# Patient Record
Sex: MALE | Race: WHITE | NOT HISPANIC OR LATINO | Employment: OTHER | URBAN - METROPOLITAN AREA
[De-identification: names, ages, dates, MRNs, and addresses within clinical notes are randomized per-mention and may not be internally consistent; named-entity substitution may affect disease eponyms.]

---

## 2019-06-02 PROBLEM — K21.00 GERD WITH ESOPHAGITIS: Status: ACTIVE | Noted: 2017-11-09

## 2019-06-02 PROBLEM — D35.01 ADENOMA OF RIGHT ADRENAL GLAND: Status: ACTIVE | Noted: 2017-11-09

## 2019-06-03 PROBLEM — Z12.5 SCREENING FOR MALIGNANT NEOPLASM OF PROSTATE: Status: ACTIVE | Noted: 2019-06-03

## 2019-06-03 PROBLEM — Z51.81 ENCOUNTER FOR THERAPEUTIC DRUG MONITORING: Status: ACTIVE | Noted: 2019-06-03

## 2019-06-03 PROBLEM — E55.9 VITAMIN D DEFICIENCY: Status: ACTIVE | Noted: 2019-06-03

## 2019-06-03 PROBLEM — E13.9 DIABETES 1.5, MANAGED AS TYPE 2: Status: ACTIVE | Noted: 2019-06-03

## 2019-06-03 PROBLEM — Z79.899 ENCOUNTER FOR LONG-TERM (CURRENT) USE OF MEDICATIONS: Status: ACTIVE | Noted: 2019-06-03

## 2019-06-03 PROBLEM — E56.9 MULTIPLE VITAMIN DEFICIENCY DISEASE: Status: ACTIVE | Noted: 2019-06-03

## 2019-06-03 PROBLEM — E88.810 METABOLIC SYNDROME: Status: ACTIVE | Noted: 2019-06-03

## 2019-06-03 PROBLEM — Z79.899 OTHER LONG TERM (CURRENT) DRUG THERAPY: Status: ACTIVE | Noted: 2019-06-03

## 2019-06-03 PROBLEM — R53.82 CHRONIC FATIGUE AND MALAISE: Status: ACTIVE | Noted: 2019-06-03

## 2019-06-03 PROBLEM — R93.89 ABNORMAL CAT SCAN: Status: ACTIVE | Noted: 2019-06-03

## 2019-06-03 PROBLEM — M15.9 GENERALIZED OSTEOARTHROSIS, INVOLVING MULTIPLE SITES: Status: ACTIVE | Noted: 2019-06-03

## 2019-06-03 PROBLEM — E08.43 DIABETES MELLITUS DUE TO UNDERLYING CONDITION WITH DIABETIC AUTONOMIC NEUROPATHY, WITHOUT LONG-TERM CURRENT USE OF INSULIN: Status: ACTIVE | Noted: 2019-06-03

## 2019-06-03 PROBLEM — R53.81 CHRONIC FATIGUE AND MALAISE: Status: ACTIVE | Noted: 2019-06-03

## 2019-06-03 PROBLEM — G60.9 HEREDITARY AND IDIOPATHIC NEUROPATHY: Status: ACTIVE | Noted: 2019-06-03

## 2019-06-03 PROBLEM — R06.09 DYSPNEA ON MINIMAL EXERTION: Status: ACTIVE | Noted: 2019-06-03

## 2019-06-03 PROBLEM — Z71.3 DIETARY COUNSELING: Status: ACTIVE | Noted: 2019-06-03

## 2019-06-13 PROBLEM — R93.3 ABNORMAL COMPUTED TOMOGRAPHY OF ESOPHAGUS: Status: ACTIVE | Noted: 2019-06-13

## 2019-07-15 PROBLEM — G60.9 HEREDITARY AND IDIOPATHIC NEUROPATHY: Chronic | Status: ACTIVE | Noted: 2019-06-03

## 2019-07-15 PROBLEM — Z79.899 ENCOUNTER FOR LONG-TERM (CURRENT) USE OF MEDICATIONS: Chronic | Status: ACTIVE | Noted: 2019-06-03

## 2019-07-15 PROBLEM — Z71.3 DIETARY COUNSELING: Status: ACTIVE | Noted: 2019-07-15

## 2019-07-15 PROBLEM — Z09 FOLLOW-UP EXAMINATION FOLLOWING COMBINED TREATMENT: Status: ACTIVE | Noted: 2019-07-15

## 2019-07-16 PROBLEM — Z71.2 PERSON CONSULTING FOR EXPLANATION OF EXAMINATION OR TEST FINDINGS: Status: ACTIVE | Noted: 2019-07-16

## 2019-07-16 PROBLEM — G47.00 INSOMNIA: Status: ACTIVE | Noted: 2019-07-16

## 2019-10-14 PROBLEM — Z09 FOLLOW-UP EXAMINATION FOLLOWING COMBINED TREATMENT: Status: RESOLVED | Noted: 2019-07-15 | Resolved: 2019-10-14

## 2019-10-16 PROBLEM — H68.001: Status: ACTIVE | Noted: 2019-10-16

## 2020-01-27 ENCOUNTER — HOSPITAL ENCOUNTER (INPATIENT)
Facility: HOSPITAL | Age: 60
LOS: 11 days | Discharge: NURSING, REHAB OR LONG-TERM CARE | DRG: 041 | End: 2020-02-07
Attending: EMERGENCY MEDICINE | Admitting: PSYCHIATRY & NEUROLOGY
Payer: MEDICARE

## 2020-01-27 DIAGNOSIS — D62 ACUTE BLOOD LOSS ANEMIA: ICD-10-CM

## 2020-01-27 DIAGNOSIS — S43.005A DISLOCATION OF LEFT SHOULDER JOINT, INITIAL ENCOUNTER: ICD-10-CM

## 2020-01-27 DIAGNOSIS — K21.00 GERD WITH ESOPHAGITIS: ICD-10-CM

## 2020-01-27 DIAGNOSIS — S42.91XA CLOSED FRACTURE DISLOCATION OF JOINT OF RIGHT SHOULDER GIRDLE, INITIAL ENCOUNTER: ICD-10-CM

## 2020-01-27 DIAGNOSIS — S43.006A SHOULDER DISLOCATION: ICD-10-CM

## 2020-01-27 DIAGNOSIS — I10 HTN (HYPERTENSION): ICD-10-CM

## 2020-01-27 DIAGNOSIS — E08.43 DIABETES MELLITUS DUE TO UNDERLYING CONDITION WITH DIABETIC AUTONOMIC NEUROPATHY, WITHOUT LONG-TERM CURRENT USE OF INSULIN: ICD-10-CM

## 2020-01-27 DIAGNOSIS — S43.004A DISLOCATION OF RIGHT SHOULDER JOINT, INITIAL ENCOUNTER: ICD-10-CM

## 2020-01-27 DIAGNOSIS — S42.121D CLOSED DISPLACED FRACTURE OF RIGHT ACROMIAL PROCESS WITH ROUTINE HEALING, SUBSEQUENT ENCOUNTER: ICD-10-CM

## 2020-01-27 DIAGNOSIS — E55.9 VITAMIN D DEFICIENCY: ICD-10-CM

## 2020-01-27 DIAGNOSIS — F10.10 ALCOHOL ABUSE: Chronic | ICD-10-CM

## 2020-01-27 DIAGNOSIS — F17.200 CURRENT EVERY DAY SMOKER: ICD-10-CM

## 2020-01-27 DIAGNOSIS — S42.92XA CLOSED FRACTURE DISLOCATION OF JOINT OF LEFT SHOULDER GIRDLE, INITIAL ENCOUNTER: ICD-10-CM

## 2020-01-27 DIAGNOSIS — R06.02 SHORTNESS OF BREATH: ICD-10-CM

## 2020-01-27 DIAGNOSIS — J42 CHRONIC BRONCHITIS, UNSPECIFIED CHRONIC BRONCHITIS TYPE: Chronic | ICD-10-CM

## 2020-01-27 DIAGNOSIS — S42.91XD CLOSED FRACTURE DISLOCATION OF JOINT OF RIGHT SHOULDER GIRDLE WITH ROUTINE HEALING, SUBSEQUENT ENCOUNTER: ICD-10-CM

## 2020-01-27 DIAGNOSIS — I48.0 PAF (PAROXYSMAL ATRIAL FIBRILLATION): ICD-10-CM

## 2020-01-27 DIAGNOSIS — M54.50 ACUTE BILATERAL LOW BACK PAIN, UNSPECIFIED WHETHER SCIATICA PRESENT: ICD-10-CM

## 2020-01-27 DIAGNOSIS — S42.122D: ICD-10-CM

## 2020-01-27 DIAGNOSIS — S42.301B: ICD-10-CM

## 2020-01-27 DIAGNOSIS — I10 ESSENTIAL HYPERTENSION: ICD-10-CM

## 2020-01-27 DIAGNOSIS — K59.00 CONSTIPATION, UNSPECIFIED CONSTIPATION TYPE: ICD-10-CM

## 2020-01-27 DIAGNOSIS — J44.9 CHRONIC OBSTRUCTIVE PULMONARY DISEASE, UNSPECIFIED COPD TYPE: Chronic | ICD-10-CM

## 2020-01-27 DIAGNOSIS — S42.302B: ICD-10-CM

## 2020-01-27 DIAGNOSIS — S06.5XAA SUBDURAL HEMATOMA: Primary | ICD-10-CM

## 2020-01-27 LAB
ALBUMIN SERPL BCP-MCNC: 2.9 G/DL (ref 3.5–5.2)
ALP SERPL-CCNC: 129 U/L (ref 55–135)
ALT SERPL W/O P-5'-P-CCNC: 17 U/L (ref 10–44)
ANION GAP SERPL CALC-SCNC: 11 MMOL/L (ref 8–16)
AST SERPL-CCNC: 22 U/L (ref 10–40)
BASOPHILS # BLD AUTO: 0.05 K/UL (ref 0–0.2)
BASOPHILS NFR BLD: 0.2 % (ref 0–1.9)
BILIRUB SERPL-MCNC: 0.8 MG/DL (ref 0.1–1)
BUN SERPL-MCNC: 12 MG/DL (ref 6–20)
CALCIUM SERPL-MCNC: 7.2 MG/DL (ref 8.7–10.5)
CHLORIDE SERPL-SCNC: 102 MMOL/L (ref 95–110)
CHOLEST SERPL-MCNC: 122 MG/DL (ref 120–199)
CHOLEST SERPL-MCNC: 122 MG/DL (ref 120–199)
CHOLEST/HDLC SERPL: 5.5 {RATIO} (ref 2–5)
CHOLEST/HDLC SERPL: 5.5 {RATIO} (ref 2–5)
CO2 SERPL-SCNC: 24 MMOL/L (ref 23–29)
CREAT SERPL-MCNC: 1.2 MG/DL (ref 0.5–1.4)
DIFFERENTIAL METHOD: ABNORMAL
EOSINOPHIL # BLD AUTO: 0 K/UL (ref 0–0.5)
EOSINOPHIL NFR BLD: 0 % (ref 0–8)
ERYTHROCYTE [DISTWIDTH] IN BLOOD BY AUTOMATED COUNT: 16.8 % (ref 11.5–14.5)
EST. GFR  (AFRICAN AMERICAN): >60 ML/MIN/1.73 M^2
EST. GFR  (NON AFRICAN AMERICAN): >60 ML/MIN/1.73 M^2
GLUCOSE SERPL-MCNC: 147 MG/DL (ref 70–110)
GLUCOSE SERPL-MCNC: 152 MG/DL (ref 70–110)
HCT VFR BLD AUTO: 44.4 % (ref 40–54)
HDLC SERPL-MCNC: 22 MG/DL (ref 40–75)
HDLC SERPL-MCNC: 22 MG/DL (ref 40–75)
HDLC SERPL: 18 % (ref 20–50)
HDLC SERPL: 18 % (ref 20–50)
HGB BLD-MCNC: 14 G/DL (ref 14–18)
IMM GRANULOCYTES # BLD AUTO: 0.3 K/UL (ref 0–0.04)
IMM GRANULOCYTES NFR BLD AUTO: 1.5 % (ref 0–0.5)
INR PPP: 1.3 (ref 0.8–1.2)
LDLC SERPL CALC-MCNC: 61 MG/DL (ref 63–159)
LDLC SERPL CALC-MCNC: 61 MG/DL (ref 63–159)
LYMPHOCYTES # BLD AUTO: 1 K/UL (ref 1–4.8)
LYMPHOCYTES NFR BLD: 4.9 % (ref 18–48)
MCH RBC QN AUTO: 27.6 PG (ref 27–31)
MCHC RBC AUTO-ENTMCNC: 31.5 G/DL (ref 32–36)
MCV RBC AUTO: 87 FL (ref 82–98)
MONOCYTES # BLD AUTO: 1 K/UL (ref 0.3–1)
MONOCYTES NFR BLD: 4.8 % (ref 4–15)
NEUTROPHILS # BLD AUTO: 17.7 K/UL (ref 1.8–7.7)
NEUTROPHILS NFR BLD: 88.6 % (ref 38–73)
NONHDLC SERPL-MCNC: 100 MG/DL
NONHDLC SERPL-MCNC: 100 MG/DL
NRBC BLD-RTO: 0 /100 WBC
PLATELET # BLD AUTO: 153 K/UL (ref 150–350)
PMV BLD AUTO: 9.6 FL (ref 9.2–12.9)
POTASSIUM SERPL-SCNC: 3.5 MMOL/L (ref 3.5–5.1)
PROT SERPL-MCNC: 6.1 G/DL (ref 6–8.4)
PROTHROMBIN TIME: 12.5 SEC (ref 9–12.5)
RBC # BLD AUTO: 5.08 M/UL (ref 4.6–6.2)
SODIUM SERPL-SCNC: 137 MMOL/L (ref 136–145)
TRIGL SERPL-MCNC: 195 MG/DL (ref 30–150)
TRIGL SERPL-MCNC: 195 MG/DL (ref 30–150)
TROPONIN I SERPL DL<=0.01 NG/ML-MCNC: 0.01 NG/ML (ref 0–0.03)
TSH SERPL DL<=0.005 MIU/L-ACNC: 0.67 UIU/ML (ref 0.4–4)
TSH SERPL DL<=0.005 MIU/L-ACNC: 0.67 UIU/ML (ref 0.4–4)
WBC # BLD AUTO: 20.03 K/UL (ref 3.9–12.7)

## 2020-01-27 PROCEDURE — 80061 LIPID PANEL: CPT

## 2020-01-27 PROCEDURE — 85025 COMPLETE CBC W/AUTO DIFF WBC: CPT

## 2020-01-27 PROCEDURE — 82962 GLUCOSE BLOOD TEST: CPT

## 2020-01-27 PROCEDURE — 99291 CRITICAL CARE FIRST HOUR: CPT | Mod: 25

## 2020-01-27 PROCEDURE — 85610 PROTHROMBIN TIME: CPT

## 2020-01-27 PROCEDURE — 99284 PR EMERGENCY DEPT VISIT,LEVEL IV: ICD-10-PCS | Mod: ,,, | Performed by: EMERGENCY MEDICINE

## 2020-01-27 PROCEDURE — 99284 EMERGENCY DEPT VISIT MOD MDM: CPT | Mod: ,,, | Performed by: EMERGENCY MEDICINE

## 2020-01-27 PROCEDURE — 84484 ASSAY OF TROPONIN QUANT: CPT

## 2020-01-27 PROCEDURE — 63600175 PHARM REV CODE 636 W HCPCS: Performed by: NURSE PRACTITIONER

## 2020-01-27 PROCEDURE — 12000002 HC ACUTE/MED SURGE SEMI-PRIVATE ROOM

## 2020-01-27 PROCEDURE — 86850 RBC ANTIBODY SCREEN: CPT

## 2020-01-27 PROCEDURE — 96375 TX/PRO/DX INJ NEW DRUG ADDON: CPT

## 2020-01-27 PROCEDURE — 99223 PR INITIAL HOSPITAL CARE,LEVL III: ICD-10-PCS | Mod: ,,, | Performed by: NURSE PRACTITIONER

## 2020-01-27 PROCEDURE — 83036 HEMOGLOBIN GLYCOSYLATED A1C: CPT

## 2020-01-27 PROCEDURE — 25000003 PHARM REV CODE 250: Performed by: EMERGENCY MEDICINE

## 2020-01-27 PROCEDURE — 25000003 PHARM REV CODE 250: Performed by: STUDENT IN AN ORGANIZED HEALTH CARE EDUCATION/TRAINING PROGRAM

## 2020-01-27 PROCEDURE — 80053 COMPREHEN METABOLIC PANEL: CPT

## 2020-01-27 PROCEDURE — 99223 1ST HOSP IP/OBS HIGH 75: CPT | Mod: ,,, | Performed by: NURSE PRACTITIONER

## 2020-01-27 PROCEDURE — 84443 ASSAY THYROID STIM HORMONE: CPT

## 2020-01-27 PROCEDURE — 96365 THER/PROPH/DIAG IV INF INIT: CPT

## 2020-01-27 RX ORDER — IPRATROPIUM BROMIDE AND ALBUTEROL SULFATE 2.5; .5 MG/3ML; MG/3ML
3 SOLUTION RESPIRATORY (INHALATION) EVERY 4 HOURS PRN
Status: DISCONTINUED | OUTPATIENT
Start: 2020-01-28 | End: 2020-01-30

## 2020-01-27 RX ORDER — INSULIN ASPART 100 [IU]/ML
1-10 INJECTION, SOLUTION INTRAVENOUS; SUBCUTANEOUS EVERY 6 HOURS PRN
Status: DISCONTINUED | OUTPATIENT
Start: 2020-01-27 | End: 2020-02-01

## 2020-01-27 RX ORDER — SODIUM CHLORIDE 0.9 % (FLUSH) 0.9 %
10 SYRINGE (ML) INJECTION
Status: DISCONTINUED | OUTPATIENT
Start: 2020-01-27 | End: 2020-02-07 | Stop reason: HOSPADM

## 2020-01-27 RX ORDER — ONDANSETRON 2 MG/ML
4 INJECTION INTRAMUSCULAR; INTRAVENOUS EVERY 8 HOURS PRN
Status: DISCONTINUED | OUTPATIENT
Start: 2020-01-27 | End: 2020-02-07 | Stop reason: HOSPADM

## 2020-01-27 RX ORDER — GLUCAGON 1 MG
1 KIT INJECTION
Status: DISCONTINUED | OUTPATIENT
Start: 2020-01-27 | End: 2020-02-01

## 2020-01-27 RX ORDER — LIDOCAINE HYDROCHLORIDE 10 MG/ML
20 INJECTION INFILTRATION; PERINEURAL ONCE
Status: COMPLETED | OUTPATIENT
Start: 2020-01-27 | End: 2020-01-28

## 2020-01-27 RX ORDER — NICARDIPINE HYDROCHLORIDE 0.2 MG/ML
2.5 INJECTION INTRAVENOUS CONTINUOUS
Status: DISCONTINUED | OUTPATIENT
Start: 2020-01-27 | End: 2020-01-29

## 2020-01-27 RX ORDER — SODIUM CHLORIDE 9 MG/ML
INJECTION, SOLUTION INTRAVENOUS CONTINUOUS
Status: DISCONTINUED | OUTPATIENT
Start: 2020-01-27 | End: 2020-02-02

## 2020-01-27 RX ORDER — ATORVASTATIN CALCIUM 20 MG/1
80 TABLET, FILM COATED ORAL NIGHTLY
Status: DISCONTINUED | OUTPATIENT
Start: 2020-01-28 | End: 2020-01-28

## 2020-01-27 RX ORDER — FENTANYL CITRATE 50 UG/ML
6.25 INJECTION, SOLUTION INTRAMUSCULAR; INTRAVENOUS
Status: DISCONTINUED | OUTPATIENT
Start: 2020-01-28 | End: 2020-01-28

## 2020-01-27 RX ORDER — PANTOPRAZOLE SODIUM 40 MG/1
40 TABLET, DELAYED RELEASE ORAL DAILY
Status: DISCONTINUED | OUTPATIENT
Start: 2020-01-28 | End: 2020-01-28

## 2020-01-27 RX ORDER — LABETALOL HCL 20 MG/4 ML
10 SYRINGE (ML) INTRAVENOUS EVERY 4 HOURS PRN
Status: DISCONTINUED | OUTPATIENT
Start: 2020-01-27 | End: 2020-02-02

## 2020-01-27 RX ORDER — LEVETIRACETAM 5 MG/ML
500 INJECTION INTRAVASCULAR EVERY 12 HOURS
Status: DISCONTINUED | OUTPATIENT
Start: 2020-01-27 | End: 2020-01-28

## 2020-01-27 RX ORDER — METOPROLOL TARTRATE 25 MG/1
25 TABLET, FILM COATED ORAL 2 TIMES DAILY
Status: DISCONTINUED | OUTPATIENT
Start: 2020-01-28 | End: 2020-01-28

## 2020-01-27 RX ORDER — ACETAMINOPHEN 500 MG
1000 TABLET ORAL
Status: COMPLETED | OUTPATIENT
Start: 2020-01-27 | End: 2020-01-27

## 2020-01-27 RX ADMIN — FENTANYL CITRATE 6.25 MCG: 50 INJECTION INTRAMUSCULAR; INTRAVENOUS at 11:01

## 2020-01-27 RX ADMIN — SODIUM CHLORIDE: 0.9 INJECTION, SOLUTION INTRAVENOUS at 11:01

## 2020-01-27 RX ADMIN — NICARDIPINE HYDROCHLORIDE 2.5 MG/HR: 0.2 INJECTION, SOLUTION INTRAVENOUS at 09:01

## 2020-01-27 RX ADMIN — LEVETIRACETAM 500 MG: 5 INJECTION INTRAVENOUS at 10:01

## 2020-01-27 RX ADMIN — ACETAMINOPHEN 1000 MG: 500 TABLET ORAL at 09:01

## 2020-01-28 ENCOUNTER — ANESTHESIA (OUTPATIENT)
Dept: SURGERY | Facility: HOSPITAL | Age: 60
DRG: 041 | End: 2020-01-28
Payer: MEDICARE

## 2020-01-28 ENCOUNTER — ANESTHESIA EVENT (OUTPATIENT)
Dept: SURGERY | Facility: HOSPITAL | Age: 60
DRG: 041 | End: 2020-01-28
Payer: MEDICARE

## 2020-01-28 PROBLEM — S42.92XA: Status: ACTIVE | Noted: 2020-01-27

## 2020-01-28 PROBLEM — S42.91XA: Status: ACTIVE | Noted: 2020-01-28

## 2020-01-28 LAB
ABO + RH BLD: NORMAL
ALBUMIN SERPL BCP-MCNC: 2.9 G/DL (ref 3.5–5.2)
ALLENS TEST: ABNORMAL
ALP SERPL-CCNC: 122 U/L (ref 55–135)
ALT SERPL W/O P-5'-P-CCNC: 15 U/L (ref 10–44)
ANION GAP SERPL CALC-SCNC: 11 MMOL/L (ref 8–16)
APTT BLDCRRT: 25.5 SEC (ref 21–32)
ASCENDING AORTA: 3.36 CM
AST SERPL-CCNC: 20 U/L (ref 10–40)
AV INDEX (PROSTH): 0.73
AV MEAN GRADIENT: 4 MMHG
AV PEAK GRADIENT: 7 MMHG
AV VALVE AREA: 2.37 CM2
AV VELOCITY RATIO: 0.63
BACTERIA #/AREA URNS AUTO: ABNORMAL /HPF
BASOPHILS # BLD AUTO: 0.03 K/UL (ref 0–0.2)
BASOPHILS # BLD AUTO: 0.03 K/UL (ref 0–0.2)
BASOPHILS NFR BLD: 0.2 % (ref 0–1.9)
BASOPHILS NFR BLD: 0.2 % (ref 0–1.9)
BILIRUB SERPL-MCNC: 0.9 MG/DL (ref 0.1–1)
BILIRUB UR QL STRIP: NEGATIVE
BLD GP AB SCN CELLS X3 SERPL QL: NORMAL
BSA FOR ECHO PROCEDURE: 2.35 M2
BUN SERPL-MCNC: 12 MG/DL (ref 6–20)
CALCIUM SERPL-MCNC: 7 MG/DL (ref 8.7–10.5)
CHLORIDE SERPL-SCNC: 101 MMOL/L (ref 95–110)
CLARITY UR REFRACT.AUTO: ABNORMAL
CO2 SERPL-SCNC: 25 MMOL/L (ref 23–29)
COLOR UR AUTO: YELLOW
CREAT SERPL-MCNC: 1.2 MG/DL (ref 0.5–1.4)
CV ECHO LV RWT: 0.43 CM
DELSYS: ABNORMAL
DIFFERENTIAL METHOD: ABNORMAL
DIFFERENTIAL METHOD: ABNORMAL
DOP CALC AO PEAK VEL: 1.29 M/S
DOP CALC AO VTI: 20.43 CM
DOP CALC LVOT AREA: 3.2 CM2
DOP CALC LVOT DIAMETER: 2.03 CM
DOP CALC LVOT PEAK VEL: 0.81 M/S
DOP CALC LVOT STROKE VOLUME: 48.33 CM3
DOP CALCLVOT PEAK VEL VTI: 14.94 CM
ECHO LV POSTERIOR WALL: 1.1 CM (ref 0.6–1.1)
EOSINOPHIL # BLD AUTO: 0 K/UL (ref 0–0.5)
EOSINOPHIL # BLD AUTO: 0 K/UL (ref 0–0.5)
EOSINOPHIL NFR BLD: 0.1 % (ref 0–8)
EOSINOPHIL NFR BLD: 0.1 % (ref 0–8)
ERYTHROCYTE [DISTWIDTH] IN BLOOD BY AUTOMATED COUNT: 16.7 % (ref 11.5–14.5)
ERYTHROCYTE [DISTWIDTH] IN BLOOD BY AUTOMATED COUNT: 16.7 % (ref 11.5–14.5)
ERYTHROCYTE [SEDIMENTATION RATE] IN BLOOD BY WESTERGREN METHOD: 14 MM/H
EST. GFR  (AFRICAN AMERICAN): >60 ML/MIN/1.73 M^2
EST. GFR  (NON AFRICAN AMERICAN): >60 ML/MIN/1.73 M^2
ESTIMATED AVG GLUCOSE: 123 MG/DL (ref 68–131)
FIO2: 50
FRACTIONAL SHORTENING: 35 % (ref 28–44)
GLUCOSE SERPL-MCNC: 124 MG/DL (ref 70–110)
GLUCOSE UR QL STRIP: NEGATIVE
HBA1C MFR BLD HPLC: 5.9 % (ref 4–5.6)
HCO3 UR-SCNC: 28.3 MMOL/L (ref 24–28)
HCT VFR BLD AUTO: 43 % (ref 40–54)
HCT VFR BLD AUTO: 43 % (ref 40–54)
HGB BLD-MCNC: 13.6 G/DL (ref 14–18)
HGB BLD-MCNC: 13.6 G/DL (ref 14–18)
HGB UR QL STRIP: ABNORMAL
HYALINE CASTS UR QL AUTO: 3 /LPF
IMM GRANULOCYTES # BLD AUTO: 0.23 K/UL (ref 0–0.04)
IMM GRANULOCYTES # BLD AUTO: 0.23 K/UL (ref 0–0.04)
IMM GRANULOCYTES NFR BLD AUTO: 1.4 % (ref 0–0.5)
IMM GRANULOCYTES NFR BLD AUTO: 1.4 % (ref 0–0.5)
INR PPP: 1.2 (ref 0.8–1.2)
INTERVENTRICULAR SEPTUM: 1.1 CM (ref 0.6–1.1)
KETONES UR QL STRIP: NEGATIVE
LA MAJOR: 4.84 CM
LA MINOR: 5.12 CM
LA WIDTH: 3.51 CM
LEFT ATRIUM SIZE: 3.42 CM
LEFT ATRIUM VOLUME INDEX: 22.1 ML/M2
LEFT ATRIUM VOLUME: 50.77 CM3
LEFT INTERNAL DIMENSION IN SYSTOLE: 3.29 CM (ref 2.1–4)
LEFT VENTRICLE DIASTOLIC VOLUME INDEX: 57.39 ML/M2
LEFT VENTRICLE DIASTOLIC VOLUME: 131.71 ML
LEFT VENTRICLE MASS INDEX: 93 G/M2
LEFT VENTRICLE SYSTOLIC VOLUME INDEX: 19.1 ML/M2
LEFT VENTRICLE SYSTOLIC VOLUME: 43.79 ML
LEFT VENTRICULAR INTERNAL DIMENSION IN DIASTOLE: 5.1 CM (ref 3.5–6)
LEFT VENTRICULAR MASS: 213.9 G
LEUKOCYTE ESTERASE UR QL STRIP: NEGATIVE
LYMPHOCYTES # BLD AUTO: 1.1 K/UL (ref 1–4.8)
LYMPHOCYTES # BLD AUTO: 1.1 K/UL (ref 1–4.8)
LYMPHOCYTES NFR BLD: 6.8 % (ref 18–48)
LYMPHOCYTES NFR BLD: 6.8 % (ref 18–48)
MAGNESIUM SERPL-MCNC: 1.2 MG/DL (ref 1.6–2.6)
MCH RBC QN AUTO: 27.5 PG (ref 27–31)
MCH RBC QN AUTO: 27.5 PG (ref 27–31)
MCHC RBC AUTO-ENTMCNC: 31.6 G/DL (ref 32–36)
MCHC RBC AUTO-ENTMCNC: 31.6 G/DL (ref 32–36)
MCV RBC AUTO: 87 FL (ref 82–98)
MCV RBC AUTO: 87 FL (ref 82–98)
MICROSCOPIC COMMENT: ABNORMAL
MIN VOL: 7
MODE: ABNORMAL
MONOCYTES # BLD AUTO: 1 K/UL (ref 0.3–1)
MONOCYTES # BLD AUTO: 1 K/UL (ref 0.3–1)
MONOCYTES NFR BLD: 5.8 % (ref 4–15)
MONOCYTES NFR BLD: 5.8 % (ref 4–15)
NEUTROPHILS # BLD AUTO: 14.4 K/UL (ref 1.8–7.7)
NEUTROPHILS # BLD AUTO: 14.4 K/UL (ref 1.8–7.7)
NEUTROPHILS NFR BLD: 85.7 % (ref 38–73)
NEUTROPHILS NFR BLD: 85.7 % (ref 38–73)
NITRITE UR QL STRIP: NEGATIVE
NRBC BLD-RTO: 0 /100 WBC
NRBC BLD-RTO: 0 /100 WBC
PCO2 BLDA: 58.6 MMHG (ref 35–45)
PEEP: 5
PH SMN: 7.29 [PH] (ref 7.35–7.45)
PH UR STRIP: 5 [PH] (ref 5–8)
PHOSPHATE SERPL-MCNC: 3.7 MG/DL (ref 2.7–4.5)
PIP: 24
PLATELET # BLD AUTO: 140 K/UL (ref 150–350)
PLATELET # BLD AUTO: 140 K/UL (ref 150–350)
PMV BLD AUTO: 9.8 FL (ref 9.2–12.9)
PMV BLD AUTO: 9.8 FL (ref 9.2–12.9)
PO2 BLDA: 110 MMHG (ref 80–100)
POC BE: 2 MMOL/L
POC SATURATED O2: 98 % (ref 95–100)
POC TCO2: 30 MMOL/L (ref 23–27)
POCT GLUCOSE: 147 MG/DL (ref 70–110)
POCT GLUCOSE: 83 MG/DL (ref 70–110)
POTASSIUM SERPL-SCNC: 3.5 MMOL/L (ref 3.5–5.1)
PROT SERPL-MCNC: 6.1 G/DL (ref 6–8.4)
PROT UR QL STRIP: ABNORMAL
PROTHROMBIN TIME: 11.9 SEC (ref 9–12.5)
RA MAJOR: 4.03 CM
RA PRESSURE: 3 MMHG
RA WIDTH: 2.74 CM
RBC # BLD AUTO: 4.94 M/UL (ref 4.6–6.2)
RBC # BLD AUTO: 4.94 M/UL (ref 4.6–6.2)
RBC #/AREA URNS AUTO: 1 /HPF (ref 0–4)
RIGHT VENTRICULAR END-DIASTOLIC DIMENSION: 3.43 CM
SAMPLE: ABNORMAL
SINUS: 3.07 CM
SITE: ABNORMAL
SODIUM SERPL-SCNC: 137 MMOL/L (ref 136–145)
SP GR UR STRIP: >=1.03 (ref 1–1.03)
SP02: 100
SQUAMOUS #/AREA URNS AUTO: 0 /HPF
STJ: 3.2 CM
TDI LATERAL: 0.04 M/S
TDI SEPTAL: 0.09 M/S
TDI: 0.07 M/S
TRICUSPID ANNULAR PLANE SYSTOLIC EXCURSION: 2.42 CM
URN SPEC COLLECT METH UR: ABNORMAL
VT: 450
WBC # BLD AUTO: 16.74 K/UL (ref 3.9–12.7)
WBC # BLD AUTO: 16.74 K/UL (ref 3.9–12.7)
WBC #/AREA URNS AUTO: 2 /HPF (ref 0–5)

## 2020-01-28 PROCEDURE — 25000003 PHARM REV CODE 250: Performed by: NURSE ANESTHETIST, CERTIFIED REGISTERED

## 2020-01-28 PROCEDURE — 84100 ASSAY OF PHOSPHORUS: CPT

## 2020-01-28 PROCEDURE — 88304 PR  SURG PATH,LEVEL III: ICD-10-PCS | Mod: 26,,, | Performed by: PATHOLOGY

## 2020-01-28 PROCEDURE — 80053 COMPREHEN METABOLIC PANEL: CPT

## 2020-01-28 PROCEDURE — C1713 ANCHOR/SCREW BN/BN,TIS/BN: HCPCS | Performed by: ORTHOPAEDIC SURGERY

## 2020-01-28 PROCEDURE — 88311 DECALCIFY TISSUE: CPT | Performed by: PATHOLOGY

## 2020-01-28 PROCEDURE — 63600175 PHARM REV CODE 636 W HCPCS: Performed by: NURSE PRACTITIONER

## 2020-01-28 PROCEDURE — D9220A PRA ANESTHESIA: ICD-10-PCS | Mod: CRNA,,, | Performed by: NURSE ANESTHETIST, CERTIFIED REGISTERED

## 2020-01-28 PROCEDURE — 85610 PROTHROMBIN TIME: CPT

## 2020-01-28 PROCEDURE — 63600175 PHARM REV CODE 636 W HCPCS: Performed by: STUDENT IN AN ORGANIZED HEALTH CARE EDUCATION/TRAINING PROGRAM

## 2020-01-28 PROCEDURE — S0028 INJECTION, FAMOTIDINE, 20 MG: HCPCS | Performed by: NURSE PRACTITIONER

## 2020-01-28 PROCEDURE — 63600175 PHARM REV CODE 636 W HCPCS: Performed by: NURSE ANESTHETIST, CERTIFIED REGISTERED

## 2020-01-28 PROCEDURE — D9220A PRA ANESTHESIA: Mod: CRNA,,, | Performed by: NURSE ANESTHETIST, CERTIFIED REGISTERED

## 2020-01-28 PROCEDURE — 63600175 PHARM REV CODE 636 W HCPCS

## 2020-01-28 PROCEDURE — 88304 TISSUE EXAM BY PATHOLOGIST: CPT | Performed by: PATHOLOGY

## 2020-01-28 PROCEDURE — 99900026 HC AIRWAY MAINTENANCE (STAT)

## 2020-01-28 PROCEDURE — 88304 TISSUE EXAM BY PATHOLOGIST: CPT | Mod: 26,,, | Performed by: PATHOLOGY

## 2020-01-28 PROCEDURE — C1776 JOINT DEVICE (IMPLANTABLE): HCPCS | Performed by: ORTHOPAEDIC SURGERY

## 2020-01-28 PROCEDURE — 25000242 PHARM REV CODE 250 ALT 637 W/ HCPCS: Performed by: NURSE ANESTHETIST, CERTIFIED REGISTERED

## 2020-01-28 PROCEDURE — 94761 N-INVAS EAR/PLS OXIMETRY MLT: CPT

## 2020-01-28 PROCEDURE — 88311 DECALCIFY TISSUE: CPT | Mod: 26,,, | Performed by: PATHOLOGY

## 2020-01-28 PROCEDURE — 36600 WITHDRAWAL OF ARTERIAL BLOOD: CPT

## 2020-01-28 PROCEDURE — 37000008 HC ANESTHESIA 1ST 15 MINUTES: Performed by: ORTHOPAEDIC SURGERY

## 2020-01-28 PROCEDURE — 23616 PR OPEN PROX HUMERAL FRACTURE PROSHETIC REPLACEMENT: ICD-10-PCS | Mod: LT,GC,, | Performed by: ORTHOPAEDIC SURGERY

## 2020-01-28 PROCEDURE — 82803 BLOOD GASES ANY COMBINATION: CPT

## 2020-01-28 PROCEDURE — 99900035 HC TECH TIME PER 15 MIN (STAT)

## 2020-01-28 PROCEDURE — 27201423 OPTIME MED/SURG SUP & DEVICES STERILE SUPPLY: Performed by: ORTHOPAEDIC SURGERY

## 2020-01-28 PROCEDURE — 25000003 PHARM REV CODE 250

## 2020-01-28 PROCEDURE — 36000711: Performed by: ORTHOPAEDIC SURGERY

## 2020-01-28 PROCEDURE — 99291 PR CRITICAL CARE, E/M 30-74 MINUTES: ICD-10-PCS | Mod: GC,,, | Performed by: PSYCHIATRY & NEUROLOGY

## 2020-01-28 PROCEDURE — 83735 ASSAY OF MAGNESIUM: CPT

## 2020-01-28 PROCEDURE — 99291 CRITICAL CARE FIRST HOUR: CPT | Mod: GC,,, | Performed by: PSYCHIATRY & NEUROLOGY

## 2020-01-28 PROCEDURE — D9220A PRA ANESTHESIA: Mod: ANES,,, | Performed by: ANESTHESIOLOGY

## 2020-01-28 PROCEDURE — 27000221 HC OXYGEN, UP TO 24 HOURS

## 2020-01-28 PROCEDURE — 25000003 PHARM REV CODE 250: Performed by: NURSE PRACTITIONER

## 2020-01-28 PROCEDURE — 88311 PR  DECALCIFY TISSUE: ICD-10-PCS | Mod: 26,,, | Performed by: PATHOLOGY

## 2020-01-28 PROCEDURE — 81001 URINALYSIS AUTO W/SCOPE: CPT

## 2020-01-28 PROCEDURE — 25000003 PHARM REV CODE 250: Performed by: STUDENT IN AN ORGANIZED HEALTH CARE EDUCATION/TRAINING PROGRAM

## 2020-01-28 PROCEDURE — 85025 COMPLETE CBC W/AUTO DIFF WBC: CPT

## 2020-01-28 PROCEDURE — 63600175 PHARM REV CODE 636 W HCPCS: Performed by: ORTHOPAEDIC SURGERY

## 2020-01-28 PROCEDURE — D9220A PRA ANESTHESIA: ICD-10-PCS | Mod: ANES,,, | Performed by: ANESTHESIOLOGY

## 2020-01-28 PROCEDURE — 23616 OPTX PRX HMRL FX FIX RPR RPL: CPT | Mod: LT,GC,, | Performed by: ORTHOPAEDIC SURGERY

## 2020-01-28 PROCEDURE — 36000710: Performed by: ORTHOPAEDIC SURGERY

## 2020-01-28 PROCEDURE — 85730 THROMBOPLASTIN TIME PARTIAL: CPT

## 2020-01-28 PROCEDURE — 37000009 HC ANESTHESIA EA ADD 15 MINS: Performed by: ORTHOPAEDIC SURGERY

## 2020-01-28 PROCEDURE — 63600175 PHARM REV CODE 636 W HCPCS: Performed by: INTERNAL MEDICINE

## 2020-01-28 PROCEDURE — 20000000 HC ICU ROOM

## 2020-01-28 PROCEDURE — 94002 VENT MGMT INPAT INIT DAY: CPT

## 2020-01-28 DEVICE — PLUG BONE #4: Type: IMPLANTABLE DEVICE | Site: SHOULDER | Status: FUNCTIONAL

## 2020-01-28 DEVICE — CEMENT BONE ANTIBIO SIMPLEX P: Type: IMPLANTABLE DEVICE | Site: SHOULDER | Status: FUNCTIONAL

## 2020-01-28 RX ORDER — FENTANYL CITRATE 50 UG/ML
25 INJECTION, SOLUTION INTRAMUSCULAR; INTRAVENOUS ONCE
Status: COMPLETED | OUTPATIENT
Start: 2020-01-28 | End: 2020-01-28

## 2020-01-28 RX ORDER — ROCURONIUM BROMIDE 10 MG/ML
INJECTION, SOLUTION INTRAVENOUS
Status: DISCONTINUED | OUTPATIENT
Start: 2020-01-28 | End: 2020-01-28

## 2020-01-28 RX ORDER — VANCOMYCIN HYDROCHLORIDE 1 G/20ML
INJECTION, POWDER, LYOPHILIZED, FOR SOLUTION INTRAVENOUS
Status: DISCONTINUED | OUTPATIENT
Start: 2020-01-28 | End: 2020-01-28 | Stop reason: HOSPADM

## 2020-01-28 RX ORDER — DEXMEDETOMIDINE HYDROCHLORIDE 4 UG/ML
0.2 INJECTION, SOLUTION INTRAVENOUS CONTINUOUS
Status: DISCONTINUED | OUTPATIENT
Start: 2020-01-28 | End: 2020-01-28

## 2020-01-28 RX ORDER — MIDAZOLAM HYDROCHLORIDE 1 MG/ML
1 INJECTION INTRAMUSCULAR; INTRAVENOUS ONCE
Status: COMPLETED | OUTPATIENT
Start: 2020-01-28 | End: 2020-01-28

## 2020-01-28 RX ORDER — PHENYLEPHRINE HYDROCHLORIDE 10 MG/ML
INJECTION INTRAVENOUS
Status: DISCONTINUED | OUTPATIENT
Start: 2020-01-28 | End: 2020-01-28

## 2020-01-28 RX ORDER — SODIUM CHLORIDE 0.9 % (FLUSH) 0.9 %
3 SYRINGE (ML) INJECTION
Status: DISCONTINUED | OUTPATIENT
Start: 2020-01-28 | End: 2020-01-29

## 2020-01-28 RX ORDER — MIDAZOLAM HYDROCHLORIDE 1 MG/ML
INJECTION INTRAMUSCULAR; INTRAVENOUS
Status: COMPLETED
Start: 2020-01-28 | End: 2020-01-28

## 2020-01-28 RX ORDER — ONDANSETRON HYDROCHLORIDE 2 MG/ML
INJECTION, SOLUTION INTRAMUSCULAR; INTRAVENOUS
Status: DISCONTINUED | OUTPATIENT
Start: 2020-01-28 | End: 2020-01-28

## 2020-01-28 RX ORDER — FAMOTIDINE 10 MG/ML
20 INJECTION INTRAVENOUS 2 TIMES DAILY
Status: DISCONTINUED | OUTPATIENT
Start: 2020-01-28 | End: 2020-01-31

## 2020-01-28 RX ORDER — CEFAZOLIN SODIUM 1 G/3ML
2 INJECTION, POWDER, FOR SOLUTION INTRAMUSCULAR; INTRAVENOUS
Status: COMPLETED | OUTPATIENT
Start: 2020-01-28 | End: 2020-01-29

## 2020-01-28 RX ORDER — PROPOFOL 10 MG/ML
5 INJECTION, EMULSION INTRAVENOUS CONTINUOUS
Status: DISCONTINUED | OUTPATIENT
Start: 2020-01-28 | End: 2020-01-30

## 2020-01-28 RX ORDER — CHLORHEXIDINE GLUCONATE ORAL RINSE 1.2 MG/ML
15 SOLUTION DENTAL 2 TIMES DAILY
Status: DISCONTINUED | OUTPATIENT
Start: 2020-01-28 | End: 2020-01-31

## 2020-01-28 RX ORDER — DEXAMETHASONE SODIUM PHOSPHATE 4 MG/ML
INJECTION, SOLUTION INTRA-ARTICULAR; INTRALESIONAL; INTRAMUSCULAR; INTRAVENOUS; SOFT TISSUE
Status: DISCONTINUED | OUTPATIENT
Start: 2020-01-28 | End: 2020-01-28

## 2020-01-28 RX ORDER — EPHEDRINE SULFATE 50 MG/ML
INJECTION, SOLUTION INTRAVENOUS
Status: DISCONTINUED | OUTPATIENT
Start: 2020-01-28 | End: 2020-01-28

## 2020-01-28 RX ORDER — METOPROLOL TARTRATE 25 MG/1
25 TABLET, FILM COATED ORAL 2 TIMES DAILY
Status: DISCONTINUED | OUTPATIENT
Start: 2020-01-29 | End: 2020-01-31

## 2020-01-28 RX ORDER — LEVETIRACETAM 10 MG/ML
1000 INJECTION INTRAVASCULAR EVERY 12 HOURS
Status: DISCONTINUED | OUTPATIENT
Start: 2020-01-28 | End: 2020-02-02

## 2020-01-28 RX ORDER — PROPOFOL 10 MG/ML
VIAL (ML) INTRAVENOUS
Status: DISCONTINUED | OUTPATIENT
Start: 2020-01-28 | End: 2020-01-28

## 2020-01-28 RX ORDER — ATORVASTATIN CALCIUM 20 MG/1
80 TABLET, FILM COATED ORAL NIGHTLY
Status: DISCONTINUED | OUTPATIENT
Start: 2020-01-29 | End: 2020-01-31

## 2020-01-28 RX ORDER — PROPOFOL 10 MG/ML
INJECTION, EMULSION INTRAVENOUS
Status: DISPENSED
Start: 2020-01-28 | End: 2020-01-28

## 2020-01-28 RX ORDER — MIDAZOLAM HYDROCHLORIDE 1 MG/ML
INJECTION INTRAMUSCULAR; INTRAVENOUS
Status: DISCONTINUED
Start: 2020-01-28 | End: 2020-01-28 | Stop reason: WASHOUT

## 2020-01-28 RX ORDER — KETAMINE HCL IN 0.9 % NACL 50 MG/5 ML
SYRINGE (ML) INTRAVENOUS
Status: DISCONTINUED | OUTPATIENT
Start: 2020-01-28 | End: 2020-01-28

## 2020-01-28 RX ORDER — FENTANYL CITRATE 50 UG/ML
50 INJECTION, SOLUTION INTRAMUSCULAR; INTRAVENOUS
Status: DISCONTINUED | OUTPATIENT
Start: 2020-01-28 | End: 2020-02-02

## 2020-01-28 RX ORDER — LIDOCAINE HCL/PF 100 MG/5ML
SYRINGE (ML) INTRAVENOUS
Status: DISCONTINUED | OUTPATIENT
Start: 2020-01-28 | End: 2020-01-28

## 2020-01-28 RX ORDER — DEXMEDETOMIDINE HYDROCHLORIDE 4 UG/ML
INJECTION, SOLUTION INTRAVENOUS
Status: COMPLETED
Start: 2020-01-28 | End: 2020-01-28

## 2020-01-28 RX ORDER — FENTANYL CITRATE 50 UG/ML
INJECTION, SOLUTION INTRAMUSCULAR; INTRAVENOUS
Status: COMPLETED
Start: 2020-01-28 | End: 2020-01-28

## 2020-01-28 RX ORDER — NEOSTIGMINE METHYLSULFATE 0.5 MG/ML
INJECTION, SOLUTION INTRAVENOUS
Status: DISCONTINUED | OUTPATIENT
Start: 2020-01-28 | End: 2020-01-28

## 2020-01-28 RX ORDER — MORPHINE SULFATE 2 MG/ML
2 INJECTION, SOLUTION INTRAMUSCULAR; INTRAVENOUS EVERY 4 HOURS PRN
Status: DISCONTINUED | OUTPATIENT
Start: 2020-01-28 | End: 2020-01-28

## 2020-01-28 RX ORDER — ALBUTEROL SULFATE 90 UG/1
AEROSOL, METERED RESPIRATORY (INHALATION)
Status: DISCONTINUED | OUTPATIENT
Start: 2020-01-28 | End: 2020-01-28

## 2020-01-28 RX ORDER — MIDAZOLAM HYDROCHLORIDE 5 MG/ML
2 INJECTION INTRAMUSCULAR; INTRAVENOUS ONCE
Status: DISCONTINUED | OUTPATIENT
Start: 2020-01-28 | End: 2020-01-29

## 2020-01-28 RX ORDER — PROPOFOL 10 MG/ML
INJECTION, EMULSION INTRAVENOUS
Status: DISCONTINUED
Start: 2020-01-28 | End: 2020-01-28 | Stop reason: WASHOUT

## 2020-01-28 RX ORDER — GLYCOPYRROLATE 0.2 MG/ML
INJECTION INTRAMUSCULAR; INTRAVENOUS
Status: DISCONTINUED | OUTPATIENT
Start: 2020-01-28 | End: 2020-01-28

## 2020-01-28 RX ORDER — FENTANYL CITRATE 50 UG/ML
INJECTION, SOLUTION INTRAMUSCULAR; INTRAVENOUS
Status: DISCONTINUED | OUTPATIENT
Start: 2020-01-28 | End: 2020-01-28

## 2020-01-28 RX ORDER — FENTANYL CITRATE 50 UG/ML
12.5 INJECTION, SOLUTION INTRAMUSCULAR; INTRAVENOUS
Status: DISCONTINUED | OUTPATIENT
Start: 2020-01-28 | End: 2020-01-28

## 2020-01-28 RX ADMIN — ROCURONIUM BROMIDE 20 MG: 10 INJECTION, SOLUTION INTRAVENOUS at 05:01

## 2020-01-28 RX ADMIN — SODIUM CHLORIDE, SODIUM GLUCONATE, SODIUM ACETATE, POTASSIUM CHLORIDE, MAGNESIUM CHLORIDE, SODIUM PHOSPHATE, DIBASIC, AND POTASSIUM PHOSPHATE: .53; .5; .37; .037; .03; .012; .00082 INJECTION, SOLUTION INTRAVENOUS at 05:01

## 2020-01-28 RX ADMIN — EPHEDRINE SULFATE 5 MG: 50 INJECTION, SOLUTION INTRAMUSCULAR; INTRAVENOUS; SUBCUTANEOUS at 07:01

## 2020-01-28 RX ADMIN — DEXAMETHASONE SODIUM PHOSPHATE 4 MG: 4 INJECTION, SOLUTION INTRAMUSCULAR; INTRAVENOUS at 05:01

## 2020-01-28 RX ADMIN — PHENYLEPHRINE HYDROCHLORIDE 100 MCG: 10 INJECTION INTRAVENOUS at 06:01

## 2020-01-28 RX ADMIN — PROPOFOL 15 MCG/KG/MIN: 10 INJECTION, EMULSION INTRAVENOUS at 09:01

## 2020-01-28 RX ADMIN — FENTANYL CITRATE 12.5 MCG: 50 INJECTION INTRAMUSCULAR; INTRAVENOUS at 06:01

## 2020-01-28 RX ADMIN — THERA TABS 1 TABLET: TAB at 09:01

## 2020-01-28 RX ADMIN — LEVETIRACETAM 1000 MG: 10 INJECTION INTRAVENOUS at 09:01

## 2020-01-28 RX ADMIN — DEXTROSE 2 G: 50 INJECTION, SOLUTION INTRAVENOUS at 05:01

## 2020-01-28 RX ADMIN — MIDAZOLAM HYDROCHLORIDE 2 MG: 1 INJECTION, SOLUTION INTRAMUSCULAR; INTRAVENOUS at 01:01

## 2020-01-28 RX ADMIN — FENTANYL CITRATE 12.5 MCG: 50 INJECTION INTRAMUSCULAR; INTRAVENOUS at 10:01

## 2020-01-28 RX ADMIN — CEFAZOLIN 2 G: 1 INJECTION, POWDER, FOR SOLUTION INTRAMUSCULAR; INTRAVENOUS at 10:01

## 2020-01-28 RX ADMIN — PHENYLEPHRINE HYDROCHLORIDE 150 MCG: 10 INJECTION INTRAVENOUS at 06:01

## 2020-01-28 RX ADMIN — GLYCOPYRROLATE 0.6 MG: 0.2 INJECTION, SOLUTION INTRAMUSCULAR; INTRAVENOUS at 08:01

## 2020-01-28 RX ADMIN — NEOSTIGMINE METHYLSULFATE 5 MG: 0.5 INJECTION INTRAVENOUS at 08:01

## 2020-01-28 RX ADMIN — MORPHINE SULFATE 2 MG: 2 INJECTION, SOLUTION INTRAMUSCULAR; INTRAVENOUS at 03:01

## 2020-01-28 RX ADMIN — ROCURONIUM BROMIDE 10 MG: 10 INJECTION, SOLUTION INTRAVENOUS at 07:01

## 2020-01-28 RX ADMIN — Medication 50 MG: at 04:01

## 2020-01-28 RX ADMIN — HUMAN ALBUMIN MICROSPHERES AND PERFLUTREN 0.66 MG: 10; .22 INJECTION, SOLUTION INTRAVENOUS at 09:01

## 2020-01-28 RX ADMIN — ALBUTEROL SULFATE 2 PUFF: 90 AEROSOL, METERED RESPIRATORY (INHALATION) at 04:01

## 2020-01-28 RX ADMIN — LABETALOL HCL IV SOLN PREFILLED SYRINGE 20 MG/4ML (5 MG/ML) 10 MG: 20/4 SOLUTION PREFILLED SYRINGE at 09:01

## 2020-01-28 RX ADMIN — SODIUM CHLORIDE: 0.9 INJECTION, SOLUTION INTRAVENOUS at 03:01

## 2020-01-28 RX ADMIN — ONDANSETRON 4 MG: 2 INJECTION, SOLUTION INTRAMUSCULAR; INTRAVENOUS at 08:01

## 2020-01-28 RX ADMIN — FENTANYL CITRATE 12.5 MCG: 50 INJECTION INTRAMUSCULAR; INTRAVENOUS at 12:01

## 2020-01-28 RX ADMIN — PHENYLEPHRINE HYDROCHLORIDE 200 MCG: 10 INJECTION INTRAVENOUS at 07:01

## 2020-01-28 RX ADMIN — EPHEDRINE SULFATE 10 MG: 50 INJECTION, SOLUTION INTRAMUSCULAR; INTRAVENOUS; SUBCUTANEOUS at 08:01

## 2020-01-28 RX ADMIN — ROCURONIUM BROMIDE 50 MG: 10 INJECTION, SOLUTION INTRAVENOUS at 04:01

## 2020-01-28 RX ADMIN — FENTANYL CITRATE 50 MCG: 50 INJECTION, SOLUTION INTRAMUSCULAR; INTRAVENOUS at 09:01

## 2020-01-28 RX ADMIN — DEXMEDETOMIDINE HYDROCHLORIDE 0.8 MCG/KG/HR: 4 INJECTION, SOLUTION INTRAVENOUS at 02:01

## 2020-01-28 RX ADMIN — SODIUM CHLORIDE, SODIUM GLUCONATE, SODIUM ACETATE, POTASSIUM CHLORIDE, MAGNESIUM CHLORIDE, SODIUM PHOSPHATE, DIBASIC, AND POTASSIUM PHOSPHATE: .53; .5; .37; .037; .03; .012; .00082 INJECTION, SOLUTION INTRAVENOUS at 04:01

## 2020-01-28 RX ADMIN — PHENYLEPHRINE HYDROCHLORIDE 100 MCG: 10 INJECTION INTRAVENOUS at 05:01

## 2020-01-28 RX ADMIN — PHENYLEPHRINE HYDROCHLORIDE 150 MCG: 10 INJECTION INTRAVENOUS at 05:01

## 2020-01-28 RX ADMIN — CHLORHEXIDINE GLUCONATE 0.12% ORAL RINSE 15 ML: 1.2 LIQUID ORAL at 10:01

## 2020-01-28 RX ADMIN — FENTANYL CITRATE 12.5 MCG: 50 INJECTION INTRAMUSCULAR; INTRAVENOUS at 08:01

## 2020-01-28 RX ADMIN — FENTANYL CITRATE 12.5 MCG: 50 INJECTION INTRAMUSCULAR; INTRAVENOUS at 04:01

## 2020-01-28 RX ADMIN — LABETALOL HCL IV SOLN PREFILLED SYRINGE 20 MG/4ML (5 MG/ML) 10 MG: 20/4 SOLUTION PREFILLED SYRINGE at 02:01

## 2020-01-28 RX ADMIN — MIDAZOLAM HYDROCHLORIDE 1 MG: 1 INJECTION INTRAMUSCULAR; INTRAVENOUS at 01:01

## 2020-01-28 RX ADMIN — FAMOTIDINE 20 MG: 10 INJECTION, SOLUTION INTRAVENOUS at 10:01

## 2020-01-28 RX ADMIN — FENTANYL CITRATE 50 MCG: 50 INJECTION, SOLUTION INTRAMUSCULAR; INTRAVENOUS at 05:01

## 2020-01-28 RX ADMIN — FENTANYL CITRATE 25 MCG: 50 INJECTION INTRAMUSCULAR; INTRAVENOUS at 03:01

## 2020-01-28 RX ADMIN — PANTOPRAZOLE SODIUM 40 MG: 40 TABLET, DELAYED RELEASE ORAL at 09:01

## 2020-01-28 RX ADMIN — ROCURONIUM BROMIDE 10 MG: 10 INJECTION, SOLUTION INTRAVENOUS at 06:01

## 2020-01-28 RX ADMIN — MIDAZOLAM HYDROCHLORIDE 1 MG: 1 INJECTION, SOLUTION INTRAMUSCULAR; INTRAVENOUS at 01:01

## 2020-01-28 RX ADMIN — FENTANYL CITRATE 12.5 MCG: 50 INJECTION INTRAMUSCULAR; INTRAVENOUS at 05:01

## 2020-01-28 RX ADMIN — FENTANYL CITRATE 50 MCG: 50 INJECTION INTRAMUSCULAR; INTRAVENOUS at 09:01

## 2020-01-28 RX ADMIN — PHENYLEPHRINE HYDROCHLORIDE 50 MCG: 10 INJECTION INTRAVENOUS at 05:01

## 2020-01-28 RX ADMIN — FENTANYL CITRATE 12.5 MCG: 50 INJECTION INTRAMUSCULAR; INTRAVENOUS at 09:01

## 2020-01-28 RX ADMIN — LEVETIRACETAM 500 MG: 5 INJECTION INTRAVENOUS at 08:01

## 2020-01-28 RX ADMIN — FENTANYL CITRATE 150 MCG: 50 INJECTION, SOLUTION INTRAMUSCULAR; INTRAVENOUS at 04:01

## 2020-01-28 RX ADMIN — FENTANYL CITRATE 25 MCG: 50 INJECTION, SOLUTION INTRAMUSCULAR; INTRAVENOUS at 03:01

## 2020-01-28 RX ADMIN — PHENYLEPHRINE HYDROCHLORIDE 100 MCG: 10 INJECTION INTRAVENOUS at 07:01

## 2020-01-28 RX ADMIN — FENTANYL CITRATE 50 MCG: 50 INJECTION, SOLUTION INTRAMUSCULAR; INTRAVENOUS at 07:01

## 2020-01-28 RX ADMIN — LIDOCAINE HYDROCHLORIDE 100 MG: 20 INJECTION, SOLUTION INTRAVENOUS at 04:01

## 2020-01-28 RX ADMIN — METOPROLOL TARTRATE 25 MG: 25 TABLET ORAL at 09:01

## 2020-01-28 RX ADMIN — ONDANSETRON 4 MG: 2 INJECTION INTRAMUSCULAR; INTRAVENOUS at 08:01

## 2020-01-28 RX ADMIN — PHENYLEPHRINE HYDROCHLORIDE 200 MCG: 10 INJECTION INTRAVENOUS at 06:01

## 2020-01-28 RX ADMIN — SODIUM CHLORIDE, SODIUM GLUCONATE, SODIUM ACETATE, POTASSIUM CHLORIDE, MAGNESIUM CHLORIDE, SODIUM PHOSPHATE, DIBASIC, AND POTASSIUM PHOSPHATE: .53; .5; .37; .037; .03; .012; .00082 INJECTION, SOLUTION INTRAVENOUS at 07:01

## 2020-01-28 RX ADMIN — LIDOCAINE HYDROCHLORIDE 20 ML: 10 INJECTION, SOLUTION INFILTRATION; PERINEURAL at 02:01

## 2020-01-28 RX ADMIN — FENTANYL CITRATE 25 MCG: 50 INJECTION INTRAMUSCULAR; INTRAVENOUS at 01:01

## 2020-01-28 RX ADMIN — PROPOFOL 150 MG: 10 INJECTION, EMULSION INTRAVENOUS at 04:01

## 2020-01-28 RX ADMIN — FENTANYL CITRATE 12.5 MCG: 50 INJECTION INTRAMUSCULAR; INTRAVENOUS at 11:01

## 2020-01-28 RX ADMIN — FENTANYL CITRATE 12.5 MCG: 50 INJECTION INTRAMUSCULAR; INTRAVENOUS at 07:01

## 2020-01-28 NOTE — PLAN OF CARE
01/28/20 1620   Post-Acute Status   Post-Acute Authorization Placement   Post-Acute Placement Status Awaiting Internal Medical Clearance   Discharge Delays None known at this time       Gwen Franks RN, CCRN-K, [facility]  Neuro-Critical Care   [phone] [id number]

## 2020-01-28 NOTE — CONSULTS
[facility]  Orthopedics  Consult Note    Patient Name: Ben Melvin  MRN: [id number]  Admission Date: 1/27/2020  Hospital Length of Stay: 1 days  Attending Provider: He Doss MD  Primary Care Provider: Cristal Ohara NP    Patient information was obtained from patient, relative(s) and ER records.     Inpatient consult to Orthopedic Surgery  Consult performed by: Guicho Adkins MD  Consult ordered by: Daniela August MD        Subjective:     Principal Problem:Subdural hematoma    Chief Complaint:   Chief Complaint   Patient presents with    [place] [facility]     Neuro consult for L sudural bleed w/ midline shift. Reversing coumadin w/ k centra and platelets. On cardene at 15ml/hr.        HPI: Mr Melvin is a 60 yo male with EtOH abuse, tobacco abuse,  PAF  with long term anticoagulation (coumadin) who [place] [facility] [place] for management of  SDH and bilateral proximal humerus fracture-dislocations. He  presented to OSH via EMS for acute onset of  SOB and reported seizure activity. At approx 5 pm he was playing video poker, felt light headed, and was assisted to the ground by family. He was disoriented and confused after. Patient reports severe bilateral shoulder pain. He denies pain in other extremities. He denies numbness or tingling in the extremities. He is currently admitted to neuro critical care after CTH and chest at OSH revealed SDH. He received Kcentra and Vit K at OSH for reversal of INR.       Past Medical History:   Diagnosis Date    Adenoma of right adrenal gland 11/09/2017    Alcohol abuse     ASCVD (arteriosclerotic cardiovascular disease) 10/2008    [place]    BPH (benign prostatic hyperplasia)     Cardiomyopathy     Chronic anxiety     Cirrhosis of liver     COPD (chronic obstructive pulmonary disease)     Current every day smoker     2-3 ppd    Elevated LFTs 08/2001    GERD with esophagitis 11/09/2017    History of colon polyps      History of epididymitis 2016    Juan Antonio    Hyperlipidemia     Hypertension, essential     Long term (current) use of anticoagulants     Lumbar disc disease with radiculopathy 05/2015    Major depression, recurrent, chronic     PAF (paroxysmal atrial fibrillation)     Peripheral autonomic neuropathy due to DM     Proteinuria     PVD (peripheral vascular disease) 05/29/2009    Venous insufficiency of both lower extremities     Vitamin D deficiency        Past Surgical History:   Procedure Laterality Date    biopsy back  05/20/2019    benign Martinez    biopsy right ankle Right 05/20/2019    Martinez, benign    COLONOSCOPY N/A 6/13/2019    Procedure: COLONOSCOPY;  Surgeon: Delmer Kerr MD;  Location: [facility] ENDO;  Service: Endoscopy;  Laterality: N/A;    COLONOSCOPY W/ POLYPECTOMY      EPIDURAL STEROID INJECTION INTO LUMBAR SPINE  06/2015    LASHAE Harvey    ESOPHAGOGASTRODUODENOSCOPY N/A 6/13/2019    Procedure: ESOPHAGOGASTRODUODENOSCOPY (EGD);  Surgeon: Delmer Kerr MD;  Location: [facility] ENDO;  Service: Endoscopy;  Laterality: N/A;    INTESTINAL MALROTATION REPAIR Right 1961    2 weeks old, Martinez    PERCUTANEOUS TRANSLUMINAL ANGIOPLASTY (PTA) OF PERIPHERAL VESSEL Right 12/2014    NIKOLAS Childs       Review of patient's allergies indicates:  No Known Allergies    Current Facility-Administered Medications   Medication    0.9%  NaCl infusion    albuterol-ipratropium 2.5 mg-0.5 mg/3 mL nebulizer solution 3 mL    atorvastatin tablet 80 mg    dextrose 10% (D10W) Bolus    fentaNYL (SUBLIMAZE) 50 mcg/mL injection    fentaNYL injection 25 mcg    fentaNYL injection 6.25 mcg    glucagon (human recombinant) injection 1 mg    insulin aspart U-100 pen 1-10 Units    labetalol 20 mg/4 mL (5 mg/mL) IV syring    levETIRAcetam in NaCl (iso-os) IVPB 500 mg    metoprolol tartrate (LOPRESSOR) tablet 25 mg    midazolam (VERSED) 1 mg/mL injection    multivitamin tablet    niCARdipine 40 mg/200 mL infusion  "   ondansetron injection 4 mg    pantoprazole EC tablet 40 mg    propofol (DIPRIVAN) 10 mg/mL infusion    propofol (DIPRIVAN) 10 mg/mL infusion    sodium chloride 0.9% flush 10 mL     Family History     Problem Relation (Age of Onset)    Acromegaly Father    Diabetes Mother    Heart attack Father    Hypertension Mother, Father    Kidney cancer Brother        Tobacco Use    Smoking status: Current Every Day Smoker     Packs/day: 9.00     Years: 40.00     Pack years: 360.00     Types: Cigarettes    Smokeless tobacco: Never Used    Tobacco comment: smokes parts all day long continuously   Substance and Sexual Activity    Alcohol use: Not Currently     Frequency: Never     Comment: quit 2 years ago was heavily    Drug use: Never    Sexual activity: Yes     Partners: Female     ROS See primary provider ROS  Objective:     Vital Signs (Most Recent):  Temp: 97.8 °F (36.6 °C) (01/28/20 0100)  Pulse: 110 (01/28/20 0200)  Resp: (!) 23 (01/28/20 0200)  BP: (!) 147/83 (01/28/20 0200)  SpO2: 95 % (01/28/20 0200) Vital Signs (24h Range):  Temp:  [96.8 °F (36 °C)-97.8 °F (36.6 °C)] 97.8 °F (36.6 °C)  Pulse:  [105-122] 110  Resp:  [13-27] 23  SpO2:  [90 %-98 %] 95 %  BP: (127-175)/(65-92) 147/83     Weight: 111.1 kg (244 lb 14.9 oz)  Height: 5' 11" (180.3 cm)  Body mass index is 34.16 kg/m².      Intake/Output Summary (Last 24 hours) at 1/28/2020 0315  Last data filed at 1/28/2020 0200  Gross per 24 hour   Intake 367.29 ml   Output --   Net 367.29 ml       Ortho/SPM Exam    Afebrile, Vital signs stable   Gen - well-developed, well-nourished, no acute distress  HEENT - normocephalic, atraumatic   CV - Regular rate   Pulm - Good inspiratory effort with unlaboured breathing    Right Upper Extremity:  Skin intact, no deformity noted  No open wounds/abrasions/laceration  Ecchymosis over posterior R shoulder  TTP diffusely about right proximal humerus  No TTP R humerus shaft, elbow, forearm, wrist, hand  Pain limited ROM of R " shoulder, full painless ROM R elbow and wrist  SILT M/U/R  SILT axillary nerve   Motor intact AIN/PIN/M/U/R   Able to contract deltoid  Cap refill < 2s    Left Upper Extremity:  Skin intact, no deformity noted  No open wounds/abrasions/laceration  TTP diffusely about left proximal humerus  No TTP L humerus shaft, elbow, forearm, wrist, hand  Pain limited ROM of L shoulder, full painless ROM L elbow and wrist  SILT M/U/R  SILT axillary nerve   Motor intact AIN/PIN/M/U/R   Able to contract deltoid  Cap refill < 2s  2+ RP    All other joints (shoulder/elbow/wrist/hip/knee/ankle) were examined and had non painful ROM. All other long bones were palpated and were non-tender.       Significant Labs: All pertinent labs within the past 24 hours have been reviewed.     Recent Labs   Lab 01/27/20  2256   WBC 20.03*   RBC 5.08   HGB 14.0   HCT 44.4      MCV 87   MCH 27.6   MCHC 31.5*         Significant Imaging: I have reviewed and interpreted all pertinent imaging results/findings.     CTA chest shows bilateral fracture dislocations of proximal humerus  XR shoulders show comminuted intra articular proximal humerus fractures      Assessment/Plan:     Fracture dislocation of joint of right shoulder girdle  Patient is a 60 yo M with bilateral posterior fracture dislocations of proximal humerus. Closed, NVI. Given subdural hemorrhage the ER did not feel comfortable sedating. Using fentanyl, versed, and intra articular lidocaine overseen by neurocritical care, bilateral shoulder reductions were attempted and were ultimately unsuccessful. Bilateral sling and swath applied. Plan to discuss further management with staff. Patient to remain NPO.     Procedure note:  After time out was performed and patient ID, side, and site were verified, the area was sterilly prepped in the standard fashion. A 22-gauge needle was introduced into the left and then right shoulder site without complication with aspiration of hematoma for  confirmation. 10cc of 1% lidocaine was then injected to the bilateral shoulder joints without difficulty. Versed and fentanyl administered by neuro critical care team. After adequate analgesia, the fracture-dislocation was attempted to be close reduced. Post reduction attempt Xrays show no improvement in alignment. The patient tolerated the procedure well with no complications. Blood loss was minimal.              Guicho Adkins MD  Orthopedics  [facility]

## 2020-01-28 NOTE — CONSULTS
[facility]  Neurosurgery  Consult Note    Consults  Subjective:     Chief Complaint/Reason for Admission: SDH    History of Present Illness: 59 M with hx of paroxysmal afib on coumadin presents for eval of bilateral shoulder dislocations and left convexity SDH.  Pt had first time seizure and slumped over in chair.  There was no LOC.  He was given vit k and kcentra at OSH for hx of coumadin use.  He endorses low back pain but no neck pain.  No sensation loss in his extremities.      (Not in a hospital admission)    Review of patient's allergies indicates:  No Known Allergies    Past Medical History:   Diagnosis Date    Adenoma of right adrenal gland 11/09/2017    Alcohol abuse     ASCVD (arteriosclerotic cardiovascular disease) 10/2008    [place]    BPH (benign prostatic hyperplasia)     Cardiomyopathy     Chronic anxiety     Cirrhosis of liver     COPD (chronic obstructive pulmonary disease)     Current every day smoker     2-3 ppd    Elevated LFTs 08/2001    GERD with esophagitis 11/09/2017    History of colon polyps     History of epididymitis 2016    Juan Antonio    Hyperlipidemia     Hypertension, essential     Long term (current) use of anticoagulants     Lumbar disc disease with radiculopathy 05/2015    Major depression, recurrent, chronic     PAF (paroxysmal atrial fibrillation)     Peripheral autonomic neuropathy due to DM     Proteinuria     PVD (peripheral vascular disease) 05/29/2009    Venous insufficiency of both lower extremities     Vitamin D deficiency      Past Surgical History:   Procedure Laterality Date    biopsy back  05/20/2019    benign Martinez    biopsy right ankle Right 05/20/2019    Martinez, benign    COLONOSCOPY N/A 6/13/2019    Procedure: COLONOSCOPY;  Surgeon: Delmer Kerr MD;  Location: [facility];  Service: Endoscopy;  Laterality: N/A;    COLONOSCOPY W/ POLYPECTOMY      EPIDURAL STEROID INJECTION INTO LUMBAR SPINE  06/2015    LASHAE Harvey     ESOPHAGOGASTRODUODENOSCOPY N/A 6/13/2019    Procedure: ESOPHAGOGASTRODUODENOSCOPY (EGD);  Surgeon: Delmer Kerr MD;  Location: [facility];  Service: Endoscopy;  Laterality: N/A;    INTESTINAL MALROTATION REPAIR Right 1961    2 weeks old, Martinez    PERCUTANEOUS TRANSLUMINAL ANGIOPLASTY (PTA) OF PERIPHERAL VESSEL Right 12/2014    NIKOLAS Childs     Family History     Problem Relation (Age of Onset)    Acromegaly Father    Diabetes Mother    Heart attack Father    Hypertension Mother, Father    Kidney cancer Brother        Tobacco Use    Smoking status: Current Every Day Smoker     Packs/day: 9.00     Years: 40.00     Pack years: 360.00     Types: Cigarettes    Smokeless tobacco: Never Used    Tobacco comment: smokes parts all day long continuously   Substance and Sexual Activity    Alcohol use: Not Currently     Frequency: Never     Comment: quit 2 years ago was heavily    Drug use: Never    Sexual activity: Yes     Partners: Female     Review of Systems  Objective:     Weight: 120.2 kg (265 lb)  Body mass index is 36.96 kg/m².  Vital Signs (Most Recent):  Temp: 96.8 °F (36 °C) (01/27/20 2106)  Pulse: 110 (01/27/20 2202)  Resp: 20 (01/27/20 2202)  BP: (!) 156/78 (01/27/20 2202)  SpO2: (!) 93 % (01/27/20 2202) Vital Signs (24h Range):  Temp:  [96.8 °F (36 °C)-97.8 °F (36.6 °C)] 96.8 °F (36 °C)  Pulse:  [105-122] 110  Resp:  [13-27] 20  SpO2:  [90 %-98 %] 93 %  BP: (127-175)/(65-90) 156/78                          Neurosurgery Physical Exam   AOX3  PERRL, EOMI, face symm, tongue midline  Able to squeeze hands 5/5   5/5 in BLE  SILT  No posterior c spine tenderness    Significant Labs:  Recent Labs   Lab 01/27/20  1625   *   *   K 3.6   CL 96   CO2 25   BUN 11   CREATININE 1.30   CALCIUM 7.8*     Recent Labs   Lab 01/27/20  1625   WBC 22.30*   HGB 16.6   HCT 49.5        Recent Labs   Lab 01/27/20  1625   LABPT 25.8*   INR 2.4*   APTT 40.9*     Microbiology Results (last 7 days)     ** No  results found for the last 168 hours. **            Significant Diagnostics:  CT head: reviewed.    Assessment/Plan:     Subdural hematoma  59 M with hx of paroxysmal afib on coumadin presents for eval of bilateral shoulder dislocations, left convexity SDH.  -Admit to neuro ICU  -q 1 hr neuro checks  -Repeat CT head 6 hours from prior  -Keppra  -Hold coumadin, goal INR of less than 1.4  -Ortho consult for bilateral shoulder dislocation  -Fu CT C/T/L spine  -Will follow.          Rik Rios,   Neurosurgery  [facility]

## 2020-01-28 NOTE — PT/OT/SLP PROGRESS
Speech Language Pathology      Ben Melvin  MRN: [id number]    Patient not seen today secondary to Other (Comment)(pt unavailable on multiple (5) attempts this AM. On final attempt, pt was leaving floor for MRI and nurse state pt going to OR at 3:00 2/2 bilateral shoulder dislocation and fracture).  Will require new orders s/p surgery. Will await new orders.    MELANI Dominguez, CCC-SLP     MELANI Dominguez, CCC-SLP  Speech Language Pathologist  [phone]  1/28/2020

## 2020-01-28 NOTE — PROGRESS NOTES
[facility]  Neurocritical Care  Progress Note    Admit Date: 1/27/2020  Service Date: 01/28/2020  Length of Stay: 1    Subjective:     Chief Complaint: Subdural hematoma    History of Present Illness: Mr Melvin is a 58 yo male with EtOH abuse, tobacco abuse,  PAF  With long term anticoagulation (coumadin) who presents to [facility] for SDH and bilateral shoulder dislocation. He  presented to OSH via EMS for acute onset of  SOB and reported seizure activity. At approx 5 pm he was playing video poker, felt light headed, and was assisted  Ground by family. He had what the family felt was a seizure but appeared awake during it. He was disoriented and confused after. He then reported had SOB, chest pain, and BUE pain. Patient also reports new onset severe lower back pain. He returned to baseline mental status shortly after per family. CTH and chest at OSH revealed SDH and bilateral upper ext dislocations. He received Kcentra and Vit K at OSH for reversal.  He is being admitted to [facility] for a higher level of care.     Hospital Course: 1/27: Admit NCC, repeat CTH, SBP <160, consult NSGY and ortho, received Kcentra and vit K at OSH  1/28: Keppra increased to 1G BD, MRI C-spine negative for compression fracture, NSGY cleared for undergoing ortho procedure under C-collar, normal PT/INR, no acute intervention per NSGY will hold anticoagulation, F/U with MRI L-Spine, Needs epilepsy follow up, PRN Morphine for pain    Interval History:      Review of Systems   Constitutional: Positive for activity change.   HENT: Positive for voice change.    Eyes: Negative.    Respiratory: Negative.    Cardiovascular: Negative.    Gastrointestinal: Negative.    Endocrine: Negative.    Genitourinary: Negative.    Musculoskeletal: Positive for arthralgias and back pain.   Skin: Negative.    Allergic/Immunologic: Negative.    Neurological: Positive for weakness.   Hematological: Negative.    Psychiatric/Behavioral: Negative.       Objective:     Vitals:  Temp: 99.1 °F (37.3 °C)  Pulse: 110  Rhythm: sinus tachycardia  BP: (!) 154/63  MAP (mmHg): 91  Resp: (!) 24  SpO2: 96 %  O2 Device (Oxygen Therapy): nasal cannula    Temp  Min: 96.8 °F (36 °C)  Max: 99.2 °F (37.3 °C)  Pulse  Min: 70  Max: 123  BP  Min: 127/65  Max: 175/85  MAP (mmHg)  Min: 85  Max: 121  Resp  Min: 13  Max: 41  SpO2  Min: 90 %  Max: 98 %    01/27 0701 - 01/28 0700  In: 782.1 [I.V.:682.1]  Out: -    Unmeasured Output  Urine Occurrence: 1  Pad Count: 2       Physical Exam  Neuro: ALERT oriented x 3, On C-collar and B/L arm slings  Patient in distress due to pain  GCS - 15  No facial asymmetry, abnormal speech 2/2 to collar placement, EOMs intact, PERRLA  Able to move upper and lower extremity distal>proximal    Medications:  Continuous  sodium chloride 0.9% Last Rate: 75 mL/hr at 01/28/20 1519   niCARdipine Last Rate: Stopped (01/28/20 0801)   Scheduled  atorvastatin 80 mg QHS   levetiracetam IVPB 1,000 mg Q12H   metoprolol tartrate 25 mg BID   midazolam 2 mg Once   multivitamin 1 tablet Daily   pantoprazole 40 mg Daily   PRN  albuterol-ipratropium 3 mL Q4H PRN   Dextrose 10% Bolus 12.5 g PRN   fentaNYL 12.5 mcg Q1H PRN   glucagon (human recombinant) 1 mg PRN   insulin aspart U-100 1-10 Units Q6H PRN   labetalol 10 mg Q4H PRN   ondansetron 4 mg Q8H PRN   sodium chloride 0.9% 10 mL PRN     Today I personally reviewed pertinent medications, lines/drains/airways, imaging, cardiology results, laboratory results, microbiology results, notably:    Diet  Diet NPO  Diet NPO        Assessment/Plan:     Neuro  * Subdural hematoma  - NSGY consulted  - Kcentra and Vit K for warfarin  - Repeat INR pending  - Hold anticoagulation at this time  - SBP <160   - Q 1 vitals  - Q1 neuro Checks  - TSH, Lipid, A1c, Echo, EKG  - PT/Ot eval and treat   - Repeat CTH stable  - TLSO when OOB/upright  - MRI C spine and MRI L spine including up to T11        Psychiatric  Alcohol abuse  - Stopped daily  drinking several years ago   - reports intermittent ETOH use   - MVI, Folate, Thiamine daily     Pulmonary  COPD (chronic obstructive pulmonary disease)  - Duo nebs   - Sats >88    Cardiac/Vascular  Hypertension, essential  - SBP <160  - Echo and EKG   - nicardipine gtt  - Labetalol prn     PAF (paroxysmal atrial fibrillation)  - continue home metoprolol   - currently rate controlled   - anticoagulation reversed and held in setting of SDH    Hyperlipidemia  - Lipid panel   - continue home atorvastatin     Hematology  Long term (current) use of anticoagulants  - see SDH     Endocrine  Diabetes mellitus due to underlying condition with diabetic autonomic neuropathy, without long-term current use of insulin  - A1c  - SSI with accu checks     GI  GERD with esophagitis  - continue home Protonix     Orthopedic  Acute bilateral low back pain  - Lumbar vertebral fracture  - MR L-spine pending  - MR C spine shows no fractures with probable C5-C6 stenosis, most of the study affected by motion artifact  - NSGY okay to undergo ortho surgery on C-collar    Open fracture of both humeri  Undergoing surgery today    Fracture dislocation of joint of left shoulder girdle  - Ortho consulted  - appreciate recs  - plan for reduction at bedside per ortho     Other  Current every day smoker  - nicotine patch PRN          The patient is being Prophylaxed for:  Venous Thromboembolism with: Mechanical  Stress Ulcer with: PPI  Ventilator Pneumonia with: not applicable    Activity Orders          Diet NPO: NPO starting at 01/27 2238    Commode at bedside starting at 01/27 2238        Full Code    Albert Johnston MD  Neurocritical Care  [facility]

## 2020-01-28 NOTE — ASSESSMENT & PLAN NOTE
- NSGY consulted  - Kcentra and Vit K for warfarin  - Repeat INR pending  - Hold anticoagulation at this time  - SBP <160   - Q 1 vitals  - Q1 neuro Checks  - TSH, Lipid, A1c, Echo, EKG  - PT/Ot eval and treat   - Repeat CTH stable  - TLSO when OOB/upright  - MRI C spine and MRI L spine including up to T11

## 2020-01-28 NOTE — PLAN OF CARE
POC reviewed with pt at 0500. Pt verbalized understanding. Questions and concerns addressed with pt and family. Cardene and NS gtt.  Pt NPO for possible surgery.  Pain medication administered for shoulder pain.   No acute events overnight. Pt progressing toward goals. Will continue to monitor. See flowsheets for full assessment and VS info

## 2020-01-28 NOTE — PROGRESS NOTES
[facility]  Neurosurgery  Progress Note    Subjective:     History of Present Illness: 59 M with hx of paroxysmal afib on coumadin presents for eval of bilateral shoulder dislocations and left convexity SDH.  Pt had first time seizure and slumped over in chair.  There was no LOC.  He was given vit k and kcentra at OSH for hx of coumadin use.  He endorses low back pain but no neck pain.  No sensation loss in his extremities.    Post-Op Info:  Procedure(s) (LRB):  ORIF, FRACTURE, HUMERUS, PROXIMAL: Left; Trios; Supine; Plexiglass; [place] prox humerus plate (Left)   Day of Surgery     Interval History: NAEON. Pending surgery w/Ortho. Pending additional spine imaging. C collar at all times, TLSO when OOB. Neurologically intact.     Medications:  Continuous Infusions:   sodium chloride 0.9% 75 mL/hr at 01/28/20 1301    niCARdipine Stopped (01/28/20 0801)     Scheduled Meds:   atorvastatin  80 mg Oral QHS    levetiracetam IVPB  1,000 mg Intravenous Q12H    metoprolol tartrate  25 mg Oral BID    midazolam  2 mg Intravenous Once    multivitamin  1 tablet Oral Daily    pantoprazole  40 mg Oral Daily    propofol         PRN Meds:albuterol-ipratropium, Dextrose 10% Bolus, fentaNYL, glucagon (human recombinant), insulin aspart U-100, labetalol, ondansetron, sodium chloride 0.9%     Review of Systems  Objective:     Weight: 110.7 kg (244 lb)  Body mass index is 34.03 kg/m².  Vital Signs (Most Recent):  Temp: 99.2 °F (37.3 °C) (01/28/20 1101)  Pulse: 70 (01/28/20 1301)  Resp: (!) 25 (01/28/20 1301)  BP: 132/80 (01/28/20 1301)  SpO2: (!) 92 % (01/28/20 1301) Vital Signs (24h Range):  Temp:  [96.8 °F (36 °C)-99.2 °F (37.3 °C)] 99.2 °F (37.3 °C)  Pulse:  [] 70  Resp:  [13-41] 25  SpO2:  [90 %-98 %] 92 %  BP: (127-175)/(59-92) 132/80     Date 01/28/20 0700 - 01/29/20 0659   Shift 1244-9216 2335-3171 3088-1170 24 Hour Total   INTAKE   I.V.(mL/kg) 564.2(5.1)   564.2(5.1)   IV Piggyback 100   100    Shift Total(mL/kg) 664.2(6)   664.2(6)   OUTPUT   Urine(mL/kg/hr) 700   700   Shift Total(mL/kg) 700(6.3)   700(6.3)   Weight (kg) 110.7 110.7 110.7 110.7                        Neurosurgery Physical Exam   AOX3  PERRL, EOMI, face symm, tongue midline  Able to squeeze hands 5/5   5/5 in BLE  SILT  No posterior c spine tenderness    Significant Labs:  Recent Labs   Lab 01/27/20 1625 01/27/20 2256 01/28/20  0110   * 152* 124*   * 137 137   K 3.6 3.5 3.5   CL 96 102 101   CO2 25 24 25   BUN 11 12 12   CREATININE 1.30 1.2 1.2   CALCIUM 7.8* 7.2* 7.0*   MG  --   --  1.2*     Recent Labs   Lab 01/27/20 1625 01/27/20 2256 01/28/20  0110   WBC 22.30* 20.03* 16.74*  16.74*   HGB 16.6 14.0 13.6*  13.6*   HCT 49.5 44.4 43.0  43.0    153 140*  140*     Recent Labs   Lab 01/27/20 1625 01/27/20 2256 01/28/20  0110   LABPT 25.8*  --   --    INR 2.4* 1.3* 1.2   APTT 40.9*  --  25.5       Significant Diagnostics:  Ct Head Without Contrast    Result Date: 1/28/2020  Mixed attenuation subdural hematoma overlying the left cerebral convexity measuring 9 mm in thickness and 6 mm of left to right midline shift. Findings were included on preliminary report sent by [facility]. Electronically signed by: Bora Escoto MD Date:    01/28/2020 Time:    09:21    Ct Head Without Contrast    Result Date: 1/27/2020  Unchanged appearance of mixed attenuation left-sided holohemispheric collection with hyperattenuating components, most consistent with acute on subacute left-sided subdural hematoma.  Stable 4 mm left to right midline shift. Minimal hyperattenuating material layering along the tentorium.  The findings may also represent some small component of subdural hematoma along the tentorium.  No significant mass effect. Electronically signed by resident: Eusebio Jones Date:    01/27/2020 Time:    22:54 Electronically signed by: Sebastian Nascimento MD Date:    01/27/2020 Time:    23:25    Cta Chest Non-coronary (pe  Study)    Result Date: 1/28/2020  1. No CT evidence of pulmonary thromboembolism. 2. Bilateral fracture dislocations both humeral heads. 3. Questionable cholelithiasis. 4. Hypoattenuating lesions in both kidneys, possibly complex cysts.  Consider follow-up ultrasound. Electronically signed by: Bora Escoto MD Date:    01/28/2020 Time:    09:17    Ct Cervical Spine Without Contrast    Result Date: 1/28/2020  Minimal compression deformities of the C5-C7 vertebral bodies, likely chronic in etiology however in the absence of prior imaging remain age-indeterminate.  No traumatic malalignment of the cervical spine.  No significant prevertebral soft tissue swelling. Electronically signed by resident: Eusebio Jones Date:    01/27/2020 Time:    23:05 Electronically signed by: Forrest Toth MD Date:    01/28/2020 Time:    00:24    Ct Lumbar Spine Without Contrast    Result Date: 1/28/2020  Acute burst fracture of the L4 vertebral body with mild fracture fragment retropulsion and extension into the left pedicle. Acute burst fracture of the L1 vertebral body with mild fracture fragment retropulsion. Age indeterminate compression fractures at T11 and T12. Partially visualized left-sided renal lesions, with greater than expected density for simple cysts.  Follow-up with nonemergent renal ultrasound could be helpful to establish cystic nature of these lesions and exclude neoplasm. This report was flagged in Epic as abnormal. Electronically signed by resident: Eusebio Jones Date:    01/27/2020 Time:    23:23 Electronically signed by: Forrest Toth MD Date:    01/28/2020 Time:    00:48    X-ray Shoulder 1 View Bilateral    Result Date: 1/28/2020  See above. Electronically signed by: Forrest Toth MD Date:    01/28/2020 Time:    03:47    X-ray Shoulder 1 View Bilateral    Result Date: 1/28/2020  See above. Consider CT for further evaluation of shoulder fractures as clinically appropriate. Electronically signed by: Forrest  MD Navneet Date:    01/28/2020 Time:    02:08    X-ray Humerus 2 View Bilateral    Result Date: 1/28/2020  See above. Consider CT for further evaluation of shoulder fractures as clinically appropriate. Electronically signed by: Forrest Toth MD Date:    01/28/2020 Time:    02:08    X-ray Chest Ap Portable    Result Date: 1/27/2020  No evidence of acute cardiopulmonary disease. Electronically signed by: Bora Escoto MD Date:    01/27/2020 Time:    16:38    Ct 3d Recon With Independent Ws    Result Date: 1/28/2020  Acute comminuted and impacted fractures of bilateral proximal humeri/humeral heads associated with posterior dislocation. Acute linear fractures of bilateral acromion processes associated with moderate posterolateral displacement. No fractures identified elsewhere. Additional incidental findings, as above. Electronically signed by resident: Ninfa Armas Date:    01/28/2020 Time:    10:25 Electronically signed by: Riley Raza MD Date:    01/28/2020 Time:    11:01    Ct 3d Recon With Independent Ws    Result Date: 1/28/2020  Acute comminuted and impacted fractures of bilateral proximal humeri/humeral heads associated with posterior dislocation. Acute linear fractures of bilateral acromion processes associated with moderate posterolateral displacement. No fractures identified elsewhere. Additional incidental findings, as above. Electronically signed by resident: Ninfa Armas Date:    01/28/2020 Time:    10:25 Electronically signed by: Riley Raza MD Date:    01/28/2020 Time:    11:01    Ct Shoulder Without Contrast Left    Result Date: 1/28/2020  Acute comminuted and impacted fractures of bilateral proximal humeri/humeral heads associated with posterior dislocation. Acute linear fractures of bilateral acromion processes associated with moderate posterolateral displacement. No fractures identified elsewhere. Additional incidental findings, as above. Electronically signed by resident:  Ninfa Armas Date:    01/28/2020 Time:    10:25 Electronically signed by: Riley Raza MD Date:    01/28/2020 Time:    11:01    Ct Shoulder Without Contrast Right    Result Date: 1/28/2020  Acute comminuted and impacted fractures of bilateral proximal humeri/humeral heads associated with posterior dislocation. Acute linear fractures of bilateral acromion processes associated with moderate posterolateral displacement. No fractures identified elsewhere. Additional incidental findings, as above. Electronically signed by resident: Ninfa Armas Date:    01/28/2020 Time:    10:25 Electronically signed by: Riley Raza MD Date:    01/28/2020 Time:    11:01    X-ray Shoulder Trauma 3 View Bilateral    Result Date: 1/27/2020  Acute displaced bilateral shoulder fractures discussed above. Electronically signed by: Forrest Toth MD Date:    01/27/2020 Time:    22:52      Assessment/Plan:     * Subdural hematoma  59 M with hx of paroxysmal afib on coumadin presents for eval of bilateral shoulder dislocations, left convexity SDH.  -Admit to neuro ICU  -q 1 hr neuro checks  -Repeat CT head stable  -Hold coumadin, goal INR of less than 1.4  -Ortho consult for bilateral shoulder dislocation  -C collar at all times  - TLSO when OOB/upright  - MRI C spine and MRI L spine including up to T11  - Ok to proceed with Ortho procedure if in C collar    Discussed w/Dr. Jhonny Zuniga MD  Neurosurgery  [facility]

## 2020-01-28 NOTE — PLAN OF CARE
POC reviewed with pt and family at 1230. Pt verbalized understanding. Questions and concerns addressed. Patient has been complaining of back and shoulder pain, PRN pain medication was given. Patient maintained on NPO except medication. Patient was placed on C collar and LSO brace was ordered. Spine precaution was done. CT scan of both shoulder and MRI of spine was done today. Patient is scheduled for OR today for ORIF. Patient on Normal Saline at 75ml/hour.  Will continue to monitor. See flowsheets for full assessment and VS info.

## 2020-01-28 NOTE — PT/OT/SLP PROGRESS
Physical Therapy   Update and Discharge    Ben Melvin   MRN: [id number]     PT orders received and acknowledged. Patient going to OR today for bilateral proximal humerus ORIF with orthopedics. Will need new orders post-op to proceed with PT evaluation and treat. Will discontinue prior PT orders and await new orders post-op.    Jaswant Lombardo, PT  1/28/2020

## 2020-01-28 NOTE — NURSING
1530H-Patient lumbar MRI was not completed due to patient agitation. Versed and fentanyl was given prior MRI. Patient is drowsy but still moving a lot. Patient's O2 Sat is on the lower side betwwen 89-90% on nasal cannula at 4LPM. Neurosurgery was notified. Humera from neurosurgery stated to put LSO brace all the time. And may remove cervical collar. Will monitor patient.

## 2020-01-28 NOTE — PROGRESS NOTES
Per rigo sullivan rdnimo, optison given ivp vialeft forearm sl for imaging. Denies transfusion rxn. Tolerated well. Sl flushed before and after with 10 cc ns,

## 2020-01-28 NOTE — PROGRESS NOTES
Patient arrived to [facility] [place] A[facility]     Type of stroke/diagnosis: SDH, dislocated and fractured bilateral shoulders    Current symptoms: bilateral shoulder pain    Skin assessment done: Y  Wounds noted: none    GALLO Armband Applied: yes    NCC notified: WILLY Soto

## 2020-01-28 NOTE — CONSULTS
"[facility]  Adult Nutrition  Progress Note    SUMMARY       Recommendations  As medically able, recommend advancing diet to Regular with texture per SLP recommendations.    RD to monitor.    Goals: Pt to receive and tolerate >75% EEN and EPN by RD follow up  Nutrition Goal Status: new  Communication of RD Recs: reviewed with RN    Reason for Assessment    Reason For Assessment: consult  Diagnosis: other (see comments)(SDH)  Relevant Medical History: EtOH abuse, tobacco abuse  Interdisciplinary Rounds: attended  General Information Comments: Pt NPO for pending surgery. Wife at bedside assisting with nutrition hx. Pt and wife confirm adequate po intake and appetite PTA. UBW approx 255lb however pt reports no s/s of weight loss as BW on admit 244lb. NFPE completed - pt nourished and obese without indications of malnutrition at this time.  Nutrition Discharge Planning: adequate po intake for optimal nutrition    Nutrition Risk Screen    Nutrition Risk Screen: (P) no indicators present    Nutrition/Diet History    Spiritual, Cultural Beliefs, Druze Practices, Values that Affect Care: (None noted at this time)    Anthropometrics    Temp: 99.2 °F (37.3 °C)  Height Method: Stated  Height: 5' 11" (180.3 cm)  Height (inches): 71 in  Weight Method: Bed Scale  Weight: 110.7 kg (244 lb)  Weight (lb): 244 lb  Ideal Body Weight (IBW), Male: 172 lb  % Ideal Body Weight, Male (lb): 141.86 %  BMI (Calculated): 34  BMI Grade: 30 - 34.9- obesity - grade I       Lab/Procedures/Meds    Pertinent Labs Reviewed: reviewed  Pertinent Labs Comments: POCT Glu 147-160  Pertinent Medications Reviewed: reviewed  Pertinent Medications Comments: IVF, cardene, MVI, versed    Estimated/Assessed Needs    Weight Used For Calorie Calculations: 110.7 kg (244 lb 0.8 oz)  Energy Calorie Requirements (kcal): 2436  Energy Need Method: Holden-St Jillian(PAL 1.25)  Protein Requirements: 111-133g(1.0-1.2g/kg)  Weight Used For Protein " Calculations: 110.7 kg (244 lb 0.8 oz)  Fluid Requirements (mL): 1mL/kcal or per MD     RDA Method (mL): 2436         Nutrition Prescription Ordered    Current Diet Order: NPO    Evaluation of Received Nutrient/Fluid Intake    IV Fluid (mL): 1800  I/O: +I/O  % Intake of Estimated Energy Needs: 0 - 25 %  % Meal Intake: NPO    Nutrition Risk    Level of Risk/Frequency of Follow-up: low(f/u 1x/week)     Assessment and Plan    Nutrition Problem  Inadequate energy intake    Related to (etiology):   NPO    Signs and Symptoms (as evidenced by):   Pt receiving <75% EEN and EPN.     Interventions(treatment strategy):  Collaboration of care with providers    Nutrition Diagnosis Status:   New        Monitor and Evaluation    Food and Nutrient Intake: energy intake, food and beverage intake  Food and Nutrient Adminstration: diet order  Anthropometric Measurements: weight, weight change, body mass index  Biochemical Data, Medical Tests and Procedures: electrolyte and renal panel, gastrointestinal profile, glucose/endocrine profile, inflammatory profile, lipid profile  Nutrition-Focused Physical Findings: overall appearance     Malnutrition Assessment         Orbital Region (Subcutaneous Fat Loss): well nourished  Upper Arm Region (Subcutaneous Fat Loss): well nourished  Thoracic and Lumbar Region: well nourished   Catholic Region (Muscle Loss): well nourished  Clavicle Bone Region (Muscle Loss): well nourished  Clavicle and Acromion Bone Region (Muscle Loss): well nourished  Scapular Bone Region (Muscle Loss): well nourished  Dorsal Hand (Muscle Loss): well nourished  Patellar Region (Muscle Loss): well nourished  Anterior Thigh Region (Muscle Loss): well nourished  Posterior Calf Region (Muscle Loss): well nourished     Nutrition Follow-Up    RD Follow-up?: Yes

## 2020-01-28 NOTE — HOSPITAL COURSE
1/27: Admit NCC, repeat CTH, SBP <160, consult NSGY and ortho, received Kcentra and vit K at OSH  1/28: Keppra increased to 1G BD, MRI C-spine negative for compression fracture, NSGY cleared for undergoing ortho procedure under C-collar, normal PT/INR, no acute intervention per NSGY will hold anticoagulation, F/U with MRI L-Spine, Needs epilepsy follow up, PRN Morphine for pain  1/29: intubated on propofol, S/P left shoulder arthroplasty, started on tube feeds, Right shoulder arthroplasty tomorrow, scheduled albuterol, MRI when able, Nicotine patch post MRI  01/30/2020- s/p pod#0 right shoulder hemiarthroplasty, d/c propofol and start precedex, add nicotine patch, plan to extubate tomorrow AM  01/31/2020-plan to extubate today  02/01/2020-follow CT head in AM  02/02/2020 dc IVF, stable for step down

## 2020-01-28 NOTE — ASSESSMENT & PLAN NOTE
Patient is a 60 yo M with bilateral posterior fracture dislocations of proximal humerus. Closed, NVI. Given subdural hemorrhage the ER did not feel comfortable sedating. Using fentanyl, versed, and intra articular lidocaine overseen by neurocritical care, bilateral shoulder reductions were attempted and were ultimately unsuccessful. Bilateral sling and swath applied. Plan to discuss further management with staff. Patient to remain NPO.     Procedure note:  After time out was performed and patient ID, side, and site were verified, the area was sterilly prepped in the standard fashion. A 22-gauge needle was introduced into the left and then right shoulder site without complication with aspiration of hematoma for confirmation. 10cc of 1% lidocaine was then injected to the bilateral shoulder joints without difficulty. Versed and fentanyl administered by neuro critical care team. After adequate analgesia, the fracture-dislocation was attempted to be close reduced. Post reduction attempt Xrays show no improvement in alignment. The patient tolerated the procedure well with no complications. Blood loss was minimal.

## 2020-01-28 NOTE — ASSESSMENT & PLAN NOTE
- Lumbar vertebral fracture  - MR L-spine pending  - MR C spine shows no fractures with probable C5-C6 stenosis, most of the study affected by motion artifact  - NSGY okay to undergo ortho surgery on C-collar

## 2020-01-28 NOTE — ED NOTES
Pt reports to ED as transfer from [facility] for neuro consult for L subdural with midline shift. Pt arrived on 15 ml/hr of cardene and got k centra and platelets in route to reverse blood thinners. Pt reports sitting at a bar drinking a coke when he got SOB and could not breathe and lightheaded. Pt was then helped to ground where he had an episode of seizure like activity. Pt currently c/o BUE pain, lower back pain, and chest pain.

## 2020-01-28 NOTE — ED PROVIDER NOTES
Encounter Date: 1/27/2020       History     Chief Complaint   Patient presents with    [place] Transfer     Neuro consult for L sudural bleed w/ midline shift. Reversing coumadin w/ k centra and platelets. On cardene at 15ml/hr.     60 yo male with EtOH abuse, PAF (coumadin) who presented to OSH via EMS for subdural hemorrhage and bilat UE humeral fractures with dislocations. He was playing video poker, felt light headed, and fell to the ground. He had a seizure but appeared awake during it. Right after he had SOB, chest pain, and BUE pain. Patient also reports new onset severe lower back pain. He denies abdominal pain, headache, dizziness, changes in sensation or weakness, n/v, diarrhea.     CT head with mixed attenuation 9mm subdural with 6mm shift. Contains acute hemorrhage. Patient received Kcentra 2500 units, 10mg Vit k via slow IV infusion. Patient had lactic acid >5, leukocytosis (WCC 22), cough, rhonchi on pulmonary exam, family reports recent URI.  CTA chest demonstrated finding of bilateral posterior shoulder dislocations with proximal humerus fractures bilaterally.     Patient transferred for NSGY care.           Review of patient's allergies indicates:  No Known Allergies  Past Medical History:   Diagnosis Date    Adenoma of right adrenal gland 11/09/2017    Alcohol abuse     ASCVD (arteriosclerotic cardiovascular disease) 10/2008    [place]    BPH (benign prostatic hyperplasia)     Cardiomyopathy     Chronic anxiety     Cirrhosis of liver     COPD (chronic obstructive pulmonary disease)     Current every day smoker     2-3 ppd    Elevated LFTs 08/2001    GERD with esophagitis 11/09/2017    History of colon polyps     History of epididymitis 2016    Juan Antonio    Hyperlipidemia     Hypertension, essential     Long term (current) use of anticoagulants     Lumbar disc disease with radiculopathy 05/2015    Major depression, recurrent, chronic     PAF (paroxysmal atrial fibrillation)      Peripheral autonomic neuropathy due to DM     Proteinuria     PVD (peripheral vascular disease) 05/29/2009    Venous insufficiency of both lower extremities     Vitamin D deficiency      Past Surgical History:   Procedure Laterality Date    biopsy back  05/20/2019    benign Martinez    biopsy right ankle Right 05/20/2019    Martinez, benign    COLONOSCOPY N/A 6/13/2019    Procedure: COLONOSCOPY;  Surgeon: Delmer Kerr MD;  Location: [facility] ENDO;  Service: Endoscopy;  Laterality: N/A;    COLONOSCOPY W/ POLYPECTOMY      EPIDURAL STEROID INJECTION INTO LUMBAR SPINE  06/2015    LASHAE Harvey    ESOPHAGOGASTRODUODENOSCOPY N/A 6/13/2019    Procedure: ESOPHAGOGASTRODUODENOSCOPY (EGD);  Surgeon: Delmer Kerr MD;  Location: [facility] ENDO;  Service: Endoscopy;  Laterality: N/A;    INTESTINAL MALROTATION REPAIR Right 1961    2 weeks old, Martinez    PERCUTANEOUS TRANSLUMINAL ANGIOPLASTY (PTA) OF PERIPHERAL VESSEL Right 12/2014    NIKOLAS Childs     Family History   Problem Relation Age of Onset    Diabetes Mother     Hypertension Mother     Hypertension Father     Acromegaly Father     Heart attack Father     Kidney cancer Brother      Social History     Tobacco Use    Smoking status: Current Every Day Smoker     Packs/day: 9.00     Years: 40.00     Pack years: 360.00     Types: Cigarettes    Smokeless tobacco: Never Used    Tobacco comment: smokes parts all day long continuously   Substance Use Topics    Alcohol use: Not Currently     Frequency: Never     Comment: quit 2 years ago was heavily    Drug use: Never     Review of Systems   Constitutional: Negative for activity change, chills and fever.   HENT: Positive for congestion and rhinorrhea.    Respiratory: Negative for chest tightness, shortness of breath and wheezing.    Cardiovascular: Positive for chest pain. Negative for palpitations and leg swelling.   Gastrointestinal: Negative for abdominal distention, abdominal pain, constipation, diarrhea,  nausea and vomiting.   Genitourinary: Negative for dysuria and frequency.   Musculoskeletal: Positive for back pain and myalgias.   Skin: Negative for pallor and wound.   Neurological: Negative for dizziness, weakness, light-headedness, numbness and headaches.   Psychiatric/Behavioral: Positive for agitation. Negative for confusion.       Physical Exam     Initial Vitals [01/27/20 2106]   BP Pulse Resp Temp SpO2   (!) 170/84 (!) 111 (!) 21 96.8 °F (36 °C) (!) 94 %      MAP       --         Physical Exam    Nursing note and vitals reviewed.  Constitutional: He appears well-developed.   obese   Eyes: Conjunctivae and EOM are normal.   Pinpoint pupil   Neck:   Patient unwilling to move due to bilat UE pain   Cardiovascular: Normal rate, regular rhythm and normal heart sounds.   No murmur heard.  Pulmonary/Chest: He is in respiratory distress (slightly SOB due to pain on O2 NC). He has rhonchi.   Abdominal: He exhibits distension. There is no tenderness.   Musculoskeletal:   Patient unwilling to move UE due to pain. Rest of exam shows normal ROM   Neurological: He is alert and oriented to person, place, and time. He has normal strength. No cranial nerve deficit. GCS score is 15. GCS eye subscore is 4. GCS verbal subscore is 5. GCS motor subscore is 6.   Skin: Skin is warm and dry. No erythema.   Psychiatric: He has a normal mood and affect. Thought content normal.         ED Course   Procedures  Labs Reviewed - No data to display       Imaging Results    None          Medical Decision Making:   Initial Assessment:   Patient is a 60 yo male with EtOH abuse, PAF (on coumadin) presenting with subdural hematoma and bilat humeral fractures with dislocation after a seizure.   Differential Diagnosis:   Patient was a transfer from [facility] so diagnosis has been made. Here for NSGY evaluation.  Clinical Tests:   Radiological Study: Ordered and Reviewed  ED Management:  Patient given 1000 tylenol for pain. Ortho consulted for bilat  humeral fractures in which they recommended injections to help with the pain and then re location per ED team. NCC consulted in which they are seeing the patient and discussing care with NSGY. Xray shoulders bilat ordered to re assess fractures as well as CT Cervical and Lumbar Spine as patient has new severe back pain. Will reassess after imaging has resulted and he has been evaluated by NSGY.                                  Clinical Impression:       ICD-10-CM ICD-9-CM   1. Subdural hematoma S06.5X9A 432.1   2. Shoulder dislocation S43.006A 831.00   3. Open fracture of both humeri, initial encounter S42.301B 812.30    S42.302B    4. Acute bilateral low back pain, unspecified whether sciatica present M54.5 724.2   5. Shortness of breath R06.02 786.05                             Daniela August MD  Resident  01/27/20 0300

## 2020-01-28 NOTE — PLAN OF CARE
Increase keppra 1g [id number]  Morphine for pain management. Hold for excessive sedation  Complex PMHx.   Clear for surgery  Rate control with metoprolol.

## 2020-01-28 NOTE — ASSESSMENT & PLAN NOTE
- Stopped daily drinking several years ago   - reports intermittent ETOH use   - MVI, Folate, Thiamine daily

## 2020-01-28 NOTE — ANESTHESIA PREPROCEDURE EVALUATION
[facility]  Anesthesia Pre-Operative Evaluation         Patient Name: Ben Melvin  YOB: 1960  MRN: [id number]    SUBJECTIVE:     Pre-operative evaluation for Procedure(s) (LRB):  ORIF, FRACTURE, HUMERUS, PROXIMAL: Left; Trios; Supine; Plexiglass; Jairon prox humerus plate (Left)     01/28/2020    Ben Melvin is a 59 y.o. male w/ a significant PMHx of HTN, pAF (on warfarin; currently held), COPD, GERD, T2DM, EtOH cirrhosis, CAD, PAD, current smoker who presented with SDH and BL humeral fracture/dislocation after a reported seizure.      Patient now presents for the above procedure(s).    EKG 1/27/20  Sinus tachycardia  Abnormal ECG  When compared with ECG of 27-JAN-2020 21:23,  No significant change was found  Confirmed by HANY ANGELES MD ([id number]) on 1/28/2020 10:55:16 AM    TTE 1/28/20  · Technically challenging portable study.  · Normal left ventricular systolic function. The estimated ejection fraction is 60%.  · Concentric left ventricular remodeling.  · Normal LV diastolic function.  · Normal right ventricular systolic function.  · Normal central venous pressure (3 mmHg).  · Anterior pericardial effusion with echodensity adherent to the anterior surface.      LDA: None documented.       Peripheral IV - Single Lumen 01/27/20 1625 18 G Left Wrist (Active)   Site Assessment Clean;Dry;Intact 1/28/2020 11:01 AM   Line Status No blood return;Flushed;Saline locked 1/28/2020 11:01 AM   Dressing Status Clean;Dry;Intact 1/28/2020 11:01 AM   Dressing Intervention Dressing reinforced 1/28/2020 11:01 AM   Dressing Change Due 01/31/20 1/28/2020 11:01 AM   Site Change Due 01/31/20 1/28/2020 11:01 AM   Reason Not Rotated Not due 1/28/2020 11:01 AM   Number of days: 0            Peripheral IV - Single Lumen 01/27/20 1913 18 G Right Hand (Active)   Site Assessment Clean 1/28/2020 11:01 AM   Line Status No blood return;Flushed;Infusing 1/28/2020 11:01 AM    Dressing Status Clean;Dry;Intact 1/28/2020 11:01 AM   Dressing Intervention Dressing reinforced 1/28/2020 11:01 AM   Dressing Change Due 01/31/20 1/28/2020 11:01 AM   Site Change Due 01/31/20 1/28/2020 11:01 AM   Reason Not Rotated Not due 1/28/2020 11:01 AM   Number of days: 0       Prev airway: None documented.    Drips: None documented.   sodium chloride 0.9% 75 mL/hr at 01/28/20 1401    niCARdipine Stopped (01/28/20 0801)       Patient Active Problem List   Diagnosis    Current every day smoker    Cardiomyopathy    ASCVD (arteriosclerotic cardiovascular disease)    Elevated LFTs    Hyperlipidemia    Proteinuria    PVD (peripheral vascular disease)    COPD (chronic obstructive pulmonary disease)    Alcohol abuse    Cirrhosis of liver    Venous insufficiency of both lower extremities    Long term (current) use of anticoagulants    PAF (paroxysmal atrial fibrillation)    Hypertension, essential    Peripheral autonomic neuropathy due to DM    Adenoma of right adrenal gland    GERD with esophagitis    BPH (benign prostatic hyperplasia)    Lumbar disc disease with radiculopathy    Chronic anxiety    Major depression, recurrent, chronic    History of epididymitis    Encounter for therapeutic drug monitoring    Metabolic syndrome    Encounter for long-term (current) use of medications    Screening for malignant neoplasm of prostate    Generalized osteoarthrosis, involving multiple sites    Abnormal CAT scan    Shortness of breath    Multiple vitamin deficiency disease    Chronic fatigue and malaise    Vitamin D deficiency    Diabetes 1.5, managed as type 2    Diabetes mellitus due to underlying condition with diabetic autonomic neuropathy, without long-term current use of insulin    Hereditary and idiopathic neuropathy     Other long term (current) drug therapy     Abnormal computed tomography of esophagus    Dietary counseling    Person consulting for explanation of  examination or test findings    Insomnia    Salpingitis of Eustachian tube, right    Fracture dislocation of joint of left shoulder girdle    Open fracture of both humeri    Acute bilateral low back pain    Subdural hematoma    Fracture dislocation of joint of right shoulder girdle       Review of patient's allergies indicates:  No Known Allergies    Current Inpatient Medications:   atorvastatin  80 mg Oral QHS    levetiracetam IVPB  1,000 mg Intravenous Q12H    metoprolol tartrate  25 mg Oral BID    midazolam  2 mg Intravenous Once    multivitamin  1 tablet Oral Daily    pantoprazole  40 mg Oral Daily       No current facility-administered medications on file prior to encounter.      Current Outpatient Medications on File Prior to Encounter   Medication Sig Dispense Refill    amitriptyline (ELAVIL) 100 MG tablet Take 1 tablet (100 mg total) by mouth every evening. 30 tablet 11    atorvastatin (LIPITOR) 80 MG tablet Take 80 mg by mouth every evening.  11    BASAGLAR KWIKPEN U-100 INSULIN glargine 100 units/mL (3mL) SubQ pen INJECT 25 UNITS DAILY 15 Syringe 5    metFORMIN (GLUCOPHAGE) 1000 MG tablet Take 1 tablet (1,000 mg total) by mouth daily with breakfast. 90 tablet 3    metoprolol tartrate (LOPRESSOR) 25 MG tablet Take 25 mg by mouth 2 (two) times daily.  11    pantoprazole (PROTONIX) 40 MG tablet Take 1 tablet (40 mg total) by mouth once daily. 30 tablet 11    ramipril (ALTACE) 5 MG capsule TAKE 1 CAPSULE (5 MG) BY ORAL ROUTE ONCE DAILY  5    TRADJENTA 5 mg Tab tablet TAKE 1 TABLET BY MOUTH EVERY DAY 30 tablet 5    warfarin (COUMADIN) 5 MG tablet Take 7.5 mg by mouth every Mon, Wed, Fri.      albuterol-ipratropium (DUO-NEB) 2.5 mg-0.5 mg/3 mL nebulizer solution USE 1 VIAL BY NEBULIZATION EVERY 6 HOURS AS NEEDED FOR WHEEZING 270 mL 3    arformoterol (BROVANA) 15 mcg/2 mL Nebu Take 15 mcg by nebulization 2 (two) times daily. Controller      aspirin 81 MG Chew Take 162 mg by mouth once  daily.      cyanocobalamin (VITAMIN B-12) 1000 MCG tablet Take 100 mcg by mouth once daily.      multivitamin with minerals tablet Take 1 tablet by mouth once daily.         Past Surgical History:   Procedure Laterality Date    biopsy back  05/20/2019    benign Martinez    biopsy right ankle Right 05/20/2019    Martinez, benign    COLONOSCOPY N/A 6/13/2019    Procedure: COLONOSCOPY;  Surgeon: Delmer Kerr MD;  Location: [facility] ENDO;  Service: Endoscopy;  Laterality: N/A;    COLONOSCOPY W/ POLYPECTOMY      EPIDURAL STEROID INJECTION INTO LUMBAR SPINE  06/2015    LASHAE Harvey    ESOPHAGOGASTRODUODENOSCOPY N/A 6/13/2019    Procedure: ESOPHAGOGASTRODUODENOSCOPY (EGD);  Surgeon: Delmer Kerr MD;  Location: [facility] ENDO;  Service: Endoscopy;  Laterality: N/A;    INTESTINAL MALROTATION REPAIR Right 1961    2 weeks old, Martinez    PERCUTANEOUS TRANSLUMINAL ANGIOPLASTY (PTA) OF PERIPHERAL VESSEL Right 12/2014    NIKOLAS Childs       Social History     Socioeconomic History    Marital status:      Spouse name: Eboni Rhodes x37 years    Number of children: 1    Years of education: Not on file    Highest education level: High school graduate   Occupational History    Occupation: Retired  x 37 years     Comment: [employer] [place]   Social Needs    Financial resource strain: Not on file    Food insecurity:     Worry: Not on file     Inability: Not on file    Transportation needs:     Medical: Not on file     Non-medical: Not on file   Tobacco Use    Smoking status: Current Every Day Smoker     Packs/day: 9.00     Years: 40.00     Pack years: 360.00     Types: Cigarettes    Smokeless tobacco: Never Used    Tobacco comment: smokes parts all day long continuously   Substance and Sexual Activity    Alcohol use: Not Currently     Frequency: Never     Comment: quit 2 years ago was heavily    Drug use: Never    Sexual activity: Yes     Partners: Female   Lifestyle    Physical activity:      Days per week: Not on file     Minutes per session: Not on file    Stress: Not at all   Relationships    Social connections:     Talks on phone: Not on file     Gets together: Not on file     Attends Baptism service: Not on file     Active member of club or organization: Not on file     Attends meetings of clubs or organizations: Not on file     Relationship status: Not on file   Other Topics Concern    Not on file   Social History Narrative    Not on file       OBJECTIVE:     Vital Signs Range (Last 24H):  Temp:  [36 °C (96.8 °F)-37.3 °C (99.2 °F)]   Pulse:  []   Resp:  [13-41]   BP: (127-175)/(59-92)   SpO2:  [90 %-98 %]       Significant Labs:  Lab Results   Component Value Date    WBC 16.74 (H) 01/28/2020    WBC 16.74 (H) 01/28/2020    HGB 13.6 (L) 01/28/2020    HGB 13.6 (L) 01/28/2020    HCT 43.0 01/28/2020    HCT 43.0 01/28/2020     (L) 01/28/2020     (L) 01/28/2020    CHOL 122 01/27/2020    CHOL 122 01/27/2020    TRIG 195 (H) 01/27/2020    TRIG 195 (H) 01/27/2020    HDL 22 (L) 01/27/2020    HDL 22 (L) 01/27/2020    ALT 15 01/28/2020    AST 20 01/28/2020     01/28/2020    K 3.5 01/28/2020     01/28/2020    CREATININE 1.2 01/28/2020    BUN 12 01/28/2020    CO2 25 01/28/2020    TSH 0.667 01/27/2020    TSH 0.667 01/27/2020    PSA 0.33 06/03/2019    INR 1.2 01/28/2020    HGBA1C 5.9 (H) 01/27/2020       Diagnostic Studies: No relevant studies.    EKG:   Results for orders placed or performed in visit on 01/27/20   EKG 12-lead    Collection Time: 01/27/20  9:24 PM    Narrative    Test Reason : R00.0    Vent. Rate : 109 BPM     Atrial Rate : 109 BPM     P-R Int : 146 ms          QRS Dur : 084 ms      QT Int : 376 ms       P-R-T Axes : 074 071 072 degrees     QTc Int : 506 ms    Sinus tachycardia  Abnormal ECG  When compared with ECG of 27-JAN-2020 21:23,  No significant change was found  Confirmed by HANY ANGELES MD ([id number]) on 1/28/2020 10:55:16 AM    Referred By: JHONNY  BRITTNEY           Confirmed By:HANY ANGELES MD       2D ECHO:  TTE:  Results for orders placed or performed during the hospital encounter of 01/27/20   Echo Color Flow Doppler? Yes   Result Value Ref Range    BSA 2.35 m2    TDI SEPTAL 0.09 m/s    LA WIDTH 3.51 cm    TDI LATERAL 0.04 m/s    LVIDD 5.10 3.5 - 6.0 cm    IVS 1.10 (A) 0.6 - 1.1 cm    PW 1.10 0.6 - 1.1 cm    LVIDS 3.29 2.1 - 4.0 cm    FS 35 28 - 44 %    LA volume 50.77 cm3    Sinus 3.07 cm    STJ 3.20 cm    Ascending aorta 3.36 cm    LV mass 213.90 g    LA size 3.42 cm    RVDD 3.43 cm    TAPSE 2.42 cm    Left Ventricle Relative Wall Thickness 0.43 cm    AV mean gradient 4 mmHg    AV valve area 2.37 cm2    AV Velocity Ratio 0.63     AV index (prosthetic) 0.73     Mean e' 0.07 m/s    LVOT diameter 2.03 cm    LVOT area 3.2 cm2    LVOT peak shaheed 0.81 m/s    LVOT peak VTI 14.94 cm    Ao peak shaheed 1.29 m/s    Ao VTI 20.43 cm    LVOT stroke volume 48.33 cm3    AV peak gradient 7 mmHg    LV Systolic Volume 43.79 mL    LV Systolic Volume Index 19.1 mL/m2    LV Diastolic Volume 131.71 mL    LV Diastolic Volume Index 57.39 mL/m2    LA Volume Index 22.1 mL/m2    LV Mass Index 93 g/m2    RA Major Axis 4.03 cm    Left Atrium Minor Axis 5.12 cm    Left Atrium Major Axis 4.84 cm    RA Width 2.74 cm    Right Atrial Pressure (from IVC) 3 mmHg    Narrative    · Technically challenging portable study.  · Normal left ventricular systolic function. The estimated ejection   fraction is 60%.  · Concentric left ventricular remodeling.  · Normal LV diastolic function.  · Normal right ventricular systolic function.  · Normal central venous pressure (3 mmHg).  · Anterior pericardial effusion with echodensity adherent to the anterior   surface.          EMANI:  No results found for this or any previous visit.    ASSESSMENT/PLAN:         Anesthesia Evaluation    I have reviewed the Patient Summary Reports.    I have reviewed the Nursing Notes.   I have reviewed the Medications.      Review of Systems  Anesthesia Hx:  No problems with previous Anesthesia  History of prior surgery of interest to airway management or planning: Denies Family Hx of Anesthesia complications.   Denies Personal Hx of Anesthesia complications.   Social:  Smoker, Social Alcohol Use  Alcohol Use:   Alcohol Abuse: (Hx of)   Cardiovascular:   Hypertension, well controlled CAD  Dysrhythmias atrial fibrillation PVD hyperlipidemia DE [place] CRUZ ECG has been reviewed.    Cardiomyopathy  Chronic Venous Insufficiency, bilateral lower extremity  Peripheral Arterial Disease Aorto-Iliac disease, s/p stent, right  Disorder of Cardiac Rhythm, on warfarin Rx    Pulmonary:   COPD Shortness of breath  Chronic Obstructive Pulmonary Disease (COPD):  is secondary to smoking. Inhaler use is maintenance inhaler daily.    Renal/:   BPH    Hepatic/GI:   Liver Disease,  Liver Disease, Abnormal Liver Enzymes , Cirrhosis    Musculoskeletal:   Arthritis   Lumbar Spine Disorders, Lumbar Disc Disease, Radiculopathy   Neurological:   Neuromuscular Disease, Lumbar disc disease, radiculopathy.   Endocrine:   Diabetes  Diabetes, Type 2 Diabetes for 4 years , Complications include Diabetic Neuropathy, peripheral sensory neuropathy , controlled by insulin, diet, oral hypoglycemics.    Psych:   Psychiatric History anxiety depression          Physical Exam  General:  Well nourished, Obesity    Airway/Jaw/Neck:  Airway Findings: Mouth Opening: Normal Tongue: Normal  General Airway Assessment: Adult  Mallampati: III  Improves to II with phonation.  TM Distance: Normal, at least 6 cm  Jaw/Neck Findings:  Neck ROM: Normal ROM  Neck Findings: (neck brace)  Girth Increased     Eyes/Ears/Nose:  EYES/EARS/NOSE FINDINGS: Normal   Dental:  Dental Findings: In tact   Chest/Lungs:  Chest/Lungs Clear    Heart/Vascular:  Heart Findings: Normal       Mental Status:  Mental Status Findings: Normal        Anesthesia Plan  Type of Anesthesia, risks & benefits  discussed:  Anesthesia Type:  general  Patient's Preference:   Intra-op Monitoring Plan: standard ASA monitors  Intra-op Monitoring Plan Comments:   Post Op Pain Control Plan: multimodal analgesia, IV/PO Opioids PRN and per primary service following discharge from PACU  Post Op Pain Control Plan Comments:   Induction:   IV  Beta Blocker:  Patient is not currently on a Beta-Blocker (No further documentation required).       Informed Consent: Patient representative understands risks and agrees with Anesthesia plan.  Questions answered. Anesthesia consent signed with patient representative.  ASA Score: 3     Day of Surgery Review of History & Physical: I have interviewed and examined the patient. I have reviewed the patient's H&P dated:  There are no significant changes.  H&P update referred to the surgeon.         Ready For Surgery From Anesthesia Perspective.

## 2020-01-28 NOTE — H&P
[facility]  Neurocritical Care  History & Physical    Admit Date: 1/27/2020  Service Date: 01/27/2020  Length of Stay: 0    Subjective:     Chief Complaint: Subdural hematoma    History of Present Illness: Mr Melvin is a 58 yo male with EtOH abuse, tobacco abuse,  PAF  With long term anticoagulation (coumadin) who presents to [facility] for SDH and bilateral shoulder dislocation. He  presented to OSH via EMS for acute onset of  SOB and reported seizure activity. At approx 5 pm he was playing video poker, felt light headed, and was assisted  Ground by family. He had what the family felt was a seizure but appeared awake during it. He was disoriented and confused after. He then reported had SOB, chest pain, and BUE pain. Patient also reports new onset severe lower back pain. He returned to baseline mental status shortly after per family. CTH and chest at OSH revealed SDH and bilateral upper ext dislocations. He received Kcentra and Vit K at OSH for reversal.  He is being admitted to [facility] for a higher level of care.     Past Medical History:   Diagnosis Date    Adenoma of right adrenal gland 11/09/2017    Alcohol abuse     ASCVD (arteriosclerotic cardiovascular disease) 10/2008    [place]    BPH (benign prostatic hyperplasia)     Cardiomyopathy     Chronic anxiety     Cirrhosis of liver     COPD (chronic obstructive pulmonary disease)     Current every day smoker     2-3 ppd    Elevated LFTs 08/2001    GERD with esophagitis 11/09/2017    History of colon polyps     History of epididymitis 2016    Juan Antonio    Hyperlipidemia     Hypertension, essential     Long term (current) use of anticoagulants     Lumbar disc disease with radiculopathy 05/2015    Major depression, recurrent, chronic     PAF (paroxysmal atrial fibrillation)     Peripheral autonomic neuropathy due to DM     Proteinuria     PVD (peripheral vascular disease) 05/29/2009    Venous insufficiency of both lower extremities      Vitamin D deficiency      Past Surgical History:   Procedure Laterality Date    biopsy back  05/20/2019    benign Martinez    biopsy right ankle Right 05/20/2019    Martinez, benign    COLONOSCOPY N/A 6/13/2019    Procedure: COLONOSCOPY;  Surgeon: Delmer Kerr MD;  Location: [facility];  Service: Endoscopy;  Laterality: N/A;    COLONOSCOPY W/ POLYPECTOMY      EPIDURAL STEROID INJECTION INTO LUMBAR SPINE  06/2015    LASHAE Harvey    ESOPHAGOGASTRODUODENOSCOPY N/A 6/13/2019    Procedure: ESOPHAGOGASTRODUODENOSCOPY (EGD);  Surgeon: Delmer Kerr MD;  Location: [facility] END[facility];  Service: Endoscopy;  Laterality: N/A;    INTESTINAL MALROTATION REPAIR Right 1961    2 weeks old, Martinez    PERCUTANEOUS TRANSLUMINAL ANGIOPLASTY (PTA) OF PERIPHERAL VESSEL Right 12/2014    NIKOLAS Childs      Current Facility-Administered Medications on File Prior to Encounter   Medication Dose Route Frequency Provider Last Rate Last Dose    [COMPLETED] human prothrombin complex (PCC) (KCENTRA) 500 unit (400-620 unit) injection 2,500 Units  2,500 Units Intravenous Once Aram Vasques MD   2,500 Units at 01/27/20 1934    [COMPLETED] HYDROmorphone injection 1 mg  1 mg Intravenous ED 1 Time Aram Vasques MD   1 mg at 01/27/20 1843    [COMPLETED] HYDROmorphone injection 1 mg  1 mg Intravenous ED 1 Time Aram Vasques MD   1 mg at 01/27/20 1947    [COMPLETED] iopamidol (ISOVUE-370) injection 100 mL  100 mL Intravenous ONCE PRN Aram Vasques MD   100 mL at 01/27/20 1753    [COMPLETED] morphine injection 2 mg  2 mg Intravenous ED 1 Time Aram Vasques MD   2 mg at 01/27/20 1649    [COMPLETED] nitroGLYCERIN 2% TD oint ointment 0.5 inch  0.5 inch Topical (Top) ED 1 Time Aram Vasques MD   0.5 inch at 01/27/20 1648    [COMPLETED] ondansetron injection 4 mg  4 mg Intravenous ED 1 Time Aram Vasques MD   4 mg at 01/27/20 1649    [COMPLETED] phytonadione vitamin k (AQUA-MEPHYTON) 10 mg in dextrose 5 % 50 mL  IVPB  10 mg Intravenous Once Aram Vasques MD 50 mL/hr at 01/27/20 1941 10 mg at 01/27/20 1941    [COMPLETED] piperacillin-tazobactam 4.5 g in dextrose 5 % 100 mL IVPB (ready to mix system)  4.5 g Intravenous ED 1 Time Aram Vasques MD   Stopped at 01/27/20 1908    [COMPLETED] sodium chloride 0.9% bolus 2,250 mL  2,250 mL Intravenous Once Aram Vasques MD   Stopped at 01/27/20 1905    [DISCONTINUED] niCARdipine 40 mg/200 mL infusion  3 mg/hr Intravenous Continuous Aram Vasques MD 15 mL/hr at 01/27/20 1943 3 mg/hr at 01/27/20 1943     Current Outpatient Medications on File Prior to Encounter   Medication Sig Dispense Refill    albuterol-ipratropium (DUO-NEB) 2.5 mg-0.5 mg/3 mL nebulizer solution USE 1 VIAL BY NEBULIZATION EVERY 6 HOURS AS NEEDED FOR WHEEZING 270 mL 3    amitriptyline (ELAVIL) 100 MG tablet Take 1 tablet (100 mg total) by mouth every evening. 30 tablet 11    arformoterol (BROVANA) 15 mcg/2 mL Nebu Take 15 mcg by nebulization 2 (two) times daily. Controller      aspirin 81 MG Chew Take 162 mg by mouth once daily.      atorvastatin (LIPITOR) 80 MG tablet Take 80 mg by mouth every evening.  11    BASAGLAR KWIKPEN U-100 INSULIN glargine 100 units/mL (3mL) SubQ pen INJECT 25 UNITS DAILY 15 Syringe 5    cyanocobalamin (VITAMIN B-12) 1000 MCG tablet Take 100 mcg by mouth once daily.      metFORMIN (GLUCOPHAGE) 1000 MG tablet Take 1 tablet (1,000 mg total) by mouth daily with breakfast. 90 tablet 3    metoprolol tartrate (LOPRESSOR) 25 MG tablet Take 25 mg by mouth 2 (two) times daily.  11    multivitamin with minerals tablet Take 1 tablet by mouth once daily.      pantoprazole (PROTONIX) 40 MG tablet Take 1 tablet (40 mg total) by mouth once daily. 30 tablet 11    ramipril (ALTACE) 5 MG capsule TAKE 1 CAPSULE (5 MG) BY ORAL ROUTE ONCE DAILY  5    TRADJENTA 5 mg Tab tablet TAKE 1 TABLET BY MOUTH EVERY DAY 30 tablet 5    warfarin (COUMADIN) 5 MG tablet Take 7.5 mg by  mouth every Mon, Wed, Fri.        Allergies: Patient has no known allergies.    Family History   Problem Relation Age of Onset    Diabetes Mother     Hypertension Mother     Hypertension Father     Acromegaly Father     Heart attack Father     Kidney cancer Brother      Social History     Tobacco Use    Smoking status: Current Every Day Smoker     Packs/day: 9.00     Years: 40.00     Pack years: 360.00     Types: Cigarettes    Smokeless tobacco: Never Used    Tobacco comment: smokes parts all day long continuously   Substance Use Topics    Alcohol use: Not Currently     Frequency: Never     Comment: quit 2 years ago was heavily    Drug use: Never     Review of Systems     Review of Symptoms:  Constitutional: Denies fevers, weight loss, chills, or weakness.  Eyes: Denies changes in vision.  ENT: Denies dysphagia, nasal discharge, ear pain or discharge.  Cardiovascular: Denies chest pain, palpitations, orthopnea, or claudication.  Respiratory: Denies shortness of breath, cough, hemoptysis, or wheezing.  GI: Denies nausea/vomitting, hematochezia, melena, abd pain, or changes in appetite.  : Denies dysuria, incontinence, or hematuria.  Musculoskeletal: + Bilateral shoulder pain   Skin/breast: Denies rashes, lumps, lesions, or discharge.  Neurologic: Denies headache, dizziness, vertigo, or paresthesias.  Psychiatric: Denies changes in mood or hallucinations.  Endocrine: Denies polyuria, polydipsia, heat/cold intolerance.  Hematologic/Lymph: Denies lymphadenopathy, easy bruising or easy bleeding.  Allergic/Immunologic: Denies rash, rhinitis.     Objective:     Vitals:    Temp: 96.8 °F (36 °C)  Pulse: 108  BP: (!) 172/86  MAP (mmHg): 121  Resp: 20  SpO2: 96 %  O2 Device (Oxygen Therapy): nasal cannula    Temp  Min: 96.8 °F (36 °C)  Max: 97.8 °F (36.6 °C)  Pulse  Min: 105  Max: 122  BP  Min: 127/65  Max: 175/85  MAP (mmHg)  Min: 87  Max: 121  Resp  Min: 13  Max: 27  SpO2  Min: 90 %  Max: 98 %    No  intake/output data recorded.           Physical Exam      Physical Exam:  GA: Alert, comfortable, no acute distress.   HEENT: No scleral icterus or JVD.   Pulmonary: Coarse bilaterally   Cardiac: RRR S1 & S2 w/o rubs/murmurs/gallops.   Abdominal: Bowel sounds present x 4.   Skin: No jaundice, rashes, or visible lesions.  Psych: Mental Status:  Alert oriented follows  Neuro:  --GCS: E4 V5 M6  --CN II-XII grossly intact.   --Pupils 3mm, PERRL.   --Corneal reflex, gag, cough intact.  --LUE strength: 5/5  --RUE strength: 5/5  --LLE strength: 5/5  --RLE strength: 5/5    Unable to test gait due to level of consciousness.    Today I personally reviewed pertinent medications, lines/drains/airways, imaging, cardiology results, laboratory results, notably: CTH        Assessment/Plan:     Neuro  * Subdural hematoma  - NSGY consulted  - Kcentra and Vit K for warfarin  - Repeat INR pending  - Hold anticoagulation at this time  - SBP <160   - Q 1 vitals  - Q1 neuro Checks  - TSH, Lipid, A1c, Echo, EKG  - PT/Ot eval and treat   - Repeat CTH         Psychiatric  Alcohol abuse  - Stopped daily drinking several years ago   - reports intermittent ETOH use   - MVI, Folate, Thiamine daily     Pulmonary  COPD (chronic obstructive pulmonary disease)  - Duo nebs   - Sats >88    Cardiac/Vascular  Hypertension, essential  - SBP <160  - Echo and EKG   - nicardipine gtt  - Labetalol prn     PAF (paroxysmal atrial fibrillation)  - continue home metoprolol   - currently rate controlled   - anticoagulation reversed and held in setting of SDH    Hyperlipidemia  - Lipid panel   - continue home atorvastatin     Hematology  Long term (current) use of anticoagulants  - see SDH     Endocrine  Diabetes mellitus due to underlying condition with diabetic autonomic neuropathy, without long-term current use of insulin  - A1c  - SSI with accu checks     GI  GERD with esophagitis  - continue home Protonix     Orthopedic  Acute bilateral low back pain  - CT L  spine pending     Shoulder dislocation  - Ortho consulted  - appreciate recs  - plan for reduction at bedside per ortho     Other  Current every day smoker  - nicotine patch PRN          The patient is being Prophylaxed for:  Venous Thromboembolism with: Mechanical  Stress Ulcer with: PPI  Ventilator Pneumonia with: not applicable    Activity Orders          Diet NPO: NPO starting at 01/27 2238    Commode at bedside starting at 01/27 2238        Full Code    Rik Soto NP  Neurocritical Care  [facility]

## 2020-01-28 NOTE — ASSESSMENT & PLAN NOTE
- continue home metoprolol   - currently rate controlled   - anticoagulation reversed and held in setting of SDH

## 2020-01-28 NOTE — HPI
59 M with hx of paroxysmal afib on coumadin presents for eval of bilateral shoulder dislocations and left convexity SDH.  Pt had first time seizure and slumped over in chair.  There was no LOC.  He was given vit k and kcentra at OSH for hx of coumadin use.  He endorses low back pain but no neck pain.  No sensation loss in his extremities.

## 2020-01-28 NOTE — SUBJECTIVE & OBJECTIVE
Past Medical History:   Diagnosis Date    Adenoma of right adrenal gland 11/09/2017    Alcohol abuse     ASCVD (arteriosclerotic cardiovascular disease) 10/2008    Amadeo    BPH (benign prostatic hyperplasia)     Cardiomyopathy     Chronic anxiety     Cirrhosis of liver     COPD (chronic obstructive pulmonary disease)     Current every day smoker     2-3 ppd    Elevated LFTs 08/2001    GERD with esophagitis 11/09/2017    History of colon polyps     History of epididymitis 2016    Juan Antonio    Hyperlipidemia     Hypertension, essential     Long term (current) use of anticoagulants     Lumbar disc disease with radiculopathy 05/2015    Major depression, recurrent, chronic     PAF (paroxysmal atrial fibrillation)     Peripheral autonomic neuropathy due to DM     Proteinuria     PVD (peripheral vascular disease) 05/29/2009    Venous insufficiency of both lower extremities     Vitamin D deficiency      Past Surgical History:   Procedure Laterality Date    biopsy back  05/20/2019    benign Martinez    biopsy right ankle Right 05/20/2019    Martinez, benign    COLONOSCOPY N/A 6/13/2019    Procedure: COLONOSCOPY;  Surgeon: Delmer Kerr MD;  Location: [facility];  Service: Endoscopy;  Laterality: N/A;    COLONOSCOPY W/ POLYPECTOMY      EPIDURAL STEROID INJECTION INTO LUMBAR SPINE  06/2015    LASHAE Harvey    ESOPHAGOGASTRODUODENOSCOPY N/A 6/13/2019    Procedure: ESOPHAGOGASTRODUODENOSCOPY (EGD);  Surgeon: Delmer Kerr MD;  Location: [facility];  Service: Endoscopy;  Laterality: N/A;    INTESTINAL MALROTATION REPAIR Right 1961    2 weeks old, Martinez    PERCUTANEOUS TRANSLUMINAL ANGIOPLASTY (PTA) OF PERIPHERAL VESSEL Right 12/2014    NIKOLAS Childs      Current Facility-Administered Medications on File Prior to Encounter   Medication Dose Route Frequency Provider Last Rate Last Dose    [COMPLETED] human prothrombin complex (PCC) (KCENTRA) 500 unit (400-620 unit) injection 2,500 Units  2,500 Units  Intravenous Once Aram Vasques MD   2,500 Units at 01/27/20 1934    [COMPLETED] HYDROmorphone injection 1 mg  1 mg Intravenous ED 1 Time Aram Vasques MD   1 mg at 01/27/20 1843    [COMPLETED] HYDROmorphone injection 1 mg  1 mg Intravenous ED 1 Time Aram Vasques MD   1 mg at 01/27/20 1947    [COMPLETED] iopamidol (ISOVUE-370) injection 100 mL  100 mL Intravenous ONCE PRN Aram Vasques MD   100 mL at 01/27/20 1753    [COMPLETED] morphine injection 2 mg  2 mg Intravenous ED 1 Time Aram Vasques MD   2 mg at 01/27/20 1649    [COMPLETED] nitroGLYCERIN 2% TD oint ointment 0.5 inch  0.5 inch Topical (Top) ED 1 Time Aram Vasques MD   0.5 inch at 01/27/20 1648    [COMPLETED] ondansetron injection 4 mg  4 mg Intravenous ED 1 Time Aram Vasques MD   4 mg at 01/27/20 1649    [COMPLETED] phytonadione vitamin k (AQUA-MEPHYTON) 10 mg in dextrose 5 % 50 mL IVPB  10 mg Intravenous Once Aram Vasques MD 50 mL/hr at 01/27/20 1941 10 mg at 01/27/20 1941    [COMPLETED] piperacillin-tazobactam 4.5 g in dextrose 5 % 100 mL IVPB (ready to mix system)  4.5 g Intravenous ED 1 Time Aram Vasques MD   Stopped at 01/27/20 1908    [COMPLETED] sodium chloride 0.9% bolus 2,250 mL  2,250 mL Intravenous Once Aram Vasques MD   Stopped at 01/27/20 1905    [DISCONTINUED] niCARdipine 40 mg/200 mL infusion  3 mg/hr Intravenous Continuous Aram Vasques MD 15 mL/hr at 01/27/20 1943 3 mg/hr at 01/27/20 1943     Current Outpatient Medications on File Prior to Encounter   Medication Sig Dispense Refill    albuterol-ipratropium (DUO-NEB) 2.5 mg-0.5 mg/3 mL nebulizer solution USE 1 VIAL BY NEBULIZATION EVERY 6 HOURS AS NEEDED FOR WHEEZING 270 mL 3    amitriptyline (ELAVIL) 100 MG tablet Take 1 tablet (100 mg total) by mouth every evening. 30 tablet 11    arformoterol (BROVANA) 15 mcg/2 mL Nebu Take 15 mcg by nebulization 2 (two) times daily. Controller      aspirin 81 MG Chew  Take 162 mg by mouth once daily.      atorvastatin (LIPITOR) 80 MG tablet Take 80 mg by mouth every evening.  11    BASAGLAR KWIKPEN U-100 INSULIN glargine 100 units/mL (3mL) SubQ pen INJECT 25 UNITS DAILY 15 Syringe 5    cyanocobalamin (VITAMIN B-12) 1000 MCG tablet Take 100 mcg by mouth once daily.      metFORMIN (GLUCOPHAGE) 1000 MG tablet Take 1 tablet (1,000 mg total) by mouth daily with breakfast. 90 tablet 3    metoprolol tartrate (LOPRESSOR) 25 MG tablet Take 25 mg by mouth 2 (two) times daily.  11    multivitamin with minerals tablet Take 1 tablet by mouth once daily.      pantoprazole (PROTONIX) 40 MG tablet Take 1 tablet (40 mg total) by mouth once daily. 30 tablet 11    ramipril (ALTACE) 5 MG capsule TAKE 1 CAPSULE (5 MG) BY ORAL ROUTE ONCE DAILY  5    TRADJENTA 5 mg Tab tablet TAKE 1 TABLET BY MOUTH EVERY DAY 30 tablet 5    warfarin (COUMADIN) 5 MG tablet Take 7.5 mg by mouth every Mon, Wed, Fri.        Allergies: Patient has no known allergies.    Family History   Problem Relation Age of Onset    Diabetes Mother     Hypertension Mother     Hypertension Father     Acromegaly Father     Heart attack Father     Kidney cancer Brother      Social History     Tobacco Use    Smoking status: Current Every Day Smoker     Packs/day: 9.00     Years: 40.00     Pack years: 360.00     Types: Cigarettes    Smokeless tobacco: Never Used    Tobacco comment: smokes parts all day long continuously   Substance Use Topics    Alcohol use: Not Currently     Frequency: Never     Comment: quit 2 years ago was heavily    Drug use: Never     Review of Systems     Review of Symptoms:  Constitutional: Denies fevers, weight loss, chills, or weakness.  Eyes: Denies changes in vision.  ENT: Denies dysphagia, nasal discharge, ear pain or discharge.  Cardiovascular: Denies chest pain, palpitations, orthopnea, or claudication.  Respiratory: Denies shortness of breath, cough, hemoptysis, or wheezing.  GI: Denies  nausea/vomitting, hematochezia, melena, abd pain, or changes in appetite.  : Denies dysuria, incontinence, or hematuria.  Musculoskeletal: + Bilateral shoulder pain   Skin/breast: Denies rashes, lumps, lesions, or discharge.  Neurologic: Denies headache, dizziness, vertigo, or paresthesias.  Psychiatric: Denies changes in mood or hallucinations.  Endocrine: Denies polyuria, polydipsia, heat/cold intolerance.  Hematologic/Lymph: Denies lymphadenopathy, easy bruising or easy bleeding.  Allergic/Immunologic: Denies rash, rhinitis.     Objective:     Vitals:    Temp: 96.8 °F (36 °C)  Pulse: 108  BP: (!) 172/86  MAP (mmHg): 121  Resp: 20  SpO2: 96 %  O2 Device (Oxygen Therapy): nasal cannula    Temp  Min: 96.8 °F (36 °C)  Max: 97.8 °F (36.6 °C)  Pulse  Min: 105  Max: 122  BP  Min: 127/65  Max: 175/85  MAP (mmHg)  Min: 87  Max: 121  Resp  Min: 13  Max: 27  SpO2  Min: 90 %  Max: 98 %    No intake/output data recorded.           Physical Exam      Physical Exam:  GA: Alert, comfortable, no acute distress.   HEENT: No scleral icterus or JVD.   Pulmonary: Coarse bilaterally   Cardiac: RRR S1 & S2 w/o rubs/murmurs/gallops.   Abdominal: Bowel sounds present x 4.   Skin: No jaundice, rashes, or visible lesions.  Psych: Mental Status:  Alert oriented follows  Neuro:  --GCS: E4 V5 M6  --CN II-XII grossly intact.   --Pupils 3mm, PERRL.   --Corneal reflex, gag, cough intact.  --LUE strength: 5/5  --RUE strength: 5/5  --LLE strength: 5/5  --RLE strength: 5/5    Unable to test gait due to level of consciousness.    Today I personally reviewed pertinent medications, lines/drains/airways, imaging, cardiology results, laboratory results, notably: [facility]

## 2020-01-28 NOTE — NURSING
Ortho MD and NCC at bedside to attempt reduction of bilateral shoulders.  Precedex gtt started.  Fentanyl and versed administered for comfort throughout procedure.   Shoulder reduction unsuccessful.  Plan for surgery today. Pt kept NPO.

## 2020-01-28 NOTE — SUBJECTIVE & OBJECTIVE
(Not in a hospital admission)    Review of patient's allergies indicates:  No Known Allergies    Past Medical History:   Diagnosis Date    Adenoma of right adrenal gland 11/09/2017    Alcohol abuse     ASCVD (arteriosclerotic cardiovascular disease) 10/2008    [place]    BPH (benign prostatic hyperplasia)     Cardiomyopathy     Chronic anxiety     Cirrhosis of liver     COPD (chronic obstructive pulmonary disease)     Current every day smoker     2-3 ppd    Elevated LFTs 08/2001    GERD with esophagitis 11/09/2017    History of colon polyps     History of epididymitis 2016    Juan Antonio    Hyperlipidemia     Hypertension, essential     Long term (current) use of anticoagulants     Lumbar disc disease with radiculopathy 05/2015    Major depression, recurrent, chronic     PAF (paroxysmal atrial fibrillation)     Peripheral autonomic neuropathy due to DM     Proteinuria     PVD (peripheral vascular disease) 05/29/2009    Venous insufficiency of both lower extremities     Vitamin D deficiency      Past Surgical History:   Procedure Laterality Date    biopsy back  05/20/2019    benign Martinez    biopsy right ankle Right 05/20/2019    Martinez, benign    COLONOSCOPY N/A 6/13/2019    Procedure: COLONOSCOPY;  Surgeon: Delmer Kerr MD;  [facility];  Service: Endoscopy;  Laterality: N/A;    COLONOSCOPY W/ POLYPECTOMY      EPIDURAL STEROID INJECTION INTO LUMBAR SPINE  06/2015    LASHAE Harvey    ESOPHAGOGASTRODUODENOSCOPY N/A 6/13/2019    Procedure: ESOPHAGOGASTRODUODENOSCOPY (EGD);  Surgeon: Delmer Kerr MD;  [facility];  Service: Endoscopy;  Laterality: N/A;    INTESTINAL MALROTATION REPAIR Right 1961    2 weeks old, Martinez    PERCUTANEOUS TRANSLUMINAL ANGIOPLASTY (PTA) OF PERIPHERAL VESSEL Right 12/2014    Amadeo, NIKOLAS     Family History     Problem Relation (Age of Onset)    Acromegaly Father    Diabetes Mother    Heart attack Father    Hypertension Mother, Father     Kidney cancer Brother        Tobacco Use    Smoking status: Current Every Day Smoker     Packs/day: 9.00     Years: 40.00     Pack years: 360.00     Types: Cigarettes    Smokeless tobacco: Never Used    Tobacco comment: smokes parts all day long continuously   Substance and Sexual Activity    Alcohol use: Not Currently     Frequency: Never     Comment: quit 2 years ago was heavily    Drug use: Never    Sexual activity: Yes     Partners: Female     Review of Systems  Objective:     Weight: 120.2 kg (265 lb)  Body mass index is 36.96 kg/m².  Vital Signs (Most Recent):  Temp: 96.8 °F (36 °C) (01/27/20 2106)  Pulse: 110 (01/27/20 2202)  Resp: 20 (01/27/20 2202)  BP: (!) 156/78 (01/27/20 2202)  SpO2: (!) 93 % (01/27/20 2202) Vital Signs (24h Range):  Temp:  [96.8 °F (36 °C)-97.8 °F (36.6 °C)] 96.8 °F (36 °C)  Pulse:  [105-122] 110  Resp:  [13-27] 20  SpO2:  [90 %-98 %] 93 %  BP: (127-175)/(65-90) 156/78                          Neurosurgery Physical Exam   AOX3  PERRL, EOMI, face symm, tongue midline  Able to squeeze hands 5/5   5/5 in BLE  SILT  No posterior c spine tenderness    Significant Labs:  Recent Labs   Lab 01/27/20  1625   *   *   K 3.6   CL 96   CO2 25   BUN 11   CREATININE 1.30   CALCIUM 7.8*     Recent Labs   Lab 01/27/20  1625   WBC 22.30*   HGB 16.6   HCT 49.5        Recent Labs   Lab 01/27/20  1625   LABPT 25.8*   INR 2.4*   APTT 40.9*     Microbiology Results (last 7 days)     ** No results found for the last 168 hours. **            Significant Diagnostics:  CT head: reviewed.

## 2020-01-28 NOTE — HPI
The patient is a 58 yo male with ETOH abuse, tobacco abuse,  PAF  with long term anticoagulation (coumadin) who presents to [facility] for SDH and bilateral shoulder dislocation. He  presented to OSH via EMS for acute onset of  SOB and reported seizure activity. At approx 5 pm he was playing video poker, felt light headed, and was assisted  Ground by family. He had what the family felt was a seizure but appeared awake during it. He was disoriented and confused after. He then reported had SOB, chest pain, and BUE pain. Patient also reports new onset severe lower back pain. He returned to baseline mental status shortly after per family. CTH and chest at OSH revealed SDH and bilateral upper ext dislocations. He received Kcentra and Vit K at OSH for reversal.  He is being admitted to [facility] for a higher level of care.

## 2020-01-28 NOTE — CHAPLAIN
brought Advance Directives information up to patient room. Patient appeared fully conscious, was cracking jokes and answering questions from the doctors.  Significant other, Eboni, was pursuing the Advance Directive and Living Will in response to the patient's request that she be his POA, and that his son not be allowed to make decisions for him. Patient appeared to me  (a non-clinician) to be competent to make these decisions.

## 2020-01-28 NOTE — PLAN OF CARE
Nutrition assessment completed. Please see RD note for details.    Recommendations  As medically able, recommend advancing diet to Regular with texture per SLP recommendations.     RD to monitor.     Goals: Pt to receive and tolerate >75% EEN and EPN by RD follow up  Nutrition Goal Status: new  Communication of RD Recs: reviewed with RN  Problem: Oral Intake Inadequate  Goal: Improved Oral Intake  Outcome: Ongoing, Progressing

## 2020-01-28 NOTE — NURSING
Patient with STAT imaging ordered. Rik Soto at bedside stated patient need to wear LSO brace first before doing the imaging. Will follow up.

## 2020-01-28 NOTE — NURSING
1400H- patient oriented only to self but seems to not cooperate with the neuro assessment. Patient has been receiving fentanyl and versed was given 2 hours ago. Chloe AGUILERA was notified. No new order. Will monitor patient.

## 2020-01-28 NOTE — SUBJECTIVE & OBJECTIVE
Past Medical History:   Diagnosis Date    Adenoma of right adrenal gland 11/09/2017    Alcohol abuse     ASCVD (arteriosclerotic cardiovascular disease) 10/2008    Amadeo    BPH (benign prostatic hyperplasia)     Cardiomyopathy     Chronic anxiety     Cirrhosis of liver     COPD (chronic obstructive pulmonary disease)     Current every day smoker     2-3 ppd    Elevated LFTs 08/2001    GERD with esophagitis 11/09/2017    History of colon polyps     History of epididymitis 2016    Juan Antonio    Hyperlipidemia     Hypertension, essential     Long term (current) use of anticoagulants     Lumbar disc disease with radiculopathy 05/2015    Major depression, recurrent, chronic     PAF (paroxysmal atrial fibrillation)     Peripheral autonomic neuropathy due to DM     Proteinuria     PVD (peripheral vascular disease) 05/29/2009    Venous insufficiency of both lower extremities     Vitamin D deficiency        Past Surgical History:   Procedure Laterality Date    biopsy back  05/20/2019    benign Martinez    biopsy right ankle Right 05/20/2019    Martinez, benign    COLONOSCOPY N/A 6/13/2019    Procedure: COLONOSCOPY;  Surgeon: Delmer Kerr MD;  Location: [facility];  Service: Endoscopy;  Laterality: N/A;    COLONOSCOPY W/ POLYPECTOMY      EPIDURAL STEROID INJECTION INTO LUMBAR SPINE  06/2015    LASHAE Harvey    ESOPHAGOGASTRODUODENOSCOPY N/A 6/13/2019    Procedure: ESOPHAGOGASTRODUODENOSCOPY (EGD);  Surgeon: Delmer Kerr MD;  [facility] ENDO;  Service: Endoscopy;  Laterality: N/A;    INTESTINAL MALROTATION REPAIR Right 1961    2 weeks old, Martinez    PERCUTANEOUS TRANSLUMINAL ANGIOPLASTY (PTA) OF PERIPHERAL VESSEL Right 12/2014    NIKOLAS Childs       Review of patient's allergies indicates:  No Known Allergies    Current Facility-Administered Medications   Medication    0.9%  NaCl infusion    albuterol-ipratropium 2.5 mg-0.5 mg/3 mL nebulizer solution 3 mL    atorvastatin tablet 80 mg     "dextrose 10% (D10W) Bolus    fentaNYL (SUBLIMAZE) 50 mcg/mL injection    fentaNYL injection 25 mcg    fentaNYL injection 6.25 mcg    glucagon (human recombinant) injection 1 mg    insulin aspart U-100 pen 1-10 Units    labetalol 20 mg/4 mL (5 mg/mL) IV syring    levETIRAcetam in NaCl (iso-os) IVPB 500 mg    metoprolol tartrate (LOPRESSOR) tablet 25 mg    midazolam (VERSED) 1 mg/mL injection    multivitamin tablet    niCARdipine 40 mg/200 mL infusion    ondansetron injection 4 mg    pantoprazole EC tablet 40 mg    propofol (DIPRIVAN) 10 mg/mL infusion    propofol (DIPRIVAN) 10 mg/mL infusion    sodium chloride 0.9% flush 10 mL     Family History     Problem Relation (Age of Onset)    Acromegaly Father    Diabetes Mother    Heart attack Father    Hypertension Mother, Father    Kidney cancer Brother        Tobacco Use    Smoking status: Current Every Day Smoker     Packs/day: 9.00     Years: 40.00     Pack years: 360.00     Types: Cigarettes    Smokeless tobacco: Never Used    Tobacco comment: smokes parts all day long continuously   Substance and Sexual Activity    Alcohol use: Not Currently     Frequency: Never     Comment: quit 2 years ago was heavily    Drug use: Never    Sexual activity: Yes     Partners: Female     ROS See primary provider ROS  Objective:     Vital Signs (Most Recent):  Temp: 97.8 °F (36.6 °C) (01/28/20 0100)  Pulse: 110 (01/28/20 0200)  Resp: (!) 23 (01/28/20 0200)  BP: (!) 147/83 (01/28/20 0200)  SpO2: 95 % (01/28/20 0200) Vital Signs (24h Range):  Temp:  [96.8 °F (36 °C)-97.8 °F (36.6 °C)] 97.8 °F (36.6 °C)  Pulse:  [105-122] 110  Resp:  [13-27] 23  SpO2:  [90 %-98 %] 95 %  BP: (127-175)/(65-92) 147/83     Weight: 111.1 kg (244 lb 14.9 oz)  Height: 5' 11" (180.3 cm)  Body mass index is 34.16 kg/m².      Intake/Output Summary (Last 24 hours) at 1/28/2020 0315  Last data filed at 1/28/2020 0200  Gross per 24 hour   Intake 367.29 ml   Output --   Net 367.29 ml "       Ortho/SPM Exam    Afebrile, Vital signs stable   Gen - well-developed, well-nourished, no acute distress  HEENT - normocephalic, atraumatic   CV - Regular rate   Pulm - Good inspiratory effort with unlaboured breathing    Right Upper Extremity:  Skin intact, no deformity noted  No open wounds/abrasions/laceration  Ecchymosis over posterior R shoulder  TTP diffusely about right proximal humerus  No TTP R humerus shaft, elbow, forearm, wrist, hand  Pain limited ROM of R shoulder, full painless ROM R elbow and wrist  SILT M/U/R  SILT axillary nerve   Motor intact AIN/PIN/M/U/R   Able to contract deltoid  Cap refill < 2s    Left Upper Extremity:  Skin intact, no deformity noted  No open wounds/abrasions/laceration  TTP diffusely about left proximal humerus  No TTP L humerus shaft, elbow, forearm, wrist, hand  Pain limited ROM of L shoulder, full painless ROM L elbow and wrist  SILT M/U/R  SILT axillary nerve   Motor intact AIN/PIN/M/U/R   Able to contract deltoid  Cap refill < 2s  2+ RP    All other joints (shoulder/elbow/wrist/hip/knee/ankle) were examined and had non painful ROM. All other long bones were palpated and were non-tender.       Significant Labs: All pertinent labs within the past 24 hours have been reviewed.     Recent Labs   Lab 01/27/20  2256   WBC 20.03*   RBC 5.08   HGB 14.0   HCT 44.4      MCV 87   MCH 27.6   MCHC 31.5*         Significant Imaging: I have reviewed and interpreted all pertinent imaging results/findings.     CTA chest shows bilateral fracture dislocations of proximal humerus  XR shoulders show comminuted intra articular proximal humerus fractures

## 2020-01-28 NOTE — PLAN OF CARE
CM met with patient and S/o, Eboni,  in room for [place] Planning Assessment.  Per patient, he lives S/o in a single story house with  steps to enter.   Per patient and S/o,  patient was independent with ADLS and used no for ambulation.  Patient stated that he uses a nebulizer at home for COPD.   Per S/o,  the patient will have assistance from her upon discharge.   Discharge Planning Booklet given to patient/family and discussed.  All questions addressed.       01/28/20 5035   Discharge Assessment   Assessment Type Discharge Planning Assessment   Confirmed/corrected address and phone number on facesheet? Yes   Assessment information obtained from? Patient;Caregiver  (patient and S/o Eboni)   Expected Length of Stay (days) 7   Communicated expected length of stay with patient/caregiver yes   Prior to hospitilization cognitive status: Alert/Oriented   Prior to hospitalization functional status: Independent   Current cognitive status: Alert/Oriented   Current Functional Status: Needs Assistance  ( bilateral shoulder dislocations and left convexity SDH)   Facility Arrived From: [facility]   Lives With significant other   Able to Return to Prior Arrangements yes   Is patient able to care for self after discharge? Unable to determine at this time (comments)   Who are your caregiver(s) and their phone number(s)? Eboni Rhodes (S/o) [phone]   Patient's perception of discharge disposition rehab facility   Readmission Within the Last 30 Days no previous admission in last 30 days   Patient currently being followed by outpatient case management? No   Patient currently receives any other outside agency services? No   Equipment Currently Used at Home none   Part D Coverage [employer]   Do you have any problems affording any of your prescribed medications? No   Is the patient taking medications as prescribed? yes   Does the patient have transportation home? Yes   Transportation Anticipated family or friend will provide   Does  the patient receive services at the [facility]? No   Discharge Plan A Rehab   Discharge Plan B Home Health   DME Needed Upon Discharge  other (see comments)  (tbd)   Patient/Family in Agreement with Plan yes                PCP:  Cristal Ohara NP        Pharmacy:    [id number] - [place], [place] - [address] AT [facility]  [address] [address]  Phone: [phone] Fax: [phone]        Emergency Contacts:  Extended Emergency Contact Information  Primary Emergency Contact: Eboni Rhodes   [facility]  Mobile Phone: [phone]  Relation: Friend  Secondary Emergency Contact: Mike Rios  [place] Phone: [phone]  Relation: Brother      Insurance:    Payor: MEDICARE / Plan: MEDICARE PART A & B / Product Type: Government /       Gwen Franks RN, CCRN-K, CCM  Neuro-Critical Care   X [phone]    01/28/2020  4:17 PM

## 2020-01-28 NOTE — HPI
Mr Melvin is a 60 yo male with EtOH abuse, tobacco abuse,  PAF with long term anticoagulation (coumadin) who presents to [facility] as a transfer for management of  SDH and bilateral proximal humerus fracture-dislocations. He  presented to OSH via EMS for acute onset of  SOB and reported seizure activity. At approx 5 pm he was playing video poker, felt light headed, and was assisted to the ground by family. He was disoriented and confused after. Patient reports severe bilateral shoulder pain. He denies pain in other extremities. He denies numbness or tingling in the extremities. He is currently admitted to neuro critical care after CTH and chest at OSH revealed SDH. He received Kcentra and Vit K at OSH for reversal of INR.

## 2020-01-28 NOTE — ASSESSMENT & PLAN NOTE
- NSGY consulted  - Kcentra and Vit K for warfarin  - Repeat INR pending  - Hold anticoagulation at this time  - SBP <160   - Q 1 vitals  - Q1 neuro Checks  - TSH, Lipid, A1c, Echo, EKG  - PT/Ot eval and treat   - Repeat CTH

## 2020-01-28 NOTE — ASSESSMENT & PLAN NOTE
59 M with hx of paroxysmal afib on coumadin presents for eval of bilateral shoulder dislocations, left convexity SDH.  -Admit to neuro ICU  -q 1 hr neuro checks  -Repeat CT head stable  -Hold coumadin, goal INR of less than 1.4  -Ortho consult for bilateral shoulder dislocation  -C collar at all times  - TLSO when OOB/upright  - MRI C spine and MRI L spine including up to T11  - Ok to proceed with Ortho procedure if in C collar    Discussed w/Dr. Barry

## 2020-01-28 NOTE — SUBJECTIVE & OBJECTIVE
Interval History: NAEON. Pending surgery w/Ortho. Pending additional spine imaging. C collar at all times, TLSO when OOB. Neurologically intact.     Medications:  Continuous Infusions:   sodium chloride 0.9% 75 mL/hr at 01/28/20 1301    niCARdipine Stopped (01/28/20 0801)     Scheduled Meds:   atorvastatin  80 mg Oral QHS    levetiracetam IVPB  1,000 mg Intravenous Q12H    metoprolol tartrate  25 mg Oral BID    midazolam  2 mg Intravenous Once    multivitamin  1 tablet Oral Daily    pantoprazole  40 mg Oral Daily    propofol         PRN Meds:albuterol-ipratropium, Dextrose 10% Bolus, fentaNYL, glucagon (human recombinant), insulin aspart U-100, labetalol, ondansetron, sodium chloride 0.9%     Review of Systems  Objective:     Weight: 110.7 kg (244 lb)  Body mass index is 34.03 kg/m².  Vital Signs (Most Recent):  Temp: 99.2 °F (37.3 °C) (01/28/20 1101)  Pulse: 70 (01/28/20 1301)  Resp: (!) 25 (01/28/20 1301)  BP: 132/80 (01/28/20 1301)  SpO2: (!) 92 % (01/28/20 1301) Vital Signs (24h Range):  Temp:  [96.8 °F (36 °C)-99.2 °F (37.3 °C)] 99.2 °F (37.3 °C)  Pulse:  [] 70  Resp:  [13-41] 25  SpO2:  [90 %-98 %] 92 %  BP: (127-175)/(59-92) 132/80     Date 01/28/20 0700 - 01/29/20 0659   Shift 9943-3811 9807-5755 9321-1296 24 Hour Total   INTAKE   I.V.(mL/kg) 564.2(5.1)   564.2(5.1)   IV Piggyback 100   100   Shift Total(mL/kg) 664.2(6)   664.2(6)   OUTPUT   Urine(mL/kg/hr) 700   700   Shift Total(mL/kg) 700(6.3)   700(6.3)   Weight (kg) 110.7 110.7 110.7 110.7                        Neurosurgery Physical Exam   AOX3  PERRL, EOMI, face symm, tongue midline  Able to squeeze hands 5/5   5/5 in BLE  SILT  No posterior c spine tenderness    Significant Labs:  Recent Labs   Lab 01/27/20  1625 01/27/20  2256 01/28/20  0110   * 152* 124*   * 137 137   K 3.6 3.5 3.5   CL 96 102 101   CO2 25 24 25   BUN 11 12 12   CREATININE 1.30 1.2 1.2   CALCIUM 7.8* 7.2* 7.0*   MG  --   --  1.2*     Recent Labs   Lab  01/27/20  1625 01/27/20  2256 01/28/20  0110   WBC 22.30* 20.03* 16.74*  16.74*   HGB 16.6 14.0 13.6*  13.6*   HCT 49.5 44.4 43.0  43.0    153 140*  140*     Recent Labs   Lab 01/27/20  1625 01/27/20  2256 01/28/20  0110   LABPT 25.8*  --   --    INR 2.4* 1.3* 1.2   APTT 40.9*  --  25.5       Significant Diagnostics:  Ct Head Without Contrast    Result Date: 1/28/2020  Mixed attenuation subdural hematoma overlying the left cerebral convexity measuring 9 mm in thickness and 6 mm of left to right midline shift. Findings were included on preliminary report sent by [facility]. Electronically signed by: Bora Escoto MD Date:    01/28/2020 Time:    09:21    Ct Head Without Contrast    Result Date: 1/27/2020  Unchanged appearance of mixed attenuation left-sided holohemispheric collection with hyperattenuating components, most consistent with acute on subacute left-sided subdural hematoma.  Stable 4 mm left to right midline shift. Minimal hyperattenuating material layering along the tentorium.  The findings may also represent some small component of subdural hematoma along the tentorium.  No significant mass effect. Electronically signed by resident: Eusebio Jones Date:    01/27/2020 Time:    22:54 Electronically signed by: Sebastian Nascimento MD Date:    01/27/2020 Time:    23:25    Cta Chest Non-coronary (pe Study)    Result Date: 1/28/2020  1. No CT evidence of pulmonary thromboembolism. 2. Bilateral fracture dislocations both humeral heads. 3. Questionable cholelithiasis. 4. Hypoattenuating lesions in both kidneys, possibly complex cysts.  Consider follow-up ultrasound. Electronically signed by: Bora Escoto MD Date:    01/28/2020 Time:    09:17    Ct Cervical Spine Without Contrast    Result Date: 1/28/2020  Minimal compression deformities of the C5-C7 vertebral bodies, likely chronic in etiology however in the absence of prior imaging remain age-indeterminate.  No traumatic malalignment of the cervical  spine.  No significant prevertebral soft tissue swelling. Electronically signed by resident: Eusebio Jones Date:    01/27/2020 Time:    23:05 Electronically signed by: Forrest Toth MD Date:    01/28/2020 Time:    00:24    Ct Lumbar Spine Without Contrast    Result Date: 1/28/2020  Acute burst fracture of the L4 vertebral body with mild fracture fragment retropulsion and extension into the left pedicle. Acute burst fracture of the L1 vertebral body with mild fracture fragment retropulsion. Age indeterminate compression fractures at T11 and T12. Partially visualized left-sided renal lesions, with greater than expected density for simple cysts.  Follow-up with nonemergent renal ultrasound could be helpful to establish cystic nature of these lesions and exclude neoplasm. This report was flagged in Epic as abnormal. Electronically signed by resident: Eusebio Jones Date:    01/27/2020 Time:    23:23 Electronically signed by: Forrest Toth MD Date:    01/28/2020 Time:    00:48    X-ray Shoulder 1 View Bilateral    Result Date: 1/28/2020  See above. Electronically signed by: Forrest Toth MD Date:    01/28/2020 Time:    03:47    X-ray Shoulder 1 View Bilateral    Result Date: 1/28/2020  See above. Consider CT for further evaluation of shoulder fractures as clinically appropriate. Electronically signed by: Forrest Toth MD Date:    01/28/2020 Time:    02:08    X-ray Humerus 2 View Bilateral    Result Date: 1/28/2020  See above. Consider CT for further evaluation of shoulder fractures as clinically appropriate. Electronically signed by: Forrest Toth MD Date:    01/28/2020 Time:    02:08    X-ray Chest Ap Portable    Result Date: 1/27/2020  No evidence of acute cardiopulmonary disease. Electronically signed by: Bora Escoto MD Date:    01/27/2020 Time:    16:38    Ct 3d Recon With Independent Ws    Result Date: 1/28/2020  Acute comminuted and impacted fractures of bilateral proximal humeri/humeral heads  associated with posterior dislocation. Acute linear fractures of bilateral acromion processes associated with moderate posterolateral displacement. No fractures identified elsewhere. Additional incidental findings, as above. Electronically signed by resident: Ninfa Armas Date:    01/28/2020 Time:    10:25 Electronically signed by: Riley Raza MD Date:    01/28/2020 Time:    11:01    Ct 3d Recon With Independent Ws    Result Date: 1/28/2020  Acute comminuted and impacted fractures of bilateral proximal humeri/humeral heads associated with posterior dislocation. Acute linear fractures of bilateral acromion processes associated with moderate posterolateral displacement. No fractures identified elsewhere. Additional incidental findings, as above. Electronically signed by resident: Ninfa Armas Date:    01/28/2020 Time:    10:25 Electronically signed by: Riley Raza MD Date:    01/28/2020 Time:    11:01    Ct Shoulder Without Contrast Left    Result Date: 1/28/2020  Acute comminuted and impacted fractures of bilateral proximal humeri/humeral heads associated with posterior dislocation. Acute linear fractures of bilateral acromion processes associated with moderate posterolateral displacement. No fractures identified elsewhere. Additional incidental findings, as above. Electronically signed by resident: Ninfa Armas Date:    01/28/2020 Time:    10:25 Electronically signed by: Riley Raza MD Date:    01/28/2020 Time:    11:01    Ct Shoulder Without Contrast Right    Result Date: 1/28/2020  Acute comminuted and impacted fractures of bilateral proximal humeri/humeral heads associated with posterior dislocation. Acute linear fractures of bilateral acromion processes associated with moderate posterolateral displacement. No fractures identified elsewhere. Additional incidental findings, as above. Electronically signed by resident: Ninfa Armas Date:    01/28/2020 Time:    10:25 Electronically  signed by: Riley Raza MD Date:    01/28/2020 Time:    11:01    X-ray Shoulder Trauma 3 View Bilateral    Result Date: 1/27/2020  Acute displaced bilateral shoulder fractures discussed above. Electronically signed by: Forrest Toth MD Date:    01/27/2020 Time:    22:52

## 2020-01-28 NOTE — ANESTHESIA PROCEDURE NOTES
Intubation  Performed by: Bear Trevino CRNA  Authorized by: David Gibbs MD     Intubation:     Induction:  Intravenous    Intubated:  Postinduction    Mask Ventilation:  Moderately difficult with oral airway    Attempts:  1    Attempted By:  CRNA    Method of Intubation:  Video laryngoscopy    Blade:  Villa 4    Laryngeal View Grade: Grade I - full view of chords      Difficult Airway Encountered?: No      Complications:  None    Airway Device:  Oral endotracheal tube    Airway Device Size:  8.0    Style/Cuff Inflation:  Cuffed (inflated to minimal occlusive pressure)    Inflation Amount (mL):  8    Tube secured:  21    Secured at:  The lips    Placement Verified By:  Capnometry    Complicating Factors:  None

## 2020-01-28 NOTE — SUBJECTIVE & OBJECTIVE
Interval History:      Review of Systems   Constitutional: Positive for activity change.   HENT: Positive for voice change.    Eyes: Negative.    Respiratory: Negative.    Cardiovascular: Negative.    Gastrointestinal: Negative.    Endocrine: Negative.    Genitourinary: Negative.    Musculoskeletal: Positive for arthralgias and back pain.   Skin: Negative.    Allergic/Immunologic: Negative.    Neurological: Positive for weakness.   Hematological: Negative.    Psychiatric/Behavioral: Negative.      Objective:     Vitals:  Temp: 99.1 °F (37.3 °C)  Pulse: 110  Rhythm: sinus tachycardia  BP: (!) 154/63  MAP (mmHg): 91  Resp: (!) 24  SpO2: 96 %  O2 Device (Oxygen Therapy): nasal cannula    Temp  Min: 96.8 °F (36 °C)  Max: 99.2 °F (37.3 °C)  Pulse  Min: 70  Max: 123  BP  Min: 127/65  Max: 175/85  MAP (mmHg)  Min: 85  Max: 121  Resp  Min: 13  Max: 41  SpO2  Min: 90 %  Max: 98 %    01/27 0701 - 01/28 0700  In: 782.1 [I.V.:682.1]  Out: -    Unmeasured Output  Urine Occurrence: 1  Pad Count: 2       Physical Exam  Neuro: ALERT oriented x 3, On C-collar and B/L arm slings  Patient in distress due to pain  GCS - 15  No facial asymmetry, abnormal speech 2/2 to collar placement, EOMs intact, PERRLA  Able to move upper and lower extremity distal>proximal    Medications:  Continuous  sodium chloride 0.9% Last Rate: 75 mL/hr at 01/28/20 1519   niCARdipine Last Rate: Stopped (01/28/20 0801)   Scheduled  atorvastatin 80 mg QHS   levetiracetam IVPB 1,000 mg Q12H   metoprolol tartrate 25 mg BID   midazolam 2 mg Once   multivitamin 1 tablet Daily   pantoprazole 40 mg Daily   PRN  albuterol-ipratropium 3 mL Q4H PRN   Dextrose 10% Bolus 12.5 g PRN   fentaNYL 12.5 mcg Q1H PRN   glucagon (human recombinant) 1 mg PRN   insulin aspart U-100 1-10 Units Q6H PRN   labetalol 10 mg Q4H PRN   ondansetron 4 mg Q8H PRN   sodium chloride 0.9% 10 mL PRN     Today I personally reviewed pertinent medications, lines/drains/airways, imaging, cardiology  results, laboratory results, microbiology results, notably:    Diet  Diet NPO  Diet NPO

## 2020-01-28 NOTE — PROGRESS NOTES
C collar cleared at bedside.    Pt w/o midline neck bony pain.   No numbness/tingling  Full, painless ROM neck  No radiculopathy  CT neg for fx/dislocation

## 2020-01-29 ENCOUNTER — ANESTHESIA EVENT (OUTPATIENT)
Dept: SURGERY | Facility: HOSPITAL | Age: 60
DRG: 041 | End: 2020-01-29
Payer: MEDICARE

## 2020-01-29 PROBLEM — S42.301A CLOSED FRACTURE OF BOTH HUMERI: Status: ACTIVE | Noted: 2020-01-27

## 2020-01-29 PROBLEM — S42.121A: Status: ACTIVE | Noted: 2020-01-29

## 2020-01-29 PROBLEM — S42.302A CLOSED FRACTURE OF BOTH HUMERI: Status: ACTIVE | Noted: 2020-01-27

## 2020-01-29 PROBLEM — S42.122A: Status: ACTIVE | Noted: 2020-01-29

## 2020-01-29 LAB
ALBUMIN SERPL BCP-MCNC: 2.2 G/DL (ref 3.5–5.2)
ALLENS TEST: ABNORMAL
ALP SERPL-CCNC: 85 U/L (ref 55–135)
ALT SERPL W/O P-5'-P-CCNC: 14 U/L (ref 10–44)
ANION GAP SERPL CALC-SCNC: 7 MMOL/L (ref 8–16)
ANION GAP SERPL CALC-SCNC: 8 MMOL/L (ref 8–16)
AST SERPL-CCNC: 45 U/L (ref 10–40)
BASOPHILS # BLD AUTO: 0.01 K/UL (ref 0–0.2)
BASOPHILS # BLD AUTO: 0.01 K/UL (ref 0–0.2)
BASOPHILS NFR BLD: 0.1 % (ref 0–1.9)
BASOPHILS NFR BLD: 0.1 % (ref 0–1.9)
BILIRUB SERPL-MCNC: 0.5 MG/DL (ref 0.1–1)
BUN SERPL-MCNC: 15 MG/DL (ref 6–20)
BUN SERPL-MCNC: 15 MG/DL (ref 6–20)
CALCIUM SERPL-MCNC: 6.4 MG/DL (ref 8.7–10.5)
CALCIUM SERPL-MCNC: 6.8 MG/DL (ref 8.7–10.5)
CHLORIDE SERPL-SCNC: 103 MMOL/L (ref 95–110)
CHLORIDE SERPL-SCNC: 105 MMOL/L (ref 95–110)
CO2 SERPL-SCNC: 26 MMOL/L (ref 23–29)
CO2 SERPL-SCNC: 26 MMOL/L (ref 23–29)
CREAT SERPL-MCNC: 1.2 MG/DL (ref 0.5–1.4)
CREAT SERPL-MCNC: 1.4 MG/DL (ref 0.5–1.4)
DELSYS: ABNORMAL
DIFFERENTIAL METHOD: ABNORMAL
DIFFERENTIAL METHOD: ABNORMAL
EOSINOPHIL # BLD AUTO: 0 K/UL (ref 0–0.5)
EOSINOPHIL # BLD AUTO: 0 K/UL (ref 0–0.5)
EOSINOPHIL NFR BLD: 0.2 % (ref 0–8)
EOSINOPHIL NFR BLD: 0.2 % (ref 0–8)
ERYTHROCYTE [DISTWIDTH] IN BLOOD BY AUTOMATED COUNT: 17 % (ref 11.5–14.5)
ERYTHROCYTE [DISTWIDTH] IN BLOOD BY AUTOMATED COUNT: 17 % (ref 11.5–14.5)
ERYTHROCYTE [SEDIMENTATION RATE] IN BLOOD BY WESTERGREN METHOD: 18 MM/H
EST. GFR  (AFRICAN AMERICAN): >60 ML/MIN/1.73 M^2
EST. GFR  (AFRICAN AMERICAN): >60 ML/MIN/1.73 M^2
EST. GFR  (NON AFRICAN AMERICAN): 55 ML/MIN/1.73 M^2
EST. GFR  (NON AFRICAN AMERICAN): >60 ML/MIN/1.73 M^2
FIO2: 40
GLUCOSE SERPL-MCNC: 139 MG/DL (ref 70–110)
GLUCOSE SERPL-MCNC: 171 MG/DL (ref 70–110)
HCO3 UR-SCNC: 26.6 MMOL/L (ref 24–28)
HCT VFR BLD AUTO: 32.5 % (ref 40–54)
HCT VFR BLD AUTO: 32.5 % (ref 40–54)
HGB BLD-MCNC: 10.1 G/DL (ref 14–18)
HGB BLD-MCNC: 10.1 G/DL (ref 14–18)
IMM GRANULOCYTES # BLD AUTO: 0.09 K/UL (ref 0–0.04)
IMM GRANULOCYTES # BLD AUTO: 0.09 K/UL (ref 0–0.04)
IMM GRANULOCYTES NFR BLD AUTO: 0.7 % (ref 0–0.5)
IMM GRANULOCYTES NFR BLD AUTO: 0.7 % (ref 0–0.5)
LYMPHOCYTES # BLD AUTO: 0.6 K/UL (ref 1–4.8)
LYMPHOCYTES # BLD AUTO: 0.6 K/UL (ref 1–4.8)
LYMPHOCYTES NFR BLD: 4.3 % (ref 18–48)
LYMPHOCYTES NFR BLD: 4.3 % (ref 18–48)
MAGNESIUM SERPL-MCNC: 1.5 MG/DL (ref 1.6–2.6)
MAGNESIUM SERPL-MCNC: 2 MG/DL (ref 1.6–2.6)
MCH RBC QN AUTO: 27.7 PG (ref 27–31)
MCH RBC QN AUTO: 27.7 PG (ref 27–31)
MCHC RBC AUTO-ENTMCNC: 31.1 G/DL (ref 32–36)
MCHC RBC AUTO-ENTMCNC: 31.1 G/DL (ref 32–36)
MCV RBC AUTO: 89 FL (ref 82–98)
MCV RBC AUTO: 89 FL (ref 82–98)
MODE: ABNORMAL
MONOCYTES # BLD AUTO: 0.5 K/UL (ref 0.3–1)
MONOCYTES # BLD AUTO: 0.5 K/UL (ref 0.3–1)
MONOCYTES NFR BLD: 3.9 % (ref 4–15)
MONOCYTES NFR BLD: 3.9 % (ref 4–15)
NEUTROPHILS # BLD AUTO: 12.4 K/UL (ref 1.8–7.7)
NEUTROPHILS # BLD AUTO: 12.4 K/UL (ref 1.8–7.7)
NEUTROPHILS NFR BLD: 90.8 % (ref 38–73)
NEUTROPHILS NFR BLD: 90.8 % (ref 38–73)
NRBC BLD-RTO: 0 /100 WBC
NRBC BLD-RTO: 0 /100 WBC
PCO2 BLDA: 44.3 MMHG (ref 35–45)
PEEP: 5
PH SMN: 7.39 [PH] (ref 7.35–7.45)
PHOSPHATE SERPL-MCNC: 2.5 MG/DL (ref 2.7–4.5)
PHOSPHATE SERPL-MCNC: 2.6 MG/DL (ref 2.7–4.5)
PLATELET # BLD AUTO: 114 K/UL (ref 150–350)
PLATELET # BLD AUTO: 114 K/UL (ref 150–350)
PMV BLD AUTO: 10.3 FL (ref 9.2–12.9)
PMV BLD AUTO: 10.3 FL (ref 9.2–12.9)
PO2 BLDA: 85 MMHG (ref 80–100)
POC BE: 2 MMOL/L
POC SATURATED O2: 96 % (ref 95–100)
POC TCO2: 28 MMOL/L (ref 23–27)
POCT GLUCOSE: 116 MG/DL (ref 70–110)
POCT GLUCOSE: 125 MG/DL (ref 70–110)
POCT GLUCOSE: 140 MG/DL (ref 70–110)
POCT GLUCOSE: 156 MG/DL (ref 70–110)
POTASSIUM SERPL-SCNC: 4.3 MMOL/L (ref 3.5–5.1)
POTASSIUM SERPL-SCNC: 4.4 MMOL/L (ref 3.5–5.1)
PROT SERPL-MCNC: 5.3 G/DL (ref 6–8.4)
RBC # BLD AUTO: 3.64 M/UL (ref 4.6–6.2)
RBC # BLD AUTO: 3.64 M/UL (ref 4.6–6.2)
SAMPLE: ABNORMAL
SITE: ABNORMAL
SODIUM SERPL-SCNC: 136 MMOL/L (ref 136–145)
SODIUM SERPL-SCNC: 139 MMOL/L (ref 136–145)
VT: 450
WBC # BLD AUTO: 13.61 K/UL (ref 3.9–12.7)
WBC # BLD AUTO: 13.61 K/UL (ref 3.9–12.7)

## 2020-01-29 PROCEDURE — 80053 COMPREHEN METABOLIC PANEL: CPT

## 2020-01-29 PROCEDURE — 94003 VENT MGMT INPAT SUBQ DAY: CPT

## 2020-01-29 PROCEDURE — 94761 N-INVAS EAR/PLS OXIMETRY MLT: CPT

## 2020-01-29 PROCEDURE — 99233 SBSQ HOSP IP/OBS HIGH 50: CPT | Mod: GC,,, | Performed by: PSYCHIATRY & NEUROLOGY

## 2020-01-29 PROCEDURE — 99233 PR SUBSEQUENT HOSPITAL CARE,LEVL III: ICD-10-PCS | Mod: GC,,, | Performed by: PSYCHIATRY & NEUROLOGY

## 2020-01-29 PROCEDURE — 99900035 HC TECH TIME PER 15 MIN (STAT)

## 2020-01-29 PROCEDURE — 63600175 PHARM REV CODE 636 W HCPCS: Performed by: NURSE PRACTITIONER

## 2020-01-29 PROCEDURE — 84100 ASSAY OF PHOSPHORUS: CPT | Mod: 91

## 2020-01-29 PROCEDURE — 20000000 HC ICU ROOM

## 2020-01-29 PROCEDURE — 83735 ASSAY OF MAGNESIUM: CPT

## 2020-01-29 PROCEDURE — 27000221 HC OXYGEN, UP TO 24 HOURS

## 2020-01-29 PROCEDURE — 84100 ASSAY OF PHOSPHORUS: CPT

## 2020-01-29 PROCEDURE — 83735 ASSAY OF MAGNESIUM: CPT | Mod: 91

## 2020-01-29 PROCEDURE — 85025 COMPLETE CBC W/AUTO DIFF WBC: CPT

## 2020-01-29 PROCEDURE — S0028 INJECTION, FAMOTIDINE, 20 MG: HCPCS | Performed by: NURSE PRACTITIONER

## 2020-01-29 PROCEDURE — 63600175 PHARM REV CODE 636 W HCPCS: Performed by: STUDENT IN AN ORGANIZED HEALTH CARE EDUCATION/TRAINING PROGRAM

## 2020-01-29 PROCEDURE — 99900026 HC AIRWAY MAINTENANCE (STAT)

## 2020-01-29 PROCEDURE — 80048 BASIC METABOLIC PNL TOTAL CA: CPT

## 2020-01-29 PROCEDURE — 25000003 PHARM REV CODE 250: Performed by: NURSE PRACTITIONER

## 2020-01-29 PROCEDURE — 36600 WITHDRAWAL OF ARTERIAL BLOOD: CPT

## 2020-01-29 PROCEDURE — 94640 AIRWAY INHALATION TREATMENT: CPT

## 2020-01-29 PROCEDURE — 25000242 PHARM REV CODE 250 ALT 637 W/ HCPCS: Performed by: PSYCHIATRY & NEUROLOGY

## 2020-01-29 PROCEDURE — 99222 PR INITIAL HOSPITAL CARE,LEVL II: ICD-10-PCS | Mod: ,,, | Performed by: ORTHOPAEDIC SURGERY

## 2020-01-29 PROCEDURE — 99222 1ST HOSP IP/OBS MODERATE 55: CPT | Mod: ,,, | Performed by: ORTHOPAEDIC SURGERY

## 2020-01-29 RX ORDER — FENTANYL CITRATE 50 UG/ML
25 INJECTION, SOLUTION INTRAMUSCULAR; INTRAVENOUS EVERY 5 MIN PRN
Status: CANCELLED | OUTPATIENT
Start: 2020-01-29

## 2020-01-29 RX ORDER — POTASSIUM CHLORIDE 7.45 MG/ML
40 INJECTION INTRAVENOUS
Status: DISCONTINUED | OUTPATIENT
Start: 2020-01-29 | End: 2020-02-02

## 2020-01-29 RX ORDER — MAGNESIUM SULFATE HEPTAHYDRATE 40 MG/ML
2 INJECTION, SOLUTION INTRAVENOUS
Status: DISCONTINUED | OUTPATIENT
Start: 2020-01-29 | End: 2020-02-03

## 2020-01-29 RX ORDER — MAGNESIUM SULFATE HEPTAHYDRATE 40 MG/ML
4 INJECTION, SOLUTION INTRAVENOUS
Status: DISCONTINUED | OUTPATIENT
Start: 2020-01-29 | End: 2020-02-02

## 2020-01-29 RX ORDER — IPRATROPIUM BROMIDE AND ALBUTEROL SULFATE 2.5; .5 MG/3ML; MG/3ML
3 SOLUTION RESPIRATORY (INHALATION) EVERY 6 HOURS
Status: DISCONTINUED | OUTPATIENT
Start: 2020-01-29 | End: 2020-01-29

## 2020-01-29 RX ORDER — POTASSIUM CHLORIDE 7.45 MG/ML
40 INJECTION INTRAVENOUS
Status: DISCONTINUED | OUTPATIENT
Start: 2020-01-29 | End: 2020-02-03

## 2020-01-29 RX ORDER — AMOXICILLIN 250 MG
1 CAPSULE ORAL DAILY
Status: DISCONTINUED | OUTPATIENT
Start: 2020-01-29 | End: 2020-01-29

## 2020-01-29 RX ORDER — AMOXICILLIN 250 MG
1 CAPSULE ORAL 2 TIMES DAILY
Status: DISCONTINUED | OUTPATIENT
Start: 2020-01-29 | End: 2020-01-31

## 2020-01-29 RX ORDER — IPRATROPIUM BROMIDE AND ALBUTEROL SULFATE 2.5; .5 MG/3ML; MG/3ML
3 SOLUTION RESPIRATORY (INHALATION) EVERY 6 HOURS
Status: DISCONTINUED | OUTPATIENT
Start: 2020-01-29 | End: 2020-02-02

## 2020-01-29 RX ORDER — MIDAZOLAM HYDROCHLORIDE 1 MG/ML
0.5 INJECTION INTRAMUSCULAR; INTRAVENOUS
Status: CANCELLED | OUTPATIENT
Start: 2020-01-29

## 2020-01-29 RX ADMIN — FAMOTIDINE 20 MG: 10 INJECTION, SOLUTION INTRAVENOUS at 08:01

## 2020-01-29 RX ADMIN — IPRATROPIUM BROMIDE AND ALBUTEROL SULFATE 3 ML: .5; 3 SOLUTION RESPIRATORY (INHALATION) at 11:01

## 2020-01-29 RX ADMIN — SENNOSIDES AND DOCUSATE SODIUM 1 TABLET: 8.6; 5 TABLET ORAL at 11:01

## 2020-01-29 RX ADMIN — CEFAZOLIN 2 G: 1 INJECTION, POWDER, FOR SOLUTION INTRAMUSCULAR; INTRAVENOUS at 06:01

## 2020-01-29 RX ADMIN — SODIUM CHLORIDE: 0.9 INJECTION, SOLUTION INTRAVENOUS at 05:01

## 2020-01-29 RX ADMIN — LEVETIRACETAM 1000 MG: 10 INJECTION INTRAVENOUS at 08:01

## 2020-01-29 RX ADMIN — CALCIUM GLUCONATE 500 MG: 98 INJECTION, SOLUTION INTRAVENOUS at 04:01

## 2020-01-29 RX ADMIN — ATORVASTATIN CALCIUM 80 MG: 20 TABLET, FILM COATED ORAL at 09:01

## 2020-01-29 RX ADMIN — PROPOFOL 30 MCG/KG/MIN: 10 INJECTION, EMULSION INTRAVENOUS at 05:01

## 2020-01-29 RX ADMIN — METOPROLOL TARTRATE 25 MG: 25 TABLET ORAL at 09:01

## 2020-01-29 RX ADMIN — CHLORHEXIDINE GLUCONATE 0.12% ORAL RINSE 15 ML: 1.2 LIQUID ORAL at 09:01

## 2020-01-29 RX ADMIN — SODIUM PHOSPHATE, MONOBASIC, MONOHYDRATE 15 MMOL: 276; 142 INJECTION, SOLUTION INTRAVENOUS at 06:01

## 2020-01-29 RX ADMIN — FAMOTIDINE 20 MG: 10 INJECTION, SOLUTION INTRAVENOUS at 09:01

## 2020-01-29 RX ADMIN — IPRATROPIUM BROMIDE AND ALBUTEROL SULFATE 3 ML: .5; 3 SOLUTION RESPIRATORY (INHALATION) at 07:01

## 2020-01-29 RX ADMIN — LEVETIRACETAM 1000 MG: 10 INJECTION INTRAVENOUS at 09:01

## 2020-01-29 RX ADMIN — CEFAZOLIN 2 G: 1 INJECTION, POWDER, FOR SOLUTION INTRAMUSCULAR; INTRAVENOUS at 01:01

## 2020-01-29 RX ADMIN — METOPROLOL TARTRATE 25 MG: 25 TABLET ORAL at 08:01

## 2020-01-29 RX ADMIN — MAGNESIUM SULFATE IN WATER 2 G: 40 INJECTION, SOLUTION INTRAVENOUS at 04:01

## 2020-01-29 RX ADMIN — PROPOFOL 30 MCG/KG/MIN: 10 INJECTION, EMULSION INTRAVENOUS at 08:01

## 2020-01-29 RX ADMIN — SENNOSIDES AND DOCUSATE SODIUM 1 TABLET: 8.6; 5 TABLET ORAL at 09:01

## 2020-01-29 RX ADMIN — SODIUM PHOSPHATE, MONOBASIC, MONOHYDRATE 15 MMOL: 276; 142 INJECTION, SOLUTION INTRAVENOUS at 01:01

## 2020-01-29 RX ADMIN — PROPOFOL 35 MCG/KG/MIN: 10 INJECTION, EMULSION INTRAVENOUS at 10:01

## 2020-01-29 RX ADMIN — IPRATROPIUM BROMIDE AND ALBUTEROL SULFATE 3 ML: .5; 3 SOLUTION RESPIRATORY (INHALATION) at 12:01

## 2020-01-29 RX ADMIN — PROPOFOL 35 MCG/KG/MIN: 10 INJECTION, EMULSION INTRAVENOUS at 01:01

## 2020-01-29 NOTE — ANESTHESIA PREPROCEDURE EVALUATION
[facility]  Anesthesia Pre-Operative Evaluation         Patient Name: Ben Melvin  YOB: 1960  MRN: [id number]    SUBJECTIVE:     Pre-operative evaluation for Procedure(s) (LRB):  HEMIARTHROPLASTY, SHOULDER- RIGHT- MEKA- SUPINE-  TRIOS (Right)     01/29/2020    Ben Melvin is a 60 y.o. male w/ a significant PMHx of HTN, pAF (on warfarin; currently held), COPD, GERD, T2DM, EtOH cirrhosis, CAD, PAD, current smoker (>140 pack-year hx) who presented with SDH and BL humeral fracture/dislocation after a reported seizure (now s/p L shoulder hemiarthroplasty on 1/28).  Pt remained intubated following surgery on 1/28.    HDS.  On propofol gtt for sedation.    Patient now presents for the above procedure(s).      LDA:        Peripheral IV - Single Lumen 01/27/20 1625 18 G Left Wrist (Active)   Site Assessment Clean;Dry;Intact;No redness;No swelling 1/28/2020 11:00 PM   Line Status Saline locked 1/28/2020 11:00 PM   Dressing Status Intact;Dry;Clean 1/28/2020 11:00 PM   Dressing Intervention Dressing reinforced 1/28/2020 11:00 PM   Dressing Change Due 01/31/20 1/28/2020 11:00 PM   Site Change Due 01/31/20 1/28/2020 11:00 PM   Reason Not Rotated Not due 1/28/2020 11:00 PM   Number of days: 1            Peripheral IV - Single Lumen 01/27/20 1913 18 G Right Hand (Active)   Site Assessment Clean 1/29/2020 11:01 AM   Line Status No blood return;Flushed;Infusing 1/29/2020 11:01 AM   Dressing Status Clean;Dry;Intact 1/29/2020 11:01 AM   Dressing Intervention Dressing reinforced 1/29/2020 11:01 AM   Dressing Change Due 01/31/20 1/29/2020 11:01 AM   Site Change Due 01/31/20 1/29/2020 11:01 AM   Reason Not Rotated Not due 1/29/2020 11:01 AM   Number of days: 1            Peripheral IV - Single Lumen 01/28/20 1637 16 G Right Forearm (Active)   Site Assessment Clean;Dry;Intact 1/29/2020 11:01 AM   Line Status No blood return;Infusing 1/29/2020 11:01 AM   Dressing Status Clean;Dry;Intact  "1/29/2020 11:01 AM   Dressing Intervention Dressing reinforced 1/29/2020 11:01 AM   Dressing Change Due 02/01/20 1/29/2020 11:01 AM   Site Change Due 02/01/20 1/29/2020 11:01 AM   Reason Not Rotated Not due 1/29/2020 11:01 AM   Number of days: 0            NG/OG Tube 01/28/20 2200 Center mouth (Active)   Placement Check placement verified by aspirate characteristics;placement verified by distal tube length measurement;placement verified by x-ray 1/29/2020 11:01 AM   Tolerance no signs/symptoms of discomfort 1/29/2020 11:01 AM   Securement secured to commercial device 1/29/2020 11:01 AM   Clamp Status/Tolerance unclamped 1/29/2020 11:01 AM   Suction Setting/Drainage Method suction at the bedside 1/28/2020 11:00 PM   Insertion Site Appearance no redness, warmth, tenderness, skin breakdown, drainage 1/29/2020 11:01 AM   Drainage None 1/28/2020 11:00 PM   Flush/Irrigation flushed w/;water;no resistance met 1/29/2020 11:01 AM   Feeding Method continuous 1/29/2020 11:01 AM   Feeding Action feeding restarted 1/29/2020 11:01 AM   Current Rate (mL/hr) 10 mL/hr 1/29/2020 11:01 AM   Goal Rate (mL/hr) 60 mL/hr 1/29/2020 11:01 AM   Formula Name impact peptide 1/29/2020 11:01 AM   Intake (mL) - Formula Tube Feeding 10 1/29/2020  1:01 PM   Number of days: 0            Urethral Catheter 01/28/20 1630 Non-latex;Straight-tip 16 Fr. (Active)   Site Assessment Clean;Intact 1/29/2020 11:01 AM   Collection Container Urimeter 1/29/2020 11:01 AM   Securement Method secured to top of thigh w/ adhesive device 1/29/2020 11:01 AM   Catheter Care Performed yes 1/29/2020 11:01 AM   Reason for Continuing Urinary Catheterization Post operative 1/29/2020 11:01 AM   CAUTI Prevention Bundle StatLock in place w 1" slack;Intact seal between catheter & drainage tubing;Drainage bag/urimeter off the floor;No dependent loops or kinks;Green sheeting clip in use;Drainage bag/urimeter not overfilled (<2/3 full);Drainage bag/urimeter below bladder 1/29/2020 " 11:01 AM   Output (mL) 150 mL 1/29/2020  1:01 PM   Number of days: 0       Prev airway:   Performed by: Bear Trevino CRNA  Authorized by: David Gibbs MD      Intubation:     Induction:  Intravenous    Intubated:  Postinduction    Mask Ventilation:  Moderately difficult with oral airway    Attempts:  1    Attempted By:  CRNA    Method of Intubation:  Video laryngoscopy    Blade:  Villa 4    Laryngeal View Grade: Grade I - full view of chords      Difficult Airway Encountered?: No      Complications:  None    Airway Device:  Oral endotracheal tube    Airway Device Size:  8.0    Style/Cuff Inflation:  Cuffed (inflated to minimal occlusive pressure)    Inflation Amount (mL):  8    Tube secured:  21    Secured at:  The lips    Placement Verified By:  Capnometry    Complicating Factors:  None       Drips:    sodium chloride 0.9% 100 mL/hr at 01/29/20 1301    propofol 35 mcg/kg/min (01/29/20 1346)       Patient Active Problem List   Diagnosis    Current every day smoker    Cardiomyopathy    ASCVD (arteriosclerotic cardiovascular disease)    Elevated LFTs    Hyperlipidemia    Proteinuria    PVD (peripheral vascular disease)    COPD (chronic obstructive pulmonary disease)    Alcohol abuse    Cirrhosis of liver    Venous insufficiency of both lower extremities    Long term (current) use of anticoagulants    PAF (paroxysmal atrial fibrillation)    Hypertension, essential    Peripheral autonomic neuropathy due to DM    Adenoma of right adrenal gland    GERD with esophagitis    BPH (benign prostatic hyperplasia)    Lumbar disc disease with radiculopathy    Chronic anxiety    Major depression, recurrent, chronic    History of epididymitis    Encounter for therapeutic drug monitoring    Metabolic syndrome    Encounter for long-term (current) use of medications    Screening for malignant neoplasm of prostate    Generalized osteoarthrosis, involving multiple sites    Abnormal CAT scan     Shortness of breath    Multiple vitamin deficiency disease    Chronic fatigue and malaise    Vitamin D deficiency    Diabetes 1.5, managed as type 2    Diabetes mellitus due to underlying condition with diabetic autonomic neuropathy, without long-term current use of insulin    Hereditary and idiopathic neuropathy     Other long term (current) drug therapy     Abnormal computed tomography of esophagus    Dietary counseling    Person consulting for explanation of examination or test findings    Insomnia    Salpingitis of Eustachian tube, right    Fracture dislocation of joint of left shoulder girdle    Open fracture of both humeri    Acute bilateral low back pain    Subdural hematoma     fracture dislocation of bilateral shoulders       Review of patient's allergies indicates:  No Known Allergies    Current Inpatient Medications:   albuterol-ipratropium  3 mL Nebulization Q6H    atorvastatin  80 mg Per OG tube QHS    chlorhexidine  15 mL Mouth/Throat BID    famotidine (PF)  20 mg Intravenous BID    levetiracetam IVPB  1,000 mg Intravenous Q12H    metoprolol tartrate  25 mg Per OG tube BID    senna-docusate 8.6-50 mg  1 tablet Per OG tube BID       No current facility-administered medications on file prior to encounter.      Current Outpatient Medications on File Prior to Encounter   Medication Sig Dispense Refill    amitriptyline (ELAVIL) 100 MG tablet Take 1 tablet (100 mg total) by mouth every evening. 30 tablet 11    atorvastatin (LIPITOR) 80 MG tablet Take 80 mg by mouth every evening.  11    BASAGLAR KWIKPEN U-100 INSULIN glargine 100 units/mL (3mL) SubQ pen INJECT 25 UNITS DAILY 15 Syringe 5    metFORMIN (GLUCOPHAGE) 1000 MG tablet Take 1 tablet (1,000 mg total) by mouth daily with breakfast. 90 tablet 3    metoprolol tartrate (LOPRESSOR) 25 MG tablet Take 25 mg by mouth 2 (two) times daily.  11    pantoprazole (PROTONIX) 40 MG tablet Take 1 tablet (40 mg total) by mouth once daily.  30 tablet 11    ramipril (ALTACE) 5 MG capsule TAKE 1 CAPSULE (5 MG) BY ORAL ROUTE ONCE DAILY  5    TRADJENTA 5 mg Tab tablet TAKE 1 TABLET BY MOUTH EVERY DAY 30 tablet 5    warfarin (COUMADIN) 5 MG tablet Take 7.5 mg by mouth every Mon, Wed, Fri.      albuterol-ipratropium (DUO-NEB) 2.5 mg-0.5 mg/3 mL nebulizer solution USE 1 VIAL BY NEBULIZATION EVERY 6 HOURS AS NEEDED FOR WHEEZING 270 mL 3    arformoterol (BROVANA) 15 mcg/2 mL Nebu Take 15 mcg by nebulization 2 (two) times daily. Controller      aspirin 81 MG Chew Take 162 mg by mouth once daily.      cyanocobalamin (VITAMIN B-12) 1000 MCG tablet Take 100 mcg by mouth once daily.      multivitamin with minerals tablet Take 1 tablet by mouth once daily.         Past Surgical History:   Procedure Laterality Date    biopsy back  05/20/2019    benign Martinez    biopsy right ankle Right 05/20/2019    Martinez, benign    COLONOSCOPY N/A 6/13/2019    Procedure: COLONOSCOPY;  Surgeon: Delmer Kerr MD;  Location: [facility];  Service: Endoscopy;  Laterality: N/A;    COLONOSCOPY W/ POLYPECTOMY      EPIDURAL STEROID INJECTION INTO LUMBAR SPINE  06/2015    LASHAE Harvey    ESOPHAGOGASTRODUODENOSCOPY N/A 6/13/2019    Procedure: ESOPHAGOGASTRODUODENOSCOPY (EGD);  Surgeon: Delmer Kerr MD;  Location: [facility];  Service: Endoscopy;  Laterality: N/A;    INTESTINAL MALROTATION REPAIR Right 1960    2 weeks old, Martinez    PERCUTANEOUS TRANSLUMINAL ANGIOPLASTY (PTA) OF PERIPHERAL VESSEL Right 12/2014    NIKOLAS Childs       Social History     Socioeconomic History    Marital status:      Spouse name: Eboni Rhodes x37 years    Number of children: 1    Years of education: Not on file    Highest education level: High school graduate   Occupational History    Occupation: Retired  x 37 years     Comment: [employer]   Social Needs    Financial resource strain: Not on file    Food insecurity:     Worry: Not on file     Inability: Not  on file    Transportation needs:     Medical: Not on file     Non-medical: Not on file   Tobacco Use    Smoking status: Current Every Day Smoker     Packs/day: 9.00     Years: 40.00     Pack years: 360.00     Types: Cigarettes    Smokeless tobacco: Never Used    Tobacco comment: smokes parts all day long continuously   Substance and Sexual Activity    Alcohol use: Not Currently     Frequency: Never     Comment: quit 2 years ago was heavily    Drug use: Never    Sexual activity: Yes     Partners: Female   Lifestyle    Physical activity:     Days per week: Not on file     Minutes per session: Not on file    Stress: Not at all   Relationships    Social connections:     Talks on phone: Not on file     Gets together: Not on file     Attends [employer] service: Not on file     Active member of club or organization: Not on file     Attends meetings of clubs or organizations: Not on file     Relationship status: Not on file   Other Topics Concern    Not on file   Social History Narrative    Not on file       OBJECTIVE:     Vital Signs Range (Last 24H):  Temp:  [36.7 °C (98.1 °F)-37.4 °C (99.3 °F)]   Pulse:  []   Resp:  [5-32]   BP: (104-171)/(54-78)   SpO2:  [93 %-100 %]       Significant Labs:  Lab Results   Component Value Date    WBC 13.61 (H) 01/29/2020    WBC 13.61 (H) 01/29/2020    HGB 10.1 (L) 01/29/2020    HGB 10.1 (L) 01/29/2020    HCT 32.5 (L) 01/29/2020    HCT 32.5 (L) 01/29/2020     (L) 01/29/2020     (L) 01/29/2020    CHOL 122 01/27/2020    CHOL 122 01/27/2020    TRIG 195 (H) 01/27/2020    TRIG 195 (H) 01/27/2020    HDL 22 (L) 01/27/2020    HDL 22 (L) 01/27/2020    ALT 14 01/29/2020    AST 45 (H) 01/29/2020     01/29/2020    K 4.3 01/29/2020     01/29/2020    CREATININE 1.2 01/29/2020    BUN 15 01/29/2020    CO2 26 01/29/2020    TSH 0.667 01/27/2020    TSH 0.667 01/27/2020    PSA 0.33 06/03/2019    INR 1.2 01/28/2020    HGBA1C 5.9 (H) 01/27/2020       Diagnostic  Studies: No relevant studies.    EKG:   Results for orders placed or performed in visit on 01/27/20   EKG 12-lead    Collection Time: 01/27/20  9:24 PM    Narrative    Test Reason : R00.0    Vent. Rate : 109 BPM     Atrial Rate : 109 BPM     P-R Int : 146 ms          QRS Dur : 084 ms      QT Int : 376 ms       P-R-T Axes : 074 071 072 degrees     QTc Int : 506 ms    Sinus tachycardia  Abnormal ECG  When compared with ECG of 27-JAN-2020 21:23,  No significant change was found  Confirmed by HANY ANGELES MD ([id number]) on 1/28/2020 10:55:16 AM    Referred By: JHONNY LUGO           Confirmed By:HANY ANGELES MD       2D ECHO:  TTE:  Results for orders placed or performed during the hospital encounter of 01/27/20   Echo Color Flow Doppler? Yes   Result Value Ref Range    BSA 2.35 m2    TDI SEPTAL 0.09 m/s    LA WIDTH 3.51 cm    TDI LATERAL 0.04 m/s    LVIDD 5.10 3.5 - 6.0 cm    IVS 1.10 (A) 0.6 - 1.1 cm    PW 1.10 0.6 - 1.1 cm    LVIDS 3.29 2.1 - 4.0 cm    FS 35 28 - 44 %    LA volume 50.77 cm3    Sinus 3.07 cm    STJ 3.20 cm    Ascending aorta 3.36 cm    LV mass 213.90 g    LA size 3.42 cm    RVDD 3.43 cm    TAPSE 2.42 cm    Left Ventricle Relative Wall Thickness 0.43 cm    AV mean gradient 4 mmHg    AV valve area 2.37 cm2    AV Velocity Ratio 0.63     AV index (prosthetic) 0.73     Mean e' 0.07 m/s    LVOT diameter 2.03 cm    LVOT area 3.2 cm2    LVOT peak shaheed 0.81 m/s    LVOT peak VTI 14.94 cm    Ao peak shaheed 1.29 m/s    Ao VTI 20.43 cm    LVOT stroke volume 48.33 cm3    AV peak gradient 7 mmHg    LV Systolic Volume 43.79 mL    LV Systolic Volume Index 19.1 mL/m2    LV Diastolic Volume 131.71 mL    LV Diastolic Volume Index 57.39 mL/m2    LA Volume Index 22.1 mL/m2    LV Mass Index 93 g/m2    RA Major Axis 4.03 cm    Left Atrium Minor Axis 5.12 cm    Left Atrium Major Axis 4.84 cm    RA Width 2.74 cm    Right Atrial Pressure (from IVC) 3 mmHg    Narrative    · Technically challenging portable study.  · Normal  left ventricular systolic function. The estimated ejection   fraction is 60%.  · Concentric left ventricular remodeling.  · Normal LV diastolic function.  · Normal right ventricular systolic function.  · Normal central venous pressure (3 mmHg).  · Anterior pericardial effusion with echodensity adherent to the anterior   surface.          EMANI:  No results found for this or any previous visit.    ASSESSMENT/PLAN:                                                                                                                  01/29/2020  Ben Melvin is a 60 y.o., male.    Anesthesia Evaluation    I have reviewed the Patient Summary Reports.    I have reviewed the Nursing Notes.   I have reviewed the Medications.     Review of Systems  Anesthesia Hx:  No problems with previous Anesthesia  History of prior surgery of interest to airway management or planning: Denies Family Hx of Anesthesia complications.   Denies Personal Hx of Anesthesia complications.   Social:  Smoker, Social Alcohol Use  Alcohol Use:   Alcohol Abuse: (Hx of)   Hematology/Oncology:         -- Anemia:   Cardiovascular:   Hypertension, well controlled CAD  Dysrhythmias atrial fibrillation PVD hyperlipidemia DE LA CRUZ ECG has been reviewed.    Cardiomyopathy  Chronic Venous Insufficiency, bilateral lower extremity  Peripheral Arterial Disease Aorto-Iliac disease, s/p stent, right  Disorder of Cardiac Rhythm, on warfarin Rx    Pulmonary:   COPD Shortness of breath  Chronic Obstructive Pulmonary Disease (COPD):  is secondary to smoking. Inhaler use is maintenance inhaler daily.    Renal/:   BPH    Hepatic/GI:   Liver Disease,  Liver Disease, Abnormal Liver Enzymes , Cirrhosis    Musculoskeletal:   Arthritis   Lumbar Spine Disorders, Lumbar Disc Disease, Radiculopathy   Neurological:   Neuromuscular Disease, Lumbar disc disease, radiculopathy.   Endocrine:   Diabetes, well controlled, type 2  Diabetes, Type 2 Diabetes for 4 years , Complications include  Diabetic Neuropathy, peripheral sensory neuropathy , controlled by insulin, diet, oral hypoglycemics.    Psych:   Psychiatric History anxiety depression          Physical Exam  General:  Well nourished, Obesity    Airway/Jaw/Neck:  Airway Findings: Mouth Opening: Normal Tongue: Normal  Pre-Existing Airway Tube(s): Oral Endotracheal tube  Size: 8.0  General Airway Assessment: Adult  Mallampati: III  Improves to II with phonation.  TM Distance: Normal, at least 6 cm  Jaw/Neck Findings:  Neck ROM: Normal ROM  Neck Findings: (neck brace)  Girth Increased     Eyes/Ears/Nose:  EYES/EARS/NOSE FINDINGS: Normal   Dental:  Dental Findings: In tact   Chest/Lungs:  Chest/Lungs Findings: Clear to auscultation, Normal Respiratory Rate     Heart/Vascular:  Heart Findings: Rate: Normal  Rhythm: Regular Rhythm  Sounds: Normal        Mental Status:  Mental Status Findings:  Somnolent, Cooperative         Anesthesia Plan  Type of Anesthesia, risks & benefits discussed:  Anesthesia Type:  general  Patient's Preference:   Intra-op Monitoring Plan: standard ASA monitors  Intra-op Monitoring Plan Comments:   Post Op Pain Control Plan: multimodal analgesia and IV/PO Opioids PRN  Post Op Pain Control Plan Comments:   Induction:   IV  Beta Blocker:  Patient is on a Beta-Blocker and has received one dose within the past 24 hours (No further documentation required).       Informed Consent: Patient representative understands risks and agrees with Anesthesia plan.  Questions answered. Anesthesia consent signed with patient representative.  ASA Score: 3     Day of Surgery Review of History & Physical:    H&P update referred to the surgeon.         Ready For Surgery From Anesthesia Perspective.

## 2020-01-29 NOTE — NURSING
0950H- Ortho team at bedside and stated that we can remove the padding in between patient's sling for the MRI procedure. MRI was notified and stated they can do the procedure between 1400H to 1500H. Family at bedside was updated. Will monitor patient.

## 2020-01-29 NOTE — SUBJECTIVE & OBJECTIVE
Interval History: NAEON. POD#1 L hemiarthroplasty with Ortho, plan for possible R side later in the week. MRI C spine w/significant motion artifact, stopped due to patient's inability to tolerate, unable to obtain MRI L spine now given size of ortho cast. C collar in place. Remains intubated post procedure, sedated on Prop.     Medications:  Continuous Infusions:   sodium chloride 0.9% 100 mL/hr at 01/29/20 0701    niCARdipine Stopped (01/28/20 0801)    propofol 35 mcg/kg/min (01/29/20 0701)     Scheduled Meds:   atorvastatin  80 mg Per OG tube QHS    ceFAZolin (ANCEF) IVPB  2 g Intravenous Q8H    chlorhexidine  15 mL Mouth/Throat BID    famotidine (PF)  20 mg Intravenous BID    levetiracetam IVPB  1,000 mg Intravenous Q12H    metoprolol tartrate  25 mg Per OG tube BID    midazolam  2 mg Intravenous Once     PRN Meds:albuterol-ipratropium, Dextrose 10% Bolus, fentaNYL, glucagon (human recombinant), insulin aspart U-100, labetalol, magnesium sulfate IVPB, magnesium sulfate IVPB, ondansetron, potassium chloride in water **AND** potassium chloride in water **AND** potassium chloride in water, sodium chloride 0.9%, sodium chloride 0.9%, sodium phosphate IVPB, sodium phosphate IVPB, sodium phosphate IVPB     Review of Systems  Objective:     Weight: 110.7 kg (244 lb)  Body mass index is 34.03 kg/m².  Vital Signs (Most Recent):  Temp: 99.3 °F (37.4 °C) (01/29/20 0701)  Pulse: (!) 112 (01/29/20 0701)  Resp: (!) 8 (01/29/20 0701)  BP: 118/63 (01/29/20 0701)  SpO2: 96 % (01/29/20 0701) Vital Signs (24h Range):  Temp:  [98.1 °F (36.7 °C)-99.3 °F (37.4 °C)] 99.3 °F (37.4 °C)  Pulse:  [] 112  Resp:  [5-41] 8  SpO2:  [92 %-100 %] 96 %  BP: (104-171)/(54-80) 118/63     Date 01/29/20 0700 - 01/30/20 0659   Shift 8787-0578 6237-4084 0131-1001 24 Hour Total   INTAKE   I.V.(mL/kg) 126.9(1.1)   126.9(1.1)   Shift Total(mL/kg) 126.9(1.1)   126.9(1.1)   OUTPUT   Urine(mL/kg/hr) 200   200   Shift Total(mL/kg) 200(1.8)    "200(1.8)   Weight (kg) 110.7 110.7 110.7 110.7              Vent Mode: A/C  Oxygen Concentration (%):  [28-50] 40  Resp Rate Total:  [15 br/min-21 br/min] 20 br/min  Vt Set:  [450 mL] 450 mL  PEEP/CPAP:  [5 cmH20] 5 cmH20  Mean Airway Pressure:  [9.2 cmH20-10 cmH20] 9.8 cmH20         NG/OG Tube 01/28/20 2200 Center mouth (Active)   Placement Check placement verified by aspirate characteristics;placement verified by distal tube length measurement;placement verified by x-ray 1/29/2020  7:01 AM   Tolerance no signs/symptoms of discomfort 1/29/2020  7:01 AM   Securement secured to commercial device 1/29/2020  7:01 AM   Clamp Status/Tolerance clamped 1/29/2020  7:01 AM   Suction Setting/Drainage Method suction at the bedside 1/28/2020 11:00 PM   Insertion Site Appearance no redness, warmth, tenderness, skin breakdown, drainage 1/29/2020  7:01 AM   Drainage None 1/28/2020 11:00 PM   Flush/Irrigation flushed w/;water;no resistance met 1/29/2020  7:01 AM            Urethral Catheter 01/28/20 1630 Non-latex;Straight-tip 16 Fr. (Active)   Site Assessment Clean;Intact 1/28/2020 11:00 PM   Collection Container Urimeter 1/28/2020 11:00 PM   Securement Method secured to top of thigh w/ adhesive device 1/28/2020 11:00 PM   Catheter Care Performed yes 1/28/2020 11:00 PM   Reason for Continuing Urinary Catheterization Post operative 1/28/2020 11:00 PM   CAUTI Prevention Bundle StatLock in place w 1" slack;Intact seal between catheter & drainage tubing;Drainage bag/urimeter off the floor;Green sheeting clip in use;No dependent loops or kinks;Drainage bag/urimeter not overfilled (<2/3 full);Drainage bag/urimeter below bladder 1/28/2020 11:00 PM   Output (mL) 200 mL 1/29/2020  7:01 AM       Neurosurgery Physical Exam  Intubated, sedated  PERRL  LUE in cast  RUE - not following commands, limited given need for OR  WD BLE  C collar in place    Significant Labs:  Recent Labs   Lab 01/27/20  2256 01/28/20  0110 01/29/20  0155 " 01/29/20  0158   * 124* 171*  --     137 136  --    K 3.5 3.5 4.4  --     101 103  --    CO2 24 25 26  --    BUN 12 12 15  --    CREATININE 1.2 1.2 1.4  --    CALCIUM 7.2* 7.0* 6.4*  --    MG  --  1.2*  --  1.5*     Recent Labs   Lab 01/27/20  2256 01/28/20  0110 01/29/20  0155   WBC 20.03* 16.74*  16.74* 13.61*  13.61*   HGB 14.0 13.6*  13.6* 10.1*  10.1*   HCT 44.4 43.0  43.0 32.5*  32.5*    140*  140* 114*  114*     Recent Labs   Lab 01/27/20  1625 01/27/20 2256 01/28/20 0110   LABPT 25.8*  --   --    INR 2.4* 1.3* 1.2   APTT 40.9*  --  25.5       Significant Diagnostics:  X-ray Chest 1 View    Result Date: 1/29/2020  Stable radiographic appearance. Electronically signed by: Ty Sanderson Date:    01/29/2020 Time:    04:51    X-ray Chest 1 View    Result Date: 1/28/2020  ET tube tip 3 cm above the delmis. Enteric tube tip overlies the GE junction. No other significant change from prior study. Electronically signed by: Isaak Lemons MD Date:    01/28/2020 Time:    22:28    X-ray Shoulder 1 View Left    Result Date: 1/28/2020  As above. Electronically signed by: Isaak Lemons MD Date:    01/28/2020 Time:    22:33    X-ray Abdomen Ap 1 View    Result Date: 1/28/2020  Enteric tube tip in the stomach. Electronically signed by: Isaak Lemons MD Date:    01/28/2020 Time:    22:25    Mri Cervical Spine Without Contrast    Result Date: 1/28/2020  Patient could not tolerate exam, which was terminated early.  Limited sequences obtained with significant motion artifact.  Probable C5-6 spinal canal stenosis.  No evidence of acute fracture, osteomyelitis, or marrow replacement process. Electronically signed by resident: Hunter Segundo Date:    01/28/2020 Time:    13:26 Electronically signed by: David Bradshaw MD Date:    01/28/2020 Time:    14:43    Ct 3d Recon With Independent Ws    Result Date: 1/28/2020  Acute comminuted and impacted fractures of bilateral proximal humeri/humeral  heads associated with posterior dislocation. Acute linear fractures of bilateral acromion processes associated with moderate posterolateral displacement. No fractures identified elsewhere. Additional incidental findings, as above. Electronically signed by resident: Ninfa Armas Date:    01/28/2020 Time:    10:25 Electronically signed by: Riley Raza MD Date:    01/28/2020 Time:    11:01    Ct 3d Recon With Independent Ws    Result Date: 1/28/2020  Acute comminuted and impacted fractures of bilateral proximal humeri/humeral heads associated with posterior dislocation. Acute linear fractures of bilateral acromion processes associated with moderate posterolateral displacement. No fractures identified elsewhere. Additional incidental findings, as above. Electronically signed by resident: Ninfa Armas Date:    01/28/2020 Time:    10:25 Electronically signed by: Riley Raza MD Date:    01/28/2020 Time:    11:01    Ct Shoulder Without Contrast Left    Result Date: 1/28/2020  Acute comminuted and impacted fractures of bilateral proximal humeri/humeral heads associated with posterior dislocation. Acute linear fractures of bilateral acromion processes associated with moderate posterolateral displacement. No fractures identified elsewhere. Additional incidental findings, as above. Electronically signed by resident: Ninfa Armas Date:    01/28/2020 Time:    10:25 Electronically signed by: Riley Raza MD Date:    01/28/2020 Time:    11:01    Ct Shoulder Without Contrast Right    Result Date: 1/28/2020  Acute comminuted and impacted fractures of bilateral proximal humeri/humeral heads associated with posterior dislocation. Acute linear fractures of bilateral acromion processes associated with moderate posterolateral displacement. No fractures identified elsewhere. Additional incidental findings, as above. Electronically signed by resident: Ninfa Armas Date:    01/28/2020 Time:    10:25  Electronically signed by: Riley Raza MD Date:    01/28/2020 Time:    11:01.

## 2020-01-29 NOTE — PLAN OF CARE
POC reviewed with pt and family at 0500. Pt unable to verbalize understanding. Questions and concerns addressed with pts family. Propofol gtt for comfort on the vent.  Mag, Phos, and Calcium replacement administered.   No acute events overnight. Pt progressing toward goals. Will continue to monitor. See flowsheets for full assessment and  VS info

## 2020-01-29 NOTE — PROGRESS NOTES
[facility]  Neurosurgery  Progress Note    Subjective:     History of Present Illness: 59 M with hx of paroxysmal afib on coumadin presents for eval of bilateral shoulder dislocations and left convexity SDH.  Pt had first time seizure and slumped over in chair.  There was no LOC.  He was given vit k and kcentra at OSH for hx of coumadin use.  He endorses low back pain but no neck pain.  No sensation loss in his extremities.    Post-Op Info:  Procedure(s) (LRB):  HEMIARTHROPLASTY, SHOULDER (Left)   1 Day Post-Op     Interval History: NAEON. POD#1 L hemiarthroplasty with Ortho, plan for possible R side later in the week. MRI C spine w/significant motion artifact, stopped due to patient's inability to tolerate, unable to obtain MRI L spine now given size of ortho cast. C collar in place. Remains intubated post procedure, sedated on Prop.     Medications:  Continuous Infusions:   sodium chloride 0.9% 100 mL/hr at 01/29/20 0701    niCARdipine Stopped (01/28/20 0801)    propofol 35 mcg/kg/min (01/29/20 0701)     Scheduled Meds:   atorvastatin  80 mg Per OG tube QHS    ceFAZolin (ANCEF) IVPB  2 g Intravenous Q8H    chlorhexidine  15 mL Mouth/Throat BID    famotidine (PF)  20 mg Intravenous BID    levetiracetam IVPB  1,000 mg Intravenous Q12H    metoprolol tartrate  25 mg Per OG tube BID    midazolam  2 mg Intravenous Once     PRN Meds:albuterol-ipratropium, Dextrose 10% Bolus, fentaNYL, glucagon (human recombinant), insulin aspart U-100, labetalol, magnesium sulfate IVPB, magnesium sulfate IVPB, ondansetron, potassium chloride in water **AND** potassium chloride in water **AND** potassium chloride in water, sodium chloride 0.9%, sodium chloride 0.9%, sodium phosphate IVPB, sodium phosphate IVPB, sodium phosphate IVPB     Review of Systems  Objective:     Weight: 110.7 kg (244 lb)  Body mass index is 34.03 kg/m².  Vital Signs (Most Recent):  Temp: 99.3 °F (37.4 °C) (01/29/20 0701)  Pulse: (!)  "112 (01/29/20 0701)  Resp: (!) 8 (01/29/20 0701)  BP: 118/63 (01/29/20 0701)  SpO2: 96 % (01/29/20 0701) Vital Signs (24h Range):  Temp:  [98.1 °F (36.7 °C)-99.3 °F (37.4 °C)] 99.3 °F (37.4 °C)  Pulse:  [] 112  Resp:  [5-41] 8  SpO2:  [92 %-100 %] 96 %  BP: (104-171)/(54-80) 118/63     Date 01/29/20 0700 - 01/30/20 0659   Shift 5917-3363 9754-2596 8603-5509 24 Hour Total   INTAKE   I.V.(mL/kg) 126.9(1.1)   126.9(1.1)   Shift Total(mL/kg) 126.9(1.1)   126.9(1.1)   OUTPUT   Urine(mL/kg/hr) 200   200   Shift Total(mL/kg) 200(1.8)   200(1.8)   Weight (kg) 110.7 110.7 110.7 110.7              Vent Mode: A/C  Oxygen Concentration (%):  [28-50] 40  Resp Rate Total:  [15 br/min-21 br/min] 20 br/min  Vt Set:  [450 mL] 450 mL  PEEP/CPAP:  [5 cmH20] 5 cmH20  Mean Airway Pressure:  [9.2 cmH20-10 cmH20] 9.8 cmH20         NG/OG Tube 01/28/20 2200 Center mouth (Active)   Placement Check placement verified by aspirate characteristics;placement verified by distal tube length measurement;placement verified by x-ray 1/29/2020  7:01 AM   Tolerance no signs/symptoms of discomfort 1/29/2020  7:01 AM   Securement secured to commercial device 1/29/2020  7:01 AM   Clamp Status/Tolerance clamped 1/29/2020  7:01 AM   Suction Setting/Drainage Method suction at the bedside 1/28/2020 11:00 PM   Insertion Site Appearance no redness, warmth, tenderness, skin breakdown, drainage 1/29/2020  7:01 AM   Drainage None 1/28/2020 11:00 PM   Flush/Irrigation flushed w/;water;no resistance met 1/29/2020  7:01 AM            Urethral Catheter 01/28/20 1630 Non-latex;Straight-tip 16 Fr. (Active)   Site Assessment Clean;Intact 1/28/2020 11:00 PM   Collection Container Urimeter 1/28/2020 11:00 PM   Securement Method secured to top of thigh w/ adhesive device 1/28/2020 11:00 PM   Catheter Care Performed yes 1/28/2020 11:00 PM   Reason for Continuing Urinary Catheterization Post operative 1/28/2020 11:00 PM   CAUTI Prevention Bundle StatLock in place w 1" " slack;Intact seal between catheter & drainage tubing;Drainage bag/urimeter off the floor;Green sheeting clip in use;No dependent loops or kinks;Drainage bag/urimeter not overfilled (<2/3 full);Drainage bag/urimeter below bladder 1/28/2020 11:00 PM   Output (mL) 200 mL 1/29/2020  7:01 AM       Neurosurgery Physical Exam  Intubated, sedated  PERRL  LUE in cast  RUE - not following commands, limited given need for OR  WD BLE  C collar in place    Significant Labs:  Recent Labs   Lab 01/27/20  2256 01/28/20  0110 01/29/20  0155 01/29/20  0158   * 124* 171*  --     137 136  --    K 3.5 3.5 4.4  --     101 103  --    CO2 24 25 26  --    BUN 12 12 15  --    CREATININE 1.2 1.2 1.4  --    CALCIUM 7.2* 7.0* 6.4*  --    MG  --  1.2*  --  1.5*     Recent Labs   Lab 01/27/20 2256 01/28/20  0110 01/29/20  0155   WBC 20.03* 16.74*  16.74* 13.61*  13.61*   HGB 14.0 13.6*  13.6* 10.1*  10.1*   HCT 44.4 43.0  43.0 32.5*  32.5*    140*  140* 114*  114*     Recent Labs   Lab 01/27/20  1625 01/27/20 2256 01/28/20  0110   LABPT 25.8*  --   --    INR 2.4* 1.3* 1.2   APTT 40.9*  --  25.5       Significant Diagnostics:  X-ray Chest 1 View    Result Date: 1/29/2020  Stable radiographic appearance. Electronically signed by: Ty Sanderson Date:    01/29/2020 Time:    04:51    X-ray Chest 1 View    Result Date: 1/28/2020  ET tube tip 3 cm above the delmis. Enteric tube tip overlies the GE junction. No other significant change from prior study. Electronically signed by: Isaak Lemons MD Date:    01/28/2020 Time:    22:28    X-ray Shoulder 1 View Left    Result Date: 1/28/2020  As above. Electronically signed by: Isaak Lemons MD Date:    01/28/2020 Time:    22:33    X-ray Abdomen Ap 1 View    Result Date: 1/28/2020  Enteric tube tip in the stomach. Electronically signed by: Isaak Lemons MD Date:    01/28/2020 Time:    22:25    Mri Cervical Spine Without Contrast    Result Date: 1/28/2020  Patient could  not tolerate exam, which was terminated early.  Limited sequences obtained with significant motion artifact.  Probable C5-6 spinal canal stenosis.  No evidence of acute fracture, osteomyelitis, or marrow replacement process. Electronically signed by resident: Hunter Segundo Date:    01/28/2020 Time:    13:26 Electronically signed by: David Bradshaw MD Date:    01/28/2020 Time:    14:43    Ct 3d Recon With Independent Ws    Result Date: 1/28/2020  Acute comminuted and impacted fractures of bilateral proximal humeri/humeral heads associated with posterior dislocation. Acute linear fractures of bilateral acromion processes associated with moderate posterolateral displacement. No fractures identified elsewhere. Additional incidental findings, as above. Electronically signed by resident: Ninfa Armas Date:    01/28/2020 Time:    10:25 Electronically signed by: Riley Raza MD Date:    01/28/2020 Time:    11:01    Ct 3d Recon With Independent Ws    Result Date: 1/28/2020  Acute comminuted and impacted fractures of bilateral proximal humeri/humeral heads associated with posterior dislocation. Acute linear fractures of bilateral acromion processes associated with moderate posterolateral displacement. No fractures identified elsewhere. Additional incidental findings, as above. Electronically signed by resident: Ninfa Armas Date:    01/28/2020 Time:    10:25 Electronically signed by: Riley Raza MD Date:    01/28/2020 Time:    11:01    Ct Shoulder Without Contrast Left    Result Date: 1/28/2020  Acute comminuted and impacted fractures of bilateral proximal humeri/humeral heads associated with posterior dislocation. Acute linear fractures of bilateral acromion processes associated with moderate posterolateral displacement. No fractures identified elsewhere. Additional incidental findings, as above. Electronically signed by resident: Ninfa Armas Date:    01/28/2020 Time:    10:25 Electronically signed  by: Riley Raza MD Date:    01/28/2020 Time:    11:01    Ct Shoulder Without Contrast Right    Result Date: 1/28/2020  Acute comminuted and impacted fractures of bilateral proximal humeri/humeral heads associated with posterior dislocation. Acute linear fractures of bilateral acromion processes associated with moderate posterolateral displacement. No fractures identified elsewhere. Additional incidental findings, as above. Electronically signed by resident: Ninfa Armas Date:    01/28/2020 Time:    10:25 Electronically signed by: Riley Raza MD Date:    01/28/2020 Time:    11:01.    Assessment/Plan:     * Subdural hematoma  59 M with hx of paroxysmal afib on coumadin presents for eval of bilateral shoulder dislocations, left convexity SDH.  -Admit to neuro ICU  -q 1 hr neuro checks  -Repeat CT head stable  -Hold coumadin, goal INR of less than 1.4  -Ortho consult for bilateral shoulder dislocation - POD#1 L sided procedure, R pending  -C collar at all times  - TLSO when OOB/upright  - MRI C spine and MRI L spine including up to T11 - pending given patient's inability to tolerate  - Ok to proceed with Ortho procedure if in C collar            Amber Zuniga MD  Neurosurgery  [facility]

## 2020-01-29 NOTE — PT/OT/SLP PROGRESS
Occupational Therapy      Patient Name:  Ben Melvin   MRN:  [id number]    OT orders received and discontinued on 1/29/20. Per chart review, pt admitted for SDH, has c-collar in place. Pt with B proximal humerus fractures and is s/p L shld hemiarthroplasty 1/28 and will remain intubated until surgery scheduled tomorrow 1/30 for R shld hemiarthroplasty. Pt will require new orders post-op when appropriate for evaluation.    LILIAM Kim  1/29/2020

## 2020-01-29 NOTE — PROGRESS NOTES
[facility]  Orthopedics  Progress Note    Patient Name: Ben Melvin  MRN: [id number]  Admission Date: 1/27/2020  Hospital Length of Stay: 2 days  Attending Provider: Walter Broussard MD  Primary Care Provider: Cristal Ohara NP  Follow-up For: Procedure(s) (LRB):  HEMIARTHROPLASTY, SHOULDER (Left)    Post-Operative Day: 1 Day Post-Op  Subjective:     Principal Problem:Subdural hematoma    Principal Orthopedic Problem: Bilateral prox humerus fractures     Interval History: Patient seen and examined at bedside.  Finished in the OR last night patient was still intubated this morning.   He was unable to get MRI of his cervical and lumbar spine per notes because of Ortho cast however patient is not in a cast he is in a sling and pillow the pillow can be removed in just keep the sling on for the MRI and that should not be a problem.   Planning for hemiarthroplasty of the right side tomorrow.      Review of patient's allergies indicates:  No Known Allergies    Current Facility-Administered Medications   Medication    0.9%  NaCl infusion    albuterol-ipratropium 2.5 mg-0.5 mg/3 mL nebulizer solution 3 mL    albuterol-ipratropium 2.5 mg-0.5 mg/3 mL nebulizer solution 3 mL    atorvastatin tablet 80 mg    ceFAZolin injection 2 g    chlorhexidine 0.12 % solution 15 mL    dextrose 10% (D10W) Bolus    famotidine (PF) injection 20 mg    fentaNYL injection 50 mcg    glucagon (human recombinant) injection 1 mg    insulin aspart U-100 pen 1-10 Units    labetalol 20 mg/4 mL (5 mg/mL) IV syring    levETIRAcetam in NaCl (iso-os) IVPB 1,000 mg    magnesium sulfate 2g in water 50mL IVPB (premix)    magnesium sulfate 2g in water 50mL IVPB (premix)    metoprolol tartrate (LOPRESSOR) tablet 25 mg    ondansetron injection 4 mg    potassium chloride 10 mEq in 100 mL IVPB    And    potassium chloride 10 mEq in 100 mL IVPB    And    potassium chloride 10 mEq in 100 mL IVPB    propofol  "(DIPRIVAN) 10 mg/mL infusion    senna-docusate 8.6-50 mg per tablet 1 tablet    sodium chloride 0.9% flush 10 mL    sodium phosphate 15 mmol in dextrose 5 % 250 mL IVPB    sodium phosphate 20.01 mmol in dextrose 5 % 250 mL IVPB    sodium phosphate 30 mmol in dextrose 5 % 250 mL IVPB     Objective:     Vital Signs (Most Recent):  Temp: 99.3 °F (37.4 °C) (01/29/20 0701)  Pulse: 105 (01/29/20 1001)  Resp: 18 (01/29/20 1001)  BP: (!) 129/55 (01/29/20 1001)  SpO2: 97 % (01/29/20 1001) Vital Signs (24h Range):  Temp:  [98.1 °F (36.7 °C)-99.3 °F (37.4 °C)] 99.3 °F (37.4 °C)  Pulse:  [] 105  Resp:  [5-41] 18  SpO2:  [92 %-100 %] 97 %  BP: (104-171)/(54-80) 129/55     Weight: 110.7 kg (244 lb)  Height: 5' 11" (180.3 cm)  Body mass index is 34.03 kg/m².      Intake/Output Summary (Last 24 hours) at 1/29/2020 1109  Last data filed at 1/29/2020 1001  Gross per 24 hour   Intake 6594.77 ml   Output 2174 ml   Net 4420.77 ml       Ortho/SPM Exam      intubated and unable to get a good motor sensory exam however patient does respond to stimulation of the left arm    Significant Labs:   CBC:   Recent Labs   Lab 01/27/20  2256 01/28/20  0110 01/29/20  0155   WBC 20.03* 16.74*  16.74* 13.61*  13.61*   HGB 14.0 13.6*  13.6* 10.1*  10.1*   HCT 44.4 43.0  43.0 32.5*  32.5*    140*  140* 114*  114*     All pertinent labs within the past 24 hours have been reviewed.    Significant Imaging: I have reviewed and interpreted all pertinent imaging results/findings.    Assessment/Plan:      fracture dislocation of bilateral shoulders  Ben Melvin is a 60 y.o. male  Who is status post left hemiarthroplasty on January 28, 2020 also has a right proximal humerus fracture dislocation which we will plan to treat in the OR tomorrow.     He was unable to get MRI of his cervical and lumbar spine per notes because of Ortho cast however patient is not in a cast he is in a sling and pillow the pillow can be removed in just keep " the sling on for the MRI and that should not be a problem.   Planning for hemiarthroplasty of the right side tomorrow.    - Weight bearing status: NWB and NO ROM of L shoulder  - Pain control: Per APS  - DVT: please hold anticoagulation if necessary for surgery tomorrow                     Aram Cottrell MD  Orthopedics  [facility]

## 2020-01-29 NOTE — ANESTHESIA POSTPROCEDURE EVALUATION
Anesthesia Post Evaluation    Patient: Ben Melvin    Procedure(s) Performed: Procedure(s) (LRB):  HEMIARTHROPLASTY, SHOULDER (Left)    Final Anesthesia Type: general    Patient location during evaluation: ICU  Patient participation: No - Unable to Participate, Intubation  Level of consciousness: sedated  Post-procedure vital signs: reviewed and stable  Pain management: adequate  Airway patency: patent    PONV status at discharge: No PONV  Anesthetic complications: no      Cardiovascular status: blood pressure returned to baseline and stable  Respiratory status: intubated and ventilator (Patient to remain intubated for high risk of postop respiratory insufficiency / failure face of multiple comorbidities and intracranial bleed.)  Hydration status: euvolemic  Follow-up not needed.          Vitals Value Taken Time   /57 1/28/2020  9:46 PM   Temp 37.3 °C (99.1 °F) 1/28/2020  3:01 PM   Pulse 114 1/28/2020  9:46 PM   Resp 32 1/28/2020  9:46 PM   SpO2 99 % 1/28/2020  9:46 PM   Vitals shown include unvalidated device data.      No case tracking events are documented in the log.      Pain/Maria Del Carmen Score: Pain Rating Prior to Med Admin: 7 (1/28/2020  3:55 PM)  Pain Rating Post Med Admin: 6 (1/28/2020  4:25 PM)

## 2020-01-29 NOTE — SUBJECTIVE & OBJECTIVE
Principal Problem:Subdural hematoma    Principal Orthopedic Problem: Bilateral prox humerus fractures     Interval History: Patient seen and examined at bedside.  Finished in the OR last night patient was still intubated this morning.   He was unable to get MRI of his cervical and lumbar spine per notes because of Ortho cast however patient is not in a cast he is in a sling and pillow the pillow can be removed in just keep the sling on for the MRI and that should not be a problem.   Planning for hemiarthroplasty of the right side tomorrow.      Review of patient's allergies indicates:  No Known Allergies    Current Facility-Administered Medications   Medication    0.9%  NaCl infusion    albuterol-ipratropium 2.5 mg-0.5 mg/3 mL nebulizer solution 3 mL    albuterol-ipratropium 2.5 mg-0.5 mg/3 mL nebulizer solution 3 mL    atorvastatin tablet 80 mg    ceFAZolin injection 2 g    chlorhexidine 0.12 % solution 15 mL    dextrose 10% (D10W) Bolus    famotidine (PF) injection 20 mg    fentaNYL injection 50 mcg    glucagon (human recombinant) injection 1 mg    insulin aspart U-100 pen 1-10 Units    labetalol 20 mg/4 mL (5 mg/mL) IV syring    levETIRAcetam in NaCl (iso-os) IVPB 1,000 mg    magnesium sulfate 2g in water 50mL IVPB (premix)    magnesium sulfate 2g in water 50mL IVPB (premix)    metoprolol tartrate (LOPRESSOR) tablet 25 mg    ondansetron injection 4 mg    potassium chloride 10 mEq in 100 mL IVPB    And    potassium chloride 10 mEq in 100 mL IVPB    And    potassium chloride 10 mEq in 100 mL IVPB    propofol (DIPRIVAN) 10 mg/mL infusion    senna-docusate 8.6-50 mg per tablet 1 tablet    sodium chloride 0.9% flush 10 mL    sodium phosphate 15 mmol in dextrose 5 % 250 mL IVPB    sodium phosphate 20.01 mmol in dextrose 5 % 250 mL IVPB    sodium phosphate 30 mmol in dextrose 5 % 250 mL IVPB     Objective:     Vital Signs (Most Recent):  Temp: 99.3 °F (37.4 °C) (01/29/20 0701)  Pulse: 105  "(01/29/20 1001)  Resp: 18 (01/29/20 1001)  BP: (!) 129/55 (01/29/20 1001)  SpO2: 97 % (01/29/20 1001) Vital Signs (24h Range):  Temp:  [98.1 °F (36.7 °C)-99.3 °F (37.4 °C)] 99.3 °F (37.4 °C)  Pulse:  [] 105  Resp:  [5-41] 18  SpO2:  [92 %-100 %] 97 %  BP: (104-171)/(54-80) 129/55     Weight: 110.7 kg (244 lb)  Height: 5' 11" (180.3 cm)  Body mass index is 34.03 kg/m².      Intake/Output Summary (Last 24 hours) at 1/29/2020 1109  Last data filed at 1/29/2020 1001  Gross per 24 hour   Intake 6594.77 ml   Output 2174 ml   Net 4420.77 ml       Ortho/SPM Exam      intubated and unable to get a good motor sensory exam however patient does respond to stimulation of the left arm    Significant Labs:   CBC:   Recent Labs   Lab 01/27/20  2256 01/28/20  0110 01/29/20  0155   WBC 20.03* 16.74*  16.74* 13.61*  13.61*   HGB 14.0 13.6*  13.6* 10.1*  10.1*   HCT 44.4 43.0  43.0 32.5*  32.5*    140*  140* 114*  114*     All pertinent labs within the past 24 hours have been reviewed.    Significant Imaging: I have reviewed and interpreted all pertinent imaging results/findings.  "

## 2020-01-29 NOTE — CARE UPDATE
Pt received from OR intubated with size 8.0 ETT secured at 23@gums. Pt was placed on documented settings. Will continue to monitor.

## 2020-01-29 NOTE — ASSESSMENT & PLAN NOTE
59 M with hx of paroxysmal afib on coumadin presents for eval of bilateral shoulder dislocations, left convexity SDH.  -Admit to neuro ICU  -q 1 hr neuro checks  -Repeat CT head stable  -Hold coumadin, goal INR of less than 1.4  -Ortho consult for bilateral shoulder dislocation - POD#1 L sided procedure, R pending  -C collar at all times  - TLSO when OOB/upright  - MRI C spine and MRI L spine including up to T11 - pending given patient's inability to tolerate  - Ok to proceed with Ortho procedure if in C collar

## 2020-01-29 NOTE — NURSING
Pt transported to MRI with RT and RN. Pt unable to fit in MRI machine with post-op arm immobilization device.  NCC notified.

## 2020-01-29 NOTE — PLAN OF CARE
POC reviewed with pt and family at 1400. Pt unable to verbalized understanding. Education was given to patient's significant others at bedside. Patient is still intubated and sedated. Patient still on propofol, sedation vacation done at 1200H, pt. Able to follows command and move all extremities to command. Tubefeeding was started today. Patient phosphorus was replaced. Patient is on normal saline. Questions and concerns addressed. No acute events today. Pt progressing toward goals. Will continue to monitor. See flowsheets for full assessment and VS info.

## 2020-01-29 NOTE — PLAN OF CARE
Resume tf  Cont mech vent until post surgery tmrw  duonebs q8hrly  Consider nicotine patch after MRI. >140 pack year smoking history

## 2020-01-29 NOTE — ASSESSMENT & PLAN NOTE
Ben Melvin is a 60 y.o. male  Who is status post left hemiarthroplasty on January 28, 2020 also has a right proximal humerus fracture dislocation which we will plan to treat in the OR tomorrow.     He was unable to get MRI of his cervical and lumbar spine per notes because of Ortho cast however patient is not in a cast he is in a sling and pillow the pillow can be removed in just keep the sling on for the MRI and that should not be a problem.   Planning for hemiarthroplasty of the right side tomorrow.    - Weight bearing status: NWB and NO ROM of L shoulder  - Pain control: Per APS  - DVT: please hold anticoagulation if necessary for surgery tomorrow

## 2020-01-29 NOTE — TRANSFER OF CARE
"Anesthesia Transfer of Care Note    Patient: Ben Melvin    Procedure(s) Performed: Procedure(s) (LRB):  HEMIARTHROPLASTY, SHOULDER (Left)    Patient location: ICU    Anesthesia Type: general    Transport from OR: Transported from OR intubated on 100% O2 by AMBU with adequate controlled ventilation. Continuous ECG monitoring in transport. Continuous SpO2 monitoring in transport    Post pain: adequate analgesia    Post assessment: no apparent anesthetic complications and tolerated procedure well    Post vital signs: stable    Level of consciousness: sedated    Nausea/Vomiting: no nausea/vomiting    Complications: none    Transfer of care protocol was followed      Last vitals:   Visit Vitals  BP (!) 161/70   Pulse 110   Temp 37.3 °C (99.1 °F) (Oral)   Resp (!) 32   Ht 5' 11" (1.803 m)   Wt 110.7 kg (244 lb)   SpO2 96%   BMI 34.03 kg/m²     "

## 2020-01-29 NOTE — NURSING
1400H Patient with pending MRI of lumbar spine. Suppose to be done between 1400H - 1500H.  RN followed up to MRI and was able to talk to Rigoberto and stated they still have 9 MRI to be done from [facility] and they only have 1 machine running and might be able to do the procedure tonight. RN paged neurosurgery.    1410H- Received call from Humera (neurosurgery, RN updated about the status of pending MRI and stated will just wait until patient can be taken by MRI.. Tammy NP (neurocritical care) was also notified about the MRI delay. Will follow up.

## 2020-01-30 ENCOUNTER — ANESTHESIA (OUTPATIENT)
Dept: SURGERY | Facility: HOSPITAL | Age: 60
DRG: 041 | End: 2020-01-30
Payer: MEDICARE

## 2020-01-30 LAB
ABO + RH BLD: NORMAL
ALBUMIN SERPL BCP-MCNC: 2 G/DL (ref 3.5–5.2)
ALLENS TEST: ABNORMAL
ALP SERPL-CCNC: 74 U/L (ref 55–135)
ALT SERPL W/O P-5'-P-CCNC: 11 U/L (ref 10–44)
ANION GAP SERPL CALC-SCNC: 7 MMOL/L (ref 8–16)
AST SERPL-CCNC: 44 U/L (ref 10–40)
BASOPHILS # BLD AUTO: 0.01 K/UL (ref 0–0.2)
BASOPHILS NFR BLD: 0.1 % (ref 0–1.9)
BILIRUB SERPL-MCNC: 0.4 MG/DL (ref 0.1–1)
BLD GP AB SCN CELLS X3 SERPL QL: NORMAL
BUN SERPL-MCNC: 15 MG/DL (ref 6–20)
CALCIUM SERPL-MCNC: 6.9 MG/DL (ref 8.7–10.5)
CHLORIDE SERPL-SCNC: 107 MMOL/L (ref 95–110)
CO2 SERPL-SCNC: 27 MMOL/L (ref 23–29)
CREAT SERPL-MCNC: 1 MG/DL (ref 0.5–1.4)
DELSYS: ABNORMAL
DIFFERENTIAL METHOD: ABNORMAL
EOSINOPHIL # BLD AUTO: 0 K/UL (ref 0–0.5)
EOSINOPHIL NFR BLD: 0.2 % (ref 0–8)
ERYTHROCYTE [DISTWIDTH] IN BLOOD BY AUTOMATED COUNT: 17.1 % (ref 11.5–14.5)
ERYTHROCYTE [SEDIMENTATION RATE] IN BLOOD BY WESTERGREN METHOD: 18 MM/H
EST. GFR  (AFRICAN AMERICAN): >60 ML/MIN/1.73 M^2
EST. GFR  (NON AFRICAN AMERICAN): >60 ML/MIN/1.73 M^2
FIO2: 40
GLUCOSE SERPL-MCNC: 141 MG/DL (ref 70–110)
HCO3 UR-SCNC: 28.1 MMOL/L (ref 24–28)
HCT VFR BLD AUTO: 28.1 % (ref 40–54)
HGB BLD-MCNC: 9 G/DL (ref 14–18)
IMM GRANULOCYTES # BLD AUTO: 0.13 K/UL (ref 0–0.04)
IMM GRANULOCYTES NFR BLD AUTO: 1.1 % (ref 0–0.5)
LYMPHOCYTES # BLD AUTO: 1.3 K/UL (ref 1–4.8)
LYMPHOCYTES NFR BLD: 11.5 % (ref 18–48)
MAGNESIUM SERPL-MCNC: 2 MG/DL (ref 1.6–2.6)
MCH RBC QN AUTO: 28.6 PG (ref 27–31)
MCHC RBC AUTO-ENTMCNC: 32 G/DL (ref 32–36)
MCV RBC AUTO: 89 FL (ref 82–98)
MIN VOL: 8
MODE: ABNORMAL
MONOCYTES # BLD AUTO: 0.6 K/UL (ref 0.3–1)
MONOCYTES NFR BLD: 5.4 % (ref 4–15)
NEUTROPHILS # BLD AUTO: 9.4 K/UL (ref 1.8–7.7)
NEUTROPHILS NFR BLD: 81.7 % (ref 38–73)
NRBC BLD-RTO: 0 /100 WBC
PCO2 BLDA: 38.2 MMHG (ref 35–45)
PEEP: 5
PH SMN: 7.47 [PH] (ref 7.35–7.45)
PHOSPHATE SERPL-MCNC: 2.1 MG/DL (ref 2.7–4.5)
PIP: 24
PLATELET # BLD AUTO: 123 K/UL (ref 150–350)
PMV BLD AUTO: 9.8 FL (ref 9.2–12.9)
PO2 BLDA: 88 MMHG (ref 80–100)
POC BE: 5 MMOL/L
POC SATURATED O2: 97 % (ref 95–100)
POC TCO2: 29 MMOL/L (ref 23–27)
POCT GLUCOSE: 120 MG/DL (ref 70–110)
POCT GLUCOSE: 121 MG/DL (ref 70–110)
POCT GLUCOSE: 130 MG/DL (ref 70–110)
POTASSIUM SERPL-SCNC: 4.1 MMOL/L (ref 3.5–5.1)
PROT SERPL-MCNC: 5.3 G/DL (ref 6–8.4)
RBC # BLD AUTO: 3.15 M/UL (ref 4.6–6.2)
SAMPLE: ABNORMAL
SITE: ABNORMAL
SODIUM SERPL-SCNC: 141 MMOL/L (ref 136–145)
SP02: 98
VT: 450
WBC # BLD AUTO: 11.55 K/UL (ref 3.9–12.7)

## 2020-01-30 PROCEDURE — 80053 COMPREHEN METABOLIC PANEL: CPT

## 2020-01-30 PROCEDURE — 36600 WITHDRAWAL OF ARTERIAL BLOOD: CPT

## 2020-01-30 PROCEDURE — D9220A PRA ANESTHESIA: ICD-10-PCS | Mod: CRNA,,, | Performed by: NURSE ANESTHETIST, CERTIFIED REGISTERED

## 2020-01-30 PROCEDURE — 99900026 HC AIRWAY MAINTENANCE (STAT)

## 2020-01-30 PROCEDURE — 37000008 HC ANESTHESIA 1ST 15 MINUTES: Performed by: ORTHOPAEDIC SURGERY

## 2020-01-30 PROCEDURE — 85025 COMPLETE CBC W/AUTO DIFF WBC: CPT

## 2020-01-30 PROCEDURE — 36556 INSERT NON-TUNNEL CV CATH: CPT | Mod: 59,,, | Performed by: ANESTHESIOLOGY

## 2020-01-30 PROCEDURE — 63600175 PHARM REV CODE 636 W HCPCS: Performed by: NURSE PRACTITIONER

## 2020-01-30 PROCEDURE — 86900 BLOOD TYPING SEROLOGIC ABO: CPT

## 2020-01-30 PROCEDURE — D9220A PRA ANESTHESIA: ICD-10-PCS | Mod: ANES,,, | Performed by: ANESTHESIOLOGY

## 2020-01-30 PROCEDURE — 94640 AIRWAY INHALATION TREATMENT: CPT

## 2020-01-30 PROCEDURE — S0028 INJECTION, FAMOTIDINE, 20 MG: HCPCS | Performed by: NURSE PRACTITIONER

## 2020-01-30 PROCEDURE — 23616 PR OPEN PROX HUMERAL FRACTURE PROSHETIC REPLACEMENT: ICD-10-PCS | Mod: 79,LT,, | Performed by: ORTHOPAEDIC SURGERY

## 2020-01-30 PROCEDURE — C1713 ANCHOR/SCREW BN/BN,TIS/BN: HCPCS | Performed by: ORTHOPAEDIC SURGERY

## 2020-01-30 PROCEDURE — 83735 ASSAY OF MAGNESIUM: CPT

## 2020-01-30 PROCEDURE — D9220A PRA ANESTHESIA: Mod: ANES,,, | Performed by: ANESTHESIOLOGY

## 2020-01-30 PROCEDURE — 25000242 PHARM REV CODE 250 ALT 637 W/ HCPCS: Performed by: PSYCHIATRY & NEUROLOGY

## 2020-01-30 PROCEDURE — 63600175 PHARM REV CODE 636 W HCPCS: Performed by: ORTHOPAEDIC SURGERY

## 2020-01-30 PROCEDURE — 25000003 PHARM REV CODE 250: Performed by: NURSE ANESTHETIST, CERTIFIED REGISTERED

## 2020-01-30 PROCEDURE — D9220A PRA ANESTHESIA: Mod: CRNA,,, | Performed by: NURSE ANESTHETIST, CERTIFIED REGISTERED

## 2020-01-30 PROCEDURE — 36000711: Performed by: ORTHOPAEDIC SURGERY

## 2020-01-30 PROCEDURE — 63600175 PHARM REV CODE 636 W HCPCS: Performed by: NURSE ANESTHETIST, CERTIFIED REGISTERED

## 2020-01-30 PROCEDURE — 36556 PR INSERT NON-TUNNEL CV CATH 5+ YRS OLD: ICD-10-PCS | Mod: 59,,, | Performed by: ANESTHESIOLOGY

## 2020-01-30 PROCEDURE — 99900035 HC TECH TIME PER 15 MIN (STAT)

## 2020-01-30 PROCEDURE — 25000003 PHARM REV CODE 250: Performed by: NURSE PRACTITIONER

## 2020-01-30 PROCEDURE — 20000000 HC ICU ROOM

## 2020-01-30 PROCEDURE — 82803 BLOOD GASES ANY COMBINATION: CPT

## 2020-01-30 PROCEDURE — 37000009 HC ANESTHESIA EA ADD 15 MINS: Performed by: ORTHOPAEDIC SURGERY

## 2020-01-30 PROCEDURE — S4991 NICOTINE PATCH NONLEGEND: HCPCS | Performed by: NURSE PRACTITIONER

## 2020-01-30 PROCEDURE — 23616 OPTX PRX HMRL FX FIX RPR RPL: CPT | Mod: 79,LT,, | Performed by: ORTHOPAEDIC SURGERY

## 2020-01-30 PROCEDURE — 36000710: Performed by: ORTHOPAEDIC SURGERY

## 2020-01-30 PROCEDURE — C1776 JOINT DEVICE (IMPLANTABLE): HCPCS | Performed by: ORTHOPAEDIC SURGERY

## 2020-01-30 PROCEDURE — 94003 VENT MGMT INPAT SUBQ DAY: CPT

## 2020-01-30 PROCEDURE — 27000221 HC OXYGEN, UP TO 24 HOURS

## 2020-01-30 PROCEDURE — 99291 CRITICAL CARE FIRST HOUR: CPT | Mod: ,,, | Performed by: NURSE PRACTITIONER

## 2020-01-30 PROCEDURE — 84100 ASSAY OF PHOSPHORUS: CPT

## 2020-01-30 PROCEDURE — 27201423 OPTIME MED/SURG SUP & DEVICES STERILE SUPPLY: Performed by: ORTHOPAEDIC SURGERY

## 2020-01-30 PROCEDURE — 99291 PR CRITICAL CARE, E/M 30-74 MINUTES: ICD-10-PCS | Mod: ,,, | Performed by: NURSE PRACTITIONER

## 2020-01-30 PROCEDURE — 94761 N-INVAS EAR/PLS OXIMETRY MLT: CPT

## 2020-01-30 DEVICE — PLUG BONE #2: Type: IMPLANTABLE DEVICE | Site: SHOULDER | Status: FUNCTIONAL

## 2020-01-30 DEVICE — CEMENT BONE WHOLE BATCH: Type: IMPLANTABLE DEVICE | Site: SHOULDER | Status: FUNCTIONAL

## 2020-01-30 RX ORDER — CEFAZOLIN SODIUM 1 G/3ML
INJECTION, POWDER, FOR SOLUTION INTRAMUSCULAR; INTRAVENOUS
Status: DISCONTINUED | OUTPATIENT
Start: 2020-01-30 | End: 2020-01-31

## 2020-01-30 RX ORDER — ONDANSETRON 2 MG/ML
INJECTION INTRAMUSCULAR; INTRAVENOUS
Status: DISCONTINUED | OUTPATIENT
Start: 2020-01-30 | End: 2020-01-31

## 2020-01-30 RX ORDER — IBUPROFEN 200 MG
1 TABLET ORAL DAILY
Status: DISCONTINUED | OUTPATIENT
Start: 2020-01-30 | End: 2020-02-07 | Stop reason: HOSPADM

## 2020-01-30 RX ORDER — VASOPRESSIN 20 [USP'U]/ML
INJECTION, SOLUTION INTRAMUSCULAR; SUBCUTANEOUS
Status: DISCONTINUED | OUTPATIENT
Start: 2020-01-30 | End: 2020-01-31

## 2020-01-30 RX ORDER — VANCOMYCIN HYDROCHLORIDE 1 G/20ML
INJECTION, POWDER, LYOPHILIZED, FOR SOLUTION INTRAVENOUS
Status: DISCONTINUED | OUTPATIENT
Start: 2020-01-30 | End: 2020-01-30 | Stop reason: HOSPADM

## 2020-01-30 RX ORDER — VASOPRESSIN 20 [USP'U]/ML
INJECTION, SOLUTION INTRAMUSCULAR; SUBCUTANEOUS CONTINUOUS PRN
Status: DISCONTINUED | OUTPATIENT
Start: 2020-01-30 | End: 2020-01-30

## 2020-01-30 RX ORDER — GLYCOPYRROLATE 0.2 MG/ML
INJECTION INTRAMUSCULAR; INTRAVENOUS
Status: DISCONTINUED | OUTPATIENT
Start: 2020-01-30 | End: 2020-01-31

## 2020-01-30 RX ORDER — PROPOFOL 10 MG/ML
VIAL (ML) INTRAVENOUS
Status: DISCONTINUED | OUTPATIENT
Start: 2020-01-30 | End: 2020-01-30

## 2020-01-30 RX ORDER — DEXAMETHASONE SODIUM PHOSPHATE 4 MG/ML
INJECTION, SOLUTION INTRA-ARTICULAR; INTRALESIONAL; INTRAMUSCULAR; INTRAVENOUS; SOFT TISSUE
Status: DISCONTINUED | OUTPATIENT
Start: 2020-01-30 | End: 2020-01-31

## 2020-01-30 RX ORDER — PROPOFOL 10 MG/ML
5 INJECTION, EMULSION INTRAVENOUS CONTINUOUS
Status: DISCONTINUED | OUTPATIENT
Start: 2020-01-30 | End: 2020-01-31

## 2020-01-30 RX ORDER — NEOSTIGMINE METHYLSULFATE 0.5 MG/ML
INJECTION, SOLUTION INTRAVENOUS
Status: DISCONTINUED | OUTPATIENT
Start: 2020-01-30 | End: 2020-01-31

## 2020-01-30 RX ORDER — KETAMINE HCL IN 0.9 % NACL 50 MG/5 ML
SYRINGE (ML) INTRAVENOUS
Status: DISCONTINUED | OUTPATIENT
Start: 2020-01-30 | End: 2020-01-31

## 2020-01-30 RX ORDER — MIDAZOLAM HYDROCHLORIDE 1 MG/ML
INJECTION, SOLUTION INTRAMUSCULAR; INTRAVENOUS
Status: DISCONTINUED | OUTPATIENT
Start: 2020-01-30 | End: 2020-01-31

## 2020-01-30 RX ORDER — FENTANYL CITRATE 50 UG/ML
INJECTION, SOLUTION INTRAMUSCULAR; INTRAVENOUS
Status: DISCONTINUED | OUTPATIENT
Start: 2020-01-30 | End: 2020-01-31

## 2020-01-30 RX ORDER — DEXMEDETOMIDINE HYDROCHLORIDE 4 UG/ML
0.2 INJECTION, SOLUTION INTRAVENOUS CONTINUOUS
Status: DISCONTINUED | OUTPATIENT
Start: 2020-01-30 | End: 2020-01-30

## 2020-01-30 RX ORDER — ROCURONIUM BROMIDE 10 MG/ML
INJECTION, SOLUTION INTRAVENOUS
Status: DISCONTINUED | OUTPATIENT
Start: 2020-01-30 | End: 2020-01-31

## 2020-01-30 RX ORDER — PHENYLEPHRINE HYDROCHLORIDE 10 MG/ML
INJECTION INTRAVENOUS
Status: DISCONTINUED | OUTPATIENT
Start: 2020-01-30 | End: 2020-01-31

## 2020-01-30 RX ADMIN — PHENYLEPHRINE HYDROCHLORIDE 100 MCG: 10 INJECTION INTRAVENOUS at 08:01

## 2020-01-30 RX ADMIN — IPRATROPIUM BROMIDE AND ALBUTEROL SULFATE 3 ML: .5; 3 SOLUTION RESPIRATORY (INHALATION) at 07:01

## 2020-01-30 RX ADMIN — IPRATROPIUM BROMIDE AND ALBUTEROL SULFATE 3 ML: .5; 3 SOLUTION RESPIRATORY (INHALATION) at 01:01

## 2020-01-30 RX ADMIN — SODIUM PHOSPHATE, MONOBASIC, MONOHYDRATE 20.01 MMOL: 276; 142 INJECTION, SOLUTION INTRAVENOUS at 03:01

## 2020-01-30 RX ADMIN — FENTANYL CITRATE 25 MCG: 50 INJECTION INTRAMUSCULAR; INTRAVENOUS at 09:01

## 2020-01-30 RX ADMIN — PHENYLEPHRINE HYDROCHLORIDE 200 MCG: 10 INJECTION INTRAVENOUS at 11:01

## 2020-01-30 RX ADMIN — SODIUM CHLORIDE: 0.9 INJECTION, SOLUTION INTRAVENOUS at 11:01

## 2020-01-30 RX ADMIN — PROPOFOL 10 MCG/KG/MIN: 10 INJECTION, EMULSION INTRAVENOUS at 07:01

## 2020-01-30 RX ADMIN — PHENYLEPHRINE HYDROCHLORIDE 100 MCG: 10 INJECTION INTRAVENOUS at 09:01

## 2020-01-30 RX ADMIN — ROCURONIUM BROMIDE 10 MG: 10 INJECTION, SOLUTION INTRAVENOUS at 11:01

## 2020-01-30 RX ADMIN — CHLORHEXIDINE GLUCONATE 0.12% ORAL RINSE 15 ML: 1.2 LIQUID ORAL at 08:01

## 2020-01-30 RX ADMIN — PHENYLEPHRINE HYDROCHLORIDE 200 MCG: 10 INJECTION INTRAVENOUS at 10:01

## 2020-01-30 RX ADMIN — PHENYLEPHRINE HYDROCHLORIDE 200 MCG: 10 INJECTION INTRAVENOUS at 08:01

## 2020-01-30 RX ADMIN — Medication 10 MG: at 11:01

## 2020-01-30 RX ADMIN — FENTANYL CITRATE 50 MCG: 50 INJECTION INTRAMUSCULAR; INTRAVENOUS at 07:01

## 2020-01-30 RX ADMIN — SODIUM CHLORIDE, SODIUM GLUCONATE, SODIUM ACETATE, POTASSIUM CHLORIDE, MAGNESIUM CHLORIDE, SODIUM PHOSPHATE, DIBASIC, AND POTASSIUM PHOSPHATE: .53; .5; .37; .037; .03; .012; .00082 INJECTION, SOLUTION INTRAVENOUS at 10:01

## 2020-01-30 RX ADMIN — DEXMEDETOMIDINE HYDROCHLORIDE 0.2 MCG/KG/HR: 4 INJECTION, SOLUTION INTRAVENOUS at 05:01

## 2020-01-30 RX ADMIN — PROPOFOL 15 MCG/KG/MIN: 10 INJECTION, EMULSION INTRAVENOUS at 11:01

## 2020-01-30 RX ADMIN — LEVETIRACETAM 1000 MG: 10 INJECTION INTRAVENOUS at 08:01

## 2020-01-30 RX ADMIN — CALCIUM CHLORIDE 500 MG: 100 INJECTION, SOLUTION INTRAVENOUS at 09:01

## 2020-01-30 RX ADMIN — PHENYLEPHRINE HYDROCHLORIDE 100 MCG: 10 INJECTION INTRAVENOUS at 10:01

## 2020-01-30 RX ADMIN — Medication 30 MG: at 09:01

## 2020-01-30 RX ADMIN — CHLORHEXIDINE GLUCONATE 0.12% ORAL RINSE 15 ML: 1.2 LIQUID ORAL at 02:01

## 2020-01-30 RX ADMIN — ROCURONIUM BROMIDE 20 MG: 10 INJECTION, SOLUTION INTRAVENOUS at 08:01

## 2020-01-30 RX ADMIN — VASOPRESSIN 1 UNITS: 20 INJECTION INTRAVENOUS at 11:01

## 2020-01-30 RX ADMIN — GLYCOPYRROLATE 0.6 MG: 0.2 INJECTION, SOLUTION INTRAMUSCULAR; INTRAVENOUS at 12:01

## 2020-01-30 RX ADMIN — Medication 10 MG: at 09:01

## 2020-01-30 RX ADMIN — ROCURONIUM BROMIDE 10 MG: 10 INJECTION, SOLUTION INTRAVENOUS at 10:01

## 2020-01-30 RX ADMIN — ROCURONIUM BROMIDE 20 MG: 10 INJECTION, SOLUTION INTRAVENOUS at 09:01

## 2020-01-30 RX ADMIN — DEXAMETHASONE SODIUM PHOSPHATE 4 MG: 4 INJECTION, SOLUTION INTRAMUSCULAR; INTRAVENOUS at 09:01

## 2020-01-30 RX ADMIN — FENTANYL CITRATE 50 MCG: 50 INJECTION INTRAMUSCULAR; INTRAVENOUS at 02:01

## 2020-01-30 RX ADMIN — FENTANYL CITRATE 50 MCG: 50 INJECTION INTRAMUSCULAR; INTRAVENOUS at 09:01

## 2020-01-30 RX ADMIN — CEFAZOLIN 2 G: 330 INJECTION, POWDER, FOR SOLUTION INTRAMUSCULAR; INTRAVENOUS at 08:01

## 2020-01-30 RX ADMIN — MIDAZOLAM HYDROCHLORIDE 2 MG: 1 INJECTION, SOLUTION INTRAMUSCULAR; INTRAVENOUS at 08:01

## 2020-01-30 RX ADMIN — FAMOTIDINE 20 MG: 10 INJECTION, SOLUTION INTRAVENOUS at 08:01

## 2020-01-30 RX ADMIN — PROPOFOL 35 MCG/KG/MIN: 10 INJECTION, EMULSION INTRAVENOUS at 07:01

## 2020-01-30 RX ADMIN — LEVETIRACETAM 1000 MG: 10 INJECTION INTRAVENOUS at 02:01

## 2020-01-30 RX ADMIN — SODIUM CHLORIDE 1000 ML: 0.9 INJECTION, SOLUTION INTRAVENOUS at 07:01

## 2020-01-30 RX ADMIN — SODIUM CHLORIDE: 0.9 INJECTION, SOLUTION INTRAVENOUS at 08:01

## 2020-01-30 RX ADMIN — NEOSTIGMINE METHYLSULFATE 5 MG: 0.5 INJECTION INTRAVENOUS at 12:01

## 2020-01-30 RX ADMIN — NICOTINE 1 PATCH: 21 PATCH, EXTENDED RELEASE TRANSDERMAL at 06:01

## 2020-01-30 RX ADMIN — SODIUM CHLORIDE 0.4 MCG/KG/MIN: 9 INJECTION, SOLUTION INTRAVENOUS at 10:01

## 2020-01-30 RX ADMIN — CALCIUM CHLORIDE 500 MG: 100 INJECTION, SOLUTION INTRAVENOUS at 08:01

## 2020-01-30 RX ADMIN — ONDANSETRON 4 MG: 2 INJECTION INTRAMUSCULAR; INTRAVENOUS at 11:01

## 2020-01-30 RX ADMIN — SENNOSIDES AND DOCUSATE SODIUM 1 TABLET: 8.6; 5 TABLET ORAL at 08:01

## 2020-01-30 RX ADMIN — ROCURONIUM BROMIDE 5 MG: 10 INJECTION, SOLUTION INTRAVENOUS at 10:01

## 2020-01-30 RX ADMIN — FENTANYL CITRATE 50 MCG: 50 INJECTION INTRAMUSCULAR; INTRAVENOUS at 04:01

## 2020-01-30 RX ADMIN — SODIUM CHLORIDE: 0.9 INJECTION, SOLUTION INTRAVENOUS at 02:01

## 2020-01-30 RX ADMIN — SODIUM CHLORIDE, SODIUM GLUCONATE, SODIUM ACETATE, POTASSIUM CHLORIDE, MAGNESIUM CHLORIDE, SODIUM PHOSPHATE, DIBASIC, AND POTASSIUM PHOSPHATE: .53; .5; .37; .037; .03; .012; .00082 INJECTION, SOLUTION INTRAVENOUS at 08:01

## 2020-01-30 RX ADMIN — FAMOTIDINE 20 MG: 10 INJECTION, SOLUTION INTRAVENOUS at 02:01

## 2020-01-30 RX ADMIN — METOPROLOL TARTRATE 25 MG: 25 TABLET ORAL at 02:01

## 2020-01-30 RX ADMIN — ATORVASTATIN CALCIUM 80 MG: 20 TABLET, FILM COATED ORAL at 08:01

## 2020-01-30 RX ADMIN — SENNOSIDES AND DOCUSATE SODIUM 1 TABLET: 8.6; 5 TABLET ORAL at 02:01

## 2020-01-30 RX ADMIN — FENTANYL CITRATE 25 MCG: 50 INJECTION INTRAMUSCULAR; INTRAVENOUS at 10:01

## 2020-01-30 RX ADMIN — ROCURONIUM BROMIDE 10 MG: 10 INJECTION, SOLUTION INTRAVENOUS at 09:01

## 2020-01-30 NOTE — NURSING
Pt sent with OR staff for surgery this AM.  Pt prepped and preopchecklist complete.  Pt remains on propofol at 35mcg, NS @100.  Consents verified, chart sent with pt.  GF at bedside notified.  Informed son via phone that pt is in surgery.

## 2020-01-30 NOTE — PROGRESS NOTES
[facility]  Neurocritical Care  Progress Note    Admit Date: 1/27/2020  Service Date: 01/29/2020  Length of Stay: 2    Subjective:     Chief Complaint: Subdural hematoma    History of Present Illness: Mr Melvin is a 60 yo male with EtOH abuse, tobacco abuse,  PAF  With long term anticoagulation (coumadin) who presents to [facility] for SDH and bilateral shoulder dislocation. He  presented to OSH via EMS for acute onset of  SOB and reported seizure activity. At approx 5 pm he was playing video poker, felt light headed, and was assisted  Ground by family. He had what the family felt was a seizure but appeared awake during it. He was disoriented and confused after. He then reported had SOB, chest pain, and BUE pain. Patient also reports new onset severe lower back pain. He returned to baseline mental status shortly after per family. CTH and chest at OSH revealed SDH and bilateral upper ext dislocations. He received Kcentra and Vit K at OSH for reversal.  He is being admitted to [facility] for a higher level of care.     Hospital Course: 1/27: Admit NCC, repeat CTH, SBP <160, consult NSGY and ortho, received Kcentra and vit K at OSH  1/28: Keppra increased to 1G BD, MRI C-spine negative for compression fracture, NSGY cleared for undergoing ortho procedure under C-collar, normal PT/INR, no acute intervention per NSGY will hold anticoagulation, F/U with MRI L-Spine, Needs epilepsy follow up, PRN Morphine for pain  1/29: intubated on propofol, S/P left shoulder arthroplasty, started on tube feeds, Right shoulder arthroplasty tomorrow, scheduled albuterol, MRI when able, Nicotine patch post MRI    Interval History:      Review of Systems   Unable to obtain a complete ROS due to level of consciousness.  Objective:     Vitals:  Temp: 99.1 °F (37.3 °C)  Pulse: 104  Rhythm: sinus tachycardia  BP: (!) 124/58  MAP (mmHg): 90  Resp: 18  SpO2: 97 %  Oxygen Concentration (%): 40  O2 Device (Oxygen Therapy): ventilator  Vent  Mode: A/C  Set Rate: 18 bmp  Vt Set: 450 mL  PEEP/CPAP: 5 cmH20  Peak Airway Pressure: 24 cmH2O  Mean Airway Pressure: 9.4 cmH20  Plateau Pressure: 0 cmH20    Temp  Min: 98.1 °F (36.7 °C)  Max: 99.3 °F (37.4 °C)  Pulse  Min: 92  Max: 116  BP  Min: 104/56  Max: 140/63  MAP (mmHg)  Min: 76  Max: 90  Resp  Min: 5  Max: 30  SpO2  Min: 94 %  Max: 100 %  Oxygen Concentration (%)  Min: 40  Max: 50    01/28 0701 - 01/29 0700  In: 6498.8 [I.V.:5948.8]  Out: 2199 [Urine:1949]   Unmeasured Output  Urine Occurrence: 1  Pad Count: 2       Physical Exam    General: Patient intubated and sedated, was following commands off sedation  PERRLA, Corneal+, No facial asymmetry  Motor: Spontaenous movements of all extremity    Medications:  Continuous  sodium chloride 0.9% Last Rate: 100 mL/hr at 01/29/20 1905   propofol Last Rate: 35 mcg/kg/min (01/29/20 1905)   Scheduled  albuterol-ipratropium 3 mL Q6H   atorvastatin 80 mg QHS   chlorhexidine 15 mL BID   famotidine (PF) 20 mg BID   levetiracetam IVPB 1,000 mg Q12H   metoprolol tartrate 25 mg BID   senna-docusate 8.6-50 mg 1 tablet BID   PRN  albuterol-ipratropium 3 mL Q4H PRN   Dextrose 10% Bolus 12.5 g PRN   fentaNYL 50 mcg Q1H PRN   glucagon (human recombinant) 1 mg PRN   insulin aspart U-100 1-10 Units Q6H PRN   labetalol 10 mg Q4H PRN   magnesium sulfate IVPB 2 g PRN   magnesium sulfate IVPB 4 g PRN   ondansetron 4 mg Q8H PRN   potassium chloride in water 40 mEq PRN   And     potassium chloride in water 40 mEq PRN   And     potassium chloride in water 40 mEq PRN   sodium chloride 0.9% 10 mL PRN   sodium phosphate IVPB 15 mmol PRN   sodium phosphate IVPB 20.01 mmol PRN   sodium phosphate IVPB 30 mmol PRN     Today I personally reviewed pertinent medications, lines/drains/airways, imaging, cardiology results, laboratory results, microbiology results, notably:    Diet  Diet NPO  Diet NPO  Diet NPO  Diet NPO        Assessment/Plan:     Neuro  * Subdural hematoma  - NSGY consulted  -  Kcentra and Vit K for warfarin  - Repeat INR pending  - Hold anticoagulation at this time  - SBP <160   - Q 1 vitals  - Q1 neuro Checks  - TSH, Lipid, A1c, Echo, EKG  - PT/Ot eval and treat   - Repeat CTH stable  - TLSO when OOB/upright  - MRI C spine and MRI L spine including up to T11        Psychiatric  Alcohol abuse  - Stopped daily drinking several years ago   - reports intermittent ETOH use   - watch for alcohol withdrawals    Pulmonary  COPD (chronic obstructive pulmonary disease)  - Duo nebs   - Sats >88    Cardiac/Vascular  Hypertension, essential  - SBP <160  - Echo and EKG   - nicardipine gtt  - Labetalol prn     PAF (paroxysmal atrial fibrillation)  - continue home metoprolol   - currently rate controlled   - anticoagulation reversed and held in setting of SDH    Hyperlipidemia  - Lipid panel   - continue home atorvastatin     Endocrine  Diabetes mellitus due to underlying condition with diabetic autonomic neuropathy, without long-term current use of insulin  - A1c 5.9  - SSI with accu checks     GI  GERD with esophagitis  - continue home Protonix     Orthopedic   fracture dislocation of bilateral shoulders  S/P Left shoulder Arthroplasty  Plan for right shoulder arthroplasty tomorrow per ortho    Open fracture of both humeri  - Orthopedic following    Fracture dislocation of joint of left shoulder girdle  - Ortho consulted  - appreciate recs  - S/P Left shoulder arthroplasty   - Right shoulder arthroplasty tommorow    Other  Current every day smoker  - nicotine patch post MRI  - On Duonebs          The patient is being Prophylaxed for:  Venous Thromboembolism with: Mechanical  Stress Ulcer with: H2B  Ventilator Pneumonia with: chlorhexidine oral care    Activity Orders          Diet NPO: NPO starting at 01/30 0001    Diet NPO: NPO starting at 01/27 2238    Commode at bedside starting at 01/27 2238        Full Code    Albert Johnston MD  Neurocritical Care  [facility]

## 2020-01-30 NOTE — ASSESSMENT & PLAN NOTE
- Ortho consulted  - appreciate recs  - S/P Left shoulder arthroplasty   - s/p Right shoulder arthroplasty

## 2020-01-30 NOTE — PROGRESS NOTES
[facility]  Neurosurgery  Progress Note    Subjective:     History of Present Illness: 59 M with hx of paroxysmal afib on coumadin presents for eval of bilateral shoulder dislocations and left convexity SDH.  Pt had first time seizure and slumped over in chair.  There was no LOC.  He was given vit k and kcentra at OSH for hx of coumadin use.  He endorses low back pain but no neck pain.  No sensation loss in his extremities.    Post-Op Info:  Procedure(s) (LRB):  HEMIARTHROPLASTY, SHOULDER- RIGHT- MEKA- SUPINE-  TRIOS (Right)   Day of Surgery     Interval History: NAEON. To OR today with Ortho. Remains intubated, sedated on Prop gtt.     Medications:  Continuous Infusions:   sodium chloride 0.9% 100 mL/hr at 01/30/20 0702    propofol Stopped (01/30/20 0811)     Scheduled Meds:   albuterol-ipratropium  3 mL Nebulization Q6H    atorvastatin  80 mg Per OG tube QHS    chlorhexidine  15 mL Mouth/Throat BID    famotidine (PF)  20 mg Intravenous BID    levetiracetam IVPB  1,000 mg Intravenous Q12H    metoprolol tartrate  25 mg Per OG tube BID    senna-docusate 8.6-50 mg  1 tablet Per OG tube BID     PRN Meds:Dextrose 10% Bolus, fentaNYL, glucagon (human recombinant), insulin aspart U-100, labetalol, magnesium sulfate IVPB, magnesium sulfate IVPB, ondansetron, potassium chloride in water **AND** potassium chloride in water **AND** potassium chloride in water, sodium chloride 0.9%, sodium phosphate IVPB, sodium phosphate IVPB, sodium phosphate IVPB     Review of Systems  Objective:     Weight: 110.7 kg (244 lb)  Body mass index is 34.03 kg/m².  Vital Signs (Most Recent):  Temp: 99 °F (37.2 °C) (01/30/20 0702)  Pulse: 102 (01/30/20 0802)  Resp: (!) 23 (01/30/20 0802)  BP: (!) 149/70 (01/30/20 0802)  SpO2: 100 % (01/30/20 0802) Vital Signs (24h Range):  Temp:  [99 °F (37.2 °C)-99.3 °F (37.4 °C)] 99 °F (37.2 °C)  Pulse:  [] 102  Resp:  [12-23] 23  SpO2:  [95 %-100 %] 100 %  BP:  (115-158)/(55-77) 149/70     Date 01/30/20 0700 - 01/31/20 0659   Shift 2996-2070 2410-3017 7775-3088 24 Hour Total   INTAKE   I.V.(mL/kg) 1022(9.2)   1022(9.2)   NG/GT 0   0   Shift Total(mL/kg) 1022(9.2)   1022(9.2)   OUTPUT   Urine(mL/kg/hr) 570   570   Shift Total(mL/kg) 570(5.2)   570(5.2)   Weight (kg) 110.7 110.7 110.7 110.7              Vent Mode: A/C  Oxygen Concentration (%):  [40] 40  Resp Rate Total:  [18 br/min-49 br/min] 18 br/min  Vt Set:  [450 mL] 450 mL  PEEP/CPAP:  [5 cmH20] 5 cmH20  Mean Airway Pressure:  [1.2 cmH20-9.9 cmH20] 9.9 cmH20         NG/OG Tube 01/28/20 2200 Center mouth (Active)   Placement Check placement verified by x-ray;placement verified by distal tube length measurement 1/30/2020  7:02 AM   Tolerance no signs/symptoms of discomfort 1/30/2020  7:02 AM   Securement secured to commercial device 1/30/2020  3:05 AM   Clamp Status/Tolerance clamped 1/30/2020  7:02 AM   Suction Setting/Drainage Method suction at the bedside 1/30/2020  3:05 AM   Insertion Site Appearance no redness, warmth, tenderness, skin breakdown, drainage 1/30/2020  3:05 AM   Drainage None 1/30/2020  3:05 AM   Flush/Irrigation flushed w/;water;no resistance met 1/30/2020  3:05 AM   Feeding Method continuous 1/30/2020  3:05 AM   Feeding Action feeding continued 1/30/2020  3:05 AM   Current Rate (mL/hr) 0 mL/hr 1/30/2020  7:02 AM   Goal Rate (mL/hr) 0 mL/hr 1/30/2020  7:02 AM   Intake (mL) 60 mL 1/29/2020  9:05 PM   Formula Name Impact peptide 1/30/2020  3:05 AM   Intake (mL) - Formula Tube Feeding 0 1/30/2020  7:02 AM   Residual Amount (ml) 25 ml 1/29/2020  3:01 PM            Urethral Catheter 01/28/20 1630 Non-latex;Straight-tip 16 Fr. (Active)   Site Assessment Clean;Intact 1/30/2020  7:02 AM   Collection Container Urimeter 1/30/2020  7:02 AM   Securement Method secured to top of thigh w/ adhesive device 1/30/2020  7:02 AM   Catheter Care Performed yes 1/30/2020  3:05 AM   Reason for Continuing Urinary  "Catheterization Post operative 1/30/2020  7:02 AM   CAUTI Prevention Bundle StatLock in place w 1" slack 1/30/2020  7:02 AM   Output (mL) 150 mL 1/30/2020  7:02 AM       Neurosurgery Physical Exam   Intubated, sedated  PERRL  LUE in cast  RUE - not following commands, limited given need for OR  WD BLE  C collar in place       Significant Labs:  Recent Labs   Lab 01/29/20  0155 01/29/20  0158 01/29/20  1015 01/30/20  0105   *  --  139* 141*     --  139 141   K 4.4  --  4.3 4.1     --  105 107   CO2 26  --  26 27   BUN 15  --  15 15   CREATININE 1.4  --  1.2 1.0   CALCIUM 6.4*  --  6.8* 6.9*   MG  --  1.5* 2.0 2.0     Recent Labs   Lab 01/29/20  0155 01/30/20  0105   WBC 13.61*  13.61* 11.55   HGB 10.1*  10.1* 9.0*   HCT 32.5*  32.5* 28.1*   *  114* 123*       Significant Diagnostics:  X-ray Chest 1 View    Result Date: 1/30/2020  Stable radiographic appearance. Electronically signed by: Ty Sanderson Date:    01/30/2020 Time:    04:35      Assessment/Plan:     * Subdural hematoma  59 M with hx of paroxysmal afib on coumadin presents for eval of bilateral shoulder dislocations, left convexity SDH.  -Admit to neuro ICU  -q 1 hr neuro checks  -Repeat CT head stable  -Hold coumadin, goal INR of less than 1.4  -Ortho consult for bilateral shoulder dislocation - POD#2 L sided procedure, R side to be done today.   -C collar at all times  - TLSO when OOB/upright  - MRI L spine complete.   - C spine cleared by Ortho. C collar for comfort            Amber Zuniga MD  Neurosurgery  [facility]  "

## 2020-01-30 NOTE — PLAN OF CARE
Ortho cleared C spine upon admit. C collar for comfort. Okay to proceed with surgery today from NSGY standpoint.Please call for further questions.      Lenny Youssef MD  NSGY PGY-III

## 2020-01-30 NOTE — ASSESSMENT & PLAN NOTE
59 M with hx of paroxysmal afib on coumadin presents for eval of bilateral shoulder dislocations, left convexity SDH.  -Admit to neuro ICU  -q 1 hr neuro checks  -Repeat CT head stable  -Hold coumadin, goal INR of less than 1.4  -Ortho consult for bilateral shoulder dislocation - POD#2 L sided procedure, R side to be done today.   -C collar at all times  - TLSO when OOB/upright  - MRI L spine complete.   - C spine cleared by Ortho. C collar for comfort

## 2020-01-30 NOTE — PROGRESS NOTES
[facility]  Orthopedics  Progress Note    Patient Name: Ben Melvin  MRN: [id number]  Admission Date: 1/27/2020  Hospital Length of Stay: 3 days  Attending Provider: Walter Broussard MD  Primary Care Provider: Cristal Ohara NP  Follow-up For: Procedure(s) (LRB):  HEMIARTHROPLASTY, SHOULDER (Left)    Post-Operative Day: 2 Days Post-Op  Subjective:     Principal Problem:Subdural hematoma    Principal Orthopedic Problem: Bilateral prox humerus fractures     Interval History: Patient seen and examined at bedside.  SANTIAGO.  He remains intubated and sedated.  He is to go to the OR today for operative fixation of his right shoulder.  Orthopedics and neuro surgery have cleared his C-spine.  Therefore he does not have to wear a C-collar.  He is to remain in his LSO brace per Neurosurgery.    Review of patient's allergies indicates:  No Known Allergies    Current Facility-Administered Medications   Medication    0.9%  NaCl infusion    albuterol-ipratropium 2.5 mg-0.5 mg/3 mL nebulizer solution 3 mL    albuterol-ipratropium 2.5 mg-0.5 mg/3 mL nebulizer solution 3 mL    atorvastatin tablet 80 mg    chlorhexidine 0.12 % solution 15 mL    dextrose 10% (D10W) Bolus    famotidine (PF) injection 20 mg    fentaNYL injection 50 mcg    glucagon (human recombinant) injection 1 mg    insulin aspart U-100 pen 1-10 Units    labetalol 20 mg/4 mL (5 mg/mL) IV syring    levETIRAcetam in NaCl (iso-os) IVPB 1,000 mg    magnesium sulfate 2g in water 50mL IVPB (premix)    magnesium sulfate 2g in water 50mL IVPB (premix)    metoprolol tartrate (LOPRESSOR) tablet 25 mg    ondansetron injection 4 mg    potassium chloride 10 mEq in 100 mL IVPB    And    potassium chloride 10 mEq in 100 mL IVPB    And    potassium chloride 10 mEq in 100 mL IVPB    propofol (DIPRIVAN) 10 mg/mL infusion    senna-docusate 8.6-50 mg per tablet 1 tablet    sodium chloride 0.9% flush 10 mL    sodium phosphate 15 mmol in  "dextrose 5 % 250 mL IVPB    sodium phosphate 20.01 mmol in dextrose 5 % 250 mL IVPB    sodium phosphate 30 mmol in dextrose 5 % 250 mL IVPB     Objective:     Vital Signs (Most Recent):  Temp: 99.2 °F (37.3 °C) (01/30/20 0305)  Pulse: 101 (01/30/20 0514)  Resp: 18 (01/30/20 0514)  BP: (!) 143/65 (01/30/20 0305)  SpO2: 97 % (01/30/20 0514) Vital Signs (24h Range):  Temp:  [99.1 °F (37.3 °C)-99.3 °F (37.4 °C)] 99.2 °F (37.3 °C)  Pulse:  [] 101  Resp:  [8-23] 18  SpO2:  [95 %-99 %] 97 %  BP: (115-143)/(55-65) 143/65     Weight: 110.7 kg (244 lb)  Height: 5' 11" (180.3 cm)  Body mass index is 34.03 kg/m².      Intake/Output Summary (Last 24 hours) at 1/30/2020 0538  Last data filed at 1/30/2020 0322  Gross per 24 hour   Intake 4091.08 ml   Output 3205 ml   Net 886.08 ml       Ortho/SPM Exam        intubated and unable to get a good motor sensory exam however patient does respond to stimulation of the left arm    Significant Labs:   CBC:   Recent Labs   Lab 01/29/20  0155 01/30/20  0105   WBC 13.61*  13.61* 11.55   HGB 10.1*  10.1* 9.0*   HCT 32.5*  32.5* 28.1*   *  114* 123*     All pertinent labs within the past 24 hours have been reviewed.    Significant Imaging: I have reviewed and interpreted all pertinent imaging results/findings.    Assessment/Plan:      fracture dislocation of bilateral shoulders  Ben Melvin is a 60 y.o. male status post left shoulder hemiarthroplasty on January 28, 2020 also has a right proximal humerus fracture dislocation which we will plan to treat in the OR today.    - Weight bearing status: NWB and NO ROM of L shoulder  - patient no longer has to wear cervical collar, LSO brace to be continued per Neurosurgery  - Pain control: Per APS  - DVT: please hold anticoagulation if possible until surgery     Patient is marked, booked, and consented for surgery today.                     Marlon Cordoba MD  Orthopedics  [facility]  "

## 2020-01-30 NOTE — TRANSFER OF CARE
"Anesthesia Transfer of Care Note    Patient: Ben Melvin    Procedure(s) Performed: Procedure(s) (LRB):  HEMIARTHROPLASTY, SHOULDER- RIGHT- MEKA- SUPINE-  TRIOS (Right)    Patient location: ICU    Anesthesia Type: general    Transport from OR: Continuous SpO2 monitoring in transport. Continuous ECG monitoring in transport. Transported from OR intubated on 100% O2 by AMBU with assisted ventilation. Upon arrival to PACU/ICU, patient attached to ventilator and auscultated to confirm bilateral breath sounds and adequate TV    Post pain: adequate analgesia    Post assessment: no apparent anesthetic complications and tolerated procedure well    Post vital signs: stable    Level of consciousness: sedated and responds to stimulation    Nausea/Vomiting: no nausea/vomiting    Complications: none    Transfer of care protocol was followed      Last vitals:   Visit Vitals  /70   Pulse 102   Temp 37.2 °C (99 °F) (Axillary)   Resp 16   Ht 5' 11" (1.803 m)   Wt 110.7 kg (244 lb)   SpO2 100%   BMI 34.03 kg/m²     "

## 2020-01-30 NOTE — H&P
CC:  Bilateral proximal humerus fracture dislocations      HISTORY       HPI:  60-year-old male, obese, AFib on anticoagulation, peripheral vascular disease, hypertension, COPD, current smoker, liver cirrhosis who sustained a witnessed seizure while playing video poker 01/27/2020.  He fell from his chair.  He was brought to emergency department at outside facility.  Injuries identified were bilateral proximal humerus fracture dislocations, bilateral acromial fractures, subdural hemorrhage, L1 and L4 burst fractures.      He was transferred to [facility] for further care. He was seen by the orthopedic team in the emergency department.  He was placed into a sling for his bilateral upper extremities.  Multi disciplinary care team coordinated workup of the above injuries.  He was admitted to the Neuro Critical Care unit for monitoring and serial head CTs.  His INR was reversed with vitamin K given his cerebral hemorrhage.  INR 2.4 at time of admission.  1.2 after vitamin K.     At the time of my evaluation in the ICU the patient was alert, oriented to person and place and was able to converse with me and answer questions appropriately.  He was complaining of bilateral shoulder pain. He also had low back pain. He denied any pain in his lower extremities or his neck. His cervical spine demonstrated:  No midline neck bony pain.   No numbness/tingling in bilateral upper extremities  Full, painless ROM neck  No radiculopathy bilateral upper extremities  CT neg for fx/dislocation  His C-collar was cleared by me at bedside.     Bilateral upper extremity exam demonstrated pain in and about his shoulder joint and proximal humerus as well as significant edema and ecchymosis at these.  He had no bony pain or tenderness with range of motion or palpation of his mid humerus, elbow, forearm, wrist, hand.  He had 5/5 strength with wrist flexion, extension, finger flexion, extension, interossei.  He had ability to fire muscles of  bilateral deltoid, biceps, triceps, but was limited due to pain in his shoulder created by these movements and resultant 1/5 strength in each muscle group.  Sensation was intact in axillary, radial, median, ulnar nerve distributions. Palpable radial pulse brisk cap refill bilateral upper extremities.     X-ray and CT scan imaging of his bilateral shoulders demonstrated 4 part posterior proximal humerus fracture dislocations.  Both sides demonstrated proximal anatomical neck fractures, with the right side having less remaining bone on the articular surface. He also had bilateral acromion fractures.     I counseled the patient on his medical diagnosis and both non operative and operative treatment management options.  We felt that non operative treatment would result in sub optimal outcome with severe limitations and eventual resorption of his proximal humeri, limited pain, limited mobility, sub optimal function, and potentially neurovascular compromise or injury due to the severe displaced and dislocated nature of the fractures. We recommended operative intervention in the form of staged procedure, 1st treating the left shoulder which appeared to have better remaining bone stock for possible open reduction internal fixation. We would potentially have to convert to a hemiarthroplasty intraoperatively if we are unable to repair the fracture. By starting 1st with the left side, if this fracture was unable to be fixed, we then would proceed directly to a shoulder hemiarthroplasty on the contralateral side. We discussed with the patient and his family members the nature of the procedure and expected outcomes.     The risks, benefits, and alternatives to surgery were discussed with the patient and/or family.    Specific risks discussed included, but were not limited to:  Failure of fixation, damage to nearby neurovascular structures, pain, dislocation, periprosthetic fracture, need to convert to hemiarthroplasty, need to  convert to later total shoulder arthroplasty or reverse total shoulder arthroplasty, rotator cuff tear weakness, failure of tuberosity healing, periprosthetic joint infection, need for multiple staged procedures, damage to nearby structures, including neurovascular structures leading to loss of function or loss of limb, bleeding, pain, numbness, tingling, weakness, compartment syndrome, malunion/nonunion, hardware failure, hardware prominence, infection, need for multiple staged procedures, prolonged antibiotics, iatrogenic fracture, heterotopic ossification, arthritis, a variety of medical complications including but not limited to heart attack, stroke, deep venous thrombosis, pulmonary embolism, prolonged hospitalization, prolonged intubation, and death.   Patient and/or family expressed an understanding and desires to proceed with surgery.   All questions were answered.  No guarantees were implied or stated.  Informed consent was obtained.    ROS:  Constitutional: Denies fever/chills  Neurological: Denies numbness/tingling (any exceptions noted in orthopaedic exam)   Psychiatric/Behavioral: Denies change in normal mood  Eyes: Denies change in vision  Cardiovascular: Denies chest pain  Respiratory: Denies shortness of breath  Hematologic/Lymphatic: Denies easy bleeding/bruising   Skin: Denies new rash or skin lesions   Gastrointestinal: Denies nausea/vomitting/diarrhea, change in bowel habits, abdominal pain   Allergic/Immunologic: Denies adverse reactions to current medications  Musculoskeletal: see HPI    PAST MEDICAL HISTORY:   Past Medical History:   Diagnosis Date    Adenoma of right adrenal gland 11/09/2017    Alcohol abuse     ASCVD (arteriosclerotic cardiovascular disease) 10/2008    [place]    BPH (benign prostatic hyperplasia)     Cardiomyopathy     Chronic anxiety     Cirrhosis of liver     COPD (chronic obstructive pulmonary disease)     Current every day smoker     2-3 ppd    Elevated LFTs  08/2001    GERD with esophagitis 11/09/2017    History of colon polyps     History of epididymitis 2016    Juan Antonio    Hyperlipidemia     Hypertension, essential     Long term (current) use of anticoagulants     Lumbar disc disease with radiculopathy 05/2015    Major depression, recurrent, chronic     PAF (paroxysmal atrial fibrillation)     Peripheral autonomic neuropathy due to DM     Proteinuria     PVD (peripheral vascular disease) 05/29/2009    Venous insufficiency of both lower extremities     Vitamin D deficiency      PAST SURGICAL HISTORY:   Past Surgical History:   Procedure Laterality Date    biopsy back  05/20/2019    benign Martinez    biopsy right ankle Right 05/20/2019    Martinez, benign    COLONOSCOPY N/A 6/13/2019    Procedure: COLONOSCOPY;  Surgeon: Delmer Kerr MD;  Location: [facility] END[facility];  Service: Endoscopy;  Laterality: N/A;    COLONOSCOPY W/ POLYPECTOMY      EPIDURAL STEROID INJECTION INTO LUMBAR SPINE  06/2015    LASHAE Harvey    ESOPHAGOGASTRODUODENOSCOPY N/A 6/13/2019    Procedure: ESOPHAGOGASTRODUODENOSCOPY (EGD);  Surgeon: Delmer Kerr MD;  Location: [facility] END[facility];  Service: Endoscopy;  Laterality: N/A;    INTESTINAL MALROTATION REPAIR Right 1961    2 weeks old, Martinez    PERCUTANEOUS TRANSLUMINAL ANGIOPLASTY (PTA) OF PERIPHERAL VESSEL Right 12/2014    NIKOLAS Childs     FAMILY HISTORY:   Family History   Problem Relation Age of Onset    Diabetes Mother     Hypertension Mother     Hypertension Father     Acromegaly Father     Heart attack Father     Kidney cancer Brother      SOCIAL HISTORY:   Social History     Socioeconomic History    Marital status:      Spouse name: Eboni Rhodes x37 years    Number of children: 1    Years of education: Not on file    Highest education level: High school graduate   Occupational History    Occupation: Retired  x 37 years     Comment: [employer] [place]   Social Needs    Financial resource strain: Not  on file    Food insecurity:     Worry: Not on file     Inability: Not on file    Transportation needs:     Medical: Not on file     Non-medical: Not on file   Tobacco Use    Smoking status: Current Every Day Smoker     Packs/day: 9.00     Years: 40.00     Pack years: 360.00     Types: Cigarettes    Smokeless tobacco: Never Used    Tobacco comment: smokes parts all day long continuously   Substance and Sexual Activity    Alcohol use: Not Currently     Frequency: Never     Comment: quit 2 years ago was heavily    Drug use: Never    Sexual activity: Yes     Partners: Female   Lifestyle    Physical activity:     Days per week: Not on file     Minutes per session: Not on file    Stress: Not at all   Relationships    Social connections:     Talks on phone: Not on file     Gets together: Not on file     Attends [employer] service: Not on file     Active member of club or organization: Not on file     Attends meetings of clubs or organizations: Not on file     Relationship status: Not on file   Other Topics Concern    Not on file   Social History Narrative    Not on file     MEDICATIONS:   Current Facility-Administered Medications:     0.9%  NaCl infusion, , Intravenous, Continuous, Arielle Lee NP, Last Rate: 100 mL/hr at 01/29/20 1905    albuterol-ipratropium 2.5 mg-0.5 mg/3 mL nebulizer solution 3 mL, 3 mL, Nebulization, Q4H PRN, Rik Soto NP    albuterol-ipratropium 2.5 mg-0.5 mg/3 mL nebulizer solution 3 mL, 3 mL, Nebulization, Q6H, Walter Broussard MD, 3 mL at 01/29/20 1930    atorvastatin tablet 80 mg, 80 mg, Per OG tube, QHS, Arielle Lee NP    chlorhexidine 0.12 % solution 15 mL, 15 mL, Mouth/Throat, BID, Arielle Lee NP, 15 mL at 01/29/20 0900    dextrose 10% (D10W) Bolus, 12.5 g, Intravenous, PRN, Rik Soto NP    famotidine (PF) injection 20 mg, 20 mg, Intravenous, BID, Arielle Lee NP, 20 mg at 01/29/20 0849    fentaNYL injection 50 mcg, 50  mcg, Intravenous, Q1H PRN, Arielle Lee NP, 50 mcg at 01/28/20 2148    glucagon (human recombinant) injection 1 mg, 1 mg, Intramuscular, PRN, Rik Soto NP    insulin aspart U-100 pen 1-10 Units, 1-10 Units, Subcutaneous, Q6H PRN, Rik Soto NP    labetalol 20 mg/4 mL (5 mg/mL) IV syring, 10 mg, Intravenous, Q4H PRN, Rik Soto NP, 10 mg at 01/28/20 1407    levETIRAcetam in NaCl (iso-os) IVPB 1,000 mg, 1,000 mg, Intravenous, Q12H, Govind Willson NP, Last Rate: 200 mL/hr at 01/29/20 0848, 1,000 mg at 01/29/20 0848    magnesium sulfate 2g in water 50mL IVPB (premix), 2 g, Intravenous, PRN, Arielle Lee NP, 2 g at 01/29/20 0426    magnesium sulfate 2g in water 50mL IVPB (premix), 4 g, Intravenous, PRN, Arielle Lee NP    metoprolol tartrate (LOPRESSOR) tablet 25 mg, 25 mg, Per OG tube, BID, Arielle Lee NP, 25 mg at 01/29/20 0848    ondansetron injection 4 mg, 4 mg, Intravenous, Q8H PRN, Rik Soto NP, 4 mg at 01/28/20 0815    potassium chloride 10 mEq in 100 mL IVPB, 40 mEq, Intravenous, PRN **AND** potassium chloride 10 mEq in 100 mL IVPB, 40 mEq, Intravenous, PRN **AND** potassium chloride 10 mEq in 100 mL IVPB, 40 mEq, Intravenous, PRN, Arielle Lee NP    propofol (DIPRIVAN) 10 mg/mL infusion, 5 mcg/kg/min, Intravenous, Continuous, Arielle Lee NP, Last Rate: 23.2 mL/hr at 01/29/20 1905, 35 mcg/kg/min at 01/29/20 1905    senna-docusate 8.6-50 mg per tablet 1 tablet, 1 tablet, Per OG tube, BID, Tammy Calderon NP, 1 tablet at 01/29/20 1100    sodium chloride 0.9% flush 10 mL, 10 mL, Intravenous, PRN, Rik Soto NP    sodium phosphate 15 mmol in dextrose 5 % 250 mL IVPB, 15 mmol, Intravenous, PRN, Arielle Lee NP, Last Rate: 83.3 mL/hr at 01/29/20 1357, 15 mmol at 01/29/20 1357    sodium phosphate 20.01 mmol in dextrose 5 % 250 mL IVPB, 20.01 mmol, Intravenous, PRN, Arielle Lee NP    sodium phosphate 30 mmol in  "dextrose 5 % 250 mL IVPB, 30 mmol, Intravenous, PRN, Arielle Lee NP  ALLERGIES: Review of patient's allergies indicates:  No Known Allergies      EXAM      VITAL SIGNS:   BP (!) 124/58   Pulse 104   Temp 99.1 °F (37.3 °C) (Axillary)   Resp 18   Ht 5' 11" (1.803 m)   Wt 110.7 kg (244 lb)   SpO2 97%   BMI 34.03 kg/m²       PE:  General:  no acute distress, appears stated age    Neuro: alert and oriented x3  Psych: normal mood  Head: normocephalic, atraumatic.   Eyes: no scleral icterus  Mouth: moist mucous membranes  Cardiovascular: extremities warm and well perfused  Lungs: breathing comfortably, equal chest rise bilat  Skin: clean, dry, intact (any exceptions noted in below musculoskeletal exam)    Musculoskeletal:  His cervical spine demonstrated:  No midline neck bony pain.   No numbness/tingling in bilateral upper extremities  Full, painless ROM neck  No radiculopathy bilateral upper extremities     Bilateral upper extremity exam demonstrated pain in and about his shoulder joint and proximal humerus as well as significant edema and ecchymosis at these.  He had no bony pain or tenderness with range of motion or palpation of his mid humerus, elbow, forearm, wrist, hand.  He had 5/5 strength with wrist flexion, extension, finger flexion, extension, interossei.  He had ability to fire muscles of bilateral deltoid, biceps, triceps, but was limited due to pain in his shoulder created by these movements and resultant 1/5 strength in each muscle group.  Sensation was intact in axillary, radial, median, ulnar nerve distributions. Palpable radial pulse brisk cap refill bilateral upper extremities.      XRAYS:  X-ray and CT scan imaging of his bilateral shoulders demonstrated 4 part posterior proximal humerus fracture dislocations.  Both sides demonstrated proximal anatomical neck fractures, with the right side having less remaining bone on the articular surface. He also had bilateral acromion fractures.  (I " independently reviewed and interpreted the above imaging)    MEDICAL DECISION MAKING       60-year-old male, obese, smoker, COPD, AFib on chronic anticoagulation with seizure, subdural hemorrhage, L1 and L4 burst fracture    bilateral proximal humerus posterior fracture dislocations as well as associated acromion fractures.         I counseled the patient on his medical diagnosis and both non operative and operative treatment management options.  We felt that non operative treatment would result in sub optimal outcome with severe limitations and eventual resorption of his proximal humeri, limited pain, limited mobility, sub optimal function, and potentially neurovascular compromise or injury due to the severe displaced and dislocated nature of the fractures. We recommended operative intervention in the form of staged procedure, 1st treating the left shoulder which appeared to have better remaining bone stock for possible open reduction internal fixation. We would potentially have to convert to a hemiarthroplasty intraoperatively if we are unable to repair the fracture. By starting 1st with the left side, if this fracture was unable to be fixed, we then would proceed directly to a shoulder hemiarthroplasty on the contralateral side. We discussed with the patient and his family members the nature of the procedure and expected outcomes.     The risks, benefits, and alternatives to surgery were discussed with the patient and/or family.    Specific risks discussed included, but were not limited to:  Failure of fixation, damage to nearby neurovascular structures, pain, dislocation, periprosthetic fracture, need to convert to hemiarthroplasty, need to convert to later total shoulder arthroplasty or reverse total shoulder arthroplasty, rotator cuff tear weakness, failure of tuberosity healing, periprosthetic joint infection, need for multiple staged procedures, damage to nearby structures, including neurovascular structures  leading to loss of function or loss of limb, bleeding, pain, numbness, tingling, weakness, compartment syndrome, malunion/nonunion, hardware failure, hardware prominence, infection, need for multiple staged procedures, prolonged antibiotics, iatrogenic fracture, heterotopic ossification, arthritis, a variety of medical complications including but not limited to heart attack, stroke, deep venous thrombosis, pulmonary embolism, prolonged hospitalization, prolonged intubation, and death.   Patient and/or family expressed an understanding and desires to proceed with surgery.   All questions were answered.  No guarantees were implied or stated.  Informed consent was obtained.    Left shoulder open reduction internal fixation versus hemiarthroplasty today followed by staged procedure on the right shoulder when medically cleared      =====================  Chandler Chris MD  Orthopaedic Surgery

## 2020-01-30 NOTE — PLAN OF CARE
Per MD:  To OR today with Ortho. Remains intubated.  Not medically ready for discharge.        01/30/20 1501   Discharge Reassessment   Assessment Type Discharge Planning Reassessment   Provided patient/caregiver education on the expected discharge date and the discharge plan Yes   Do you have any problems affording any of your prescribed medications? No   Discharge Plan A Rehab   Discharge Plan B Home Health   DME Needed Upon Discharge  other (see comments)  (tbd)   Patient choice form signed by patient/caregiver N/A   Anticipated Discharge Disposition Rehab   Can the patient answer the patient profile reliably? No, cognitively impaired  (vent)   Describe the patient's ability to walk at the present time. Does not walk or unable to take any steps at all   How often would a person be available to care for the patient? Whenever needed   Number of comorbid conditions (as recorded on the chart) Four   Post-Acute Status   Post-Acute Authorization Placement   Post-Acute Placement Status Awaiting Internal Medical Clearance   Discharge Delays None known at this time       Gwen Franks RN, CCRN-K, [facility]  Neuro-Critical Care   [phone]

## 2020-01-30 NOTE — NURSING
Wheeled back pt from MRI for lumbar spine imaging; no acute events/changes noted; VS stable; RN will continue to monitor

## 2020-01-30 NOTE — ANESTHESIA PROCEDURE NOTES
Central Line    Diagnosis: Humerus fracture  Patient location during procedure: done in OR  Procedure start time: 1/30/2020 8:21 AM  Timeout: 1/30/2020 8:20 AM  Procedure end time: 1/30/2020 8:35 AM    Staffing  Authorizing Provider: Marin Mario MD  Performing Provider: Mairn Mario MD    Staffing  Anesthesiologist: Marin Mario MD  Anesthesiologist was present at the time of the procedure.  Preanesthetic Checklist  Completed: patient identified, site marked, surgical consent, pre-op evaluation, timeout performed, IV checked, risks and benefits discussed, monitors and equipment checked and anesthesia consent given  Indication   Indication: vascular access, med administration     Anesthesia   general anesthesia    Central Line   Skin Prep: skin prepped with ChloraPrep, skin prep agent completely dried prior to procedure  maximum sterile barriers used during central venous catheter insertion  hand hygiene performed prior to central venous catheter insertion  Location: left, internal jugular.   Catheter type: triple lumen  Catheter Size: 7 Fr  Ultrasound: vascular probe with ultrasound  Vessel Caliber: medium, patent  Vascular Doppler:  not done, compressibility normal  Needle advanced into vessel with real time Ultrasound guidance.   Manometry: Venous cannualation confirmed by visual estimation of blood vessel pressure using manometry.  Insertion Attempts: 1   Securement:line sutured, chlorhexidine patch, sterile dressing applied and blood return through all ports    Post-Procedure   Adverse Events:none    Guidewire

## 2020-01-30 NOTE — SUBJECTIVE & OBJECTIVE
Interval History:      Review of Systems   Unable to obtain a complete ROS due to level of consciousness.  Objective:     Vitals:  Temp: 99.1 °F (37.3 °C)  Pulse: 104  Rhythm: sinus tachycardia  BP: (!) 124/58  MAP (mmHg): 90  Resp: 18  SpO2: 97 %  Oxygen Concentration (%): 40  O2 Device (Oxygen Therapy): ventilator  Vent Mode: A/C  Set Rate: 18 bmp  Vt Set: 450 mL  PEEP/CPAP: 5 cmH20  Peak Airway Pressure: 24 cmH2O  Mean Airway Pressure: 9.4 cmH20  Plateau Pressure: 0 cmH20    Temp  Min: 98.1 °F (36.7 °C)  Max: 99.3 °F (37.4 °C)  Pulse  Min: 92  Max: 116  BP  Min: 104/56  Max: 140/63  MAP (mmHg)  Min: 76  Max: 90  Resp  Min: 5  Max: 30  SpO2  Min: 94 %  Max: 100 %  Oxygen Concentration (%)  Min: 40  Max: 50    01/28 0701 - 01/29 0700  In: 6498.8 [I.V.:5948.8]  Out: 2199 [Urine:1949]   Unmeasured Output  Urine Occurrence: 1  Pad Count: 2       Physical Exam    General: Patient intubated and sedated, was following commands off sedation  PERRLA, Corneal+, No facial asymmetry  Motor: Spontaenous movements of all extremity    Medications:  Continuous  sodium chloride 0.9% Last Rate: 100 mL/hr at 01/29/20 1905   propofol Last Rate: 35 mcg/kg/min (01/29/20 1905)   Scheduled  albuterol-ipratropium 3 mL Q6H   atorvastatin 80 mg QHS   chlorhexidine 15 mL BID   famotidine (PF) 20 mg BID   levetiracetam IVPB 1,000 mg Q12H   metoprolol tartrate 25 mg BID   senna-docusate 8.6-50 mg 1 tablet BID   PRN  albuterol-ipratropium 3 mL Q4H PRN   Dextrose 10% Bolus 12.5 g PRN   fentaNYL 50 mcg Q1H PRN   glucagon (human recombinant) 1 mg PRN   insulin aspart U-100 1-10 Units Q6H PRN   labetalol 10 mg Q4H PRN   magnesium sulfate IVPB 2 g PRN   magnesium sulfate IVPB 4 g PRN   ondansetron 4 mg Q8H PRN   potassium chloride in water 40 mEq PRN   And     potassium chloride in water 40 mEq PRN   And     potassium chloride in water 40 mEq PRN   sodium chloride 0.9% 10 mL PRN   sodium phosphate IVPB 15 mmol PRN   sodium phosphate IVPB 20.01 mmol  PRN   sodium phosphate IVPB 30 mmol PRN     Today I personally reviewed pertinent medications, lines/drains/airways, imaging, cardiology results, laboratory results, microbiology results, notably:    Diet  Diet NPO  Diet NPO  Diet NPO  Diet NPO

## 2020-01-30 NOTE — SUBJECTIVE & OBJECTIVE
Principal Problem:Subdural hematoma    Principal Orthopedic Problem: Bilateral prox humerus fractures     Interval History: Patient seen and examined at bedside.  SANTIAGO.  He remains intubated and sedated.  He is to go to the OR today for operative fixation of his right shoulder.  Orthopedics and neuro surgery have cleared his C-spine.  Therefore he does not have to wear a C-collar.  He is to remain in his LSO brace per Neurosurgery.    Review of patient's allergies indicates:  No Known Allergies    Current Facility-Administered Medications   Medication    0.9%  NaCl infusion    albuterol-ipratropium 2.5 mg-0.5 mg/3 mL nebulizer solution 3 mL    albuterol-ipratropium 2.5 mg-0.5 mg/3 mL nebulizer solution 3 mL    atorvastatin tablet 80 mg    chlorhexidine 0.12 % solution 15 mL    dextrose 10% (D10W) Bolus    famotidine (PF) injection 20 mg    fentaNYL injection 50 mcg    glucagon (human recombinant) injection 1 mg    insulin aspart U-100 pen 1-10 Units    labetalol 20 mg/4 mL (5 mg/mL) IV syring    levETIRAcetam in NaCl (iso-os) IVPB 1,000 mg    magnesium sulfate 2g in water 50mL IVPB (premix)    magnesium sulfate 2g in water 50mL IVPB (premix)    metoprolol tartrate (LOPRESSOR) tablet 25 mg    ondansetron injection 4 mg    potassium chloride 10 mEq in 100 mL IVPB    And    potassium chloride 10 mEq in 100 mL IVPB    And    potassium chloride 10 mEq in 100 mL IVPB    propofol (DIPRIVAN) 10 mg/mL infusion    senna-docusate 8.6-50 mg per tablet 1 tablet    sodium chloride 0.9% flush 10 mL    sodium phosphate 15 mmol in dextrose 5 % 250 mL IVPB    sodium phosphate 20.01 mmol in dextrose 5 % 250 mL IVPB    sodium phosphate 30 mmol in dextrose 5 % 250 mL IVPB     Objective:     Vital Signs (Most Recent):  Temp: 99.2 °F (37.3 °C) (01/30/20 0305)  Pulse: 101 (01/30/20 0514)  Resp: 18 (01/30/20 0514)  BP: (!) 143/65 (01/30/20 0305)  SpO2: 97 % (01/30/20 0514) Vital Signs (24h Range):  Temp:  [99.1 °F  "(37.3 °C)-99.3 °F (37.4 °C)] 99.2 °F (37.3 °C)  Pulse:  [] 101  Resp:  [8-23] 18  SpO2:  [95 %-99 %] 97 %  BP: (115-143)/(55-65) 143/65     Weight: 110.7 kg (244 lb)  Height: 5' 11" (180.3 cm)  Body mass index is 34.03 kg/m².      Intake/Output Summary (Last 24 hours) at 1/30/2020 0538  Last data filed at 1/30/2020 0322  Gross per 24 hour   Intake 4091.08 ml   Output 3205 ml   Net 886.08 ml       Ortho/SPM Exam        intubated and unable to get a good motor sensory exam however patient does respond to stimulation of the left arm    Significant Labs:   CBC:   Recent Labs   Lab 01/29/20  0155 01/30/20  0105   WBC 13.61*  13.61* 11.55   HGB 10.1*  10.1* 9.0*   HCT 32.5*  32.5* 28.1*   *  114* 123*     All pertinent labs within the past 24 hours have been reviewed.    Significant Imaging: I have reviewed and interpreted all pertinent imaging results/findings.  "

## 2020-01-30 NOTE — ASSESSMENT & PLAN NOTE
S/P Left shoulder Arthroplasty  Plan for right shoulder arthroplasty tomorrow per ortho  01/30/2020-s/p  right shoulder hemiarthroplasty

## 2020-01-30 NOTE — PLAN OF CARE
POC reviewed with Ben and ALVARO at 0500. Pt is intubated/sedated and cannot verbalized understanding; S.O. verbalized understanding. Questions and concerns addressed. No acute events overnight. MRI of the lumbar spine done. Chlorhexidine bath given; pre-op checklist initiated. PRN fentanyl given twice for agitation and pain. Propofol infusion continued to maintain adequate sedation. Pt progressing toward goals. Will continue to monitor. See flowsheets for full assessment and VS info

## 2020-01-30 NOTE — PROGRESS NOTES
[facility]  Neurocritical Care  Progress Note    Admit Date: 1/27/2020  Service Date: 01/30/2020  Length of Stay: 3    Subjective:     Chief Complaint: Subdural hematoma    History of Present Illness: Mr Melvin is a 60 yo male with EtOH abuse, tobacco abuse,  PAF  With long term anticoagulation (coumadin) who presents to [facility] for SDH and bilateral shoulder dislocation. He  presented to OSH via EMS for acute onset of  SOB and reported seizure activity. At approx 5 pm he was playing video poker, felt light headed, and was assisted  Ground by family. He had what the family felt was a seizure but appeared awake during it. He was disoriented and confused after. He then reported had SOB, chest pain, and BUE pain. Patient also reports new onset severe lower back pain. He returned to baseline mental status shortly after per family. CTH and chest at OSH revealed SDH and bilateral upper ext dislocations. He received Kcentra and Vit K at OSH for reversal.  He is being admitted to [facility] for a higher level of care.     Hospital Course: 1/27: Admit NCC, repeat CTH, SBP <160, consult NSGY and ortho, received Kcentra and vit K at OSH  1/28: Keppra increased to 1G BD, MRI C-spine negative for compression fracture, NSGY cleared for undergoing ortho procedure under C-collar, normal PT/INR, no acute intervention per NSGY will hold anticoagulation, F/U with MRI L-Spine, Needs epilepsy follow up, PRN Morphine for pain  1/29: intubated on propofol, S/P left shoulder arthroplasty, started on tube feeds, Right shoulder arthroplasty tomorrow, scheduled albuterol, MRI when able, Nicotine patch post MRI  01/30/2020- s/p pod#0 right shoulder hemiarthroplasty, d/c propofol and start precedex, add nicotine patch, plan to extubate tomorrow AM          Review of Systems   Unable to obtain a complete ROS due to pt intubated  Objective:     Vitals:  Temp: 98 °F (36.7 °C)  Pulse: 105  Rhythm: sinus tachycardia  BP: (!) 110/55  MAP  (mmHg): 73  Resp: 19  SpO2: 100 %  Oxygen Concentration (%): 40  O2 Device (Oxygen Therapy): ventilator  Vent Mode: A/C  Set Rate: 18 BPM  Vt Set: 450 mL  PEEP/CPAP: 5 cmH20  Peak Airway Pressure: 24 cmH2O  Mean Airway Pressure: 9.1 cmH20  Plateau Pressure: 0 cmH20    Temp  Min: 97.5 °F (36.4 °C)  Max: 99.2 °F (37.3 °C)  Pulse  Min: 87  Max: 109  BP  Min: 103/47  Max: 158/77  MAP (mmHg)  Min: 64  Max: 110  Resp  Min: 10  Max: 23  SpO2  Min: 95 %  Max: 100 %  Oxygen Concentration (%)  Min: 40  Max: 40    01/29 0701 - 01/30 0700  In: 4099.2 [I.V.:3069.2]  Out: 3510 [Urine:3510]   Unmeasured Output  Urine Occurrence: 1  Pad Count: 2       Physical Exam  GA:comfortable, no acute distress.   HEENT: No scleral icterus or JVD.   Pulmonary: Clear to auscultation A/P/L. No wheezing, crackles, or rhonchi.  Cardiac: RRR S1 & S2 w/o rubs/murmurs/gallops.   Abdominal: Bowel sounds present x 4. No appreciable hepatosplenomegaly.  Skin: No jaundice, rashes, or visible lesions.  Neuro:  --GCS: E4 VT1 M6  --Mental Status:  Opens eyes to pain, tracks and follows simple commands  --Pupils 2mm, PERRL.   --Corneal reflex, gag, cough intact.  -- DANG spont    Medications:  Continuous  sodium chloride 0.9% Last Rate: 100 mL/hr at 01/30/20 1702   dexmedetomidine (PRECEDEX) infusion Last Rate: 0.2 mcg/kg/hr (01/30/20 1735)   Scheduled  albuterol-ipratropium 3 mL Q6H   atorvastatin 80 mg QHS   chlorhexidine 15 mL BID   famotidine (PF) 20 mg BID   levetiracetam IVPB 1,000 mg Q12H   metoprolol tartrate 25 mg BID   nicotine 1 patch Daily   senna-docusate 8.6-50 mg 1 tablet BID   PRN  Dextrose 10% Bolus 12.5 g PRN   fentaNYL 50 mcg Q1H PRN   glucagon (human recombinant) 1 mg PRN   insulin aspart U-100 1-10 Units Q6H PRN   labetalol 10 mg Q4H PRN   magnesium sulfate IVPB 2 g PRN   magnesium sulfate IVPB 4 g PRN   ondansetron 4 mg Q8H PRN   potassium chloride in water 40 mEq PRN   And     potassium chloride in water 40 mEq PRN   And     potassium  chloride in water 40 mEq PRN   sodium chloride 0.9% 10 mL PRN   sodium phosphate IVPB 15 mmol PRN   sodium phosphate IVPB 20.01 mmol PRN   sodium phosphate IVPB 30 mmol PRN     Today I personally reviewed pertinent medications, lines/drains/airways, imaging, laboratory results,     Diet  Diet NPO      Assessment/Plan:     Neuro  * Subdural hematoma  - NSGY consulted  - Kcentra and Vit K for warfarin  - Repeat INR pending  - Hold anticoagulation at this time  - SBP <160   - Q 1 vitals  - Q1 neuro Checks  - TSH, Lipid, A1c, Echo, EKG  - PT/Ot eval and treat   - Repeat CTH stable  - TLSO when OOB/upright  - MRI C spine and MRI L spine including up to T11        Cardiac/Vascular  Hypertension, essential  - SBP <160  - Echo and EKG   - nicardipine gtt  - Labetalol prn     PAF (paroxysmal atrial fibrillation)  - continue home metoprolol   - currently rate controlled   - anticoagulation reversed and held in setting of SDH    Hyperlipidemia  - Lipid panel   - continue home atorvastatin     Hematology  Long term (current) use of anticoagulants  - see SDH     Endocrine  Diabetes mellitus due to underlying condition with diabetic autonomic neuropathy, without long-term current use of insulin  - A1c 5.9  - SSI with accu checks     GI  GERD with esophagitis  - continue home Protonix     Orthopedic   fracture dislocation of bilateral shoulders  S/P Left shoulder Arthroplasty  Plan for right shoulder arthroplasty tomorrow per ortho  01/30/2020-s/p  right shoulder hemiarthroplasty      Acute bilateral low back pain  - Lumbar vertebral fracture  - MR L-spine pending  - MR C spine shows no fractures with probable C5-C6 stenosis, most of the study affected by motion artifact  - NSGY okay to undergo ortho surgery on C-collar    Closed fracture of both humeri  - Orthopedic following    Fracture dislocation of joint of left shoulder girdle  - Ortho consulted  - appreciate recs  - S/P Left shoulder arthroplasty   - s/p Right shoulder  arthroplasty     Other  Shortness of breath  -duo nebs scheduled q6h    Current every day smoker  - nicotine patch   - On Duonebs          The patient is being Prophylaxed for:  Venous Thromboembolism with: Mechanical  Stress Ulcer with: H2B  Ventilator Pneumonia with: chlorhexidine  Activity Orders          Diet NPO: NPO starting at 01/30 0001    Commode at bedside starting at 01/27 2238        Full Code    Tammy Calderon NP  Neurocritical Care  [facility]    Critical care time > 30 min.

## 2020-01-30 NOTE — OP NOTE
OPERATIVE NOTE    DATE OF PROCEDURE:  01/28/2020    PREOPERATIVE DIAGNOSIS:   Left proximal humerus 4 part fracture dislocation, posterior, closed, displaced, initial encounter  Left acromion fracture, closed, displaced, initial encounter  Seizure    POSTOPERATIVE DIAGNOSIS:   Left proximal humerus 4 part fracture dislocation, posterior, closed, displaced, initial encounter  Left acromion fracture, closed, displaced, initial encounter  Seizure    PROCEDURE:   Left shoulder hemiarthroplasty for fracture  Left biceps tenodesis    SURGEON:   Chandler Chris MD    ASSISTANT:    Aram Cottrell MD    ANESTHESIA:   General    EBL:    300 mL    COMPLICATIONS:  Unsalvageable humeral head fracture with complete disruption of the articular surface rendering joint unsalvageable, necessitating conversion to hemiarthroplasty intraoperatively    IMPLANTS:   [place]  Shoulder arthroplasty fracture stem, size 11  Humeral head, 48 x 21  Simplex bone cement  Cement restrictor  #5 FiberWire suture  Vancomycin powder 1 g    SPECIMENS:   Humeral head sent for pathology    INDICATIONS FOR PROCEDURE:  60-year-old male, obese, AFib on anticoagulation, peripheral vascular disease, hypertension, COPD, current smoker, liver cirrhosis who sustained a witnessed seizure while playing video poker 01/27/2020.  He fell from his chair.  He was brought to emergency department at outside facility.  Injuries identified were bilateral proximal humerus fracture dislocations, bilateral acromial fractures, subdural hemorrhage, L1 and L4 burst fractures.     He was transferred to [facility] for further care. He was seen by the orthopedic team in the emergency department.  He was placed into a sling for his bilateral upper extremities.  Multi disciplinary care team coordinated workup of the above injuries.  He was admitted to the Neuro Critical Care unit for monitoring and serial head CTs.  His INR was reversed with vitamin K given his cerebral  hemorrhage.  INR 2.4 at time of admission.  1.2 after vitamin K.    At the time of my evaluation in the ICU the patient was alert, oriented to person and place and was able to converse with me and answer questions appropriately.  He was complaining of bilateral shoulder pain. He also had low back pain. He denied any pain in his lower extremities or his neck. His cervical spine demonstrated:  No midline neck bony pain.   No numbness/tingling in bilateral upper extremities  Full, painless ROM neck  No radiculopathy bilateral upper extremities  CT neg for fx/dislocation  His C-collar was cleared by me at bedside.    Bilateral upper extremity exam demonstrated pain in and about his shoulder joint and proximal humerus as well as significant edema and ecchymosis at these.  He had no bony pain or tenderness with range of motion or palpation of his mid humerus, elbow, forearm, wrist, hand.  He had 5/5 strength with wrist flexion, extension, finger flexion, extension, interossei.  He had ability to fire muscles of bilateral deltoid, biceps, triceps, but was limited due to pain in his shoulder created by these movements and resultant 1/5 strength in each muscle group.  Sensation was intact in axillary, radial, median, ulnar nerve distributions. Palpable radial pulse brisk cap refill bilateral upper extremities.    X-ray and CT scan imaging of his bilateral shoulders demonstrated 4 part posterior proximal humerus fracture dislocations.  Both sides demonstrated proximal anatomical neck fractures, with the right side having less remaining bone on the articular surface. He also had bilateral acromion fractures.    I counseled the patient on his medical diagnosis and both non operative and operative treatment management options.  We felt that non operative treatment would result in sub optimal outcome with severe limitations and eventual resorption of his proximal humeri, limited pain, limited mobility, sub optimal function, and  potentially neurovascular compromise or injury due to the severe displaced and dislocated nature of the fractures. We recommended operative intervention in the form of staged procedure, 1st treating the left shoulder which appeared to have better remaining bone stock for possible open reduction internal fixation. We would potentially have to convert to a hemiarthroplasty intraoperatively if we are unable to repair the fracture. By starting 1st with the left side, if this fracture was unable to be fixed, we then would proceed directly to a shoulder hemiarthroplasty on the contralateral side. We discussed with the patient and his family members the nature of the procedure and expected outcomes.    The risks, benefits, and alternatives to surgery were discussed with the patient and/or family.    Specific risks discussed included, but were not limited to:  Failure of fixation, damage to nearby neurovascular structures, pain, dislocation, periprosthetic fracture, need to convert to hemiarthroplasty, need to convert to later total shoulder arthroplasty or reverse total shoulder arthroplasty, rotator cuff tear weakness, failure of tuberosity healing, periprosthetic joint infection, need for multiple staged procedures, damage to nearby structures, including neurovascular structures leading to loss of function or loss of limb, bleeding, pain, numbness, tingling, weakness, compartment syndrome, malunion/nonunion, hardware failure, hardware prominence, infection, need for multiple staged procedures, prolonged antibiotics, iatrogenic fracture, heterotopic ossification, arthritis, a variety of medical complications including but not limited to heart attack, stroke, deep venous thrombosis, pulmonary embolism, prolonged hospitalization, prolonged intubation, and death.   Patient and/or family expressed an understanding and desires to proceed with surgery.   All questions were answered.  No guarantees were implied or stated.   Informed consent was obtained.    OPERATIVE PROCEDURE:  Patient was met in the intensive care unit with the correct site and side of surgery being the left shoulder were marked and verified.  Patient was brought down to the operative suite.  General anesthesia smoothly induced. Patient was transferred over operative table placed in the supine position with care to maintain spinal precautions during transferring.  His left upper extremity was placed on a radiolucent armboard.  A small bump was placed under his left scapula.  We obtained preoperative fluoroscopic imaging to ensure that we could obtain appropriate views.  Left upper extremity was then prepped and draped in normal sterile fashion. Patient received preoperative antibiotics of Ancef.  We performed time-out verifying the correct site and side of surgery being the left shoulder.    We performed a deltopectoral approach starting at the coracoid and extending distally along the anterior lateral aspect of the arm towards the lateral aspect of the biceps tendon.  Skin was incised with scalpel.  Hemostasis was achieved as needed with electrocautery.  Subcutaneous tissue was split.  We identified the deltopectoral interval.  The cephalic vein was retracted and protected laterally throughout the case. Subdeltoid bursa was mobilized.  Axillary nerve was palpated and protected throughout the case.  Deltoid retractor was placed as needed during the case.  Conjoint tendon was identified, protected and retracted medially throughout the case as needed. We identified the fracture.  There is multiple comminuted pieces of bone. There was a lesser tuberosity fracture intact to the subscap tendon displaced medially.  There is a large greater tuberosity piece laterally that was displaced.  The humeral head was dislocated posteriorly.  The lesser and greater tuberosity fracture pieces with their corresponding rotator cuff tendons were tagged with 0 Vicryl suture for control  throughout the case.  We tempted to work through the tuberosity split to retrieve the humeral head fracture piece which was dislocated posteriorly.  Utilizing blunt instruments we were unable to retrieve this fracture fragment easily.      We felt that the biceps tendon was prohibiting retrieval of the fracture piece. The biceps tendon was involved in the fracture did appear to be swollen damage in frayed.  We performed a biceps tenodesis by tagging the biceps tendon to the insertion of the pectoralis major tendon with a 0 Vicryl suture. Biceps tendon was sharply cut and excised. We were then able to retrieve the humeral head fragment from the posterior aspect of the shoulder.  It was a small piece that was 180° turned around from its proper orientation.  There were 0 soft tissue attachments remaining intact. The the humeral head fracture was brought back to the back table and we assessed the ability to reconstruct the humeral head. We felt that not enough remaining bone stock was available for successful open reduction internal fixation and if this was reconstructed would result in a high complication rate and risk of AVN. At this point we converted to a shoulder hemiarthroplasty.    We divided the rotator interval slightly to allow further displacement of the tuberosity pieces to allow access better access to our proximal humerus and glenoid to further remove any bony fragments.  Of further bony fragments of comminution were retrieved from the joint. The joint was thoroughly irrigated with saline. We then sequentially reamed up to the appropriate size Reamer.  We then placed a trial broach with 30° of retroversion.  A trial head component was then placed. The head was approximately 5.5 cm from the insertion of the pectoralis major tendon. Shoulder was reduced. We used fluoroscopic imaging to confirm appropriate placement of our trial and sizing of the head.  Shoulder appeared to be stable throughout range of  motion.    Trial components were removed. We placed our final appropriate sized humeral stem. Our initial plan was to place this fracture stem without using cement. This was malleted into place.  Fluoroscopic imaging indicated that the stem had subsided compared to our trial stem.  Stem was removed.  Decision was made to go forward with cement fixation of this fracture stem.  Cement restrictor was placed.  The canal was thoroughly irrigated with pulse lavage saline. Cement was mixed on the back table and inserted into the canal.  The prior final stem was cleaned of any bony debris and then inserted in 30° retroversion and appropriate alignment into the humeral canal.  Cement was removed. Glenoid was irrigated with saline to remove any debris. We then replaced the trial head and relocated the shoulder.  Fluoroscopic imaging confirmed appropriate stem placement and sizing.  We then placed the appropriate size final head which was impacted onto the Peña taper that was previously cleaned and dried. Shoulder was reduced.  It was found to be appropriate size both visually and on fluoroscopic imaging with appropriate range of motion and stability.    We then turned our attention to repair of the tuberosities.  5 FiberWire sutures were then placed through both the greater and lesser tuberosity pieces.  2 sutures were placed in each. The more superior sutures were tied to each other connecting the lesser and greater tuberosities producing a nice repair.  The lower sutures were tied through holes in the fracture stem further securing our tuberosity repair to the stem. Bone graft from the humeral head was placed into the space along the stem underneath the tuberosity fragments.  At this point we will reassess fluoroscopic imaging were satisfied with our hemiarthroplasty placement.    Wound was irrigated with saline. 1 g vancomycin powder was placed deep.  Deltopectoral interval closed with 0 Vicryl.  Subcutaneous tissue  closed with 2 O Vicryl. Skin closed with 3 Monocryl subcuticular stitch.  Dermabond/Prineo applied. Aquacel applied. Shoulder abduction sling applied.    Prior to final closure all counts were confirmed to be correct.    Patient tolerated the procedure well.  Due to his polytrauma and overall respiratory status the decision was made by the anesthesia team to keep the patient intubated and transferred back directly to the ICU for further medical management.    POSTOPERATIVE PLAN:  60-year-old male, obese, smoker, COPD, AFib on chronic anticoagulation with seizure, subdural hemorrhage, L1 and L4 burst fracture, and bilateral proximal humerus posterior fracture dislocations as well as associated acromion fractures.    01/28/2020 - left shoulder hemiarthroplasty    Plan for right shoulder hemiarthroplasty 01/30/2020, or when deemed medically stable per the multi disciplinary care team.    X-ray left shoulder postop    Antibiotics times 24 hr    Hold anticoagulation per neuro surgery, however will be okay to resume Coumadin or other anticoagulation and DVT prophylaxis when cleared by the consulting teams.  If patient is removed from therapeutic anticoagulation, would recommend aspirin 81 mg b.i.d. x6 weeks for DVT prophylaxis    Nonweightbearing left upper extremity times 3 months postop   shoulder sling x6 weeks, may remove for hygiene and physical therapy.    Okay for range of motion as tolerated hand wrist elbow now.  Okay for active assisted range of motion of shoulder and pendulums at 2 weeks postop.  No strengthening exercises until 3 months postop    Follow-up 2 weeks postop for wound check and x-rays    X-ray bilateral shoulders at subsequent postop visits    Follow-up postop 2 weeks, 6 weeks, 3 months, 6 months, 1 year    =====================  Chandler Chris MD  Orthopaedic Surgery

## 2020-01-30 NOTE — ASSESSMENT & PLAN NOTE
- Stopped daily drinking several years ago   - reports intermittent ETOH use   - watch for alcohol withdrawals

## 2020-01-30 NOTE — ASSESSMENT & PLAN NOTE
Ben Melvin is a 60 y.o. male status post left shoulder hemiarthroplasty on January 28, 2020 also has a right proximal humerus fracture dislocation which we will plan to treat in the OR today.    - Weight bearing status: NWB and NO ROM of L shoulder  - patient no longer has to wear cervical collar, LSO brace to be continued per Neurosurgery  - Pain control: Per APS  - DVT: please hold anticoagulation if possible until surgery     Patient is marked, booked, and consented for surgery today.

## 2020-01-30 NOTE — SUBJECTIVE & OBJECTIVE
Interval History: NAEON. To OR today with Ortho. Remains intubated, sedated on Prop gtt.     Medications:  Continuous Infusions:   sodium chloride 0.9% 100 mL/hr at 01/30/20 0702    propofol Stopped (01/30/20 0811)     Scheduled Meds:   albuterol-ipratropium  3 mL Nebulization Q6H    atorvastatin  80 mg Per OG tube QHS    chlorhexidine  15 mL Mouth/Throat BID    famotidine (PF)  20 mg Intravenous BID    levetiracetam IVPB  1,000 mg Intravenous Q12H    metoprolol tartrate  25 mg Per OG tube BID    senna-docusate 8.6-50 mg  1 tablet Per OG tube BID     PRN Meds:Dextrose 10% Bolus, fentaNYL, glucagon (human recombinant), insulin aspart U-100, labetalol, magnesium sulfate IVPB, magnesium sulfate IVPB, ondansetron, potassium chloride in water **AND** potassium chloride in water **AND** potassium chloride in water, sodium chloride 0.9%, sodium phosphate IVPB, sodium phosphate IVPB, sodium phosphate IVPB     Review of Systems  Objective:     Weight: 110.7 kg (244 lb)  Body mass index is 34.03 kg/m².  Vital Signs (Most Recent):  Temp: 99 °F (37.2 °C) (01/30/20 0702)  Pulse: 102 (01/30/20 0802)  Resp: (!) 23 (01/30/20 0802)  BP: (!) 149/70 (01/30/20 0802)  SpO2: 100 % (01/30/20 0802) Vital Signs (24h Range):  Temp:  [99 °F (37.2 °C)-99.3 °F (37.4 °C)] 99 °F (37.2 °C)  Pulse:  [] 102  Resp:  [12-23] 23  SpO2:  [95 %-100 %] 100 %  BP: (115-158)/(55-77) 149/70     Date 01/30/20 0700 - 01/31/20 0659   Shift 0054-8938 0377-3208 2713-2424 24 Hour Total   INTAKE   I.V.(mL/kg) 1022(9.2)   1022(9.2)   NG/GT 0   0   Shift Total(mL/kg) 1022(9.2)   1022(9.2)   OUTPUT   Urine(mL/kg/hr) 570   570   Shift Total(mL/kg) 570(5.2)   570(5.2)   Weight (kg) 110.7 110.7 110.7 110.7              Vent Mode: A/C  Oxygen Concentration (%):  [40] 40  Resp Rate Total:  [18 br/min-49 br/min] 18 br/min  Vt Set:  [450 mL] 450 mL  PEEP/CPAP:  [5 cmH20] 5 cmH20  Mean Airway Pressure:  [1.2 cmH20-9.9 cmH20] 9.9 cmH20         NG/OG Tube  "01/28/20 2200 Center mouth (Active)   Placement Check placement verified by x-ray;placement verified by distal tube length measurement 1/30/2020  7:02 AM   Tolerance no signs/symptoms of discomfort 1/30/2020  7:02 AM   Securement secured to commercial device 1/30/2020  3:05 AM   Clamp Status/Tolerance clamped 1/30/2020  7:02 AM   Suction Setting/Drainage Method suction at the bedside 1/30/2020  3:05 AM   Insertion Site Appearance no redness, warmth, tenderness, skin breakdown, drainage 1/30/2020  3:05 AM   Drainage None 1/30/2020  3:05 AM   Flush/Irrigation flushed w/;water;no resistance met 1/30/2020  3:05 AM   Feeding Method continuous 1/30/2020  3:05 AM   Feeding Action feeding continued 1/30/2020  3:05 AM   Current Rate (mL/hr) 0 mL/hr 1/30/2020  7:02 AM   Goal Rate (mL/hr) 0 mL/hr 1/30/2020  7:02 AM   Intake (mL) 60 mL 1/29/2020  9:05 PM   Formula Name Impact peptide 1/30/2020  3:05 AM   Intake (mL) - Formula Tube Feeding 0 1/30/2020  7:02 AM   Residual Amount (ml) 25 ml 1/29/2020  3:01 PM            Urethral Catheter 01/28/20 1630 Non-latex;Straight-tip 16 Fr. (Active)   Site Assessment Clean;Intact 1/30/2020  7:02 AM   Collection Container Urimeter 1/30/2020  7:02 AM   Securement Method secured to top of thigh w/ adhesive device 1/30/2020  7:02 AM   Catheter Care Performed yes 1/30/2020  3:05 AM   Reason for Continuing Urinary Catheterization Post operative 1/30/2020  7:02 AM   CAUTI Prevention Bundle StatLock in place w 1" slack 1/30/2020  7:02 AM   Output (mL) 150 mL 1/30/2020  7:02 AM       Neurosurgery Physical Exam   Intubated, sedated  PERRL  LUE in cast  RUE - not following commands, limited given need for OR  WD BLE  C collar in place       Significant Labs:  Recent Labs   Lab 01/29/20  0155 01/29/20  0158 01/29/20  1015 01/30/20  0105   *  --  139* 141*     --  139 141   K 4.4  --  4.3 4.1     --  105 107   CO2 26  --  26 27   BUN 15  --  15 15   CREATININE 1.4  --  1.2 1.0 "   CALCIUM 6.4*  --  6.8* 6.9*   MG  --  1.5* 2.0 2.0     Recent Labs   Lab 01/29/20  0155 01/30/20  0105   WBC 13.61*  13.61* 11.55   HGB 10.1*  10.1* 9.0*   HCT 32.5*  32.5* 28.1*   *  114* 123*       Significant Diagnostics:  X-ray Chest 1 View    Result Date: 1/30/2020  Stable radiographic appearance. Electronically signed by: Ty Sanderson Date:    01/30/2020 Time:    04:35

## 2020-01-30 NOTE — SUBJECTIVE & OBJECTIVE
Review of Systems   Unable to obtain a complete ROS due to pt intubated  Objective:     Vitals:  Temp: 98 °F (36.7 °C)  Pulse: 105  Rhythm: sinus tachycardia  BP: (!) 110/55  MAP (mmHg): 73  Resp: 19  SpO2: 100 %  Oxygen Concentration (%): 40  O2 Device (Oxygen Therapy): ventilator  Vent Mode: A/C  Set Rate: 18 BPM  Vt Set: 450 mL  PEEP/CPAP: 5 cmH20  Peak Airway Pressure: 24 cmH2O  Mean Airway Pressure: 9.1 cmH20  Plateau Pressure: 0 cmH20    Temp  Min: 97.5 °F (36.4 °C)  Max: 99.2 °F (37.3 °C)  Pulse  Min: 87  Max: 109  BP  Min: 103/47  Max: 158/77  MAP (mmHg)  Min: 64  Max: 110  Resp  Min: 10  Max: 23  SpO2  Min: 95 %  Max: 100 %  Oxygen Concentration (%)  Min: 40  Max: 40    01/29 0701 - 01/30 0700  In: 4099.2 [I.V.:3069.2]  Out: 3510 [Urine:3510]   Unmeasured Output  Urine Occurrence: 1  Pad Count: 2       Physical Exam  GA:comfortable, no acute distress.   HEENT: No scleral icterus or JVD.   Pulmonary: Clear to auscultation A/P/L. No wheezing, crackles, or rhonchi.  Cardiac: RRR S1 & S2 w/o rubs/murmurs/gallops.   Abdominal: Bowel sounds present x 4. No appreciable hepatosplenomegaly.  Skin: No jaundice, rashes, or visible lesions.  Neuro:  --GCS: E4 VT1 M6  --Mental Status:  Opens eyes to pain, tracks and follows simple commands  --Pupils 2mm, PERRL.   --Corneal reflex, gag, cough intact.  -- DANG spont    Medications:  Continuous  sodium chloride 0.9% Last Rate: 100 mL/hr at 01/30/20 1702   dexmedetomidine (PRECEDEX) infusion Last Rate: 0.2 mcg/kg/hr (01/30/20 1735)   Scheduled  albuterol-ipratropium 3 mL Q6H   atorvastatin 80 mg QHS   chlorhexidine 15 mL BID   famotidine (PF) 20 mg BID   levetiracetam IVPB 1,000 mg Q12H   metoprolol tartrate 25 mg BID   nicotine 1 patch Daily   senna-docusate 8.6-50 mg 1 tablet BID   PRN  Dextrose 10% Bolus 12.5 g PRN   fentaNYL 50 mcg Q1H PRN   glucagon (human recombinant) 1 mg PRN   insulin aspart U-100 1-10 Units Q6H PRN   labetalol 10 mg Q4H PRN   magnesium sulfate  IVPB 2 g PRN   magnesium sulfate IVPB 4 g PRN   ondansetron 4 mg Q8H PRN   potassium chloride in water 40 mEq PRN   And     potassium chloride in water 40 mEq PRN   And     potassium chloride in water 40 mEq PRN   sodium chloride 0.9% 10 mL PRN   sodium phosphate IVPB 15 mmol PRN   sodium phosphate IVPB 20.01 mmol PRN   sodium phosphate IVPB 30 mmol PRN     Today I personally reviewed pertinent medications, lines/drains/airways, imaging, laboratory results,     Diet  Diet NPO

## 2020-01-30 NOTE — ASSESSMENT & PLAN NOTE
- Ortho consulted  - appreciate recs  - S/P Left shoulder arthroplasty   - Right shoulder arthroplasty tommosisi

## 2020-01-31 DIAGNOSIS — S42.91XD CLOSED FRACTURE DISLOCATION OF JOINT OF RIGHT SHOULDER GIRDLE WITH ROUTINE HEALING, SUBSEQUENT ENCOUNTER: Primary | ICD-10-CM

## 2020-01-31 LAB
ALBUMIN SERPL BCP-MCNC: 1.8 G/DL (ref 3.5–5.2)
ALP SERPL-CCNC: 64 U/L (ref 55–135)
ALT SERPL W/O P-5'-P-CCNC: 10 U/L (ref 10–44)
ANION GAP SERPL CALC-SCNC: 8 MMOL/L (ref 8–16)
AST SERPL-CCNC: 34 U/L (ref 10–40)
BASOPHILS # BLD AUTO: 0.02 K/UL (ref 0–0.2)
BASOPHILS NFR BLD: 0.2 % (ref 0–1.9)
BILIRUB SERPL-MCNC: 0.5 MG/DL (ref 0.1–1)
BUN SERPL-MCNC: 16 MG/DL (ref 6–20)
CALCIUM SERPL-MCNC: 7.1 MG/DL (ref 8.7–10.5)
CHLORIDE SERPL-SCNC: 108 MMOL/L (ref 95–110)
CO2 SERPL-SCNC: 27 MMOL/L (ref 23–29)
CREAT SERPL-MCNC: 0.9 MG/DL (ref 0.5–1.4)
DIFFERENTIAL METHOD: ABNORMAL
EOSINOPHIL # BLD AUTO: 0 K/UL (ref 0–0.5)
EOSINOPHIL NFR BLD: 0 % (ref 0–8)
ERYTHROCYTE [DISTWIDTH] IN BLOOD BY AUTOMATED COUNT: 17.2 % (ref 11.5–14.5)
EST. GFR  (AFRICAN AMERICAN): >60 ML/MIN/1.73 M^2
EST. GFR  (NON AFRICAN AMERICAN): >60 ML/MIN/1.73 M^2
GLUCOSE SERPL-MCNC: 113 MG/DL (ref 70–110)
HCT VFR BLD AUTO: 24.3 % (ref 40–54)
HGB BLD-MCNC: 7.3 G/DL (ref 14–18)
IMM GRANULOCYTES # BLD AUTO: 0.11 K/UL (ref 0–0.04)
IMM GRANULOCYTES NFR BLD AUTO: 1 % (ref 0–0.5)
LYMPHOCYTES # BLD AUTO: 1.2 K/UL (ref 1–4.8)
LYMPHOCYTES NFR BLD: 10.9 % (ref 18–48)
MAGNESIUM SERPL-MCNC: 1.9 MG/DL (ref 1.6–2.6)
MCH RBC QN AUTO: 27.2 PG (ref 27–31)
MCHC RBC AUTO-ENTMCNC: 30 G/DL (ref 32–36)
MCV RBC AUTO: 91 FL (ref 82–98)
MONOCYTES # BLD AUTO: 0.6 K/UL (ref 0.3–1)
MONOCYTES NFR BLD: 5.5 % (ref 4–15)
NEUTROPHILS # BLD AUTO: 9.1 K/UL (ref 1.8–7.7)
NEUTROPHILS NFR BLD: 82.4 % (ref 38–73)
NRBC BLD-RTO: 0 /100 WBC
PHOSPHATE SERPL-MCNC: 2.8 MG/DL (ref 2.7–4.5)
PLATELET # BLD AUTO: 129 K/UL (ref 150–350)
PMV BLD AUTO: 9.7 FL (ref 9.2–12.9)
POCT GLUCOSE: 106 MG/DL (ref 70–110)
POCT GLUCOSE: 94 MG/DL (ref 70–110)
POTASSIUM SERPL-SCNC: 4.4 MMOL/L (ref 3.5–5.1)
PROT SERPL-MCNC: 4.9 G/DL (ref 6–8.4)
RBC # BLD AUTO: 2.68 M/UL (ref 4.6–6.2)
SODIUM SERPL-SCNC: 143 MMOL/L (ref 136–145)
WBC # BLD AUTO: 11 K/UL (ref 3.9–12.7)

## 2020-01-31 PROCEDURE — 63600175 PHARM REV CODE 636 W HCPCS: Performed by: NURSE PRACTITIONER

## 2020-01-31 PROCEDURE — 83735 ASSAY OF MAGNESIUM: CPT

## 2020-01-31 PROCEDURE — 27000221 HC OXYGEN, UP TO 24 HOURS

## 2020-01-31 PROCEDURE — S0028 INJECTION, FAMOTIDINE, 20 MG: HCPCS | Performed by: NURSE PRACTITIONER

## 2020-01-31 PROCEDURE — 94640 AIRWAY INHALATION TREATMENT: CPT

## 2020-01-31 PROCEDURE — 25000003 PHARM REV CODE 250: Performed by: NURSE PRACTITIONER

## 2020-01-31 PROCEDURE — S4991 NICOTINE PATCH NONLEGEND: HCPCS | Performed by: NURSE PRACTITIONER

## 2020-01-31 PROCEDURE — 84100 ASSAY OF PHOSPHORUS: CPT

## 2020-01-31 PROCEDURE — 99900035 HC TECH TIME PER 15 MIN (STAT)

## 2020-01-31 PROCEDURE — 99291 CRITICAL CARE FIRST HOUR: CPT | Mod: ,,, | Performed by: NURSE PRACTITIONER

## 2020-01-31 PROCEDURE — 85025 COMPLETE CBC W/AUTO DIFF WBC: CPT

## 2020-01-31 PROCEDURE — 94150 VITAL CAPACITY TEST: CPT

## 2020-01-31 PROCEDURE — 80053 COMPREHEN METABOLIC PANEL: CPT

## 2020-01-31 PROCEDURE — 94010 BREATHING CAPACITY TEST: CPT

## 2020-01-31 PROCEDURE — 97530 THERAPEUTIC ACTIVITIES: CPT

## 2020-01-31 PROCEDURE — 97162 PT EVAL MOD COMPLEX 30 MIN: CPT

## 2020-01-31 PROCEDURE — 94003 VENT MGMT INPAT SUBQ DAY: CPT

## 2020-01-31 PROCEDURE — 99900026 HC AIRWAY MAINTENANCE (STAT)

## 2020-01-31 PROCEDURE — 94761 N-INVAS EAR/PLS OXIMETRY MLT: CPT

## 2020-01-31 PROCEDURE — 63600175 PHARM REV CODE 636 W HCPCS: Performed by: NURSE ANESTHETIST, CERTIFIED REGISTERED

## 2020-01-31 PROCEDURE — 99291 PR CRITICAL CARE, E/M 30-74 MINUTES: ICD-10-PCS | Mod: ,,, | Performed by: NURSE PRACTITIONER

## 2020-01-31 PROCEDURE — 25000242 PHARM REV CODE 250 ALT 637 W/ HCPCS: Performed by: PSYCHIATRY & NEUROLOGY

## 2020-01-31 PROCEDURE — 20000000 HC ICU ROOM

## 2020-01-31 PROCEDURE — 25000003 PHARM REV CODE 250: Performed by: STUDENT IN AN ORGANIZED HEALTH CARE EDUCATION/TRAINING PROGRAM

## 2020-01-31 RX ORDER — AMOXICILLIN 250 MG
1 CAPSULE ORAL 2 TIMES DAILY
Status: DISCONTINUED | OUTPATIENT
Start: 2020-01-31 | End: 2020-02-05

## 2020-01-31 RX ORDER — METOPROLOL TARTRATE 25 MG/1
25 TABLET, FILM COATED ORAL 2 TIMES DAILY
Status: DISCONTINUED | OUTPATIENT
Start: 2020-01-31 | End: 2020-02-07 | Stop reason: HOSPADM

## 2020-01-31 RX ORDER — AMOXICILLIN 250 MG
1 CAPSULE ORAL 2 TIMES DAILY
Status: DISCONTINUED | OUTPATIENT
Start: 2020-02-01 | End: 2020-01-31

## 2020-01-31 RX ORDER — METOPROLOL TARTRATE 25 MG/1
25 TABLET, FILM COATED ORAL 2 TIMES DAILY
Status: DISCONTINUED | OUTPATIENT
Start: 2020-02-01 | End: 2020-01-31

## 2020-01-31 RX ORDER — ATORVASTATIN CALCIUM 20 MG/1
80 TABLET, FILM COATED ORAL NIGHTLY
Status: DISCONTINUED | OUTPATIENT
Start: 2020-02-01 | End: 2020-01-31

## 2020-01-31 RX ORDER — ATORVASTATIN CALCIUM 20 MG/1
80 TABLET, FILM COATED ORAL NIGHTLY
Status: DISCONTINUED | OUTPATIENT
Start: 2020-01-31 | End: 2020-02-03

## 2020-01-31 RX ADMIN — FENTANYL CITRATE 50 MCG: 50 INJECTION INTRAMUSCULAR; INTRAVENOUS at 06:01

## 2020-01-31 RX ADMIN — IPRATROPIUM BROMIDE AND ALBUTEROL SULFATE 3 ML: .5; 3 SOLUTION RESPIRATORY (INHALATION) at 01:01

## 2020-01-31 RX ADMIN — SENNOSIDES AND DOCUSATE SODIUM 1 TABLET: 8.6; 5 TABLET ORAL at 08:01

## 2020-01-31 RX ADMIN — FENTANYL CITRATE 50 MCG: 50 INJECTION INTRAMUSCULAR; INTRAVENOUS at 12:01

## 2020-01-31 RX ADMIN — SODIUM CHLORIDE: 0.9 INJECTION, SOLUTION INTRAVENOUS at 05:01

## 2020-01-31 RX ADMIN — CHLORHEXIDINE GLUCONATE 0.12% ORAL RINSE 15 ML: 1.2 LIQUID ORAL at 08:01

## 2020-01-31 RX ADMIN — ATORVASTATIN CALCIUM 80 MG: 20 TABLET, FILM COATED ORAL at 10:01

## 2020-01-31 RX ADMIN — FENTANYL CITRATE 50 MCG: 50 INJECTION INTRAMUSCULAR; INTRAVENOUS at 09:01

## 2020-01-31 RX ADMIN — LEVETIRACETAM 1000 MG: 10 INJECTION INTRAVENOUS at 09:01

## 2020-01-31 RX ADMIN — METOPROLOL TARTRATE 25 MG: 25 TABLET ORAL at 08:01

## 2020-01-31 RX ADMIN — LEVETIRACETAM 1000 MG: 10 INJECTION INTRAVENOUS at 08:01

## 2020-01-31 RX ADMIN — FAMOTIDINE 20 MG: 10 INJECTION, SOLUTION INTRAVENOUS at 08:01

## 2020-01-31 RX ADMIN — IPRATROPIUM BROMIDE AND ALBUTEROL SULFATE 3 ML: .5; 3 SOLUTION RESPIRATORY (INHALATION) at 08:01

## 2020-01-31 RX ADMIN — IPRATROPIUM BROMIDE AND ALBUTEROL SULFATE 3 ML: .5; 3 SOLUTION RESPIRATORY (INHALATION) at 07:01

## 2020-01-31 RX ADMIN — METOPROLOL TARTRATE 25 MG: 25 TABLET ORAL at 10:01

## 2020-01-31 RX ADMIN — IPRATROPIUM BROMIDE AND ALBUTEROL SULFATE 3 ML: .5; 3 SOLUTION RESPIRATORY (INHALATION) at 12:01

## 2020-01-31 RX ADMIN — NICOTINE 1 PATCH: 21 PATCH, EXTENDED RELEASE TRANSDERMAL at 08:01

## 2020-01-31 RX ADMIN — SENNOSIDES AND DOCUSATE SODIUM 1 TABLET: 8.6; 5 TABLET ORAL at 10:01

## 2020-01-31 RX ADMIN — FENTANYL CITRATE 50 MCG: 50 INJECTION INTRAMUSCULAR; INTRAVENOUS at 05:01

## 2020-01-31 NOTE — PROGRESS NOTES
[facility]  Neurosurgery  Progress Note    Subjective:     History of Present Illness: 59 M with hx of paroxysmal afib on coumadin presents for eval of bilateral shoulder dislocations and left convexity SDH.  Pt had first time seizure and slumped over in chair.  There was no LOC.  He was given vit k and kcentra at OSH for hx of coumadin use.  He endorses low back pain but no neck pain.  No sensation loss in his extremities.    Post-Op Info:  Procedure(s) (LRB):  HEMIARTHROPLASTY, SHOULDER- RIGHT- MEKA- SUPINE-  TRIOS (Right)   1 Day Post-Op     Interval History: NAEON. OR w/ ortho yesterday.     Medications:  Continuous Infusions:   sodium chloride 0.9% 100 mL/hr at 01/31/20 1102    propofol Stopped (01/31/20 0825)     Scheduled Meds:   albuterol-ipratropium  3 mL Nebulization Q6H    atorvastatin  80 mg Per OG tube QHS    chlorhexidine  15 mL Mouth/Throat BID    famotidine (PF)  20 mg Intravenous BID    levetiracetam IVPB  1,000 mg Intravenous Q12H    metoprolol tartrate  25 mg Per OG tube BID    nicotine  1 patch Transdermal Daily    senna-docusate 8.6-50 mg  1 tablet Per OG tube BID     PRN Meds:Dextrose 10% Bolus, fentaNYL, glucagon (human recombinant), insulin aspart U-100, labetalol, magnesium sulfate IVPB, magnesium sulfate IVPB, ondansetron, potassium chloride in water **AND** potassium chloride in water **AND** potassium chloride in water, sodium chloride 0.9%, sodium phosphate IVPB, sodium phosphate IVPB, sodium phosphate IVPB     Review of Systems    Objective:     Weight: 110.7 kg (244 lb)  Body mass index is 34.03 kg/m².  Vital Signs (Most Recent):  Temp: 99.5 °F (37.5 °C) (01/31/20 0702)  Pulse: 106 (01/31/20 1102)  Resp: 12 (01/31/20 1102)  BP: (!) 162/64 (01/31/20 1102)  SpO2: 100 % (01/31/20 1102) Vital Signs (24h Range):  Temp:  [97.5 °F (36.4 °C)-99.5 °F (37.5 °C)] 99.5 °F (37.5 °C)  Pulse:  [] 106  Resp:  [1-23] 12  SpO2:  [98 %-100 %] 100 %  BP:  ()/(35-95) 162/64     Date 01/31/20 0700 - 02/01/20 0659   Shift 3839-3259 1805-5586 9136-5405 24 Hour Total   INTAKE   I.V.(mL/kg) 532.7(4.8)   532.7(4.8)   NG/GT 50   50   IV Piggyback 100   100   Shift Total(mL/kg) 682.7(6.2)   682.7(6.2)   OUTPUT   Urine(mL/kg/hr) 1100   1100   Shift Total(mL/kg) 1100(9.9)   1100(9.9)   Weight (kg) 110.7 110.7 110.7 110.7              Vent Mode: A/C  Oxygen Concentration (%):  [40] 40  Resp Rate Total:  [18 br/min-42 br/min] 42 br/min  Vt Set:  [450 mL] 450 mL  PEEP/CPAP:  [5 cmH20] 5 cmH20  Mean Airway Pressure:  [9.1 nfZ36-93 cmH20] 10 cmH20         NG/OG Tube 01/28/20 2200 Center mouth (Active)   Placement Check placement verified by x-ray;placement verified by distal tube length measurement 1/30/2020  7:02 AM   Tolerance no signs/symptoms of discomfort 1/30/2020  7:02 AM   Securement secured to commercial device 1/30/2020  3:05 AM   Clamp Status/Tolerance clamped 1/30/2020  7:02 AM   Suction Setting/Drainage Method suction at the bedside 1/30/2020  3:05 AM   Insertion Site Appearance no redness, warmth, tenderness, skin breakdown, drainage 1/30/2020  3:05 AM   Drainage None 1/30/2020  3:05 AM   Flush/Irrigation flushed w/;water;no resistance met 1/30/2020  3:05 AM   Feeding Method continuous 1/30/2020  3:05 AM   Feeding Action feeding continued 1/30/2020  3:05 AM   Current Rate (mL/hr) 0 mL/hr 1/30/2020  7:02 AM   Goal Rate (mL/hr) 0 mL/hr 1/30/2020  7:02 AM   Intake (mL) 60 mL 1/29/2020  9:05 PM   Formula Name Impact peptide 1/30/2020  3:05 AM   Intake (mL) - Formula Tube Feeding 0 1/30/2020  7:02 AM   Residual Amount (ml) 25 ml 1/29/2020  3:01 PM            Urethral Catheter 01/28/20 1630 Non-latex;Straight-tip 16 Fr. (Active)   Site Assessment Clean;Intact 1/30/2020  7:02 AM   Collection Container Urimeter 1/30/2020  7:02 AM   Securement Method secured to top of thigh w/ adhesive device 1/30/2020  7:02 AM   Catheter Care Performed yes 1/30/2020  3:05 AM   Reason for  "Continuing Urinary Catheterization Post operative 1/30/2020  7:02 AM   CAUTI Prevention Bundle StatLock in place w 1" slack 1/30/2020  7:02 AM   Output (mL) 150 mL 1/30/2020  7:02 AM       Neurosurgery Physical Exam     Intubated, sedated  PERRL  BUE in cast  WD BLE  C collar in place, TLSO on.      Significant Labs:  Recent Labs   Lab 01/30/20 0105 01/31/20 0105   * 113*    143   K 4.1 4.4    108   CO2 27 27   BUN 15 16   CREATININE 1.0 0.9   CALCIUM 6.9* 7.1*   MG 2.0 1.9     Recent Labs   Lab 01/30/20 0105 01/31/20 0105   WBC 11.55 11.00   HGB 9.0* 7.3*   HCT 28.1* 24.3*   * 129*       Significant Diagnostics:  X-ray Chest 1 View    Result Date: 1/30/2020  Stable radiographic appearance. Electronically signed by: Ty Sanderson Date:    01/30/2020 Time:    04:35      Assessment/Plan:     * Subdural hematoma  59 M with hx of paroxysmal afib on coumadin presents for eval of bilateral shoulder dislocations, left convexity SDH.    -Admit to neuro ICU  -q 1 hr neuro checks  -Repeat CT head stable  -Hold coumadin, goal INR of less than 1.4  -Ortho consult for bilateral shoulder dislocation - POD#2/1 L/R sided procedures  -C collar at all times  - TLSO when OOB/upright  - MRI L spine complete. Likely will need RadTx to L spine given cancer hx.   - C spine cleared by Ortho. C collar for comfort            Abbe Keane MD  Neurosurgery  [facility]  "

## 2020-01-31 NOTE — NURSING
Weaned propofol down, started pt on precedex.  At 1900 pt BP dropped, MAPs in 50's.  WILLY Munoz notified. Gave orders for 1L NS bolus and to switch pt back to propofol.  Bedside handoff given to MATTHEW Hurtado.  Latest BP reading 129/47.

## 2020-01-31 NOTE — ASSESSMENT & PLAN NOTE
S/P Left shoulder Arthroplasty  Plan for right shoulder arthroplasty tomorrow per ortho  01/31/2020-s/p POD#2/1 L/R sided procedures  TLSO when OOB/upright  C spine cleared by Ortho. C collar for comfort

## 2020-01-31 NOTE — PLAN OF CARE
POC reviewed with pt and family at 1400. Pt unreliably and intermittently demonstrates understanding; girlfriend, Eboni, at bedside verbalizes care plan.  Questions and concerns addressed. No acute events today. Pt progressing toward goals. Will continue to monitor. See flowsheets for full assessment and VS info.       -pt extubated successfully, remains O2 sats of %  -go catheter removed  -pending GALLO   -pt sat on side of bed with PT  -pain adequately controlled with PRN medication, used sparingly

## 2020-01-31 NOTE — PT/OT/SLP EVAL
"Physical Therapy Evaluation & treatment    Patient Name:  Ben Melvin   MRN:  [id number]    Recommendations:     Discharge Recommendations:  rehabilitation facility   Discharge Equipment Recommendations: (TBD)   Barriers to discharge: Decreased caregiver support- requires extensive assistance for mobility at this time.    Assessment:     Ben Melvin is a 60 y.o. male admitted with a medical diagnosis of Subdural hematoma.  He presents with the following impairments/functional limitations:  weakness, impaired endurance, impaired self care skills, impaired functional mobilty, gait instability, impaired cognition, impaired balance, decreased safety awareness, pain, decreased ROM, orthopedic precautions, edema. Pt was independent, mowing people's lawns prior to admission.  At this time, pt requires Mod A x 2 people for both bed mobility and sit<>stand transfers. Demonstrates weakness in his core and lower extremities via forward flexed posture while standing and impaired endurance by standing for 30 seconds or less each trial. Was unable to take any steps. Will benefit from continued PT services to improve his mobility, allowing pt to move without use of his upper extremities for weight-bearing.     Rehab Prognosis: Fair; patient would benefit from acute skilled PT services to address these deficits and reach maximum level of function.    Recent Surgery: Procedure(s) (LRB):  HEMIARTHROPLASTY, SHOULDER- RIGHT- MEKA- SUPINE-  TRIOS (Right) 1 Day Post-Op   Pertinent diagnosis: Subdural hematoma    Plan:     During this hospitalization, patient to be seen 4 x/week to address the identified rehab impairments via gait training, therapeutic exercises, therapeutic activities, neuromuscular re-education and progress toward the following goals:    · Plan of Care Expires:  03/01/20    Subjective     Chief Complaint: Pain. Pt often calling for "Karsten!" Pt goes by the name Karsten regularly. Was calling himself "Nacho" " which is his son's name.   Patient/Family Comments/goals: To stand.  Pain/Comfort:  · Pain Rating 1: (Pain upon movement- did not rate)  · Location - Side 1: Bilateral  · Location - Orientation 1: generalized  · Location 1: shoulder  · Pain Addressed 1: Reposition, Distraction  · Pain Rating Post-Intervention 1: 0/10    Patients cultural, spiritual, Taoism conflicts given the current situation: no    Living Environment:  Pt lives with wife in 1 [address], [address], R [place] (working on getting ramp built). 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 Pt and wife are disabled. Son and brother live nearby. Sleeps in a water bed. Wife is considering having pt sleep in recliner upon return home. Tub-shower with tub bench at home.  Prior to admission, patients level of function was (I).  Equipment used at home: Tub-bench; DME owned (not currently used): none.  Upon discharge, patient will have assistance from wife, son, and brother.    Objective:     Communicated with nurse prior to session.  Patient found up in chair with central line, peripheral IV, go catheter  upon PT entry to room.  Assistance received from nurse for transfers and bed mobility.     General Precautions: Standard, fall, aspiration, seizure(spinal precautions)   Orthopedic Precautions:RUE non weight bearing, LUE non weight bearing  (Per Dr. Chris: Nonweightbearing bilateral upper extremity times 3 months postop   shoulder sling x6 weeks, may remove for hygiene and physical therapy.    Okay for range of motion as tolerated hand wrist elbow now.  Okay for active assisted range of motion of shoulder and pendulums at 2 weeks postop.  No strengthening exercises until 3 months postop)       Braces:   B shoulder abduction slings (new one being ordered for LUE)    Exams:  · Cognitive Exam:  Patient is oriented to Person  · RLE ROM: WNL  · RLE Strength: WNL  · LLE ROM: WNL  · LLE Strength: WNL    Functional Mobility:  · Bed Mobility:   Supine to Sit: moderate assistance and of two persons  · Sit to Supine: maximal assistance and of two persons  · Transfers:  Sit to Stand:  moderate assistance and of two persons with no AD  · Gait: Unable, though attempted  · Balance: Stood twice at edge of bed for thirty seconds or less each trial with Min A x 2, forward flexed posture, head down. Unable to correct      Therapeutic Activities and Exercises:  · PT approached Dr. Cottrell to order another L shoulder abduction brace, as current brace allows arm to fall upon sitting upright with weight of arm.  · Educated pt on proper technique for standing and no weight-bearing through either upper extremity.  · White board updated with (A) level.  · Educated pt and wife on frequency of therapies, therapy recommendation for DC, safety of mobility while on the unit.    AM-PAC 6 CLICK MOBILITY  Total Score:9     Patient left HOB elevated with call button in reach and wife and nurse present.    GOALS:   Multidisciplinary Problems     Physical Therapy Goals        Problem: Physical Therapy Goal    Goal Priority Disciplines Outcome Goal Variances Interventions   Physical Therapy Goal     PT, PT/OT      Description:  Goals to be met by: 2/7/20    Patient will increase functional independence with mobility by performing (while maintaining NWB of both upper extremities):    1. Supine to sit with Moderate Assistance. Not met  2. Sit to supine with Moderate Assistance Not met  3. Sit to stand transfer with Moderate Assistance Not met  4. Bed to chair transfer with Moderate Assistance. Not met  5. Gait  x 25 feet with Moderate Assistance. Not met  6. Stand for 2 minutes with Minimal Assistance statically, upright posture. Not met  7. Lower extremity exercise program x 20 reps per handout, with independence Not met                      History:     Past Medical History:   Diagnosis Date    Adenoma of right adrenal gland 11/09/2017    Alcohol abuse     ASCVD (arteriosclerotic  cardiovascular disease) 10/2008    [place]    BPH (benign prostatic hyperplasia)     Cardiomyopathy     Chronic anxiety     Cirrhosis of liver     COPD (chronic obstructive pulmonary disease)     Current every day smoker     2-3 ppd    Elevated LFTs 08/2001    GERD with esophagitis 11/09/2017    History of colon polyps     History of epididymitis 2016    Juan Antonio    Hyperlipidemia     Hypertension, essential     Long term (current) use of anticoagulants     Lumbar disc disease with radiculopathy 05/2015    Major depression, recurrent, chronic     PAF (paroxysmal atrial fibrillation)     Peripheral autonomic neuropathy due to DM     Proteinuria     PVD (peripheral vascular disease) 05/29/2009    Venous insufficiency of both lower extremities     Vitamin D deficiency        Past Surgical History:   Procedure Laterality Date    biopsy back  05/20/2019    benign Martinez    biopsy right ankle Right 05/20/2019    Martinez, benign    COLONOSCOPY N/A 6/13/2019    Procedure: COLONOSCOPY;  Surgeon: Delmer Kerr MD;  Location: [facility];  Service: Endoscopy;  Laterality: N/A;    COLONOSCOPY W/ POLYPECTOMY      EPIDURAL STEROID INJECTION INTO LUMBAR SPINE  06/2015    LASHAE Harvey    ESOPHAGOGASTRODUODENOSCOPY N/A 6/13/2019    Procedure: ESOPHAGOGASTRODUODENOSCOPY (EGD);  Surgeon: Delmer Kerr MD;  Location: [facility];  Service: Endoscopy;  Laterality: N/A;    INTESTINAL MALROTATION REPAIR Right 1961    2 weeks old, Martinez    PARTIAL ARTHROPLASTY OF SHOULDER Left 1/28/2020    Procedure: HEMIARTHROPLASTY, SHOULDER;  Surgeon: Chandler Chris MD;  Location: [facility] OR [address] [place] [address];  Service: Orthopedics;  Laterality: Left;    PARTIAL ARTHROPLASTY OF SHOULDER Right 1/30/2020    Procedure: HEMIARTHROPLASTY, SHOULDER- RIGHT- MEKA- SUPINE-  TRIOS;  Surgeon: Chandler Chris MD;  Location: [facility] OR [address] [place] [address];  Service: Orthopedics;  Laterality: Right;    PERCUTANEOUS TRANSLUMINAL ANGIOPLASTY  (PTA) OF PERIPHERAL VESSEL Right 12/2014    NIKOLAS Childs       Time Tracking:     PT Received On: 01/31/20  PT Start Time: 1445     PT Stop Time: 1520  PT Total Time (min): 35 min     Billable Minutes: Evaluation 5 and Therapeutic Activity 30      Liliane Cazares, PT,DPT  01/31/2020

## 2020-01-31 NOTE — OP NOTE
OPERATIVE NOTE     DATE OF PROCEDURE:  01/30/2020     PREOPERATIVE DIAGNOSIS:           Right proximal humerus 4 part fracture dislocation, posterior, closed, displaced, initial encounter  Right acromion fracture, closed, displaced, initial encounter  Seizure     POSTOPERATIVE DIAGNOSIS:         Right proximal humerus 4 part fracture dislocation, posterior, closed, displaced, initial encounter  Right acromion fracture, closed, displaced, initial encounter  Seizure     PROCEDURE:              Right shoulder hemiarthroplasty for fracture  Right biceps tenodesis     SURGEON:       Chandler Chris MD     ASSISTANT:                 Aram Cottrell MD     ANESTHESIA:              General     EBL:                  200 mL     COMPLICATIONS:  None    IMPLANTS:       Jairon  Shoulder arthroplasty fracture stem, size 10  Humeral head, 48 x 24  Simplex bone cement  Cement restrictor  #5 FiberWire suture  Vancomycin powder 1 g     SPECIMENS:    Humeral head sent for pathology     INDICATIONS FOR PROCEDURE:  60-year-old male, obese, AFib on anticoagulation, peripheral vascular disease, hypertension, COPD, current smoker, liver cirrhosis who sustained a witnessed seizure while playing video poker 01/27/2020.  He fell from his chair.  He was brought to emergency department at outside facility.  Injuries identified were bilateral proximal humerus fracture dislocations, bilateral acromial fractures, subdural hemorrhage, L1 and L4 burst fractures.      He was transferred to [facility] for further care. He was seen by the orthopedic team in the emergency department.  He was placed into a sling for his bilateral upper extremities.  Multi disciplinary care team coordinated workup of the above injuries.  He was admitted to the Neuro Critical Care unit for monitoring and serial head CTs.  His INR was reversed with vitamin K given his cerebral hemorrhage.  INR 2.4 at time of admission.  1.2 after vitamin K.     At the time of my  evaluation in the ICU the patient was alert, oriented to person and place and was able to converse with me and answer questions appropriately.  He was complaining of bilateral shoulder pain. He also had low back pain. He denied any pain in his lower extremities or his neck. His cervical spine demonstrated:  No midline neck bony pain.   No numbness/tingling in bilateral upper extremities  Full, painless ROM neck  No radiculopathy bilateral upper extremities  CT neg for fx/dislocation  His C-collar was cleared by me at bedside.     Bilateral upper extremity exam demonstrated pain in and about his shoulder joint and proximal humerus as well as significant edema and ecchymosis at these.  He had no bony pain or tenderness with range of motion or palpation of his mid humerus, elbow, forearm, wrist, hand.  He had 5/5 strength with wrist flexion, extension, finger flexion, extension, interossei.  He had ability to fire muscles of bilateral deltoid, biceps, triceps, but was limited due to pain in his shoulder created by these movements and resultant 1/5 strength in each muscle group.  Sensation was intact in axillary, radial, median, ulnar nerve distributions. Palpable radial pulse brisk cap refill bilateral upper extremities.     X-ray and CT scan imaging of his bilateral shoulders demonstrated 4 part posterior proximal humerus fracture dislocations.  Both sides demonstrated proximal anatomical neck fractures, with the right side having less remaining bone on the articular surface. He also had bilateral acromion fractures.     I counseled the patient on his medical diagnosis and both non operative and operative treatment management options.  We felt that non operative treatment would result in sub optimal outcome with severe limitations and eventual resorption of his proximal humeri, limited pain, limited mobility, sub optimal function, and potentially neurovascular compromise or injury due to the severe displaced and  dislocated nature of the fractures.     We recommended operative intervention in the form of staged procedure. Left shoulder hemiarthroplasty was completed 01/28/2020.  Today we are planning right shoulder hemiarthroplasty.    We discussed with the patient and his family members the nature of the procedure and expected outcomes.     The risks, benefits, and alternatives to surgery were discussed with the patient and/or family.    Specific risks discussed included, but were not limited to:  Failure of fixation, damage to nearby neurovascular structures, pain, dislocation, periprosthetic fracture,  need to convert to later total shoulder arthroplasty or reverse total shoulder arthroplasty, rotator cuff tear weakness, failure of tuberosity healing, periprosthetic joint infection, need for multiple staged procedures, damage to nearby structures, including neurovascular structures leading to loss of function or loss of limb, bleeding, pain, numbness, tingling, weakness, compartment syndrome, malunion/nonunion, hardware failure, hardware prominence, infection, need for multiple staged procedures, prolonged antibiotics, iatrogenic fracture, heterotopic ossification, arthritis, a variety of medical complications including but not limited to heart attack, stroke, deep venous thrombosis, pulmonary embolism, prolonged hospitalization, prolonged intubation, and death.   Patient and/or family expressed an understanding and desires to proceed with surgery.   All questions were answered.  No guarantees were implied or stated.  Informed consent was obtained.    Prior to proceeding with surgery we discussed with the multiple disciplinary team, ICU, neuro surgery, anesthesia to ensure the patient was medically stable to proceed with the procedure.     OPERATIVE PROCEDURE:  Patient was met in the intensive care unit with the correct site and side of surgery being the right shoulder were marked and verified.  Patient was brought down  to the operative suite.  General anesthesia smoothly induced. Patient was transferred over operative table placed in the supine position with care to maintain spinal precautions during transferring.  His right upper extremity was placed on a radiolucent armboard.  A small bump was placed under his right scapula.  We obtained preoperative fluoroscopic imaging to ensure that we could obtain appropriate views.  Right upper extremity was then prepped and draped in normal sterile fashion. Patient received preoperative antibiotics of Ancef.  We performed time-out verifying the correct site and side of surgery being the right shoulder.     We performed a deltopectoral approach starting at the coracoid and extending distally along the anterior lateral aspect of the arm towards the lateral aspect of the biceps tendon.  Skin was incised with scalpel.  Hemostasis was achieved as needed with electrocautery.  Subcutaneous tissue was split.  We identified the deltopectoral interval.  The cephalic vein was retracted and protected laterally throughout the case. Subdeltoid bursa was mobilized.  Axillary nerve was palpated and protected throughout the case.  Deltoid retractor was placed as needed during the case.  Conjoint tendon was identified, protected and retracted medially throughout the case as needed. We identified the fracture.  There were multiple comminuted pieces of bone. There was a lesser tuberosity fracture intact to the subscap tendon displaced medially.  There is a large greater tuberosity piece laterally that was displaced.  The humeral head was dislocated posteriorly.  The lesser and greater tuberosity fracture pieces with their corresponding rotator cuff tendons were tagged with 0 Vicryl suture for control throughout the case.      The biceps tendon was involved in the fracture did appear to be swollen, damaged, and frayed.  We performed a biceps tenodesis by tagging the biceps tendon to the insertion of the  pectoralis major tendon with a 0 Vicryl suture. Biceps tendon was sharply cut and excised.     We opened up the rotator interval to allow better exposure.  We were then able to retrieve the humeral head fragment from the posterior aspect of the shoulder.  It was a small piece that was 180° turned around from its proper orientation.  There were 0 soft tissue attachments remaining intact. Cancellous bone from the humeral head was used for bone graft later in the case. Further bony fragments of comminution were retrieved from the joint. The joint was thoroughly irrigated with saline. We then sequentially reamed up to the appropriate size Reamer.  We then placed a trial broach with 30° of retroversion.  A trial head component was then placed. The head was approximately 5.5 cm from the insertion of the pectoralis major tendon. Shoulder was reduced. We used fluoroscopic imaging to confirm appropriate placement of our trial and sizing of the head.  Shoulder appeared to be stable throughout range of motion.     Trial components were removed. Cement restrictor was placed.  The canal was thoroughly irrigated with pulse lavage saline. Cement was mixed on the back table and inserted into the canal.  The prior final stem was inserted in 30° retroversion and appropriate alignment into the humeral canal.  Cement was removed. Glenoid was irrigated with saline to remove any debris. We then replaced the trial head and relocated the shoulder.  Fluoroscopic imaging confirmed appropriate stem placement and sizing.  We then placed the appropriate size final head which was impacted onto the Peña taper that was previously cleaned and dried. Shoulder was reduced.  It was found to be appropriate size both visually and on fluoroscopic imaging with appropriate range of motion and stability.     We then turned our attention to repair of the tuberosities.  5 FiberWire sutures were then placed through both the greater and lesser tuberosity  pieces.  2 sutures were placed in each. 1 strand of suture from each the lesser and greater tuberosities were passed through a hole in the humeral stem. Bone graft from the humeral head was placed deep to the tuberosities around the humeral stem. The more superior sutures were tied to each other connecting the lesser and greater tuberosities producing a nice repair.  The lower sutures were tied through holes in the fracture stem further securing our tuberosity repair to the stem. Rotator interval split was closed with a 2 FiberWire and 0 Vicryl.  At this point we reassessed fluoroscopic imaging were satisfied with our hemiarthroplasty placement.     Wound was irrigated with saline. 1 g vancomycin powder was placed deep.  Deltopectoral interval closed with 0 Vicryl.  Subcutaneous tissue closed with 2 O Vicryl. Skin closed with 3 Monocryl subcuticular stitch.  Dermabond/Prineo applied. Aquacel applied. Shoulder abduction sling applied.     Prior to final closure all counts were confirmed to be correct.     Patient tolerated the procedure well.  Due to his polytrauma and overall respiratory status the decision was made by the anesthesia team to keep the patient intubated and transferred back directly to the ICU for further medical management.     POSTOPERATIVE PLAN:  60-year-old male, obese, smoker, COPD, AFib on chronic anticoagulation with seizure, subdural hemorrhage, L1 and L4 burst fracture, and bilateral proximal humerus posterior fracture dislocations as well as associated acromion fractures.     01/28/2020 - left shoulder hemiarthroplasty, biceps tenodesis    01/30/2020 - right shoulder hemiarthroplasty, biceps tenodesis    If there is a planned spine surgery were the patient will be prone, request that orthopedics be available to assist in positioning to avoid damage to his bilateral shoulder repairs.     X-ray right shoulder postop     Antibiotics times 24 hr     Hold anticoagulation per neuro surgery,  however will be okay to resume Coumadin or other anticoagulation and DVT prophylaxis when cleared by the consulting teams.  If patient is removed from therapeutic anticoagulation, would recommend aspirin 81 mg b.i.d. x6 weeks for DVT prophylaxis     Nonweightbearing bilateral upper extremity times 3 months postop   shoulder sling x6 weeks, may remove for hygiene and physical therapy.    Okay for range of motion as tolerated hand wrist elbow now.  Okay for active assisted range of motion of shoulder and pendulums at 2 weeks postop.  No strengthening exercises until 3 months postop     Follow-up 2 weeks postop for wound check and x-rays     X-ray bilateral shoulders at subsequent postop visits     Follow-up postop 2 weeks, 6 weeks, 3 months, 6 months, 1 year     =====================  Chandler Chris MD  Orthopaedic Surgery

## 2020-01-31 NOTE — ANESTHESIA POSTPROCEDURE EVALUATION
Anesthesia Post Evaluation    Patient: Ben Melvin    Procedure(s) Performed: Procedure(s) (LRB):  HEMIARTHROPLASTY, SHOULDER- RIGHT- MEKA- SUPINE-  TRIOS (Right)    Final Anesthesia Type: general    Patient location during evaluation: PACU  Patient participation: Yes- Able to Participate  Level of consciousness: awake and alert  Post-procedure vital signs: reviewed and stable  Pain management: adequate  Airway patency: patent    PONV status at discharge: No PONV  Anesthetic complications: no      Cardiovascular status: blood pressure returned to baseline  Respiratory status: unassisted  Hydration status: euvolemic  Follow-up not needed.          Vitals Value Taken Time   /77 1/31/2020  5:31 PM   Temp 37.1 °C (98.7 °F) 1/31/2020  3:02 PM   Pulse 113 1/31/2020  5:34 PM   Resp 26 1/31/2020  5:34 PM   SpO2 97 % 1/31/2020  5:34 PM   Vitals shown include unvalidated device data.      No case tracking events are documented in the log.      Pain/Maria Del Carmen Score: Pain Rating Prior to Med Admin: 8 (1/31/2020 12:59 PM)  Pain Rating Post Med Admin: 0 (1/31/2020  1:32 PM)

## 2020-01-31 NOTE — ASSESSMENT & PLAN NOTE
59 M with hx of paroxysmal afib on coumadin presents for eval of bilateral shoulder dislocations, left convexity SDH.    -Admit to neuro ICU  -q 1 hr neuro checks  -Repeat CT head stable  -Hold coumadin, goal INR of less than 1.4  -Ortho consult for bilateral shoulder dislocation - POD#2/1 L/R sided procedures  -C collar at all times  - TLSO when OOB/upright  - MRI L spine complete. Likely will need RadTx to L spine given cancer hx.   - C spine cleared by Ortho. C collar for comfort

## 2020-01-31 NOTE — SUBJECTIVE & OBJECTIVE
Interval History: NAEON. OR w/ ortho yesterday.     Medications:  Continuous Infusions:   sodium chloride 0.9% 100 mL/hr at 01/31/20 1102    propofol Stopped (01/31/20 0825)     Scheduled Meds:   albuterol-ipratropium  3 mL Nebulization Q6H    atorvastatin  80 mg Per OG tube QHS    chlorhexidine  15 mL Mouth/Throat BID    famotidine (PF)  20 mg Intravenous BID    levetiracetam IVPB  1,000 mg Intravenous Q12H    metoprolol tartrate  25 mg Per OG tube BID    nicotine  1 patch Transdermal Daily    senna-docusate 8.6-50 mg  1 tablet Per OG tube BID     PRN Meds:Dextrose 10% Bolus, fentaNYL, glucagon (human recombinant), insulin aspart U-100, labetalol, magnesium sulfate IVPB, magnesium sulfate IVPB, ondansetron, potassium chloride in water **AND** potassium chloride in water **AND** potassium chloride in water, sodium chloride 0.9%, sodium phosphate IVPB, sodium phosphate IVPB, sodium phosphate IVPB     Review of Systems    Objective:     Weight: 110.7 kg (244 lb)  Body mass index is 34.03 kg/m².  Vital Signs (Most Recent):  Temp: 99.5 °F (37.5 °C) (01/31/20 0702)  Pulse: 106 (01/31/20 1102)  Resp: 12 (01/31/20 1102)  BP: (!) 162/64 (01/31/20 1102)  SpO2: 100 % (01/31/20 1102) Vital Signs (24h Range):  Temp:  [97.5 °F (36.4 °C)-99.5 °F (37.5 °C)] 99.5 °F (37.5 °C)  Pulse:  [] 106  Resp:  [1-23] 12  SpO2:  [98 %-100 %] 100 %  BP: ()/(35-95) 162/64     Date 01/31/20 0700 - 02/01/20 0659   Shift 2513-7114 5863-6212 5671-2057 24 Hour Total   INTAKE   I.V.(mL/kg) 532.7(4.8)   532.7(4.8)   NG/GT 50   50   IV Piggyback 100   100   Shift Total(mL/kg) 682.7(6.2)   682.7(6.2)   OUTPUT   Urine(mL/kg/hr) 1100   1100   Shift Total(mL/kg) 1100(9.9)   1100(9.9)   Weight (kg) 110.7 110.7 110.7 110.7              Vent Mode: A/C  Oxygen Concentration (%):  [40] 40  Resp Rate Total:  [18 br/min-42 br/min] 42 br/min  Vt Set:  [450 mL] 450 mL  PEEP/CPAP:  [5 cmH20] 5 cmH20  Mean Airway Pressure:  [9.1 luK41-16 cmH20]  "10 cmH20         NG/OG Tube 01/28/20 2200 Center mouth (Active)   Placement Check placement verified by x-ray;placement verified by distal tube length measurement 1/30/2020  7:02 AM   Tolerance no signs/symptoms of discomfort 1/30/2020  7:02 AM   Securement secured to commercial device 1/30/2020  3:05 AM   Clamp Status/Tolerance clamped 1/30/2020  7:02 AM   Suction Setting/Drainage Method suction at the bedside 1/30/2020  3:05 AM   Insertion Site Appearance no redness, warmth, tenderness, skin breakdown, drainage 1/30/2020  3:05 AM   Drainage None 1/30/2020  3:05 AM   Flush/Irrigation flushed w/;water;no resistance met 1/30/2020  3:05 AM   Feeding Method continuous 1/30/2020  3:05 AM   Feeding Action feeding continued 1/30/2020  3:05 AM   Current Rate (mL/hr) 0 mL/hr 1/30/2020  7:02 AM   Goal Rate (mL/hr) 0 mL/hr 1/30/2020  7:02 AM   Intake (mL) 60 mL 1/29/2020  9:05 PM   Formula Name Impact peptide 1/30/2020  3:05 AM   Intake (mL) - Formula Tube Feeding 0 1/30/2020  7:02 AM   Residual Amount (ml) 25 ml 1/29/2020  3:01 PM            Urethral Catheter 01/28/20 1630 Non-latex;Straight-tip 16 Fr. (Active)   Site Assessment Clean;Intact 1/30/2020  7:02 AM   Collection Container Urimeter 1/30/2020  7:02 AM   Securement Method secured to top of thigh w/ adhesive device 1/30/2020  7:02 AM   Catheter Care Performed yes 1/30/2020  3:05 AM   Reason for Continuing Urinary Catheterization Post operative 1/30/2020  7:02 AM   CAUTI Prevention Bundle StatLock in place w 1" slack 1/30/2020  7:02 AM   Output (mL) 150 mL 1/30/2020  7:02 AM       Neurosurgery Physical Exam     Intubated, sedated  PERRL  BUE in cast  WD BLE  C collar in place, TLSO on.      Significant Labs:  Recent Labs   Lab 01/30/20  0105 01/31/20  0105   * 113*    143   K 4.1 4.4    108   CO2 27 27   BUN 15 16   CREATININE 1.0 0.9   CALCIUM 6.9* 7.1*   MG 2.0 1.9     Recent Labs   Lab 01/30/20  0105 01/31/20  0105   WBC 11.55 11.00   HGB 9.0* " 7.3*   HCT 28.1* 24.3*   * 129*       Significant Diagnostics:  X-ray Chest 1 View    Result Date: 1/30/2020  Stable radiographic appearance. Electronically signed by: Ty Sanderson Date:    01/30/2020 Time:    04:35

## 2020-01-31 NOTE — ASSESSMENT & PLAN NOTE
- NSGY consulted  - Kcentra and Vit K for warfarin  - Repeat INR pending  - Hold anticoagulation at this time  - SBP <160   - Q 1 vitals  - Q1 neuro Checks  - TSH, Lipid, A1c, Echo, EKG  - PT/Ot eval and treat   - Repeat CTH stable  - TLSO when OOB/upright  - MRI C spine and MRI L spine including up to T11  01/31/2020  Plan to extubate today

## 2020-01-31 NOTE — PROGRESS NOTES
[facility]  Neurocritical Care  Progress Note    Admit Date: 1/27/2020  Service Date: 01/31/2020  Length of Stay: 4    Subjective:     Chief Complaint: Subdural hematoma    History of Present Illness: Mr Melvin is a 60 yo male with EtOH abuse, tobacco abuse,  PAF  With long term anticoagulation (coumadin) who presents to [facility] for SDH and bilateral shoulder dislocation. He  presented to OSH via EMS for acute onset of  SOB and reported seizure activity. At approx 5 pm he was playing video poker, felt light headed, and was assisted  Ground by family. He had what the family felt was a seizure but appeared awake during it. He was disoriented and confused after. He then reported had SOB, chest pain, and BUE pain. Patient also reports new onset severe lower back pain. He returned to baseline mental status shortly after per family. CTH and chest at OSH revealed SDH and bilateral upper ext dislocations. He received Kcentra and Vit K at OSH for reversal.  He is being admitted to [facility] for a higher level of care.     Hospital Course: 1/27: Admit NCC, repeat CTH, SBP <160, consult NSGY and ortho, received Kcentra and vit K at OSH  1/28: Keppra increased to 1G BD, MRI C-spine negative for compression fracture, NSGY cleared for undergoing ortho procedure under C-collar, normal PT/INR, no acute intervention per NSGY will hold anticoagulation, F/U with MRI L-Spine, Needs epilepsy follow up, PRN Morphine for pain  1/29: intubated on propofol, S/P left shoulder arthroplasty, started on tube feeds, Right shoulder arthroplasty tomorrow, scheduled albuterol, MRI when able, Nicotine patch post MRI  01/30/2020- s/p pod#0 right shoulder hemiarthroplasty, d/c propofol and start precedex, add nicotine patch, plan to extubate tomorrow AM  01/31/2020-plan to extubate today          Review of Systems  Unable to obtain a complete ROS due to-pt intubated  Objective:     Vitals:  Temp: 99.5 °F (37.5 °C)  Pulse: 110  Rhythm: sinus  tachycardia  BP: (!) 162/64  MAP (mmHg): 92  Resp: 16  SpO2: 98 %  Oxygen Concentration (%): 40  O2 Device (Oxygen Therapy): nasal cannula  Vent Mode: A/C  Set Rate: 18 BPM  Vt Set: 450 mL  PEEP/CPAP: 5 cmH20  Peak Airway Pressure: 20 cmH2O  Mean Airway Pressure: 10 cmH20  Plateau Pressure: 17 cmH20    Temp  Min: 97.5 °F (36.4 °C)  Max: 99.5 °F (37.5 °C)  Pulse  Min: 84  Max: 110  BP  Min: 75/35  Max: 205/95  MAP (mmHg)  Min: 56  Max: 131  Resp  Min: 1  Max: 23  SpO2  Min: 97 %  Max: 100 %  Oxygen Concentration (%)  Min: 40  Max: 40    01/30 0701 - 01/31 0700  In: 5811.5 [I.V.:4551.5]  Out: 2600 [Urine:2550]   Unmeasured Output  Urine Occurrence: 1  Pad Count: 2       Physical Exam  GA: Alert, comfortable, no acute distress.   HEENT: No scleral icterus or JVD.   Pulmonary: Clear to auscultation A/P/L. No wheezing, crackles, or rhonchi.  Cardiac: RRR S1 & S2 w/o rubs/murmurs/gallops.   Abdominal: Bowel sounds present x 4. No appreciable hepatosplenomegaly.  Skin: No jaundice, rashes, or visible lesions.  Psych: normal affect  Neuro:  --GCS: E4 Vt1 M5  --Mental Status:  Awake, alert, follows commands, nods appropriately  --CN II-XII grossly intact.   --Pupils 3mm, PERRL.   --Corneal reflex, gag, cough intact.  --DANG spont    Medications:  Continuous  sodium chloride 0.9% Last Rate: 100 mL/hr at 01/31/20 1102   Scheduled  albuterol-ipratropium 3 mL Q6H   atorvastatin 80 mg QHS   levetiracetam IVPB 1,000 mg Q12H   metoprolol tartrate 25 mg BID   nicotine 1 patch Daily   senna-docusate 8.6-50 mg 1 tablet BID   PRN  Dextrose 10% Bolus 12.5 g PRN   fentaNYL 50 mcg Q1H PRN   glucagon (human recombinant) 1 mg PRN   insulin aspart U-100 1-10 Units Q6H PRN   labetalol 10 mg Q4H PRN   magnesium sulfate IVPB 2 g PRN   magnesium sulfate IVPB 4 g PRN   ondansetron 4 mg Q8H PRN   potassium chloride in water 40 mEq PRN   And     potassium chloride in water 40 mEq PRN   And     potassium chloride in water 40 mEq PRN   sodium chloride  0.9% 10 mL PRN   sodium phosphate IVPB 15 mmol PRN   sodium phosphate IVPB 20.01 mmol PRN   sodium phosphate IVPB 30 mmol PRN     Today I personally reviewed pertinent medications, lines/drains/airways, imaging, laboratory results,     Diet  Diet NPO      Assessment/Plan:     Neuro  * Subdural hematoma  - NSGY consulted  - Kcentra and Vit K for warfarin  - Repeat INR pending  - Hold anticoagulation at this time  - SBP <160   - Q 1 vitals  - Q1 neuro Checks  - TSH, Lipid, A1c, Echo, EKG  - PT/Ot eval and treat   - Repeat CTH stable  - TLSO when OOB/upright  - MRI C spine and MRI L spine including up to T11  01/31/2020  Plan to extubate today        Pulmonary  COPD (chronic obstructive pulmonary disease)  - Duo nebs   - Sats >88    Cardiac/Vascular  Hypertension, essential  - SBP <160  - Echo and EKG   - metoprolol  - Labetalol prn     PAF (paroxysmal atrial fibrillation)  - continue home metoprolol   - currently rate controlled   - anticoagulation reversed and held in setting of SDH    Hyperlipidemia  - Lipid panel   - continue home atorvastatin     Hematology  Long term (current) use of anticoagulants  - see SDH     Endocrine  Diabetes mellitus due to underlying condition with diabetic autonomic neuropathy, without long-term current use of insulin  - A1c 5.9  - SSI with accu checks     GI  GERD with esophagitis  - continue home Protonix     Orthopedic   fracture dislocation of bilateral shoulders  S/P Left shoulder Arthroplasty  Plan for right shoulder arthroplasty tomorrow per ortho  01/31/2020-s/p POD#2/1 L/R sided procedures  TLSO when OOB/upright  C spine cleared by Ortho. C collar for comfort           Other  Current every day smoker  - nicotine patch   - On Duonebs          The patient is being Prophylaxed for:  Venous Thromboembolism with: Mechanical  Stress Ulcer with: H2B  Ventilator Pneumonia with: chlorhexidine oral care    Activity Orders          Diet NPO: NPO starting at 01/30 0001    Commode at  bedside starting at 01/27 2238        Full Code    Tammy Calderon NP  Neurocritical Care  [facility]    Critical care time > 30 min.

## 2020-01-31 NOTE — PROGRESS NOTES
[facility]  Orthopedics  Progress Note    Patient Name: Ben Melvin  MRN: [id number]  Admission Date: 1/27/2020  Hospital Length of Stay: 4 days  Attending Provider: Walter Broussard MD  Primary Care Provider: Cristal Ohara NP  Follow-up For: Procedure(s) (LRB):  HEMIARTHROPLASTY, SHOULDER- RIGHT- MEKA- SUPINE-  TRIOS (Right)    Post-Operative Day: 1 Day Post-Op  Subjective:     Principal Problem:Subdural hematoma    Principal Orthopedic Problem: Bilateral prox humerus fractures     Interval History: Patient seen and examined at bedside.  Patient is now extubated overall he is doing okay he is neurovascularly intact on the left and right side although he does have some diminished sensation over the lateral deltoid.  He is restless and attempting to move his shoulders.    Review of patient's allergies indicates:  No Known Allergies    Current Facility-Administered Medications   Medication    0.9%  NaCl infusion    albuterol-ipratropium 2.5 mg-0.5 mg/3 mL nebulizer solution 3 mL    atorvastatin tablet 80 mg    dextrose 10% (D10W) Bolus    fentaNYL injection 50 mcg    glucagon (human recombinant) injection 1 mg    insulin aspart U-100 pen 1-10 Units    labetalol 20 mg/4 mL (5 mg/mL) IV syring    levETIRAcetam in NaCl (iso-os) IVPB 1,000 mg    magnesium sulfate 2g in water 50mL IVPB (premix)    magnesium sulfate 2g in water 50mL IVPB (premix)    metoprolol tartrate (LOPRESSOR) tablet 25 mg    nicotine 21 mg/24 hr 1 patch    ondansetron injection 4 mg    potassium chloride 10 mEq in 100 mL IVPB    And    potassium chloride 10 mEq in 100 mL IVPB    And    potassium chloride 10 mEq in 100 mL IVPB    senna-docusate 8.6-50 mg per tablet 1 tablet    sodium chloride 0.9% flush 10 mL    sodium phosphate 15 mmol in dextrose 5 % 250 mL IVPB    sodium phosphate 20.01 mmol in dextrose 5 % 250 mL IVPB    sodium phosphate 30 mmol in dextrose 5 % 250 mL IVPB     Objective:  "    Vital Signs (Most Recent):  Temp: 99.5 °F (37.5 °C) (01/31/20 0702)  Pulse: 110 (01/31/20 1242)  Resp: 16 (01/31/20 1242)  BP: (!) 162/64 (01/31/20 1102)  SpO2: 98 % (01/31/20 1242) Vital Signs (24h Range):  Temp:  [97.7 °F (36.5 °C)-99.5 °F (37.5 °C)] 99.5 °F (37.5 °C)  Pulse:  [] 110  Resp:  [1-23] 16  SpO2:  [97 %-100 %] 98 %  BP: ()/(35-95) 162/64     Weight: 110.7 kg (244 lb)  Height: 5' 11" (180.3 cm)  Body mass index is 34.03 kg/m².      Intake/Output Summary (Last 24 hours) at 1/31/2020 1424  Last data filed at 1/31/2020 1102  Gross per 24 hour   Intake 4412.14 ml   Output 2350 ml   Net 2062.14 ml       Ortho/SPM Exam       Physical Exam:  NAD, A/O x 3.  Wound c/d/i with clean dressing.  No focal motor or sensory deficits however some decreased sensation over the lateral shoulder    Significant Labs:   CBC:   Recent Labs   Lab 01/30/20  0105 01/31/20  0105   WBC 11.55 11.00   HGB 9.0* 7.3*   HCT 28.1* 24.3*   * 129*     All pertinent labs within the past 24 hours have been reviewed.    Significant Imaging: I have reviewed and interpreted all pertinent imaging results/findings.    Assessment/Plan:      fracture dislocation of bilateral shoulders  Ben Melvin is a 60 y.o. male status post left shoulder hemiarthroplasty on January 28, 2020  and status post right shoulder hemiarthroplasty on January 30, 2020.    - Weight bearing status: NWB and NO ROM of bilateral  shoulder  - patient no longer has to wear cervical collar, LSO brace to be continued per Neurosurgery  - Pain control: Per APS  - DVT prophylaxis per primary    Continue rehabilitation hemoglobin is 7.2 if decreases again tomorrow will transfuse                     Aram Cottrell MD  Orthopedics  [facility]  "

## 2020-01-31 NOTE — ASSESSMENT & PLAN NOTE
Ben Melvin is a 60 y.o. male status post left shoulder hemiarthroplasty on January 28, 2020  and status post right shoulder hemiarthroplasty on January 30, 2020.    - Weight bearing status: NWB and NO ROM of bilateral  shoulder  - patient no longer has to wear cervical collar, LSO brace to be continued per Neurosurgery  - Pain control: Per APS  - DVT prophylaxis per primary    Continue rehabilitation hemoglobin is 7.2 if decreases again tomorrow will transfuse

## 2020-01-31 NOTE — SUBJECTIVE & OBJECTIVE
Review of Systems  Unable to obtain a complete ROS due to-pt intubated  Objective:     Vitals:  Temp: 99.5 °F (37.5 °C)  Pulse: 110  Rhythm: sinus tachycardia  BP: (!) 162/64  MAP (mmHg): 92  Resp: 16  SpO2: 98 %  Oxygen Concentration (%): 40  O2 Device (Oxygen Therapy): nasal cannula  Vent Mode: A/C  Set Rate: 18 BPM  Vt Set: 450 mL  PEEP/CPAP: 5 cmH20  Peak Airway Pressure: 20 cmH2O  Mean Airway Pressure: 10 cmH20  Plateau Pressure: 17 cmH20    Temp  Min: 97.5 °F (36.4 °C)  Max: 99.5 °F (37.5 °C)  Pulse  Min: 84  Max: 110  BP  Min: 75/35  Max: 205/95  MAP (mmHg)  Min: 56  Max: 131  Resp  Min: 1  Max: 23  SpO2  Min: 97 %  Max: 100 %  Oxygen Concentration (%)  Min: 40  Max: 40    01/30 0701 - 01/31 0700  In: 5811.5 [I.V.:4551.5]  Out: 2600 [Urine:2550]   Unmeasured Output  Urine Occurrence: 1  Pad Count: 2       Physical Exam  GA: Alert, comfortable, no acute distress.   HEENT: No scleral icterus or JVD.   Pulmonary: Clear to auscultation A/P/L. No wheezing, crackles, or rhonchi.  Cardiac: RRR S1 & S2 w/o rubs/murmurs/gallops.   Abdominal: Bowel sounds present x 4. No appreciable hepatosplenomegaly.  Skin: No jaundice, rashes, or visible lesions.  Psych: normal affect  Neuro:  --GCS: E4 Vt1 M5  --Mental Status:  Awake, alert, follows commands, nods appropriately  --CN II-XII grossly intact.   --Pupils 3mm, PERRL.   --Corneal reflex, gag, cough intact.  --DANG spont    Medications:  Continuous  sodium chloride 0.9% Last Rate: 100 mL/hr at 01/31/20 1102   Scheduled  albuterol-ipratropium 3 mL Q6H   atorvastatin 80 mg QHS   levetiracetam IVPB 1,000 mg Q12H   metoprolol tartrate 25 mg BID   nicotine 1 patch Daily   senna-docusate 8.6-50 mg 1 tablet BID   PRN  Dextrose 10% Bolus 12.5 g PRN   fentaNYL 50 mcg Q1H PRN   glucagon (human recombinant) 1 mg PRN   insulin aspart U-100 1-10 Units Q6H PRN   labetalol 10 mg Q4H PRN   magnesium sulfate IVPB 2 g PRN   magnesium sulfate IVPB 4 g PRN   ondansetron 4 mg Q8H PRN    potassium chloride in water 40 mEq PRN   And     potassium chloride in water 40 mEq PRN   And     potassium chloride in water 40 mEq PRN   sodium chloride 0.9% 10 mL PRN   sodium phosphate IVPB 15 mmol PRN   sodium phosphate IVPB 20.01 mmol PRN   sodium phosphate IVPB 30 mmol PRN     Today I personally reviewed pertinent medications, lines/drains/airways, imaging, laboratory results,     Diet  Diet NPO

## 2020-01-31 NOTE — PLAN OF CARE
POC reviewed with Ben and S.O. at 0500. Pt is intubated/sedated and cannot verbalized understanding; SO verbalized understanding. Questions and concerns addressed. No acute events overnight. Propofol drip titrated accordingly to maintain adequate sedation. NS running at 100 mL/hr. PRN Fentanyl given 3x during the shift for pain cues with agitation. Pt progressing toward goals. Will continue to monitor. See flowsheets for full assessment and VS info

## 2020-01-31 NOTE — SUBJECTIVE & OBJECTIVE
"Principal Problem:Subdural hematoma    Principal Orthopedic Problem: Bilateral prox humerus fractures     Interval History: Patient seen and examined at bedside.  Patient is now extubated overall he is doing okay he is neurovascularly intact on the left and right side although he does have some diminished sensation over the lateral deltoid.  He is restless and attempting to move his shoulders.    Review of patient's allergies indicates:  No Known Allergies    Current Facility-Administered Medications   Medication    0.9%  NaCl infusion    albuterol-ipratropium 2.5 mg-0.5 mg/3 mL nebulizer solution 3 mL    atorvastatin tablet 80 mg    dextrose 10% (D10W) Bolus    fentaNYL injection 50 mcg    glucagon (human recombinant) injection 1 mg    insulin aspart U-100 pen 1-10 Units    labetalol 20 mg/4 mL (5 mg/mL) IV syring    levETIRAcetam in NaCl (iso-os) IVPB 1,000 mg    magnesium sulfate 2g in water 50mL IVPB (premix)    magnesium sulfate 2g in water 50mL IVPB (premix)    metoprolol tartrate (LOPRESSOR) tablet 25 mg    nicotine 21 mg/24 hr 1 patch    ondansetron injection 4 mg    potassium chloride 10 mEq in 100 mL IVPB    And    potassium chloride 10 mEq in 100 mL IVPB    And    potassium chloride 10 mEq in 100 mL IVPB    senna-docusate 8.6-50 mg per tablet 1 tablet    sodium chloride 0.9% flush 10 mL    sodium phosphate 15 mmol in dextrose 5 % 250 mL IVPB    sodium phosphate 20.01 mmol in dextrose 5 % 250 mL IVPB    sodium phosphate 30 mmol in dextrose 5 % 250 mL IVPB     Objective:     Vital Signs (Most Recent):  Temp: 99.5 °F (37.5 °C) (01/31/20 0702)  Pulse: 110 (01/31/20 1242)  Resp: 16 (01/31/20 1242)  BP: (!) 162/64 (01/31/20 1102)  SpO2: 98 % (01/31/20 1242) Vital Signs (24h Range):  Temp:  [97.7 °F (36.5 °C)-99.5 °F (37.5 °C)] 99.5 °F (37.5 °C)  Pulse:  [] 110  Resp:  [1-23] 16  SpO2:  [97 %-100 %] 98 %  BP: ()/(35-95) 162/64     Weight: 110.7 kg (244 lb)  Height: 5' 11" (180.3 " cm)  Body mass index is 34.03 kg/m².      Intake/Output Summary (Last 24 hours) at 1/31/2020 1424  Last data filed at 1/31/2020 1102  Gross per 24 hour   Intake 4412.14 ml   Output 2350 ml   Net 2062.14 ml       Ortho/SPM Exam       Physical Exam:  NAD, A/O x 3.  Wound c/d/i with clean dressing.  No focal motor or sensory deficits however some decreased sensation over the lateral shoulder    Significant Labs:   CBC:   Recent Labs   Lab 01/30/20  0105 01/31/20  0105   WBC 11.55 11.00   HGB 9.0* 7.3*   HCT 28.1* 24.3*   * 129*     All pertinent labs within the past 24 hours have been reviewed.    Significant Imaging: I have reviewed and interpreted all pertinent imaging results/findings.

## 2020-01-31 NOTE — PLAN OF CARE
POC reviewed with pt and family at 1300 and 1800. Pt intubated/sedated and unable to VU; significant other at bedside verbalized understanding - questions and concerns addressed. Pt progressing toward goals. Will continue to monitor. See flowsheets for full assessment and VS info.       -pt to OR today for R arthroplasty  -remains sedated/intubated through the night c plans to extubate tomorrow  -switched propofol for precedex  -go draining adequate amounts of clear yellow urine  -starting on nicotine patch per NCC

## 2020-02-01 LAB
ALBUMIN SERPL BCP-MCNC: 2 G/DL (ref 3.5–5.2)
ALP SERPL-CCNC: 76 U/L (ref 55–135)
ALT SERPL W/O P-5'-P-CCNC: 12 U/L (ref 10–44)
ANION GAP SERPL CALC-SCNC: 8 MMOL/L (ref 8–16)
AST SERPL-CCNC: 35 U/L (ref 10–40)
BASOPHILS # BLD AUTO: 0.04 K/UL (ref 0–0.2)
BASOPHILS NFR BLD: 0.5 % (ref 0–1.9)
BILIRUB SERPL-MCNC: 1 MG/DL (ref 0.1–1)
BUN SERPL-MCNC: 16 MG/DL (ref 6–20)
CALCIUM SERPL-MCNC: 7.9 MG/DL (ref 8.7–10.5)
CHLORIDE SERPL-SCNC: 104 MMOL/L (ref 95–110)
CO2 SERPL-SCNC: 29 MMOL/L (ref 23–29)
CREAT SERPL-MCNC: 0.8 MG/DL (ref 0.5–1.4)
DIFFERENTIAL METHOD: ABNORMAL
EOSINOPHIL # BLD AUTO: 0.1 K/UL (ref 0–0.5)
EOSINOPHIL NFR BLD: 1.5 % (ref 0–8)
ERYTHROCYTE [DISTWIDTH] IN BLOOD BY AUTOMATED COUNT: 16.6 % (ref 11.5–14.5)
EST. GFR  (AFRICAN AMERICAN): >60 ML/MIN/1.73 M^2
EST. GFR  (NON AFRICAN AMERICAN): >60 ML/MIN/1.73 M^2
GLUCOSE SERPL-MCNC: 88 MG/DL (ref 70–110)
HCT VFR BLD AUTO: 25.5 % (ref 40–54)
HGB BLD-MCNC: 7.6 G/DL (ref 14–18)
IMM GRANULOCYTES # BLD AUTO: 0.1 K/UL (ref 0–0.04)
IMM GRANULOCYTES NFR BLD AUTO: 1.2 % (ref 0–0.5)
LYMPHOCYTES # BLD AUTO: 1.4 K/UL (ref 1–4.8)
LYMPHOCYTES NFR BLD: 17.3 % (ref 18–48)
MAGNESIUM SERPL-MCNC: 1.6 MG/DL (ref 1.6–2.6)
MCH RBC QN AUTO: 27.2 PG (ref 27–31)
MCHC RBC AUTO-ENTMCNC: 29.8 G/DL (ref 32–36)
MCV RBC AUTO: 91 FL (ref 82–98)
MONOCYTES # BLD AUTO: 0.6 K/UL (ref 0.3–1)
MONOCYTES NFR BLD: 7.3 % (ref 4–15)
NEUTROPHILS # BLD AUTO: 5.8 K/UL (ref 1.8–7.7)
NEUTROPHILS NFR BLD: 72.2 % (ref 38–73)
NRBC BLD-RTO: 0 /100 WBC
PHOSPHATE SERPL-MCNC: 2.6 MG/DL (ref 2.7–4.5)
PHOSPHATE SERPL-MCNC: 3 MG/DL (ref 2.7–4.5)
PLATELET # BLD AUTO: 147 K/UL (ref 150–350)
PMV BLD AUTO: 9.4 FL (ref 9.2–12.9)
POCT GLUCOSE: 92 MG/DL (ref 70–110)
POTASSIUM SERPL-SCNC: 3.5 MMOL/L (ref 3.5–5.1)
POTASSIUM SERPL-SCNC: 3.6 MMOL/L (ref 3.5–5.1)
PROT SERPL-MCNC: 5.4 G/DL (ref 6–8.4)
RBC # BLD AUTO: 2.79 M/UL (ref 4.6–6.2)
SODIUM SERPL-SCNC: 141 MMOL/L (ref 136–145)
WBC # BLD AUTO: 8.07 K/UL (ref 3.9–12.7)

## 2020-02-01 PROCEDURE — 99232 SBSQ HOSP IP/OBS MODERATE 35: CPT | Mod: ,,, | Performed by: NEUROLOGICAL SURGERY

## 2020-02-01 PROCEDURE — 84132 ASSAY OF SERUM POTASSIUM: CPT

## 2020-02-01 PROCEDURE — 83735 ASSAY OF MAGNESIUM: CPT

## 2020-02-01 PROCEDURE — 94761 N-INVAS EAR/PLS OXIMETRY MLT: CPT

## 2020-02-01 PROCEDURE — 94640 AIRWAY INHALATION TREATMENT: CPT

## 2020-02-01 PROCEDURE — 25000003 PHARM REV CODE 250: Performed by: NURSE PRACTITIONER

## 2020-02-01 PROCEDURE — 25000003 PHARM REV CODE 250: Performed by: STUDENT IN AN ORGANIZED HEALTH CARE EDUCATION/TRAINING PROGRAM

## 2020-02-01 PROCEDURE — 85025 COMPLETE CBC W/AUTO DIFF WBC: CPT

## 2020-02-01 PROCEDURE — 25000242 PHARM REV CODE 250 ALT 637 W/ HCPCS: Performed by: PSYCHIATRY & NEUROLOGY

## 2020-02-01 PROCEDURE — 63600175 PHARM REV CODE 636 W HCPCS: Performed by: NURSE PRACTITIONER

## 2020-02-01 PROCEDURE — 99900026 HC AIRWAY MAINTENANCE (STAT)

## 2020-02-01 PROCEDURE — 20000000 HC ICU ROOM

## 2020-02-01 PROCEDURE — 99232 PR SUBSEQUENT HOSPITAL CARE,LEVL II: ICD-10-PCS | Mod: ,,, | Performed by: NEUROLOGICAL SURGERY

## 2020-02-01 PROCEDURE — 80053 COMPREHEN METABOLIC PANEL: CPT

## 2020-02-01 PROCEDURE — 99233 SBSQ HOSP IP/OBS HIGH 50: CPT | Mod: ,,, | Performed by: NURSE PRACTITIONER

## 2020-02-01 PROCEDURE — 84100 ASSAY OF PHOSPHORUS: CPT

## 2020-02-01 PROCEDURE — S4991 NICOTINE PATCH NONLEGEND: HCPCS | Performed by: NURSE PRACTITIONER

## 2020-02-01 PROCEDURE — 27000221 HC OXYGEN, UP TO 24 HOURS

## 2020-02-01 PROCEDURE — 99233 PR SUBSEQUENT HOSPITAL CARE,LEVL III: ICD-10-PCS | Mod: ,,, | Performed by: NURSE PRACTITIONER

## 2020-02-01 PROCEDURE — 84100 ASSAY OF PHOSPHORUS: CPT | Mod: 91

## 2020-02-01 RX ORDER — INSULIN ASPART 100 [IU]/ML
1-10 INJECTION, SOLUTION INTRAVENOUS; SUBCUTANEOUS
Status: DISCONTINUED | OUTPATIENT
Start: 2020-02-01 | End: 2020-02-07 | Stop reason: HOSPADM

## 2020-02-01 RX ORDER — IBUPROFEN 200 MG
16 TABLET ORAL
Status: DISCONTINUED | OUTPATIENT
Start: 2020-02-01 | End: 2020-02-07 | Stop reason: HOSPADM

## 2020-02-01 RX ORDER — GLUCAGON 1 MG
1 KIT INJECTION
Status: DISCONTINUED | OUTPATIENT
Start: 2020-02-01 | End: 2020-02-07 | Stop reason: HOSPADM

## 2020-02-01 RX ORDER — IBUPROFEN 200 MG
24 TABLET ORAL
Status: DISCONTINUED | OUTPATIENT
Start: 2020-02-01 | End: 2020-02-07 | Stop reason: HOSPADM

## 2020-02-01 RX ORDER — HEPARIN SODIUM 5000 [USP'U]/ML
5000 INJECTION, SOLUTION INTRAVENOUS; SUBCUTANEOUS EVERY 8 HOURS
Status: DISCONTINUED | OUTPATIENT
Start: 2020-02-02 | End: 2020-02-07 | Stop reason: HOSPADM

## 2020-02-01 RX ADMIN — SENNOSIDES AND DOCUSATE SODIUM 1 TABLET: 8.6; 5 TABLET ORAL at 08:02

## 2020-02-01 RX ADMIN — IPRATROPIUM BROMIDE AND ALBUTEROL SULFATE 3 ML: .5; 3 SOLUTION RESPIRATORY (INHALATION) at 08:02

## 2020-02-01 RX ADMIN — FENTANYL CITRATE 50 MCG: 50 INJECTION INTRAMUSCULAR; INTRAVENOUS at 10:02

## 2020-02-01 RX ADMIN — MAGNESIUM SULFATE IN WATER 2 G: 40 INJECTION, SOLUTION INTRAVENOUS at 06:02

## 2020-02-01 RX ADMIN — METOPROLOL TARTRATE 25 MG: 25 TABLET ORAL at 08:02

## 2020-02-01 RX ADMIN — IPRATROPIUM BROMIDE AND ALBUTEROL SULFATE 3 ML: .5; 3 SOLUTION RESPIRATORY (INHALATION) at 07:02

## 2020-02-01 RX ADMIN — POTASSIUM CHLORIDE 10 MEQ: 7.46 INJECTION, SOLUTION INTRAVENOUS at 06:02

## 2020-02-01 RX ADMIN — SODIUM PHOSPHATE, MONOBASIC, MONOHYDRATE 15 MMOL: 276; 142 INJECTION, SOLUTION INTRAVENOUS at 09:02

## 2020-02-01 RX ADMIN — IPRATROPIUM BROMIDE AND ALBUTEROL SULFATE 3 ML: .5; 3 SOLUTION RESPIRATORY (INHALATION) at 12:02

## 2020-02-01 RX ADMIN — POTASSIUM CHLORIDE 30 MEQ: 7.46 INJECTION, SOLUTION INTRAVENOUS at 07:02

## 2020-02-01 RX ADMIN — IPRATROPIUM BROMIDE AND ALBUTEROL SULFATE 3 ML: .5; 3 SOLUTION RESPIRATORY (INHALATION) at 01:02

## 2020-02-01 RX ADMIN — FENTANYL CITRATE 50 MCG: 50 INJECTION INTRAMUSCULAR; INTRAVENOUS at 08:02

## 2020-02-01 RX ADMIN — NICOTINE 1 PATCH: 21 PATCH, EXTENDED RELEASE TRANSDERMAL at 08:02

## 2020-02-01 RX ADMIN — LEVETIRACETAM 1000 MG: 10 INJECTION INTRAVENOUS at 09:02

## 2020-02-01 RX ADMIN — POTASSIUM CHLORIDE 40 MEQ: 7.46 INJECTION, SOLUTION INTRAVENOUS at 03:02

## 2020-02-01 RX ADMIN — ATORVASTATIN CALCIUM 80 MG: 20 TABLET, FILM COATED ORAL at 08:02

## 2020-02-01 RX ADMIN — LEVETIRACETAM 1000 MG: 10 INJECTION INTRAVENOUS at 08:02

## 2020-02-01 RX ADMIN — FENTANYL CITRATE 50 MCG: 50 INJECTION INTRAMUSCULAR; INTRAVENOUS at 05:02

## 2020-02-01 NOTE — SUBJECTIVE & OBJECTIVE
Review of Systems  Constitutional: Denies fevers, weight loss, chills, or weakness.  Eyes: Denies changes in vision.  ENT: Denies dysphagia, nasal discharge, ear pain or discharge.  Cardiovascular: Denies chest pain, palpitations, orthopnea, or claudication.  Respiratory: Denies shortness of breath, cough, hemoptysis, or wheezing.  GI: Denies nausea/vomitting, hematochezia, melena, abd pain, or changes in appetite.  : Denies dysuria, incontinence, or hematuria.  Musculoskeletal: Denies joint pain or myalgias.  Skin/breast: Denies rashes, lumps, lesions, or discharge.  Neurologic: Denies headache, dizziness, vertigo, or paresthesias.  Psychiatric: Denies changes in mood or hallucinations.  Endocrine: Denies polyuria, polydipsia, heat/cold intolerance.  Hematologic/Lymph: Denies lymphadenopathy, easy bruising or easy bleeding.  Allergic/Immunologic: Denies rash, rhinitis.   Objective:     Vitals:  Temp: 98.4 °F (36.9 °C)  Pulse: 106  Rhythm: sinus tachycardia  BP: 116/67  MAP (mmHg): 86  Resp: 15  SpO2: 97 %  Oxygen Concentration (%): 28  O2 Device (Oxygen Therapy): nasal cannula    Temp  Min: 98.1 °F (36.7 °C)  Max: 98.4 °F (36.9 °C)  Pulse  Min: 94  Max: 111  BP  Min: 96/49  Max: 148/66  MAP (mmHg)  Min: 67  Max: 108  Resp  Min: 12  Max: 24  SpO2  Min: 93 %  Max: 100 %  Oxygen Concentration (%)  Min: 28  Max: 32    01/31 0701 - 02/01 0700  In: 2831 [I.V.:2481]  Out: 3600 [Urine:3600]   Unmeasured Output  Urine Occurrence: 1  Pad Count: 2       Physical Exam  GA: Alert, comfortable, no acute distress.   HEENT: No scleral icterus or JVD.   Pulmonary: Course to auscultation A/L. No wheezing, crackles, or rhonchi.  Cardiac: RRR S1 & S2 w/o rubs/murmurs/gallops.   Abdominal: Bowel sounds present x 4. No appreciable hepatosplenomegaly.  Skin: No jaundice, rashes, or visible lesions.  Neuro:  --GCS: E4 V4 M6  --Mental Status:  Awake, alert, confused, follows simple comamnds, delayed responses at times  --Pupils 3mm,  PERRL.   --Corneal reflex, gag, cough intact.  -- DANG spont  Medications:  Continuous  sodium chloride 0.9% Last Rate: 100 mL/hr at 02/01/20 1605   Scheduled  albuterol-ipratropium 3 mL Q6H   atorvastatin 80 mg QHS   [START ON 2/2/2020] heparin (porcine) 5,000 Units Q8H   levetiracetam IVPB 1,000 mg Q12H   metoprolol tartrate 25 mg BID   nicotine 1 patch Daily   senna-docusate 8.6-50 mg 1 tablet BID   PRN  Dextrose 10% Bolus 12.5 g PRN   Dextrose 10% Bolus 12.5 g PRN   Dextrose 10% Bolus 25 g PRN   fentaNYL 50 mcg Q1H PRN   glucagon (human recombinant) 1 mg PRN   glucose 16 g PRN   glucose 24 g PRN   insulin aspart U-100 1-10 Units QID (AC + HS) PRN   labetalol 10 mg Q4H PRN   magnesium sulfate IVPB 2 g PRN   magnesium sulfate IVPB 4 g PRN   ondansetron 4 mg Q8H PRN   potassium chloride in water 40 mEq PRN   And     potassium chloride in water 40 mEq PRN   And     potassium chloride in water 40 mEq PRN   sodium chloride 0.9% 10 mL PRN   sodium phosphate IVPB 15 mmol PRN   sodium phosphate IVPB 20.01 mmol PRN   sodium phosphate IVPB 30 mmol PRN     Today I personally reviewed pertinent medications, lines/drains/airways, imaging, laboratory results,     Diet  Diet diabetic [facility]; 2000 Calorie

## 2020-02-01 NOTE — ASSESSMENT & PLAN NOTE
- NSGY consulted  - Kcentra and Vit K for warfarin  - Repeat INR pending  - Hold anticoagulation at this time  - SBP <160   - Q 1 vitals  - Q1 neuro Checks  - TSH, Lipid, A1c, Echo, EKG  - PT/Ot eval and treat   - Repeat CTH stable  - TLSO when OOB/upright  - MRI C spine and MRI L spine including up to T11  - extubated  - follow CT head in AM

## 2020-02-01 NOTE — ASSESSMENT & PLAN NOTE
59 M with hx of paroxysmal afib on coumadin presents for eval of bilateral shoulder dislocations, left convexity SDH, L1 compression fracture, L4 burst fracture.    -continue care per NCC  -q 1 hr neuro checks  -Repeat CT head stable  -Hold coumadin, goal INR of less than 1.4  -both shoulders repaired by ortho, appreciate recs  -TLSO when OOB/upright  -would like sitting upright L-spine x-rays in TLSO  -MRI L spine complete. Likely will need RadTx to L spine given cancer hx.   -C spine cleared by Ortho. C collar for comfort

## 2020-02-01 NOTE — PROGRESS NOTES
[facility]  Orthopedics  Progress Note    Patient Name: Ben Melvin  MRN: [id number]  Admission Date: 1/27/2020  Hospital Length of Stay: 5 days  Attending Provider: Walter Broussard MD  Primary Care Provider: Cristal Ohara NP  Follow-up For: Procedure(s) (LRB):  HEMIARTHROPLASTY, SHOULDER- RIGHT- MEKA- SUPINE-  TRIOS (Right)    Post-Operative Day: 2 Days Post-Op  Subjective:     Principal Problem:Subdural hematoma    Principal Orthopedic Problem: Bilateral prox humerus fractures     Interval History: Patient seen and examined at bedside.  Patient was extubated yesterday. Has been confused since that time. Able to follow commands, but not oriented to place or time. Left abduction brace not fitting comfortably.  Review of patient's allergies indicates:  No Known Allergies    Current Facility-Administered Medications   Medication    0.9%  NaCl infusion    albuterol-ipratropium 2.5 mg-0.5 mg/3 mL nebulizer solution 3 mL    atorvastatin tablet 80 mg    dextrose 10% (D10W) Bolus    fentaNYL injection 50 mcg    glucagon (human recombinant) injection 1 mg    insulin aspart U-100 pen 1-10 Units    labetalol 20 mg/4 mL (5 mg/mL) IV syring    levETIRAcetam in NaCl (iso-os) IVPB 1,000 mg    magnesium sulfate 2g in water 50mL IVPB (premix)    magnesium sulfate 2g in water 50mL IVPB (premix)    metoprolol tartrate (LOPRESSOR) tablet 25 mg    nicotine 21 mg/24 hr 1 patch    ondansetron injection 4 mg    potassium chloride 10 mEq in 100 mL IVPB    And    potassium chloride 10 mEq in 100 mL IVPB    And    potassium chloride 10 mEq in 100 mL IVPB    senna-docusate 8.6-50 mg per tablet 1 tablet    sodium chloride 0.9% flush 10 mL    sodium phosphate 15 mmol in dextrose 5 % 250 mL IVPB    sodium phosphate 20.01 mmol in dextrose 5 % 250 mL IVPB    sodium phosphate 30 mmol in dextrose 5 % 250 mL IVPB     Objective:     Vital Signs (Most Recent):  Temp: 98.3 °F (36.8 °C) (02/01/20  "0301)  Pulse: 101 (02/01/20 0301)  Resp: 17 (02/01/20 0301)  BP: 125/75 (02/01/20 0301)  SpO2: (!) 94 % (02/01/20 0301) Vital Signs (24h Range):  Temp:  [98.3 °F (36.8 °C)-99.5 °F (37.5 °C)] 98.3 °F (36.8 °C)  Pulse:  [] 101  Resp:  [8-24] 17  SpO2:  [94 %-100 %] 94 %  BP: (100-205)/(46-95) 125/75     Weight: 110.7 kg (244 lb)  Height: 5' 11" (180.3 cm)  Body mass index is 34.03 kg/m².      Intake/Output Summary (Last 24 hours) at 2/1/2020 0538  Last data filed at 2/1/2020 0301  Gross per 24 hour   Intake 2487.59 ml   Output 2955 ml   Net -467.41 ml       Ortho/SPM Exam     Physical Exam:  NAD, A/O x 3.  Wound c/d/i with clean dressing.  No focal motor or sensory deficits to bilateral UE    Significant Labs:   CBC:   Recent Labs   Lab 01/31/20  0105 02/01/20  0234   WBC 11.00 8.07   HGB 7.3* 7.6*   HCT 24.3* 25.5*   * 147*     All pertinent labs within the past 24 hours have been reviewed.    Significant Imaging: I have reviewed and interpreted all pertinent imaging results/findings.    Assessment/Plan:      fracture dislocation of bilateral shoulders  Ben Melvin is a 60 y.o. male status post left shoulder hemiarthroplasty on January 28, 2020  and status post right shoulder hemiarthroplasty on January 30, 2020.    - Weight bearing status: NWB and NO ROM of bilateral  shoulder  - patient no longer has to wear cervical collar, LSO brace to be continued per Neurosurgery  - Pain control: Per APS  - DVT prophylaxis per primary  -Labs: hgb 7.6    Will order new shoulder abduction brace today for left.    Dispo: Continue rehabilitation, per primary                     Venkatesh Lukasz Chaney MD  Orthopedics  [facility]  "

## 2020-02-01 NOTE — ASSESSMENT & PLAN NOTE
Ben Melvin is a 60 y.o. male status post left shoulder hemiarthroplasty on January 28, 2020  and status post right shoulder hemiarthroplasty on January 30, 2020.    - Weight bearing status: NWB and NO ROM of bilateral  shoulder  - patient no longer has to wear cervical collar, LSO brace to be continued per Neurosurgery  - Pain control: Per APS  - DVT prophylaxis per primary  -Labs: hgb 7.6    Will order new shoulder abduction brace today for left.    Dispo: Continue rehabilitation, per primary

## 2020-02-01 NOTE — PROGRESS NOTES
[facility]  Neurocritical Care  Progress Note    Admit Date: 1/27/2020  Service Date: 02/01/2020  Length of Stay: 5    Subjective:     Chief Complaint: Subdural hematoma    History of Present Illness: Mr Melvin is a 60 yo male with EtOH abuse, tobacco abuse,  PAF  With long term anticoagulation (coumadin) who presents to [facility] for SDH and bilateral shoulder dislocation. He  presented to OSH via EMS for acute onset of  SOB and reported seizure activity. At approx 5 pm he was playing video poker, felt light headed, and was assisted  Ground by family. He had what the family felt was a seizure but appeared awake during it. He was disoriented and confused after. He then reported had SOB, chest pain, and BUE pain. Patient also reports new onset severe lower back pain. He returned to baseline mental status shortly after per family. CTH and chest at OSH revealed SDH and bilateral upper ext dislocations. He received Kcentra and Vit K at OSH for reversal.  He is being admitted to [facility] for a higher level of care.     Hospital Course: 1/27: Admit NCC, repeat CTH, SBP <160, consult NSGY and ortho, received Kcentra and vit K at OSH  1/28: Keppra increased to 1G BD, MRI C-spine negative for compression fracture, NSGY cleared for undergoing ortho procedure under C-collar, normal PT/INR, no acute intervention per NSGY will hold anticoagulation, F/U with MRI L-Spine, Needs epilepsy follow up, PRN Morphine for pain  1/29: intubated on propofol, S/P left shoulder arthroplasty, started on tube feeds, Right shoulder arthroplasty tomorrow, scheduled albuterol, MRI when able, Nicotine patch post MRI  01/30/2020- s/p pod#0 right shoulder hemiarthroplasty, d/c propofol and start precedex, add nicotine patch, plan to extubate tomorrow AM  01/31/2020-plan to extubate today  02/01/2020-follow CT head in AM          Review of Systems  Constitutional: Denies fevers, weight loss, chills, or weakness.  Eyes: Denies changes in  vision.  ENT: Denies dysphagia, nasal discharge, ear pain or discharge.  Cardiovascular: Denies chest pain, palpitations, orthopnea, or claudication.  Respiratory: Denies shortness of breath, cough, hemoptysis, or wheezing.  GI: Denies nausea/vomitting, hematochezia, melena, abd pain, or changes in appetite.  : Denies dysuria, incontinence, or hematuria.  Musculoskeletal: Denies joint pain or myalgias.  Skin/breast: Denies rashes, lumps, lesions, or discharge.  Neurologic: Denies headache, dizziness, vertigo, or paresthesias.  Psychiatric: Denies changes in mood or hallucinations.  Endocrine: Denies polyuria, polydipsia, heat/cold intolerance.  Hematologic/Lymph: Denies lymphadenopathy, easy bruising or easy bleeding.  Allergic/Immunologic: Denies rash, rhinitis.   Objective:     Vitals:  Temp: 98.4 °F (36.9 °C)  Pulse: 106  Rhythm: sinus tachycardia  BP: 116/67  MAP (mmHg): 86  Resp: 15  SpO2: 97 %  Oxygen Concentration (%): 28  O2 Device (Oxygen Therapy): nasal cannula    Temp  Min: 98.1 °F (36.7 °C)  Max: 98.4 °F (36.9 °C)  Pulse  Min: 94  Max: 111  BP  Min: 96/49  Max: 148/66  MAP (mmHg)  Min: 67  Max: 108  Resp  Min: 12  Max: 24  SpO2  Min: 93 %  Max: 100 %  Oxygen Concentration (%)  Min: 28  Max: 32    01/31 0701 - 02/01 0700  In: 2831 [I.V.:2481]  Out: 3600 [Urine:3600]   Unmeasured Output  Urine Occurrence: 1  Pad Count: 2       Physical Exam  GA: Alert, comfortable, no acute distress.   HEENT: No scleral icterus or JVD.   Pulmonary: Course to auscultation A/L. No wheezing, crackles, or rhonchi.  Cardiac: RRR S1 & S2 w/o rubs/murmurs/gallops.   Abdominal: Bowel sounds present x 4. No appreciable hepatosplenomegaly.  Skin: No jaundice, rashes, or visible lesions.  Neuro:  --GCS: E4 V4 M6  --Mental Status:  Awake, alert, confused, follows simple comamnds, delayed responses at times  --Pupils 3mm, PERRL.   --Corneal reflex, gag, cough intact.  -- DANG spont  Medications:  Continuous  sodium chloride 0.9% Last  Rate: 100 mL/hr at 02/01/20 1605   Scheduled  albuterol-ipratropium 3 mL Q6H   atorvastatin 80 mg QHS   [START ON 2/2/2020] heparin (porcine) 5,000 Units Q8H   levetiracetam IVPB 1,000 mg Q12H   metoprolol tartrate 25 mg BID   nicotine 1 patch Daily   senna-docusate 8.6-50 mg 1 tablet BID   PRN  Dextrose 10% Bolus 12.5 g PRN   Dextrose 10% Bolus 12.5 g PRN   Dextrose 10% Bolus 25 g PRN   fentaNYL 50 mcg Q1H PRN   glucagon (human recombinant) 1 mg PRN   glucose 16 g PRN   glucose 24 g PRN   insulin aspart U-100 1-10 Units QID (AC + HS) PRN   labetalol 10 mg Q4H PRN   magnesium sulfate IVPB 2 g PRN   magnesium sulfate IVPB 4 g PRN   ondansetron 4 mg Q8H PRN   potassium chloride in water 40 mEq PRN   And     potassium chloride in water 40 mEq PRN   And     potassium chloride in water 40 mEq PRN   sodium chloride 0.9% 10 mL PRN   sodium phosphate IVPB 15 mmol PRN   sodium phosphate IVPB 20.01 mmol PRN   sodium phosphate IVPB 30 mmol PRN     Today I personally reviewed pertinent medications, lines/drains/airways, imaging, laboratory results,     Diet  Diet diabetic [facility]; 2000 Calorie        Assessment/Plan:     Neuro  * Subdural hematoma  - NSGY consulted  - Kcentra and Vit K for warfarin  - Repeat INR pending  - Hold anticoagulation at this time  - SBP <160   - Q 1 vitals  - Q1 neuro Checks  - TSH, Lipid, A1c, Echo, EKG  - PT/Ot eval and treat   - Repeat CTH stable  - TLSO when OOB/upright  - MRI C spine and MRI L spine including up to T11  - extubated  - follow CT head in AM        Pulmonary  COPD (chronic obstructive pulmonary disease)  - Duo nebs   - Sats >88    Cardiac/Vascular  Hypertension, essential  - SBP <160  - EKG   - metoprolol  - Labetalol prn   · TTE: Technically challenging portable study.  · Normal left ventricular systolic function. The estimated ejection fraction is 60%.  · Concentric left ventricular remodeling.  · Normal LV diastolic function.  · Normal right ventricular systolic  function.  · Normal central venous pressure (3 mmHg).  Anterior pericardial effusion with echodensity adherent to the anterior surface.    Hyperlipidemia  - Lipid panel   - continue home atorvastatin     Hematology  Long term (current) use of anticoagulants  - see SDH   -plan for CT head in Am    GI  GERD with esophagitis  - continue home Protonix     Orthopedic   fracture dislocation of bilateral shoulders  S/P Left shoulder Arthroplasty  Plan for right shoulder arthroplasty tomorrow per ortho  02/01/2020-s/p POD#2/1 L/R sided procedures  TLSO when OOB/upright  C spine cleared by Ortho. C collar for comfort           Acute bilateral low back pain  - Lumbar vertebral fracture  - MR L-spine pending  - MR C spine shows no fractures with probable C5-C6 stenosis, most of the study affected by motion artifact  - NSGY okay to undergo ortho surgery on C-collar    Fracture dislocation of joint of left shoulder girdle  - Ortho consulted  - appreciate recs  - S/P Left shoulder arthroplasty   - s/p Right shoulder arthroplasty     Other  Current every day smoker  - nicotine patch   - On Duonebs          The patient is being Prophylaxed for:  Venous Thromboembolism with: Chemical  Stress Ulcer with: none  Ventilator Pneumonia with: not applicable    Activity Orders          Diet diabetic [facility]; 2000 Calorie: Diabetic starting at 02/01 1051    Commode at bedside starting at 01/27 2238        Full Code    Tammy Calderon NP  Neurocritical Care  [facility]

## 2020-02-01 NOTE — PLAN OF CARE
POC reviewed with pt and family at 1400. Pt verbalized understanding.  L IJ TLC removed.  Pt started on diabetic diet.  Potassium and phosphorus replaced.  Plan for CT in AM.  Harry arms remain in slings.  Pt turned Q2.  Family at bedside. Questions and concerns addressed. No acute events today. Pt progressing toward goals. Will continue to monitor. See flowsheets for full assessment and VS info.

## 2020-02-01 NOTE — ASSESSMENT & PLAN NOTE
S/P Left shoulder Arthroplasty  Plan for right shoulder arthroplasty tomorrow per ortho  02/01/2020-s/p POD#2/1 L/R sided procedures  TLSO when OOB/upright  C spine cleared by Ortho. C collar for comfort

## 2020-02-01 NOTE — PROGRESS NOTES
[facility]  Neurosurgery  Progress Note    Subjective:     History of Present Illness: 59 M with hx of paroxysmal afib on coumadin presents for eval of bilateral shoulder dislocations and left convexity SDH.  Pt had first time seizure and slumped over in chair.  There was no LOC.  He was given vit k and kcentra at OSH for hx of coumadin use.  He endorses low back pain but no neck pain.  No sensation loss in his extremities.    Post-Op Info:  Procedure(s) (LRB):  HEMIARTHROPLASTY, SHOULDER- RIGHT- MEKA- SUPINE-  TRIOS (Right)   2 Days Post-Op     Interval History: Patient is now extubated.  Some slight confusion, but otherwise doing well.    Medications:  Continuous Infusions:   sodium chloride 0.9% 100 mL/hr at 02/01/20 1505     Scheduled Meds:   albuterol-ipratropium  3 mL Nebulization Q6H    atorvastatin  80 mg Oral QHS    [START ON 2/2/2020] heparin (porcine)  5,000 Units Subcutaneous Q8H    levetiracetam IVPB  1,000 mg Intravenous Q12H    metoprolol tartrate  25 mg Oral BID    nicotine  1 patch Transdermal Daily    senna-docusate 8.6-50 mg  1 tablet Oral BID     PRN Meds:Dextrose 10% Bolus, Dextrose 10% Bolus, Dextrose 10% Bolus, fentaNYL, glucagon (human recombinant), glucose, glucose, insulin aspart U-100, labetalol, magnesium sulfate IVPB, magnesium sulfate IVPB, ondansetron, potassium chloride in water **AND** potassium chloride in water **AND** potassium chloride in water, sodium chloride 0.9%, sodium phosphate IVPB, sodium phosphate IVPB, sodium phosphate IVPB     Review of Systems  Objective:     Weight: 110.7 kg (244 lb)  Body mass index is 34.03 kg/m².  Vital Signs (Most Recent):  Temp: 98.4 °F (36.9 °C) (02/01/20 1505)  Pulse: 108 (02/01/20 1505)  Resp: 18 (02/01/20 1505)  BP: (!) 124/59 (02/01/20 1505)  SpO2: (!) 93 % (02/01/20 1505) Vital Signs (24h Range):  Temp:  [98.1 °F (36.7 °C)-98.4 °F (36.9 °C)] 98.4 °F (36.9 °C)  Pulse:  [] 108  Resp:  [12-24] 18  SpO2:   [93 %-100 %] 93 %  BP: ()/(46-87) 124/59     Date 02/01/20 0700 - 02/02/20 0659   Shift 5931-3519 1930-2675 2625-0859 24 Hour Total   INTAKE   P.O. 10   10   I.V.(mL/kg) 836.7(7.6) 110(1)  946.7(8.6)   IV Piggyback 650   650   Shift Total(mL/kg) 1496.7(13.5) 110(1)  1606.7(14.5)   OUTPUT   Urine(mL/kg/hr) 1270(1.4) 0  1270   Shift Total(mL/kg) 1270(11.5) 0(0)  1270(11.5)   Weight (kg) 110.7 110.7 110.7 110.7              Oxygen Concentration (%):  [28-32] 28    Male External Urinary Catheter 01/31/20 1756 Small (Active)   Collection Container Urimeter 2/1/2020  3:05 PM   Securement Method secured to top of thigh w/ adhesive device 2/1/2020  3:05 PM   Skin no redness;no breakdown 2/1/2020  3:05 PM   Tolerance no signs/symptoms of discomfort 2/1/2020  3:05 PM   Output (mL) 0 mL 2/1/2020  3:05 PM   Catheter Change Date 02/01/20 2/1/2020  3:05 PM   Catheter Change Time 0705 2/1/2020  3:05 PM       Neurosurgery Physical Exam     [id number]  AOx2, FCx4  PERRL  BUE in cast, 5/5 strength in hand   5/5 strength BLE in all muscle groups  TLSO on.      Significant Labs:  Recent Labs   Lab 01/31/20 0105 02/01/20  0234   * 88    141   K 4.4 3.5    104   CO2 27 29   BUN 16 16   CREATININE 0.9 0.8   CALCIUM 7.1* 7.9*   MG 1.9 1.6     Recent Labs   Lab 01/31/20 0105 02/01/20  0234   WBC 11.00 8.07   HGB 7.3* 7.6*   HCT 24.3* 25.5*   * 147*     No results for input(s): LABPT, INR, APTT in the last 48 hours.  Microbiology Results (last 7 days)     ** No results found for the last 168 hours. **        All pertinent labs from the last 24 hours have been reviewed.    Significant Diagnostics:  CT: No results found in the last 24 hours.  MRI: No results found in the last 24 hours.  I have reviewed all pertinent imaging results/findings within the past 24 hours.    Assessment/Plan:     * Subdural hematoma  59 M with hx of paroxysmal afib on coumadin presents for eval of bilateral shoulder dislocations,  left convexity SDH, L1 compression fracture, L4 burst fracture.    -continue care per NCC  -q 1 hr neuro checks  -Repeat CT head stable  -Hold coumadin, goal INR of less than 1.4  -both shoulders repaired by ortho, appreciate recs  -TLSO when OOB/upright  -would like sitting upright L-spine x-rays in TLSO  -MRI L spine complete. Likely will need RadTx to L spine given cancer hx.   -C spine cleared by Ortho. C collar for comfort            Maciej Thapa MD  Neurosurgery  [facility]

## 2020-02-01 NOTE — SUBJECTIVE & OBJECTIVE
"Principal Problem:Subdural hematoma    Principal Orthopedic Problem: Bilateral prox humerus fractures     Interval History: Patient seen and examined at bedside.  Patient was extubated yesterday. Has been confused since that time. Able to follow commands, but not oriented to place or time. Left abduction brace not fitting comfortably.  Review of patient's allergies indicates:  No Known Allergies    Current Facility-Administered Medications   Medication    0.9%  NaCl infusion    albuterol-ipratropium 2.5 mg-0.5 mg/3 mL nebulizer solution 3 mL    atorvastatin tablet 80 mg    dextrose 10% (D10W) Bolus    fentaNYL injection 50 mcg    glucagon (human recombinant) injection 1 mg    insulin aspart U-100 pen 1-10 Units    labetalol 20 mg/4 mL (5 mg/mL) IV syring    levETIRAcetam in NaCl (iso-os) IVPB 1,000 mg    magnesium sulfate 2g in water 50mL IVPB (premix)    magnesium sulfate 2g in water 50mL IVPB (premix)    metoprolol tartrate (LOPRESSOR) tablet 25 mg    nicotine 21 mg/24 hr 1 patch    ondansetron injection 4 mg    potassium chloride 10 mEq in 100 mL IVPB    And    potassium chloride 10 mEq in 100 mL IVPB    And    potassium chloride 10 mEq in 100 mL IVPB    senna-docusate 8.6-50 mg per tablet 1 tablet    sodium chloride 0.9% flush 10 mL    sodium phosphate 15 mmol in dextrose 5 % 250 mL IVPB    sodium phosphate 20.01 mmol in dextrose 5 % 250 mL IVPB    sodium phosphate 30 mmol in dextrose 5 % 250 mL IVPB     Objective:     Vital Signs (Most Recent):  Temp: 98.3 °F (36.8 °C) (02/01/20 0301)  Pulse: 101 (02/01/20 0301)  Resp: 17 (02/01/20 0301)  BP: 125/75 (02/01/20 0301)  SpO2: (!) 94 % (02/01/20 0301) Vital Signs (24h Range):  Temp:  [98.3 °F (36.8 °C)-99.5 °F (37.5 °C)] 98.3 °F (36.8 °C)  Pulse:  [] 101  Resp:  [8-24] 17  SpO2:  [94 %-100 %] 94 %  BP: (100-205)/(46-95) 125/75     Weight: 110.7 kg (244 lb)  Height: 5' 11" (180.3 cm)  Body mass index is 34.03 kg/m².      Intake/Output " Summary (Last 24 hours) at 2/1/2020 0538  Last data filed at 2/1/2020 0301  Gross per 24 hour   Intake 2487.59 ml   Output 2955 ml   Net -467.41 ml       Ortho/SPM Exam     Physical Exam:  NAD, A/O x 3.  Wound c/d/i with clean dressing.  No focal motor or sensory deficits to bilateral UE    Significant Labs:   CBC:   Recent Labs   Lab 01/31/20  0105 02/01/20  0234   WBC 11.00 8.07   HGB 7.3* 7.6*   HCT 24.3* 25.5*   * 147*     All pertinent labs within the past 24 hours have been reviewed.    Significant Imaging: I have reviewed and interpreted all pertinent imaging results/findings.

## 2020-02-01 NOTE — ASSESSMENT & PLAN NOTE
- SBP <160  - EKG   - metoprolol  - Labetalol prn   · TTE: Technically challenging portable study.  · Normal left ventricular systolic function. The estimated ejection fraction is 60%.  · Concentric left ventricular remodeling.  · Normal LV diastolic function.  · Normal right ventricular systolic function.  · Normal central venous pressure (3 mmHg).  Anterior pericardial effusion with echodensity adherent to the anterior surface.

## 2020-02-01 NOTE — SUBJECTIVE & OBJECTIVE
Interval History: Patient is now extubated.  Some slight confusion, but otherwise doing well.    Medications:  Continuous Infusions:   sodium chloride 0.9% 100 mL/hr at 02/01/20 1505     Scheduled Meds:   albuterol-ipratropium  3 mL Nebulization Q6H    atorvastatin  80 mg Oral QHS    [START ON 2/2/2020] heparin (porcine)  5,000 Units Subcutaneous Q8H    levetiracetam IVPB  1,000 mg Intravenous Q12H    metoprolol tartrate  25 mg Oral BID    nicotine  1 patch Transdermal Daily    senna-docusate 8.6-50 mg  1 tablet Oral BID     PRN Meds:Dextrose 10% Bolus, Dextrose 10% Bolus, Dextrose 10% Bolus, fentaNYL, glucagon (human recombinant), glucose, glucose, insulin aspart U-100, labetalol, magnesium sulfate IVPB, magnesium sulfate IVPB, ondansetron, potassium chloride in water **AND** potassium chloride in water **AND** potassium chloride in water, sodium chloride 0.9%, sodium phosphate IVPB, sodium phosphate IVPB, sodium phosphate IVPB     Review of Systems  Objective:     Weight: 110.7 kg (244 lb)  Body mass index is 34.03 kg/m².  Vital Signs (Most Recent):  Temp: 98.4 °F (36.9 °C) (02/01/20 1505)  Pulse: 108 (02/01/20 1505)  Resp: 18 (02/01/20 1505)  BP: (!) 124/59 (02/01/20 1505)  SpO2: (!) 93 % (02/01/20 1505) Vital Signs (24h Range):  Temp:  [98.1 °F (36.7 °C)-98.4 °F (36.9 °C)] 98.4 °F (36.9 °C)  Pulse:  [] 108  Resp:  [12-24] 18  SpO2:  [93 %-100 %] 93 %  BP: ()/(46-87) 124/59     Date 02/01/20 0700 - 02/02/20 0659   Shift 5600-5721 6148-3123 0492-1741 24 Hour Total   INTAKE   P.O. 10   10   I.V.(mL/kg) 836.7(7.6) 110(1)  946.7(8.6)   IV Piggyback 650   650   Shift Total(mL/kg) 1496.7(13.5) 110(1)  1606.7(14.5)   OUTPUT   Urine(mL/kg/hr) 1270(1.4) 0  1270   Shift Total(mL/kg) 1270(11.5) 0(0)  1270(11.5)   Weight (kg) 110.7 110.7 110.7 110.7              Oxygen Concentration (%):  [28-32] 28    Male External Urinary Catheter 01/31/20 2278 Small (Active)   Collection Container Urimeter 2/1/2020   3:05 PM   Securement Method secured to top of thigh w/ adhesive device 2/1/2020  3:05 PM   Skin no redness;no breakdown 2/1/2020  3:05 PM   Tolerance no signs/symptoms of discomfort 2/1/2020  3:05 PM   Output (mL) 0 mL 2/1/2020  3:05 PM   Catheter Change Date 02/01/20 2/1/2020  3:05 PM   Catheter Change Time 0705 2/1/2020  3:05 PM       Neurosurgery Physical Exam     E4V4M6  AOx2, FCx4  PERRL  BUE in cast, 5/5 strength in hand   5/5 strength BLE in all muscle groups  TLSO on.      Significant Labs:  Recent Labs   Lab 01/31/20 0105 02/01/20  0234   * 88    141   K 4.4 3.5    104   CO2 27 29   BUN 16 16   CREATININE 0.9 0.8   CALCIUM 7.1* 7.9*   MG 1.9 1.6     Recent Labs   Lab 01/31/20 0105 02/01/20  0234   WBC 11.00 8.07   HGB 7.3* 7.6*   HCT 24.3* 25.5*   * 147*     No results for input(s): LABPT, INR, APTT in the last 48 hours.  Microbiology Results (last 7 days)     ** No results found for the last 168 hours. **        All pertinent labs from the last 24 hours have been reviewed.    Significant Diagnostics:  CT: No results found in the last 24 hours.  MRI: No results found in the last 24 hours.  I have reviewed all pertinent imaging results/findings within the past 24 hours.

## 2020-02-01 NOTE — PLAN OF CARE
POC reviewed with pt and spouse at 0500. Pt and spouse verbalized understanding but pt needs reinforcement due to being confused. Questions and concerns addressed. No acute events overnight. Pt progressing toward goals. Will continue to monitor. See flowsheets for full assessment and VS info   Fentanyl for pain given x2. Bath complete

## 2020-02-02 LAB
ALBUMIN SERPL BCP-MCNC: 2 G/DL (ref 3.5–5.2)
ALP SERPL-CCNC: 112 U/L (ref 55–135)
ALT SERPL W/O P-5'-P-CCNC: 27 U/L (ref 10–44)
ANION GAP SERPL CALC-SCNC: 8 MMOL/L (ref 8–16)
AST SERPL-CCNC: 47 U/L (ref 10–40)
BASOPHILS # BLD AUTO: 0.04 K/UL (ref 0–0.2)
BASOPHILS NFR BLD: 0.5 % (ref 0–1.9)
BILIRUB SERPL-MCNC: 1.1 MG/DL (ref 0.1–1)
BUN SERPL-MCNC: 16 MG/DL (ref 6–20)
CALCIUM SERPL-MCNC: 8.3 MG/DL (ref 8.7–10.5)
CHLORIDE SERPL-SCNC: 101 MMOL/L (ref 95–110)
CO2 SERPL-SCNC: 29 MMOL/L (ref 23–29)
CREAT SERPL-MCNC: 0.8 MG/DL (ref 0.5–1.4)
DIFFERENTIAL METHOD: ABNORMAL
EOSINOPHIL # BLD AUTO: 0.2 K/UL (ref 0–0.5)
EOSINOPHIL NFR BLD: 2.2 % (ref 0–8)
ERYTHROCYTE [DISTWIDTH] IN BLOOD BY AUTOMATED COUNT: 16.3 % (ref 11.5–14.5)
EST. GFR  (AFRICAN AMERICAN): >60 ML/MIN/1.73 M^2
EST. GFR  (NON AFRICAN AMERICAN): >60 ML/MIN/1.73 M^2
GLUCOSE SERPL-MCNC: 103 MG/DL (ref 70–110)
HCT VFR BLD AUTO: 27.7 % (ref 40–54)
HGB BLD-MCNC: 8.5 G/DL (ref 14–18)
IMM GRANULOCYTES # BLD AUTO: 0.13 K/UL (ref 0–0.04)
IMM GRANULOCYTES NFR BLD AUTO: 1.6 % (ref 0–0.5)
LYMPHOCYTES # BLD AUTO: 1.4 K/UL (ref 1–4.8)
LYMPHOCYTES NFR BLD: 17.4 % (ref 18–48)
MAGNESIUM SERPL-MCNC: 1.7 MG/DL (ref 1.6–2.6)
MCH RBC QN AUTO: 27.4 PG (ref 27–31)
MCHC RBC AUTO-ENTMCNC: 30.7 G/DL (ref 32–36)
MCV RBC AUTO: 89 FL (ref 82–98)
MONOCYTES # BLD AUTO: 0.6 K/UL (ref 0.3–1)
MONOCYTES NFR BLD: 7.2 % (ref 4–15)
NEUTROPHILS # BLD AUTO: 5.6 K/UL (ref 1.8–7.7)
NEUTROPHILS NFR BLD: 71.1 % (ref 38–73)
NRBC BLD-RTO: 0 /100 WBC
PHOSPHATE SERPL-MCNC: 3.3 MG/DL (ref 2.7–4.5)
PLATELET # BLD AUTO: 157 K/UL (ref 150–350)
PMV BLD AUTO: 9.2 FL (ref 9.2–12.9)
POCT GLUCOSE: 104 MG/DL (ref 70–110)
POCT GLUCOSE: 111 MG/DL (ref 70–110)
POCT GLUCOSE: 112 MG/DL (ref 70–110)
POCT GLUCOSE: 118 MG/DL (ref 70–110)
POCT GLUCOSE: 121 MG/DL (ref 70–110)
POCT GLUCOSE: 128 MG/DL (ref 70–110)
POCT GLUCOSE: 85 MG/DL (ref 70–110)
POCT GLUCOSE: 88 MG/DL (ref 70–110)
POTASSIUM SERPL-SCNC: 3.7 MMOL/L (ref 3.5–5.1)
PROT SERPL-MCNC: 5.9 G/DL (ref 6–8.4)
RBC # BLD AUTO: 3.1 M/UL (ref 4.6–6.2)
SODIUM SERPL-SCNC: 138 MMOL/L (ref 136–145)
WBC # BLD AUTO: 7.89 K/UL (ref 3.9–12.7)

## 2020-02-02 PROCEDURE — 25000003 PHARM REV CODE 250: Performed by: NURSE PRACTITIONER

## 2020-02-02 PROCEDURE — S4991 NICOTINE PATCH NONLEGEND: HCPCS | Performed by: NURSE PRACTITIONER

## 2020-02-02 PROCEDURE — 25000242 PHARM REV CODE 250 ALT 637 W/ HCPCS: Performed by: NURSE PRACTITIONER

## 2020-02-02 PROCEDURE — 94640 AIRWAY INHALATION TREATMENT: CPT

## 2020-02-02 PROCEDURE — 99232 SBSQ HOSP IP/OBS MODERATE 35: CPT | Mod: ,,, | Performed by: NURSE PRACTITIONER

## 2020-02-02 PROCEDURE — 99232 PR SUBSEQUENT HOSPITAL CARE,LEVL II: ICD-10-PCS | Mod: ,,, | Performed by: NEUROLOGICAL SURGERY

## 2020-02-02 PROCEDURE — 92610 EVALUATE SWALLOWING FUNCTION: CPT

## 2020-02-02 PROCEDURE — 25000242 PHARM REV CODE 250 ALT 637 W/ HCPCS: Performed by: PSYCHIATRY & NEUROLOGY

## 2020-02-02 PROCEDURE — 94761 N-INVAS EAR/PLS OXIMETRY MLT: CPT

## 2020-02-02 PROCEDURE — 63600175 PHARM REV CODE 636 W HCPCS: Performed by: NURSE PRACTITIONER

## 2020-02-02 PROCEDURE — 99232 PR SUBSEQUENT HOSPITAL CARE,LEVL II: ICD-10-PCS | Mod: ,,, | Performed by: NURSE PRACTITIONER

## 2020-02-02 PROCEDURE — 85025 COMPLETE CBC W/AUTO DIFF WBC: CPT

## 2020-02-02 PROCEDURE — 84100 ASSAY OF PHOSPHORUS: CPT

## 2020-02-02 PROCEDURE — 63600175 PHARM REV CODE 636 W HCPCS: Performed by: STUDENT IN AN ORGANIZED HEALTH CARE EDUCATION/TRAINING PROGRAM

## 2020-02-02 PROCEDURE — 27000221 HC OXYGEN, UP TO 24 HOURS

## 2020-02-02 PROCEDURE — 83735 ASSAY OF MAGNESIUM: CPT

## 2020-02-02 PROCEDURE — 25000003 PHARM REV CODE 250: Performed by: INTERNAL MEDICINE

## 2020-02-02 PROCEDURE — 80053 COMPREHEN METABOLIC PANEL: CPT

## 2020-02-02 PROCEDURE — 25000003 PHARM REV CODE 250: Performed by: STUDENT IN AN ORGANIZED HEALTH CARE EDUCATION/TRAINING PROGRAM

## 2020-02-02 PROCEDURE — 99232 SBSQ HOSP IP/OBS MODERATE 35: CPT | Mod: ,,, | Performed by: NEUROLOGICAL SURGERY

## 2020-02-02 PROCEDURE — 11000001 HC ACUTE MED/SURG PRIVATE ROOM

## 2020-02-02 RX ORDER — LEVALBUTEROL INHALATION SOLUTION 0.63 MG/3ML
0.63 SOLUTION RESPIRATORY (INHALATION) EVERY 8 HOURS
Status: DISCONTINUED | OUTPATIENT
Start: 2020-02-02 | End: 2020-02-07 | Stop reason: HOSPADM

## 2020-02-02 RX ORDER — POLYETHYLENE GLYCOL 3350 17 G/17G
17 POWDER, FOR SOLUTION ORAL 2 TIMES DAILY
Status: DISCONTINUED | OUTPATIENT
Start: 2020-02-02 | End: 2020-02-07 | Stop reason: HOSPADM

## 2020-02-02 RX ORDER — PANTOPRAZOLE SODIUM 40 MG/1
40 TABLET, DELAYED RELEASE ORAL DAILY
Status: DISCONTINUED | OUTPATIENT
Start: 2020-02-03 | End: 2020-02-07 | Stop reason: HOSPADM

## 2020-02-02 RX ORDER — ACETAMINOPHEN 325 MG/1
650 TABLET ORAL EVERY 6 HOURS PRN
Status: DISCONTINUED | OUTPATIENT
Start: 2020-02-03 | End: 2020-02-03

## 2020-02-02 RX ORDER — LEVETIRACETAM 500 MG/1
1000 TABLET ORAL 2 TIMES DAILY
Status: DISCONTINUED | OUTPATIENT
Start: 2020-02-02 | End: 2020-02-07 | Stop reason: HOSPADM

## 2020-02-02 RX ADMIN — HEPARIN SODIUM 5000 UNITS: 5000 INJECTION, SOLUTION INTRAVENOUS; SUBCUTANEOUS at 05:02

## 2020-02-02 RX ADMIN — SENNOSIDES AND DOCUSATE SODIUM 1 TABLET: 8.6; 5 TABLET ORAL at 08:02

## 2020-02-02 RX ADMIN — ATORVASTATIN CALCIUM 80 MG: 20 TABLET, FILM COATED ORAL at 09:02

## 2020-02-02 RX ADMIN — HEPARIN SODIUM 5000 UNITS: 5000 INJECTION, SOLUTION INTRAVENOUS; SUBCUTANEOUS at 02:02

## 2020-02-02 RX ADMIN — HEPARIN SODIUM 5000 UNITS: 5000 INJECTION, SOLUTION INTRAVENOUS; SUBCUTANEOUS at 09:02

## 2020-02-02 RX ADMIN — SENNOSIDES AND DOCUSATE SODIUM 1 TABLET: 8.6; 5 TABLET ORAL at 09:02

## 2020-02-02 RX ADMIN — LEVALBUTEROL HYDROCHLORIDE 0.63 MG: 0.63 SOLUTION RESPIRATORY (INHALATION) at 04:02

## 2020-02-02 RX ADMIN — METOPROLOL TARTRATE 25 MG: 25 TABLET ORAL at 08:02

## 2020-02-02 RX ADMIN — METOPROLOL TARTRATE 25 MG: 25 TABLET ORAL at 09:02

## 2020-02-02 RX ADMIN — LEVETIRACETAM 1000 MG: 500 TABLET ORAL at 09:02

## 2020-02-02 RX ADMIN — MAGNESIUM SULFATE IN WATER 2 G: 40 INJECTION, SOLUTION INTRAVENOUS at 02:02

## 2020-02-02 RX ADMIN — POLYETHYLENE GLYCOL 3350 17 G: 17 POWDER, FOR SOLUTION ORAL at 09:02

## 2020-02-02 RX ADMIN — NICOTINE 1 PATCH: 21 PATCH, EXTENDED RELEASE TRANSDERMAL at 09:02

## 2020-02-02 RX ADMIN — IPRATROPIUM BROMIDE AND ALBUTEROL SULFATE 3 ML: .5; 3 SOLUTION RESPIRATORY (INHALATION) at 07:02

## 2020-02-02 RX ADMIN — ACETAMINOPHEN 650 MG: 325 TABLET ORAL at 11:02

## 2020-02-02 RX ADMIN — IPRATROPIUM BROMIDE AND ALBUTEROL SULFATE 3 ML: .5; 3 SOLUTION RESPIRATORY (INHALATION) at 12:02

## 2020-02-02 RX ADMIN — POTASSIUM CHLORIDE 40 MEQ: 7.46 INJECTION, SOLUTION INTRAVENOUS at 02:02

## 2020-02-02 NOTE — PROGRESS NOTES
[facility]  Neurosurgery  Progress Note    Subjective:     History of Present Illness: 59 M with hx of paroxysmal afib on coumadin presents for eval of bilateral shoulder dislocations and left convexity SDH.  Pt had first time seizure and slumped over in chair.  There was no LOC.  He was given vit k and kcentra at OSH for hx of coumadin use.  He endorses low back pain but no neck pain.  No sensation loss in his extremities.    Post-Op Info:  Procedure(s) (LRB):  HEMIARTHROPLASTY, SHOULDER- RIGHT- MEKA- SUPINE-  TRIOS (Right)   3 Days Post-Op     Interval History: No acute events overnight.  Patient in TLSO.  Otherwise stable.    Medications:  Continuous Infusions:  Scheduled Meds:   atorvastatin  80 mg Oral QHS    heparin (porcine)  5,000 Units Subcutaneous Q8H    levalbuterol  0.63 mg Nebulization Q8H    levETIRAcetam  1,000 mg Oral BID    metoprolol tartrate  25 mg Oral BID    nicotine  1 patch Transdermal Daily    senna-docusate 8.6-50 mg  1 tablet Oral BID     PRN Meds:Dextrose 10% Bolus, Dextrose 10% Bolus, Dextrose 10% Bolus, glucagon (human recombinant), glucose, glucose, insulin aspart U-100, magnesium sulfate IVPB, ondansetron, [DISCONTINUED] potassium chloride in water **AND** potassium chloride in water **AND** [DISCONTINUED] potassium chloride in water, sodium chloride 0.9%     Review of Systems  Objective:     Weight: 110.7 kg (244 lb)  Body mass index is 34.03 kg/m².  Vital Signs (Most Recent):  Temp: 98 °F (36.7 °C) (02/02/20 1334)  Pulse: 90 (02/02/20 1334)  Resp: 16 (02/02/20 1334)  BP: 125/72 (02/02/20 1334)  SpO2: 96 % (02/02/20 1334) Vital Signs (24h Range):  Temp:  [98 °F (36.7 °C)-98.4 °F (36.9 °C)] 98 °F (36.7 °C)  Pulse:  [] 90  Resp:  [11-22] 16  SpO2:  [93 %-100 %] 96 %  BP: (116-163)/(56-99) 125/72     Date 02/02/20 0700 - 02/03/20 0659   Shift 6601-3294 5415-6027 0699-8820 24 Hour Total   INTAKE   P.O. 200   200   I.V.(mL/kg) 311.7(2.8)   311.7(2.8)    Shift Total(mL/kg) 511.7(4.6)   511.7(4.6)   OUTPUT   Urine(mL/kg/hr) 1175   1175   Shift Total(mL/kg) 1175(10.6)   1175(10.6)   Weight (kg) 110.7 110.7 110.7 110.7                   Male External Urinary Catheter 01/31/20 1756 Small (Active)   Collection Container Urimeter 2/2/2020 11:05 AM   Securement Method secured to top of thigh w/ adhesive device 2/2/2020 11:05 AM   Skin no redness;no breakdown 2/2/2020 11:05 AM   Tolerance no signs/symptoms of discomfort 2/2/2020 11:05 AM   Output (mL) 0 mL 2/2/2020 12:05 PM   Catheter Change Date 02/02/20 2/2/2020 11:05 AM   Catheter Change Time 0705 2/2/2020 11:05 AM       Neurosurgery Physical Exam     [id number]  AOx2, FCx4  PERRL  BUE in cast, 5/5 strength in hand   5/5 strength BLE in all muscle groups  TLSO on.       Significant Labs:  Recent Labs   Lab 02/01/20  0234 02/01/20  1451 02/02/20  0115   GLU 88  --  103     --  138   K 3.5 3.6 3.7     --  101   CO2 29  --  29   BUN 16  --  16   CREATININE 0.8  --  0.8   CALCIUM 7.9*  --  8.3*   MG 1.6  --  1.7     Recent Labs   Lab 02/01/20  0234 02/02/20  0115   WBC 8.07 7.89   HGB 7.6* 8.5*   HCT 25.5* 27.7*   * 157     No results for input(s): LABPT, INR, APTT in the last 48 hours.  Microbiology Results (last 7 days)     ** No results found for the last 168 hours. **        All pertinent labs from the last 24 hours have been reviewed.    Significant Diagnostics:  I have reviewed all pertinent imaging results/findings within the past 24 hours.    Assessment/Plan:     * Subdural hematoma  59 M with hx of paroxysmal afib on coumadin presents for eval of bilateral shoulder dislocations, left convexity SDH, L1 compression fracture, L4 burst fracture.    -continue care per NCC  -q 1 hr neuro checks  -Repeat CT head stable  -Hold coumadin, goal INR of less than 1.4  -both shoulders repaired by ortho, appreciate recs  -TLSO when OOB/upright  -would like sitting upright L-spine x-rays in TLSO when able  -MRI  L spine complete. Likely will need RadTx to L spine given cancer hx.   -C spine cleared by Ortho. C collar for comfort              Maciej Thapa MD  Neurosurgery  [facility]

## 2020-02-02 NOTE — ASSESSMENT & PLAN NOTE
Initiate Zoponex (may assist with decreasing tachycardia associated with Duo neb treatments)  Maintain Sat >88%  Oxygen as needed

## 2020-02-02 NOTE — ASSESSMENT & PLAN NOTE
- continue metoprolol   - currently rate controlled   - anticoagulation reversed and held in setting of SDH

## 2020-02-02 NOTE — SUBJECTIVE & OBJECTIVE
Interval History:  POD #3 left shoulder hemiarthroplasty on (01/28) and  right shoulder hemiarthroplasty on (01/30). Neurologically intact with intermittent episodes of confusion. Easily redirected. CTH stable left-sided SDH with diminishing attenuation, minimal subdural along the left tentorium without evidence for significant interval detrimental change. No current plan for NSGY intervention r/t spine. Will reassess in the future. Continue TSLO brace when OOB per ORTHO. Tolerating without difficulty. Currently no weight bearing or ROM. Bilat arm slings intact. PT following. Will re-consult OT when appropriateContinue Keppra for seizure prophylaxis. No incident of seizure activity reported since admission. Able to maintain SBP < 160. Asymptomatic tachycardia noted since admission; Metoprolol scheduled. Pain  Well controlled. Electrolyte replacements administered as needed. Heparin for DVT prophylaxis. Patient stable for TTF.     Review of Systems   Constitutional: Positive for activity change.   HENT: Negative.    Eyes: Negative.    Respiratory: Negative.    Cardiovascular: Negative.    Gastrointestinal: Negative.    Endocrine: Negative.    Genitourinary: Negative.    Musculoskeletal: Positive for back pain and neck pain.   Allergic/Immunologic: Negative.    Neurological: Positive for weakness.   Psychiatric/Behavioral: Positive for confusion.       Objective:     Vitals:  Temp: 98 °F (36.7 °C)  Pulse: 90  Rhythm: sinus tachycardia  BP: 125/72  MAP (mmHg): 99  Resp: 16  SpO2: 96 %  O2 Device (Oxygen Therapy): nasal cannula w/ humidification    Temp  Min: 98 °F (36.7 °C)  Max: 98.3 °F (36.8 °C)  Pulse  Min: 90  Max: 112  BP  Min: 116/67  Max: 163/67  MAP (mmHg)  Min: 59  Max: 106  Resp  Min: 11  Max: 22  SpO2  Min: 95 %  Max: 100 %    02/01 0701 - 02/02 0700  In: 4305 [P.O.:210; I.V.:2545]  Out: 3420 [Urine:3420]   Unmeasured Output  Urine Occurrence: 1  Pad Count: 2       Physical Exam   Constitutional: He appears  well-developed and well-nourished.   HENT:   Head: Normocephalic and atraumatic.   Eyes: Pupils are equal, round, and reactive to light. EOM are normal.   Neck: Normal range of motion. Neck supple. No tracheal deviation present.   Cardiovascular: Tachycardia present. Exam reveals no friction rub.   No murmur heard.  Pulmonary/Chest: Effort normal.   Abdominal: Soft. Bowel sounds are normal. He exhibits no distension.   Neurological:   Mental Status:  Awake, follows commands, occasional confusion to place/time   Negative   Aphasia Dysarthria Ataxia Apraxia   Pupils  3mm, PERRL.   +Corneal reflex, +gag, +cough   Bilat Upper extremity in brace/ weakness noted  Spontaneous movement of lower extremities       Nursing note and vitals reviewed.    Unable to test judgment, insight, fund of knowledge, coordination, gait due to level of consciousness.    Medications:  Continuous Scheduled  atorvastatin 80 mg QHS   heparin (porcine) 5,000 Units Q8H   levalbuterol 0.63 mg Q8H   levETIRAcetam 1,000 mg BID   metoprolol tartrate 25 mg BID   nicotine 1 patch Daily   polyethylene glycol 17 g BID   senna-docusate 8.6-50 mg 1 tablet BID   PRN  Dextrose 10% Bolus 12.5 g PRN   Dextrose 10% Bolus 12.5 g PRN   Dextrose 10% Bolus 25 g PRN   glucagon (human recombinant) 1 mg PRN   glucose 16 g PRN   glucose 24 g PRN   insulin aspart U-100 1-10 Units QID (AC + HS) PRN   magnesium sulfate IVPB 2 g PRN   ondansetron 4 mg Q8H PRN   potassium chloride in water 40 mEq PRN   sodium chloride 0.9% 10 mL PRN     Today I personally reviewed pertinent medications, lines/drains/airways, imaging, laboratory results, notably:    Diet  [facility]; 2000 Calorie  Diet diabetic [facility]; 2000 Calorie

## 2020-02-02 NOTE — PROGRESS NOTES
[facility]  Orthopedics  Progress Note    Patient Name: Ben Melvin  MRN: [id number]  Admission Date: 1/27/2020  Hospital Length of Stay: 6 days  Attending Provider: Walter Broussard MD  Primary Care Provider: Cristal Ohara NP  Follow-up For: Procedure(s) (LRB):  HEMIARTHROPLASTY, SHOULDER- RIGHT- MEKA- SUPINE-  TRIOS (Right)    Post-Operative Day: 3 Days Post-Op  Subjective:     Principal Problem:Subdural hematoma    Principal Orthopedic Problem: Bilateral prox humerus fractures     Interval History: Patient seen and examined at bedside. NAEON. Mentation improving. Patient is no longer confused per family. New left abduction brace is now fitting comfortably.      Review of patient's allergies indicates:  No Known Allergies    Current Facility-Administered Medications   Medication    0.9%  NaCl infusion    albuterol-ipratropium 2.5 mg-0.5 mg/3 mL nebulizer solution 3 mL    atorvastatin tablet 80 mg    dextrose 10% (D10W) Bolus    dextrose 10% (D10W) Bolus    dextrose 10% (D10W) Bolus    fentaNYL injection 50 mcg    glucagon (human recombinant) injection 1 mg    glucose chewable tablet 16 g    glucose chewable tablet 24 g    heparin (porcine) injection 5,000 Units    insulin aspart U-100 pen 1-10 Units    labetalol 20 mg/4 mL (5 mg/mL) IV syring    levETIRAcetam in NaCl (iso-os) IVPB 1,000 mg    magnesium sulfate 2g in water 50mL IVPB (premix)    magnesium sulfate 2g in water 50mL IVPB (premix)    metoprolol tartrate (LOPRESSOR) tablet 25 mg    nicotine 21 mg/24 hr 1 patch    ondansetron injection 4 mg    potassium chloride 10 mEq in 100 mL IVPB    And    potassium chloride 10 mEq in 100 mL IVPB    And    potassium chloride 10 mEq in 100 mL IVPB    senna-docusate 8.6-50 mg per tablet 1 tablet    sodium chloride 0.9% flush 10 mL    sodium phosphate 15 mmol in dextrose 5 % 250 mL IVPB    sodium phosphate 20.01 mmol in dextrose 5 % 250 mL IVPB    sodium  "phosphate 30 mmol in dextrose 5 % 250 mL IVPB     Objective:     Vital Signs (Most Recent):  Temp: 98.3 °F (36.8 °C) (02/02/20 0301)  Pulse: 100 (02/02/20 0601)  Resp: 19 (02/02/20 0601)  BP: 138/65 (02/02/20 0601)  SpO2: 98 % (02/02/20 0601) Vital Signs (24h Range):  Temp:  [98 °F (36.7 °C)-98.4 °F (36.9 °C)] 98.3 °F (36.8 °C)  Pulse:  [] 100  Resp:  [11-24] 19  SpO2:  [93 %-99 %] 98 %  BP: ()/(47-99) 138/65     Weight: 110.7 kg (244 lb)  Height: 5' 11" (180.3 cm)  Body mass index is 34.03 kg/m².      Intake/Output Summary (Last 24 hours) at 2/2/2020 0703  Last data filed at 2/2/2020 0601  Gross per 24 hour   Intake 4305 ml   Output 3420 ml   Net 885 ml       Ortho/SPM Exam     Physical Exam:  NAD, A/O x 3.  Wound c/d/i with clean dressing.  No focal motor or sensory deficits to bilateral UE    Significant Labs:   CBC:   Recent Labs   Lab 02/01/20  0234 02/02/20  0115   WBC 8.07 7.89   HGB 7.6* 8.5*   HCT 25.5* 27.7*   * 157     All pertinent labs within the past 24 hours have been reviewed.    Significant Imaging: I have reviewed and interpreted all pertinent imaging results/findings.    Assessment/Plan:      fracture dislocation of bilateral shoulders  Ben Melvin is a 60 y.o. male status post left shoulder hemiarthroplasty on January 28, 2020  and status post right shoulder hemiarthroplasty on January 30, 2020.    - Weight bearing status: NWB and NO ROM of bilateral  shoulder  - patient no longer has to wear cervical collar, LSO brace to be continued per Neurosurgery  - Pain control: Per APS  - DVT prophylaxis per primary  -Labs: hgb 8.5    New L abduction brace fitting well today.    Dispo: Continue rehabilitation, per primary                     [facility] Lukasz Chaney MD  Orthopedics  [facility]

## 2020-02-02 NOTE — ASSESSMENT & PLAN NOTE
59 M with hx of paroxysmal afib on coumadin presents for eval of bilateral shoulder dislocations, left convexity SDH, L1 compression fracture, L4 burst fracture.    -continue care per NCC  -q 1 hr neuro checks  -Repeat CT head stable  -Hold coumadin, goal INR of less than 1.4  -both shoulders repaired by ortho, appreciate recs  -TLSO when OOB/upright  -would like sitting upright L-spine x-rays in TLSO when able  -MRI L spine complete. Likely will need RadTx to L spine given cancer hx.   -C spine cleared by Ortho. C collar for comfort

## 2020-02-02 NOTE — ASSESSMENT & PLAN NOTE
- Stopped daily drinking several years ago   - reports intermittent ETOH use   Monitor for alcohol withdrawals

## 2020-02-02 NOTE — SUBJECTIVE & OBJECTIVE
Interval History: No acute events overnight.  Patient in TLSO.  Otherwise stable.    Medications:  Continuous Infusions:  Scheduled Meds:   atorvastatin  80 mg Oral QHS    heparin (porcine)  5,000 Units Subcutaneous Q8H    levalbuterol  0.63 mg Nebulization Q8H    levETIRAcetam  1,000 mg Oral BID    metoprolol tartrate  25 mg Oral BID    nicotine  1 patch Transdermal Daily    senna-docusate 8.6-50 mg  1 tablet Oral BID     PRN Meds:Dextrose 10% Bolus, Dextrose 10% Bolus, Dextrose 10% Bolus, glucagon (human recombinant), glucose, glucose, insulin aspart U-100, magnesium sulfate IVPB, ondansetron, [DISCONTINUED] potassium chloride in water **AND** potassium chloride in water **AND** [DISCONTINUED] potassium chloride in water, sodium chloride 0.9%     Review of Systems  Objective:     Weight: 110.7 kg (244 lb)  Body mass index is 34.03 kg/m².  Vital Signs (Most Recent):  Temp: 98 °F (36.7 °C) (02/02/20 1334)  Pulse: 90 (02/02/20 1334)  Resp: 16 (02/02/20 1334)  BP: 125/72 (02/02/20 1334)  SpO2: 96 % (02/02/20 1334) Vital Signs (24h Range):  Temp:  [98 °F (36.7 °C)-98.4 °F (36.9 °C)] 98 °F (36.7 °C)  Pulse:  [] 90  Resp:  [11-22] 16  SpO2:  [93 %-100 %] 96 %  BP: (116-163)/(56-99) 125/72     Date 02/02/20 0700 - 02/03/20 0659   Shift 0854-4431 6191-4403 1481-5032 24 Hour Total   INTAKE   P.O. 200   200   I.V.(mL/kg) 311.7(2.8)   311.7(2.8)   Shift Total(mL/kg) 511.7(4.6)   511.7(4.6)   OUTPUT   Urine(mL/kg/hr) 1175   1175   Shift Total(mL/kg) 1175(10.6)   1175(10.6)   Weight (kg) 110.7 110.7 110.7 110.7                   Male External Urinary Catheter 01/31/20 1756 Small (Active)   Collection Container Urimeter 2/2/2020 11:05 AM   Securement Method secured to top of thigh w/ adhesive device 2/2/2020 11:05 AM   Skin no redness;no breakdown 2/2/2020 11:05 AM   Tolerance no signs/symptoms of discomfort 2/2/2020 11:05 AM   Output (mL) 0 mL 2/2/2020 12:05 PM   Catheter Change Date 02/02/20 2/2/2020 11:05 AM    Catheter Change Time 0705 2/2/2020 11:05 AM       Neurosurgery Physical Exam     E4V4M6  AOx2, FCx4  PERRL  BUE in cast, 5/5 strength in hand   5/5 strength BLE in all muscle groups  TLSO on.       Significant Labs:  Recent Labs   Lab 02/01/20  0234 02/01/20  1451 02/02/20  0115   GLU 88  --  103     --  138   K 3.5 3.6 3.7     --  101   CO2 29  --  29   BUN 16  --  16   CREATININE 0.8  --  0.8   CALCIUM 7.9*  --  8.3*   MG 1.6  --  1.7     Recent Labs   Lab 02/01/20  0234 02/02/20  0115   WBC 8.07 7.89   HGB 7.6* 8.5*   HCT 25.5* 27.7*   * 157     No results for input(s): LABPT, INR, APTT in the last 48 hours.  Microbiology Results (last 7 days)     ** No results found for the last 168 hours. **        All pertinent labs from the last 24 hours have been reviewed.    Significant Diagnostics:  I have reviewed all pertinent imaging results/findings within the past 24 hours.

## 2020-02-02 NOTE — PLAN OF CARE
CT head stable  D/w neurosurgery if no anticipated surgical intervention will transfer to the F  Mobilize with brace as tolerated.   SCD/heparin

## 2020-02-02 NOTE — PLAN OF CARE
POC reviewed with pt and family at 0500. Pt and family verbalized understanding. Questions and concerns addressed. No acute events overnight. Pt progressing toward goals. Will continue to monitor. See flowsheets for full assessment and VS info   Turns Q2hr. Bath complete. Linen changed. Fentanyl 50mcg given x2 for pain.   K+ and Mag replaced.

## 2020-02-02 NOTE — PROGRESS NOTES
[facility]  Neurocritical Care  Progress Note    Admit Date: 1/27/2020  Service Date: 02/02/2020  Length of Stay: 6    Subjective:     Chief Complaint: Subdural hematoma    History of Present Illness: The patient is a 60 yo male with ETOH abuse, tobacco abuse,  PAF  with long term anticoagulation (coumadin) who presents to [facility] for SDH and bilateral shoulder dislocation. He  presented to OSH via EMS for acute onset of  SOB and reported seizure activity. At approx 5 pm he was playing video poker, felt light headed, and was assisted  Ground by family. He had what the family felt was a seizure but appeared awake during it. He was disoriented and confused after. He then reported had SOB, chest pain, and BUE pain. Patient also reports new onset severe lower back pain. He returned to baseline mental status shortly after per family. CTH and chest at OSH revealed SDH and bilateral upper ext dislocations. He received Kcentra and Vit K at OSH for reversal.  He is being admitted to [facility] for a higher level of care.     Hospital Course: 1/27: Admit NCC, repeat CTH, SBP <160, consult NSGY and ortho, received Kcentra and vit K at OSH  1/28: Keppra increased to 1G BD, MRI C-spine negative for compression fracture, NSGY cleared for undergoing ortho procedure under C-collar, normal PT/INR, no acute intervention per NSGY will hold anticoagulation, F/U with MRI L-Spine, Needs epilepsy follow up, PRN Morphine for pain  1/29: intubated on propofol, S/P left shoulder arthroplasty, started on tube feeds, Right shoulder arthroplasty tomorrow, scheduled albuterol, MRI when able, Nicotine patch post MRI  01/30/2020- s/p pod#0 right shoulder hemiarthroplasty, d/c propofol and start precedex, add nicotine patch, plan to extubate tomorrow AM  01/31/2020-plan to extubate today  02/01/2020-follow CT head in AM  02/02/2020 dc IVF, stable for step down    Interval History:  POD #3 left shoulder hemiarthroplasty on (01/28) and   right shoulder hemiarthroplasty on (01/30). Neurologically intact with intermittent episodes of confusion. Easily redirected. CTH stable left-sided SDH with diminishing attenuation, minimal subdural along the left tentorium without evidence for significant interval detrimental change. No current plan for NSGY intervention r/t spine. Will reassess in the future. Continue TSLO brace when OOB per ORTHO. Tolerating without difficulty. Currently no weight bearing or ROM. Bilat arm slings intact. PT following. Will re-consult OT when appropriateContinue Keppra for seizure prophylaxis. No incident of seizure activity reported since admission. Able to maintain SBP < 160. Asymptomatic tachycardia noted since admission; Metoprolol scheduled. Pain  Well controlled. Electrolyte replacements administered as needed. Heparin for DVT prophylaxis. Patient stable for TTF.     Review of Systems   Constitutional: Positive for activity change.   HENT: Negative.    Eyes: Negative.    Respiratory: Negative.    Cardiovascular: Negative.    Gastrointestinal: Negative.    Endocrine: Negative.    Genitourinary: Negative.    Musculoskeletal: Positive for back pain and neck pain.   Allergic/Immunologic: Negative.    Neurological: Positive for weakness.   Psychiatric/Behavioral: Positive for confusion.       Objective:     Vitals:  Temp: 98 °F (36.7 °C)  Pulse: 90  Rhythm: sinus tachycardia  BP: 125/72  MAP (mmHg): 99  Resp: 16  SpO2: 96 %  O2 Device (Oxygen Therapy): nasal cannula w/ humidification    Temp  Min: 98 °F (36.7 °C)  Max: 98.3 °F (36.8 °C)  Pulse  Min: 90  Max: 112  BP  Min: 116/67  Max: 163/67  MAP (mmHg)  Min: 59  Max: 106  Resp  Min: 11  Max: 22  SpO2  Min: 95 %  Max: 100 %    02/01 0701 - 02/02 0700  In: 4305 [P.O.:210; I.V.:2545]  Out: 3420 [Urine:3420]   Unmeasured Output  Urine Occurrence: 1  Pad Count: 2       Physical Exam   Constitutional: He appears well-developed and well-nourished.   HENT:   Head: Normocephalic and  atraumatic.   Eyes: Pupils are equal, round, and reactive to light. EOM are normal.   Neck: Normal range of motion. Neck supple. No tracheal deviation present.   Cardiovascular: Tachycardia present. Exam reveals no friction rub.   No murmur heard.  Pulmonary/Chest: Effort normal.   Abdominal: Soft. Bowel sounds are normal. He exhibits no distension.   Neurological:   Mental Status:  Awake, follows commands, occasional confusion to place/time   Negative   Aphasia Dysarthria Ataxia Apraxia   Pupils  3mm, PERRL.   +Corneal reflex, +gag, +cough   Bilat Upper extremity in brace/ weakness noted  Spontaneous movement of lower extremities       Nursing note and vitals reviewed.    Unable to test judgment, insight, fund of knowledge, coordination, gait due to level of consciousness.    Medications:  Continuous Scheduled  atorvastatin 80 mg QHS   heparin (porcine) 5,000 Units Q8H   levalbuterol 0.63 mg Q8H   levETIRAcetam 1,000 mg BID   metoprolol tartrate 25 mg BID   nicotine 1 patch Daily   polyethylene glycol 17 g BID   senna-docusate 8.6-50 mg 1 tablet BID   PRN  Dextrose 10% Bolus 12.5 g PRN   Dextrose 10% Bolus 12.5 g PRN   Dextrose 10% Bolus 25 g PRN   glucagon (human recombinant) 1 mg PRN   glucose 16 g PRN   glucose 24 g PRN   insulin aspart U-100 1-10 Units QID (AC + HS) PRN   magnesium sulfate IVPB 2 g PRN   ondansetron 4 mg Q8H PRN   potassium chloride in water 40 mEq PRN   sodium chloride 0.9% 10 mL PRN     Today I personally reviewed pertinent medications, lines/drains/airways, imaging, laboratory results, notably:    Diet  Diet diabetic [facility]; 2000 Calorie  Diet diabetic [facility]; 2000 Calorie          Assessment/Plan:     Neuro  * Subdural hematoma  - NSGY following, no intervention  - Kcentra and Vit K for warfarin  - Hold anticoagulation at this time  - SBP <160   - Q 1 vitals  - Q1 neuro Checks  PT following  Re-consult OT when appropriate  - Repeat CTH stable  - TLSO when  OOB/upright            Psychiatric  Alcohol abuse  - Stopped daily drinking several years ago   - reports intermittent ETOH use   Monitor for alcohol withdrawals    Pulmonary  COPD (chronic obstructive pulmonary disease)  Initiate Zoponex (may assist with decreasing tachycardia associated with Duo neb treatments)  Maintain Sat >88%  Oxygen as needed    Cardiac/Vascular  Hypertension, essential  - SBP <160  - metoprolol for rate control  - Labetalol prn   ·  The estimated ejection fraction is 60%.  · Concentric left ventricular remodeling.  Anterior pericardial effusion with echodensity adherent to the anterior surface.    PAF (paroxysmal atrial fibrillation)  - continue metoprolol   - currently rate controlled   - anticoagulation reversed and held in setting of SDH    Hyperlipidemia  Monitor Lipid panel   - continue atorvastatin     Hematology  Long term (current) use of anticoagulants  - see SDH   CT head stable  Coag monitoring prn    Endocrine  Diabetes mellitus due to underlying condition with diabetic autonomic neuropathy, without long-term current use of insulin  - A1c 5.9  - SSI with accu checks   RISS AC/HS    GI  GERD with esophagitis  - continue Protonix     Orthopedic  Dislocation of right shoulder joint  As noted on imaging   ORTHO Following      Dislocation of left shoulder joint  As noted on imaging   ORTHO Following     fracture dislocation of bilateral shoulders  S/P Left shoulder Arthroplasty POD #3  Plan for right shoulder arthroplasty tomorrow per ortho  TLSO when OOB/upright  C spine cleared by Ortho. C collar for comfort           Acute bilateral low back pain  - Lumbar vertebral fracture  - MR L-spine pending  - MR C spine shows no fractures with probable C5-C6 stenosis, most of the study affected by motion artifact  - NSGY okay to undergo ortho surgery on C-collar  Future interventions per NSGY to be reassessed, no current intervention planned    Closed fracture of both humeri  - Orthopedic  following    Fracture dislocation of joint of left shoulder girdle  - Ortho Following  - S/P Left shoulder arthroplasty   - s/p Right shoulder arthroplasty     Other  Closed displaced fracture of acromial process of left scapula  As noted on imaging  ORTHO following    Closed displaced fracture of acromial process of right scapula  As noted on imaging  ORTHO Following    Shortness of breath  Nebulizer as needed  Oxygen as needed  Continuous pulse ox monitoring    Current every day smoker  - nicotine patch   Continue nebulizer treatments          The patient is being Prophylaxed for:  Venous Thromboembolism with: Mechanical or Chemical  Stress Ulcer with: PPI  Ventilator Pneumonia with: none    Activity Orders          Diet diabetic [facility]; 2000 Calorie: Diabetic starting at 02/01 1051    Commode at bedside starting at 01/27 2238        Full Code    Govind Willson NP  Neurocritical Care  [facility]

## 2020-02-02 NOTE — PT/OT/SLP EVAL
Speech Language Pathology Evaluation  Bedside Swallow    Patient Name:  Ben Melvin   MRN:  [id number]  Admitting Diagnosis: Subdural hematoma    Recommendations:                 General Recommendations:  Dysphagia therapy and Cognitive-linguistic evaluation  Diet recommendations:  Regular, Thin   Aspiration Precautions: 1 bite/sip at a time, Assistance with meals, Feed only when awake/alert, HOB to 90 degrees, Monitor for s/s of aspiration, Remain upright 30 minutes post meal, Small bites/sips and Standard aspiration precautions   General Precautions: Standard, fall, aspiration, seizure  Communication strategies:  none    History:     Past Medical History:   Diagnosis Date    Adenoma of right adrenal gland 11/09/2017    Alcohol abuse     ASCVD (arteriosclerotic cardiovascular disease) 10/2008    [place]    BPH (benign prostatic hyperplasia)     Cardiomyopathy     Chronic anxiety     Cirrhosis of liver     COPD (chronic obstructive pulmonary disease)     Current every day smoker     2-3 ppd    Elevated LFTs 08/2001    GERD with esophagitis 11/09/2017    History of colon polyps     History of epididymitis 2016    Juan Antonio    Hyperlipidemia     Hypertension, essential     Long term (current) use of anticoagulants     Lumbar disc disease with radiculopathy 05/2015    Major depression, recurrent, chronic     PAF (paroxysmal atrial fibrillation)     Peripheral autonomic neuropathy due to DM     Proteinuria     PVD (peripheral vascular disease) 05/29/2009    Venous insufficiency of both lower extremities     Vitamin D deficiency        Past Surgical History:   Procedure Laterality Date    biopsy back  05/20/2019    benign Martinez    biopsy right ankle Right 05/20/2019    Martinez, benign    COLONOSCOPY N/A 6/13/2019    Procedure: COLONOSCOPY;  Surgeon: Delmer Kerr MD;  [facility];  Service: Endoscopy;  Laterality: N/A;    COLONOSCOPY W/ POLYPECTOMY      EPIDURAL STEROID INJECTION  INTO LUMBAR SPINE  06/2015    LASHAE Wes    ESOPHAGOGASTRODUODENOSCOPY N/A 6/13/2019    Procedure: ESOPHAGOGASTRODUODENOSCOPY (EGD);  Surgeon: Delmer Kerr MD;  [facility];  Service: Endoscopy;  Laterality: N/A;    INTESTINAL MALROTATION REPAIR Right 1961    2 weeks old, Martinez    PARTIAL ARTHROPLASTY OF SHOULDER Left 1/28/2020    Procedure: HEMIARTHROPLASTY, SHOULDER;  Surgeon: Chandler Chris MD;  Location: [address];  Service: Orthopedics;  Laterality: Left;    PARTIAL ARTHROPLASTY OF SHOULDER Right 1/30/2020    Procedure: HEMIARTHROPLASTY, SHOULDER- RIGHT- MEKA- SUPINE-  TRIOS;  Surgeon: Chandler Chris MD;  Location: [address];  Service: Orthopedics;  Laterality: Right;    PERCUTANEOUS TRANSLUMINAL ANGIOPLASTY (PTA) OF PERIPHERAL VESSEL Right 12/2014    Amadeo, NIKOLAS       Prior Intubation HX:  1/2/-1/31/2020    Chest X-Rays: 1/30/2020- stable    Prior diet: regular/thin    Subjective     Spoke with RN prior to entering pt's room . Pt seen bedside for session with wife present. Alert and agreeable to ST.       Pain/Comfort:  · Pain Rating 1: 0/10  · Pain Rating Post-Intervention 1: 0/10    Objective:     Oral Musculature Evaluation  · Oral Musculature: WFL  · Dentition: scattered dentition  · Secretion Management: adequate  · Mucosal Quality: adequate  · Oral Labial Strength and Mobility: WFL  · Lingual Strength and Mobility: WFL  · Volitional Cough: elicited  · Volitional Swallow: elicited  · Voice Prior to PO Intake: strong, clear    Bedside Swallow Eval:   Consistencies Assessed:  · Thin liquids via cup and straw sips  · Puree via tsp x5  · Solids cracker portions x3     Oral Phase:   · WFL    Pharyngeal Phase:   · no overt clinical signs/symptoms of aspiration  · no overt clinical signs/symptoms of pharyngeal dysphagia    Compensatory Strategies  · None    Treatment: Provided education to pt and family re: role of ST,  POC, recommendations for regular  consistency diet with thin liquids, and plan to f/u to complete cognitive-linguistic evaluation. They verbalized understanding. White board updated. RN and MD notified of results and recs.       Assessment:     Ben Melvin is a 60 y.o. male with an SLP diagnosis of Dysphagia and Cognitive-Linguistic Impairment.      Goals:   Multidisciplinary Problems     SLP Goals        Problem: SLP Goal    Goal Priority Disciplines Outcome   SLP Goal     SLP Ongoing, Progressing   Description:  Goals expected to be met by 2/9:   1. Pt will tolerate least restrictive diet without overt s/sx airway compromise.    2. Pt will participate in cognitive-linguistic evaluation to further develop POC.                       Plan:     · Patient to be seen:  3 x/week   · Plan of Care expires:  03/02/20  · Plan of Care reviewed with:  patient, spouse   · SLP Follow-Up:  Yes       Discharge recommendations:  rehabilitation facility   Barriers to Discharge:  Level of Skilled Assistance Needed .    Time Tracking:     SLP Treatment Date:   02/02/20  Speech Start Time:  0833  Speech Stop Time:  0841     Speech Total Time (min):  8 min    Billable Minutes: Eval Swallow and Oral Function 8 minutes    Aliyah Squires CCC-SLP  02/02/2020

## 2020-02-02 NOTE — ASSESSMENT & PLAN NOTE
- NSGY following, no intervention  - Kcentra and Vit K for warfarin  - Hold anticoagulation at this time  - SBP <160   - Q 1 vitals  - Q1 neuro Checks  PT following  Re-consult OT when appropriate  - Repeat CTH stable  - TLSO when OOB/upright

## 2020-02-02 NOTE — ASSESSMENT & PLAN NOTE
- SBP <160  - metoprolol for rate control  - Labetalol prn   ·  The estimated ejection fraction is 60%.  · Concentric left ventricular remodeling.  Anterior pericardial effusion with echodensity adherent to the anterior surface.

## 2020-02-02 NOTE — ASSESSMENT & PLAN NOTE
- Lumbar vertebral fracture  - MR L-spine pending  - MR C spine shows no fractures with probable C5-C6 stenosis, most of the study affected by motion artifact  - NSGY okay to undergo ortho surgery on C-collar  Future interventions per NSGY to be reassessed, no current intervention planned

## 2020-02-02 NOTE — PLAN OF CARE
Bedside swallow assessment completed. Pt is safe for regular consistency diet with thin liquids. ST to f/u to ensure safety with diet and to complete cognitive-linguistic evaluation. Aliyah Squires CCC-SLP 2/2/2020 2:07 PM

## 2020-02-02 NOTE — ASSESSMENT & PLAN NOTE
S/P Left shoulder Arthroplasty POD #3  Plan for right shoulder arthroplasty tomorrow per ortho  TLSO when OOB/upright  C spine cleared by Ortho. C collar for comfort

## 2020-02-02 NOTE — ASSESSMENT & PLAN NOTE
Ben Melvin is a 60 y.o. male status post left shoulder hemiarthroplasty on January 28, 2020  and status post right shoulder hemiarthroplasty on January 30, 2020.    - Weight bearing status: NWB and NO ROM of bilateral  shoulder  - patient no longer has to wear cervical collar, LSO brace to be continued per Neurosurgery  - Pain control: Per APS  - DVT prophylaxis per primary  -Labs: hgb 8.5    New L abduction brace fitting well today.    Dispo: Continue rehabilitation, per primary

## 2020-02-02 NOTE — SUBJECTIVE & OBJECTIVE
Principal Problem:Subdural hematoma    Principal Orthopedic Problem: Bilateral prox humerus fractures     Interval History: Patient seen and examined at bedside. NAEON. Mentation improving. Patient is no longer confused per family. New left abduction brace is now fitting comfortably.      Review of patient's allergies indicates:  No Known Allergies    Current Facility-Administered Medications   Medication    0.9%  NaCl infusion    albuterol-ipratropium 2.5 mg-0.5 mg/3 mL nebulizer solution 3 mL    atorvastatin tablet 80 mg    dextrose 10% (D10W) Bolus    dextrose 10% (D10W) Bolus    dextrose 10% (D10W) Bolus    fentaNYL injection 50 mcg    glucagon (human recombinant) injection 1 mg    glucose chewable tablet 16 g    glucose chewable tablet 24 g    heparin (porcine) injection 5,000 Units    insulin aspart U-100 pen 1-10 Units    labetalol 20 mg/4 mL (5 mg/mL) IV syring    levETIRAcetam in NaCl (iso-os) IVPB 1,000 mg    magnesium sulfate 2g in water 50mL IVPB (premix)    magnesium sulfate 2g in water 50mL IVPB (premix)    metoprolol tartrate (LOPRESSOR) tablet 25 mg    nicotine 21 mg/24 hr 1 patch    ondansetron injection 4 mg    potassium chloride 10 mEq in 100 mL IVPB    And    potassium chloride 10 mEq in 100 mL IVPB    And    potassium chloride 10 mEq in 100 mL IVPB    senna-docusate 8.6-50 mg per tablet 1 tablet    sodium chloride 0.9% flush 10 mL    sodium phosphate 15 mmol in dextrose 5 % 250 mL IVPB    sodium phosphate 20.01 mmol in dextrose 5 % 250 mL IVPB    sodium phosphate 30 mmol in dextrose 5 % 250 mL IVPB     Objective:     Vital Signs (Most Recent):  Temp: 98.3 °F (36.8 °C) (02/02/20 0301)  Pulse: 100 (02/02/20 0601)  Resp: 19 (02/02/20 0601)  BP: 138/65 (02/02/20 0601)  SpO2: 98 % (02/02/20 0601) Vital Signs (24h Range):  Temp:  [98 °F (36.7 °C)-98.4 °F (36.9 °C)] 98.3 °F (36.8 °C)  Pulse:  [] 100  Resp:  [11-24] 19  SpO2:  [93 %-99 %] 98 %  BP: ()/(47-99)  "138/65     Weight: 110.7 kg (244 lb)  Height: 5' 11" (180.3 cm)  Body mass index is 34.03 kg/m².      Intake/Output Summary (Last 24 hours) at 2/2/2020 0703  Last data filed at 2/2/2020 0601  Gross per 24 hour   Intake 4305 ml   Output 3420 ml   Net 885 ml       Ortho/SPM Exam     Physical Exam:  NAD, A/O x 3.  Wound c/d/i with clean dressing.  No focal motor or sensory deficits to bilateral UE    Significant Labs:   CBC:   Recent Labs   Lab 02/01/20  0234 02/02/20  0115   WBC 8.07 7.89   HGB 7.6* 8.5*   HCT 25.5* 27.7*   * 157     All pertinent labs within the past 24 hours have been reviewed.    Significant Imaging: I have reviewed and interpreted all pertinent imaging results/findings.  "

## 2020-02-02 NOTE — NURSING TRANSFER
Nursing Transfer Note      2/2/2020     Transfer To: [address]    Transfer via bed    Transfer with O2 and cardiac monitor    Transported by RN     Medicines sent: N/A    Chart send with patient: Yes    Notified: spouse    Patient reassessed at: 2/2/2020, 1330 (date, time)    Upon arrival to floor: cardiac monitor applied, patient oriented to room, call bell in reach and bed in lowest position

## 2020-02-02 NOTE — PROVIDER TRANSFER
[facility] Hospital Medicine ICU Stepdown Acceptance Note     Date of Admission:  1/27/2020  Date of Transfer / Stepdown:  02/02/2020  Francisco, C/J, L, Onc (IV chemo <= 1 month), Gyn/Onc, or other special case?: no   ICU team stepping patient down: [facility]  ICU team member giving verbal handoff:  [id number]  Accepting  team: Hos Med N      Brief History of Present Illness:    Mr Melvin is a 58 yo male with EtOH abuse, tobacco abuse,  PAF  With long term anticoagulation (coumadin) who presents to [facility] for SDH and bilateral shoulder dislocation. He  presented to OSH via EMS for acute onset of  SOB and reported seizure activity. At approx 5 pm he was playing video poker, felt light headed, and was assisted  Ground by family. He had what the family felt was a seizure but appeared awake during it. He was disoriented and confused after. He then reported had SOB, chest pain, and BUE pain. Patient also reports new onset severe lower back pain. He returned to baseline mental status shortly after per family. CTH and chest at OSH revealed SDH and bilateral upper ext dislocations. He received Kcentra and Vit K at OSH for reversal.  He is being admitted to [facility] for a higher level of care.        Hospital/ICU Course:    1/27: Admit [facility], repeat CTH, SBP <160, consult NSGY and ortho, received Kcentra and vit K at OSH  1/28: Keppra increased to 1G BD, MRI C-spine negative for compression fracture, NSGY cleared for undergoing ortho procedure under C-collar, normal PT/INR, no acute intervention per NSGY will hold anticoagulation, F/U with MRI L-Spine, Needs epilepsy follow up, PRN Morphine for pain  1/29: intubated on propofol, S/P left shoulder arthroplasty, started on tube feeds, Right shoulder arthroplasty tomorrow, scheduled albuterol, MRI when able, Nicotine patch post MRI  01/30/2020- s/p pod#0 right shoulder hemiarthroplasty, d/c propofol and start precedex, add nicotine patch, plan to extubate tomorrow  AM  01/31/2020-extubated  02/01/2020-follow CT head in AM    Patient followed by Neurosurgery for SDH and  L1 compression fx and L4  burst fractures.  TLSO when patient upright. He may wear cervical collar for comfort.      NWB with no ROM to bilateral UE.      + tachycardia and NCC states is sinus tach and to consider changing neb therapy.    No BM since 1/27 and NCC to adjust bowel regimen.    Consultants and Procedures:     Consultants:  Consults (From admission, onward)        Status Ordering Provider     Inpatient consult to Orthopedic Surgery  Once     Provider:  (Not yet assigned)    Completed JAMARCUS HERRMANN     Inpatient consult to Registered Dietitian/Nutritionist  Once     Provider:  (Not yet assigned)    Completed MANFRED MCKEON     IP consult to case management/social work  Once     Provider:  (Not yet assigned)    Acknowledged VENKAT CANO              Procedures:      1/28 -  Left shoulder hemiarthroplasty for fracture  Left biceps tenodesis    1/30 - Right shoulder hemiarthroplasty for fracture  Right biceps tenodesis    Transfer Information:      Code status:    Full Code     Diet:   Diet diabetic [facility]; 2000 Calorie     Physical Activity: NWB with no ROM to bilateral UE with shoulder abduction braces in place.  TLSO when OOB.       To Do / Pending Studies / Follow ups:  · Neurosurgery: would like sitting upright L-spine x-rays in TLSO when able  ·  PT/OT - recommend rehab facility  · Monitor HR  · Treat constipation  · F/u ortho recs.      Patient has been accepted by Hospital Medicine Team N, who will assume care of the patient upon arrival to the floor from the ICU. Please contact ICU team with any concerns prior to arrival.        Emily Mcdaniel MD  Hospital Medicine  [facility]  [phone]

## 2020-02-03 LAB
ALBUMIN SERPL BCP-MCNC: 2.1 G/DL (ref 3.5–5.2)
ALP SERPL-CCNC: 139 U/L (ref 55–135)
ALT SERPL W/O P-5'-P-CCNC: 55 U/L (ref 10–44)
ANION GAP SERPL CALC-SCNC: 10 MMOL/L (ref 8–16)
AST SERPL-CCNC: 73 U/L (ref 10–40)
BASOPHILS # BLD AUTO: 0.04 K/UL (ref 0–0.2)
BASOPHILS NFR BLD: 0.4 % (ref 0–1.9)
BILIRUB SERPL-MCNC: 1.3 MG/DL (ref 0.1–1)
BUN SERPL-MCNC: 19 MG/DL (ref 6–20)
CALCIUM SERPL-MCNC: 9.1 MG/DL (ref 8.7–10.5)
CHLORIDE SERPL-SCNC: 100 MMOL/L (ref 95–110)
CO2 SERPL-SCNC: 29 MMOL/L (ref 23–29)
CREAT SERPL-MCNC: 0.9 MG/DL (ref 0.5–1.4)
DIFFERENTIAL METHOD: ABNORMAL
EOSINOPHIL # BLD AUTO: 0.2 K/UL (ref 0–0.5)
EOSINOPHIL NFR BLD: 2.1 % (ref 0–8)
ERYTHROCYTE [DISTWIDTH] IN BLOOD BY AUTOMATED COUNT: 16.1 % (ref 11.5–14.5)
EST. GFR  (AFRICAN AMERICAN): >60 ML/MIN/1.73 M^2
EST. GFR  (NON AFRICAN AMERICAN): >60 ML/MIN/1.73 M^2
GLUCOSE SERPL-MCNC: 112 MG/DL (ref 70–110)
HCT VFR BLD AUTO: 31 % (ref 40–54)
HGB BLD-MCNC: 9.6 G/DL (ref 14–18)
IMM GRANULOCYTES # BLD AUTO: 0.18 K/UL (ref 0–0.04)
IMM GRANULOCYTES NFR BLD AUTO: 1.9 % (ref 0–0.5)
LYMPHOCYTES # BLD AUTO: 1.5 K/UL (ref 1–4.8)
LYMPHOCYTES NFR BLD: 15.9 % (ref 18–48)
MAGNESIUM SERPL-MCNC: 1.8 MG/DL (ref 1.6–2.6)
MCH RBC QN AUTO: 27.3 PG (ref 27–31)
MCHC RBC AUTO-ENTMCNC: 31 G/DL (ref 32–36)
MCV RBC AUTO: 88 FL (ref 82–98)
MONOCYTES # BLD AUTO: 0.8 K/UL (ref 0.3–1)
MONOCYTES NFR BLD: 8.4 % (ref 4–15)
NEUTROPHILS # BLD AUTO: 6.7 K/UL (ref 1.8–7.7)
NEUTROPHILS NFR BLD: 71.3 % (ref 38–73)
NRBC BLD-RTO: 0 /100 WBC
PHOSPHATE SERPL-MCNC: 3.5 MG/DL (ref 2.7–4.5)
PLATELET # BLD AUTO: 216 K/UL (ref 150–350)
PMV BLD AUTO: 9.2 FL (ref 9.2–12.9)
POCT GLUCOSE: 112 MG/DL (ref 70–110)
POCT GLUCOSE: 119 MG/DL (ref 70–110)
POCT GLUCOSE: 136 MG/DL (ref 70–110)
POTASSIUM SERPL-SCNC: 4.2 MMOL/L (ref 3.5–5.1)
PROT SERPL-MCNC: 6.3 G/DL (ref 6–8.4)
RBC # BLD AUTO: 3.52 M/UL (ref 4.6–6.2)
SODIUM SERPL-SCNC: 139 MMOL/L (ref 136–145)
WBC # BLD AUTO: 9.36 K/UL (ref 3.9–12.7)

## 2020-02-03 PROCEDURE — 97530 THERAPEUTIC ACTIVITIES: CPT

## 2020-02-03 PROCEDURE — 25000003 PHARM REV CODE 250: Performed by: INTERNAL MEDICINE

## 2020-02-03 PROCEDURE — 25000003 PHARM REV CODE 250: Performed by: STUDENT IN AN ORGANIZED HEALTH CARE EDUCATION/TRAINING PROGRAM

## 2020-02-03 PROCEDURE — 85025 COMPLETE CBC W/AUTO DIFF WBC: CPT

## 2020-02-03 PROCEDURE — S4991 NICOTINE PATCH NONLEGEND: HCPCS | Performed by: NURSE PRACTITIONER

## 2020-02-03 PROCEDURE — 25000003 PHARM REV CODE 250: Performed by: NURSE PRACTITIONER

## 2020-02-03 PROCEDURE — 27000221 HC OXYGEN, UP TO 24 HOURS

## 2020-02-03 PROCEDURE — 84100 ASSAY OF PHOSPHORUS: CPT

## 2020-02-03 PROCEDURE — 11000001 HC ACUTE MED/SURG PRIVATE ROOM

## 2020-02-03 PROCEDURE — 25000242 PHARM REV CODE 250 ALT 637 W/ HCPCS: Performed by: INTERNAL MEDICINE

## 2020-02-03 PROCEDURE — 99232 PR SUBSEQUENT HOSPITAL CARE,LEVL II: ICD-10-PCS | Mod: ,,, | Performed by: PHYSICIAN ASSISTANT

## 2020-02-03 PROCEDURE — 94640 AIRWAY INHALATION TREATMENT: CPT

## 2020-02-03 PROCEDURE — 83735 ASSAY OF MAGNESIUM: CPT

## 2020-02-03 PROCEDURE — 63600175 PHARM REV CODE 636 W HCPCS: Performed by: STUDENT IN AN ORGANIZED HEALTH CARE EDUCATION/TRAINING PROGRAM

## 2020-02-03 PROCEDURE — 99900035 HC TECH TIME PER 15 MIN (STAT)

## 2020-02-03 PROCEDURE — 80053 COMPREHEN METABOLIC PANEL: CPT

## 2020-02-03 PROCEDURE — 36415 COLL VENOUS BLD VENIPUNCTURE: CPT

## 2020-02-03 PROCEDURE — 94761 N-INVAS EAR/PLS OXIMETRY MLT: CPT

## 2020-02-03 PROCEDURE — 25000242 PHARM REV CODE 250 ALT 637 W/ HCPCS: Performed by: NURSE PRACTITIONER

## 2020-02-03 PROCEDURE — 63600175 PHARM REV CODE 636 W HCPCS: Performed by: NURSE PRACTITIONER

## 2020-02-03 PROCEDURE — 99232 SBSQ HOSP IP/OBS MODERATE 35: CPT | Mod: ,,, | Performed by: INTERNAL MEDICINE

## 2020-02-03 PROCEDURE — 99232 PR SUBSEQUENT HOSPITAL CARE,LEVL II: ICD-10-PCS | Mod: ,,, | Performed by: INTERNAL MEDICINE

## 2020-02-03 PROCEDURE — 25000003 PHARM REV CODE 250: Performed by: HOSPITALIST

## 2020-02-03 PROCEDURE — 99232 SBSQ HOSP IP/OBS MODERATE 35: CPT | Mod: ,,, | Performed by: PHYSICIAN ASSISTANT

## 2020-02-03 RX ORDER — OXYCODONE HYDROCHLORIDE 10 MG/1
10 TABLET ORAL EVERY 4 HOURS PRN
Status: DISCONTINUED | OUTPATIENT
Start: 2020-02-03 | End: 2020-02-07 | Stop reason: HOSPADM

## 2020-02-03 RX ORDER — HYDROMORPHONE HYDROCHLORIDE 1 MG/ML
0.5 INJECTION, SOLUTION INTRAMUSCULAR; INTRAVENOUS; SUBCUTANEOUS
Status: DISCONTINUED | OUTPATIENT
Start: 2020-02-03 | End: 2020-02-07 | Stop reason: HOSPADM

## 2020-02-03 RX ORDER — OXYCODONE HYDROCHLORIDE 5 MG/1
5 TABLET ORAL EVERY 4 HOURS PRN
Status: DISCONTINUED | OUTPATIENT
Start: 2020-02-03 | End: 2020-02-03

## 2020-02-03 RX ORDER — ACETAMINOPHEN 325 MG/1
650 TABLET ORAL
Status: DISCONTINUED | OUTPATIENT
Start: 2020-02-03 | End: 2020-02-04

## 2020-02-03 RX ORDER — IPRATROPIUM BROMIDE 0.5 MG/2.5ML
0.5 SOLUTION RESPIRATORY (INHALATION)
Status: DISCONTINUED | OUTPATIENT
Start: 2020-02-03 | End: 2020-02-05

## 2020-02-03 RX ORDER — BISACODYL 5 MG
10 TABLET, DELAYED RELEASE (ENTERIC COATED) ORAL ONCE
Status: COMPLETED | OUTPATIENT
Start: 2020-02-03 | End: 2020-02-03

## 2020-02-03 RX ORDER — TALC
6 POWDER (GRAM) TOPICAL NIGHTLY PRN
Status: DISCONTINUED | OUTPATIENT
Start: 2020-02-03 | End: 2020-02-04

## 2020-02-03 RX ORDER — CELECOXIB 200 MG/1
200 CAPSULE ORAL DAILY
Status: DISCONTINUED | OUTPATIENT
Start: 2020-02-03 | End: 2020-02-07 | Stop reason: HOSPADM

## 2020-02-03 RX ORDER — OXYCODONE HYDROCHLORIDE 5 MG/1
5 TABLET ORAL EVERY 4 HOURS PRN
Status: DISCONTINUED | OUTPATIENT
Start: 2020-02-03 | End: 2020-02-07 | Stop reason: HOSPADM

## 2020-02-03 RX ORDER — METHOCARBAMOL 750 MG/1
750 TABLET, FILM COATED ORAL 3 TIMES DAILY
Status: DISCONTINUED | OUTPATIENT
Start: 2020-02-03 | End: 2020-02-07 | Stop reason: HOSPADM

## 2020-02-03 RX ADMIN — LEVALBUTEROL HYDROCHLORIDE 0.63 MG: 0.63 SOLUTION RESPIRATORY (INHALATION) at 11:02

## 2020-02-03 RX ADMIN — METHOCARBAMOL TABLETS 750 MG: 750 TABLET, COATED ORAL at 10:02

## 2020-02-03 RX ADMIN — SENNOSIDES AND DOCUSATE SODIUM 1 TABLET: 8.6; 5 TABLET ORAL at 10:02

## 2020-02-03 RX ADMIN — POLYETHYLENE GLYCOL 3350 17 G: 17 POWDER, FOR SOLUTION ORAL at 10:02

## 2020-02-03 RX ADMIN — HYDROMORPHONE HYDROCHLORIDE 0.5 MG: 1 INJECTION, SOLUTION INTRAMUSCULAR; INTRAVENOUS; SUBCUTANEOUS at 01:02

## 2020-02-03 RX ADMIN — LEVALBUTEROL HYDROCHLORIDE 0.63 MG: 0.63 SOLUTION RESPIRATORY (INHALATION) at 03:02

## 2020-02-03 RX ADMIN — PANTOPRAZOLE SODIUM 40 MG: 40 TABLET, DELAYED RELEASE ORAL at 09:02

## 2020-02-03 RX ADMIN — OXYCODONE HYDROCHLORIDE 15 MG: 10 TABLET ORAL at 03:02

## 2020-02-03 RX ADMIN — IPRATROPIUM BROMIDE 0.5 MG: 0.5 SOLUTION RESPIRATORY (INHALATION) at 11:02

## 2020-02-03 RX ADMIN — ACETAMINOPHEN 650 MG: 325 TABLET ORAL at 01:02

## 2020-02-03 RX ADMIN — OXYCODONE HYDROCHLORIDE 5 MG: 5 TABLET ORAL at 12:02

## 2020-02-03 RX ADMIN — Medication 6 MG: at 03:02

## 2020-02-03 RX ADMIN — LEVETIRACETAM 1000 MG: 500 TABLET ORAL at 09:02

## 2020-02-03 RX ADMIN — LEVETIRACETAM 1000 MG: 500 TABLET ORAL at 10:02

## 2020-02-03 RX ADMIN — HEPARIN SODIUM 5000 UNITS: 5000 INJECTION, SOLUTION INTRAVENOUS; SUBCUTANEOUS at 10:02

## 2020-02-03 RX ADMIN — NICOTINE 1 PATCH: 21 PATCH, EXTENDED RELEASE TRANSDERMAL at 09:02

## 2020-02-03 RX ADMIN — ACETAMINOPHEN 650 MG: 325 TABLET ORAL at 07:02

## 2020-02-03 RX ADMIN — METHOCARBAMOL TABLETS 750 MG: 750 TABLET, COATED ORAL at 03:02

## 2020-02-03 RX ADMIN — BISACODYL 10 MG: 5 TABLET, COATED ORAL at 05:02

## 2020-02-03 RX ADMIN — Medication 6 MG: at 10:02

## 2020-02-03 RX ADMIN — ACETAMINOPHEN 650 MG: 325 TABLET ORAL at 09:02

## 2020-02-03 RX ADMIN — CELECOXIB 200 MG: 200 CAPSULE ORAL at 12:02

## 2020-02-03 RX ADMIN — LEVALBUTEROL HYDROCHLORIDE 0.63 MG: 0.63 SOLUTION RESPIRATORY (INHALATION) at 09:02

## 2020-02-03 RX ADMIN — HEPARIN SODIUM 5000 UNITS: 5000 INJECTION, SOLUTION INTRAVENOUS; SUBCUTANEOUS at 02:02

## 2020-02-03 RX ADMIN — LEVALBUTEROL HYDROCHLORIDE 0.63 MG: 0.63 SOLUTION RESPIRATORY (INHALATION) at 02:02

## 2020-02-03 RX ADMIN — SENNOSIDES AND DOCUSATE SODIUM 1 TABLET: 8.6; 5 TABLET ORAL at 09:02

## 2020-02-03 RX ADMIN — HEPARIN SODIUM 5000 UNITS: 5000 INJECTION, SOLUTION INTRAVENOUS; SUBCUTANEOUS at 05:02

## 2020-02-03 RX ADMIN — METOPROLOL TARTRATE 25 MG: 25 TABLET ORAL at 10:02

## 2020-02-03 NOTE — PLAN OF CARE
Problem: Physical Therapy Goal  Goal: Physical Therapy Goal  Description  Goals to be met by: 2/7/20    Patient will increase functional independence with mobility by performing (while maintaining NWB of both upper extremities):    1. Supine to sit with Moderate Assistance. Not met  2. Sit to supine with Moderate Assistance Not met  3. Sit to stand transfer with Moderate Assistance Not met  4. Bed to chair transfer with Moderate Assistance. Not met  5. Gait  x 25 feet with Moderate Assistance. Not met  6. Stand for 2 minutes with Minimal Assistance statically, upright posture. Not met  7. Lower extremity exercise program x 20 reps per handout, with independence Not met     Outcome: Ongoing, Progressing    Pt pleasant and cooperative, continues to progress towards goals. Will continue to follow while in house.     Samina Kingsley, PT, DPT  2/3/2020

## 2020-02-03 NOTE — ASSESSMENT & PLAN NOTE
59 M with hx of paroxysmal afib on coumadin presents for eval of bilateral shoulder dislocations, left convexity SDH, L1 compression fracture, L4 burst fracture.    -Stepped down to the floor, neuro checks q4h, vital signs q4h  -Exam stable however limited by RLE pain today.  -No acute neurosurgical intervention at this time based on current imaging  -Repeat CT head stable 2/2 stable. No worsening midline shift or increase size of left convexity SDH. No new hemorrhage noted.  -Continue to hold coumadin, goal INR of less than 1.4  -both shoulders repaired by ortho, appreciate recs  -TLSO when OOB/upright  -Please obtain sitting and upright L-spine x-rays in TLSO when able  -MRI L spine complete. L1 compression fracture with minimal retropulsion into central canal, mild canal stenosis. L4 burst fracture with retropulsion into central canal resulting in moderate canal stenosis. Likely will need RadTx to L spine given cancer hx.   -C spine cleared by Ortho. C collar for comfort  -Further recs to follow completed imaging  -Discussed with Dr. Barry

## 2020-02-03 NOTE — PLAN OF CARE
02/03/20 1656   Post-Acute Status   Post-Acute Authorization Placement   Post-Acute Placement Status Referrals Sent   SW met with patient and friend in room along bedside to discuss tx team recommendations for IRF. SW provided patient friend with list of available rehabs in network with patient payor around patient area of residence. Patient friend identified [place] as primary choice at this time. Sw sent referral to provider via [facility]. SW provided patient and friend with contact info to call for any future questions/concerns. SW will follow up with patient friend to obtain additional choices, if needed. SW in communication with CM and patient care team.   Dyan Rose, PROMISE  [facility]

## 2020-02-03 NOTE — PT/OT/SLP PROGRESS
Occupational Therapy   Treatment    Name: Ben Melvin  MRN: [id number]  Admitting Diagnosis:  Subdural hematoma  4 Days Post-Op    Recommendations:     Discharge Recommendations: rehabilitation facility  Discharge Equipment Recommendations:  (tbd)  Barriers to discharge:  None    Assessment:     Ben Melvin is a 60 y.o. male with a medical diagnosis of Subdural hematoma.  He presents with presents with impairments listed below. Pt is currently reuqiring hihg levels of assist for ADLs and mobility at this time. Pt displayed global deconditioning requiring increased assist for ADLs and mobility at this time. Pt would benefit from skilled OT services to improve independence and overall occupational functioning.     Performance deficits affecting function are weakness, impaired endurance, impaired self care skills, impaired functional mobilty, gait instability, impaired balance, impaired fine motor, orthopedic precautions, pain, decreased upper extremity function.     Rehab Prognosis:  Good; patient would benefit from acute skilled OT services to address these deficits and reach maximum level of function.       Plan:     Patient to be seen daily to address the above listed problems via self-care/home management, therapeutic activities, therapeutic exercises  · Plan of Care Expires: 03/03/20  · Plan of Care Reviewed with: patient, significant other    Subjective     Pain/Comfort:  · Pain Rating 1: (did not rate)  · Location - Side 1: Bilateral  · Location - Orientation 1: generalized  · Location 1: shoulder  · Pain Addressed 1: Reposition, Distraction  · Pain Rating Post-Intervention 1: (did not rate)    Objective:     Communicated with: RN prior to session.  Patient found up in chair with telemetry, SCD upon OT entry to room.    General Precautions: Standard, fall, seizure   Orthopedic Precautions:RUE partial weight bearing, LUE non weight bearing   Braces: TLSO, Shoulder abduction brace     Occupational  Performance:     Bed Mobility:    · Patient completed Sit to Supine with moderate assistance     Functional Mobility/Transfers:  · Patient completed Sit <> Stand Transfer with moderate assistance  with  no assistive device   · Functional Mobility: Pt ambulated ~4 steps at min a w/o AD.       [facility] 6 Click ADL: 6    Treatment & Education:  Pt educated on POC.     Patient left HOB elevated with all lines intact, call button in reach and spouse presentEducation:      GOALS:   Multidisciplinary Problems     Occupational Therapy Goals        Problem: Occupational Therapy Goal    Goal Priority Disciplines Outcome Interventions   Occupational Therapy Goal     OT, PT/OT Ongoing, Progressing    Description:  Goals to be met by: 2/10/2020     Patient will increase functional independence with ADLs by performing:    UE Dressing with Moderate Assistance.  LE Dressing with Moderate Assistance and Assistive Devices as needed.  Grooming while standing with Minimal Assistance.  Toileting from bedside commode with Maximum Assistance for hygiene and clothing management.   Toilet transfer to bedside commode with Contact Guard Assistance.                      Time Tracking:     OT Date of Treatment: 02/03/20  OT Start Time: 1019  OT Stop Time: 1029  OT Total Time (min): 10 min    Billable Minutes:Therapeutic Activity 10 minutes    David Hughes OT  2/3/2020

## 2020-02-03 NOTE — PLAN OF CARE
Problem: Occupational Therapy Goal  Goal: Occupational Therapy Goal  Description  Goals to be met by: 2/10/2020     Patient will increase functional independence with ADLs by performing:    UE Dressing with Moderate Assistance.  LE Dressing with Moderate Assistance and Assistive Devices as needed.  Grooming while standing with Minimal Assistance.  Toileting from bedside commode with Maximum Assistance for hygiene and clothing management.   Toilet transfer to bedside commode with Contact Guard Assistance.     Outcome: Ongoing, Progressing    David Hughes OTR/L  2/3/2020

## 2020-02-03 NOTE — PLAN OF CARE
Pt A & O x4 most of the time, but is occasionally unaware of time. VS as charted, bilateral arm slings and SCDs in place, no s/s skin injury, fall precautions in place, pt reports no pain, wife at bedside, call light within reach, will continue to monitor pt.

## 2020-02-03 NOTE — SUBJECTIVE & OBJECTIVE
Interval History: Restless and agitated overnight per wife at bedside. Patient drowsy on exam however oriented x4 and participates fully. Patient sitting upright in chair, shoulder braces and TLSO in place. Complains of RLE burning pain throughout to the ankle with movement. XR pending. Denies worsening weakness, paresthesias. Denies b/b dysfunction.     Medications:  Continuous Infusions:  Scheduled Meds:   acetaminophen  650 mg Oral Q6H    atorvastatin  80 mg Oral QHS    celecoxib  200 mg Oral Daily    heparin (porcine)  5,000 Units Subcutaneous Q8H    levalbuterol  0.63 mg Nebulization Q8H    levETIRAcetam  1,000 mg Oral BID    methocarbamol  750 mg Oral TID    metoprolol tartrate  25 mg Oral BID    nicotine  1 patch Transdermal Daily    pantoprazole  40 mg Oral Daily    polyethylene glycol  17 g Oral BID    senna-docusate 8.6-50 mg  1 tablet Oral BID     PRN Meds:Dextrose 10% Bolus, Dextrose 10% Bolus, Dextrose 10% Bolus, glucagon (human recombinant), glucose, glucose, HYDROmorphone, insulin aspart U-100, magnesium sulfate IVPB, melatonin, ondansetron, oxyCODONE, oxyCODONE, oxyCODONE, [DISCONTINUED] potassium chloride in water **AND** potassium chloride in water **AND** [DISCONTINUED] potassium chloride in water, sodium chloride 0.9%       Objective:     Weight: 110.7 kg (244 lb)  Body mass index is 34.03 kg/m².  Vital Signs (Most Recent):  Temp: 97.6 °F (36.4 °C) (02/03/20 1137)  Pulse: 108 (02/03/20 1146)  Resp: 18 (02/03/20 1137)  BP: 102/64 (02/03/20 1146)  SpO2: 96 % (02/03/20 1137) Vital Signs (24h Range):  Temp:  [96.5 °F (35.8 °C)-98.1 °F (36.7 °C)] 97.6 °F (36.4 °C)  Pulse:  [] 108  Resp:  [16-20] 18  SpO2:  [94 %-97 %] 96 %  BP: ()/(59-75) 102/64                     Male External Urinary Catheter 01/31/20 2356 Small (Active)   Collection Container Urimeter 2/2/2020  2:08 PM   Securement Method secured to top of thigh w/ adhesive device 2/2/2020  2:08 PM   Skin skin barrier  applied;no breakdown;no redness 2/2/2020  2:08 PM   Tolerance no signs/symptoms of discomfort 2/2/2020  2:08 PM   Output (mL) 400 mL 2/2/2020  9:00 PM   Catheter Change Date 02/02/20 2/2/2020  2:08 PM   Catheter Change Time 2100 2/2/2020  9:00 PM       Neurosurgery Physical Exam    General: obese, mild distress.   Head: normocephalic, atraumatic  Neck: No tracheal deviation. No palpable masses.   Neurologic: Drowsy but oriented. Thought content appropriate.  GCS: Motor: 6/Verbal: 5/Eyes: 4 GCS Total: 15  Mental Status: Drowsy, easily arouses to voice. Oriented x 4  Language: No aphasia  Speech: No dysarthria  Cranial nerves: face symmetric, tongue midline, CN II-XII grossly intact.   Eyes: pupils equal, round, reactive to light with accomodation, EOMI.   Ears: No drainage.   Pulmonary: normal respirations, no signs of respiratory distress  Abdomen: soft, non-distended, not tender to palpation  Sensory: intact to light touch throughout  Motor Strength: BUE exam limited 2/2 shoulder arthroplasty. 5/5 hand  strength bilaterally. LLE 5/5 throughout. RLE limited proximally 2/2 pain with light touch. 4-/5 HF/KF/KE. 5/5 EHL/DF/PF. No abnormal movements seen.   DTR's - 2 + and symmetric in UE and LE  Duran: absent  Clonus: absent  Vascular: Pulses 2+ and symmetric radial and dorsalis pedis. No LE edema.   Skin: Skin is warm, dry and intact.      Significant Labs:  Recent Labs   Lab 02/01/20  1451 02/02/20 0115 02/03/20 0347   GLU  --  103 112*   NA  --  138 139   K 3.6 3.7 4.2   CL  --  101 100   CO2  --  29 29   BUN  --  16 19   CREATININE  --  0.8 0.9   CALCIUM  --  8.3* 9.1   MG  --  1.7 1.8     Recent Labs   Lab 02/02/20 0115 02/03/20 0347   WBC 7.89 9.36   HGB 8.5* 9.6*   HCT 27.7* 31.0*    216     No results for input(s): LABPT, INR, APTT in the last 48 hours.  Microbiology Results (last 7 days)     ** No results found for the last 168 hours. **        Recent Lab Results       02/03/20  1204    02/03/20  0758   02/03/20  0347   02/02/20  2128   02/02/20  1738        Albumin     2.1         Alkaline Phosphatase     139         ALT     55         Anion Gap     10         AST     73         Baso #     0.04         Basophil%     0.4         BILIRUBIN TOTAL     1.3  Comment:  For infants and newborns, interpretation of results should be based  on gestational age, weight and in agreement with clinical  observations.  Premature Infant recommended reference ranges:  Up to 24 hours.............<8.0 mg/dL  Up to 48 hours............<12.0 mg/dL  3-5 days..................<15.0 mg/dL  6-29 days.................<15.0 mg/dL           BUN, Bld     19         Calcium     9.1         Chloride     100         CO2     29         Creatinine     0.9         Differential Method     Automated         eGFR if      >60.0         eGFR if non      >60.0  Comment:  Calculation used to obtain the estimated glomerular filtration  rate (eGFR) is the CKD-EPI equation.            Eos #     0.2         Eosinophil%     2.1         Glucose     112         Gran # (ANC)     6.7         Gran%     71.3         Hematocrit     31.0         Hemoglobin     9.6         Immature Grans (Abs)     0.18  Comment:  Mild elevation in immature granulocytes is non specific and   can be seen in a variety of conditions including stress response,   acute inflammation, trauma and pregnancy. Correlation with other   laboratory and clinical findings is essential.           Immature Granulocytes     1.9         Lymph #     1.5         Lymph%     15.9         Magnesium     1.8         MCH     27.3         MCHC     31.0         MCV     88         Mono #     0.8         Mono%     8.4         MPV     9.2         nRBC     0         Phosphorus     3.5         Platelets     216         POCT Glucose 136 119   112 118     Potassium     4.2         PROTEIN TOTAL     6.3         RBC     3.52         RDW     16.1         Sodium     139          WBC     9.36                            All pertinent labs from the last 24 hours have been reviewed.    Significant Diagnostics:  I have reviewed all pertinent imaging results/findings within the past 24 hours.

## 2020-02-03 NOTE — PROGRESS NOTES
"[facility] Hospital Medicine  Progress Note    Team: [facility]   Attending MD: Emily Mcdaniel MD  Admit Date: 1/27/2020  KIRBY 2/5/2020  Length of Stay:  LOS: 7 days   Code status: Full Code    Principal Problem: Subdural hematoma    Interval hx: stepped down from [facility] on evening of 2/2; patient with confusion overnight and has not been sleeping even when intubated; complained of 10/10 shoulder pain which improved to 5/10 with oxycodone and now sleepy; takes amitriptyline at home nightly; significant other at bedside and in agreement with rehab; heavy smoker and only rarely drinks ETOH; needs constant reminder about NWB to BUE; confirmed home regimen with significant other    Events from this admit reviewed.      ROS:  Constitutional: no fever or chills  Respiratory: no cough or shortness of breath  Cardiovascular: no chest pain or palpitations  Gastrointestinal: no nausea or vomiting, no abdominal pain; last BM: 1/27  Genitourinary: no hematuria or dysuria  Integument/Breast: no rash or pruritis  Neurological: no seizures or tremors  Behavioral/Psych: no auditory or visual hallucinations      Physical Exam  Temp:  [96.5 °F (35.8 °C)-98.1 °F (36.7 °C)] 97.6 °F (36.4 °C)  Pulse:  [] 108  Resp:  [16-20] 18  SpO2:  [94 %-97 %] 96 %  BP: ()/(59-75) 102/64      Intake/Output Summary (Last 24 hours) at 2/3/2020 1450  Last data filed at 2/2/2020 2100  Gross per 24 hour   Intake --   Output 400 ml   Net -400 ml       Wt Readings from Last 1 Encounters:   01/28/20 0901 110.7 kg (244 lb)   01/28/20 0052 111.1 kg (244 lb 14.9 oz)   01/27/20 2106 120.2 kg (265 lb)        Estimated body mass index is 34.03 kg/m² as calculated from the following:    Height as of this encounter: 5' 11" (1.803 m).    Weight as of this encounter: 110.7 kg (244 lb).    General: well developed, obese, no distress  Lungs:  Rhonchi diffusely bilaterally and normal respiratory effort.  Cardiovascular: Heart: regular " rate and rhythm, S1, S2 normal, no murmur, click, rub or gallop. Chest Wall: no tenderness. Extremities: no cyanosis, 1+ bilateral ankle edema, no clubbing.   Abdomen/Rectal: Abdomen: obese, non-tender +distended; bowel sounds normal  Skin: Skin color, texture, turgor normal. No rashes or lesions.  Neurologic: Normal strength and tone BLE  Musculoskeletal: bilateral UE in shoulder abduction braces.  TLSO in place.  No focal numbness or weakness but UE not tested.  Psych/Behavioral:  Alert and oriented to person/place; appropriate affect.      Recent Results (from the past 24 hour(s))   POCT glucose    Collection Time: 02/02/20  5:38 PM   Result Value Ref Range    POCT Glucose 118 (H) 70 - 110 mg/dL   POCT glucose    Collection Time: 02/02/20  9:28 PM   Result Value Ref Range    POCT Glucose 112 (H) 70 - 110 mg/dL   Comprehensive metabolic panel    Collection Time: 02/03/20  3:47 AM   Result Value Ref Range    Sodium 139 136 - 145 mmol/L    Potassium 4.2 3.5 - 5.1 mmol/L    Chloride 100 95 - 110 mmol/L    CO2 29 23 - 29 mmol/L    Glucose 112 (H) 70 - 110 mg/dL    BUN, Bld 19 6 - 20 mg/dL    Creatinine 0.9 0.5 - 1.4 mg/dL    Calcium 9.1 8.7 - 10.5 mg/dL    Total Protein 6.3 6.0 - 8.4 g/dL    Albumin 2.1 (L) 3.5 - 5.2 g/dL    Total Bilirubin 1.3 (H) 0.1 - 1.0 mg/dL    Alkaline Phosphatase 139 (H) 55 - 135 U/L    AST 73 (H) 10 - 40 U/L    ALT 55 (H) 10 - 44 U/L    Anion Gap 10 8 - 16 mmol/L    eGFR if African American >60.0 >60 mL/min/1.73 m^2    eGFR if non African American >60.0 >60 mL/min/1.73 m^2   CBC auto differential    Collection Time: 02/03/20  3:47 AM   Result Value Ref Range    WBC 9.36 3.90 - 12.70 K/uL    RBC 3.52 (L) 4.60 - 6.20 M/uL    Hemoglobin 9.6 (L) 14.0 - 18.0 g/dL    Hematocrit 31.0 (L) 40.0 - 54.0 %    Mean Corpuscular Volume 88 82 - 98 fL    Mean Corpuscular Hemoglobin 27.3 27.0 - 31.0 pg    Mean Corpuscular Hemoglobin Conc 31.0 (L) 32.0 - 36.0 g/dL    RDW 16.1 (H) 11.5 - 14.5 %    Platelets 216  150 - 350 K/uL    MPV 9.2 9.2 - 12.9 fL    Immature Granulocytes 1.9 (H) 0.0 - 0.5 %    Gran # (ANC) 6.7 1.8 - 7.7 K/uL    Immature Grans (Abs) 0.18 (H) 0.00 - 0.04 K/uL    Lymph # 1.5 1.0 - 4.8 K/uL    Mono # 0.8 0.3 - 1.0 K/uL    Eos # 0.2 0.0 - 0.5 K/uL    Baso # 0.04 0.00 - 0.20 K/uL    nRBC 0 0 /100 WBC    Gran% 71.3 38.0 - 73.0 %    Lymph% 15.9 (L) 18.0 - 48.0 %    Mono% 8.4 4.0 - 15.0 %    Eosinophil% 2.1 0.0 - 8.0 %    Basophil% 0.4 0.0 - 1.9 %    Differential Method Automated    Magnesium    Collection Time: 02/03/20  3:47 AM   Result Value Ref Range    Magnesium 1.8 1.6 - 2.6 mg/dL   Phosphorus    Collection Time: 02/03/20  3:47 AM   Result Value Ref Range    Phosphorus 3.5 2.7 - 4.5 mg/dL   POCT glucose    Collection Time: 02/03/20  7:58 AM   Result Value Ref Range    POCT Glucose 119 (H) 70 - 110 mg/dL   POCT glucose    Collection Time: 02/03/20 12:04 PM   Result Value Ref Range    POCT Glucose 136 (H) 70 - 110 mg/dL       Recent Labs   Lab 02/01/20  0234 02/02/20  0115 02/03/20  0347   WBC 8.07 7.89 9.36   HGB 7.6* 8.5* 9.6*   HCT 25.5* 27.7* 31.0*   * 157 216       Recent Labs   Lab 02/01/20  0234 02/01/20  1451 02/01/20  1824 02/02/20  0115 02/03/20  0347     --   --  138 139   K 3.5 3.6  --  3.7 4.2     --   --  101 100   CO2 29  --   --  29 29   BUN 16  --   --  16 19   CREATININE 0.8  --   --  0.8 0.9   GLU 88  --   --  103 112*   CALCIUM 7.9*  --   --  8.3* 9.1   MG 1.6  --   --  1.7 1.8   PHOS 2.6*  --  3.0 3.3 3.5       Recent Labs   Lab 01/27/20  1625 01/27/20  2256 01/28/20  0110  02/01/20  0234 02/02/20  0115 02/03/20  0347   ALKPHOS 149* 129 122   < > 76 112 139*   ALT 20 17 15   < > 12 27 55*   AST 38 22 20   < > 35 47* 73*   ALBUMIN 4.1 2.9* 2.9*   < > 2.0* 2.0* 2.1*   PROT 7.3 6.1 6.1   < > 5.4* 5.9* 6.3   BILITOT 0.5 0.8 0.9   < > 1.0 1.1* 1.3*   INR 2.4* 1.3* 1.2  --   --   --   --     < > = values in this interval not displayed.       Recent Labs   Lab 02/02/20  0738  02/02/20  1125 02/02/20  1738 02/02/20  2128 02/03/20  0758 02/03/20  1204   POCTGLUCOSE 104 128* 118* 112* 119* 136*       Hemoglobin A1C   Date Value Ref Range Status   01/27/2020 5.9 (H) 4.0 - 5.6 % Final     Comment:     ADA Screening Guidelines:  5.7-6.4%  Consistent with prediabetes  >or=6.5%  Consistent with diabetes  High levels of fetal hemoglobin interfere with the HbA1C  assay. Heterozygous hemoglobin variants (HbS, HgC, etc)do  not significantly interfere with this assay.   However, presence of multiple variants may affect accuracy.     12/30/2019 6.6 % Final   10/16/2019 7.0 % Final   06/03/2019 7.1 % Final       Scheduled Meds:   acetaminophen  650 mg Oral Q6H    atorvastatin  80 mg Oral QHS    celecoxib  200 mg Oral Daily    heparin (porcine)  5,000 Units Subcutaneous Q8H    levalbuterol  0.63 mg Nebulization Q8H    levETIRAcetam  1,000 mg Oral BID    methocarbamol  750 mg Oral TID    metoprolol tartrate  25 mg Oral BID    nicotine  1 patch Transdermal Daily    pantoprazole  40 mg Oral Daily    polyethylene glycol  17 g Oral BID    senna-docusate 8.6-50 mg  1 tablet Oral BID       Continuous Infusions:    As Needed: Dextrose 10% Bolus, Dextrose 10% Bolus, Dextrose 10% Bolus, glucagon (human recombinant), glucose, glucose, HYDROmorphone, insulin aspart U-100, magnesium sulfate IVPB, melatonin, ondansetron, oxyCODONE, oxyCODONE, oxyCODONE, [DISCONTINUED] potassium chloride in water **AND** potassium chloride in water **AND** [DISCONTINUED] potassium chloride in water, sodium chloride 0.9%    Active Hospital Problems    Diagnosis  POA    *Subdural hematoma [S06.5X9A]  Yes    Closed displaced fracture of acromial process of right scapula [S42.121A]  Yes    Closed displaced fracture of acromial process of left scapula [S42.122A]  Yes    Dislocation of left shoulder joint [S43.005A]  Yes    Dislocation of right shoulder joint [S43.004A]  Yes     fracture dislocation of bilateral shoulders  [S42.91XA]  Yes    Fracture dislocation of joint of left shoulder girdle [S42.92XA]  Yes    Closed fracture of both humeri [S42.301A, S42.302A]  Yes    Acute bilateral low back pain [M54.5]  Yes    Shortness of breath [R06.02]  Unknown    Diabetes mellitus due to underlying condition with diabetic autonomic neuropathy, without long-term current use of insulin [E08.43]  Yes    Alcohol abuse [F10.10]  Yes     Chronic    COPD (chronic obstructive pulmonary disease) [J44.9]  Yes     Chronic    Current every day smoker [F17.200]  Yes     2-3 ppd      Hyperlipidemia [E78.5]  Yes    Long term (current) use of anticoagulants [Z79.01]  Not Applicable    PAF (paroxysmal atrial fibrillation) [I48.0]  Yes    Hypertension, essential [I10]  Yes    GERD with esophagitis [K21.0]  Yes      Resolved Hospital Problems   No resolved problems to display.     59 M with hx of paroxysmal afib on coumadin presents for eval of bilateral shoulder dislocations, left convexity SDH, L1 compression fracture, L4 burst fracture. C spine cleared by Ortho. C-collar for comfort      Assessment and Plan    Subdural hematoma  · q 4 hr neuro checks  · Repeat CT head stable on 2/2  · Hold coumadin, goal INR of less than 1.4  · continue keppra - timeframe per Neurosurgery    Bilateral acromial fractures with bilateral shoulder dislocation  S/p bilateral shoulder hemiarthroplasty: right on 1/28/2020 and left on 1/30/2020  · Weight bearing status: NWB and NO ROM of bilateral  shoulder  · Pain control initiated with oxycodone, hydromorphone prn, robaxin and celebrex; will need to monitor for somnolence and taper as needed  · Continue bilateral abduction braces to BUE at all times     L1 burst fracture  L4 burst fracture with moderate spinal canal stenosis  T10, T11 compression fractures, unknown age  · TLSO when OOB/upright  · would like sitting/upright L-spine x-rays in TLSO - should be able to obtain in am of 2/4    DM2  · Chronic home meds  with metformin, basaglar and tradjenta  · Continue monitoring with accuchecks, diabetic diet, SSNI prn    COPD  Smoker  · Continue levoalbuterol nebs (tachy)  · Continue brovana if obtained from home;   · Limit oxygen to avoid hypercapnea  · Continue nicotine patch and encourage cessation with education    Elevated LFT's  Hx of ETOH abuse  · Significant other states patient no longer drinks and 6 pack of beer at home will last  · Chronic statin at home; hold currently  · Denies cirrhosis (listed in hx)  · Evaluate with acute hep panel; direct bili and trending of labs; may need RUQ U/S  · Check BNP to rule out hepatic congestion    PAF  · Sinus tach on ECG  · continue metoprolol  · Hold warfarin therapy until cleared by Ortho/neurosurgery    Constipation  · Continue miralax and senokot-s  · Dulcolax po on 2/3    Anemia  · Likely blood loss as usually hemo-concentrated  · Transfuse prn Hgb < 7; continue to monitor  · ASA currently held    GERD  · Continue pantoprazole    Essential HTN  · On metoprolol and ramipril at home   · continue metoprolol and resume ramipril as BP allows    Diet: Diet diabetic [facility]; 2000 Calorie   DVT PPx:   VTE Risk Mitigation (From admission, onward)         Ordered     heparin (porcine) injection 5,000 Units  Every 8 hours      02/01/20 0915     Reason for No Pharmacological VTE Prophylaxis  Once     Question:  Reasons:  Answer:  Active Bleeding    01/27/20 2243     IP VTE HIGH RISK PATIENT  Once      01/27/20 2243     Place sequential compression device  Until discontinued      01/27/20 2243              GI PPx: pantoprazole  L/D/A: PIV  Wounds: bilateral shoulder with Aquacel dressing    Goals of Care: full code; curative    Discharge plan: inpatient rehab    Emily Mcdaniel MD  [facility] Medicine  [facility]  [phone]

## 2020-02-03 NOTE — PROGRESS NOTES
[facility]  Neurosurgery  Progress Note    Subjective:     History of Present Illness: 59 M with hx of paroxysmal afib on coumadin presents for eval of bilateral shoulder dislocations and left convexity SDH.  Pt had first time seizure and slumped over in chair.  There was no LOC.  He was given vit k and kcentra at OSH for hx of coumadin use.  He endorses low back pain but no neck pain.  No sensation loss in his extremities.    Post-Op Info:  Procedure(s) (LRB):  HEMIARTHROPLASTY, SHOULDER- RIGHT- MEKA- SUPINE-  TRIOS (Right)   4 Days Post-Op     Interval History: Restless and agitated overnight per wife at bedside. Patient drowsy on exam however oriented x4 and participates fully. Patient sitting upright in chair, shoulder braces and TLSO in place. Complains of RLE burning pain throughout to the ankle with movement. XR pending. Denies worsening weakness, paresthesias. Denies b/b dysfunction.     Medications:  Continuous Infusions:  Scheduled Meds:   acetaminophen  650 mg Oral Q6H    atorvastatin  80 mg Oral QHS    celecoxib  200 mg Oral Daily    heparin (porcine)  5,000 Units Subcutaneous Q8H    levalbuterol  0.63 mg Nebulization Q8H    levETIRAcetam  1,000 mg Oral BID    methocarbamol  750 mg Oral TID    metoprolol tartrate  25 mg Oral BID    nicotine  1 patch Transdermal Daily    pantoprazole  40 mg Oral Daily    polyethylene glycol  17 g Oral BID    senna-docusate 8.6-50 mg  1 tablet Oral BID     PRN Meds:Dextrose 10% Bolus, Dextrose 10% Bolus, Dextrose 10% Bolus, glucagon (human recombinant), glucose, glucose, HYDROmorphone, insulin aspart U-100, magnesium sulfate IVPB, melatonin, ondansetron, oxyCODONE, oxyCODONE, oxyCODONE, [DISCONTINUED] potassium chloride in water **AND** potassium chloride in water **AND** [DISCONTINUED] potassium chloride in water, sodium chloride 0.9%       Objective:     Weight: 110.7 kg (244 lb)  Body mass index is 34.03 kg/m².  Vital Signs (Most  Recent):  Temp: 97.6 °F (36.4 °C) (02/03/20 1137)  Pulse: 108 (02/03/20 1146)  Resp: 18 (02/03/20 1137)  BP: 102/64 (02/03/20 1146)  SpO2: 96 % (02/03/20 1137) Vital Signs (24h Range):  Temp:  [96.5 °F (35.8 °C)-98.1 °F (36.7 °C)] 97.6 °F (36.4 °C)  Pulse:  [] 108  Resp:  [16-20] 18  SpO2:  [94 %-97 %] 96 %  BP: ()/(59-75) 102/64                     Male External Urinary Catheter 01/31/20 1756 Small (Active)   Collection Container Urimeter 2/2/2020  2:08 PM   Securement Method secured to top of thigh w/ adhesive device 2/2/2020  2:08 PM   Skin skin barrier applied;no breakdown;no redness 2/2/2020  2:08 PM   Tolerance no signs/symptoms of discomfort 2/2/2020  2:08 PM   Output (mL) 400 mL 2/2/2020  9:00 PM   Catheter Change Date 02/02/20 2/2/2020  2:08 PM   Catheter Change Time 2100 2/2/2020  9:00 PM       Neurosurgery Physical Exam    General: obese, mild distress.   Head: normocephalic, atraumatic  Neck: No tracheal deviation. No palpable masses.   Neurologic: Drowsy but oriented. Thought content appropriate.  GCS: Motor: 6/Verbal: 5/Eyes: 4 GCS Total: 15  Mental Status: Drowsy, easily arouses to voice. Oriented x 4  Language: No aphasia  Speech: No dysarthria  Cranial nerves: face symmetric, tongue midline, CN II-XII grossly intact.   Eyes: pupils equal, round, reactive to light with accomodation, EOMI.   Ears: No drainage.   Pulmonary: normal respirations, no signs of respiratory distress  Abdomen: soft, non-distended, not tender to palpation  Sensory: intact to light touch throughout  Motor Strength: BUE exam limited 2/2 shoulder arthroplasty. 5/5 hand  strength bilaterally. LLE 5/5 throughout. RLE limited proximally 2/2 pain with light touch. 4-/5 HF/KF/KE. 5/5 EHL/DF/PF. No abnormal movements seen.   DTR's - 2 + and symmetric in UE and LE  Duran: absent  Clonus: absent  Vascular: Pulses 2+ and symmetric radial and dorsalis pedis. No LE edema.   Skin: Skin is warm, dry and  intact.      Significant Labs:  Recent Labs   Lab 02/01/20  1451 02/02/20  0115 02/03/20  0347   GLU  --  103 112*   NA  --  138 139   K 3.6 3.7 4.2   CL  --  101 100   CO2  --  29 29   BUN  --  16 19   CREATININE  --  0.8 0.9   CALCIUM  --  8.3* 9.1   MG  --  1.7 1.8     Recent Labs   Lab 02/02/20  0115 02/03/20  0347   WBC 7.89 9.36   HGB 8.5* 9.6*   HCT 27.7* 31.0*    216     No results for input(s): LABPT, INR, APTT in the last 48 hours.  Microbiology Results (last 7 days)     ** No results found for the last 168 hours. **        Recent Lab Results       02/03/20  1204   02/03/20  0758   02/03/20  0347   02/02/20  2128   02/02/20  1738        Albumin     2.1         Alkaline Phosphatase     139         ALT     55         Anion Gap     10         AST     73         Baso #     0.04         Basophil%     0.4         BILIRUBIN TOTAL     1.3  Comment:  For infants and newborns, interpretation of results should be based  on gestational age, weight and in agreement with clinical  observations.  Premature Infant recommended reference ranges:  Up to 24 hours.............<8.0 mg/dL  Up to 48 hours............<12.0 mg/dL  3-5 days..................<15.0 mg/dL  6-29 days.................<15.0 mg/dL           BUN, Bld     19         Calcium     9.1         Chloride     100         CO2     29         Creatinine     0.9         Differential Method     Automated         eGFR if      >60.0         eGFR if non      >60.0  Comment:  Calculation used to obtain the estimated glomerular filtration  rate (eGFR) is the CKD-EPI equation.            Eos #     0.2         Eosinophil%     2.1         Glucose     112         Gran # (ANC)     6.7         Gran%     71.3         Hematocrit     31.0         Hemoglobin     9.6         Immature Grans (Abs)     0.18  Comment:  Mild elevation in immature granulocytes is non specific and   can be seen in a variety of conditions including stress response,    acute inflammation, trauma and pregnancy. Correlation with other   laboratory and clinical findings is essential.           Immature Granulocytes     1.9         Lymph #     1.5         Lymph%     15.9         Magnesium     1.8         MCH     27.3         MCHC     31.0         MCV     88         Mono #     0.8         Mono%     8.4         MPV     9.2         nRBC     0         Phosphorus     3.5         Platelets     216         POCT Glucose 136 119   112 118     Potassium     4.2         PROTEIN TOTAL     6.3         RBC     3.52         RDW     16.1         Sodium     139         WBC     9.36                            All pertinent labs from the last 24 hours have been reviewed.    Significant Diagnostics:  I have reviewed all pertinent imaging results/findings within the past 24 hours.    Assessment/Plan:     * Subdural hematoma  59 M with hx of paroxysmal afib on coumadin presents for eval of bilateral shoulder dislocations, left convexity SDH, L1 compression fracture, L4 burst fracture.    -Stepped down to the floor, neuro checks q4h, vital signs q4h  -Exam stable however limited by RLE pain today.  -No acute neurosurgical intervention at this time based on current imaging  -Repeat CT head stable 2/2 stable. No worsening midline shift or increase size of left convexity SDH. No new hemorrhage noted.  -Continue to hold coumadin, goal INR of less than 1.4  -both shoulders repaired by ortho, appreciate recs  -TLSO when OOB/upright  -Please obtain sitting and upright L-spine x-rays in TLSO when able  -MRI L spine complete. L1 compression fracture with minimal retropulsion into central canal, mild canal stenosis. L4 burst fracture with retropulsion into central canal resulting in moderate canal stenosis. Likely will need RadTx to L spine given cancer hx.   -C spine cleared by Ortho. C collar for comfort  -Further recs to follow completed imaging  -Discussed with Dr. Jhonny Aragon,  LUCY  Neurosurgery  [facility]

## 2020-02-03 NOTE — PT/OT/SLP PROGRESS
Physical Therapy Treatment    Patient Name:  Ben Melvin   MRN:  [id number]    Recommendations:     Discharge Recommendations:  rehabilitation facility   Discharge Equipment Recommendations: (TBD)   Barriers to discharge: Decreased caregiver support at current functional status     Assessment:     Ben Melvin is a 60 y.o. male admitted with a medical diagnosis of Subdural hematoma.  He presents with the following impairments/functional limitations:  weakness, impaired functional mobilty, impaired balance, decreased upper extremity function, decreased safety awareness, impaired endurance, impaired self care skills, gait instability, pain, decreased ROM, orthopedic precautions. Pt supine in bed at beginning of session, significant other present and supportive. Pt agreeable to PT but limited by pain and impaired mobility due to inability to use BUEs and generalized weakness. Pt required maxA x 2 for bed mobility and transferring to chair with step by step cuing. Continues to be rehab appropriate at this time to maximize functional potential.     Rehab Prognosis: Good; patient would benefit from acute skilled PT services to address these deficits and reach maximum level of function.    Recent Surgery: Procedure(s) (LRB):  HEMIARTHROPLASTY, SHOULDER- RIGHT- MEKA- SUPINE-  TRIOS (Right) 4 Days Post-Op    Plan:     During this hospitalization, patient to be seen 4 x/week to address the identified rehab impairments via gait training, therapeutic activities, therapeutic exercises, neuromuscular re-education and progress toward the following goals:    · Plan of Care Expires:  03/01/20    Subjective     Chief Complaint: Pain  Patient/Family Comments/goals: To get stronger   Pain/Comfort:  · Pain Rating 1: (11/10)  · Location - Side 1: Bilateral  · Location 1: shoulder  · Pain Addressed 1: Pre-medicate for activity, Reposition, Distraction, Cessation of Activity  · Pain Rating Post-Intervention 1: (Did not  rate)      Objective:     Communicated with RN prior to session.  Patient found supine with telemetry, SCD upon PT entry to room.     General Precautions: Standard, fall, seizure   Orthopedic Precautions:RUE non weight bearing, LUE non weight bearing   Braces: TLSO, Shoulder abduction brace(Bilaterally)     Functional Mobility:  · Bed Mobility:     · Supine to Sit: maximal assistance and of 2 persons; with use of drawsheet for trunk   · Sit to Supine: NT, pt in chair at end of session   · Transfers:     · Sit to Stand:  maximal assistance and of 2 persons; required x2 attempts due to pt weakness, pt with one episode of buckling first attempt, returned to seated position before second attempt   · Bed to Chair: maximal assistance and of 2 persons via stand pivot, able to take small steps to chair with cuing   · Balance: Poor in standing   · Pt total assist for donning of B shoulder abduction braces and TLSO      AM-PAC 6 CLICK MOBILITY  Turning over in bed (including adjusting bedclothes, sheets and blankets)?: 2  Sitting down on and standing up from a chair with arms (e.g., wheelchair, bedside commode, etc.): 2  Moving from lying on back to sitting on the side of the bed?: 2  Moving to and from a bed to a chair (including a wheelchair)?: 2  Need to walk in hospital room?: 1  Climbing 3-5 steps with a railing?: 1  Basic Mobility Total Score: 10       Therapeutic Activities and Exercises:   Pt educated on: PT role/POC; safety with mobility; benefits of OOB activities; discharge recommendations; weight-bearing precautions; Pt verbalized understanding.     Patient left up in chair with all lines intact, call button in reach, RN notified and spouse present..    GOALS:   Multidisciplinary Problems     Physical Therapy Goals        Problem: Physical Therapy Goal    Goal Priority Disciplines Outcome Goal Variances Interventions   Physical Therapy Goal     PT, PT/OT Ongoing, Progressing     Description:  Goals to be met by:  2/7/20    Patient will increase functional independence with mobility by performing (while maintaining NWB of both upper extremities):    1. Supine to sit with Moderate Assistance. Not met  2. Sit to supine with Moderate Assistance Not met  3. Sit to stand transfer with Moderate Assistance Not met  4. Bed to chair transfer with Moderate Assistance. Not met  5. Gait  x 25 feet with Moderate Assistance. Not met  6. Stand for 2 minutes with Minimal Assistance statically, upright posture. Not met  7. Lower extremity exercise program x 20 reps per handout, with independence Not met                      Time Tracking:     PT Received On: 02/03/20  PT Start Time: 0833     PT Stop Time: 0903  PT Total Time (min): 30 min     Billable Minutes: Therapeutic Activity 30 min    Treatment Type: Treatment  PT/PTA: PT     PTA Visit Number: 0     Samina Kingsley PT, DPT  02/03/2020

## 2020-02-03 NOTE — NURSING
Poor hours. Confused and restless. Awake all night. Melatonin given, but did not sleep.condom cath replaced several times but kept coming off, was left off. naomi abd brace to arms in place, he kept trying to get out of them. Vss. Safety maintained.

## 2020-02-04 LAB
25(OH)D3+25(OH)D2 SERPL-MCNC: 7 NG/ML (ref 30–96)
ALBUMIN SERPL BCP-MCNC: 2.3 G/DL (ref 3.5–5.2)
ALP SERPL-CCNC: 142 U/L (ref 55–135)
ALT SERPL W/O P-5'-P-CCNC: 52 U/L (ref 10–44)
ANION GAP SERPL CALC-SCNC: 12 MMOL/L (ref 8–16)
AST SERPL-CCNC: 58 U/L (ref 10–40)
BILIRUB DIRECT SERPL-MCNC: 0.4 MG/DL (ref 0.1–0.3)
BILIRUB SERPL-MCNC: 1.3 MG/DL (ref 0.1–1)
BNP SERPL-MCNC: 22 PG/ML (ref 0–99)
BUN SERPL-MCNC: 28 MG/DL (ref 6–20)
CALCIUM SERPL-MCNC: 8.9 MG/DL (ref 8.7–10.5)
CHLORIDE SERPL-SCNC: 100 MMOL/L (ref 95–110)
CO2 SERPL-SCNC: 26 MMOL/L (ref 23–29)
CREAT SERPL-MCNC: 1.1 MG/DL (ref 0.5–1.4)
EST. GFR  (AFRICAN AMERICAN): >60 ML/MIN/1.73 M^2
EST. GFR  (NON AFRICAN AMERICAN): >60 ML/MIN/1.73 M^2
GLUCOSE SERPL-MCNC: 102 MG/DL (ref 70–110)
HAV IGM SERPL QL IA: NEGATIVE
HBV CORE IGM SERPL QL IA: NEGATIVE
HBV SURFACE AG SERPL QL IA: NEGATIVE
HCV AB SERPL QL IA: NEGATIVE
MAGNESIUM SERPL-MCNC: 2 MG/DL (ref 1.6–2.6)
PHOSPHATE SERPL-MCNC: 3.6 MG/DL (ref 2.7–4.5)
POCT GLUCOSE: 117 MG/DL (ref 70–110)
POCT GLUCOSE: 119 MG/DL (ref 70–110)
POTASSIUM SERPL-SCNC: 4.5 MMOL/L (ref 3.5–5.1)
PROT SERPL-MCNC: 6.7 G/DL (ref 6–8.4)
SODIUM SERPL-SCNC: 138 MMOL/L (ref 136–145)

## 2020-02-04 PROCEDURE — 99900035 HC TECH TIME PER 15 MIN (STAT)

## 2020-02-04 PROCEDURE — 25000003 PHARM REV CODE 250: Performed by: STUDENT IN AN ORGANIZED HEALTH CARE EDUCATION/TRAINING PROGRAM

## 2020-02-04 PROCEDURE — 80053 COMPREHEN METABOLIC PANEL: CPT

## 2020-02-04 PROCEDURE — 97535 SELF CARE MNGMENT TRAINING: CPT

## 2020-02-04 PROCEDURE — 94640 AIRWAY INHALATION TREATMENT: CPT

## 2020-02-04 PROCEDURE — 63600175 PHARM REV CODE 636 W HCPCS: Performed by: NURSE PRACTITIONER

## 2020-02-04 PROCEDURE — 92523 SPEECH SOUND LANG COMPREHEN: CPT

## 2020-02-04 PROCEDURE — 99231 SBSQ HOSP IP/OBS SF/LOW 25: CPT | Mod: ICN,CMP,, | Performed by: PHYSICIAN ASSISTANT

## 2020-02-04 PROCEDURE — 99231 PR SUBSEQUENT HOSPITAL CARE,LEVL I: ICD-10-PCS | Mod: ICN,CMP,, | Performed by: PHYSICIAN ASSISTANT

## 2020-02-04 PROCEDURE — 82248 BILIRUBIN DIRECT: CPT

## 2020-02-04 PROCEDURE — 99232 SBSQ HOSP IP/OBS MODERATE 35: CPT | Mod: ,,, | Performed by: INTERNAL MEDICINE

## 2020-02-04 PROCEDURE — 36415 COLL VENOUS BLD VENIPUNCTURE: CPT

## 2020-02-04 PROCEDURE — 84100 ASSAY OF PHOSPHORUS: CPT

## 2020-02-04 PROCEDURE — 25000003 PHARM REV CODE 250: Performed by: NURSE PRACTITIONER

## 2020-02-04 PROCEDURE — 25000003 PHARM REV CODE 250: Performed by: INTERNAL MEDICINE

## 2020-02-04 PROCEDURE — 25000242 PHARM REV CODE 250 ALT 637 W/ HCPCS: Performed by: NURSE PRACTITIONER

## 2020-02-04 PROCEDURE — 82306 VITAMIN D 25 HYDROXY: CPT

## 2020-02-04 PROCEDURE — 92526 ORAL FUNCTION THERAPY: CPT

## 2020-02-04 PROCEDURE — 94761 N-INVAS EAR/PLS OXIMETRY MLT: CPT

## 2020-02-04 PROCEDURE — 99232 PR SUBSEQUENT HOSPITAL CARE,LEVL II: ICD-10-PCS | Mod: ,,, | Performed by: INTERNAL MEDICINE

## 2020-02-04 PROCEDURE — 11000001 HC ACUTE MED/SURG PRIVATE ROOM

## 2020-02-04 PROCEDURE — S4991 NICOTINE PATCH NONLEGEND: HCPCS | Performed by: NURSE PRACTITIONER

## 2020-02-04 PROCEDURE — 83880 ASSAY OF NATRIURETIC PEPTIDE: CPT

## 2020-02-04 PROCEDURE — 97530 THERAPEUTIC ACTIVITIES: CPT

## 2020-02-04 PROCEDURE — 80074 ACUTE HEPATITIS PANEL: CPT

## 2020-02-04 PROCEDURE — 63600175 PHARM REV CODE 636 W HCPCS: Performed by: STUDENT IN AN ORGANIZED HEALTH CARE EDUCATION/TRAINING PROGRAM

## 2020-02-04 PROCEDURE — 27000221 HC OXYGEN, UP TO 24 HOURS

## 2020-02-04 PROCEDURE — 25000242 PHARM REV CODE 250 ALT 637 W/ HCPCS: Performed by: INTERNAL MEDICINE

## 2020-02-04 PROCEDURE — 83735 ASSAY OF MAGNESIUM: CPT

## 2020-02-04 RX ORDER — GABAPENTIN 300 MG/1
300 CAPSULE ORAL NIGHTLY
Status: DISCONTINUED | OUTPATIENT
Start: 2020-02-04 | End: 2020-02-05

## 2020-02-04 RX ORDER — ERGOCALCIFEROL 1.25 MG/1
50000 CAPSULE ORAL
Status: DISCONTINUED | OUTPATIENT
Start: 2020-02-04 | End: 2020-02-07 | Stop reason: HOSPADM

## 2020-02-04 RX ORDER — ACETAMINOPHEN 325 MG/1
650 TABLET ORAL 3 TIMES DAILY
Status: DISCONTINUED | OUTPATIENT
Start: 2020-02-04 | End: 2020-02-05

## 2020-02-04 RX ORDER — AMITRIPTYLINE HYDROCHLORIDE 25 MG/1
50 TABLET, FILM COATED ORAL NIGHTLY
Status: DISCONTINUED | OUTPATIENT
Start: 2020-02-04 | End: 2020-02-05

## 2020-02-04 RX ORDER — BISACODYL 10 MG
10 SUPPOSITORY, RECTAL RECTAL DAILY PRN
Status: DISCONTINUED | OUTPATIENT
Start: 2020-02-04 | End: 2020-02-07 | Stop reason: HOSPADM

## 2020-02-04 RX ADMIN — GUAIFENESIN AND DEXTROMETHORPHAN HYDROBROMIDE 1 TABLET: 600; 30 TABLET, EXTENDED RELEASE ORAL at 10:02

## 2020-02-04 RX ADMIN — LEVALBUTEROL HYDROCHLORIDE 0.63 MG: 0.63 SOLUTION RESPIRATORY (INHALATION) at 11:02

## 2020-02-04 RX ADMIN — HEPARIN SODIUM 5000 UNITS: 5000 INJECTION, SOLUTION INTRAVENOUS; SUBCUTANEOUS at 06:02

## 2020-02-04 RX ADMIN — HYDROMORPHONE HYDROCHLORIDE 0.5 MG: 1 INJECTION, SOLUTION INTRAMUSCULAR; INTRAVENOUS; SUBCUTANEOUS at 01:02

## 2020-02-04 RX ADMIN — POLYETHYLENE GLYCOL 3350 17 G: 17 POWDER, FOR SOLUTION ORAL at 10:02

## 2020-02-04 RX ADMIN — OXYCODONE HYDROCHLORIDE 10 MG: 10 TABLET ORAL at 02:02

## 2020-02-04 RX ADMIN — LEVETIRACETAM 1000 MG: 500 TABLET ORAL at 08:02

## 2020-02-04 RX ADMIN — AMITRIPTYLINE HYDROCHLORIDE 50 MG: 25 TABLET, FILM COATED ORAL at 10:02

## 2020-02-04 RX ADMIN — PANTOPRAZOLE SODIUM 40 MG: 40 TABLET, DELAYED RELEASE ORAL at 08:02

## 2020-02-04 RX ADMIN — CELECOXIB 200 MG: 200 CAPSULE ORAL at 08:02

## 2020-02-04 RX ADMIN — METOPROLOL TARTRATE 25 MG: 25 TABLET ORAL at 10:02

## 2020-02-04 RX ADMIN — OXYCODONE HYDROCHLORIDE 10 MG: 10 TABLET ORAL at 11:02

## 2020-02-04 RX ADMIN — LEVALBUTEROL HYDROCHLORIDE 0.63 MG: 0.63 SOLUTION RESPIRATORY (INHALATION) at 04:02

## 2020-02-04 RX ADMIN — ACETAMINOPHEN 650 MG: 325 TABLET ORAL at 07:02

## 2020-02-04 RX ADMIN — HEPARIN SODIUM 5000 UNITS: 5000 INJECTION, SOLUTION INTRAVENOUS; SUBCUTANEOUS at 10:02

## 2020-02-04 RX ADMIN — GABAPENTIN 300 MG: 300 CAPSULE ORAL at 09:02

## 2020-02-04 RX ADMIN — METHOCARBAMOL TABLETS 750 MG: 750 TABLET, COATED ORAL at 09:02

## 2020-02-04 RX ADMIN — ACETAMINOPHEN 650 MG: 325 TABLET ORAL at 10:02

## 2020-02-04 RX ADMIN — IPRATROPIUM BROMIDE 0.5 MG: 0.5 SOLUTION RESPIRATORY (INHALATION) at 11:02

## 2020-02-04 RX ADMIN — IPRATROPIUM BROMIDE 0.5 MG: 0.5 SOLUTION RESPIRATORY (INHALATION) at 09:02

## 2020-02-04 RX ADMIN — SENNOSIDES AND DOCUSATE SODIUM 1 TABLET: 8.6; 5 TABLET ORAL at 10:02

## 2020-02-04 RX ADMIN — METHOCARBAMOL TABLETS 750 MG: 750 TABLET, COATED ORAL at 10:02

## 2020-02-04 RX ADMIN — SENNOSIDES AND DOCUSATE SODIUM 1 TABLET: 8.6; 5 TABLET ORAL at 08:02

## 2020-02-04 RX ADMIN — ERGOCALCIFEROL 50000 UNITS: 1.25 CAPSULE ORAL at 09:02

## 2020-02-04 RX ADMIN — OXYCODONE HYDROCHLORIDE 10 MG: 10 TABLET ORAL at 08:02

## 2020-02-04 RX ADMIN — HEPARIN SODIUM 5000 UNITS: 5000 INJECTION, SOLUTION INTRAVENOUS; SUBCUTANEOUS at 01:02

## 2020-02-04 RX ADMIN — POLYETHYLENE GLYCOL 3350 17 G: 17 POWDER, FOR SOLUTION ORAL at 08:02

## 2020-02-04 RX ADMIN — IPRATROPIUM BROMIDE 0.5 MG: 0.5 SOLUTION RESPIRATORY (INHALATION) at 01:02

## 2020-02-04 RX ADMIN — IPRATROPIUM BROMIDE 0.5 MG: 0.5 SOLUTION RESPIRATORY (INHALATION) at 04:02

## 2020-02-04 RX ADMIN — LEVETIRACETAM 1000 MG: 500 TABLET ORAL at 10:02

## 2020-02-04 RX ADMIN — METOPROLOL TARTRATE 25 MG: 25 TABLET ORAL at 08:02

## 2020-02-04 RX ADMIN — NICOTINE 1 PATCH: 21 PATCH, EXTENDED RELEASE TRANSDERMAL at 09:02

## 2020-02-04 RX ADMIN — LEVALBUTEROL HYDROCHLORIDE 0.63 MG: 0.63 SOLUTION RESPIRATORY (INHALATION) at 09:02

## 2020-02-04 RX ADMIN — METHOCARBAMOL TABLETS 750 MG: 750 TABLET, COATED ORAL at 02:02

## 2020-02-04 NOTE — PLAN OF CARE
Problem: SLP Goal  Goal: SLP Goal  Description  Goals expected to be met by 2/9:   1. Pt will tolerate least restrictive diet without overt s/sx airway compromise.    2. Pt will participate in cognitive-linguistic evaluation to further develop POC (met & ongoing).  3. Pt will recall 3/4 unrelated objects with a 5-minute delay and min assist.    4. Pt will complete short-term recall tasks with 80% accuracy and min assist.   5. Pt will complete mental manipulation tasks with 80% accuracy and min assist.   6. Pt will participate in ongoing assessment of cognition/language/reading/visuospatial skills.    Outcome: Ongoing, Progressing     Patient seen for ongoing monitoring of diet tolerance and a speech/language/cognitive evaluation.     Lux Yi CCC-SLP  Speech-Language Pathology  Pager: [phone]

## 2020-02-04 NOTE — PLAN OF CARE
02/04/20 1220   Post-Acute Status   Post-Acute Authorization Placement   Post-Acute Placement Status Pending Post-Acute Clinical Review   SW received message from Deepika with [facility] to state following patient case and that referral being reviewed at this time. Deepika reports patient not ready for discharge at this time and that referral will still be under review until patient level of functioning increases. PROMISE will continue to follow up with provider by sending updates on patient level of functioning.   Dyan Rose, BOBBY  [facility]

## 2020-02-04 NOTE — PLAN OF CARE
Pt A & O x4, all HAPI precautions followed and charted, pt able to stand at bedside with 2 person assist,  fall precautions in place, pain monitored and controlled with scheduled and PRN medication, wife at bedside, call light within reach.

## 2020-02-04 NOTE — PLAN OF CARE
Problem: Physical Therapy Goal  Goal: Physical Therapy Goal  Description  Goals to be met by: 2/7/20    Patient will increase functional independence with mobility by performing (while maintaining NWB of both upper extremities):    1. Supine to sit with Moderate Assistance. Not met  2. Sit to supine with Moderate Assistance Not met  3. Sit to stand transfer with Moderate Assistance Not met  4. Bed to chair transfer with Moderate Assistance. Not met  5. Gait  x 25 feet with Moderate Assistance. Not met  6. Stand for 2 minutes with Minimal Assistance statically, upright posture. Not met  7. Lower extremity exercise program x 20 reps per handout, with independence Not met     Outcome: Ongoing, Progressing     Patient progressing appropriately towards current goals and plan of care remains appropriate at this time. Patient demonstrates good motivation and decreased activity tolerance secondary lightheadedness and fatigue.  Patient tolerated sitting edge of bed ~ 20 min for management and readjustment of braces.  Patient however, was with lightheadedness and diaphoresis following increased time sitting.  Nursing notified of lightheadedness (BP was 114/54 mmHg in sitting).    Riley Freeman, PT, DPT  2/4/2020  Pager #: [phone]

## 2020-02-04 NOTE — PT/OT/SLP EVAL
Speech Language Pathology Evaluation  Cognitive Communication    Patient Name:  Ben Melvin   MRN:  [id number]  Admitting Diagnosis: Subdural hematoma    Recommendations:     Recommendations:                General Recommendations:  Cognitive-linguistic therapy  Diet recommendations:  Regular, Thin   Aspiration Precautions: Standard aspiration precautions   General Precautions: Standard, fall, seizure  Communication strategies:  none    History:     Past Medical History:   Diagnosis Date    Adenoma of right adrenal gland 11/09/2017    Alcohol abuse     ASCVD (arteriosclerotic cardiovascular disease) 10/2008    [place]    BPH (benign prostatic hyperplasia)     Cardiomyopathy     Chronic anxiety     Cirrhosis of liver     COPD (chronic obstructive pulmonary disease)     Current every day smoker     2-3 ppd    Elevated LFTs 08/2001    GERD with esophagitis 11/09/2017    History of colon polyps     History of epididymitis 2016    Juan Antonio    Hyperlipidemia     Hypertension, essential     Long term (current) use of anticoagulants     Lumbar disc disease with radiculopathy 05/2015    Major depression, recurrent, chronic     PAF (paroxysmal atrial fibrillation)     Peripheral autonomic neuropathy due to DM     Proteinuria     PVD (peripheral vascular disease) 05/29/2009    Venous insufficiency of both lower extremities     Vitamin D deficiency        Past Surgical History:   Procedure Laterality Date    biopsy back  05/20/2019    benign Martinez    biopsy right ankle Right 05/20/2019    Martinez, benign    COLONOSCOPY N/A 6/13/2019    Procedure: COLONOSCOPY;  Surgeon: Delmer Kerr MD;  [facility];  Service: Endoscopy;  Laterality: N/A;    COLONOSCOPY W/ POLYPECTOMY      EPIDURAL STEROID INJECTION INTO LUMBAR SPINE  06/2015    LASHAE Harvey    ESOPHAGOGASTRODUODENOSCOPY N/A 6/13/2019    Procedure: ESOPHAGOGASTRODUODENOSCOPY (EGD);  Surgeon: Delmer Kerr MD;  Location: [facility];   "Service: Endoscopy;  Laterality: N/A;    INTESTINAL MALROTATION REPAIR Right 1961    2 weeks old, Martinez    PARTIAL ARTHROPLASTY OF SHOULDER Left 1/28/2020    Procedure: HEMIARTHROPLASTY, SHOULDER;  Surgeon: Chandler Chris MD;  Location: [facility] [address] [place] [address];  Service: Orthopedics;  Laterality: Left;    PARTIAL ARTHROPLASTY OF SHOULDER Right 1/30/2020    Procedure: HEMIARTHROPLASTY, SHOULDER- RIGHT- MEKA- SUPINE-  TRIOS;  Surgeon: Chandler Chris MD;  Location: [facility] OR [address] [place] [address];  Service: Orthopedics;  Laterality: Right;    PERCUTANEOUS TRANSLUMINAL ANGIOPLASTY (PTA) OF PERIPHERAL VESSEL Right 12/2014    NIKOLAS Childs       Social History: Patient lives with his SO in [place].    Occupation/hobbies/homemaking: Patient formerly worked in [employer], but has been retired for 9 years and is on disability. He enjoys playing pool, fishing and cutting grass for neighbors.    Subjective     Patient awake and alert during session  "Even before this, I would tell him things and he would forget right away." Patient's SO referring to baseline memory deficits  Communicated with nurse prior to session     Pain/Comfort:  · Pain Rating 1: 0/10  · Pain Rating Post-Intervention 1: 0/10    Objective:   Cognitive Status:    Arousal/Alertness Appropriate response to stimuli  Attention Sustained attention deficit present as session progressed  Orientation Oriented x4  Memory Immediate Recall: mod assist and Delayed Recall: independently recalled 0/4 unrelated objects with a 5-minute delay, increasing to 2/4 with max assist  Problem Solving Conclusions: 2/3 with mod assist and Solutions: 1/2 with mod assist    Receptive Language:   Comprehension:    Questions Complex yes/no: WFL  Open ended questions: WFL  Commands multistep basic commands: WFL    Pragmatics:    Mild tangential thought processes, easily redirected    Expressive Language:  Verbal:    Automatic Speech  Phrase completion: WFL  Naming Confrontation: WFL and " Convergent: WFL  Sentence formulation WFL      Motor Speech:  WFL    Voice:   WFL    Visual-Spatial:  TBA    Reading:   TBA     Written Expression:   TBA    Treatment: Patient seen for ongoing swallow assessment and a speech/language/cognitive eval. He was sitting up in bed with with his SO in room. Patient and SO reported no difficulties with current diet and he tolerated thin liquids x5 (via straw) and solids x3 (saltines) with no overt signs of aspiration. Patient and SO reported that he was near baseline from a ST perspective, but were acknowledged the benefits of therapy. SLP educated them regarding POC from a ST perspective. Patient left in room with SO present.     Assessment:   Ben Melvin is a 60 y.o. male with an SLP diagnosis of mild Cognitive-Linguistic Impairment.      Goals:   Multidisciplinary Problems     SLP Goals        Problem: SLP Goal    Goal Priority Disciplines Outcome   SLP Goal     SLP Ongoing, Progressing   Description:  Goals expected to be met by 2/9:   1. Pt will tolerate least restrictive diet without overt s/sx airway compromise.    2. Pt will participate in cognitive-linguistic evaluation to further develop POC (met & ongoing).  3. Pt will recall 3/4 unrelated objects with a 5-minute delay and min assist.    4. Pt will complete short-term recall tasks with 80% accuracy and min assist.   5. Pt will complete mental manipulation tasks with 80% accuracy and min assist.   6. Pt will participate in ongoing assessment of cognition/language/reading/visuospatial skills.                       Plan:   · Patient to be seen:  3 x/week   · Plan of Care expires:  03/02/20  · Plan of Care reviewed with:  patient, significant other   · SLP Follow-Up:  Yes       Discharge recommendations:  Discharge Facility/Level of Care Needs: rehabilitation facility   Barriers to Discharge:  None    Time Tracking:   SLP Treatment Date:   02/04/20  Speech Start Time:  1325  Speech Stop Time:  1351     Speech  Total Time (min):  26 min    Billable Minutes: Eval 16  and Treatment Swallowing Dysfunction 10    Lux Yi CCC-SLP  Speech-Language Pathology  Pager: [phone]   02/04/2020

## 2020-02-04 NOTE — PROGRESS NOTES
[facility]  Neurosurgery  Progress Note    Subjective:     History of Present Illness: 59 M with hx of paroxysmal afib on coumadin presents for eval of bilateral shoulder dislocations and left convexity SDH.  Pt had first time seizure and slumped over in chair.  There was no LOC.  He was given vit k and kcentra at OSH for hx of coumadin use.  He endorses low back pain but no neck pain.  No sensation loss in his extremities.    Post-Op Info:  Procedure(s) (LRB):  HEMIARTHROPLASTY, SHOULDER- RIGHT- MEKA- SUPINE-  TRIOS (Right)   5 Days Post-Op     Interval History: NAEON. Better sleep reported from family at bedside. Denies back and RLE pain on exam, however was just given pain medication prior to assessment. Endorses LLE pain on today's exam and proximal exam limited by pain. Denies b/b dysfunction, numbness. Unable to get XR today 2/2 patient unable to raise arms for films and non-cooperative per XR tech.     Medications:  Continuous Infusions:  Scheduled Meds:   acetaminophen  650 mg Oral TID    celecoxib  200 mg Oral Daily    dextromethorphan-guaifenesin  mg  1 tablet Oral BID    ergocalciferol  50,000 Units Oral Q7 Days    gabapentin  300 mg Oral QHS    heparin (porcine)  5,000 Units Subcutaneous Q8H    ipratropium  0.5 mg Nebulization Q4H While awake    levalbuterol  0.63 mg Nebulization Q8H    levETIRAcetam  1,000 mg Oral BID    methocarbamol  750 mg Oral TID    metoprolol tartrate  25 mg Oral BID    nicotine  1 patch Transdermal Daily    pantoprazole  40 mg Oral Daily    polyethylene glycol  17 g Oral BID    senna-docusate 8.6-50 mg  1 tablet Oral BID     PRN Meds:bisacodyL, Dextrose 10% Bolus, Dextrose 10% Bolus, Dextrose 10% Bolus, glucagon (human recombinant), glucose, glucose, HYDROmorphone, insulin aspart U-100, melatonin, ondansetron, oxyCODONE, oxyCODONE, oxyCODONE, sodium chloride 0.9%       Objective:     Weight: 110.7 kg (244 lb)  Body mass index is 34.03  kg/m².  Vital Signs (Most Recent):  Temp: 97.5 °F (36.4 °C) (02/04/20 0722)  Pulse: 97 (02/04/20 1351)  Resp: (!) 97 (02/04/20 1351)  BP: 132/70 (02/04/20 0722)  SpO2: 96 % (02/04/20 1351) Vital Signs (24h Range):  Temp:  [96.3 °F (35.7 °C)-97.9 °F (36.6 °C)] 97.5 °F (36.4 °C)  Pulse:  [] 97  Resp:  [16-97] 97  SpO2:  [91 %-98 %] 96 %  BP: (109-135)/(60-72) 132/70     Date 02/04/20 0700 - 02/05/20 0659   Shift 2869-2927 3880-1687 8731-1754 24 Hour Total   INTAKE   P.O. 118   118   Shift Total(mL/kg) 118(1.1)   118(1.1)   OUTPUT   Shift Total(mL/kg)       Weight (kg) 110.7 110.7 110.7 110.7              Oxygen Concentration (%):  [24-28] 24    Male External Urinary Catheter 01/31/20 1756 Small (Active)   Collection Container Urimeter 2/2/2020  2:08 PM   Securement Method secured to top of thigh w/ adhesive device 2/2/2020  2:08 PM   Skin skin barrier applied;no breakdown;no redness 2/2/2020  2:08 PM   Tolerance no signs/symptoms of discomfort 2/2/2020  2:08 PM   Output (mL) 400 mL 2/2/2020  9:00 PM   Catheter Change Date 02/02/20 2/2/2020  2:08 PM   Catheter Change Time 2100 2/2/2020  9:00 PM       Neurosurgery Physical Exam    General: well developed, well nourished, no distress.   Head: normocephalic, atraumatic  Neck: No tracheal deviation. No palpable masses.    Neurologic: Alert and oriented. Thought content appropriate.  GCS: Motor: 6/Verbal: 5/Eyes: 4 GCS Total: 15  Mental Status: Awake, Alert, Oriented x 4  Language: No aphasia  Speech: No dysarthria  Cranial nerves: face symmetric, tongue midline, CN II-XII grossly intact.   Eyes: pupils equal, round, reactive to light with accomodation, EOMI.    Ears: No drainage.   Pulmonary: normal respirations, no signs of respiratory distress  Abdomen: soft, non-distended, not tender to palpation  Sensory: intact to light touch throughout  Motor Strength: Moves all extremities spontaneously with good tone. BUE in shoulder abduction braces. Able to perform hand   bilaterally 5/5. Exam limited by LLE pain today. 4-/5 HF/KF/KE, 5/5 DF/EHL/PF.  Full strength throughout RLE. No abnormal movements seen.   Duran: absent  Clonus: absent  Babinski: absent  Vascular: Pulses 2+ and symmetric radial and dorsalis pedis. No LE edema.   Skin: Skin is warm, dry and intact.    TLSO brace in place    Significant Labs:  Recent Labs   Lab 02/03/20  0347 02/04/20  0455   * 102    138   K 4.2 4.5    100   CO2 29 26   BUN 19 28*   CREATININE 0.9 1.1   CALCIUM 9.1 8.9   MG 1.8 2.0     Recent Labs   Lab 02/03/20  0347   WBC 9.36   HGB 9.6*   HCT 31.0*        No results for input(s): LABPT, INR, APTT in the last 48 hours.  Microbiology Results (last 7 days)     ** No results found for the last 168 hours. **        Recent Lab Results       02/04/20 0455   02/03/20  2236        Albumin 2.3       Alkaline Phosphatase 142       ALT 52       Anion Gap 12       AST 58  Comment:  *Result may be interfered by visible hemolysis       Bilirubin, Direct 0.4  Comment:  *Result may be interfered by visible hemolysis       BILIRUBIN TOTAL 1.3  Comment:  For infants and newborns, interpretation of results should be based  on gestational age, weight and in agreement with clinical  observations.  Premature Infant recommended reference ranges:  Up to 24 hours.............<8.0 mg/dL  Up to 48 hours............<12.0 mg/dL  3-5 days..................<15.0 mg/dL  6-29 days.................<15.0 mg/dL         BNP 22  Comment:  Values of less than 100 pg/ml are consistent with non-CHF populations.       BUN, Bld 28       Calcium 8.9       Chloride 100       CO2 26       Creatinine 1.1       eGFR if  >60.0       eGFR if non  >60.0  Comment:  Calculation used to obtain the estimated glomerular filtration  rate (eGFR) is the CKD-EPI equation.          Glucose 102       Hep A IgM Negative       Hep B C IgM Negative       Hepatitis B Surface Ag Negative        Hepatitis C Ab Negative       Magnesium 2.0       Phosphorus 3.6       POCT Glucose   112     Potassium 4.5  Comment:  *Result may be interfered by visible hemolysis       PROTEIN TOTAL 6.7  Comment:  *Result may be interfered by visible hemolysis       Sodium 138       Vit D, 25-Hydroxy 7  Comment:  Vitamin D deficiency.........<10 ng/mL                              Vitamin D insufficiency......10-29 ng/mL       Vitamin D sufficiency........> or equal to 30 ng/mL  Vitamin D toxicity............>100 ng/mL             All pertinent labs from the last 24 hours have been reviewed.    Significant Diagnostics:  I have reviewed all pertinent imaging results/findings within the past 24 hours.    Assessment/Plan:     * Subdural hematoma  59 M with hx of paroxysmal afib on coumadin presents for eval of bilateral shoulder dislocations, left convexity SDH, L1 compression fracture, L4 burst fracture.    -Stepped down to the floor, neuro checks q4h, vital signs q4h  -Exam stable however limited by LLE pain today. RLE full strength. Denies pain in RLE.  -No acute neurosurgical intervention at this time based on current imaging  -Repeat CT head stable 2/2 stable. No worsening midline shift or increase size of left convexity SDH. No new hemorrhage noted.  -Continue to hold coumadin, goal INR of less than 1.4. From neurosurgery standpoint in the setting of SDH, recommend holding coumadin for 2 weeks. Will need a repeat CT head in 2 weeks and if stable and patient is asymptomatic, ok to resume.  -TLSO when OOB/upright  -Please obtain sitting and upright L-spine x-rays in TLSO when able to evaluate spinal stability. If unable to obtain and patient remains neurologically intact, will f/u outpatient in 6 weeks with XR at that time. Continue TLSO brace when OOB/upright if patient is discharged without an XR.  -MRI L spine complete. L1 compression fracture with minimal retropulsion into central canal, mild canal stenosis. L4 burst  fracture with retropulsion into central canal resulting in moderate canal stenosis. Likely will need RadTx to L spine given cancer hx.   -C spine cleared by Ortho. C collar for comfort  -Discussed with Dr. Jhonny Aragon, PA-C  Neurosurgery  [facility]

## 2020-02-04 NOTE — SUBJECTIVE & OBJECTIVE
Interval History: NAEON. Better sleep reported from family at bedside. Denies back and RLE pain on exam, however was just given pain medication prior to assessment. Endorses LLE pain on today's exam and proximal exam limited by pain. Denies b/b dysfunction, numbness. Unable to get XR today 2/2 patient unable to raise arms for films and non-cooperative per XR tech.     Medications:  Continuous Infusions:  Scheduled Meds:   acetaminophen  650 mg Oral TID    celecoxib  200 mg Oral Daily    dextromethorphan-guaifenesin  mg  1 tablet Oral BID    ergocalciferol  50,000 Units Oral Q7 Days    gabapentin  300 mg Oral QHS    heparin (porcine)  5,000 Units Subcutaneous Q8H    ipratropium  0.5 mg Nebulization Q4H While awake    levalbuterol  0.63 mg Nebulization Q8H    levETIRAcetam  1,000 mg Oral BID    methocarbamol  750 mg Oral TID    metoprolol tartrate  25 mg Oral BID    nicotine  1 patch Transdermal Daily    pantoprazole  40 mg Oral Daily    polyethylene glycol  17 g Oral BID    senna-docusate 8.6-50 mg  1 tablet Oral BID     PRN Meds:bisacodyL, Dextrose 10% Bolus, Dextrose 10% Bolus, Dextrose 10% Bolus, glucagon (human recombinant), glucose, glucose, HYDROmorphone, insulin aspart U-100, melatonin, ondansetron, oxyCODONE, oxyCODONE, oxyCODONE, sodium chloride 0.9%       Objective:     Weight: 110.7 kg (244 lb)  Body mass index is 34.03 kg/m².  Vital Signs (Most Recent):  Temp: 97.5 °F (36.4 °C) (02/04/20 0722)  Pulse: 97 (02/04/20 1351)  Resp: (!) 97 (02/04/20 1351)  BP: 132/70 (02/04/20 0722)  SpO2: 96 % (02/04/20 1351) Vital Signs (24h Range):  Temp:  [96.3 °F (35.7 °C)-97.9 °F (36.6 °C)] 97.5 °F (36.4 °C)  Pulse:  [] 97  Resp:  [16-97] 97  SpO2:  [91 %-98 %] 96 %  BP: (109-135)/(60-72) 132/70     Date 02/04/20 0700 - 02/05/20 0659   Shift 6030-7756 8356-9157 0225-0869 24 Hour Total   INTAKE   P.O. 118   118   Shift Total(mL/kg) 118(1.1)   118(1.1)   OUTPUT   Shift Total(mL/kg)       Weight  (kg) 110.7 110.7 110.7 110.7              Oxygen Concentration (%):  [24-28] 24    Male External Urinary Catheter 01/31/20 0521 Small (Active)   Collection Container Urimeter 2/2/2020  2:08 PM   Securement Method secured to top of thigh w/ adhesive device 2/2/2020  2:08 PM   Skin skin barrier applied;no breakdown;no redness 2/2/2020  2:08 PM   Tolerance no signs/symptoms of discomfort 2/2/2020  2:08 PM   Output (mL) 400 mL 2/2/2020  9:00 PM   Catheter Change Date 02/02/20 2/2/2020  2:08 PM   Catheter Change Time 2100 2/2/2020  9:00 PM       Neurosurgery Physical Exam    General: well developed, well nourished, no distress.   Head: normocephalic, atraumatic  Neck: No tracheal deviation. No palpable masses.    Neurologic: Alert and oriented. Thought content appropriate.  GCS: Motor: 6/Verbal: 5/Eyes: 4 GCS Total: 15  Mental Status: Awake, Alert, Oriented x 4  Language: No aphasia  Speech: No dysarthria  Cranial nerves: face symmetric, tongue midline, CN II-XII grossly intact.   Eyes: pupils equal, round, reactive to light with accomodation, EOMI.    Ears: No drainage.   Pulmonary: normal respirations, no signs of respiratory distress  Abdomen: soft, non-distended, not tender to palpation  Sensory: intact to light touch throughout  Motor Strength: Moves all extremities spontaneously with good tone. BUE in shoulder abduction braces. Able to perform hand  bilaterally 5/5. Exam limited by LLE pain today. 4-/5 HF/KF/KE, 5/5 DF/EHL/PF.  Full strength throughout RLE. No abnormal movements seen.   Duran: absent  Clonus: absent  Babinski: absent  Vascular: Pulses 2+ and symmetric radial and dorsalis pedis. No LE edema.   Skin: Skin is warm, dry and intact.    TLSO brace in place    Significant Labs:  Recent Labs   Lab 02/03/20 0347 02/04/20  0455   * 102    138   K 4.2 4.5    100   CO2 29 26   BUN 19 28*   CREATININE 0.9 1.1   CALCIUM 9.1 8.9   MG 1.8 2.0     Recent Labs   Lab 02/03/20  0347   WBC  9.36   HGB 9.6*   HCT 31.0*        No results for input(s): LABPT, INR, APTT in the last 48 hours.  Microbiology Results (last 7 days)     ** No results found for the last 168 hours. **        Recent Lab Results       02/04/20  0455   02/03/20  2236        Albumin 2.3       Alkaline Phosphatase 142       ALT 52       Anion Gap 12       AST 58  Comment:  *Result may be interfered by visible hemolysis       Bilirubin, Direct 0.4  Comment:  *Result may be interfered by visible hemolysis       BILIRUBIN TOTAL 1.3  Comment:  For infants and newborns, interpretation of results should be based  on gestational age, weight and in agreement with clinical  observations.  Premature Infant recommended reference ranges:  Up to 24 hours.............<8.0 mg/dL  Up to 48 hours............<12.0 mg/dL  3-5 days..................<15.0 mg/dL  6-29 days.................<15.0 mg/dL         BNP 22  Comment:  Values of less than 100 pg/ml are consistent with non-CHF populations.       BUN, Bld 28       Calcium 8.9       Chloride 100       CO2 26       Creatinine 1.1       eGFR if  >60.0       eGFR if non  >60.0  Comment:  Calculation used to obtain the estimated glomerular filtration  rate (eGFR) is the CKD-EPI equation.          Glucose 102       Hep A IgM Negative       Hep B C IgM Negative       Hepatitis B Surface Ag Negative       Hepatitis C Ab Negative       Magnesium 2.0       Phosphorus 3.6       POCT Glucose   112     Potassium 4.5  Comment:  *Result may be interfered by visible hemolysis       PROTEIN TOTAL 6.7  Comment:  *Result may be interfered by visible hemolysis       Sodium 138       Vit D, 25-Hydroxy 7  Comment:  Vitamin D deficiency.........<10 ng/mL                              Vitamin D insufficiency......10-29 ng/mL       Vitamin D sufficiency........> or equal to 30 ng/mL  Vitamin D toxicity............>100 ng/mL             All pertinent labs from the last 24 hours have been  reviewed.    Significant Diagnostics:  I have reviewed all pertinent imaging results/findings within the past 24 hours.

## 2020-02-04 NOTE — PROGRESS NOTES
"[facility]  Adult Nutrition  Progress Note    SUMMARY       Recommendations    1. Continue diabetic diet as tolerated.   2. If po intake <50%, recommend Boost Glucose Control with meals.   3. RD following.     Goals: Pt to receive and tolerate >75% EEN and EPN by RD follow up  Nutrition Goal Status: goal met per chart review   Communication of RD Recs: (POC)    Reason for Assessment    Reason For Assessment: RD follow-up  Diagnosis: other (see comments)(SDH)  Relevant Medical History: AFib, peripheral vascular disease, HTN, COPD, EtOH abuse, liver cirrhosis   Interdisciplinary Rounds: did not attend  General Information Comments: Pt resting in bed with wife at bedside. Reports that appetite is up and down. Per chart review, pt ate 100% of meals x 2 on 2/2. Denies N/V/D/C. NFPE completed 1/28. Pt continues to appear nourished, obese. Wife requested diet teaching PTA. Discussed "General Healthful Nutrition Therapy". Pt and wife verbalized understanding,  Nutrition Discharge Planning: adequate po intake for optimal nutrition    Nutrition Risk Screen    Nutrition Risk Screen: no indicators present    Nutrition/Diet History    Spiritual, Cultural Beliefs, Jewish Practices, Values that Affect Care: no    Anthropometrics    Temp: 97.5 °F (36.4 °C)  Height Method: Stated  Height: 5' 11" (180.3 cm)  Height (inches): 71 in  Weight Method: Bed Scale  Weight: 110.7 kg (244 lb)  Weight (lb): 244 lb  Ideal Body Weight (IBW), Male: 172 lb  % Ideal Body Weight, Male (lb): 141.86 %  BMI (Calculated): 34  BMI Grade: 30 - 34.9- obesity - grade I     Lab/Procedures/Meds    Pertinent Labs Reviewed: reviewed  Pertinent Labs Comments: BUN 28, alk phos 142, T bili 1.3, AST 58, ALT 52  Pertinent Medications Reviewed: reviewed  Pertinent Medications Comments: acetaminophen, methocarbamol, pantoprazole, docusate    Estimated/Assessed Needs    Weight Used For Calorie Calculations: 110.7 kg (244 lb 0.8 oz)  Energy Calorie " Requirements (kcal): 2436  Energy Need Method: [place]-St Jeor(PAL 1.25)  Protein Requirements: 111-133g(1.0-1.2g/kg)  Weight Used For Protein Calculations: 110.7 kg (244 lb 0.8 oz)  Fluid Requirements (mL): 1mL/kcal or per MD     RDA Method (mL): 2436     Nutrition Prescription Ordered    Current Diet Order: Diabetic    Evaluation of Received Nutrient/Fluid Intake    I/O: +7.775L since admit  Comments: LBM recorded 1/27  Tolerance: tolerating  % Intake of Estimated Energy Needs: 75 - 100 %  % Meal Intake: 75 - 100 %    Nutrition Risk    Level of Risk/Frequency of Follow-up: low(f/u 1x/week)     Assessment and Plan    Nutrition Problem  Inadequate energy intake     Related to (etiology):   NPO     Signs and Symptoms (as evidenced by):   Pt receiving <75% EEN and EPN.      Interventions(treatment strategy):  Collaboration of care with providers     Nutrition Diagnosis Status:   Resolved      Monitor and Evaluation    Food and Nutrient Intake: energy intake, food and beverage intake  Food and Nutrient Adminstration: diet order  Anthropometric Measurements: weight, weight change, body mass index  Biochemical Data, Medical Tests and Procedures: electrolyte and renal panel, gastrointestinal profile, glucose/endocrine profile, inflammatory profile, lipid profile  Nutrition-Focused Physical Findings: overall appearance     Malnutrition Assessment                 Orbital Region (Subcutaneous Fat Loss): well nourished  Upper Arm Region (Subcutaneous Fat Loss): well nourished  Thoracic and Lumbar Region: well nourished   [place] Region (Muscle Loss): well nourished  Clavicle Bone Region (Muscle Loss): well nourished  Clavicle and Acromion Bone Region (Muscle Loss): well nourished  Scapular Bone Region (Muscle Loss): well nourished  Dorsal Hand (Muscle Loss): well nourished  Patellar Region (Muscle Loss): well nourished  Anterior Thigh Region (Muscle Loss): well nourished  Posterior Calf Region (Muscle Loss): well nourished                  Nutrition Follow-Up    RD Follow-up?: Yes

## 2020-02-04 NOTE — PLAN OF CARE
Recommendations    1. Continue diabetic diet as tolerated.   2. If po intake <50%, recommend Boost Glucose Control with meals.   3. RD following.     Goals: Pt to receive and tolerate >75% EEN and EPN by RD follow up  Nutrition Goal Status: goal met per chart review

## 2020-02-04 NOTE — PROGRESS NOTES
"[facility] Medicine  Progress Note    Team: [facility]   Attending MD: Emily Mcdaniel MD  Admit Date: 1/27/2020  KIRBY 2/6/2020  Length of Stay:  LOS: 8 days   Code status: Full Code    Principal Problem: Subdural hematoma    Interval hx: much more alert and interactive today; slept well; significant other at bedside; feeling intemittent pins and needles to BLE; unable to complete spine X-rays this am; feels a BM if can get to commode      ROS:  Constitutional: no fever or chills  Respiratory: no cough or shortness of breath  Cardiovascular: no chest pain or palpitations  Gastrointestinal: no nausea or vomiting, no abdominal pain; last BM: 1/27  Genitourinary: no hematuria or dysuria  Integument/Breast: no rash or pruritis  Neurological: no seizures or tremors  Behavioral/Psych: no auditory or visual hallucinations      Physical Exam  Temp:  [96.3 °F (35.7 °C)-97.9 °F (36.6 °C)] 97.5 °F (36.4 °C)  Pulse:  [] 100  Resp:  [16-97] 97  SpO2:  [94 %-98 %] 96 %  BP: (109-135)/(60-72) 132/70      Intake/Output Summary (Last 24 hours) at 2/4/2020 1600  Last data filed at 2/4/2020 1400  Gross per 24 hour   Intake 358 ml   Output --   Net 358 ml       Wt Readings from Last 1 Encounters:   01/28/20 0901 110.7 kg (244 lb)   01/28/20 0052 111.1 kg (244 lb 14.9 oz)   01/27/20 2106 120.2 kg (265 lb)        Estimated body mass index is 34.03 kg/m² as calculated from the following:    Height as of this encounter: 5' 11" (1.803 m).    Weight as of this encounter: 110.7 kg (244 lb).    General: well developed, obese, no distress  Lungs:  Rhonchi diffusely bilaterally and normal respiratory effort.  Cardiovascular: Heart: regular rate and rhythm, S1, S2 normal, no murmur, click, rub or gallop. Chest Wall: no tenderness. Extremities: no cyanosis, 1+ bilateral ankle edema, no clubbing.   Abdomen/Rectal: Abdomen: obese, non-tender +distended; bowel sounds normal  Skin: Skin color, texture, turgor " normal. No rashes or lesions.  Neurologic: Normal strength and tone BLE  Musculoskeletal: bilateral UE in shoulder abduction braces.  TLSO in place.  No focal numbness or weakness but UE not tested.  Psych/Behavioral:  Alert and oriented to person/place; appropriate affect.      Recent Results (from the past 24 hour(s))   POCT glucose    Collection Time: 02/03/20 10:36 PM   Result Value Ref Range    POCT Glucose 112 (H) 70 - 110 mg/dL   Comprehensive metabolic panel    Collection Time: 02/04/20  4:55 AM   Result Value Ref Range    Sodium 138 136 - 145 mmol/L    Potassium 4.5 3.5 - 5.1 mmol/L    Chloride 100 95 - 110 mmol/L    CO2 26 23 - 29 mmol/L    Glucose 102 70 - 110 mg/dL    BUN, Bld 28 (H) 6 - 20 mg/dL    Creatinine 1.1 0.5 - 1.4 mg/dL    Calcium 8.9 8.7 - 10.5 mg/dL    Total Protein 6.7 6.0 - 8.4 g/dL    Albumin 2.3 (L) 3.5 - 5.2 g/dL    Total Bilirubin 1.3 (H) 0.1 - 1.0 mg/dL    Alkaline Phosphatase 142 (H) 55 - 135 U/L    AST 58 (H) 10 - 40 U/L    ALT 52 (H) 10 - 44 U/L    Anion Gap 12 8 - 16 mmol/L    eGFR if African American >60.0 >60 mL/min/1.73 m^2    eGFR if non African American >60.0 >60 mL/min/1.73 m^2   Magnesium    Collection Time: 02/04/20  4:55 AM   Result Value Ref Range    Magnesium 2.0 1.6 - 2.6 mg/dL   Phosphorus    Collection Time: 02/04/20  4:55 AM   Result Value Ref Range    Phosphorus 3.6 2.7 - 4.5 mg/dL   Brain natriuretic peptide    Collection Time: 02/04/20  4:55 AM   Result Value Ref Range    BNP 22 0 - 99 pg/mL   Hepatitis panel, acute    Collection Time: 02/04/20  4:55 AM   Result Value Ref Range    Hepatitis B Surface Ag Negative Negative    Hep B C IgM Negative Negative    Hep A IgM Negative Negative    Hepatitis C Ab Negative Negative   Bilirubin, direct    Collection Time: 02/04/20  4:55 AM   Result Value Ref Range    Bilirubin, Direct 0.4 (H) 0.1 - 0.3 mg/dL   Vitamin D    Collection Time: 02/04/20  4:55 AM   Result Value Ref Range    Vit D, 25-Hydroxy 7 (L) 30 - 96 ng/mL        Recent Labs   Lab 02/01/20  0234 02/02/20  0115 02/03/20  0347   WBC 8.07 7.89 9.36   HGB 7.6* 8.5* 9.6*   HCT 25.5* 27.7* 31.0*   * 157 216       Recent Labs   Lab 02/02/20  0115 02/03/20  0347 02/04/20  0455    139 138   K 3.7 4.2 4.5    100 100   CO2 29 29 26   BUN 16 19 28*   CREATININE 0.8 0.9 1.1    112* 102   CALCIUM 8.3* 9.1 8.9   MG 1.7 1.8 2.0   PHOS 3.3 3.5 3.6       Recent Labs   Lab 02/02/20  0115 02/03/20  0347 02/04/20  0455   ALKPHOS 112 139* 142*   ALT 27 55* 52*   AST 47* 73* 58*   ALBUMIN 2.0* 2.1* 2.3*   PROT 5.9* 6.3 6.7   BILITOT 1.1* 1.3* 1.3*       Recent Labs   Lab 02/02/20  1125 02/02/20  1738 02/02/20  2128 02/03/20  0758 02/03/20  1204 02/03/20  2236   POCTGLUCOSE 128* 118* 112* 119* 136* 112*       Hemoglobin A1C   Date Value Ref Range Status   01/27/2020 5.9 (H) 4.0 - 5.6 % Final     Comment:     ADA Screening Guidelines:  5.7-6.4%  Consistent with prediabetes  >or=6.5%  Consistent with diabetes  High levels of fetal hemoglobin interfere with the HbA1C  assay. Heterozygous hemoglobin variants (HbS, HgC, etc)do  not significantly interfere with this assay.   However, presence of multiple variants may affect accuracy.     12/30/2019 6.6 % Final   10/16/2019 7.0 % Final   06/03/2019 7.1 % Final       Scheduled Meds:   acetaminophen  650 mg Oral TID    celecoxib  200 mg Oral Daily    dextromethorphan-guaifenesin  mg  1 tablet Oral BID    ergocalciferol  50,000 Units Oral Q7 Days    gabapentin  300 mg Oral QHS    heparin (porcine)  5,000 Units Subcutaneous Q8H    ipratropium  0.5 mg Nebulization Q4H While awake    levalbuterol  0.63 mg Nebulization Q8H    levETIRAcetam  1,000 mg Oral BID    methocarbamol  750 mg Oral TID    metoprolol tartrate  25 mg Oral BID    nicotine  1 patch Transdermal Daily    pantoprazole  40 mg Oral Daily    polyethylene glycol  17 g Oral BID    senna-docusate 8.6-50 mg  1 tablet Oral BID       Continuous  Infusions:    As Needed: bisacodyL, Dextrose 10% Bolus, Dextrose 10% Bolus, Dextrose 10% Bolus, glucagon (human recombinant), glucose, glucose, HYDROmorphone, insulin aspart U-100, melatonin, ondansetron, oxyCODONE, oxyCODONE, oxyCODONE, sodium chloride 0.9%    Active Hospital Problems    Diagnosis  POA    *Subdural hematoma [S06.5X9A]  Yes    Acute blood loss anemia [D62]  Yes    Constipation [K59.00]  Yes    Closed displaced fracture of acromial process of right scapula [S42.121A]  Yes    Closed displaced fracture of acromial process of left scapula [S42.122A]  Yes    Dislocation of left shoulder joint [S43.005A]  Yes    Dislocation of right shoulder joint [S43.004A]  Yes     fracture dislocation of bilateral shoulders [S42.91XA]  Yes    Fracture dislocation of joint of left shoulder girdle [S42.92XA]  Yes    Closed fracture of both humeri [S42.301A, S42.302A]  Yes    Acute bilateral low back pain [M54.5]  Yes    Shortness of breath [R06.02]  Yes    Diabetes mellitus due to underlying condition with diabetic autonomic neuropathy, without long-term current use of insulin [E08.43]  Yes    Vitamin D deficiency [E55.9]  Yes    Alcohol abuse [F10.10]  Yes     Chronic    COPD (chronic obstructive pulmonary disease) [J44.9]  Yes     Chronic    Current every day smoker [F17.200]  Yes     2-3 ppd      Hyperlipidemia [E78.5]  Yes    Long term (current) use of anticoagulants [Z79.01]  Not Applicable    PAF (paroxysmal atrial fibrillation) [I48.0]  Yes    Hypertension, essential [I10]  Yes    GERD with esophagitis [K21.0]  Yes      Resolved Hospital Problems   No resolved problems to display.     59 M with hx of paroxysmal afib on coumadin presents for eval of bilateral shoulder dislocations, left convexity SDH, L1 compression fracture, L4 burst fracture. C spine cleared by Ortho. C-collar for comfort.      Assessment and Plan    Subdural hematoma  · q 4 hr neuro checks  · Repeat CT head stable on  2/2  · Hold coumadin, goal INR of less than 1.4  · continue keppra - timeframe per Neurosurgery    Bilateral acromial fractures with bilateral shoulder dislocation  S/p bilateral shoulder hemiarthroplasty: right on 1/28/2020 and left on 1/30/2020  · Weight bearing status: NWB and NO ROM of bilateral  shoulder  · Pain control initiated with oxycodone, hydromorphone prn, robaxin and celebrex; will need to monitor for somnolence and taper as needed  · Continue bilateral abduction braces to BUE at all times     L1 burst fracture  L4 burst fracture with moderate spinal canal stenosis  T10, T11 compression fractures, unknown age  · TLSO when OOB/upright  · would like sitting/upright L-spine x-rays in TLSO - unable to be obtained due to patient non-compliance; need to raise hands over head and necessary removal of UE braces  · Start gabapentin due to neuropathic pain    DM2  · Chronic home meds with metformin, basaglar and tradjenta  · Continue monitoring with accuchecks, diabetic diet, SSNI prn    COPD  Smoker  · Continue levoalbuterol nebs (tachy)  · Continue brovana if obtained from home;   · Limit oxygen to avoid hypercapnea  · Continue nicotine patch and encourage cessation with education    Elevated LFT's  Hx of ETOH abuse  · improving  · Significant other states patient no longer drinks and 6 pack of beer at home will last for days  · Chronic statin at home; hold currently  · Denies cirrhosis (listed in hx)  · Evaluated with acute hep panel (neg); direct bili (normal) and trending of labs; may need RUQ U/S if persists  · Check BNP to rule out hepatic congestion - nl    PAF  · Sinus tach on ECG  · continue metoprolol  · Hold warfarin therapy until cleared by Ortho/neurosurgery    Constipation  · Continue miralax and senokot-s  · Dulcolax po on 2/3  · Dulcolax suppository prn    Anemia  · Likely blood loss as usually hemo-concentrated  · Transfuse prn Hgb < 7; continue to monitor  · ASA currently  held    GERD  · Continue pantoprazole    Essential HTN  · On metoprolol and ramipril at home   · continue metoprolol and resume ramipril as BP allows    Vit D deficiency  · Level of 7 on 2/4  · Therapy with ergocalciferol 50k weekly    Diet: Diet diabetic [facility]; 2000 Calorie   DVT PPx:   VTE Risk Mitigation (From admission, onward)         Ordered     heparin (porcine) injection 5,000 Units  Every 8 hours      02/01/20 0915     Reason for No Pharmacological VTE Prophylaxis  Once     Question:  Reasons:  Answer:  Active Bleeding    01/27/20 2243     IP VTE HIGH RISK PATIENT  Once      01/27/20 2243     Place sequential compression device  Until discontinued      01/27/20 2243              GI PPx: pantoprazole  L/D/A: PIV  Wounds: bilateral shoulder with Aquacel dressing    Goals of Care: full code; curative    Discharge plan: inpatient rehab    Emily Mcdaniel MD  [facility] Medicine  [facility]  [phone]

## 2020-02-04 NOTE — PT/OT/SLP PROGRESS
Occupational Therapy   Treatment    Name: Ben Melvin  MRN: [id number]  Admitting Diagnosis:  Subdural hematoma  5 Days Post-Op    Recommendations:     Discharge Recommendations: rehabilitation facility  Discharge Equipment Recommendations:  (tbd)  Barriers to discharge:       Assessment:     Bne Melvin is a 60 y.o. male with a medical diagnosis of Subdural hematoma.  He presents with the following. Performance deficits affecting function are weakness, impaired endurance, impaired self care skills, impaired functional mobilty, impaired balance, decreased lower extremity function, pain, decreased safety awareness, orthopedic precautions.     Pt agreeable to therapy and tolerated session fairly secondary to dizziness and low BP upon sitting up. Pt required total A to tesfaye/doff TLSO and BUE shoulder abduction braces. Pt sat EOB ~15 mins while braces were adjusted. Pt the c/o of nausea and dizziness and quickly returned to supine. BP supine: 114/54. RN notified. Continue OT POC.    Rehab Prognosis:  Good; patient would benefit from acute skilled OT services to address these deficits and reach maximum level of function.       Plan:     Patient to be seen 5 x/week to address the above listed problems via self-care/home management, therapeutic activities, therapeutic exercises  · Plan of Care Expires: 03/03/20  · Plan of Care Reviewed with: patient, spouse    Subjective     Pain/Comfort:  · Pain Rating 1: other (see comments)(pt did not rate)  · Location - Side 1: Bilateral  · Location - Orientation 1: generalized  · Location 1: shoulder  · Pain Addressed 1: Reposition, Distraction    Objective:     Communicated with: Rn prior to session.  Patient found supine with telemetry, SCD, TLSO upon OT entry to room.    General Precautions: Standard, fall, seizure   Orthopedic Precautions:RUE non weight bearing, LUE non weight bearing   Braces: TLSO, Shoulder abduction brace     Occupational Performance:     Bed Mobility:     · Patient completed Supine to Sit with maximal assistance and 2 persons  · Patient completed Sit to Supine with maximal assistance and 2 persons     Functional Mobility/Transfers:  · Not attempted to pt nausea/dizziness  · Functional Mobility: Pt agreeable to therapy and tolerated session fairly secondary to dizziness and low BP upon sitting up. Pt required total A to tesfaye/doff TLSO and BUE shoulder abduction braces. Pt sat EOB ~15 mins while braces were adjusted. Pt the c/o of nausea and dizziness and quickly returned to supine. BP supine: 114/54. RN notified. Continue OT POC.    Activities of Daily Living:  · Upper Body Dressing: total assistance wilber evans      [facility] 6 Click ADL: 11    Treatment & Education:  - OT/POC-  - Importance of mobility to maximize recovery.  - safety w/ functional mobility; hand placement to ensure safe transfers to various surfaces in prep for ADLs  - Reviewed gait sequence and RW management via verbalization and demonstration   - Advised pt and spouse on how tesfaye/doff TLSO and shoulder abduction braces - needs reinforcement        Patient left supine with all lines intact, call button in reach and RN notifiedEducation:      GOALS:   Multidisciplinary Problems     Occupational Therapy Goals        Problem: Occupational Therapy Goal    Goal Priority Disciplines Outcome Interventions   Occupational Therapy Goal     OT, PT/OT Ongoing, Progressing    Description:  Goals to be met by: 2/10/2020     Patient will increase functional independence with ADLs by performing:    UE Dressing with Moderate Assistance.  LE Dressing with Moderate Assistance and Assistive Devices as needed.  Grooming while standing with Minimal Assistance.  Toileting from bedside commode with Maximum Assistance for hygiene and clothing management.   Toilet transfer to bedside commode with Contact Guard Assistance.                      Time Tracking:     OT Date of Treatment: 02/04/20  OT Start Time: 1439  OT Stop Time:  1520  OT Total Time (min): 41 min    Billable Minutes:Self Care/Home Management 41    Theresa Mccall OT  2/4/2020

## 2020-02-04 NOTE — PLAN OF CARE
Pt agreeable to therapy and tolerated session fairly secondary to dizziness and low BP upon sitting up. Pt required total A to tesfaye/doff TLSO and BUE shoulder abduction braces. Pt sat EOB ~15 mins while braces were adjusted. Pt the c/o of nausea and dizziness and quickly returned to supine. BP supine: 114/54. RN notified. Continue OT POC.      Problem: Occupational Therapy Goal  Goal: Occupational Therapy Goal  Description  Goals to be met by: 2/10/2020     Patient will increase functional independence with ADLs by performing:    UE Dressing with Moderate Assistance.  LE Dressing with Moderate Assistance and Assistive Devices as needed.  Grooming while standing with Minimal Assistance.  Toileting from bedside commode with Maximum Assistance for hygiene and clothing management.   Toilet transfer to bedside commode with Contact Guard Assistance.     Outcome: Ongoing, Progressing

## 2020-02-04 NOTE — PT/OT/SLP PROGRESS
"Physical Therapy Treatment    Patient Name:  Ben Melvin   MRN:  [id number]    Recommendations:     Discharge Recommendations:  rehabilitation facility   Discharge Equipment Recommendations: wheelchair   Barriers to discharge: Decreased caregiver support    Assessment:     Ben Melvin is a 60 y.o. male admitted with a medical diagnosis of Subdural hematoma.  He presents with the following impairments/functional limitations:  weakness, gait instability, decreased ROM, impaired joint extensibility, decreased lower extremity function, impaired balance, impaired muscle length, orthopedic precautions, pain, impaired functional mobilty, impaired self care skills.      Patient demonstrates good motivation and decreased activity tolerance secondary lightheadedness and fatigue.  Patient tolerated sitting edge of bed ~ 20 min for management and readjustment of braces.  Patient however, was with lightheadedness and diaphoresis following increased time sitting.  Nursing notified of lightheadedness (BP was 114/54 mmHg in sitting).    Rehab Prognosis: Good; patient continues to benefit from acute skilled PT services to address these deficits and reach maximum level of function.  Patient remains most appropriate to discharge to rehabilitation facility  Recent Surgery: Procedure(s) (LRB):  HEMIARTHROPLASTY, SHOULDER- RIGHT- MEKA- SUPINE-  TRIOS (Right) 5 Days Post-Op    Plan:     During this hospitalization, patient to be seen 5 x/week to address the identified rehab impairments via gait training, therapeutic activities, therapeutic exercises, neuromuscular re-education and progress toward the following goals:    · Plan of Care Expires:  03/01/20    Subjective     Subjective: "This L shoulder brace is bothering me."   Pain/Comfort:  · Pain Rating 1: 0/10  · Location - Side 1: Bilateral  · Location 1: shoulder  · Pain Addressed 1: Reposition, Distraction  · Pain Rating Post-Intervention 1: 0/10      Objective: "     Communicated with RN prior to session.  Patient found supine with telemetry, SCD, TLSO upon PT entry to room.     General Precautions: Standard, fall, seizure   Orthopedic Precautions:RUE non weight bearing, LUE non weight bearing   Braces: TLSO, Shoulder abduction brace     Functional Mobility:  · Bed Mobility:     · Supine to Sit: maximal assistance and of 2 persons  · Sit to Supine: maximal assistance and of 2 persons  · Transfers:  Patient with lightheadedness and nausea in sitting and unable to progress to stance.  · Balance: SBA to CGA for seated balance.      AM-PAC 6 CLICK MOBILITY  Turning over in bed (including adjusting bedclothes, sheets and blankets)?: 2  Sitting down on and standing up from a chair with arms (e.g., wheelchair, bedside commode, etc.): 2  Moving from lying on back to sitting on the side of the bed?: 2  Moving to and from a bed to a chair (including a wheelchair)?: 2  Need to walk in hospital room?: 1  Climbing 3-5 steps with a railing?: 1  Basic Mobility Total Score: 10       Therapeutic Activities and Exercises:   Primarily focused on readjustment of braces.  Educated patient and spouse on positioning.    Patient Education:    Patient educated on bed mobility training, Fall risk, home safety, Home exercise program, posture and weight bearing restrictions by explanation.  Patient was receptive to education and verbalizes understanding.     Patient left supine with all lines intact, call button in reach, RN notified and spouse present.    GOALS:   Multidisciplinary Problems     Physical Therapy Goals        Problem: Physical Therapy Goal    Goal Priority Disciplines Outcome Goal Variances Interventions   Physical Therapy Goal     PT, PT/OT Ongoing, Progressing     Description:  Goals to be met by: 2/7/20    Patient will increase functional independence with mobility by performing (while maintaining NWB of both upper extremities):    1. Supine to sit with Moderate Assistance. Not  met  2. Sit to supine with Moderate Assistance Not met  3. Sit to stand transfer with Moderate Assistance Not met  4. Bed to chair transfer with Moderate Assistance. Not met  5. Gait  x 25 feet with Moderate Assistance. Not met  6. Stand for 2 minutes with Minimal Assistance statically, upright posture. Not met  7. Lower extremity exercise program x 20 reps per handout, with independence Not met                      Time Tracking:     PT Received On: 02/04/20  PT Start Time: 1439     PT Stop Time: 1520  PT Total Time (min): 41 min     Billable Minutes: Therapeutic Activity 41 min    Treatment Type: Treatment  PT/PTA: PT     PTA Visit Number: 0     Riley Freeman, PT  02/04/2020

## 2020-02-04 NOTE — ASSESSMENT & PLAN NOTE
59 M with hx of paroxysmal afib on coumadin presents for eval of bilateral shoulder dislocations, left convexity SDH, L1 compression fracture, L4 burst fracture.    -Stepped down to the floor, neuro checks q4h, vital signs q4h  -Exam stable however limited by LLE pain today. RLE full strength. Denies pain in RLE.  -No acute neurosurgical intervention at this time based on current imaging  -Repeat CT head stable 2/2 stable. No worsening midline shift or increase size of left convexity SDH. No new hemorrhage noted.  -Continue to hold coumadin, goal INR of less than 1.4. From neurosurgery standpoint in the setting of SDH, recommend holding coumadin for 2 weeks. Will need a repeat CT head in 2 weeks and if stable and patient is asymptomatic, ok to resume.  -TLSO when OOB/upright  -Please obtain sitting and upright L-spine x-rays in TLSO when able to evaluate spinal stability. If unable to obtain and patient remains neurologically intact, will f/u outpatient in 6 weeks with XR at that time. Continue TLSO brace when OOB/upright if patient is discharged without an XR.  -MRI L spine complete. L1 compression fracture with minimal retropulsion into central canal, mild canal stenosis. L4 burst fracture with retropulsion into central canal resulting in moderate canal stenosis. Likely will need RadTx to L spine given cancer hx.   -C spine cleared by Ortho. C collar for comfort  -Discussed with Dr. Barry

## 2020-02-04 NOTE — PLAN OF CARE
Much better night. Pain controlled with current meds. Slept well. Vss. Harry abd braces intact to arms. Safety maintained.

## 2020-02-05 DIAGNOSIS — S06.5XAA SUBDURAL HEMATOMA: Primary | ICD-10-CM

## 2020-02-05 DIAGNOSIS — S32.001D CLOSED BURST FRACTURE OF LUMBAR VERTEBRA WITH ROUTINE HEALING, SUBSEQUENT ENCOUNTER: ICD-10-CM

## 2020-02-05 DIAGNOSIS — S32.010D COMPRESSION FRACTURE OF L1 VERTEBRA WITH ROUTINE HEALING, SUBSEQUENT ENCOUNTER: ICD-10-CM

## 2020-02-05 LAB
ALBUMIN SERPL BCP-MCNC: 2.2 G/DL (ref 3.5–5.2)
ALP SERPL-CCNC: 151 U/L (ref 55–135)
ALT SERPL W/O P-5'-P-CCNC: 59 U/L (ref 10–44)
ANION GAP SERPL CALC-SCNC: 11 MMOL/L (ref 8–16)
ANISOCYTOSIS BLD QL SMEAR: SLIGHT
AST SERPL-CCNC: 68 U/L (ref 10–40)
BASOPHILS # BLD AUTO: 0.04 K/UL (ref 0–0.2)
BASOPHILS NFR BLD: 0.4 % (ref 0–1.9)
BILIRUB SERPL-MCNC: 1.1 MG/DL (ref 0.1–1)
BUN SERPL-MCNC: 30 MG/DL (ref 6–20)
CALCIUM SERPL-MCNC: 8.3 MG/DL (ref 8.7–10.5)
CHLORIDE SERPL-SCNC: 103 MMOL/L (ref 95–110)
CO2 SERPL-SCNC: 26 MMOL/L (ref 23–29)
CREAT SERPL-MCNC: 1.1 MG/DL (ref 0.5–1.4)
DIFFERENTIAL METHOD: ABNORMAL
EOSINOPHIL # BLD AUTO: 0.3 K/UL (ref 0–0.5)
EOSINOPHIL NFR BLD: 2.9 % (ref 0–8)
ERYTHROCYTE [DISTWIDTH] IN BLOOD BY AUTOMATED COUNT: 16.9 % (ref 11.5–14.5)
EST. GFR  (AFRICAN AMERICAN): >60 ML/MIN/1.73 M^2
EST. GFR  (NON AFRICAN AMERICAN): >60 ML/MIN/1.73 M^2
FINAL PATHOLOGIC DIAGNOSIS: NORMAL
GLUCOSE SERPL-MCNC: 95 MG/DL (ref 70–110)
GROSS: NORMAL
HCT VFR BLD AUTO: 27.8 % (ref 40–54)
HGB BLD-MCNC: 8.7 G/DL (ref 14–18)
HYPOCHROMIA BLD QL SMEAR: ABNORMAL
IMM GRANULOCYTES # BLD AUTO: 0.45 K/UL (ref 0–0.04)
IMM GRANULOCYTES NFR BLD AUTO: 4 % (ref 0–0.5)
LYMPHOCYTES # BLD AUTO: 2.6 K/UL (ref 1–4.8)
LYMPHOCYTES NFR BLD: 22.5 % (ref 18–48)
MAGNESIUM SERPL-MCNC: 1.9 MG/DL (ref 1.6–2.6)
MCH RBC QN AUTO: 27.4 PG (ref 27–31)
MCHC RBC AUTO-ENTMCNC: 31.3 G/DL (ref 32–36)
MCV RBC AUTO: 87 FL (ref 82–98)
MONOCYTES # BLD AUTO: 1.3 K/UL (ref 0.3–1)
MONOCYTES NFR BLD: 11.1 % (ref 4–15)
NEUTROPHILS # BLD AUTO: 6.7 K/UL (ref 1.8–7.7)
NEUTROPHILS NFR BLD: 59.1 % (ref 38–73)
NRBC BLD-RTO: 0 /100 WBC
OVALOCYTES BLD QL SMEAR: ABNORMAL
PHOSPHATE SERPL-MCNC: 3.3 MG/DL (ref 2.7–4.5)
PLATELET # BLD AUTO: 264 K/UL (ref 150–350)
PLATELET BLD QL SMEAR: ABNORMAL
PMV BLD AUTO: 10.2 FL (ref 9.2–12.9)
POCT GLUCOSE: 102 MG/DL (ref 70–110)
POCT GLUCOSE: 124 MG/DL (ref 70–110)
POCT GLUCOSE: 86 MG/DL (ref 70–110)
POIKILOCYTOSIS BLD QL SMEAR: SLIGHT
POLYCHROMASIA BLD QL SMEAR: ABNORMAL
POTASSIUM SERPL-SCNC: 5 MMOL/L (ref 3.5–5.1)
PROT SERPL-MCNC: 6 G/DL (ref 6–8.4)
RBC # BLD AUTO: 3.18 M/UL (ref 4.6–6.2)
SODIUM SERPL-SCNC: 140 MMOL/L (ref 136–145)
WBC # BLD AUTO: 11.38 K/UL (ref 3.9–12.7)

## 2020-02-05 PROCEDURE — 80053 COMPREHEN METABOLIC PANEL: CPT

## 2020-02-05 PROCEDURE — 25000242 PHARM REV CODE 250 ALT 637 W/ HCPCS: Performed by: INTERNAL MEDICINE

## 2020-02-05 PROCEDURE — 94640 AIRWAY INHALATION TREATMENT: CPT

## 2020-02-05 PROCEDURE — 94799 UNLISTED PULMONARY SVC/PX: CPT

## 2020-02-05 PROCEDURE — 25000003 PHARM REV CODE 250: Performed by: NURSE PRACTITIONER

## 2020-02-05 PROCEDURE — 94761 N-INVAS EAR/PLS OXIMETRY MLT: CPT

## 2020-02-05 PROCEDURE — 25000242 PHARM REV CODE 250 ALT 637 W/ HCPCS: Performed by: NURSE PRACTITIONER

## 2020-02-05 PROCEDURE — 27000221 HC OXYGEN, UP TO 24 HOURS

## 2020-02-05 PROCEDURE — 25000003 PHARM REV CODE 250: Performed by: INTERNAL MEDICINE

## 2020-02-05 PROCEDURE — 84100 ASSAY OF PHOSPHORUS: CPT

## 2020-02-05 PROCEDURE — 36415 COLL VENOUS BLD VENIPUNCTURE: CPT

## 2020-02-05 PROCEDURE — 63600175 PHARM REV CODE 636 W HCPCS: Performed by: INTERNAL MEDICINE

## 2020-02-05 PROCEDURE — 63600175 PHARM REV CODE 636 W HCPCS: Performed by: NURSE PRACTITIONER

## 2020-02-05 PROCEDURE — S4991 NICOTINE PATCH NONLEGEND: HCPCS | Performed by: NURSE PRACTITIONER

## 2020-02-05 PROCEDURE — 99232 SBSQ HOSP IP/OBS MODERATE 35: CPT | Mod: ,,, | Performed by: PHYSICIAN ASSISTANT

## 2020-02-05 PROCEDURE — 97110 THERAPEUTIC EXERCISES: CPT

## 2020-02-05 PROCEDURE — 97535 SELF CARE MNGMENT TRAINING: CPT

## 2020-02-05 PROCEDURE — 99232 PR SUBSEQUENT HOSPITAL CARE,LEVL II: ICD-10-PCS | Mod: ,,, | Performed by: INTERNAL MEDICINE

## 2020-02-05 PROCEDURE — 99900035 HC TECH TIME PER 15 MIN (STAT)

## 2020-02-05 PROCEDURE — 97530 THERAPEUTIC ACTIVITIES: CPT

## 2020-02-05 PROCEDURE — 99232 PR SUBSEQUENT HOSPITAL CARE,LEVL II: ICD-10-PCS | Mod: ,,, | Performed by: PHYSICIAN ASSISTANT

## 2020-02-05 PROCEDURE — 99232 SBSQ HOSP IP/OBS MODERATE 35: CPT | Mod: ,,, | Performed by: INTERNAL MEDICINE

## 2020-02-05 PROCEDURE — 25000003 PHARM REV CODE 250: Performed by: STUDENT IN AN ORGANIZED HEALTH CARE EDUCATION/TRAINING PROGRAM

## 2020-02-05 PROCEDURE — 83735 ASSAY OF MAGNESIUM: CPT

## 2020-02-05 PROCEDURE — 85025 COMPLETE CBC W/AUTO DIFF WBC: CPT

## 2020-02-05 PROCEDURE — 11000001 HC ACUTE MED/SURG PRIVATE ROOM

## 2020-02-05 RX ORDER — AMOXICILLIN 250 MG
2 CAPSULE ORAL 2 TIMES DAILY
Status: DISCONTINUED | OUTPATIENT
Start: 2020-02-05 | End: 2020-02-07 | Stop reason: HOSPADM

## 2020-02-05 RX ORDER — IPRATROPIUM BROMIDE 0.5 MG/2.5ML
0.5 SOLUTION RESPIRATORY (INHALATION)
Status: DISCONTINUED | OUTPATIENT
Start: 2020-02-05 | End: 2020-02-07 | Stop reason: HOSPADM

## 2020-02-05 RX ORDER — AMITRIPTYLINE HYDROCHLORIDE 25 MG/1
50 TABLET, FILM COATED ORAL
Status: DISCONTINUED | OUTPATIENT
Start: 2020-02-05 | End: 2020-02-07 | Stop reason: HOSPADM

## 2020-02-05 RX ADMIN — PANTOPRAZOLE SODIUM 40 MG: 40 TABLET, DELAYED RELEASE ORAL at 10:02

## 2020-02-05 RX ADMIN — HEPARIN SODIUM 5000 UNITS: 5000 INJECTION, SOLUTION INTRAVENOUS; SUBCUTANEOUS at 05:02

## 2020-02-05 RX ADMIN — AMITRIPTYLINE HYDROCHLORIDE 50 MG: 25 TABLET, FILM COATED ORAL at 09:02

## 2020-02-05 RX ADMIN — METHOCARBAMOL TABLETS 750 MG: 750 TABLET, COATED ORAL at 10:02

## 2020-02-05 RX ADMIN — LEVETIRACETAM 1000 MG: 500 TABLET ORAL at 10:02

## 2020-02-05 RX ADMIN — IPRATROPIUM BROMIDE 0.5 MG: 0.5 SOLUTION RESPIRATORY (INHALATION) at 05:02

## 2020-02-05 RX ADMIN — HEPARIN SODIUM 5000 UNITS: 5000 INJECTION, SOLUTION INTRAVENOUS; SUBCUTANEOUS at 02:02

## 2020-02-05 RX ADMIN — GUAIFENESIN AND DEXTROMETHORPHAN HYDROBROMIDE 1 TABLET: 600; 30 TABLET, EXTENDED RELEASE ORAL at 09:02

## 2020-02-05 RX ADMIN — LEVALBUTEROL HYDROCHLORIDE 0.63 MG: 0.63 SOLUTION RESPIRATORY (INHALATION) at 05:02

## 2020-02-05 RX ADMIN — OXYCODONE HYDROCHLORIDE 10 MG: 10 TABLET ORAL at 09:02

## 2020-02-05 RX ADMIN — POLYETHYLENE GLYCOL 3350 17 G: 17 POWDER, FOR SOLUTION ORAL at 10:02

## 2020-02-05 RX ADMIN — OXYCODONE HYDROCHLORIDE 10 MG: 10 TABLET ORAL at 02:02

## 2020-02-05 RX ADMIN — SODIUM CHLORIDE 500 ML: 0.9 INJECTION, SOLUTION INTRAVENOUS at 07:02

## 2020-02-05 RX ADMIN — SODIUM PHOSPHATE, DIBASIC AND SODIUM PHOSPHATE, MONOBASIC 1 ENEMA: 7; 19 ENEMA RECTAL at 07:02

## 2020-02-05 RX ADMIN — LEVALBUTEROL HYDROCHLORIDE 0.63 MG: 0.63 SOLUTION RESPIRATORY (INHALATION) at 08:02

## 2020-02-05 RX ADMIN — CELECOXIB 200 MG: 200 CAPSULE ORAL at 10:02

## 2020-02-05 RX ADMIN — IPRATROPIUM BROMIDE 0.5 MG: 0.5 SOLUTION RESPIRATORY (INHALATION) at 08:02

## 2020-02-05 RX ADMIN — OXYCODONE HYDROCHLORIDE 10 MG: 10 TABLET ORAL at 04:02

## 2020-02-05 RX ADMIN — SENNOSIDES AND DOCUSATE SODIUM 1 TABLET: 8.6; 5 TABLET ORAL at 10:02

## 2020-02-05 RX ADMIN — METOPROLOL TARTRATE 25 MG: 25 TABLET ORAL at 09:02

## 2020-02-05 RX ADMIN — METHOCARBAMOL TABLETS 750 MG: 750 TABLET, COATED ORAL at 09:02

## 2020-02-05 RX ADMIN — IPRATROPIUM BROMIDE 0.5 MG: 0.5 SOLUTION RESPIRATORY (INHALATION) at 12:02

## 2020-02-05 RX ADMIN — NICOTINE 1 PATCH: 21 PATCH, EXTENDED RELEASE TRANSDERMAL at 10:02

## 2020-02-05 RX ADMIN — METHOCARBAMOL TABLETS 750 MG: 750 TABLET, COATED ORAL at 04:02

## 2020-02-05 RX ADMIN — LEVETIRACETAM 1000 MG: 500 TABLET ORAL at 09:02

## 2020-02-05 RX ADMIN — HEPARIN SODIUM 5000 UNITS: 5000 INJECTION, SOLUTION INTRAVENOUS; SUBCUTANEOUS at 09:02

## 2020-02-05 RX ADMIN — IPRATROPIUM BROMIDE 0.5 MG: 0.5 SOLUTION RESPIRATORY (INHALATION) at 09:02

## 2020-02-05 RX ADMIN — GUAIFENESIN AND DEXTROMETHORPHAN HYDROBROMIDE 1 TABLET: 600; 30 TABLET, EXTENDED RELEASE ORAL at 10:02

## 2020-02-05 NOTE — SUBJECTIVE & OBJECTIVE
Principal Problem:Subdural hematoma    Principal Orthopedic Problem: Bilateral prox humerus fractures     Interval History: Patient seen and examined at bedside. Patient experienced some delirium last night that has resolved. His pain is more controlled this morning. His abduction pillows were both out of place in bed today. I readjusted them and educated patient and wife on proper positioning.       Review of patient's allergies indicates:  No Known Allergies    Current Facility-Administered Medications   Medication    amitriptyline tablet 50 mg    bisacodyL suppository 10 mg    celecoxib capsule 200 mg    dextromethorphan-guaifenesin  mg per 12 hr tablet 1 tablet    dextrose 10% (D10W) Bolus    dextrose 10% (D10W) Bolus    dextrose 10% (D10W) Bolus    ergocalciferol capsule 50,000 Units    gabapentin capsule 300 mg    glucagon (human recombinant) injection 1 mg    glucose chewable tablet 16 g    glucose chewable tablet 24 g    heparin (porcine) injection 5,000 Units    HYDROmorphone injection 0.5 mg    insulin aspart U-100 pen 1-10 Units    ipratropium 0.02 % nebulizer solution 0.5 mg    levalbuterol nebulizer solution 0.63 mg    levETIRAcetam tablet 1,000 mg    methocarbamol tablet 750 mg    metoprolol tartrate (LOPRESSOR) tablet 25 mg    nicotine 21 mg/24 hr 1 patch    ondansetron injection 4 mg    oxyCODONE immediate release tablet 15 mg    oxyCODONE immediate release tablet 5 mg    oxyCODONE immediate release tablet Tab 10 mg    pantoprazole EC tablet 40 mg    polyethylene glycol packet 17 g    senna-docusate 8.6-50 mg per tablet 1 tablet    sodium chloride 0.9% flush 10 mL     Objective:     Vital Signs (Most Recent):  Temp: 96.5 °F (35.8 °C) (02/05/20 0807)  Pulse: 94 (02/05/20 1120)  Resp: 18 (02/05/20 0847)  BP: (!) 105/50 (02/05/20 0807)  SpO2: 95 % (02/05/20 0847) Vital Signs (24h Range):  Temp:  [96.5 °F (35.8 °C)-97.8 °F (36.6 °C)] 96.5 °F (35.8 °C)  Pulse:  []  "94  Resp:  [16-97] 18  SpO2:  [91 %-98 %] 95 %  BP: (105-184)/(50-72) 105/50     Weight: 110.7 kg (244 lb)  Height: 5' 11" (180.3 cm)  Body mass index is 34.03 kg/m².      Intake/Output Summary (Last 24 hours) at 2/5/2020 1123  Last data filed at 2/5/2020 0400  Gross per 24 hour   Intake 368 ml   Output 500 ml   Net -132 ml       Ortho/SPM Exam       Physical Exam:  NAD, A/O x 3.  Wound c/d/i with clean dressing.  Bilateral sling shot in place with abduction pillows  No focal motor or sensory deficits to bilateral UE    Significant Labs:   CBC:   Recent Labs   Lab 02/05/20  0501   WBC 11.38   HGB 8.7*   HCT 27.8*        All pertinent labs within the past 24 hours have been reviewed.    Significant Imaging: I have reviewed and interpreted all pertinent imaging results/findings.  "

## 2020-02-05 NOTE — PLAN OF CARE
Pt is AAOX4,VSS. Pt is progressing towards plan of care goals. Pain management has been assessed. Pt reports pain at a 7. PRN meds are given and pain is reassessed. Brace to bilateral shoulders in place, aquacel dressing to bilateral shoulder clean, dry, intact. SCDs in place with frequent checks for skin breakdown. Fall precautions in place, no report of falls. Safety measures are in place bed in lowest position, wheels locked, call light within reach.

## 2020-02-05 NOTE — PLAN OF CARE
02/05/20 1436   Post-Acute Status   Post-Acute Authorization Placement   Post-Acute Placement Status Referrals Sent   SW met with patient partner outside of room to discuss update with dc planning (patient was engaged in therapy). SW informed of limited bed availability at [facility] and Medical Director holding off on accepting patient due to concerns in patient level of functioning. Pt Partner provided consent to send referral to [facility]. SW sent referral to provider via [facility]. CM placed pmr consult and updated patient team of [facility] referral. SW will continue following patient for discharge needs. SW in communication with CM and patient care team.   Dyan Rose, PROMISE  [facility]

## 2020-02-05 NOTE — PROGRESS NOTES
[facility]  Neurosurgery  Progress Note    Subjective:     History of Present Illness: 59 M with hx of paroxysmal afib on coumadin presents for eval of bilateral shoulder dislocations and left convexity SDH.  Pt had first time seizure and slumped over in chair.  There was no LOC.  He was given vit k and kcentra at O[facility] for hx of coumadin use.  He endorses low back pain but no neck pain.  No sensation loss in his extremities.    Post-Op Info:  Procedure(s) (LRB):  HEMIARTHROPLASTY, SHOULDER- RIGHT- MEKA- SUPINE-  TRIOS (Right)   6 Days Post-Op     Interval History: NAEON. Patient restless on exam however no complaints of pain to back or BLE. He denies weakness, paresthesias, b/b dysfunction. No focal neuro deficits on exam.     Medications:  Continuous Infusions:  Scheduled Meds:   amitriptyline  50 mg Oral QHS    celecoxib  200 mg Oral Daily    dextromethorphan-guaifenesin  mg  1 tablet Oral BID    ergocalciferol  50,000 Units Oral Q7 Days    gabapentin  300 mg Oral QHS    heparin (porcine)  5,000 Units Subcutaneous Q8H    ipratropium  0.5 mg Nebulization Q4H WAKE    levalbuterol  0.63 mg Nebulization Q8H    levETIRAcetam  1,000 mg Oral BID    methocarbamol  750 mg Oral TID    metoprolol tartrate  25 mg Oral BID    nicotine  1 patch Transdermal Daily    pantoprazole  40 mg Oral Daily    polyethylene glycol  17 g Oral BID    senna-docusate 8.6-50 mg  1 tablet Oral BID     PRN Meds:bisacodyL, Dextrose 10% Bolus, Dextrose 10% Bolus, Dextrose 10% Bolus, glucagon (human recombinant), glucose, glucose, HYDROmorphone, insulin aspart U-100, ondansetron, oxyCODONE, oxyCODONE, oxyCODONE, sodium chloride 0.9%       Objective:     Weight: 110.7 kg (244 lb)  Body mass index is 34.03 kg/m².  Vital Signs (Most Recent):  Temp: 96.5 °F (35.8 °C) (02/05/20 1150)  Pulse: 88 (02/05/20 1259)  Resp: 18 (02/05/20 1259)  BP: (!) 112/54 (02/05/20 1150)  SpO2: 95 % (02/05/20 1259) Vital Signs (24h  Range):  Temp:  [96.5 °F (35.8 °C)-97.8 °F (36.6 °C)] 96.5 °F (35.8 °C)  Pulse:  [] 88  Resp:  [16-22] 18  SpO2:  [91 %-98 %] 95 %  BP: (105-184)/(50-72) 112/54                     Male External Urinary Catheter 01/31/20 2796 Small (Active)   Collection Container Urimeter 2/4/2020  7:50 PM   Securement Method secured to top of thigh w/ tape 2/4/2020  7:50 PM   Skin no redness;no breakdown 2/4/2020  7:50 PM   Tolerance no signs/symptoms of discomfort 2/4/2020  7:50 PM   Output (mL) 500 mL 2/5/2020  4:00 AM   Catheter Change Date 02/06/20 2/4/2020  7:50 PM   Catheter Change Time 2100 2/4/2020  7:50 PM       Neurosurgery Physical Exam    General: obese, no distress.   Head: normocephalic, atraumatic  Neck: No tracheal deviation. No palpable masses.  Neurologic: Alert and oriented. Thought content appropriate.  GCS: Motor: 6/Verbal: 5/Eyes: 4 GCS Total: 15  Mental Status: Awake, Alert, Oriented x 4  Language: No aphasia  Speech: No dysarthria  Cranial nerves: face symmetric, tongue midline, CN II-XII grossly intact.   Eyes: pupils equal, round, reactive to light with accomodation, EOMI.   Ears: No drainage.   Pulmonary: normal respirations, no signs of respiratory distress  Abdomen: soft, non-distended, not tender to palpation  Sensory: intact to light touch throughout  Motor Strength: BUE in abduction shoulder brace. Able to perform hand , full strength noted. Moves lower extremities spontaneously with good tone. Full strength noted throughout. No abnormal movements seen.     Strength  Deltoids Triceps Biceps Wrist Extension Wrist Flexion Hand    Upper: R 5/5 5/5 5/5 5/5 5/5 5/5    L 5/5 5/5 5/5 5/5 5/5 5/5     Iliopsoas Quadriceps Knee  Flexion Tibialis  anterior Gastro- cnemius EHL   Lower: R 5/5 5/5 5/5 5/5 5/5 5/5    L 5/5 5/5 5/5 5/5 5/5 5/5     Duran: absent  Clonus: absent  Babinski: absent  Vascular: Pulses 2+ and symmetric radial and dorsalis pedis. No LE edema.   Skin: Skin is warm, dry and  intact.      Significant Labs:  Recent Labs   Lab 02/04/20  0455 02/05/20  0501    95    140   K 4.5 5.0    103   CO2 26 26   BUN 28* 30*   CREATININE 1.1 1.1   CALCIUM 8.9 8.3*   MG 2.0 1.9     Recent Labs   Lab 02/05/20  0501   WBC 11.38   HGB 8.7*   HCT 27.8*        No results for input(s): LABPT, INR, APTT in the last 48 hours.  Microbiology Results (last 7 days)     ** No results found for the last 168 hours. **        Recent Lab Results       02/05/20  1145   02/05/20  0758   02/05/20  0501   02/04/20  2327   02/04/20  1917        Albumin     2.2         Alkaline Phosphatase     151         ALT     59         Anion Gap     11         Aniso     Slight         AST     68         Baso #     0.04         Basophil%     0.4         BILIRUBIN TOTAL     1.1  Comment:  For infants and newborns, interpretation of results should be based  on gestational age, weight and in agreement with clinical  observations.  Premature Infant recommended reference ranges:  Up to 24 hours.............<8.0 mg/dL  Up to 48 hours............<12.0 mg/dL  3-5 days..................<15.0 mg/dL  6-29 days.................<15.0 mg/dL           BUN, Bld     30         Calcium     8.3         Chloride     103         CO2     26         Creatinine     1.1         Differential Method     Automated         eGFR if      >60.0         eGFR if non      >60.0  Comment:  Calculation used to obtain the estimated glomerular filtration  rate (eGFR) is the CKD-EPI equation.            Eos #     0.3         Eosinophil%     2.9         Glucose     95         Gran # (ANC)     6.7         Gran%     59.1         Hematocrit     27.8         Hemoglobin     8.7         Hypo     Occasional         Immature Grans (Abs)     0.45  Comment:  Mild elevation in immature granulocytes is non specific and   can be seen in a variety of conditions including stress response,   acute inflammation, trauma and pregnancy.  Correlation with other   laboratory and clinical findings is essential.           Immature Granulocytes     4.0         Lymph #     2.6         Lymph%     22.5         Magnesium     1.9         MCH     27.4         MCHC     31.3         MCV     87         Mono #     1.3         Mono%     11.1         MPV     10.2         nRBC     0         Ovalocytes     Occasional         Phosphorus     3.3         Platelet Estimate     Appears normal         Platelets     264  Comment:  Platelets are clumped on smear.Platelet count may be affected.         POCT Glucose 86 102   119 117     Poik     Slight         Poly     Occasional         Potassium     5.0         PROTEIN TOTAL     6.0         RBC     3.18         RDW     16.9         Sodium     140         WBC     11.38                            All pertinent labs from the last 24 hours have been reviewed.    Significant Diagnostics:  I have reviewed all pertinent imaging results/findings within the past 24 hours.    Assessment/Plan:     * Subdural hematoma  59 M with hx of paroxysmal afib on coumadin presents for eval of bilateral shoulder dislocations, left convexity SDH, L1 compression fracture, L4 burst fracture.    -Stepped down to the floor, neuro checks q4h, vital signs q4h  -Able to participate fully in exam today, reports pain is controlled. Full strength noted throughout BLE. No focal neuro deficits on exam. Denies b/b dysfunction.  -No acute neurosurgical intervention at this time based on current imaging  -Repeat CT head stable 2/2 stable. No worsening midline shift or increase size of left convexity SDH. No new hemorrhage noted.  -Continue to hold coumadin, goal INR of less than 1.4. From neurosurgery standpoint in the setting of SDH, recommend holding coumadin for 2 weeks. Will need a repeat CT head in 2 weeks and if stable and patient is asymptomatic, ok to resume.  -TLSO when OOB/upright  -Please obtain sitting and upright L-spine x-rays in TLSO when able to  evaluate spinal stability. If unable to obtain and patient remains neurologically intact, will f/u outpatient in 6 weeks with XR at that time. Continue TLSO brace when OOB/upright if patient is discharged without an XR.  -MRI L spine complete. L1 compression fracture with minimal retropulsion into central canal, mild canal stenosis. L4 burst fracture with retropulsion into central canal resulting in moderate canal stenosis. Likely will need RadTx to L spine given cancer hx.   -C spine cleared by Ortho. C collar for comfort  -Discussed with Dr. Barry    - Will schedule outpatient follow-up for SDH and Lumbar compression fracture follow-up. Patient will be contacted with appointment. NSGY will sign off. Please call if imaging obtained while inpatient or if he has an acute change in exam. Will be available for questions/concerns.             Humera Aragon PA-C  Neurosurgery  [facility]

## 2020-02-05 NOTE — ASSESSMENT & PLAN NOTE
59 M with hx of paroxysmal afib on coumadin presents for eval of bilateral shoulder dislocations, left convexity SDH, L1 compression fracture, L4 burst fracture.    -Stepped down to the floor, neuro checks q4h, vital signs q4h  -Able to participate fully in exam today, reports pain is controlled. Full strength noted throughout BLE. No focal neuro deficits on exam. Denies b/b dysfunction.  -No acute neurosurgical intervention at this time based on current imaging  -Repeat CT head stable 2/2 stable. No worsening midline shift or increase size of left convexity SDH. No new hemorrhage noted.  -Continue to hold coumadin, goal INR of less than 1.4. From neurosurgery standpoint in the setting of SDH, recommend holding coumadin for 2 weeks. Will need a repeat CT head in 2 weeks and if stable and patient is asymptomatic, ok to resume.  -TLSO when OOB/upright  -Please obtain sitting and upright L-spine x-rays in TLSO when able to evaluate spinal stability. If unable to obtain and patient remains neurologically intact, will f/u outpatient in 6 weeks with XR at that time. Continue TLSO brace when OOB/upright if patient is discharged without an XR.  -MRI L spine complete. L1 compression fracture with minimal retropulsion into central canal, mild canal stenosis. L4 burst fracture with retropulsion into central canal resulting in moderate canal stenosis. Likely will need RadTx to L spine given cancer hx.   -C spine cleared by Ortho. C collar for comfort  -Discussed with Dr. Barry    - Will schedule outpatient follow-up for SDH and Lumbar compression fracture follow-up. Patient will be contacted with appointment. NSGY will sign off. Please call if imaging obtained while inpatient or if he has an acute change in exam. Will be available for questions/concerns.

## 2020-02-05 NOTE — PT/OT/SLP PROGRESS
Occupational Therapy   Treatment    Name: Ben Melvin  MRN: [id number]  Admitting Diagnosis:  Subdural hematoma  6 Days Post-Op    Recommendations:     Discharge Recommendations: rehabilitation facility  Discharge Equipment Recommendations:  wheelchair  Barriers to discharge:  None    Assessment:     Ben Melvin is a 60 y.o. male with a medical diagnosis of Subdural hematoma.  He presents with the following performance deficits affecting function:  weakness, impaired endurance, impaired self care skills, impaired balance, gait instability, impaired functional mobilty, decreased lower extremity function, decreased upper extremity function, orthopedic precautions, impaired skin, edema, decreased ROM, pain, impaired coordination.     Pt tolerated session well. He continues to remain motivated and participative with therapy. Pt tolerated EOB sitting w./ no balance difficulties during therapeutic exercises or repositioning of braces. Pt educated on importance of maintaining his B elbow, wrist, and hand/digit ROM and ability to feed self, using elbow ROM, to maintain functional use. Pt required Max Ax2 for 2 sit to stand trials w/ cueing on forward weight shift and postural control. Pt began to become lightheaded during mobility activities and returned to supine as pt reported feeling nauseous and sweating. He continues to benefit from skilled OT to address the above listed impairments.     Rehab Prognosis:  Good; patient would benefit from acute skilled OT services to address these deficits and reach maximum level of function.       Plan:     Patient to be seen 5 x/week to address the above listed problems via self-care/home management, therapeutic activities, therapeutic exercises  · Plan of Care Expires: 03/03/20  · Plan of Care Reviewed with: patient, spouse    Subjective     Pain/Comfort:  · Pain Rating 1: (did not rate)    Objective:     Communicated with: RN prior to session.  Patient found HOB elevated  with telemetry, TLSO, SCD, oxygen, Condom Catheter upon OT entry to room.    General Precautions: Standard, fall, seizure   Orthopedic Precautions:LUE non weight bearing, RUE non weight bearing   Braces: Shoulder abduction brace, TLSO     Occupational Performance:     Bed Mobility:    · Patient completed Scooting/Bridging with minimum assistance  · Patient completed Supine to Sit with maximal assistance  · Patient completed Sit to Supine with maximal assistance     Functional Mobility/Transfers:  · Patient completed Sit <> Stand Transfer with maximal assistance and of 2 persons  with  hand-held assist   · Functional Mobility: unable to safely attempt steps at this time due to patient becoming lightheaded and nauseous    Activities of Daily Living:  · Upper Body Dressing: total assistance and education on shoulder abduction brace management and positioning       [facility] 6 Click ADL: 11    Treatment & Education:  - OT POC  - Importance of OOB activity to maximize recovery   - Educated on B UE ROM exercises while seated EOB: elbow, wrist and hand/finger exercises   - cues on postural control in standing   - shoulder abduction brace management and education on positioning     Patient left HOB elevated with all lines intact, call button in reach, RN notified and significant other presentEducation:      GOALS:   Multidisciplinary Problems     Occupational Therapy Goals        Problem: Occupational Therapy Goal    Goal Priority Disciplines Outcome Interventions   Occupational Therapy Goal     OT, PT/OT Ongoing, Progressing    Description:  Goals to be met by: 2/10/2020     Patient will increase functional independence with ADLs by performing:    UE Dressing with Moderate Assistance.  LE Dressing with Moderate Assistance and Assistive Devices as needed.  Grooming while standing with Minimal Assistance.  Toileting from bedside commode with Maximum Assistance for hygiene and clothing management.   Toilet transfer to bedside  commode with Contact Guard Assistance.                      Time Tracking:     OT Date of Treatment: 02/05/20  OT Start Time: 1357  OT Stop Time: 1430  OT Total Time (min): 33 min    Billable Minutes:Self Care/Home Management 18  Therapeutic Exercise 15    Lore Evans, OT  2/5/2020

## 2020-02-05 NOTE — PT/OT/SLP PROGRESS
Physical Therapy Treatment    Patient Name:  Ben Melvin   MRN:  [id number]    Recommendations:     Discharge Recommendations:  rehabilitation facility   Discharge Equipment Recommendations: wheelchair   Barriers to discharge: Decreased caregiver support    Assessment:     Ben Melvin is a 60 y.o. male admitted with a medical diagnosis of Subdural hematoma.  He presents with the following impairments/functional limitations:  weakness, impaired functional mobilty, gait instability, impaired endurance, decreased safety awareness, impaired cardiopulmonary response to activity, pain, impaired joint extensibility, impaired muscle length, impaired skin, decreased upper extremity function, decreased lower extremity function, impaired self care skills, orthopedic precautions, impaired balance, decreased ROM.       Patient is pleasant, cooperative, and agreeable to therapy. Patient demonstrates good motivation with limited functional mobility and activity tolerance secondary to SOB, nausea, and diaphoresis with exertion. Patient requires between moderate and maximum assistance for impairments noted above. Patient tolerated sitting edge of bed ~7 minutes for readjustment of braces; demonstrated improved tolerance and good balance while seated. Patient transferred sit to stand with maximum assistance and hand-held assist x 2 trials; patient required cues for forward weight shift for 2/2 trials.    Rehab Prognosis: Good; patient continues to benefit from acute skilled PT services to address these deficits and reach maximum level of function.  Patient remains most appropriate to discharge to rehabilitation facility  Recent Surgery: Procedure(s) (LRB):  HEMIARTHROPLASTY, SHOULDER- RIGHT- MEKA- SUPINE-  TRIOS (Right) 6 Days Post-Op    Plan:     During this hospitalization, patient to be seen 5 x/week to address the identified rehab impairments via gait training, therapeutic activities, therapeutic exercises,  "neuromuscular re-education and progress toward the following goals:    · Plan of Care Expires:  03/01/20    Subjective     Subjective: "I'm sweating a lot."   Pain/Comfort:  · Pain Rating 1: (Did not rate)  · Location - Side 1: Bilateral  · Location - Orientation 1: generalized  · Location 1: shoulder  · Pain Addressed 1: Reposition, Distraction  · Pain Rating Post-Intervention 1: (Did not rate)      Objective:     Communicated with RN prior to session.  Patient found supine with telemetry, SCD, TLSO, oxygen, Condom Catheter upon PT entry to room.     General Precautions: Standard, fall, seizure   Orthopedic Precautions:RUE non weight bearing, LUE non weight bearing   Braces: Shoulder abduction brace, TLSO     Functional Mobility:  · Bed Mobility:     · Scooting: maximal assistance with use of draw sheet to move pt toward HOB  · Supine to Sit: maximal assistance for trunk and leg management.  · Sit to Supine: moderate assistance for trunk and leg management.  · Transfers:     · Sit to Stand:  maximal assistance with hand-held assist x 2 trials  · Trial 1: demonstrated posterior lean in standing with hips flexed and weight in heels  · Trial 2: Improved with weight shift forward but still with mild posterior lean  · Gait: Patient ambulated 3 steps laterally along EOB with hand-held assist and moderate assistance   · Balance:   · Standing: Poor; demonstrates posterior lean  · Sitting: Good at EOB with feet on floor      AM-PAC 6 CLICK MOBILITY  Turning over in bed (including adjusting bedclothes, sheets and blankets)?: 2  Sitting down on and standing up from a chair with arms (e.g., wheelchair, bedside commode, etc.): 2  Moving from lying on back to sitting on the side of the bed?: 2  Moving to and from a bed to a chair (including a wheelchair)?: 2  Need to walk in hospital room?: 1  Climbing 3-5 steps with a railing?: 1  Basic Mobility Total Score: 10       Therapeutic Activities and Exercises:   Patient with " assistance as noted above. Required cues in standing and during ambulation for forward weight shift and hip extension via glute squeezes to decrease posterior lean and prevent LOB. Patient required rest breaks before and between transfers with cues for diaphragmatic breathing. Activity tolerance continues to be limited secondary to nausea, SOB, diaphoresis, and fatigue.     Patient Education:    Patient and Significant other educated on Fall risk, home safety, transfer training, weight bearing restrictions and diaphragmatic breathing by explanation.  Patient was receptive to education and verbalizes understanding but may need reinforcement.     Patient left supine with all lines intact and significant other present.    GOALS:   Multidisciplinary Problems     Physical Therapy Goals        Problem: Physical Therapy Goal    Goal Priority Disciplines Outcome Goal Variances Interventions   Physical Therapy Goal     PT, PT/OT Ongoing, Progressing     Description:  Goals to be met by: 2/7/20    Patient will increase functional independence with mobility by performing (while maintaining NWB of both upper extremities):    1. Supine to sit with Moderate Assistance. Not met  2. Sit to supine with Moderate Assistance Not met  3. Sit to stand transfer with Moderate Assistance Not met  4. Bed to chair transfer with Moderate Assistance. Not met  5. Gait  x 25 feet with Moderate Assistance. Not met  6. Stand for 2 minutes with Minimal Assistance statically, upright posture. Not met  7. Lower extremity exercise program x 20 reps per handout, with independence Not met                      Time Tracking:     PT Received On: 02/05/20  PT Start Time: 1357     PT Stop Time: 1431  PT Total Time (min): 34 min     Billable Minutes: Therapeutic Activity 34 min    Treatment Type: Treatment  PT/PTA: PT     PTA Visit Number: 0     Gricelda Johnyohan [facility]  02/05/2020

## 2020-02-05 NOTE — PLAN OF CARE
Problem: Physical Therapy Goal  Goal: Physical Therapy Goal  Description  Goals to be met by: 2/7/20    Patient will increase functional independence with mobility by performing (while maintaining NWB of both upper extremities):    1. Supine to sit with Moderate Assistance. Not met  2. Sit to supine with Moderate Assistance Not met  3. Sit to stand transfer with Moderate Assistance Not met  4. Bed to chair transfer with Moderate Assistance. Not met  5. Gait  x 25 feet with Moderate Assistance. Not met  6. Stand for 2 minutes with Minimal Assistance statically, upright posture. Not met  7. Lower extremity exercise program x 20 reps per handout, with independence Not met     Outcome: Ongoing, Progressing   Patient is pleasant, cooperative, and agreeable to therapy. Patient demonstrates fair motivation and limited functional mobility secondary to SOB, nausea, and diaphoresis with exertion. Patient requires between moderate and maximum assistance for bed mobility, transfers, and ambulation secondary to pain, weakness, impaired balance, impaired endurance, impaired cardiopulmonary response, decreased UE function, gait instability, and decreased safety awareness. Patient tolerated sitting edge of bed for ~7 minutes while PT adjusted braces; demonstrated improved tolerance and good balance while seated. Patient transferred sit to stand with maximum assistance and hand-held assist x 2 trials; patient required cues for forward weight shift for 2/2 trials. Plan of care continues to be appropriate. Patient will continue to benefit from skilled physical therapy while in acute care setting and at rehabilitation facility for strengthening, endurance, and mobility training.

## 2020-02-05 NOTE — PLAN OF CARE
Pt continues to participate well with therapy       Problem: Occupational Therapy Goal  Goal: Occupational Therapy Goal  Description  Goals to be met by: 2/10/2020     Patient will increase functional independence with ADLs by performing:    UE Dressing with Moderate Assistance.  LE Dressing with Moderate Assistance and Assistive Devices as needed.  Grooming while standing with Minimal Assistance.  Toileting from bedside commode with Maximum Assistance for hygiene and clothing management.   Toilet transfer to bedside commode with Contact Guard Assistance.     Outcome: Ongoing, Progressing

## 2020-02-05 NOTE — PROGRESS NOTES
[facility]  Orthopedics  Progress Note    Patient Name: Ben Melvin  MRN: [id number]  Admission Date: 1/27/2020  Hospital Length of Stay: 9 days  Attending Provider: Emily Mcdaniel MD  Primary Care Provider: Cristal Ohara NP  Follow-up For: Procedure(s) (LRB):  HEMIARTHROPLASTY, SHOULDER- RIGHT- MEKA- SUPINE-  TRIOS (Right)    Post-Operative Day: 6 Days Post-Op  Subjective:     Principal Problem:Subdural hematoma    Principal Orthopedic Problem: Bilateral prox humerus fractures     Interval History: Patient seen and examined at bedside. Patient experienced some delirium last night that has resolved. His pain is more controlled this morning. His abduction pillows were both out of place in bed today. I readjusted them and educated patient and wife on proper positioning.       Review of patient's allergies indicates:  No Known Allergies    Current Facility-Administered Medications   Medication    amitriptyline tablet 50 mg    bisacodyL suppository 10 mg    celecoxib capsule 200 mg    dextromethorphan-guaifenesin  mg per 12 hr tablet 1 tablet    dextrose 10% (D10W) Bolus    dextrose 10% (D10W) Bolus    dextrose 10% (D10W) Bolus    ergocalciferol capsule 50,000 Units    gabapentin capsule 300 mg    glucagon (human recombinant) injection 1 mg    glucose chewable tablet 16 g    glucose chewable tablet 24 g    heparin (porcine) injection 5,000 Units    HYDROmorphone injection 0.5 mg    insulin aspart U-100 pen 1-10 Units    ipratropium 0.02 % nebulizer solution 0.5 mg    levalbuterol nebulizer solution 0.63 mg    levETIRAcetam tablet 1,000 mg    methocarbamol tablet 750 mg    metoprolol tartrate (LOPRESSOR) tablet 25 mg    nicotine 21 mg/24 hr 1 patch    ondansetron injection 4 mg    oxyCODONE immediate release tablet 15 mg    oxyCODONE immediate release tablet 5 mg    oxyCODONE immediate release tablet Tab 10 mg    pantoprazole EC tablet 40 mg    polyethylene  "glycol packet 17 g    senna-docusate 8.6-50 mg per tablet 1 tablet    sodium chloride 0.9% flush 10 mL     Objective:     Vital Signs (Most Recent):  Temp: 96.5 °F (35.8 °C) (02/05/20 0807)  Pulse: 94 (02/05/20 1120)  Resp: 18 (02/05/20 0847)  BP: (!) 105/50 (02/05/20 0807)  SpO2: 95 % (02/05/20 0847) Vital Signs (24h Range):  Temp:  [96.5 °F (35.8 °C)-97.8 °F (36.6 °C)] 96.5 °F (35.8 °C)  Pulse:  [] 94  Resp:  [16-97] 18  SpO2:  [91 %-98 %] 95 %  BP: (105-184)/(50-72) 105/50     Weight: 110.7 kg (244 lb)  Height: 5' 11" (180.3 cm)  Body mass index is 34.03 kg/m².      Intake/Output Summary (Last 24 hours) at 2/5/2020 1123  Last data filed at 2/5/2020 0400  Gross per 24 hour   Intake 368 ml   Output 500 ml   Net -132 ml       Ortho/SPM Exam       Physical Exam:  NAD, A/O x 3.  Wound c/d/i with clean dressing.  Bilateral sling shot in place with abduction pillows  No focal motor or sensory deficits to bilateral UE    Significant Labs:   CBC:   Recent Labs   Lab 02/05/20  0501   WBC 11.38   HGB 8.7*   HCT 27.8*        All pertinent labs within the past 24 hours have been reviewed.    Significant Imaging: I have reviewed and interpreted all pertinent imaging results/findings.    Assessment/Plan:      fracture dislocation of bilateral shoulders  Ben Melvin is a 60 y.o. male status post left shoulder hemiarthroplasty on January 28, 2020  and status post right shoulder hemiarthroplasty on January 30, 2020.    - Weight bearing status: NWB and no ROM of bilateral shoulders  - LSO brace to be continued per Neurosurgery  - Pain control: multimodal  - DVT prophylaxis: heparin tid    Dispo: Continue rehabilitation                   Marlon Cordoba MD  Orthopedics  [facility]  "

## 2020-02-05 NOTE — ASSESSMENT & PLAN NOTE
Ben Melvin is a 60 y.o. male status post left shoulder hemiarthroplasty on January 28, 2020  and status post right shoulder hemiarthroplasty on January 30, 2020.    - Weight bearing status: NWB and no ROM of bilateral shoulders  - LSO brace to be continued per Neurosurgery  - Pain control: multimodal  - DVT prophylaxis: heparin tid    Dispo: Continue rehabilitation

## 2020-02-05 NOTE — SUBJECTIVE & OBJECTIVE
Interval History: NAEON. Patient restless on exam however no complaints of pain to back or BLE. He denies weakness, paresthesias, b/b dysfunction. No focal neuro deficits on exam.     Medications:  Continuous Infusions:  Scheduled Meds:   amitriptyline  50 mg Oral QHS    celecoxib  200 mg Oral Daily    dextromethorphan-guaifenesin  mg  1 tablet Oral BID    ergocalciferol  50,000 Units Oral Q7 Days    gabapentin  300 mg Oral QHS    heparin (porcine)  5,000 Units Subcutaneous Q8H    ipratropium  0.5 mg Nebulization Q4H WAKE    levalbuterol  0.63 mg Nebulization Q8H    levETIRAcetam  1,000 mg Oral BID    methocarbamol  750 mg Oral TID    metoprolol tartrate  25 mg Oral BID    nicotine  1 patch Transdermal Daily    pantoprazole  40 mg Oral Daily    polyethylene glycol  17 g Oral BID    senna-docusate 8.6-50 mg  1 tablet Oral BID     PRN Meds:bisacodyL, Dextrose 10% Bolus, Dextrose 10% Bolus, Dextrose 10% Bolus, glucagon (human recombinant), glucose, glucose, HYDROmorphone, insulin aspart U-100, ondansetron, oxyCODONE, oxyCODONE, oxyCODONE, sodium chloride 0.9%       Objective:     Weight: 110.7 kg (244 lb)  Body mass index is 34.03 kg/m².  Vital Signs (Most Recent):  Temp: 96.5 °F (35.8 °C) (02/05/20 1150)  Pulse: 88 (02/05/20 1259)  Resp: 18 (02/05/20 1259)  BP: (!) 112/54 (02/05/20 1150)  SpO2: 95 % (02/05/20 1259) Vital Signs (24h Range):  Temp:  [96.5 °F (35.8 °C)-97.8 °F (36.6 °C)] 96.5 °F (35.8 °C)  Pulse:  [] 88  Resp:  [16-22] 18  SpO2:  [91 %-98 %] 95 %  BP: (105-184)/(50-72) 112/54                     Male External Urinary Catheter 01/31/20 8534 Small (Active)   Collection Container Urimeter 2/4/2020  7:50 PM   Securement Method secured to top of thigh w/ tape 2/4/2020  7:50 PM   Skin no redness;no breakdown 2/4/2020  7:50 PM   Tolerance no signs/symptoms of discomfort 2/4/2020  7:50 PM   Output (mL) 500 mL 2/5/2020  4:00 AM   Catheter Change Date 02/06/20 2/4/2020  7:50 PM    Catheter Change Time 2100 2/4/2020  7:50 PM       Neurosurgery Physical Exam    General: obese, no distress.   Head: normocephalic, atraumatic  Neck: No tracheal deviation. No palpable masses.  Neurologic: Alert and oriented. Thought content appropriate.  GCS: Motor: 6/Verbal: 5/Eyes: 4 GCS Total: 15  Mental Status: Awake, Alert, Oriented x 4  Language: No aphasia  Speech: No dysarthria  Cranial nerves: face symmetric, tongue midline, CN II-XII grossly intact.   Eyes: pupils equal, round, reactive to light with accomodation, EOMI.   Ears: No drainage.   Pulmonary: normal respirations, no signs of respiratory distress  Abdomen: soft, non-distended, not tender to palpation  Sensory: intact to light touch throughout  Motor Strength: BUE in abduction shoulder brace. Able to perform hand , full strength noted. Moves lower extremities spontaneously with good tone. Full strength noted throughout. No abnormal movements seen.     Strength  Deltoids Triceps Biceps Wrist Extension Wrist Flexion Hand    Upper: R 5/5 5/5 5/5 5/5 5/5 5/5    L 5/5 5/5 5/5 5/5 5/5 5/5     Iliopsoas Quadriceps Knee  Flexion Tibialis  anterior Gastro- cnemius EHL   Lower: R 5/5 5/5 5/5 5/5 5/5 5/5    L 5/5 5/5 5/5 5/5 5/5 5/5     Duran: absent  Clonus: absent  Babinski: absent  Vascular: Pulses 2+ and symmetric radial and dorsalis pedis. No LE edema.   Skin: Skin is warm, dry and intact.      Significant Labs:  Recent Labs   Lab 02/04/20  0455 02/05/20  0501    95    140   K 4.5 5.0    103   CO2 26 26   BUN 28* 30*   CREATININE 1.1 1.1   CALCIUM 8.9 8.3*   MG 2.0 1.9     Recent Labs   Lab 02/05/20  0501   WBC 11.38   HGB 8.7*   HCT 27.8*        No results for input(s): LABPT, INR, APTT in the last 48 hours.  Microbiology Results (last 7 days)     ** No results found for the last 168 hours. **        Recent Lab Results       02/05/20  1145   02/05/20  0758   02/05/20  0501   02/04/20  2327   02/04/20  1917         Albumin     2.2         Alkaline Phosphatase     151         ALT     59         Anion Gap     11         Aniso     Slight         AST     68         Baso #     0.04         Basophil%     0.4         BILIRUBIN TOTAL     1.1  Comment:  For infants and newborns, interpretation of results should be based  on gestational age, weight and in agreement with clinical  observations.  Premature Infant recommended reference ranges:  Up to 24 hours.............<8.0 mg/dL  Up to 48 hours............<12.0 mg/dL  3-5 days..................<15.0 mg/dL  6-29 days.................<15.0 mg/dL           BUN, Bld     30         Calcium     8.3         Chloride     103         CO2     26         Creatinine     1.1         Differential Method     Automated         eGFR if      >60.0         eGFR if non      >60.0  Comment:  Calculation used to obtain the estimated glomerular filtration  rate (eGFR) is the CKD-EPI equation.            Eos #     0.3         Eosinophil%     2.9         Glucose     95         Gran # (ANC)     6.7         Gran%     59.1         Hematocrit     27.8         Hemoglobin     8.7         Hypo     Occasional         Immature Grans (Abs)     0.45  Comment:  Mild elevation in immature granulocytes is non specific and   can be seen in a variety of conditions including stress response,   acute inflammation, trauma and pregnancy. Correlation with other   laboratory and clinical findings is essential.           Immature Granulocytes     4.0         Lymph #     2.6         Lymph%     22.5         Magnesium     1.9         MCH     27.4         MCHC     31.3         MCV     87         Mono #     1.3         Mono%     11.1         MPV     10.2         nRBC     0         Ovalocytes     Occasional         Phosphorus     3.3         Platelet Estimate     Appears normal         Platelets     264  Comment:  Platelets are clumped on smear.Platelet count may be affected.         POCT Glucose 86 102    119 117     Poik     Slight         Poly     Occasional         Potassium     5.0         PROTEIN TOTAL     6.0         RBC     3.18         RDW     16.9         Sodium     140         WBC     11.38                            All pertinent labs from the last 24 hours have been reviewed.    Significant Diagnostics:  I have reviewed all pertinent imaging results/findings within the past 24 hours.

## 2020-02-05 NOTE — PLAN OF CARE
02/05/20 1008   Post-Acute Status   Post-Acute Authorization Placement   Post-Acute Placement Status Pending Post-Acute Clinical Review   Plan: patient to discharge to [facility] IRF when medically cleared. Pending post acute clinical review at this time.     (8:30AM) PROMISE spoke with patient partner outside of patient room to update on dc planning. Sw inquired about obtaining additional choices to send referrals to. Patient partner does not want other referrals expect for [facility]. SW will continue with patient dc planning process.   Dyan Rose LMSW [facility]     (10:00AM) PROMISE outreached [facility] at [phone] to follow up on patient referral. Transferred to [facility] with intake department. No answer. Left voicemail requesting return call.   Dyan Rose LMSW [facility]     (10:30AM) PROMISE spoke with Deepika at [facility] who states following patient to see if meets medical criteria for acceptance. Deepika states reviewed patient referral packet with Medical Director this morning and that patient level of functioning too low (max assist x2) and with concerns of patient pain. Deepika states will review with director tomorrow for approval to submit for auth. Deepika reports will follow up with PROMISE tomorrow morning to give update.   Dyan Rose LMSW [facility]     (2:00PM) PROMISE spoke with Deepika at [facility] who states medical director concerned of patient low level of functioning and pain management. The biggest concern is bed availability, which may not happen until Mon. PROMISE will follow up with patient to see if willing to provide additional referral choices.   Dyan Rose LMSW [facility]

## 2020-02-06 ENCOUNTER — TELEPHONE (OUTPATIENT)
Dept: NEUROSURGERY | Facility: CLINIC | Age: 60
End: 2020-02-06

## 2020-02-06 LAB
ALBUMIN SERPL BCP-MCNC: 2.2 G/DL (ref 3.5–5.2)
ALP SERPL-CCNC: 167 U/L (ref 55–135)
ALT SERPL W/O P-5'-P-CCNC: 69 U/L (ref 10–44)
ANION GAP SERPL CALC-SCNC: 9 MMOL/L (ref 8–16)
AST SERPL-CCNC: 76 U/L (ref 10–40)
BASOPHILS # BLD AUTO: 0.05 K/UL (ref 0–0.2)
BASOPHILS NFR BLD: 0.5 % (ref 0–1.9)
BILIRUB SERPL-MCNC: 0.9 MG/DL (ref 0.1–1)
BUN SERPL-MCNC: 26 MG/DL (ref 6–20)
CALCIUM SERPL-MCNC: 8.6 MG/DL (ref 8.7–10.5)
CHLORIDE SERPL-SCNC: 107 MMOL/L (ref 95–110)
CO2 SERPL-SCNC: 27 MMOL/L (ref 23–29)
CREAT SERPL-MCNC: 1 MG/DL (ref 0.5–1.4)
DIFFERENTIAL METHOD: ABNORMAL
EOSINOPHIL # BLD AUTO: 0.3 K/UL (ref 0–0.5)
EOSINOPHIL NFR BLD: 2.4 % (ref 0–8)
ERYTHROCYTE [DISTWIDTH] IN BLOOD BY AUTOMATED COUNT: 16.9 % (ref 11.5–14.5)
EST. GFR  (AFRICAN AMERICAN): >60 ML/MIN/1.73 M^2
EST. GFR  (NON AFRICAN AMERICAN): >60 ML/MIN/1.73 M^2
GLUCOSE SERPL-MCNC: 87 MG/DL (ref 70–110)
HCT VFR BLD AUTO: 26.8 % (ref 40–54)
HGB BLD-MCNC: 8.7 G/DL (ref 14–18)
IMM GRANULOCYTES # BLD AUTO: 0.53 K/UL (ref 0–0.04)
IMM GRANULOCYTES NFR BLD AUTO: 5 % (ref 0–0.5)
LYMPHOCYTES # BLD AUTO: 2.7 K/UL (ref 1–4.8)
LYMPHOCYTES NFR BLD: 25.5 % (ref 18–48)
MAGNESIUM SERPL-MCNC: 1.8 MG/DL (ref 1.6–2.6)
MCH RBC QN AUTO: 28.1 PG (ref 27–31)
MCHC RBC AUTO-ENTMCNC: 32.5 G/DL (ref 32–36)
MCV RBC AUTO: 87 FL (ref 82–98)
MONOCYTES # BLD AUTO: 1 K/UL (ref 0.3–1)
MONOCYTES NFR BLD: 9 % (ref 4–15)
NEUTROPHILS # BLD AUTO: 6.1 K/UL (ref 1.8–7.7)
NEUTROPHILS NFR BLD: 57.6 % (ref 38–73)
NRBC BLD-RTO: 0 /100 WBC
PHOSPHATE SERPL-MCNC: 3.2 MG/DL (ref 2.7–4.5)
PLATELET # BLD AUTO: 291 K/UL (ref 150–350)
PMV BLD AUTO: 9.5 FL (ref 9.2–12.9)
POCT GLUCOSE: 104 MG/DL (ref 70–110)
POCT GLUCOSE: 108 MG/DL (ref 70–110)
POCT GLUCOSE: 117 MG/DL (ref 70–110)
POCT GLUCOSE: 119 MG/DL (ref 70–110)
POCT GLUCOSE: 123 MG/DL (ref 70–110)
POTASSIUM SERPL-SCNC: 5.1 MMOL/L (ref 3.5–5.1)
PROT SERPL-MCNC: 5.8 G/DL (ref 6–8.4)
RBC # BLD AUTO: 3.1 M/UL (ref 4.6–6.2)
SODIUM SERPL-SCNC: 143 MMOL/L (ref 136–145)
WBC # BLD AUTO: 10.61 K/UL (ref 3.9–12.7)

## 2020-02-06 PROCEDURE — 25000003 PHARM REV CODE 250: Performed by: INTERNAL MEDICINE

## 2020-02-06 PROCEDURE — 25000003 PHARM REV CODE 250: Performed by: STUDENT IN AN ORGANIZED HEALTH CARE EDUCATION/TRAINING PROGRAM

## 2020-02-06 PROCEDURE — 27000221 HC OXYGEN, UP TO 24 HOURS

## 2020-02-06 PROCEDURE — 25000242 PHARM REV CODE 250 ALT 637 W/ HCPCS: Performed by: INTERNAL MEDICINE

## 2020-02-06 PROCEDURE — 94640 AIRWAY INHALATION TREATMENT: CPT

## 2020-02-06 PROCEDURE — 25000242 PHARM REV CODE 250 ALT 637 W/ HCPCS: Performed by: NURSE PRACTITIONER

## 2020-02-06 PROCEDURE — 83735 ASSAY OF MAGNESIUM: CPT

## 2020-02-06 PROCEDURE — 99900035 HC TECH TIME PER 15 MIN (STAT)

## 2020-02-06 PROCEDURE — 94761 N-INVAS EAR/PLS OXIMETRY MLT: CPT

## 2020-02-06 PROCEDURE — 84100 ASSAY OF PHOSPHORUS: CPT

## 2020-02-06 PROCEDURE — 25000003 PHARM REV CODE 250: Performed by: NURSE PRACTITIONER

## 2020-02-06 PROCEDURE — 99233 SBSQ HOSP IP/OBS HIGH 50: CPT | Mod: ,,, | Performed by: HOSPITALIST

## 2020-02-06 PROCEDURE — 85007 BL SMEAR W/DIFF WBC COUNT: CPT

## 2020-02-06 PROCEDURE — 11000001 HC ACUTE MED/SURG PRIVATE ROOM

## 2020-02-06 PROCEDURE — S4991 NICOTINE PATCH NONLEGEND: HCPCS | Performed by: NURSE PRACTITIONER

## 2020-02-06 PROCEDURE — 36415 COLL VENOUS BLD VENIPUNCTURE: CPT

## 2020-02-06 PROCEDURE — 99233 PR SUBSEQUENT HOSPITAL CARE,LEVL III: ICD-10-PCS | Mod: ,,, | Performed by: HOSPITALIST

## 2020-02-06 PROCEDURE — 85027 COMPLETE CBC AUTOMATED: CPT

## 2020-02-06 PROCEDURE — 80053 COMPREHEN METABOLIC PANEL: CPT

## 2020-02-06 PROCEDURE — 63600175 PHARM REV CODE 636 W HCPCS: Performed by: NURSE PRACTITIONER

## 2020-02-06 RX ORDER — METHOCARBAMOL 750 MG/1
750 TABLET, FILM COATED ORAL 3 TIMES DAILY
Qty: 30 TABLET | Refills: 0 | Status: SHIPPED | OUTPATIENT
Start: 2020-02-06 | End: 2020-02-16

## 2020-02-06 RX ORDER — IBUPROFEN 200 MG
1 TABLET ORAL DAILY
Qty: 7 PATCH | Refills: 0 | Status: ON HOLD | OUTPATIENT
Start: 2020-02-07 | End: 2020-03-24 | Stop reason: HOSPADM

## 2020-02-06 RX ORDER — INSULIN ASPART 100 [IU]/ML
1-10 INJECTION, SOLUTION INTRAVENOUS; SUBCUTANEOUS
Qty: 3 ML | Refills: 0 | Status: ON HOLD | OUTPATIENT
Start: 2020-02-06 | End: 2020-03-05 | Stop reason: HOSPADM

## 2020-02-06 RX ORDER — LEVETIRACETAM 1000 MG/1
1000 TABLET ORAL 2 TIMES DAILY
Qty: 60 TABLET | Refills: 11 | Status: ON HOLD | OUTPATIENT
Start: 2020-02-06 | End: 2020-03-24 | Stop reason: SDUPTHER

## 2020-02-06 RX ORDER — OXYCODONE HYDROCHLORIDE 5 MG/1
5 TABLET ORAL EVERY 4 HOURS PRN
Qty: 30 TABLET | Refills: 0 | Status: ON HOLD | OUTPATIENT
Start: 2020-02-06 | End: 2020-03-05 | Stop reason: HOSPADM

## 2020-02-06 RX ORDER — LEVALBUTEROL INHALATION SOLUTION 0.63 MG/3ML
0.63 SOLUTION RESPIRATORY (INHALATION) EVERY 8 HOURS
Qty: 270 ML | Refills: 11 | Status: ON HOLD | OUTPATIENT
Start: 2020-02-06 | End: 2020-03-24 | Stop reason: HOSPADM

## 2020-02-06 RX ORDER — HEPARIN SODIUM 5000 [USP'U]/ML
5000 INJECTION, SOLUTION INTRAVENOUS; SUBCUTANEOUS EVERY 8 HOURS
Qty: 21 ML | Refills: 0 | Status: SHIPPED | OUTPATIENT
Start: 2020-02-06 | End: 2020-02-13

## 2020-02-06 RX ORDER — ERGOCALCIFEROL 1.25 MG/1
50000 CAPSULE ORAL
Qty: 7 CAPSULE | Refills: 0 | Status: ON HOLD | OUTPATIENT
Start: 2020-02-11 | End: 2020-03-24 | Stop reason: SDUPTHER

## 2020-02-06 RX ORDER — IPRATROPIUM BROMIDE 0.5 MG/2.5ML
500 SOLUTION RESPIRATORY (INHALATION) EVERY 4 HOURS PRN
Qty: 1 BOX | Refills: 0 | Status: ON HOLD | OUTPATIENT
Start: 2020-02-06 | End: 2020-03-24 | Stop reason: HOSPADM

## 2020-02-06 RX ORDER — AMITRIPTYLINE HYDROCHLORIDE 50 MG/1
50 TABLET, FILM COATED ORAL NIGHTLY
Qty: 30 TABLET | Refills: 11 | Status: ON HOLD | OUTPATIENT
Start: 2020-02-06 | End: 2020-03-05 | Stop reason: HOSPADM

## 2020-02-06 RX ORDER — BISACODYL 10 MG
10 SUPPOSITORY, RECTAL RECTAL DAILY PRN
Refills: 0 | Status: ON HOLD | COMMUNITY
Start: 2020-02-06 | End: 2020-03-24 | Stop reason: HOSPADM

## 2020-02-06 RX ORDER — POLYETHYLENE GLYCOL 3350 17 G/17G
17 POWDER, FOR SOLUTION ORAL 2 TIMES DAILY
Qty: 10 EACH | Refills: 0 | Status: ON HOLD | OUTPATIENT
Start: 2020-02-06 | End: 2020-03-24 | Stop reason: SDUPTHER

## 2020-02-06 RX ORDER — AMOXICILLIN 250 MG
2 CAPSULE ORAL 2 TIMES DAILY
Qty: 30 TABLET | Refills: 0 | Status: ON HOLD | OUTPATIENT
Start: 2020-02-06 | End: 2020-03-24 | Stop reason: HOSPADM

## 2020-02-06 RX ORDER — CELECOXIB 200 MG/1
200 CAPSULE ORAL DAILY
Qty: 30 CAPSULE | Refills: 0 | Status: ON HOLD | OUTPATIENT
Start: 2020-02-07 | End: 2020-03-05 | Stop reason: HOSPADM

## 2020-02-06 RX ADMIN — GUAIFENESIN AND DEXTROMETHORPHAN HYDROBROMIDE 1 TABLET: 600; 30 TABLET, EXTENDED RELEASE ORAL at 09:02

## 2020-02-06 RX ADMIN — HEPARIN SODIUM 5000 UNITS: 5000 INJECTION, SOLUTION INTRAVENOUS; SUBCUTANEOUS at 09:02

## 2020-02-06 RX ADMIN — IPRATROPIUM BROMIDE 0.5 MG: 0.5 SOLUTION RESPIRATORY (INHALATION) at 04:02

## 2020-02-06 RX ADMIN — GUAIFENESIN AND DEXTROMETHORPHAN HYDROBROMIDE 1 TABLET: 600; 30 TABLET, EXTENDED RELEASE ORAL at 08:02

## 2020-02-06 RX ADMIN — CELECOXIB 200 MG: 200 CAPSULE ORAL at 09:02

## 2020-02-06 RX ADMIN — METHOCARBAMOL TABLETS 750 MG: 750 TABLET, COATED ORAL at 08:02

## 2020-02-06 RX ADMIN — METOPROLOL TARTRATE 25 MG: 25 TABLET ORAL at 09:02

## 2020-02-06 RX ADMIN — POLYETHYLENE GLYCOL 3350 17 G: 17 POWDER, FOR SOLUTION ORAL at 09:02

## 2020-02-06 RX ADMIN — AMITRIPTYLINE HYDROCHLORIDE 50 MG: 25 TABLET, FILM COATED ORAL at 08:02

## 2020-02-06 RX ADMIN — METHOCARBAMOL TABLETS 750 MG: 750 TABLET, COATED ORAL at 09:02

## 2020-02-06 RX ADMIN — METOPROLOL TARTRATE 25 MG: 25 TABLET ORAL at 08:02

## 2020-02-06 RX ADMIN — DOCUSATE SODIUM - SENNOSIDES 2 TABLET: 50; 8.6 TABLET, FILM COATED ORAL at 09:02

## 2020-02-06 RX ADMIN — LEVALBUTEROL HYDROCHLORIDE 0.63 MG: 0.63 SOLUTION RESPIRATORY (INHALATION) at 04:02

## 2020-02-06 RX ADMIN — LEVALBUTEROL HYDROCHLORIDE 0.63 MG: 0.63 SOLUTION RESPIRATORY (INHALATION) at 07:02

## 2020-02-06 RX ADMIN — IPRATROPIUM BROMIDE 0.5 MG: 0.5 SOLUTION RESPIRATORY (INHALATION) at 07:02

## 2020-02-06 RX ADMIN — HEPARIN SODIUM 5000 UNITS: 5000 INJECTION, SOLUTION INTRAVENOUS; SUBCUTANEOUS at 06:02

## 2020-02-06 RX ADMIN — DOCUSATE SODIUM - SENNOSIDES 2 TABLET: 50; 8.6 TABLET, FILM COATED ORAL at 08:02

## 2020-02-06 RX ADMIN — METHOCARBAMOL TABLETS 750 MG: 750 TABLET, COATED ORAL at 02:02

## 2020-02-06 RX ADMIN — LEVALBUTEROL HYDROCHLORIDE 0.63 MG: 0.63 SOLUTION RESPIRATORY (INHALATION) at 12:02

## 2020-02-06 RX ADMIN — PANTOPRAZOLE SODIUM 40 MG: 40 TABLET, DELAYED RELEASE ORAL at 09:02

## 2020-02-06 RX ADMIN — HEPARIN SODIUM 5000 UNITS: 5000 INJECTION, SOLUTION INTRAVENOUS; SUBCUTANEOUS at 01:02

## 2020-02-06 RX ADMIN — LEVALBUTEROL HYDROCHLORIDE 0.63 MG: 0.63 SOLUTION RESPIRATORY (INHALATION) at 11:02

## 2020-02-06 RX ADMIN — POLYETHYLENE GLYCOL 3350 17 G: 17 POWDER, FOR SOLUTION ORAL at 08:02

## 2020-02-06 RX ADMIN — IPRATROPIUM BROMIDE 0.5 MG: 0.5 SOLUTION RESPIRATORY (INHALATION) at 12:02

## 2020-02-06 RX ADMIN — NICOTINE 1 PATCH: 21 PATCH, EXTENDED RELEASE TRANSDERMAL at 09:02

## 2020-02-06 RX ADMIN — LEVETIRACETAM 1000 MG: 500 TABLET ORAL at 09:02

## 2020-02-06 RX ADMIN — LEVETIRACETAM 1000 MG: 500 TABLET ORAL at 08:02

## 2020-02-06 NOTE — PLAN OF CARE
ZULMA informed by PROMISE that [facility] unable to admit patient today due to bed availability. Patient will discharge to facility tomorrow. Dr Armstrong notified to remove patient's discharge order.

## 2020-02-06 NOTE — PLAN OF CARE
02/06/20 0958   Post-Acute Status   Post-Acute Authorization Placement   Post-Acute Placement Status Pending Post-Acute Clinical Review   SW outreached Yossi with [facility] to follow up on patient referral. NO answer. Left voicemail requesting return call.   Dyan Rose, BOBBY  [facility]

## 2020-02-06 NOTE — PLAN OF CARE
02/06/20 1127   Post-Acute Status   Post-Acute Authorization Placement   Post-Acute Placement Status Awaiting Orders for Rehab   Discharge Delays (!) Procedure Scheduling (IR, OR, Labs, Echo, Cath, Echo, EEG)   PROMISE spoke with Yossi from  REHAB who states patient can discharge today as they have an available bed. Yossi requested orders be placed. Confirmation received from [facility] team that patient medically cleared for discharge to rehab. Patient also has xray scheduled and needs results to be reviewed by neurosurgery. Awaiting dc orders and xray results/review at this time. SW will update patient with discharge date once confirmed with [facility] and treatment team.   SW in communication with CM and patient care team. Dyan Rose LMSW [facility]     (11:48AM) PROMISE spoke with Yossi from  [facility] who states patient accepted, order reviewed. Yossi reports bed may not be available until later this afternoon. Yossi reports will follow up with PROMISE with update with bed availability. At this time, discharge pending until xray results reviewed and bed availability.   Dyan Rose LMSW [facility]     (1:30PM) PROMISE outreached Yossi with [facility] to follow up on bed availability. Yossi will call SW back around 2:00PM with update if they will have an available bed for patient today or first thing tomorrow morning.   Dyan Rose LMSW [facility]    (2:08PM) PROMISE received callback from Yossi who states patient unable to admit until tomorrow morning due to bed availability. PROMISE will update team about tomorrow dc to  [facility].   Dyan Rose LMSW [facility]

## 2020-02-06 NOTE — SUBJECTIVE & OBJECTIVE
Past Medical History:   Diagnosis Date    Adenoma of right adrenal gland 11/09/2017    Alcohol abuse     ASCVD (arteriosclerotic cardiovascular disease) 10/2008    [place]    BPH (benign prostatic hyperplasia)     Cardiomyopathy     Chronic anxiety     Cirrhosis of liver     COPD (chronic obstructive pulmonary disease)     Current every day smoker     2-3 ppd    Elevated LFTs 08/2001    GERD with esophagitis 11/09/2017    History of colon polyps     History of epididymitis 2016    Juan Antonio    Hyperlipidemia     Hypertension, essential     Long term (current) use of anticoagulants     Lumbar disc disease with radiculopathy 05/2015    Major depression, recurrent, chronic     PAF (paroxysmal atrial fibrillation)     Peripheral autonomic neuropathy due to DM     Proteinuria     PVD (peripheral vascular disease) 05/29/2009    Venous insufficiency of both lower extremities     Vitamin D deficiency      Past Surgical History:   Procedure Laterality Date    biopsy back  05/20/2019    benign Martinez    biopsy right ankle Right 05/20/2019    Martinez, benign    COLONOSCOPY N/A 6/13/2019    Procedure: COLONOSCOPY;  Surgeon: Delmer Kerr MD;  Location: [facility];  Service: Endoscopy;  Laterality: N/A;    COLONOSCOPY W/ POLYPECTOMY      EPIDURAL STEROID INJECTION INTO LUMBAR SPINE  06/2015    LASHAE Harvey    ESOPHAGOGASTRODUODENOSCOPY N/A 6/13/2019    Procedure: ESOPHAGOGASTRODUODENOSCOPY (EGD);  Surgeon: Delmer Kerr MD;  Location: [facility];  Service: Endoscopy;  Laterality: N/A;    INTESTINAL MALROTATION REPAIR Right 1961    2 weeks old, Martinez    PARTIAL ARTHROPLASTY OF SHOULDER Left 1/28/2020    Procedure: HEMIARTHROPLASTY, SHOULDER;  Surgeon: Chandler Chris MD;  Location: [address];  Service: Orthopedics;  Laterality: Left;    PARTIAL ARTHROPLASTY OF SHOULDER Right 1/30/2020    Procedure: HEMIARTHROPLASTY, SHOULDER- RIGHT- MEKA- SUPINE-  TRIOS;  Surgeon: Chandler  AGRTH Chris MD;  Location: [facility] OR [address] [place] [address];  Service: Orthopedics;  Laterality: Right;    PERCUTANEOUS TRANSLUMINAL ANGIOPLASTY (PTA) OF PERIPHERAL VESSEL Right 12/2014    NIKOLAS Childs     Review of patient's allergies indicates:  No Known Allergies    Scheduled Medications:    amitriptyline  50 mg Oral Q24H    celecoxib  200 mg Oral Daily    dextromethorphan-guaifenesin  mg  1 tablet Oral BID    ergocalciferol  50,000 Units Oral Q7 Days    heparin (porcine)  5,000 Units Subcutaneous Q8H    ipratropium  0.5 mg Nebulization Q4H WAKE    levalbuterol  0.63 mg Nebulization Q8H    levETIRAcetam  1,000 mg Oral BID    methocarbamol  750 mg Oral TID    metoprolol tartrate  25 mg Oral BID    nicotine  1 patch Transdermal Daily    pantoprazole  40 mg Oral Daily    polyethylene glycol  17 g Oral BID    senna-docusate 8.6-50 mg  2 tablet Oral BID       PRN Medications: bisacodyL, Dextrose 10% Bolus, Dextrose 10% Bolus, Dextrose 10% Bolus, glucagon (human recombinant), glucose, glucose, HYDROmorphone, insulin aspart U-100, ondansetron, oxyCODONE, oxyCODONE, oxyCODONE, sodium chloride 0.9%    Family History     Problem Relation (Age of Onset)    Acromegaly Father    Diabetes Mother    Heart attack Father    Hypertension Mother, Father    Kidney cancer Brother        Tobacco Use    Smoking status: Current Every Day Smoker     Packs/day: 9.00     Years: 40.00     Pack years: 360.00     Types: Cigarettes    Smokeless tobacco: Never Used    Tobacco comment: smokes parts all day long continuously   Substance and Sexual Activity    Alcohol use: Not Currently     Frequency: Never     Comment: quit 2 years ago was heavily    Drug use: Never    Sexual activity: Yes     Partners: Female     Review of Systems   Constitutional: Positive for activity change. Negative for fatigue and fever.   HENT: Negative for trouble swallowing and voice change.    Eyes: Negative for photophobia and visual disturbance.    Respiratory: Negative for cough and shortness of breath.    Cardiovascular: Negative for chest pain and palpitations.   Gastrointestinal: Negative for nausea and vomiting.   Genitourinary: Negative for difficulty urinating and flank pain.   Musculoskeletal: Positive for arthralgias, back pain and gait problem.   Skin: Negative for color change and rash.   Neurological: Positive for weakness. Negative for facial asymmetry, speech difficulty and numbness.   Psychiatric/Behavioral: Positive for confusion. Negative for agitation.     Objective:     Vital Signs (Most Recent):  Temp: 97.3 °F (36.3 °C) (02/06/20 0735)  Pulse: 92 (02/06/20 0740)  Resp: (!) 24 (02/06/20 0740)  BP: (!) 149/67 (02/06/20 0735)  SpO2: 98 % (02/06/20 0740)    Vital Signs (24h Range):  Temp:  [96.3 °F (35.7 °C)-98.8 °F (37.1 °C)] 97.3 °F (36.3 °C)  Pulse:  [] 92  Resp:  [18-24] 24  SpO2:  [94 %-98 %] 98 %  BP: ()/(54-71) 149/67     Body mass index is 34.03 kg/m².    Physical Exam   Constitutional: He is oriented to person, place, and time. He appears well-developed and well-nourished.   HENT:   Head: Normocephalic and atraumatic.   Eyes: Right eye exhibits no discharge. Left eye exhibits no discharge.   Neck: Neck supple.   Cardiovascular: Intact distal pulses.   Pulmonary/Chest: Effort normal. No respiratory distress.   Abdominal: Soft. He exhibits no distension.   Musculoskeletal: He exhibits no edema or deformity.        Lumbar back: He exhibits decreased range of motion and tenderness.   B/l shoulder sling in place  BLE 5/5  B/l hand  5/5    Neurological: He is alert and oriented to person, place, and time.   Follows commands    Skin: Skin is warm and dry.   Psychiatric: He has a normal mood and affect. His behavior is normal. Cognition and memory are impaired (mild).     NEUROLOGICAL EXAMINATION:     MENTAL STATUS   Oriented to person, place, and time.       Diagnostic Results:   Labs: Reviewed  ECG: Reviewed  X-Ray:  Reviewed  CT: Reviewed  MRI: Reviewed

## 2020-02-06 NOTE — HPI
Ben Melvin is a 60-year-old male with PMHx of obese, AFib on anticoagulation, peripheral vascular disease, hypertension, COPD, current smoker, liver cirrhosis .  Patient presented to [facility] from OSH on 1/27 s/p raheel seizure while playing video poker.  Injuries identified were bilateral proximal humerus fracture dislocations, bilateral acromial fractures, subdural hemorrhage, L1 and L4 burst fractures. INR was reversed with vitamin K given his cerebral hemorrhage. S/p left shoulder hemiarthroplasty on January 28, 2020  and t right shoulder hemiarthroplasty on January 30, 2020.  Per Ortho, Nonweightbearing bilateral upper extremity times 3 months postop, shoulder sling x6 weeks, may remove for hygiene and physical therapy. Okay for range of motion as tolerated hand wrist elbow now.Okay for active assisted range of motion of shoulder and pendulums at 2 weeks postop. Hospital course complicated by elevated LFTs, A-fib, constipation, anemia, & Vit D deficiency.     Functional History: Patient lives with wife in a single story home with 3 steps to enter.  Prior to admission, (I) with ADLs and mobility. DME: none.

## 2020-02-06 NOTE — TELEPHONE ENCOUNTER
Attempted to reach pt about upcoming ED F/U imaging and appts/  Appt letter in the mail, LVM. ----- Message from Genoveva Gonzalez RN sent at 2/5/2020  4:23 PM CST -----  This is a hospital patient from [facility]. He had a SDH. She said that he will probably still be in IP rehab in 2 weeks. He will be in [place].     -Head CT needs to coordinated with the facility 2wks PO  - XR Lumbar spine upright/supine and 6 wk PO f/u PA appt needs to be scheduled for 6 weeks, but she will not be here    Please advise.  Allyson  ----- Message -----  From: Humera Aragon PA-C  Sent: 2/5/2020   3:53 PM CST  To: Jhonny Henry Staff    Can you schedule an appointment for this patient in 2 weeks with a repeat CT head to assess a SDH and in 6 weeks with an XR Lumbar spine upright/supine to assess L1 and L4 fractures? He will likely still be admitted to rehab for the next 2 weeks.     Thanks,   Humera

## 2020-02-06 NOTE — ASSESSMENT & PLAN NOTE
-ortho consulted  -s/p left shoulder hemiarthroplasty on January 28, 2020  and status post right shoulder hemiarthroplasty on January 30, 2020  - NWB and no ROM of bilateral shoulders

## 2020-02-06 NOTE — HPI
Team: [facility]   Attending MD: Kayy Perla MD  Admit Date: 1/27/2020  KIRBY 2/6/2020  Length of Stay:  LOS: 10 days   Code status: Full Code    Principal Problem: Subdural hematoma     60 yo male with EtOH abuse, tobacco abuse,  PAF  With long term anticoagulation (coumadin) who presents to [facility] for SDH and bilateral shoulder dislocation. He  presented to OSH via EMS for acute onset of  SOB and reported seizure activity. At approx 5 pm he was playing video poker, felt light headed, and was assisted  Ground by family. He had what the family felt was a seizure but appeared awake during it. He was disoriented and confused after. He then reported had SOB, chest pain, and BUE pain. Patient also reports new onset severe lower back pain. He returned to baseline mental status shortly after per family. CTH and chest at OSH revealed SDH and bilateral upper ext dislocations. He received Kcentra and Vit K at OSH for reversal.  He is being admitted to [facility] for a higher level of care.        Interval hx: no BM; confused overnight but eventually slept; appears improved; denies N/V; improving appetite; significant other at bedside      ROS:  Constitutional: no fever or chills  Respiratory: no cough or shortness of breath  Cardiovascular: no chest pain or palpitations  Gastrointestinal: no nausea or vomiting, no abdominal pain; last BM: 2/3  Genitourinary: no hematuria or dysuria  Integument/Breast: no rash or pruritis  Neurological: no seizures or tremors  Behavioral/Psych: no auditory or visual hallucinations      Physical Exam  Temp:  [96.3 °F (35.7 °C)-98.8 °F (37.1 °C)] 96.8 °F (36 °C)  Pulse:  [] 92  Resp:  [16-19] 18  SpO2:  [94 %-98 %] 98 %  BP: ()/(50-71) 137/71      Intake/Output Summary (Last 24 hours) at 2/6/2020 0736  Last data filed at 2/6/2020 0000  Gross per 24 hour   Intake 240 ml   Output 800 ml   Net -560 ml       Wt Readings from Last 1 Encounters:   01/28/20 0901 110.7 kg (244 lb)   01/28/20  "0052 111.1 kg (244 lb 14.9 oz)   01/27/20 2106 120.2 kg (265 lb)        Estimated body mass index is 34.03 kg/m² as calculated from the following:    Height as of this encounter: 5' 11" (1.803 m).    Weight as of this encounter: 110.7 kg (244 lb).    General: well developed, obese, no distress  Lungs:  Rhonchi diffusely bilaterally and normal respiratory effort.  Cardiovascular: Heart: regular rate and rhythm, S1, S2 normal, no murmur, click, rub or gallop. Chest Wall: no tenderness. Extremities: no cyanosis, trace bilateral ankle edema, no clubbing.   Abdomen/Rectal: Abdomen: obese, non-tender +distended; bowel sounds normal  Skin: Skin color, texture, turgor normal. No rashes or lesions.  Neurologic: Normal strength and tone BLE  Musculoskeletal: bilateral UE in shoulder abduction braces.  TLSO in place.  No focal numbness or weakness but UE not tested.  Psych/Behavioral:  Alert and oriented to person/place/time; appropriate affect.      Recent Results (from the past 24 hour(s))   POCT glucose    Collection Time: 02/05/20  7:58 AM   Result Value Ref Range    POCT Glucose 102 70 - 110 mg/dL   POCT glucose    Collection Time: 02/05/20 11:45 AM   Result Value Ref Range    POCT Glucose 86 70 - 110 mg/dL   POCT glucose    Collection Time: 02/05/20  5:25 PM   Result Value Ref Range    POCT Glucose 124 (H) 70 - 110 mg/dL   POCT glucose    Collection Time: 02/06/20 12:15 AM   Result Value Ref Range    POCT Glucose 104 70 - 110 mg/dL   Comprehensive metabolic panel    Collection Time: 02/06/20  4:16 AM   Result Value Ref Range    Sodium 143 136 - 145 mmol/L    Potassium 5.1 3.5 - 5.1 mmol/L    Chloride 107 95 - 110 mmol/L    CO2 27 23 - 29 mmol/L    Glucose 87 70 - 110 mg/dL    BUN, Bld 26 (H) 6 - 20 mg/dL    Creatinine 1.0 0.5 - 1.4 mg/dL    Calcium 8.6 (L) 8.7 - 10.5 mg/dL    Total Protein 5.8 (L) 6.0 - 8.4 g/dL    Albumin 2.2 (L) 3.5 - 5.2 g/dL    Total Bilirubin 0.9 0.1 - 1.0 mg/dL    Alkaline Phosphatase 167 (H) 55 - " 135 U/L    AST 76 (H) 10 - 40 U/L    ALT 69 (H) 10 - 44 U/L    Anion Gap 9 8 - 16 mmol/L    eGFR if African American >60.0 >60 mL/min/1.73 m^2    eGFR if non African American >60.0 >60 mL/min/1.73 m^2   CBC auto differential    Collection Time: 02/06/20  4:16 AM   Result Value Ref Range    WBC 10.61 3.90 - 12.70 K/uL    RBC 3.10 (L) 4.60 - 6.20 M/uL    Hemoglobin 8.7 (L) 14.0 - 18.0 g/dL    Hematocrit 26.8 (L) 40.0 - 54.0 %    Mean Corpuscular Volume 87 82 - 98 fL    Mean Corpuscular Hemoglobin 28.1 27.0 - 31.0 pg    Mean Corpuscular Hemoglobin Conc 32.5 32.0 - 36.0 g/dL    RDW 16.9 (H) 11.5 - 14.5 %    Platelets 291 150 - 350 K/uL    MPV 9.5 9.2 - 12.9 fL    Immature Granulocytes 5.0 (H) 0.0 - 0.5 %    Gran # (ANC) 6.1 1.8 - 7.7 K/uL    Immature Grans (Abs) 0.53 (H) 0.00 - 0.04 K/uL    Lymph # 2.7 1.0 - 4.8 K/uL    Mono # 1.0 0.3 - 1.0 K/uL    Eos # 0.3 0.0 - 0.5 K/uL    Baso # 0.05 0.00 - 0.20 K/uL    nRBC 0 0 /100 WBC    Gran% 57.6 38.0 - 73.0 %    Lymph% 25.5 18.0 - 48.0 %    Mono% 9.0 4.0 - 15.0 %    Eosinophil% 2.4 0.0 - 8.0 %    Basophil% 0.5 0.0 - 1.9 %    Differential Method Automated    Magnesium    Collection Time: 02/06/20  4:16 AM   Result Value Ref Range    Magnesium 1.8 1.6 - 2.6 mg/dL   Phosphorus    Collection Time: 02/06/20  4:16 AM   Result Value Ref Range    Phosphorus 3.2 2.7 - 4.5 mg/dL       Recent Labs   Lab 02/03/20 0347 02/05/20  0501 02/06/20  0416   WBC 9.36 11.38 10.61   HGB 9.6* 8.7* 8.7*   HCT 31.0* 27.8* 26.8*    264 291       Recent Labs   Lab 02/04/20 0455 02/05/20  0501 02/06/20  0416    140 143   K 4.5 5.0 5.1    103 107   CO2 26 26 27   BUN 28* 30* 26*   CREATININE 1.1 1.1 1.0    95 87   CALCIUM 8.9 8.3* 8.6*   MG 2.0 1.9 1.8   PHOS 3.6 3.3 3.2       Recent Labs   Lab 02/04/20 0455 02/05/20  0501 02/06/20  0416   ALKPHOS 142* 151* 167*   ALT 52* 59* 69*   AST 58* 68* 76*   ALBUMIN 2.3* 2.2* 2.2*   PROT 6.7 6.0 5.8*   BILITOT 1.3* 1.1* 0.9       Recent  Labs   Lab 02/04/20  1917 02/04/20  2327 02/05/20  0758 02/05/20  1145 02/05/20  1725 02/06/20  0015   POCTGLUCOSE 117* 119* 102 86 124* 104       Hemoglobin A1C   Date Value Ref Range Status   01/27/2020 5.9 (H) 4.0 - 5.6 % Final     Comment:     ADA Screening Guidelines:  5.7-6.4%  Consistent with prediabetes  >or=6.5%  Consistent with diabetes  High levels of fetal hemoglobin interfere with the HbA1C  assay. Heterozygous hemoglobin variants (HbS, HgC, etc)do  not significantly interfere with this assay.   However, presence of multiple variants may affect accuracy.     12/30/2019 6.6 % Final   10/16/2019 7.0 % Final   06/03/2019 7.1 % Final       Scheduled Meds:   amitriptyline  50 mg Oral Q24H    celecoxib  200 mg Oral Daily    dextromethorphan-guaifenesin  mg  1 tablet Oral BID    ergocalciferol  50,000 Units Oral Q7 Days    heparin (porcine)  5,000 Units Subcutaneous Q8H    ipratropium  0.5 mg Nebulization Q4H WAKE    levalbuterol  0.63 mg Nebulization Q8H    levETIRAcetam  1,000 mg Oral BID    methocarbamol  750 mg Oral TID    metoprolol tartrate  25 mg Oral BID    nicotine  1 patch Transdermal Daily    pantoprazole  40 mg Oral Daily    polyethylene glycol  17 g Oral BID    senna-docusate 8.6-50 mg  2 tablet Oral BID       Continuous Infusions:    As Needed: bisacodyL, Dextrose 10% Bolus, Dextrose 10% Bolus, Dextrose 10% Bolus, glucagon (human recombinant), glucose, glucose, HYDROmorphone, insulin aspart U-100, ondansetron, oxyCODONE, oxyCODONE, oxyCODONE, sodium chloride 0.9%    Active Hospital Problems    Diagnosis  POA    *Subdural hematoma [S06.5X9A]  Yes    Acute blood loss anemia [D62]  Yes    Constipation [K59.00]  Yes    Closed displaced fracture of acromial process of right scapula [S42.121A]  Yes    Closed displaced fracture of acromial process of left scapula [S42.122A]  Yes    Dislocation of left shoulder joint [S43.005A]  Yes    Dislocation of right shoulder joint  [S43.004A]  Yes     fracture dislocation of bilateral shoulders [S42.91XA]  Yes    Fracture dislocation of joint of left shoulder girdle [S42.92XA]  Yes    Closed fracture of both humeri [S42.301A, S42.302A]  Yes    Acute bilateral low back pain [M54.5]  Yes    Shortness of breath [R06.02]  Yes    Diabetes mellitus due to underlying condition with diabetic autonomic neuropathy, without long-term current use of insulin [E08.43]  Yes    Vitamin D deficiency [E55.9]  Yes    Alcohol abuse [F10.10]  Yes     Chronic    COPD (chronic obstructive pulmonary disease) [J44.9]  Yes     Chronic    Current every day smoker [F17.200]  Yes     2-3 ppd      Hyperlipidemia [E78.5]  Yes    Long term (current) use of anticoagulants [Z79.01]  Not Applicable    PAF (paroxysmal atrial fibrillation) [I48.0]  Yes    Hypertension, essential [I10]  Yes    GERD with esophagitis [K21.0]  Yes      Resolved Hospital Problems   No resolved problems to display.     59 M with hx of paroxysmal afib on coumadin presents for eval of bilateral shoulder dislocations, left convexity SDH, L1 compression fracture, L4 burst fracture. C spine cleared by Ortho. C-collar for comfort.      Assessment and Plan    Subdural hematoma  · q 4 hr neuro checks  · Repeat CT head stable on 2/2  · Hold coumadin; goal INR < 1.4 for 2 weeks with repeat head CT in 2 weeks ( after 2/10)  and can resume warfarin if stable  · continue keppra - timeframe per Neurosurgery    Bilateral acromial fractures with bilateral shoulder dislocation  S/p bilateral shoulder hemiarthroplasty: right on 1/28/2020 and left on 1/30/2020  · Weight bearing status: NWB and NO ROM of bilateral  shoulder  · Pain control initiated with oxycodone, hydromorphone prn, robaxin and celebrex; will need to monitor for somnolence and taper as needed  · Continue bilateral abduction braces to BUE at all times     L1 burst fracture  L4 burst fracture with moderate spinal canal stenosis  T10, T11  compression fractures, unknown age  · F/u with neurosurgery in 6 weeks with X-rays  · MRI L spine complete. L1 compression fracture with minimal retropulsion into central canal, mild canal stenosis. L4 burst fracture with retropulsion into central canal resulting in moderate canal stenosis. Likely will need RadTx to L spine given cancer hx.   · Stopped gabapentin after nocturnal confusion  2/6 - TLSO when OOB/upright  -sitting and upright L-spine x-rays in TLSO to evaluate spinal stability - done, but not read.   - Please obtain sitting and upright L-spine x-rays in TLSO when able to evaluate spinal stability. If unable to obtain and patient remains neurologically intact, will f/u outpatient in 6 weeks with XR at that time. Continue TLSO brace when OOB/upright if patient is discharged without an XR.    DM2  · Controlled since admission  · Chronic home meds with metformin, basaglar and tradjenta  · Continue monitoring with accuchecks, diabetic diet, SSNI prn    COPD  Smoker  · Continue levoalbuterol nebs (tachy)  · Continue brovana if obtained from home;   · Limit oxygen to avoid hypercapnea  · Continue nicotine patch and encourage cessation with education    Elevated LFT's  Hx of ETOH abuse  · stagnant  · Significant other states patient no longer drinks and 6 pack of beer at home will last for days  · Chronic statin at home; hold currently\  · Discontinue scheduled tylenol  · Denies cirrhosis (listed in hx)  · Evaluated with acute hep panel (neg); direct bili (normal) and trending of labs; may need RUQ U/S if persists  · Check BNP to rule out hepatic congestion which was normal    PAF  · Sinus tach on ECG  · continue metoprolol  · Hold warfarin until CT head in 2 weeks and resume if imaging stable    Constipation  · Continue miralax and senokot-s  · Dulcolax po on 2/3 with small results  · Dulcolax suppository prn and enema prn    Anemia  · Likely blood loss as usually hemo-concentrated  · Transfuse prn Hgb < 7;  continue to monitor  · ASA currently held    GERD  · Continue pantoprazole    Essential HTN  · On metoprolol and ramipril at home   · BP has been intermittently low since pain meds  · continue metoprolol and resume ramipril as BP allows    Vit D deficiency  · Level of 7 on 2/4  · Therapy with ergocalciferol 50k weekly    Diet: Diet diabetic [facility]; 2000 Calorie   DVT PPx:   VTE Risk Mitigation (From admission, onward)         Ordered     heparin (porcine) injection 5,000 Units  Every 8 hours      02/01/20 0915     Reason for No Pharmacological VTE Prophylaxis  Once     Question:  Reasons:  Answer:  Active Bleeding    01/27/20 2243     IP VTE HIGH RISK PATIENT  Once      01/27/20 2243     Place sequential compression device  Until discontinued      01/27/20 2243              GI PPx: pantoprazole  L/D/A: PIV  Wounds: bilateral shoulder with Aquacel dressing    Goals of Care: full code; curative    Discharge plan:  inpatient rehab - [facility]; denied by Amado Perla MD  [facility] Medicine  [facility]  [id number]

## 2020-02-06 NOTE — PLAN OF CARE
.[facility] Facility Transfer Orders        Admit to: [facility]    Diagnoses:   Active Hospital Problems    Diagnosis  POA    *Subdural hematoma [S06.5X9A]  Yes    Acute blood loss anemia [D62]  Yes    Constipation [K59.00]  Yes    Closed displaced fracture of acromial process of right scapula [S42.121A]  Yes    Closed displaced fracture of acromial process of left scapula [S42.122A]  Yes    Dislocation of left shoulder joint [S43.005A]  Yes    Dislocation of right shoulder joint [S43.004A]  Yes     fracture dislocation of bilateral shoulders [S42.91XA]  Yes    Fracture dislocation of joint of left shoulder girdle [S42.92XA]  Yes    Closed fracture of both humeri [S42.301A, S42.302A]  Yes    Acute bilateral low back pain [M54.5]  Yes    Shortness of breath [R06.02]  Yes    Diabetes mellitus due to underlying condition with diabetic autonomic neuropathy, without long-term current use of insulin [E08.43]  Yes    Vitamin D deficiency [E55.9]  Yes    Alcohol abuse [F10.10]  Yes     Chronic    COPD (chronic obstructive pulmonary disease) [J44.9]  Yes     Chronic    Current every day smoker [F17.200]  Yes     2-3 ppd      Hyperlipidemia [E78.5]  Yes    Long term (current) use of anticoagulants [Z79.01]  Not Applicable    PAF (paroxysmal atrial fibrillation) [I48.0]  Yes    Hypertension, essential [I10]  Yes    GERD with esophagitis [K21.0]  Yes      Resolved Hospital Problems   No resolved problems to display.     Allergies: Review of patient's allergies indicates:  No Known Allergies    Code Status: full    Vitals: Routine       Diet: diabetic diet: 2000 calorie    Activity: Up in a chair each morning as tolerated, Ambulate with assistance to bathroom and Activity as tolerated    Nursing Precautions: Aspiration , Fall, Seizure and Pressure ulcer prevention    Bed/Surface: Low Air Loss    Consults: PT to evaluate and treat- 5 times a week and OT to evaluate and treat- 5 times  a week    Oxygen: starte 2 liters if o2 sat less alexx 88%.         Pending Diagnostic Studies:     None        Miscellaneous Care:   Routine Skin for Bedridden Patients:  Apply moisture barrier cream to all     Diabetes care:     Sliding scale, medium dose with meals  Aspart insulin    If Blood sugar 151-200  2 units     201-250 4 units    251-300 6 units    301-350 8 unitis  greater than  351  10  units and call M.D.         Medications: Discontinue all previous medication orders, if any. See new list below.  Current Discharge Medication List      START taking these medications    Details   bisacodyL (DULCOLAX) 10 mg Supp Place 1 suppository (10 mg total) rectally daily as needed.  Refills: 0      celecoxib (CELEBREX) 200 MG capsule Take 1 capsule (200 mg total) by mouth once daily.  Qty: 30 capsule, Refills: 0      dextromethorphan-guaifenesin  mg (MUCINEX DM)  mg per 12 hr tablet Take 1 tablet by mouth 2 (two) times daily. for 10 days  Qty: 20 tablet, Refills: 0      ergocalciferol (ERGOCALCIFEROL) 50,000 unit Cap Take 1 capsule (50,000 Units total) by mouth every 7 days.  Qty: 7 capsule, Refills: 0      heparin sodium,porcine (HEPARIN, PORCINE,) 5,000 unit/mL injection Inject 1 mL (5,000 Units total) into the skin every 8 (eight) hours. for 7 days  Qty: 21 mL, Refills: 0      insulin aspart U-100 (NOVOLOG) 100 unit/mL (3 mL) InPn pen Inject 1-10 Units into the skin before meals and at bedtime as needed (Hyperglycemia).  Qty: 3 mL, Refills: 0      ipratropium (ATROVENT) 0.02 % nebulizer solution Take 2.5 mLs (500 mcg total) by nebulization every 4 (four) hours as needed for Wheezing. Rescue  Qty: 1 Box, Refills: 0      levalbuterol (XOPENEX) 0.63 mg/3 mL nebulizer solution Take 3 mLs (0.63 mg total) by nebulization every 8 (eight) hours. Rescue  Qty: 270 mL, Refills: 11      levETIRAcetam (KEPPRA) 1000 MG tablet Take 1 tablet (1,000 mg total) by mouth 2 (two) times daily.  Qty: 60 tablet, Refills: 11       methocarbamol (ROBAXIN) 750 MG Tab Take 1 tablet (750 mg total) by mouth 3 (three) times daily. for 10 days  Qty: 30 tablet, Refills: 0      nicotine (NICODERM CQ) 21 mg/24 hr Place 1 patch onto the skin once daily.  Qty: 7 patch, Refills: 0      oxyCODONE (ROXICODONE) 5 MG immediate release tablet Take 1 tablet (5 mg total) by mouth every 4 (four) hours as needed (pain 1-10).  Qty: 30 tablet, Refills: 0    Comments: Quantity prescribed more than 7 day supply? No      polyethylene glycol (GLYCOLAX) 17 gram PwPk Take 17 g by mouth 2 (two) times daily.  Qty: 10 each, Refills: 0      senna-docusate 8.6-50 mg (PERICOLACE) 8.6-50 mg per tablet Take 2 tablets by mouth 2 (two) times daily.  Qty: 30 tablet, Refills: 0         CONTINUE these medications which have CHANGED    Details   amitriptyline (ELAVIL) 50 MG tablet Take 1 tablet (50 mg total) by mouth every evening.  Qty: 30 tablet, Refills: 11         CONTINUE these medications which have NOT CHANGED    Details   atorvastatin (LIPITOR) 80 MG tablet Take 80 mg by mouth every evening.  Refills: 11      BASAGLAR KWIKPEN U-100 INSULIN glargine 100 units/mL (3mL) SubQ pen INJECT 25 UNITS DAILY  Qty: 15 Syringe, Refills: 5    Comments: NEED REFILLS PLEASE      metFORMIN (GLUCOPHAGE) 1000 MG tablet Take 1 tablet (1,000 mg total) by mouth daily with breakfast.  Qty: 90 tablet, Refills: 3    Associated Diagnoses: Diabetes mellitus due to underlying condition with diabetic autonomic neuropathy, without long-term current use of insulin; Diabetes 1.5, managed as type 2      metoprolol tartrate (LOPRESSOR) 25 MG tablet Take 25 mg by mouth 2 (two) times daily.  Refills: 11      pantoprazole (PROTONIX) 40 MG tablet Take 1 tablet (40 mg total) by mouth once daily.  Qty: 30 tablet, Refills: 11    Associated Diagnoses: GERD with esophagitis      ramipril (ALTACE) 5 MG capsule TAKE 1 CAPSULE (5 MG) BY ORAL ROUTE ONCE DAILY  Refills: 5      TRADJENTA 5 mg Tab tablet TAKE 1 TABLET BY  MOUTH EVERY DAY  Qty: 30 tablet, Refills: 5    Comments: DX Code Needed  .  Associated Diagnoses: Type 2 diabetes mellitus with other specified complication, unspecified whether long term insulin use; Diabetes mellitus due to underlying condition with diabetic autonomic neuropathy, without long-term current use of insulin      aspirin 81 MG Chew Take 162 mg by mouth once daily.      cyanocobalamin (VITAMIN B-12) 1000 MCG tablet Take 100 mcg by mouth once daily.      multivitamin with minerals tablet Take 1 tablet by mouth once daily.         STOP taking these medications       warfarin (COUMADIN) 5 MG tablet Comments:   Reason for Stopping:         albuterol-ipratropium (DUO-NEB) 2.5 mg-0.5 mg/3 mL nebulizer solution Comments:   Reason for Stopping:         arformoterol (BROVANA) 15 mcg/2 mL Nebu Comments:   Reason for Stopping:             Follow up: Neurosurgery:   Please obtain sitting and upright L-spine x-rays in TLSO when able to evaluate spinal stability. If unable to obtain and patient remains neurologically intact, will f/u outpatient in 6 weeks with XR at that time. Continue TLSO brace when OOB/upright if patient is discharged without an XR.

## 2020-02-06 NOTE — CONSULTS
[facility]  Physical Medicine & Rehab  Consult Note    Patient Name: Ben Melvin  MRN: [id number]  Admission Date: 1/27/2020  Hospital Length of Stay: 10 days  Attending Physician: Kayy Perla MD     Inpatient consult to Physical Medicine & Rehabilitation  Consult performed by: Kristina Wilkinson NP  Consult requested by:  Kayy Perla MD    Reason for Consult:  assess rehabilitation needs  Consults  Subjective:     Principal Problem: Subdural hematoma    HPI: Ben Melvin is a 60-year-old male with PMHx of obese, AFib on anticoagulation, peripheral vascular disease, hypertension, COPD, current smoker, liver cirrhosis .  Patient presented to [facility] from OSH on 1/27 s/p raheel seizure while playing video poker.  Injuries identified were bilateral proximal humerus fracture dislocations, bilateral acromial fractures, subdural hemorrhage, L1 and L4 burst fractures. INR was reversed with vitamin K given his cerebral hemorrhage. S/p left shoulder hemiarthroplasty on 01/28/20 and s/p right shoulder hemiarthroplasty on 01/30/20. Per Ortho, Nonweightbearing bilateral upper extremity times 3 months postop, shoulder sling x6 weeks, may remove for hygiene and physical therapy. Okay for range of motion as tolerated hand wrist elbow now.Okay for active assisted range of motion of shoulder and pendulums at 2 weeks postop. Hospital course complicated by elevated LFTs, A-fib, constipation, anemia, & Vit D deficiency.     Functional History: Patient lives with wife in a single story home with 3 steps to enter.  Prior to admission, (I) with ADLs and mobility. DME: none.    Hospital Course: 02/04/2020: Bed mobility MaxA x 2 ppl.  No transfers 2/2 nausea and lightheadedness.  02/05/2020: Bed mobility Min-MaxA .  Sit to stand MaxA.  UBD TA.  2/6/20: SLP diagnosis of mild Cognitive-Linguistic Impairment.   Passed bedside swallow evaluation.  SLP recommending regular diet and thin liquids.    Past Medical  History:   Diagnosis Date    Adenoma of right adrenal gland 11/09/2017    Alcohol abuse     ASCVD (arteriosclerotic cardiovascular disease) 10/2008    [place]    BPH (benign prostatic hyperplasia)     Cardiomyopathy     Chronic anxiety     Cirrhosis of liver     COPD (chronic obstructive pulmonary disease)     Current every day smoker     2-3 ppd    Elevated LFTs 08/2001    GERD with esophagitis 11/09/2017    History of colon polyps     History of epididymitis 2016    Juan Antonio    Hyperlipidemia     Hypertension, essential     Long term (current) use of anticoagulants     Lumbar disc disease with radiculopathy 05/2015    Major depression, recurrent, chronic     PAF (paroxysmal atrial fibrillation)     Peripheral autonomic neuropathy due to DM     Proteinuria     PVD (peripheral vascular disease) 05/29/2009    Venous insufficiency of both lower extremities     Vitamin D deficiency      Past Surgical History:   Procedure Laterality Date    biopsy back  05/20/2019    benign Martinez    biopsy right ankle Right 05/20/2019    Martinez, benign    COLONOSCOPY N/A 6/13/2019    Procedure: COLONOSCOPY;  Surgeon: Delmer Kerr MD;  Location: [facility];  Service: Endoscopy;  Laterality: N/A;    COLONOSCOPY W/ POLYPECTOMY      EPIDURAL STEROID INJECTION INTO LUMBAR SPINE  06/2015    LASHAE Harvey    ESOPHAGOGASTRODUODENOSCOPY N/A 6/13/2019    Procedure: ESOPHAGOGASTRODUODENOSCOPY (EGD);  Surgeon: Delmer Kerr MD;  Location: [facility];  Service: Endoscopy;  Laterality: N/A;    INTESTINAL MALROTATION REPAIR Right 1961    2 weeks old, Martinez    PARTIAL ARTHROPLASTY OF SHOULDER Left 1/28/2020    Procedure: HEMIARTHROPLASTY, SHOULDER;  Surgeon: Chandler Chris MD;  Location: [address];  Service: Orthopedics;  Laterality: Left;    PARTIAL ARTHROPLASTY OF SHOULDER Right 1/30/2020    Procedure: HEMIARTHROPLASTY, SHOULDER- RIGHT- MEKA- SUPINE-  TRIOS;  Surgeon: Chandler Chris,  MD;  Location: [facility] OR [address] [place] [address];  Service: Orthopedics;  Laterality: Right;    PERCUTANEOUS TRANSLUMINAL ANGIOPLASTY (PTA) OF PERIPHERAL VESSEL Right 12/2014    NIKOLAS Childs     Review of patient's allergies indicates:  No Known Allergies    Scheduled Medications:    amitriptyline  50 mg Oral Q24H    celecoxib  200 mg Oral Daily    dextromethorphan-guaifenesin  mg  1 tablet Oral BID    ergocalciferol  50,000 Units Oral Q7 Days    heparin (porcine)  5,000 Units Subcutaneous Q8H    ipratropium  0.5 mg Nebulization Q4H WAKE    levalbuterol  0.63 mg Nebulization Q8H    levETIRAcetam  1,000 mg Oral BID    methocarbamol  750 mg Oral TID    metoprolol tartrate  25 mg Oral BID    nicotine  1 patch Transdermal Daily    pantoprazole  40 mg Oral Daily    polyethylene glycol  17 g Oral BID    senna-docusate 8.6-50 mg  2 tablet Oral BID       PRN Medications: bisacodyL, Dextrose 10% Bolus, Dextrose 10% Bolus, Dextrose 10% Bolus, glucagon (human recombinant), glucose, glucose, HYDROmorphone, insulin aspart U-100, ondansetron, oxyCODONE, oxyCODONE, oxyCODONE, sodium chloride 0.9%    Family History     Problem Relation (Age of Onset)    Acromegaly Father    Diabetes Mother    Heart attack Father    Hypertension Mother, Father    Kidney cancer Brother        Tobacco Use    Smoking status: Current Every Day Smoker     Packs/day: 9.00     Years: 40.00     Pack years: 360.00     Types: Cigarettes    Smokeless tobacco: Never Used    Tobacco comment: smokes parts all day long continuously   Substance and Sexual Activity    Alcohol use: Not Currently     Frequency: Never     Comment: quit 2 years ago was heavily    Drug use: Never    Sexual activity: Yes     Partners: Female     Review of Systems   Constitutional: Positive for activity change. Negative for fatigue and fever.   HENT: Negative for trouble swallowing and voice change.    Eyes: Negative for photophobia and visual disturbance.   Respiratory: Negative  for cough and shortness of breath.    Cardiovascular: Negative for chest pain and palpitations.   Gastrointestinal: Negative for nausea and vomiting.   Genitourinary: Negative for difficulty urinating and flank pain.   Musculoskeletal: Positive for arthralgias, back pain and gait problem.   Skin: Negative for color change and rash.   Neurological: Positive for weakness. Negative for facial asymmetry, speech difficulty and numbness.   Psychiatric/Behavioral: Positive for confusion. Negative for agitation.     Objective:     Vital Signs (Most Recent):  Temp: 97.3 °F (36.3 °C) (02/06/20 0735)  Pulse: 92 (02/06/20 0740)  Resp: (!) 24 (02/06/20 0740)  BP: (!) 149/67 (02/06/20 0735)  SpO2: 98 % (02/06/20 0740)    Vital Signs (24h Range):  Temp:  [96.3 °F (35.7 °C)-98.8 °F (37.1 °C)] 97.3 °F (36.3 °C)  Pulse:  [] 92  Resp:  [18-24] 24  SpO2:  [94 %-98 %] 98 %  BP: ()/(54-71) 149/67     Body mass index is 34.03 kg/m².    Physical Exam   Constitutional: He is oriented to person, place, and time. He appears well-developed and well-nourished.   HENT:   Head: Normocephalic and atraumatic.   Eyes: Right eye exhibits no discharge. Left eye exhibits no discharge.   Neck: Neck supple.   Cardiovascular: Intact distal pulses.   Pulmonary/Chest: Effort normal. No respiratory distress.   Abdominal: Soft. He exhibits no distension.   Musculoskeletal: He exhibits no edema or deformity.        Lumbar back: He exhibits decreased range of motion and tenderness.   B/l shoulder sling in place  BLE 5/5  B/l hand  5/5    Neurological: He is alert and oriented to person, place, and time.   Follows commands    Skin: Skin is warm and dry.   Psychiatric: He has a normal mood and affect. His behavior is normal. Cognition and memory are impaired (mild).     Diagnostic Results:   Labs: Reviewed  ECG: Reviewed  X-Ray: Reviewed  CT: Reviewed  MRI: Reviewed    Assessment/Plan:     * Subdural hematoma  See hospital course for functional,  cognitive/speech/language, and nutrition/swallow status.      Recommendations  -  Monitor sleep disturbances and establish consistent sleep-wake cycle  -  Environmental modifications to limit agitation/confusion   -  Reorient patient to person, place, time, and situation on each encounter  -  Avoid restraints  -  May benefit from 24/7 supervision  -  Avoid/limit medications that can worsen delirium (benzodiazepines, antihistamines, anticholinergics, hypnotics, opiates)  -  Encourage mobility, OOB in chair, and early ambulation as appropriate  -  PT/OT evaluate and treat  -  SLP speech and cognitive evaluate and treat  -  Monitor for bowel and bladder dysfunction  -  Monitor for and prevent skin breakdown and pressure ulcers  · Early mobility, repositioning/weight shifting every 20-30 minutes when sitting, turn patient every 2 hours, proper mattress/overlay and chair cushioning, pressure relief/heel protector boots  -  DVT prophylaxis (if appropriate)       fracture dislocation of bilateral shoulders  -ortho consulted  -s/p left shoulder hemiarthroplasty on January 28, 2020  and status post right shoulder hemiarthroplasty on January 30, 2020  - NWB and no ROM of bilateral shoulders    Acute bilateral low back pain  -LSO brace to be continued per Neurosurgery for L1 compression fracture, L4 burst fracture  - L-spine x-rays in TLSO when able per nsgy  -MRI L spine complete. Likely will need RadTx to L spine given cancer hx per NSGY    PAC recommendation: pending.     Thank you for your consult.     Kristina Wilkinson NP  Department of Physical Medicine & Rehab  [facility]

## 2020-02-06 NOTE — CONSULTS
Inpatient consult to Physical Medicine Rehab  Consult performed by: Kristina Wilkinson NP  Consult ordered by: Emily Mcdaniel MD  Reason for consult: assess rehab needs        Reviewed patient history and current admission.  Rehab team following.  Full consult to follow.    IHSAN Carballo, FNP-C  Physical Medicine & Rehabilitation   02/06/2020

## 2020-02-06 NOTE — DISCHARGE SUMMARY
[facility] Hospital Medicine  Discharge Summary      Patient Name: Ben Melvin  MRN: [id number]  Admission Date: 1/27/2020  Hospital Length of Stay: 11 days  Discharge Date and Time: No discharge date for patient encounter.  Attending Physician: Kayy Perla MD   Discharging Provider: Kayy Perla MD  Primary Care Provider: Cristal Ohara NP  [facility] Medicine Team: [facility] MED N Kayy Perla MD    HPI:   Team: [facility] MED N   Attending MD: Kayy Perla MD  Admit Date: 1/27/2020  KIRBY 2/7/2020  Length of Stay:  LOS: 10 days   Code status: Full Code    Principal Problem: Subdural hematoma     60 yo male with EtOH abuse, tobacco abuse,  PAF  With long term anticoagulation (coumadin) who presents to [facility] for SDH and bilateral shoulder dislocation. He  presented to OSH via EMS for acute onset of  SOB and reported seizure activity. At approx 5 pm he was playing video poker, felt light headed, and was assisted  Ground by family. He had what the family felt was a seizure but appeared awake during it. He was disoriented and confused after. He then reported had SOB, chest pain, and BUE pain. Patient also reports new onset severe lower back pain. He returned to baseline mental status shortly after per family. CTH and chest at OSH revealed SDH and bilateral upper ext dislocations. He received Kcentra and Vit K at OSH for reversal.  He is being admitted to [facility] for a higher level of care.        Interval hx: no BM; confused overnight but eventually slept; appears improved; denies N/V; improving appetite; significant other at bedside    2/7 - discharge to [facility].   F/u w neurosurgery w VT head  2 weeks.       ROS:  Constitutional: no fever or chills  Respiratory: no cough or shortness of breath  Cardiovascular: no chest pain or palpitations  Gastrointestinal: no nausea or vomiting, no abdominal pain; last BM: 2/3  Genitourinary: no hematuria or dysuria  Integument/Breast: no rash or  "pruritis  Neurological: no seizures or tremors  Behavioral/Psych: no auditory or visual hallucinations      Physical Exam  Temp:  [96.3 °F (35.7 °C)-98.8 °F (37.1 °C)] 96.8 °F (36 °C)  Pulse:  [] 92  Resp:  [16-19] 18  SpO2:  [94 %-98 %] 98 %  BP: ()/(50-71) 137/71      Intake/Output Summary (Last 24 hours) at 2/6/2020 0736  Last data filed at 2/6/2020 0000  Gross per 24 hour   Intake 240 ml   Output 800 ml   Net -560 ml       Wt Readings from Last 1 Encounters:   01/28/20 0901 110.7 kg (244 lb)   01/28/20 0052 111.1 kg (244 lb 14.9 oz)   01/27/20 2106 120.2 kg (265 lb)        Estimated body mass index is 34.03 kg/m² as calculated from the following:    Height as of this encounter: 5' 11" (1.803 m).    Weight as of this encounter: 110.7 kg (244 lb).    General: well developed, obese, no distress  Lungs:  Rhonchi diffusely bilaterally and normal respiratory effort.  Cardiovascular: Heart: regular rate and rhythm, S1, S2 normal, no murmur, click, rub or gallop. Chest Wall: no tenderness. Extremities: no cyanosis, trace bilateral ankle edema, no clubbing.   Abdomen/Rectal: Abdomen: obese, non-tender +distended; bowel sounds normal  Skin: Skin color, texture, turgor normal. No rashes or lesions.  Neurologic: Normal strength and tone BLE  Musculoskeletal: bilateral UE in shoulder abduction braces.  TLSO in place.  No focal numbness or weakness but UE not tested.  Psych/Behavioral:  Alert and oriented to person/place/time; appropriate affect.      Recent Results (from the past 24 hour(s))   POCT glucose    Collection Time: 02/05/20  7:58 AM   Result Value Ref Range    POCT Glucose 102 70 - 110 mg/dL   POCT glucose    Collection Time: 02/05/20 11:45 AM   Result Value Ref Range    POCT Glucose 86 70 - 110 mg/dL   POCT glucose    Collection Time: 02/05/20  5:25 PM   Result Value Ref Range    POCT Glucose 124 (H) 70 - 110 mg/dL   POCT glucose    Collection Time: 02/06/20 12:15 AM   Result Value Ref Range    POCT " Glucose 104 70 - 110 mg/dL   Comprehensive metabolic panel    Collection Time: 02/06/20  4:16 AM   Result Value Ref Range    Sodium 143 136 - 145 mmol/L    Potassium 5.1 3.5 - 5.1 mmol/L    Chloride 107 95 - 110 mmol/L    CO2 27 23 - 29 mmol/L    Glucose 87 70 - 110 mg/dL    BUN, Bld 26 (H) 6 - 20 mg/dL    Creatinine 1.0 0.5 - 1.4 mg/dL    Calcium 8.6 (L) 8.7 - 10.5 mg/dL    Total Protein 5.8 (L) 6.0 - 8.4 g/dL    Albumin 2.2 (L) 3.5 - 5.2 g/dL    Total Bilirubin 0.9 0.1 - 1.0 mg/dL    Alkaline Phosphatase 167 (H) 55 - 135 U/L    AST 76 (H) 10 - 40 U/L    ALT 69 (H) 10 - 44 U/L    Anion Gap 9 8 - 16 mmol/L    eGFR if African American >60.0 >60 mL/min/1.73 m^2    eGFR if non African American >60.0 >60 mL/min/1.73 m^2   CBC auto differential    Collection Time: 02/06/20  4:16 AM   Result Value Ref Range    WBC 10.61 3.90 - 12.70 K/uL    RBC 3.10 (L) 4.60 - 6.20 M/uL    Hemoglobin 8.7 (L) 14.0 - 18.0 g/dL    Hematocrit 26.8 (L) 40.0 - 54.0 %    Mean Corpuscular Volume 87 82 - 98 fL    Mean Corpuscular Hemoglobin 28.1 27.0 - 31.0 pg    Mean Corpuscular Hemoglobin Conc 32.5 32.0 - 36.0 g/dL    RDW 16.9 (H) 11.5 - 14.5 %    Platelets 291 150 - 350 K/uL    MPV 9.5 9.2 - 12.9 fL    Immature Granulocytes 5.0 (H) 0.0 - 0.5 %    Gran # (ANC) 6.1 1.8 - 7.7 K/uL    Immature Grans (Abs) 0.53 (H) 0.00 - 0.04 K/uL    Lymph # 2.7 1.0 - 4.8 K/uL    Mono # 1.0 0.3 - 1.0 K/uL    Eos # 0.3 0.0 - 0.5 K/uL    Baso # 0.05 0.00 - 0.20 K/uL    nRBC 0 0 /100 WBC    Gran% 57.6 38.0 - 73.0 %    Lymph% 25.5 18.0 - 48.0 %    Mono% 9.0 4.0 - 15.0 %    Eosinophil% 2.4 0.0 - 8.0 %    Basophil% 0.5 0.0 - 1.9 %    Differential Method Automated    Magnesium    Collection Time: 02/06/20  4:16 AM   Result Value Ref Range    Magnesium 1.8 1.6 - 2.6 mg/dL   Phosphorus    Collection Time: 02/06/20  4:16 AM   Result Value Ref Range    Phosphorus 3.2 2.7 - 4.5 mg/dL       Recent Labs   Lab 02/03/20  0347 02/05/20  0501 02/06/20  0416   WBC 9.36 11.38 10.61    HGB 9.6* 8.7* 8.7*   HCT 31.0* 27.8* 26.8*    264 291       Recent Labs   Lab 02/04/20  0455 02/05/20  0501 02/06/20  0416    140 143   K 4.5 5.0 5.1    103 107   CO2 26 26 27   BUN 28* 30* 26*   CREATININE 1.1 1.1 1.0    95 87   CALCIUM 8.9 8.3* 8.6*   MG 2.0 1.9 1.8   PHOS 3.6 3.3 3.2       Recent Labs   Lab 02/04/20  0455 02/05/20  0501 02/06/20  0416   ALKPHOS 142* 151* 167*   ALT 52* 59* 69*   AST 58* 68* 76*   ALBUMIN 2.3* 2.2* 2.2*   PROT 6.7 6.0 5.8*   BILITOT 1.3* 1.1* 0.9       Recent Labs   Lab 02/04/20  1917 02/04/20  2327 02/05/20  0758 02/05/20  1145 02/05/20  1725 02/06/20  0015   POCTGLUCOSE 117* 119* 102 86 124* 104       Hemoglobin A1C   Date Value Ref Range Status   01/27/2020 5.9 (H) 4.0 - 5.6 % Final     Comment:     ADA Screening Guidelines:  5.7-6.4%  Consistent with prediabetes  >or=6.5%  Consistent with diabetes  High levels of fetal hemoglobin interfere with the HbA1C  assay. Heterozygous hemoglobin variants (HbS, HgC, etc)do  not significantly interfere with this assay.   However, presence of multiple variants may affect accuracy.     12/30/2019 6.6 % Final   10/16/2019 7.0 % Final   06/03/2019 7.1 % Final       Scheduled Meds:   amitriptyline  50 mg Oral Q24H    celecoxib  200 mg Oral Daily    dextromethorphan-guaifenesin  mg  1 tablet Oral BID    ergocalciferol  50,000 Units Oral Q7 Days    heparin (porcine)  5,000 Units Subcutaneous Q8H    ipratropium  0.5 mg Nebulization Q4H WAKE    levalbuterol  0.63 mg Nebulization Q8H    levETIRAcetam  1,000 mg Oral BID    methocarbamol  750 mg Oral TID    metoprolol tartrate  25 mg Oral BID    nicotine  1 patch Transdermal Daily    pantoprazole  40 mg Oral Daily    polyethylene glycol  17 g Oral BID    senna-docusate 8.6-50 mg  2 tablet Oral BID       Continuous Infusions:    As Needed: bisacodyL, Dextrose 10% Bolus, Dextrose 10% Bolus, Dextrose 10% Bolus, glucagon (human recombinant), glucose,  glucose, HYDROmorphone, insulin aspart U-100, ondansetron, oxyCODONE, oxyCODONE, oxyCODONE, sodium chloride 0.9%    Active Hospital Problems    Diagnosis  POA    *Subdural hematoma [S06.5X9A]  Yes    Acute blood loss anemia [D62]  Yes    Constipation [K59.00]  Yes    Closed displaced fracture of acromial process of right scapula [S42.121A]  Yes    Closed displaced fracture of acromial process of left scapula [S42.122A]  Yes    Dislocation of left shoulder joint [S43.005A]  Yes    Dislocation of right shoulder joint [S43.004A]  Yes     fracture dislocation of bilateral shoulders [S42.91XA]  Yes    Fracture dislocation of joint of left shoulder girdle [S42.92XA]  Yes    Closed fracture of both humeri [S42.301A, S42.302A]  Yes    Acute bilateral low back pain [M54.5]  Yes    Shortness of breath [R06.02]  Yes    Diabetes mellitus due to underlying condition with diabetic autonomic neuropathy, without long-term current use of insulin [E08.43]  Yes    Vitamin D deficiency [E55.9]  Yes    Alcohol abuse [F10.10]  Yes     Chronic    COPD (chronic obstructive pulmonary disease) [J44.9]  Yes     Chronic    Current every day smoker [F17.200]  Yes     2-3 ppd      Hyperlipidemia [E78.5]  Yes    Long term (current) use of anticoagulants [Z79.01]  Not Applicable    PAF (paroxysmal atrial fibrillation) [I48.0]  Yes    Hypertension, essential [I10]  Yes    GERD with esophagitis [K21.0]  Yes      Resolved Hospital Problems   No resolved problems to display.     59 M with hx of paroxysmal afib on coumadin presents for eval of bilateral shoulder dislocations, left convexity SDH, L1 compression fracture, L4 burst fracture. C spine cleared by Ortho. C-collar for comfort.      Assessment and Plan    Subdural hematoma  · q 4 hr neuro checks  · Repeat CT head stable on 2/2  · Hold coumadin; goal INR < 1.4 for 2 weeks with repeat head CT in 2 weeks ( after 2/10)  and can resume warfarin if stable  · continue keppra -  timeframe per Neurosurgery  · F/u Neurosurgery 2 weeks    Bilateral acromial fractures with bilateral shoulder dislocation  S/p bilateral shoulder hemiarthroplasty: right on 1/28/2020 and left on 1/30/2020  · Weight bearing status: NWB and NO ROM of bilateral  shoulder  · Pain control initiated with oxycodone, hydromorphone prn, robaxin and celebrex; will need to monitor for somnolence and taper as needed  · Continue bilateral abduction braces to BUE at all times  · f/u orhthopedics      L1 burst fracture  L4 burst fracture with moderate spinal canal stenosis  T10, T11 compression fractures, unknown age  · F/u with neurosurgery in 6 weeks with X-rays  · MRI L spine complete. L1 compression fracture with minimal retropulsion into central canal, mild canal stenosis. L4 burst fracture with retropulsion into central canal resulting in moderate canal stenosis. Likely will need RadTx to L spine given cancer hx.   · Stopped gabapentin after nocturnal confusion  2/6 - TLSO when OOB/upright  -sitting and upright L-spine x-rays in TLSO to evaluate spinal stability - done, but not read.   - Please obtain sitting and upright L-spine x-rays in TLSO when able to evaluate spinal stability. If unable to obtain and patient remains neurologically intact, will f/u outpatient in 6 weeks with XR at that time. Continue TLSO brace when OOB/upright if patient is discharged without an XR.    DM2  · Controlled since admission  · Chronic home meds with metformin, basaglar and tradjenta  · Continue monitoring with accuchecks, diabetic diet, SSNI prn    COPD  Smoker  · Continue levoalbuterol nebs (tachy)  · Continue brovana if obtained from home;   · Limit oxygen to avoid hypercapnea  · Continue nicotine patch and encourage cessation with education    Elevated LFT's  Hx of ETOH abuse  · stagnant  · Significant other states patient no longer drinks and 6 pack of beer at home will last for days  · Chronic statin at home; hold  currently\  · Discontinue scheduled tylenol  · Denies cirrhosis (listed in hx)  · Evaluated with acute hep panel (neg); direct bili (normal) and trending of labs; may need RUQ U/S if persists  · Check BNP to rule out hepatic congestion which was normal    PAF  · Sinus tach on ECG  · continue metoprolol  · Hold warfarin until CT head in 2 weeks and resume if imaging stable    Constipation  · Continue miralax and senokot-s  · Dulcolax po on 2/3 with small results  · Dulcolax suppository prn and enema prn    Anemia  · Likely blood loss as usually hemo-concentrated  · Transfuse prn Hgb < 7; continue to monitor  · ASA currently held    GERD  · Continue pantoprazole    Essential HTN  · On metoprolol and ramipril at home   · BP has been intermittently low since pain meds  · continue metoprolol and resume ramipril as BP allows    Vit D deficiency  · Level of 7 on 2/4  · Therapy with ergocalciferol 50k weekly    Diet: Diet diabetic [facility]; 2000 Calorie   DVT PPx:   VTE Risk Mitigation (From admission, onward)         Ordered     heparin (porcine) injection 5,000 Units  Every 8 hours      02/01/20 0915     Reason for No Pharmacological VTE Prophylaxis  Once     Question:  Reasons:  Answer:  Active Bleeding    01/27/20 2243     IP VTE HIGH RISK PATIENT  Once      01/27/20 2243     Place sequential compression device  Until discontinued      01/27/20 2243              GI PPx: pantoprazole  L/D/A: PIV  Wounds: bilateral shoulder with Aquacel dressing    Goals of Care: full code; curative    Discharge plan:  inpatient rehab - [facility]; denied by Amado Perla MD  [facility]  [facility]  [id number]      Procedure(s) (LRB):  HEMIARTHROPLASTY, SHOULDER- RIGHT- MEKA- SUPINE-  TRIOS (Right)      Hospital Course:   No notes on file     Consults:   Consults (From admission, onward)        Status Ordering Provider     Inpatient consult to Orthopedic Surgery  Once     Provider:  (Not yet  assigned)    Completed JAMARCUS HERRMANN     Inpatient consult to Physical Medicine Rehab  Once     Provider:  (Not yet assigned)    Completed AG GUERRERO     Inpatient consult to Registered Dietitian/Nutritionist  Once     Provider:  (Not yet assigned)    Completed MANFRED MCKEON     IP consult to case management/social work  Once     Provider:  (Not yet assigned)    Acknowledged VENKAT CANO          No new Assessment & Plan notes have been filed under this hospital service since the last note was generated.  Service: Hospital Medicine    Final Active Diagnoses:    Diagnosis Date Noted POA    PRINCIPAL PROBLEM:  Subdural hematoma [S06.5X9A] 01/27/2020 Yes    Acute blood loss anemia [D62]  Yes    Constipation [K59.00]  Yes    Closed displaced fracture of acromial process of right scapula [S42.121A] 01/29/2020 Yes    Closed displaced fracture of acromial process of left scapula [S42.122A] 01/29/2020 Yes    Dislocation of left shoulder joint [S43.005A]  Yes    Dislocation of right shoulder joint [S43.004A]  Yes     fracture dislocation of bilateral shoulders [S42.91XA] 01/28/2020 Yes    Fracture dislocation of joint of left shoulder girdle [S42.92XA] 01/27/2020 Yes    Closed fracture of both humeri [S42.301A, S42.302A] 01/27/2020 Yes    Acute bilateral low back pain [M54.5] 01/27/2020 Yes    Shortness of breath [R06.02] 06/03/2019 Yes    Diabetes mellitus due to underlying condition with diabetic autonomic neuropathy, without long-term current use of insulin [E08.43] 06/03/2019 Yes    Vitamin D deficiency [E55.9] 06/03/2019 Yes    Alcohol abuse [F10.10]  Yes     Chronic    COPD (chronic obstructive pulmonary disease) [J44.9]  Yes     Chronic    Current every day smoker [F17.200]  Yes    Hyperlipidemia [E78.5]  Yes    Long term (current) use of anticoagulants [Z79.01]  Not Applicable    PAF (paroxysmal atrial fibrillation) [I48.0]  Yes    Hypertension, essential [I10]  Yes    GERD with  esophagitis [K21.0] 11/09/2017 Yes      Problems Resolved During this Admission:       Discharged Condition: good    Disposition:     Follow Up:    Patient Instructions:   No discharge procedures on file.    Significant Diagnostic Studies: see above    Pending Diagnostic Studies:     None         Medications:  Reconciled Home Medications:      Medication List      START taking these medications    bisacodyL 10 mg Supp  Commonly known as:  DULCOLAX  Place 1 suppository (10 mg total) rectally daily as needed.     celecoxib 200 MG capsule  Commonly known as:  CeleBREX  Take 1 capsule (200 mg total) by mouth once daily.     dextromethorphan-guaifenesin  mg  mg per 12 hr tablet  Commonly known as:  MUCINEX DM  Take 1 tablet by mouth 2 (two) times daily. for 10 days     ergocalciferol 50,000 unit Cap  Commonly known as:  ERGOCALCIFEROL  Take 1 capsule (50,000 Units total) by mouth every 7 days.  Start taking on:  February 11, 2020     heparin (porcine) 5,000 unit/mL injection  Inject 1 mL (5,000 Units total) into the skin every 8 (eight) hours. for 7 days     insulin aspart U-100 100 unit/mL (3 mL) Inpn pen  Commonly known as:  NovoLOG  Inject 1-10 Units into the skin before meals and at bedtime as needed (Hyperglycemia).     ipratropium 0.02 % nebulizer solution  Commonly known as:  ATROVENT  Take 2.5 mLs (500 mcg total) by nebulization every 4 (four) hours as needed for Wheezing. Rescue     levalbuterol 0.63 mg/3 mL nebulizer solution  Commonly known as:  XOPENEX  Take 3 mLs (0.63 mg total) by nebulization every 8 (eight) hours. Rescue     levETIRAcetam 1000 MG tablet  Commonly known as:  KEPPRA  Take 1 tablet (1,000 mg total) by mouth 2 (two) times daily.     methocarbamol 750 MG Tab  Commonly known as:  ROBAXIN  Take 1 tablet (750 mg total) by mouth 3 (three) times daily. for 10 days     nicotine 21 mg/24 hr  Commonly known as:  NICODERM CQ  Place 1 patch onto the skin once daily.     oxyCODONE 5 MG  immediate release tablet  Commonly known as:  ROXICODONE  Take 1 tablet (5 mg total) by mouth every 4 (four) hours as needed (pain 1-10).     polyethylene glycol 17 gram Pwpk  Commonly known as:  GLYCOLAX  Take 17 g by mouth 2 (two) times daily.     senna-docusate 8.6-50 mg 8.6-50 mg per tablet  Commonly known as:  PERICOLACE  Take 2 tablets by mouth 2 (two) times daily.        CHANGE how you take these medications    amitriptyline 50 MG tablet  Commonly known as:  ELAVIL  Take 1 tablet (50 mg total) by mouth every evening.  What changed:    · medication strength  · how much to take        CONTINUE taking these medications    aspirin 81 MG Chew  Take 162 mg by mouth once daily.     atorvastatin 80 MG tablet  Commonly known as:  LIPITOR  Take 80 mg by mouth every evening.     Basaglar KwikPen U-100 Insulin glargine 100 units/mL (3mL) SubQ pen  Generic drug:  insulin  INJECT 25 UNITS DAILY     cyanocobalamin 1000 MCG tablet  Commonly known as:  VITAMIN B-12  Take 100 mcg by mouth once daily.     metFORMIN 1000 MG tablet  Commonly known as:  GLUCOPHAGE  Take 1 tablet (1,000 mg total) by mouth daily with breakfast.     metoprolol tartrate 25 MG tablet  Commonly known as:  LOPRESSOR  Take 25 mg by mouth 2 (two) times daily.     multivitamin with minerals tablet  Take 1 tablet by mouth once daily.        STOP taking these medications    albuterol-ipratropium 2.5 mg-0.5 mg/3 mL nebulizer solution  Commonly known as:  DUO-NEB     Brovana 15 mcg/2 mL Nebu  Generic drug:  arformoteroL     warfarin 5 MG tablet  Commonly known as:  COUMADIN        ASK your doctor about these medications    pantoprazole 40 MG tablet  Commonly known as:  PROTONIX  Take 1 tablet (40 mg total) by mouth once daily.     ramipril 5 MG capsule  Commonly known as:  ALTACE  TAKE 1 CAPSULE (5 MG) BY ORAL ROUTE ONCE DAILY     Tradjenta 5 mg Tab tablet  Generic drug:  linaGLIPtin  TAKE 1 TABLET BY MOUTH EVERY DAY            Indwelling Lines/Drains at time  of discharge:   Lines/Drains/Airways     Drain            Male External Urinary Catheter 01/31/20 1756 Small 5 days                Time spent on the discharge of patient: 35  minutes  Patient was seen and examined on the date of discharge and determined to be suitable for discharge.         Kayy Perla MD  Department of Hospital Medicine  [facility]

## 2020-02-06 NOTE — PROGRESS NOTES
"[facility] Medicine  Progress Note    Team: [facility]   Attending MD: Emily Mcdaniel MD  Admit Date: 1/27/2020  KIRBY 2/6/2020  Length of Stay:  LOS: 9 days   Code status: Full Code    Principal Problem: Subdural hematoma    Interval hx: no BM; confused overnight but eventually slept; appears improved; denies N/V; improving appetite; significant other at bedside      ROS:  Constitutional: no fever or chills  Respiratory: no cough or shortness of breath  Cardiovascular: no chest pain or palpitations  Gastrointestinal: no nausea or vomiting, no abdominal pain; last BM: 2/3  Genitourinary: no hematuria or dysuria  Integument/Breast: no rash or pruritis  Neurological: no seizures or tremors  Behavioral/Psych: no auditory or visual hallucinations      Physical Exam  Temp:  [96.3 °F (35.7 °C)-97.8 °F (36.6 °C)] 96.3 °F (35.7 °C)  Pulse:  [] 103  Resp:  [16-22] 18  SpO2:  [91 %-98 %] 98 %  BP: ()/(50-72) 98/57      Intake/Output Summary (Last 24 hours) at 2/5/2020 1818  Last data filed at 2/5/2020 1454  Gross per 24 hour   Intake 240 ml   Output 1000 ml   Net -760 ml       Wt Readings from Last 1 Encounters:   01/28/20 0901 110.7 kg (244 lb)   01/28/20 0052 111.1 kg (244 lb 14.9 oz)   01/27/20 2106 120.2 kg (265 lb)        Estimated body mass index is 34.03 kg/m² as calculated from the following:    Height as of this encounter: 5' 11" (1.803 m).    Weight as of this encounter: 110.7 kg (244 lb).    General: well developed, obese, no distress  Lungs:  Rhonchi diffusely bilaterally and normal respiratory effort.  Cardiovascular: Heart: regular rate and rhythm, S1, S2 normal, no murmur, click, rub or gallop. Chest Wall: no tenderness. Extremities: no cyanosis, trace bilateral ankle edema, no clubbing.   Abdomen/Rectal: Abdomen: obese, non-tender +distended; bowel sounds normal  Skin: Skin color, texture, turgor normal. No rashes or lesions.  Neurologic: Normal strength and tone " BLE  Musculoskeletal: bilateral UE in shoulder abduction braces.  TLSO in place.  No focal numbness or weakness but UE not tested.  Psych/Behavioral:  Alert and oriented to person/place/time; appropriate affect.      Recent Results (from the past 24 hour(s))   POCT glucose    Collection Time: 02/04/20  7:17 PM   Result Value Ref Range    POCT Glucose 117 (H) 70 - 110 mg/dL   POCT glucose    Collection Time: 02/04/20 11:27 PM   Result Value Ref Range    POCT Glucose 119 (H) 70 - 110 mg/dL   Comprehensive metabolic panel    Collection Time: 02/05/20  5:01 AM   Result Value Ref Range    Sodium 140 136 - 145 mmol/L    Potassium 5.0 3.5 - 5.1 mmol/L    Chloride 103 95 - 110 mmol/L    CO2 26 23 - 29 mmol/L    Glucose 95 70 - 110 mg/dL    BUN, Bld 30 (H) 6 - 20 mg/dL    Creatinine 1.1 0.5 - 1.4 mg/dL    Calcium 8.3 (L) 8.7 - 10.5 mg/dL    Total Protein 6.0 6.0 - 8.4 g/dL    Albumin 2.2 (L) 3.5 - 5.2 g/dL    Total Bilirubin 1.1 (H) 0.1 - 1.0 mg/dL    Alkaline Phosphatase 151 (H) 55 - 135 U/L    AST 68 (H) 10 - 40 U/L    ALT 59 (H) 10 - 44 U/L    Anion Gap 11 8 - 16 mmol/L    eGFR if African American >60.0 >60 mL/min/1.73 m^2    eGFR if non African American >60.0 >60 mL/min/1.73 m^2   CBC auto differential    Collection Time: 02/05/20  5:01 AM   Result Value Ref Range    WBC 11.38 3.90 - 12.70 K/uL    RBC 3.18 (L) 4.60 - 6.20 M/uL    Hemoglobin 8.7 (L) 14.0 - 18.0 g/dL    Hematocrit 27.8 (L) 40.0 - 54.0 %    Mean Corpuscular Volume 87 82 - 98 fL    Mean Corpuscular Hemoglobin 27.4 27.0 - 31.0 pg    Mean Corpuscular Hemoglobin Conc 31.3 (L) 32.0 - 36.0 g/dL    RDW 16.9 (H) 11.5 - 14.5 %    Platelets 264 150 - 350 K/uL    MPV 10.2 9.2 - 12.9 fL    Immature Granulocytes 4.0 (H) 0.0 - 0.5 %    Gran # (ANC) 6.7 1.8 - 7.7 K/uL    Immature Grans (Abs) 0.45 (H) 0.00 - 0.04 K/uL    Lymph # 2.6 1.0 - 4.8 K/uL    Mono # 1.3 (H) 0.3 - 1.0 K/uL    Eos # 0.3 0.0 - 0.5 K/uL    Baso # 0.04 0.00 - 0.20 K/uL    nRBC 0 0 /100 WBC    Gran% 59.1  38.0 - 73.0 %    Lymph% 22.5 18.0 - 48.0 %    Mono% 11.1 4.0 - 15.0 %    Eosinophil% 2.9 0.0 - 8.0 %    Basophil% 0.4 0.0 - 1.9 %    Platelet Estimate Appears normal     Aniso Slight     Poik Slight     Poly Occasional     Hypo Occasional     Ovalocytes Occasional     Differential Method Automated    Magnesium    Collection Time: 02/05/20  5:01 AM   Result Value Ref Range    Magnesium 1.9 1.6 - 2.6 mg/dL   Phosphorus    Collection Time: 02/05/20  5:01 AM   Result Value Ref Range    Phosphorus 3.3 2.7 - 4.5 mg/dL   POCT glucose    Collection Time: 02/05/20  7:58 AM   Result Value Ref Range    POCT Glucose 102 70 - 110 mg/dL   POCT glucose    Collection Time: 02/05/20 11:45 AM   Result Value Ref Range    POCT Glucose 86 70 - 110 mg/dL   POCT glucose    Collection Time: 02/05/20  5:25 PM   Result Value Ref Range    POCT Glucose 124 (H) 70 - 110 mg/dL       Recent Labs   Lab 02/02/20  0115 02/03/20 0347 02/05/20  0501   WBC 7.89 9.36 11.38   HGB 8.5* 9.6* 8.7*   HCT 27.7* 31.0* 27.8*    216 264       Recent Labs   Lab 02/03/20 0347 02/04/20  0455 02/05/20  0501    138 140   K 4.2 4.5 5.0    100 103   CO2 29 26 26   BUN 19 28* 30*   CREATININE 0.9 1.1 1.1   * 102 95   CALCIUM 9.1 8.9 8.3*   MG 1.8 2.0 1.9   PHOS 3.5 3.6 3.3       Recent Labs   Lab 02/03/20 0347 02/04/20  0455 02/05/20  0501   ALKPHOS 139* 142* 151*   ALT 55* 52* 59*   AST 73* 58* 68*   ALBUMIN 2.1* 2.3* 2.2*   PROT 6.3 6.7 6.0   BILITOT 1.3* 1.3* 1.1*       Recent Labs   Lab 02/03/20 2236 02/04/20 1917 02/04/20 2327 02/05/20  0758 02/05/20  1145 02/05/20  1725   POCTGLUCOSE 112* 117* 119* 102 86 124*       Hemoglobin A1C   Date Value Ref Range Status   01/27/2020 5.9 (H) 4.0 - 5.6 % Final     Comment:     ADA Screening Guidelines:  5.7-6.4%  Consistent with prediabetes  >or=6.5%  Consistent with diabetes  High levels of fetal hemoglobin interfere with the HbA1C  assay. Heterozygous hemoglobin variants (HbS, HgC,  etc)do  not significantly interfere with this assay.   However, presence of multiple variants may affect accuracy.     12/30/2019 6.6 % Final   10/16/2019 7.0 % Final   06/03/2019 7.1 % Final       Scheduled Meds:   amitriptyline  50 mg Oral Q24H    celecoxib  200 mg Oral Daily    dextromethorphan-guaifenesin  mg  1 tablet Oral BID    ergocalciferol  50,000 Units Oral Q7 Days    heparin (porcine)  5,000 Units Subcutaneous Q8H    ipratropium  0.5 mg Nebulization Q4H WAKE    levalbuterol  0.63 mg Nebulization Q8H    levETIRAcetam  1,000 mg Oral BID    methocarbamol  750 mg Oral TID    metoprolol tartrate  25 mg Oral BID    nicotine  1 patch Transdermal Daily    pantoprazole  40 mg Oral Daily    polyethylene glycol  17 g Oral BID    senna-docusate 8.6-50 mg  2 tablet Oral BID    sodium chloride 0.9%  500 mL Intravenous Once    sodium phosphates  1 enema Rectal Once       Continuous Infusions:    As Needed: bisacodyL, Dextrose 10% Bolus, Dextrose 10% Bolus, Dextrose 10% Bolus, glucagon (human recombinant), glucose, glucose, HYDROmorphone, insulin aspart U-100, ondansetron, oxyCODONE, oxyCODONE, oxyCODONE, sodium chloride 0.9%    Active Hospital Problems    Diagnosis  POA    *Subdural hematoma [S06.5X9A]  Yes    Acute blood loss anemia [D62]  Yes    Constipation [K59.00]  Yes    Closed displaced fracture of acromial process of right scapula [S42.121A]  Yes    Closed displaced fracture of acromial process of left scapula [S42.122A]  Yes    Dislocation of left shoulder joint [S43.005A]  Yes    Dislocation of right shoulder joint [S43.004A]  Yes     fracture dislocation of bilateral shoulders [S42.91XA]  Yes    Fracture dislocation of joint of left shoulder girdle [S42.92XA]  Yes    Closed fracture of both humeri [S42.301A, S42.302A]  Yes    Acute bilateral low back pain [M54.5]  Yes    Shortness of breath [R06.02]  Yes    Diabetes mellitus due to underlying condition with diabetic  autonomic neuropathy, without long-term current use of insulin [E08.43]  Yes    Vitamin D deficiency [E55.9]  Yes    Alcohol abuse [F10.10]  Yes     Chronic    COPD (chronic obstructive pulmonary disease) [J44.9]  Yes     Chronic    Current every day smoker [F17.200]  Yes     2-3 ppd      Hyperlipidemia [E78.5]  Yes    Long term (current) use of anticoagulants [Z79.01]  Not Applicable    PAF (paroxysmal atrial fibrillation) [I48.0]  Yes    Hypertension, essential [I10]  Yes    GERD with esophagitis [K21.0]  Yes      Resolved Hospital Problems   No resolved problems to display.     59 M with hx of paroxysmal afib on coumadin presents for eval of bilateral shoulder dislocations, left convexity SDH, L1 compression fracture, L4 burst fracture. C spine cleared by Ortho. C-collar for comfort.      Assessment and Plan    Subdural hematoma  · q 4 hr neuro checks  · Repeat CT head stable on 2/2  · Hold coumadin; goal INR < 1.4 for 2 weeks with repeat head CT in 2 weeks and can resume warfarin if stable  · continue keppra - timeframe per Neurosurgery    Bilateral acromial fractures with bilateral shoulder dislocation  S/p bilateral shoulder hemiarthroplasty: right on 1/28/2020 and left on 1/30/2020  · Weight bearing status: NWB and NO ROM of bilateral  shoulder  · Pain control initiated with oxycodone, hydromorphone prn, robaxin and celebrex; will need to monitor for somnolence and taper as needed  · Continue bilateral abduction braces to BUE at all times     L1 burst fracture  L4 burst fracture with moderate spinal canal stenosis  T10, T11 compression fractures, unknown age  · TLSO when OOB/upright  · would like sitting/upright L-spine x-rays in TLSO - unable to be obtained currently due to patient non-compliance, need to raise hands over head and necessary removal of UE braces  · F/u with neurosurgery in 6 weeks with X-rays  · Stopped gabapentin after nocturnal confusion    DM2  · Controlled since  admission  · Chronic home meds with metformin, basaglar and tradjenta  · Continue monitoring with accuchecks, diabetic diet, SSNI prn    COPD  Smoker  · Continue levoalbuterol nebs (tachy)  · Continue brovana if obtained from home;   · Limit oxygen to avoid hypercapnea  · Continue nicotine patch and encourage cessation with education    Elevated LFT's  Hx of ETOH abuse  · stagnant  · Significant other states patient no longer drinks and 6 pack of beer at home will last for days  · Chronic statin at home; hold currently\  · Discontinue scheduled tylenol  · Denies cirrhosis (listed in hx)  · Evaluated with acute hep panel (neg); direct bili (normal) and trending of labs; may need RUQ U/S if persists  · Check BNP to rule out hepatic congestion which was normal    PAF  · Sinus tach on ECG  · continue metoprolol  · Hold warfarin until CT head in 2 weeks and resume if imaging stable    Constipation  · Continue miralax and senokot-s  · Dulcolax po on 2/3 with small results  · Dulcolax suppository prn and enema prn    Anemia  · Likely blood loss as usually hemo-concentrated  · Transfuse prn Hgb < 7; continue to monitor  · ASA currently held    GERD  · Continue pantoprazole    Essential HTN  · On metoprolol and ramipril at home   · BP has been intermittently low since pain meds  · continue metoprolol and resume ramipril as BP allows    Vit D deficiency  · Level of 7 on 2/4  · Therapy with ergocalciferol 50k weekly    Diet: Diet diabetic [facility]; 2000 Calorie   DVT PPx:   VTE Risk Mitigation (From admission, onward)         Ordered     heparin (porcine) injection 5,000 Units  Every 8 hours      02/01/20 0915     Reason for No Pharmacological VTE Prophylaxis  Once     Question:  Reasons:  Answer:  Active Bleeding    01/27/20 2243     IP VTE HIGH RISK PATIENT  Once      01/27/20 2243     Place sequential compression device  Until discontinued      01/27/20 2243              GI PPx: pantoprazole  L/D/A: PIV  Wounds:  bilateral shoulder with Aquacel dressing    Goals of Care: full code; curative    Discharge plan:  inpatient rehab - [facility]; denied by Amado Mcdaniel MD  [facility]  [facility]  [phone]

## 2020-02-06 NOTE — PROGRESS NOTES
[facility] Hospital Medicine  Progress Note    Team: [facility] MED N   Attending MD: Kayy Perla MD  Admit Date: 1/27/2020  KIRBY 2/6/2020  Length of Stay:  LOS: 10 days   Code status: Full Code    Principal Problem: Subdural hematoma     60 yo male with EtOH abuse, tobacco abuse,  PAF  With long term anticoagulation (coumadin) who presents to [facility] for SDH and bilateral shoulder dislocation. He  presented to OSH via EMS for acute onset of  SOB and reported seizure activity. At approx 5 pm he was playing video poker, felt light headed, and was assisted  Ground by family. He had what the family felt was a seizure but appeared awake during it. He was disoriented and confused after. He then reported had SOB, chest pain, and BUE pain. Patient also reports new onset severe lower back pain. He returned to baseline mental status shortly after per family. CTH and chest at OSH revealed SDH and bilateral upper ext dislocations. He received Kcentra and Vit K at OSH for reversal.  He is being admitted to [facility] for a higher level of care.        Interval hx: no BM; confused overnight but eventually slept; appears improved; denies N/V; improving appetite; significant other at bedside      ROS:  Constitutional: no fever or chills  Respiratory: no cough or shortness of breath  Cardiovascular: no chest pain or palpitations  Gastrointestinal: no nausea or vomiting, no abdominal pain; last BM: 2/3  Genitourinary: no hematuria or dysuria  Integument/Breast: no rash or pruritis  Neurological: no seizures or tremors  Behavioral/Psych: no auditory or visual hallucinations      Physical Exam  Temp:  [96.3 °F (35.7 °C)-98.8 °F (37.1 °C)] 96.8 °F (36 °C)  Pulse:  [] 92  Resp:  [16-19] 18  SpO2:  [94 %-98 %] 98 %  BP: ()/(50-71) 137/71      Intake/Output Summary (Last 24 hours) at 2/6/2020 0736  Last data filed at 2/6/2020 0000  Gross per 24 hour   Intake 240 ml   Output 800 ml   Net -560 ml       Wt Readings  "from Last 1 Encounters:   01/28/20 0901 110.7 kg (244 lb)   01/28/20 0052 111.1 kg (244 lb 14.9 oz)   01/27/20 2106 120.2 kg (265 lb)        Estimated body mass index is 34.03 kg/m² as calculated from the following:    Height as of this encounter: 5' 11" (1.803 m).    Weight as of this encounter: 110.7 kg (244 lb).    General: well developed, obese, no distress  Lungs:  Rhonchi diffusely bilaterally and normal respiratory effort.  Cardiovascular: Heart: regular rate and rhythm, S1, S2 normal, no murmur, click, rub or gallop. Chest Wall: no tenderness. Extremities: no cyanosis, trace bilateral ankle edema, no clubbing.   Abdomen/Rectal: Abdomen: obese, non-tender +distended; bowel sounds normal  Skin: Skin color, texture, turgor normal. No rashes or lesions.  Neurologic: Normal strength and tone BLE  Musculoskeletal: bilateral UE in shoulder abduction braces.  TLSO in place.  No focal numbness or weakness but UE not tested.  Psych/Behavioral:  Alert and oriented to person/place/time; appropriate affect.      Recent Results (from the past 24 hour(s))   POCT glucose    Collection Time: 02/05/20  7:58 AM   Result Value Ref Range    POCT Glucose 102 70 - 110 mg/dL   POCT glucose    Collection Time: 02/05/20 11:45 AM   Result Value Ref Range    POCT Glucose 86 70 - 110 mg/dL   POCT glucose    Collection Time: 02/05/20  5:25 PM   Result Value Ref Range    POCT Glucose 124 (H) 70 - 110 mg/dL   POCT glucose    Collection Time: 02/06/20 12:15 AM   Result Value Ref Range    POCT Glucose 104 70 - 110 mg/dL   Comprehensive metabolic panel    Collection Time: 02/06/20  4:16 AM   Result Value Ref Range    Sodium 143 136 - 145 mmol/L    Potassium 5.1 3.5 - 5.1 mmol/L    Chloride 107 95 - 110 mmol/L    CO2 27 23 - 29 mmol/L    Glucose 87 70 - 110 mg/dL    BUN, Bld 26 (H) 6 - 20 mg/dL    Creatinine 1.0 0.5 - 1.4 mg/dL    Calcium 8.6 (L) 8.7 - 10.5 mg/dL    Total Protein 5.8 (L) 6.0 - 8.4 g/dL    Albumin 2.2 (L) 3.5 - 5.2 g/dL    " Total Bilirubin 0.9 0.1 - 1.0 mg/dL    Alkaline Phosphatase 167 (H) 55 - 135 U/L    AST 76 (H) 10 - 40 U/L    ALT 69 (H) 10 - 44 U/L    Anion Gap 9 8 - 16 mmol/L    eGFR if African American >60.0 >60 mL/min/1.73 m^2    eGFR if non African American >60.0 >60 mL/min/1.73 m^2   CBC auto differential    Collection Time: 02/06/20  4:16 AM   Result Value Ref Range    WBC 10.61 3.90 - 12.70 K/uL    RBC 3.10 (L) 4.60 - 6.20 M/uL    Hemoglobin 8.7 (L) 14.0 - 18.0 g/dL    Hematocrit 26.8 (L) 40.0 - 54.0 %    Mean Corpuscular Volume 87 82 - 98 fL    Mean Corpuscular Hemoglobin 28.1 27.0 - 31.0 pg    Mean Corpuscular Hemoglobin Conc 32.5 32.0 - 36.0 g/dL    RDW 16.9 (H) 11.5 - 14.5 %    Platelets 291 150 - 350 K/uL    MPV 9.5 9.2 - 12.9 fL    Immature Granulocytes 5.0 (H) 0.0 - 0.5 %    Gran # (ANC) 6.1 1.8 - 7.7 K/uL    Immature Grans (Abs) 0.53 (H) 0.00 - 0.04 K/uL    Lymph # 2.7 1.0 - 4.8 K/uL    Mono # 1.0 0.3 - 1.0 K/uL    Eos # 0.3 0.0 - 0.5 K/uL    Baso # 0.05 0.00 - 0.20 K/uL    nRBC 0 0 /100 WBC    Gran% 57.6 38.0 - 73.0 %    Lymph% 25.5 18.0 - 48.0 %    Mono% 9.0 4.0 - 15.0 %    Eosinophil% 2.4 0.0 - 8.0 %    Basophil% 0.5 0.0 - 1.9 %    Differential Method Automated    Magnesium    Collection Time: 02/06/20  4:16 AM   Result Value Ref Range    Magnesium 1.8 1.6 - 2.6 mg/dL   Phosphorus    Collection Time: 02/06/20  4:16 AM   Result Value Ref Range    Phosphorus 3.2 2.7 - 4.5 mg/dL       Recent Labs   Lab 02/03/20  0347 02/05/20  0501 02/06/20  0416   WBC 9.36 11.38 10.61   HGB 9.6* 8.7* 8.7*   HCT 31.0* 27.8* 26.8*    264 291       Recent Labs   Lab 02/04/20  0455 02/05/20  0501 02/06/20  0416    140 143   K 4.5 5.0 5.1    103 107   CO2 26 26 27   BUN 28* 30* 26*   CREATININE 1.1 1.1 1.0    95 87   CALCIUM 8.9 8.3* 8.6*   MG 2.0 1.9 1.8   PHOS 3.6 3.3 3.2       Recent Labs   Lab 02/04/20  0455 02/05/20  0501 02/06/20  0416   ALKPHOS 142* 151* 167*   ALT 52* 59* 69*   AST 58* 68* 76*    ALBUMIN 2.3* 2.2* 2.2*   PROT 6.7 6.0 5.8*   BILITOT 1.3* 1.1* 0.9       Recent Labs   Lab 02/04/20  1917 02/04/20  2327 02/05/20  0758 02/05/20  1145 02/05/20  1725 02/06/20  0015   POCTGLUCOSE 117* 119* 102 86 124* 104       Hemoglobin A1C   Date Value Ref Range Status   01/27/2020 5.9 (H) 4.0 - 5.6 % Final     Comment:     ADA Screening Guidelines:  5.7-6.4%  Consistent with prediabetes  >or=6.5%  Consistent with diabetes  High levels of fetal hemoglobin interfere with the HbA1C  assay. Heterozygous hemoglobin variants (HbS, HgC, etc)do  not significantly interfere with this assay.   However, presence of multiple variants may affect accuracy.     12/30/2019 6.6 % Final   10/16/2019 7.0 % Final   06/03/2019 7.1 % Final       Scheduled Meds:   amitriptyline  50 mg Oral Q24H    celecoxib  200 mg Oral Daily    dextromethorphan-guaifenesin  mg  1 tablet Oral BID    ergocalciferol  50,000 Units Oral Q7 Days    heparin (porcine)  5,000 Units Subcutaneous Q8H    ipratropium  0.5 mg Nebulization Q4H WAKE    levalbuterol  0.63 mg Nebulization Q8H    levETIRAcetam  1,000 mg Oral BID    methocarbamol  750 mg Oral TID    metoprolol tartrate  25 mg Oral BID    nicotine  1 patch Transdermal Daily    pantoprazole  40 mg Oral Daily    polyethylene glycol  17 g Oral BID    senna-docusate 8.6-50 mg  2 tablet Oral BID       Continuous Infusions:    As Needed: bisacodyL, Dextrose 10% Bolus, Dextrose 10% Bolus, Dextrose 10% Bolus, glucagon (human recombinant), glucose, glucose, HYDROmorphone, insulin aspart U-100, ondansetron, oxyCODONE, oxyCODONE, oxyCODONE, sodium chloride 0.9%    Active Hospital Problems    Diagnosis  POA    *Subdural hematoma [S06.5X9A]  Yes    Acute blood loss anemia [D62]  Yes    Constipation [K59.00]  Yes    Closed displaced fracture of acromial process of right scapula [S42.121A]  Yes    Closed displaced fracture of acromial process of left scapula [S42.122A]  Yes    Dislocation of  left shoulder joint [S43.005A]  Yes    Dislocation of right shoulder joint [S43.004A]  Yes     fracture dislocation of bilateral shoulders [S42.91XA]  Yes    Fracture dislocation of joint of left shoulder girdle [S42.92XA]  Yes    Closed fracture of both humeri [S42.301A, S42.302A]  Yes    Acute bilateral low back pain [M54.5]  Yes    Shortness of breath [R06.02]  Yes    Diabetes mellitus due to underlying condition with diabetic autonomic neuropathy, without long-term current use of insulin [E08.43]  Yes    Vitamin D deficiency [E55.9]  Yes    Alcohol abuse [F10.10]  Yes     Chronic    COPD (chronic obstructive pulmonary disease) [J44.9]  Yes     Chronic    Current every day smoker [F17.200]  Yes     2-3 ppd      Hyperlipidemia [E78.5]  Yes    Long term (current) use of anticoagulants [Z79.01]  Not Applicable    PAF (paroxysmal atrial fibrillation) [I48.0]  Yes    Hypertension, essential [I10]  Yes    GERD with esophagitis [K21.0]  Yes      Resolved Hospital Problems   No resolved problems to display.     59 M with hx of paroxysmal afib on coumadin presents for eval of bilateral shoulder dislocations, left convexity SDH, L1 compression fracture, L4 burst fracture. C spine cleared by Ortho. C-collar for comfort.      Assessment and Plan    Subdural hematoma  · q 4 hr neuro checks  · Repeat CT head stable on 2/2  · Hold coumadin; goal INR < 1.4 for 2 weeks with repeat head CT in 2 weeks ( after 2/10)  and can resume warfarin if stable  · continue keppra - timeframe per Neurosurgery    Bilateral acromial fractures with bilateral shoulder dislocation  S/p bilateral shoulder hemiarthroplasty: right on 1/28/2020 and left on 1/30/2020  · Weight bearing status: NWB and NO ROM of bilateral  shoulder  · Pain control initiated with oxycodone, hydromorphone prn, robaxin and celebrex; will need to monitor for somnolence and taper as needed  · Continue bilateral abduction braces to BUE at all times     L1 burst  fracture  L4 burst fracture with moderate spinal canal stenosis  T10, T11 compression fractures, unknown age  · F/u with neurosurgery in 6 weeks with X-rays  · MRI L spine complete. L1 compression fracture with minimal retropulsion into central canal, mild canal stenosis. L4 burst fracture with retropulsion into central canal resulting in moderate canal stenosis. Likely will need RadTx to L spine given cancer hx.   · Stopped gabapentin after nocturnal confusion  2/6 - TLSO when OOB/upright  -sitting and upright L-spine x-rays in TLSO to evaluate spinal stability - done, but not read.   - Please obtain sitting and upright L-spine x-rays in TLSO when able to evaluate spinal stability. If unable to obtain and patient remains neurologically intact, will f/u outpatient in 6 weeks with XR at that time. Continue TLSO brace when OOB/upright if patient is discharged without an XR.    DM2  · Controlled since admission  · Chronic home meds with metformin, basaglar and tradjenta  · Continue monitoring with accuchecks, diabetic diet, SSNI prn    COPD  Smoker  · Continue levoalbuterol nebs (tachy)  · Continue brovana if obtained from home;   · Limit oxygen to avoid hypercapnea  · Continue nicotine patch and encourage cessation with education    Elevated LFT's  Hx of ETOH abuse  · stagnant  · Significant other states patient no longer drinks and 6 pack of beer at home will last for days  · Chronic statin at home; hold currently\  · Discontinue scheduled tylenol  · Denies cirrhosis (listed in hx)  · Evaluated with acute hep panel (neg); direct bili (normal) and trending of labs; may need RUQ U/S if persists  · Check BNP to rule out hepatic congestion which was normal    PAF  · Sinus tach on ECG  · continue metoprolol  · Hold warfarin until CT head in 2 weeks and resume if imaging stable    Constipation  · Continue miralax and senokot-s  · Dulcolax po on 2/3 with small results  · Dulcolax suppository prn and enema  prn    Anemia  · Likely blood loss as usually hemo-concentrated  · Transfuse prn Hgb < 7; continue to monitor  · ASA currently held    GERD  · Continue pantoprazole    Essential HTN  · On metoprolol and ramipril at home   · BP has been intermittently low since pain meds  · continue metoprolol and resume ramipril as BP allows    Vit D deficiency  · Level of 7 on 2/4  · Therapy with ergocalciferol 50k weekly    Diet: Diet diabetic [facility]; 2000 Calorie   DVT PPx:   VTE Risk Mitigation (From admission, onward)         Ordered     heparin (porcine) injection 5,000 Units  Every 8 hours      02/01/20 0915     Reason for No Pharmacological VTE Prophylaxis  Once     Question:  Reasons:  Answer:  Active Bleeding    01/27/20 2243     IP VTE HIGH RISK PATIENT  Once      01/27/20 2243     Place sequential compression device  Until discontinued      01/27/20 2243              GI PPx: pantoprazole  L/D/A: PIV  Wounds: bilateral shoulder with Aquacel dressing    Goals of Care: full code; curative    Discharge plan:  inpatient rehab - [facility]; denied by Amado Perla MD  [facility] Medicine  [facility]  [id number]

## 2020-02-06 NOTE — ASSESSMENT & PLAN NOTE
-LSO brace to be continued per Neurosurgery for L1 compression fracture, L4 burst fracture  - L-spine x-rays in TLSO when able per nsgy  -MRI L spine complete. Likely will need RadTx to L spine given cancer hx per NSGY

## 2020-02-06 NOTE — TELEPHONE ENCOUNTER
SO, Eboni, verified imaging and ED F/U visit on 02.21.2020.  V/U.----- Message from Silas Alegria sent at 2/6/2020  1:35 PM CST -----  Contact: Eboni ( sign other ) @ [phone]  Caller returning a missed call, pls call

## 2020-02-06 NOTE — HOSPITAL COURSE
02/04/2020: Bed mobility MaxA x 2 ppl.  No transfers 2/2 nausea and lightheadedness.  02/05/2020: Bed mobility Min-MaxA .  Sit to stand MaxA.  UBD TA.  2/6/20: SLP diagnosis of mild Cognitive-Linguistic Impairment.  Passed bedside swallow evaluation.  SLP recommending regular diet and thin liquids.

## 2020-02-07 VITALS
OXYGEN SATURATION: 97 % | TEMPERATURE: 98 F | RESPIRATION RATE: 16 BRPM | WEIGHT: 244 LBS | SYSTOLIC BLOOD PRESSURE: 144 MMHG | HEART RATE: 99 BPM | BODY MASS INDEX: 34.16 KG/M2 | DIASTOLIC BLOOD PRESSURE: 65 MMHG | HEIGHT: 71 IN

## 2020-02-07 LAB
ALBUMIN SERPL BCP-MCNC: 2.7 G/DL (ref 3.5–5.2)
ALP SERPL-CCNC: 220 U/L (ref 55–135)
ALT SERPL W/O P-5'-P-CCNC: 95 U/L (ref 10–44)
ANION GAP SERPL CALC-SCNC: 10 MMOL/L (ref 8–16)
ANISOCYTOSIS BLD QL SMEAR: SLIGHT
AST SERPL-CCNC: 91 U/L (ref 10–40)
BASOPHILS # BLD AUTO: ABNORMAL K/UL (ref 0–0.2)
BASOPHILS NFR BLD: 1 % (ref 0–1.9)
BILIRUB SERPL-MCNC: 0.9 MG/DL (ref 0.1–1)
BUN SERPL-MCNC: 24 MG/DL (ref 6–20)
CALCIUM SERPL-MCNC: 9.4 MG/DL (ref 8.7–10.5)
CHLORIDE SERPL-SCNC: 103 MMOL/L (ref 95–110)
CO2 SERPL-SCNC: 28 MMOL/L (ref 23–29)
CREAT SERPL-MCNC: 1 MG/DL (ref 0.5–1.4)
DIFFERENTIAL METHOD: ABNORMAL
EOSINOPHIL # BLD AUTO: ABNORMAL K/UL (ref 0–0.5)
EOSINOPHIL NFR BLD: 2 % (ref 0–8)
ERYTHROCYTE [DISTWIDTH] IN BLOOD BY AUTOMATED COUNT: 17.1 % (ref 11.5–14.5)
EST. GFR  (AFRICAN AMERICAN): >60 ML/MIN/1.73 M^2
EST. GFR  (NON AFRICAN AMERICAN): >60 ML/MIN/1.73 M^2
GLUCOSE SERPL-MCNC: 107 MG/DL (ref 70–110)
HCT VFR BLD AUTO: 33.3 % (ref 40–54)
HGB BLD-MCNC: 10 G/DL (ref 14–18)
IMM GRANULOCYTES # BLD AUTO: ABNORMAL K/UL (ref 0–0.04)
IMM GRANULOCYTES NFR BLD AUTO: ABNORMAL % (ref 0–0.5)
LYMPHOCYTES # BLD AUTO: ABNORMAL K/UL (ref 1–4.8)
LYMPHOCYTES NFR BLD: 18 % (ref 18–48)
MAGNESIUM SERPL-MCNC: 1.8 MG/DL (ref 1.6–2.6)
MCH RBC QN AUTO: 27.4 PG (ref 27–31)
MCHC RBC AUTO-ENTMCNC: 30 G/DL (ref 32–36)
MCV RBC AUTO: 91 FL (ref 82–98)
MONOCYTES # BLD AUTO: ABNORMAL K/UL (ref 0.3–1)
MONOCYTES NFR BLD: 7 % (ref 4–15)
NEUTROPHILS NFR BLD: 72 % (ref 38–73)
NRBC BLD-RTO: 0 /100 WBC
PHOSPHATE SERPL-MCNC: 3.2 MG/DL (ref 2.7–4.5)
PLATELET # BLD AUTO: 314 K/UL (ref 150–350)
PLATELET BLD QL SMEAR: ABNORMAL
PMV BLD AUTO: 9.3 FL (ref 9.2–12.9)
POCT GLUCOSE: 107 MG/DL (ref 70–110)
POCT GLUCOSE: 95 MG/DL (ref 70–110)
POLYCHROMASIA BLD QL SMEAR: ABNORMAL
POTASSIUM SERPL-SCNC: 4.4 MMOL/L (ref 3.5–5.1)
PROT SERPL-MCNC: 6.9 G/DL (ref 6–8.4)
RBC # BLD AUTO: 3.65 M/UL (ref 4.6–6.2)
SODIUM SERPL-SCNC: 141 MMOL/L (ref 136–145)
WBC # BLD AUTO: 11.16 K/UL (ref 3.9–12.7)

## 2020-02-07 PROCEDURE — 94761 N-INVAS EAR/PLS OXIMETRY MLT: CPT

## 2020-02-07 PROCEDURE — 99900035 HC TECH TIME PER 15 MIN (STAT)

## 2020-02-07 PROCEDURE — 99222 1ST HOSP IP/OBS MODERATE 55: CPT | Mod: ,,, | Performed by: NURSE PRACTITIONER

## 2020-02-07 PROCEDURE — 80053 COMPREHEN METABOLIC PANEL: CPT

## 2020-02-07 PROCEDURE — 25000003 PHARM REV CODE 250: Performed by: STUDENT IN AN ORGANIZED HEALTH CARE EDUCATION/TRAINING PROGRAM

## 2020-02-07 PROCEDURE — 27000221 HC OXYGEN, UP TO 24 HOURS

## 2020-02-07 PROCEDURE — S4991 NICOTINE PATCH NONLEGEND: HCPCS | Performed by: NURSE PRACTITIONER

## 2020-02-07 PROCEDURE — 99223 PR INITIAL HOSPITAL CARE,LEVL III: ICD-10-PCS | Mod: ,,, | Performed by: PHYSICAL MEDICINE & REHABILITATION

## 2020-02-07 PROCEDURE — 25000003 PHARM REV CODE 250: Performed by: INTERNAL MEDICINE

## 2020-02-07 PROCEDURE — 36415 COLL VENOUS BLD VENIPUNCTURE: CPT

## 2020-02-07 PROCEDURE — 25000242 PHARM REV CODE 250 ALT 637 W/ HCPCS: Performed by: INTERNAL MEDICINE

## 2020-02-07 PROCEDURE — 85027 COMPLETE CBC AUTOMATED: CPT

## 2020-02-07 PROCEDURE — 99223 1ST HOSP IP/OBS HIGH 75: CPT | Mod: ,,, | Performed by: PHYSICAL MEDICINE & REHABILITATION

## 2020-02-07 PROCEDURE — 25000242 PHARM REV CODE 250 ALT 637 W/ HCPCS: Performed by: NURSE PRACTITIONER

## 2020-02-07 PROCEDURE — 84100 ASSAY OF PHOSPHORUS: CPT

## 2020-02-07 PROCEDURE — 83735 ASSAY OF MAGNESIUM: CPT

## 2020-02-07 PROCEDURE — 25000003 PHARM REV CODE 250: Performed by: NURSE PRACTITIONER

## 2020-02-07 PROCEDURE — 63600175 PHARM REV CODE 636 W HCPCS: Performed by: NURSE PRACTITIONER

## 2020-02-07 PROCEDURE — 85007 BL SMEAR W/DIFF WBC COUNT: CPT

## 2020-02-07 PROCEDURE — 99222 PR INITIAL HOSPITAL CARE,LEVL II: ICD-10-PCS | Mod: ,,, | Performed by: NURSE PRACTITIONER

## 2020-02-07 PROCEDURE — 94640 AIRWAY INHALATION TREATMENT: CPT

## 2020-02-07 RX ADMIN — NICOTINE 1 PATCH: 21 PATCH, EXTENDED RELEASE TRANSDERMAL at 10:02

## 2020-02-07 RX ADMIN — POLYETHYLENE GLYCOL 3350 17 G: 17 POWDER, FOR SOLUTION ORAL at 10:02

## 2020-02-07 RX ADMIN — OXYCODONE HYDROCHLORIDE 15 MG: 10 TABLET ORAL at 01:02

## 2020-02-07 RX ADMIN — HEPARIN SODIUM 5000 UNITS: 5000 INJECTION, SOLUTION INTRAVENOUS; SUBCUTANEOUS at 06:02

## 2020-02-07 RX ADMIN — CELECOXIB 200 MG: 200 CAPSULE ORAL at 10:02

## 2020-02-07 RX ADMIN — DOCUSATE SODIUM - SENNOSIDES 2 TABLET: 50; 8.6 TABLET, FILM COATED ORAL at 10:02

## 2020-02-07 RX ADMIN — LEVALBUTEROL HYDROCHLORIDE 0.63 MG: 0.63 SOLUTION RESPIRATORY (INHALATION) at 07:02

## 2020-02-07 RX ADMIN — IPRATROPIUM BROMIDE 0.5 MG: 0.5 SOLUTION RESPIRATORY (INHALATION) at 11:02

## 2020-02-07 RX ADMIN — GUAIFENESIN AND DEXTROMETHORPHAN HYDROBROMIDE 1 TABLET: 600; 30 TABLET, EXTENDED RELEASE ORAL at 10:02

## 2020-02-07 RX ADMIN — LEVETIRACETAM 1000 MG: 500 TABLET ORAL at 10:02

## 2020-02-07 RX ADMIN — HEPARIN SODIUM 5000 UNITS: 5000 INJECTION, SOLUTION INTRAVENOUS; SUBCUTANEOUS at 01:02

## 2020-02-07 RX ADMIN — IPRATROPIUM BROMIDE 0.5 MG: 0.5 SOLUTION RESPIRATORY (INHALATION) at 07:02

## 2020-02-07 RX ADMIN — METOPROLOL TARTRATE 25 MG: 25 TABLET ORAL at 10:02

## 2020-02-07 RX ADMIN — METHOCARBAMOL TABLETS 750 MG: 750 TABLET, COATED ORAL at 10:02

## 2020-02-07 RX ADMIN — PANTOPRAZOLE SODIUM 40 MG: 40 TABLET, DELAYED RELEASE ORAL at 10:02

## 2020-02-07 NOTE — SUBJECTIVE & OBJECTIVE
Interval History 2/7/2020:  Patient is seen for follow-up rehab evaluation and recommendations: Participating w/ therapy.    HPI, Past Medical, Family, and Social History remains the same as documented in the initial encounter.    Scheduled Medications:    amitriptyline  50 mg Oral Q24H    celecoxib  200 mg Oral Daily    dextromethorphan-guaifenesin  mg  1 tablet Oral BID    ergocalciferol  50,000 Units Oral Q7 Days    heparin (porcine)  5,000 Units Subcutaneous Q8H    ipratropium  0.5 mg Nebulization Q4H WAKE    levalbuterol  0.63 mg Nebulization Q8H    levETIRAcetam  1,000 mg Oral BID    methocarbamol  750 mg Oral TID    metoprolol tartrate  25 mg Oral BID    nicotine  1 patch Transdermal Daily    pantoprazole  40 mg Oral Daily    polyethylene glycol  17 g Oral BID    senna-docusate 8.6-50 mg  2 tablet Oral BID       Diagnostic Results: Labs: Reviewed    PRN Medications: bisacodyL, Dextrose 10% Bolus, Dextrose 10% Bolus, Dextrose 10% Bolus, glucagon (human recombinant), glucose, glucose, HYDROmorphone, insulin aspart U-100, ondansetron, oxyCODONE, oxyCODONE, oxyCODONE, sodium chloride 0.9%    Review of Systems   Constitutional: Positive for activity change. Negative for fatigue and fever.   HENT: Negative for trouble swallowing and voice change.    Respiratory: Negative for cough and shortness of breath.    Cardiovascular: Negative for chest pain and palpitations.   Musculoskeletal: Positive for arthralgias, back pain and gait problem.   Skin: Negative for color change and rash.   Neurological: Positive for weakness. Negative for speech difficulty.   Psychiatric/Behavioral: Positive for confusion. Negative for agitation.     Objective:     Vital Signs (Most Recent):  Temp: 97.3 °F (36.3 °C) (02/07/20 0816)  Pulse: 95 (02/07/20 0816)  Resp: 18 (02/07/20 0816)  BP: (!) 152/72 (02/07/20 0816)  SpO2: 96 % (02/07/20 0816)    Vital Signs (24h Range):  Temp:  [96.6 °F (35.9 °C)-97.4 °F (36.3 °C)] 97.3  °F (36.3 °C)  Pulse:  [] 95  Resp:  [18-24] 18  SpO2:  [92 %-99 %] 96 %  BP: (112-152)/(54-72) 152/72     Physical Exam   Constitutional: He is oriented to person, place, and time. He appears well-developed and well-nourished.   HENT:   Head: Normocephalic and atraumatic.   Eyes: Right eye exhibits no discharge. Left eye exhibits no discharge.   Neck: Neck supple.   Cardiovascular: Intact distal pulses.   Pulmonary/Chest: Effort normal. No respiratory distress.   Abdominal: Soft. He exhibits no distension.   Musculoskeletal: He exhibits no edema or deformity.        Lumbar back: He exhibits decreased range of motion and tenderness.   B/l shoulder sling in place  BLE 5/5  B/l hand  5/5    Neurological: He is alert and oriented to person, place, and time.   Follows commands    Skin: Skin is warm and dry.   Psychiatric: He has a normal mood and affect. His behavior is normal. Cognition and memory are impaired (mild).     NEUROLOGICAL EXAMINATION:     MENTAL STATUS   Oriented to person, place, and time.

## 2020-02-07 NOTE — NURSING
Pt and partner verbalized understanding of DC instructions. Pt is leaving via wheelchair to transport to [facility]. IV sites removed, report called to [facility]. No further concerns at this time.

## 2020-02-07 NOTE — PLAN OF CARE
02/07/20 1101   Post-Acute Status   Post-Acute Authorization Placement   Post-Acute Placement Status Set-up Complete   Eli spoke with Yossi from [facility] who states patient can admit today and will call back once getting clearance from MD on a time to set transport up for. Patient plan: dc to [facility] today. Pending transport at this time.   Dyan Rose, LMSW [facility]     (12:21PM) ELI scheduled discharge transportation (wheelchair, pet patient request) to [facility] through [facility]. Patient scheduled for pickup at 1:30PM. Eli called and provided patient nurse with update about transport arrangements. Nurse can call report to [phone]. SW notified patient and patient partner of transport arrangements. SW in communication with patient CM and patient care team.  Dyan Rose, LMSW [facility]     (12:29PM) ELI received call from Yossi with [facility] to push transport time back to 2:30PM. ELI called [facility] and changed transport time to 2:30PM.   Dyan Rose, LMSW [facility]

## 2020-02-07 NOTE — ASSESSMENT & PLAN NOTE
-TLSO brace to be continued per Neurosurgery for L1 burst fracture, L4 burst fracture, T10-T11 compression frx  - L-spine x-rays in TLSO when able per nsgy  -spinal xrays pending

## 2020-02-07 NOTE — ASSESSMENT & PLAN NOTE
-coumadin held  -goal INR < 1.4 for 2 weeks with repeat head CT in 2 weeks ( after 2/10)  and can resume warfarin if stable per HM

## 2020-02-07 NOTE — PROGRESS NOTES
[facility]  Physical Medicine & Rehab  Progress Note    Patient Name: Ben Melvin  MRN: [id number]  Admission Date: 1/27/2020  Length of Stay: 11 days  Attending Physician: Kayy Perla MD    Subjective:     Principal Problem:Subdural hematoma    Hospital Course:   02/04/2020: Bed mobility MaxA x 2 ppl.  No transfers 2/2 nausea and lightheadedness.  02/05/2020: Bed mobility Min-MaxA .  Sit to stand MaxA.  UBD TA.  2/6/20: SLP diagnosis of mild Cognitive-Linguistic Impairment.   Passed bedside swallow evaluation.  SLP recommending regular diet and thin liquids.    Interval History 2/7/2020:  Patient is seen for follow-up rehab evaluation and recommendations: Participating w/ therapy.    HPI, Past Medical, Family, and Social History remains the same as documented in the initial encounter.    Scheduled Medications:    amitriptyline  50 mg Oral Q24H    celecoxib  200 mg Oral Daily    dextromethorphan-guaifenesin  mg  1 tablet Oral BID    ergocalciferol  50,000 Units Oral Q7 Days    heparin (porcine)  5,000 Units Subcutaneous Q8H    ipratropium  0.5 mg Nebulization Q4H WAKE    levalbuterol  0.63 mg Nebulization Q8H    levETIRAcetam  1,000 mg Oral BID    methocarbamol  750 mg Oral TID    metoprolol tartrate  25 mg Oral BID    nicotine  1 patch Transdermal Daily    pantoprazole  40 mg Oral Daily    polyethylene glycol  17 g Oral BID    senna-docusate 8.6-50 mg  2 tablet Oral BID       Diagnostic Results: Labs: Reviewed    PRN Medications: bisacodyL, Dextrose 10% Bolus, Dextrose 10% Bolus, Dextrose 10% Bolus, glucagon (human recombinant), glucose, glucose, HYDROmorphone, insulin aspart U-100, ondansetron, oxyCODONE, oxyCODONE, oxyCODONE, sodium chloride 0.9%    Review of Systems   Constitutional: Positive for activity change. Negative for fatigue and fever.   HENT: Negative for trouble swallowing and voice change.    Respiratory: Negative for cough and shortness of breath.     Cardiovascular: Negative for chest pain and palpitations.   Musculoskeletal: Positive for arthralgias, back pain and gait problem.   Skin: Negative for color change and rash.   Neurological: Positive for weakness. Negative for speech difficulty.   Psychiatric/Behavioral: Positive for confusion. Negative for agitation.     Objective:     Vital Signs (Most Recent):  Temp: 97.3 °F (36.3 °C) (02/07/20 0816)  Pulse: 95 (02/07/20 0816)  Resp: 18 (02/07/20 0816)  BP: (!) 152/72 (02/07/20 0816)  SpO2: 96 % (02/07/20 0816)    Vital Signs (24h Range):  Temp:  [96.6 °F (35.9 °C)-97.4 °F (36.3 °C)] 97.3 °F (36.3 °C)  Pulse:  [] 95  Resp:  [18-24] 18  SpO2:  [92 %-99 %] 96 %  BP: (112-152)/(54-72) 152/72     Physical Exam   Constitutional: He is oriented to person, place, and time. He appears well-developed and well-nourished.   HENT:   Head: Normocephalic and atraumatic.   Eyes: Right eye exhibits no discharge. Left eye exhibits no discharge.   Neck: Neck supple.   Cardiovascular: Intact distal pulses.   Pulmonary/Chest: Effort normal. No respiratory distress.   Abdominal: Soft. He exhibits no distension.   Musculoskeletal: He exhibits no edema or deformity.        Lumbar back: He exhibits decreased range of motion and tenderness.   B/l shoulder sling in place  BLE 5/5  B/l hand  5/5    Neurological: He is alert and oriented to person, place, and time.   Follows commands    Skin: Skin is warm and dry.   Psychiatric: He has a normal mood and affect. His behavior is normal. Cognition and memory are impaired (mild).     Assessment/Plan:      * Subdural hematoma  See hospital course for functional, cognitive/speech/language, and nutrition/swallow status.      Recommendations  -  Monitor sleep disturbances and establish consistent sleep-wake cycle  -  Environmental modifications to limit agitation/confusion   -  Reorient patient to person, place, time, and situation on each encounter  -  Avoid restraints  -  May benefit  from 24/7 supervision  -  Avoid/limit medications that can worsen delirium (benzodiazepines, antihistamines, anticholinergics, hypnotics, opiates)  -  Encourage mobility, OOB in chair, and early ambulation as appropriate  -  PT/OT evaluate and treat  -  SLP speech and cognitive evaluate and treat  -  Monitor for bowel and bladder dysfunction  -  Monitor for and prevent skin breakdown and pressure ulcers  · Early mobility, repositioning/weight shifting every 20-30 minutes when sitting, turn patient every 2 hours, proper mattress/overlay and chair cushioning, pressure relief/heel protector boots  -  DVT prophylaxis (if appropriate)     fracture dislocation of bilateral shoulders  -ortho consulted  -s/p left shoulder hemiarthroplasty on January 28, 2020  and status post right shoulder hemiarthroplasty on January 30, 2020  - NWB and no ROM of bilateral shoulders    Acute bilateral low back pain  -TLSO brace to be continued per Neurosurgery for L1 burst fracture, L4 burst fracture, T10-T11 compression frx  - L-spine x-rays in TLSO when able per nsgy  -spinal xrays pending     PAF (paroxysmal atrial fibrillation)  -coumadin held  -goal INR < 1.4 for 2 weeks with repeat head CT in 2 weeks ( after 2/10)  and can resume warfarin if stable per     PAC recommendation: Inpatient Rehab        Kristina Wilkinson NP  Department of Physical Medicine & Rehab   [facility]

## 2020-02-10 ENCOUNTER — HOSPITAL ENCOUNTER (OUTPATIENT)
Dept: RADIOLOGY | Facility: HOSPITAL | Age: 60
Discharge: HOME | End: 2020-02-10
Attending: ORTHOPAEDIC SURGERY
Payer: MEDICARE

## 2020-02-10 ENCOUNTER — HOSPITAL ENCOUNTER (OUTPATIENT)
Dept: RADIOLOGY | Facility: HOSPITAL | Age: 60
Discharge: HOME | End: 2020-02-10
Attending: NURSE PRACTITIONER
Payer: MEDICARE

## 2020-02-10 DIAGNOSIS — Z96.612 STATUS POST REPLACEMENT OF BOTH SHOULDER JOINTS: Primary | ICD-10-CM

## 2020-02-10 DIAGNOSIS — Z96.612 STATUS POST REPLACEMENT OF BOTH SHOULDER JOINTS: ICD-10-CM

## 2020-02-10 DIAGNOSIS — Z96.611 STATUS POST REPLACEMENT OF BOTH SHOULDER JOINTS: Primary | ICD-10-CM

## 2020-02-10 DIAGNOSIS — Z96.611 STATUS POST REPLACEMENT OF BOTH SHOULDER JOINTS: ICD-10-CM

## 2020-02-10 PROCEDURE — 73020 X-RAY EXAM OF SHOULDER: CPT | Mod: 26,50,, | Performed by: RADIOLOGY

## 2020-02-10 PROCEDURE — 73020 X-RAY EXAM OF SHOULDER: CPT | Mod: TC,50,FY

## 2020-02-10 PROCEDURE — 70450 CT HEAD WITHOUT CONTRAST: ICD-10-PCS | Mod: 26,,, | Performed by: RADIOLOGY

## 2020-02-10 PROCEDURE — 70450 CT HEAD/BRAIN W/O DYE: CPT | Mod: 26,,, | Performed by: RADIOLOGY

## 2020-02-10 PROCEDURE — 73020 XR SHOULDER 1 VIEW BILATERAL: ICD-10-PCS | Mod: 26,50,, | Performed by: RADIOLOGY

## 2020-02-10 NOTE — PLAN OF CARE
Patient discharged to [facility] on 2/7/20.     02/10/20 1647   Final Note   Assessment Type Final Discharge Note   Anticipated Discharge Disposition Rehab   What phone number can be called within the next 1-3 days to see how you are doing after discharge?   ([phone])   Hospital Follow Up  Appt(s) scheduled? Yes   Discharge plans and expectations educations in teach back method with documentation complete? Yes   Right Care Referral Info   Post Acute Recommendation IRF   Referral Type Inpatient Rehab   Facility Name [facility]

## 2020-02-12 DIAGNOSIS — Z96.611 STATUS POST REPLACEMENT OF BOTH SHOULDER JOINTS: ICD-10-CM

## 2020-02-12 DIAGNOSIS — S42.251K CLOSED DISPLACED FRACTURE OF GREATER TUBEROSITY OF RIGHT HUMERUS WITH NONUNION, SUBSEQUENT ENCOUNTER: Primary | ICD-10-CM

## 2020-02-12 DIAGNOSIS — S42.91XD CLOSED FRACTURE DISLOCATION OF JOINT OF RIGHT SHOULDER GIRDLE WITH ROUTINE HEALING, SUBSEQUENT ENCOUNTER: ICD-10-CM

## 2020-02-12 DIAGNOSIS — Z96.612 STATUS POST REPLACEMENT OF BOTH SHOULDER JOINTS: ICD-10-CM

## 2020-02-13 ENCOUNTER — ANESTHESIA EVENT (OUTPATIENT)
Dept: SURGERY | Facility: HOSPITAL | Age: 60
DRG: 493 | End: 2020-02-13
Payer: MEDICARE

## 2020-02-13 NOTE — PROGRESS NOTES
60-year-old male, obese, AFib on anticoagulation, peripheral vascular disease, hypertension, COPD, current smoker, liver cirrhosis who sustained a witnessed seizure while playing video poker 01/27/2020.  He fell from his chair.  He was brought to emergency department at outside facility.  Injuries identified were bilateral proximal humerus fracture dislocations, bilateral acromial fractures, subdural hemorrhage, L1 and L4 burst fractures.     01/28/2020 - left shoulder hemiarthroplasty, biceps tenodesis     01/30/2020 - right shoulder hemiarthroplasty, biceps tenodesis    Now at [facility]    Has had difficulties with maintaining his bilateral shoulder slings, which indeed is a difficult situation causing him difficulties with activities of daily living such as eating and transfers.    Has preferentially removed his right shoulder sling to assist in these activities per discussion with his wife and patient    02/10/2020 - obtained follow-up x-rays of bilateral shoulders which demonstrated unchanged appearance of left shoulder arthroplasty. The right shoulder arthroplasty was in good position, however there appeared to be failure of the repair of the greater tuberosity on the right shoulder.    I discussed this case with my shoulder colleagues, and then call to discuss with the patient and his wife while there at rehab.    Counseled patient and his wife on the current status of his bilateral shoulders.  I have concern that there is failure of tuberosity repair on the right shoulder.    We discussed multiple options    - do nothing now, let him recover from his multiple injuries including L1/L4 burst fracture, subdural hemorrhage, bilateral shoulder replacements.  Let him progress his rehab and see how his right shoulder does.  I anticipate that his tuberosity will not heal and in a functional position and he will have difficulty with elevation of shoulder from here and into the foreseeable future.  In the future,  after recovered from these acute injuries, can consider conversion of this fracture stem to a reverse total shoulder arthroplasty, which should provide him with improved shoulder function. At this time this is not an option due to his coexisting bilateral acromial avulsion fractures from his seizure    - perform revision fixation of the right greater tuberosity.  This would require return trip to the operating room. A prefer to do this sooner rather than later to prevent further retraction of the greater tuberosity.    After discussion with the patient and his wife decision made to go forward with revision fixation of his right greater tuberosity fracture.    Plan is for transfer from [facility] this Friday, 02/14/2020 for same-day surgery at [facility].  We will perform a suture repair of the greater tuberosity, with planned discharge back to [facility] the same day.    This will reset the clock on right tuberosity healing, with further postop plans detailed in operative note    The risks, benefits, and alternatives to surgery were discussed with the patient and/or family.    Specific risks discussed included, but were not limited to:  Failure of fixation, damage to nearby neurovascular structures, pain, dislocation, periprosthetic fracture,  need to convert to later total shoulder arthroplasty or reverse total shoulder arthroplasty, rotator cuff tear weakness, failure of tuberosity healing, periprosthetic joint infection, need for multiple staged procedures, damage to nearby structures, including neurovascular structures leading to loss of function or loss of limb, bleeding, pain, numbness, tingling, weakness, compartment syndrome, malunion/nonunion, hardware failure, hardware prominence, infection, need for multiple staged procedures, prolonged antibiotics, iatrogenic fracture, heterotopic ossification, arthritis, a variety of medical complications including but not limited to heart attack, stroke,  deep venous thrombosis, pulmonary embolism, prolonged hospitalization, prolonged intubation, and death.   Patient and/or family expressed an understanding and desires to proceed with surgery.   All questions were answered.  No guarantees were implied or stated.  Informed consent was obtained.

## 2020-02-14 ENCOUNTER — TELEPHONE (OUTPATIENT)
Dept: NEUROSURGERY | Facility: CLINIC | Age: 60
End: 2020-02-14

## 2020-02-14 ENCOUNTER — HOSPITAL ENCOUNTER (INPATIENT)
Facility: HOSPITAL | Age: 60
LOS: 4 days | Discharge: NURSING, REHAB OR LONG-TERM CARE | DRG: 493 | End: 2020-02-18
Attending: ORTHOPAEDIC SURGERY | Admitting: ORTHOPAEDIC SURGERY
Payer: MEDICARE

## 2020-02-14 ENCOUNTER — ANESTHESIA (OUTPATIENT)
Dept: SURGERY | Facility: HOSPITAL | Age: 60
DRG: 493 | End: 2020-02-14
Payer: MEDICARE

## 2020-02-14 DIAGNOSIS — S06.5XAA SUBDURAL HEMATOMA: ICD-10-CM

## 2020-02-14 DIAGNOSIS — S42.209A PROXIMAL HUMERAL FRACTURE: ICD-10-CM

## 2020-02-14 DIAGNOSIS — S42.201A CLOSED FRACTURE OF PROXIMAL END OF RIGHT HUMERUS, UNSPECIFIED FRACTURE MORPHOLOGY, INITIAL ENCOUNTER: ICD-10-CM

## 2020-02-14 LAB — POCT GLUCOSE: 130 MG/DL (ref 70–110)

## 2020-02-14 PROCEDURE — 25000003 PHARM REV CODE 250: Performed by: STUDENT IN AN ORGANIZED HEALTH CARE EDUCATION/TRAINING PROGRAM

## 2020-02-14 PROCEDURE — 25000003 PHARM REV CODE 250: Performed by: NURSE ANESTHETIST, CERTIFIED REGISTERED

## 2020-02-14 PROCEDURE — 82962 GLUCOSE BLOOD TEST: CPT | Performed by: ORTHOPAEDIC SURGERY

## 2020-02-14 PROCEDURE — 71000015 HC POSTOP RECOV 1ST HR: Performed by: ORTHOPAEDIC SURGERY

## 2020-02-14 PROCEDURE — 63600175 PHARM REV CODE 636 W HCPCS: Performed by: ANESTHESIOLOGY

## 2020-02-14 PROCEDURE — 63600175 PHARM REV CODE 636 W HCPCS: Performed by: STUDENT IN AN ORGANIZED HEALTH CARE EDUCATION/TRAINING PROGRAM

## 2020-02-14 PROCEDURE — 37000009 HC ANESTHESIA EA ADD 15 MINS: Performed by: ORTHOPAEDIC SURGERY

## 2020-02-14 PROCEDURE — D9220A PRA ANESTHESIA: Mod: CRNA,,, | Performed by: NURSE ANESTHETIST, CERTIFIED REGISTERED

## 2020-02-14 PROCEDURE — 37000008 HC ANESTHESIA 1ST 15 MINUTES: Performed by: ORTHOPAEDIC SURGERY

## 2020-02-14 PROCEDURE — D9220A PRA ANESTHESIA: Mod: ANES,,, | Performed by: ANESTHESIOLOGY

## 2020-02-14 PROCEDURE — 27800903 OPTIME MED/SURG SUP & DEVICES OTHER IMPLANTS: Performed by: ORTHOPAEDIC SURGERY

## 2020-02-14 PROCEDURE — 23630 OPTX GR HMRL TBRS FX INT FIX: CPT | Mod: 58,RT,GC, | Performed by: ORTHOPAEDIC SURGERY

## 2020-02-14 PROCEDURE — S0077 INJECTION, CLINDAMYCIN PHOSP: HCPCS | Performed by: NURSE ANESTHETIST, CERTIFIED REGISTERED

## 2020-02-14 PROCEDURE — D9220A PRA ANESTHESIA: ICD-10-PCS | Mod: CRNA,,, | Performed by: NURSE ANESTHETIST, CERTIFIED REGISTERED

## 2020-02-14 PROCEDURE — 27201423 OPTIME MED/SURG SUP & DEVICES STERILE SUPPLY: Performed by: ORTHOPAEDIC SURGERY

## 2020-02-14 PROCEDURE — 63600175 PHARM REV CODE 636 W HCPCS: Performed by: NURSE ANESTHETIST, CERTIFIED REGISTERED

## 2020-02-14 PROCEDURE — S0077 INJECTION, CLINDAMYCIN PHOSP: HCPCS | Performed by: STUDENT IN AN ORGANIZED HEALTH CARE EDUCATION/TRAINING PROGRAM

## 2020-02-14 PROCEDURE — 23630 PR OPEN RX GR TUBEROSITY FX: ICD-10-PCS | Mod: 58,RT,GC, | Performed by: ORTHOPAEDIC SURGERY

## 2020-02-14 PROCEDURE — 36000710: Performed by: ORTHOPAEDIC SURGERY

## 2020-02-14 PROCEDURE — 71000033 HC RECOVERY, INTIAL HOUR: Performed by: ORTHOPAEDIC SURGERY

## 2020-02-14 PROCEDURE — 71000016 HC POSTOP RECOV ADDL HR: Performed by: ORTHOPAEDIC SURGERY

## 2020-02-14 PROCEDURE — 11000001 HC ACUTE MED/SURG PRIVATE ROOM

## 2020-02-14 PROCEDURE — 94761 N-INVAS EAR/PLS OXIMETRY MLT: CPT

## 2020-02-14 PROCEDURE — D9220A PRA ANESTHESIA: ICD-10-PCS | Mod: ANES,,, | Performed by: ANESTHESIOLOGY

## 2020-02-14 PROCEDURE — 36000711: Performed by: ORTHOPAEDIC SURGERY

## 2020-02-14 PROCEDURE — 63600175 PHARM REV CODE 636 W HCPCS: Performed by: ORTHOPAEDIC SURGERY

## 2020-02-14 DEVICE — MATRIX BONE CELLR VIVIGEN 5CC: Type: IMPLANTABLE DEVICE | Site: HUMERUS | Status: FUNCTIONAL

## 2020-02-14 RX ORDER — LIDOCAINE HYDROCHLORIDE 10 MG/ML
1 INJECTION, SOLUTION EPIDURAL; INFILTRATION; INTRACAUDAL; PERINEURAL ONCE
Status: COMPLETED | OUTPATIENT
Start: 2020-02-14 | End: 2020-02-14

## 2020-02-14 RX ORDER — VANCOMYCIN HYDROCHLORIDE 1 G/20ML
INJECTION, POWDER, LYOPHILIZED, FOR SOLUTION INTRAVENOUS
Status: DISCONTINUED | OUTPATIENT
Start: 2020-02-14 | End: 2020-02-14 | Stop reason: HOSPADM

## 2020-02-14 RX ORDER — OXYCODONE AND ACETAMINOPHEN 10; 325 MG/1; MG/1
1 TABLET ORAL EVERY 4 HOURS PRN
Status: DISCONTINUED | OUTPATIENT
Start: 2020-02-14 | End: 2020-02-14

## 2020-02-14 RX ORDER — TALC
6 POWDER (GRAM) TOPICAL NIGHTLY PRN
Status: DISCONTINUED | OUTPATIENT
Start: 2020-02-14 | End: 2020-02-18 | Stop reason: HOSPADM

## 2020-02-14 RX ORDER — MIDAZOLAM HYDROCHLORIDE 1 MG/ML
0.5 INJECTION INTRAMUSCULAR; INTRAVENOUS
Status: DISCONTINUED | OUTPATIENT
Start: 2020-02-14 | End: 2020-02-14 | Stop reason: HOSPADM

## 2020-02-14 RX ORDER — ONDANSETRON 8 MG/1
8 TABLET, ORALLY DISINTEGRATING ORAL EVERY 8 HOURS PRN
Status: DISCONTINUED | OUTPATIENT
Start: 2020-02-14 | End: 2020-02-18 | Stop reason: HOSPADM

## 2020-02-14 RX ORDER — CLINDAMYCIN PHOSPHATE 900 MG/50ML
900 INJECTION, SOLUTION INTRAVENOUS
Status: COMPLETED | OUTPATIENT
Start: 2020-02-14 | End: 2020-02-15

## 2020-02-14 RX ORDER — MORPHINE SULFATE 2 MG/ML
3 INJECTION, SOLUTION INTRAMUSCULAR; INTRAVENOUS
Status: DISCONTINUED | OUTPATIENT
Start: 2020-02-14 | End: 2020-02-18 | Stop reason: HOSPADM

## 2020-02-14 RX ORDER — SODIUM CHLORIDE 0.9 % (FLUSH) 0.9 %
10 SYRINGE (ML) INJECTION
Status: DISCONTINUED | OUTPATIENT
Start: 2020-02-14 | End: 2020-02-18 | Stop reason: HOSPADM

## 2020-02-14 RX ORDER — ONDANSETRON 2 MG/ML
INJECTION INTRAMUSCULAR; INTRAVENOUS
Status: DISCONTINUED | OUTPATIENT
Start: 2020-02-14 | End: 2020-02-14

## 2020-02-14 RX ORDER — OXYCODONE HYDROCHLORIDE 10 MG/1
10 TABLET ORAL EVERY 4 HOURS PRN
Status: DISCONTINUED | OUTPATIENT
Start: 2020-02-14 | End: 2020-02-18 | Stop reason: HOSPADM

## 2020-02-14 RX ORDER — ENOXAPARIN SODIUM 100 MG/ML
40 INJECTION SUBCUTANEOUS EVERY 24 HOURS
Status: DISCONTINUED | OUTPATIENT
Start: 2020-02-14 | End: 2020-02-18 | Stop reason: HOSPADM

## 2020-02-14 RX ORDER — CLINDAMYCIN PHOSPHATE 900 MG/50ML
INJECTION, SOLUTION INTRAVENOUS
Status: DISCONTINUED | OUTPATIENT
Start: 2020-02-14 | End: 2020-02-14

## 2020-02-14 RX ORDER — KETAMINE HCL IN 0.9 % NACL 50 MG/5 ML
SYRINGE (ML) INTRAVENOUS
Status: DISCONTINUED | OUTPATIENT
Start: 2020-02-14 | End: 2020-02-14

## 2020-02-14 RX ORDER — ROCURONIUM BROMIDE 10 MG/ML
INJECTION, SOLUTION INTRAVENOUS
Status: DISCONTINUED | OUTPATIENT
Start: 2020-02-14 | End: 2020-02-14

## 2020-02-14 RX ORDER — METHOCARBAMOL 500 MG/1
500 TABLET, FILM COATED ORAL 3 TIMES DAILY
Status: DISCONTINUED | OUTPATIENT
Start: 2020-02-14 | End: 2020-02-18 | Stop reason: HOSPADM

## 2020-02-14 RX ORDER — OXYCODONE HYDROCHLORIDE 5 MG/1
5 TABLET ORAL EVERY 4 HOURS PRN
Status: DISCONTINUED | OUTPATIENT
Start: 2020-02-14 | End: 2020-02-18 | Stop reason: HOSPADM

## 2020-02-14 RX ORDER — FENTANYL CITRATE 50 UG/ML
INJECTION, SOLUTION INTRAMUSCULAR; INTRAVENOUS
Status: DISCONTINUED | OUTPATIENT
Start: 2020-02-14 | End: 2020-02-14

## 2020-02-14 RX ORDER — DEXAMETHASONE SODIUM PHOSPHATE 4 MG/ML
INJECTION, SOLUTION INTRA-ARTICULAR; INTRALESIONAL; INTRAMUSCULAR; INTRAVENOUS; SOFT TISSUE
Status: DISCONTINUED | OUTPATIENT
Start: 2020-02-14 | End: 2020-02-14

## 2020-02-14 RX ORDER — VASOPRESSIN 20 [USP'U]/ML
INJECTION, SOLUTION INTRAMUSCULAR; SUBCUTANEOUS
Status: DISCONTINUED | OUTPATIENT
Start: 2020-02-14 | End: 2020-02-14

## 2020-02-14 RX ORDER — ACETAMINOPHEN 500 MG
1000 TABLET ORAL 3 TIMES DAILY
Status: DISCONTINUED | OUTPATIENT
Start: 2020-02-14 | End: 2020-02-14

## 2020-02-14 RX ORDER — LIDOCAINE HYDROCHLORIDE 20 MG/ML
INJECTION INTRAVENOUS
Status: DISCONTINUED | OUTPATIENT
Start: 2020-02-14 | End: 2020-02-14

## 2020-02-14 RX ORDER — GABAPENTIN 300 MG/1
300 CAPSULE ORAL 3 TIMES DAILY
Status: DISCONTINUED | OUTPATIENT
Start: 2020-02-14 | End: 2020-02-18

## 2020-02-14 RX ORDER — SUCCINYLCHOLINE CHLORIDE 20 MG/ML
INJECTION INTRAMUSCULAR; INTRAVENOUS
Status: DISCONTINUED | OUTPATIENT
Start: 2020-02-14 | End: 2020-02-14

## 2020-02-14 RX ORDER — PHENYLEPHRINE HYDROCHLORIDE 10 MG/ML
INJECTION INTRAVENOUS
Status: DISCONTINUED | OUTPATIENT
Start: 2020-02-14 | End: 2020-02-14

## 2020-02-14 RX ORDER — FENTANYL CITRATE 50 UG/ML
25 INJECTION, SOLUTION INTRAMUSCULAR; INTRAVENOUS EVERY 5 MIN PRN
Status: DISCONTINUED | OUTPATIENT
Start: 2020-02-14 | End: 2020-02-14 | Stop reason: HOSPADM

## 2020-02-14 RX ORDER — SODIUM CHLORIDE 9 MG/ML
INJECTION, SOLUTION INTRAVENOUS CONTINUOUS
Status: DISCONTINUED | OUTPATIENT
Start: 2020-02-14 | End: 2020-02-18 | Stop reason: HOSPADM

## 2020-02-14 RX ORDER — PROPOFOL 10 MG/ML
VIAL (ML) INTRAVENOUS
Status: DISCONTINUED | OUTPATIENT
Start: 2020-02-14 | End: 2020-02-14

## 2020-02-14 RX ORDER — ACETAMINOPHEN 325 MG/1
650 TABLET ORAL EVERY 8 HOURS PRN
Status: DISCONTINUED | OUTPATIENT
Start: 2020-02-14 | End: 2020-02-14

## 2020-02-14 RX ADMIN — SUCCINYLCHOLINE CHLORIDE 160 MG: 20 INJECTION, SOLUTION INTRAMUSCULAR; INTRAVENOUS at 11:02

## 2020-02-14 RX ADMIN — PHENYLEPHRINE HYDROCHLORIDE 200 MCG: 10 INJECTION INTRAVENOUS at 11:02

## 2020-02-14 RX ADMIN — PROPOFOL 50 MG: 10 INJECTION, EMULSION INTRAVENOUS at 11:02

## 2020-02-14 RX ADMIN — MIDAZOLAM HYDROCHLORIDE 1 MG: 1 INJECTION, SOLUTION INTRAMUSCULAR; INTRAVENOUS at 09:02

## 2020-02-14 RX ADMIN — PHENYLEPHRINE HYDROCHLORIDE 100 MCG: 10 INJECTION INTRAVENOUS at 11:02

## 2020-02-14 RX ADMIN — ROCURONIUM BROMIDE 10 MG: 10 INJECTION, SOLUTION INTRAVENOUS at 11:02

## 2020-02-14 RX ADMIN — PROPOFOL 100 MG: 10 INJECTION, EMULSION INTRAVENOUS at 11:02

## 2020-02-14 RX ADMIN — VASOPRESSIN 1 UNITS: 20 INJECTION INTRAVENOUS at 12:02

## 2020-02-14 RX ADMIN — ROCURONIUM BROMIDE 20 MG: 10 INJECTION, SOLUTION INTRAVENOUS at 12:02

## 2020-02-14 RX ADMIN — OXYCODONE HYDROCHLORIDE 10 MG: 10 TABLET ORAL at 09:02

## 2020-02-14 RX ADMIN — GABAPENTIN 300 MG: 300 CAPSULE ORAL at 09:02

## 2020-02-14 RX ADMIN — METHOCARBAMOL TABLETS 500 MG: 500 TABLET, COATED ORAL at 02:02

## 2020-02-14 RX ADMIN — LIDOCAINE HYDROCHLORIDE 80 MG: 20 INJECTION, SOLUTION INTRAVENOUS at 11:02

## 2020-02-14 RX ADMIN — ROCURONIUM BROMIDE 20 MG: 10 INJECTION, SOLUTION INTRAVENOUS at 11:02

## 2020-02-14 RX ADMIN — SODIUM CHLORIDE 0.5 MCG/KG/MIN: 9 INJECTION, SOLUTION INTRAVENOUS at 11:02

## 2020-02-14 RX ADMIN — LIDOCAINE HYDROCHLORIDE 0.3 MG: 10 INJECTION, SOLUTION EPIDURAL; INFILTRATION; INTRACAUDAL; PERINEURAL at 07:02

## 2020-02-14 RX ADMIN — CLINDAMYCIN PHOSPHATE 900 MG: 900 INJECTION INTRAVENOUS at 09:02

## 2020-02-14 RX ADMIN — CLINDAMYCIN PHOSPHATE 900 MG: 18 INJECTION, SOLUTION INTRAVENOUS at 11:02

## 2020-02-14 RX ADMIN — SODIUM CHLORIDE, SODIUM GLUCONATE, SODIUM ACETATE, POTASSIUM CHLORIDE, MAGNESIUM CHLORIDE, SODIUM PHOSPHATE, DIBASIC, AND POTASSIUM PHOSPHATE: .53; .5; .37; .037; .03; .012; .00082 INJECTION, SOLUTION INTRAVENOUS at 12:02

## 2020-02-14 RX ADMIN — VANCOMYCIN HYDROCHLORIDE 1 G: 1 INJECTION, POWDER, LYOPHILIZED, FOR SOLUTION INTRAVENOUS at 11:02

## 2020-02-14 RX ADMIN — SUGAMMADEX 400 MG: 100 INJECTION, SOLUTION INTRAVENOUS at 12:02

## 2020-02-14 RX ADMIN — FENTANYL CITRATE 50 MCG: 50 INJECTION INTRAMUSCULAR; INTRAVENOUS at 09:02

## 2020-02-14 RX ADMIN — ONDANSETRON 4 MG: 2 INJECTION INTRAMUSCULAR; INTRAVENOUS at 12:02

## 2020-02-14 RX ADMIN — DEXAMETHASONE SODIUM PHOSPHATE 4 MG: 4 INJECTION, SOLUTION INTRAMUSCULAR; INTRAVENOUS at 12:02

## 2020-02-14 RX ADMIN — FENTANYL CITRATE 100 MCG: 50 INJECTION, SOLUTION INTRAMUSCULAR; INTRAVENOUS at 11:02

## 2020-02-14 RX ADMIN — GABAPENTIN 300 MG: 300 CAPSULE ORAL at 02:02

## 2020-02-14 RX ADMIN — SODIUM CHLORIDE 1000 ML: 0.9 INJECTION, SOLUTION INTRAVENOUS at 07:02

## 2020-02-14 RX ADMIN — Medication 6 MG: at 09:02

## 2020-02-14 RX ADMIN — ENOXAPARIN SODIUM 40 MG: 100 INJECTION SUBCUTANEOUS at 05:02

## 2020-02-14 RX ADMIN — METHOCARBAMOL TABLETS 500 MG: 500 TABLET, COATED ORAL at 09:02

## 2020-02-14 RX ADMIN — Medication 30 MG: at 11:02

## 2020-02-14 NOTE — PROGRESS NOTES
Pt arrive to floor via stretcher escorted by staff. Bilateral slings in place to upper extremities. Aquacel dressing in place to R shoulder clean dry intact. Pt AAO x 4. SCD in place. Call light within reach. Bed in lowest position.

## 2020-02-14 NOTE — PROGRESS NOTES
Patient transferred to [facility] to block area. Report given to Arabella oGre RN

## 2020-02-14 NOTE — ANESTHESIA PREPROCEDURE EVALUATION
[facility]  Anesthesia Pre-Operative Evaluation         Patient Name: Ben Melvin  YOB: 1960  MRN: [id number]    SUBJECTIVE:     Pre-operative evaluation for Procedure(s) (LRB):  ORIF, FRACTURE, HUMERUS, PROXIMAL - ORIF R greater tuberosity - suture repair. Trios, supine, fiberwire (Right Arm Upper)       02/14/2020    Ben Melvin is a 60 y.o. male w/ a significant PMHx of HTN, pAF (on warfarin; currently held), COPD, GERD, T2DM, EtOH cirrhosis, CAD, PAD, current smoker (>140 pack-year hx) who presented with SDH and BL humeral fracture/dislocation after a reported seizure (now s/p L shoulder hemiarthroplasty on 1/28).  Pt remained intubated following surgery on 1/28.    HDS.      Patient now presents for the above procedure(s).      LDA:        Peripheral IV - Single Lumen 01/27/20 1625 18 G Left Wrist (Active)   Site Assessment Clean;Dry;Intact;No redness;No swelling 1/28/2020 11:00 PM   Line Status Saline locked 1/28/2020 11:00 PM   Dressing Status Intact;Dry;Clean 1/28/2020 11:00 PM   Dressing Intervention Dressing reinforced 1/28/2020 11:00 PM   Dressing Change Due 01/31/20 1/28/2020 11:00 PM   Site Change Due 01/31/20 1/28/2020 11:00 PM   Reason Not Rotated Not due 1/28/2020 11:00 PM   Number of days: 1            Peripheral IV - Single Lumen 01/27/20 1913 18 G Right Hand (Active)   Site Assessment Clean 1/29/2020 11:01 AM   Line Status No blood return;Flushed;Infusing 1/29/2020 11:01 AM   Dressing Status Clean;Dry;Intact 1/29/2020 11:01 AM   Dressing Intervention Dressing reinforced 1/29/2020 11:01 AM   Dressing Change Due 01/31/20 1/29/2020 11:01 AM   Site Change Due 01/31/20 1/29/2020 11:01 AM   Reason Not Rotated Not due 1/29/2020 11:01 AM   Number of days: 1            Peripheral IV - Single Lumen 01/28/20 1637 16 G Right Forearm (Active)   Site Assessment Clean;Dry;Intact 1/29/2020 11:01 AM   Line Status No blood return;Infusing 1/29/2020 11:01 AM   Dressing  "Status Clean;Dry;Intact 1/29/2020 11:01 AM   Dressing Intervention Dressing reinforced 1/29/2020 11:01 AM   Dressing Change Due 02/01/20 1/29/2020 11:01 AM   Site Change Due 02/01/20 1/29/2020 11:01 AM   Reason Not Rotated Not due 1/29/2020 11:01 AM   Number of days: 0            NG/OG Tube 01/28/20 2200 Center mouth (Active)   Placement Check placement verified by aspirate characteristics;placement verified by distal tube length measurement;placement verified by x-ray 1/29/2020 11:01 AM   Tolerance no signs/symptoms of discomfort 1/29/2020 11:01 AM   Securement secured to commercial device 1/29/2020 11:01 AM   Clamp Status/Tolerance unclamped 1/29/2020 11:01 AM   Suction Setting/Drainage Method suction at the bedside 1/28/2020 11:00 PM   Insertion Site Appearance no redness, warmth, tenderness, skin breakdown, drainage 1/29/2020 11:01 AM   Drainage None 1/28/2020 11:00 PM   Flush/Irrigation flushed w/;water;no resistance met 1/29/2020 11:01 AM   Feeding Method continuous 1/29/2020 11:01 AM   Feeding Action feeding restarted 1/29/2020 11:01 AM   Current Rate (mL/hr) 10 mL/hr 1/29/2020 11:01 AM   Goal Rate (mL/hr) 60 mL/hr 1/29/2020 11:01 AM   Formula Name impact peptide 1/29/2020 11:01 AM   Intake (mL) - Formula Tube Feeding 10 1/29/2020  1:01 PM   Number of days: 0            Urethral Catheter 01/28/20 1630 Non-latex;Straight-tip 16 Fr. (Active)   Site Assessment Clean;Intact 1/29/2020 11:01 AM   Collection Container Urimeter 1/29/2020 11:01 AM   Securement Method secured to top of thigh w/ adhesive device 1/29/2020 11:01 AM   Catheter Care Performed yes 1/29/2020 11:01 AM   Reason for Continuing Urinary Catheterization Post operative 1/29/2020 11:01 AM   CAUTI Prevention Bundle StatLock in place w 1" slack;Intact seal between catheter & drainage tubing;Drainage bag/urimeter off the floor;No dependent loops or kinks;Green sheeting clip in use;Drainage bag/urimeter not overfilled (<2/3 full);Drainage bag/urimeter " below bladder 1/29/2020 11:01 AM   Output (mL) 150 mL 1/29/2020  1:01 PM   Number of days: 0       Prev airway:   Performed by: Bear Trevino CRNA  Authorized by: David Gibbs MD      Intubation:     Induction:  Intravenous    Intubated:  Postinduction    Mask Ventilation:  Moderately difficult with oral airway    Attempts:  1    Attempted By:  CRNA    Method of Intubation:  Video laryngoscopy    Blade:  Villa 4    Laryngeal View Grade: Grade I - full view of chords      Difficult Airway Encountered?: No      Complications:  None    Airway Device:  Oral endotracheal tube    Airway Device Size:  8.0    Style/Cuff Inflation:  Cuffed (inflated to minimal occlusive pressure)    Inflation Amount (mL):  8    Tube secured:  21    Secured at:  The lips    Placement Verified By:  Capnometry    Complicating Factors:  None       Drips:       Patient Active Problem List   Diagnosis    Current every day smoker    Cardiomyopathy    ASCVD (arteriosclerotic cardiovascular disease)    Elevated LFTs    Hyperlipidemia    Proteinuria    PVD (peripheral vascular disease)    COPD (chronic obstructive pulmonary disease)    Alcohol abuse    Cirrhosis of liver    Venous insufficiency of both lower extremities    Long term (current) use of anticoagulants    PAF (paroxysmal atrial fibrillation)    Essential hypertension    Peripheral autonomic neuropathy due to DM    Adenoma of right adrenal gland    GERD with esophagitis    BPH (benign prostatic hyperplasia)    Lumbar disc disease with radiculopathy    Chronic anxiety    Major depression, recurrent, chronic    History of epididymitis    Encounter for therapeutic drug monitoring    Metabolic syndrome    Encounter for long-term (current) use of medications    Screening for malignant neoplasm of prostate    Generalized osteoarthrosis, involving multiple sites    Abnormal CAT scan    Shortness of breath    Multiple vitamin deficiency disease    Chronic fatigue  and malaise    Vitamin D deficiency    Diabetes 1.5, managed as type 2    Diabetes mellitus due to underlying condition with diabetic autonomic neuropathy, without long-term current use of insulin    Hereditary and idiopathic neuropathy     Other long term (current) drug therapy     Abnormal computed tomography of esophagus    Dietary counseling    Person consulting for explanation of examination or test findings    Insomnia    Salpingitis of Eustachian tube, right    Fracture dislocation of joint of left shoulder girdle    Closed fracture of both humeri    Acute bilateral low back pain    Subdural hematoma     fracture dislocation of bilateral shoulders    Dislocation of left shoulder joint    Dislocation of right shoulder joint    Closed displaced fracture of acromial process of right scapula    Closed displaced fracture of acromial process of left scapula    Acute blood loss anemia    Constipation    Proximal humeral fracture       Review of patient's allergies indicates:  No Known Allergies    Current Inpatient Medications:      Current Facility-Administered Medications on File Prior to Visit   Medication Dose Route Frequency Provider Last Rate Last Dose    0.9%  NaCl infusion   Intravenous Continuous Breanna Gannon MD 10 mL/hr at 02/14/20 0734 1,000 mL at 02/14/20 0734    acetaminophen tablet 650 mg  650 mg Oral Q8H PRN Aram Cheung MD        melatonin tablet 6 mg  6 mg Oral Nightly PRN Aram Cheung MD        morphine injection 3 mg  3 mg Intravenous Q3H PRN Aram Cheung MD        ondansetron disintegrating tablet 8 mg  8 mg Oral Q8H PRN Aram Cheung MD        oxyCODONE-acetaminophen  mg per tablet 1 tablet  1 tablet Oral Q4H PRN Aram Cheung MD        sodium chloride 0.9% flush 10 mL  10 mL Intravenous PRN Aram Cheung MD         Current Outpatient Medications on File Prior to Visit   Medication Sig Dispense Refill    amitriptyline (ELAVIL) 50 MG  tablet Take 1 tablet (50 mg total) by mouth every evening. 30 tablet 11    aspirin 81 MG Chew Take 162 mg by mouth once daily.      atorvastatin (LIPITOR) 80 MG tablet Take 80 mg by mouth every evening.  11    BASAGLAR KWIKPEN U-100 INSULIN glargine 100 units/mL (3mL) SubQ pen INJECT 25 UNITS DAILY 15 Syringe 5    bisacodyL (DULCOLAX) 10 mg Supp Place 1 suppository (10 mg total) rectally daily as needed.  0    celecoxib (CELEBREX) 200 MG capsule Take 1 capsule (200 mg total) by mouth once daily. 30 capsule 0    cyanocobalamin (VITAMIN B-12) 1000 MCG tablet Take 100 mcg by mouth once daily.      dextromethorphan-guaifenesin  mg (MUCINEX DM)  mg per 12 hr tablet Take 1 tablet by mouth 2 (two) times daily. for 10 days 20 tablet 0    ergocalciferol (ERGOCALCIFEROL) 50,000 unit Cap Take 1 capsule (50,000 Units total) by mouth every 7 days. 7 capsule 0    insulin aspart U-100 (NOVOLOG) 100 unit/mL (3 mL) InPn pen Inject 1-10 Units into the skin before meals and at bedtime as needed (Hyperglycemia). 3 mL 0    ipratropium (ATROVENT) 0.02 % nebulizer solution Take 2.5 mLs (500 mcg total) by nebulization every 4 (four) hours as needed for Wheezing. Rescue 1 Box 0    levalbuterol (XOPENEX) 0.63 mg/3 mL nebulizer solution Take 3 mLs (0.63 mg total) by nebulization every 8 (eight) hours. Rescue 270 mL 11    levETIRAcetam (KEPPRA) 1000 MG tablet Take 1 tablet (1,000 mg total) by mouth 2 (two) times daily. 60 tablet 11    metFORMIN (GLUCOPHAGE) 1000 MG tablet Take 1 tablet (1,000 mg total) by mouth daily with breakfast. 90 tablet 3    methocarbamol (ROBAXIN) 750 MG Tab Take 1 tablet (750 mg total) by mouth 3 (three) times daily. for 10 days 30 tablet 0    metoprolol tartrate (LOPRESSOR) 25 MG tablet Take 25 mg by mouth 2 (two) times daily.  11    multivitamin with minerals tablet Take 1 tablet by mouth once daily.      nicotine (NICODERM CQ) 21 mg/24 hr Place 1 patch onto the skin once daily. 7 patch 0     oxyCODONE (ROXICODONE) 5 MG immediate release tablet Take 1 tablet (5 mg total) by mouth every 4 (four) hours as needed (pain 1-10). 30 tablet 0    pantoprazole (PROTONIX) 40 MG tablet Take 1 tablet (40 mg total) by mouth once daily. 30 tablet 11    polyethylene glycol (GLYCOLAX) 17 gram PwPk Take 17 g by mouth 2 (two) times daily. 10 each 0    ramipril (ALTACE) 5 MG capsule TAKE 1 CAPSULE (5 MG) BY ORAL ROUTE ONCE DAILY  5    senna-docusate 8.6-50 mg (PERICOLACE) 8.6-50 mg per tablet Take 2 tablets by mouth 2 (two) times daily. 30 tablet 0    TRADJENTA 5 mg Tab tablet TAKE 1 TABLET BY MOUTH EVERY DAY 30 tablet 5       Past Surgical History:   Procedure Laterality Date    biopsy back  05/20/2019    benign Martinez    biopsy right ankle Right 05/20/2019    Martinez, benign    COLONOSCOPY N/A 6/13/2019    Procedure: COLONOSCOPY;  Surgeon: Delmer Kerr MD;  Location: [facility];  Service: Endoscopy;  Laterality: N/A;    COLONOSCOPY W/ POLYPECTOMY      EPIDURAL STEROID INJECTION INTO LUMBAR SPINE  06/2015    LASHAE Harvey    ESOPHAGOGASTRODUODENOSCOPY N/A 6/13/2019    Procedure: ESOPHAGOGASTRODUODENOSCOPY (EGD);  Surgeon: Delmer Kerr MD;  Location: [facility];  Service: Endoscopy;  Laterality: N/A;    INTESTINAL MALROTATION REPAIR Right 1960    2 weeks old, Martinez    PARTIAL ARTHROPLASTY OF SHOULDER Left 1/28/2020    Procedure: HEMIARTHROPLASTY, SHOULDER;  Surgeon: Chandler Chris MD;  Location: [facility] OR [address] [place] [address];  Service: Orthopedics;  Laterality: Left;    PARTIAL ARTHROPLASTY OF SHOULDER Right 1/30/2020    Procedure: HEMIARTHROPLASTY, SHOULDER- RIGHT- MEKA- SUPINE-  TRIOS;  Surgeon: Chandler Chris MD;  [facility];  Service: Orthopedics;  Laterality: Right;    PERCUTANEOUS TRANSLUMINAL ANGIOPLASTY (PTA) OF PERIPHERAL VESSEL Right 12/2014    NIKOLAS Childs       Social History     Socioeconomic History    Marital status:      Spouse name: Eboni Rhodes xDashawn  years    Number of children: 1    Years of education: Not on file    Highest education level: High school graduate   Occupational History    Occupation: Retired  x 37 years     Comment: [employer]   Social Needs    Financial resource strain: Not on file    Food insecurity:     Worry: Not on file     Inability: Not on file    Transportation needs:     Medical: Not on file     Non-medical: Not on file   Tobacco Use    Smoking status: Current Every Day Smoker     Packs/day: 9.00     Years: 40.00     Pack years: 360.00     Types: Cigarettes    Smokeless tobacco: Never Used    Tobacco comment: smokes parts all day long continuously   Substance and Sexual Activity    Alcohol use: Not Currently     Frequency: Never     Comment: quit 2 years ago was heavily    Drug use: Never    Sexual activity: Yes     Partners: Female   Lifestyle    Physical activity:     Days per week: Not on file     Minutes per session: Not on file    Stress: Not at all   Relationships    Social connections:     Talks on phone: Not on file     Gets together: Not on file     Attends [employer] service: Not on file     Active member of club or organization: Not on file     Attends meetings of clubs or organizations: Not on file     Relationship status: Not on file   Other Topics Concern    Not on file   Social History Narrative    Not on file       OBJECTIVE:     Vital Signs Range (Last 24H):  Temp:  [36.6 °C (97.9 °F)]   Pulse:  [92]   Resp:  [18]   BP: (93)/(47)   SpO2:  [95 %]       Significant Labs:  Lab Results   Component Value Date    WBC 11.16 02/07/2020    HGB 10.0 (L) 02/07/2020    HCT 33.3 (L) 02/07/2020     02/07/2020    CHOL 122 01/27/2020    CHOL 122 01/27/2020    TRIG 195 (H) 01/27/2020    TRIG 195 (H) 01/27/2020    HDL 22 (L) 01/27/2020    HDL 22 (L) 01/27/2020    ALT 95 (H) 02/07/2020    AST 91 (H) 02/07/2020     02/07/2020    K 4.4 02/07/2020     02/07/2020    CREATININE 1.0  02/07/2020    BUN 24 (H) 02/07/2020    CO2 28 02/07/2020    TSH 0.667 01/27/2020    TSH 0.667 01/27/2020    PSA 0.33 06/03/2019    INR 1.2 01/28/2020    HGBA1C 5.9 (H) 01/27/2020       Diagnostic Studies: No relevant studies.    EKG:   Results for orders placed or performed in visit on 01/27/20   EKG 12-lead    Collection Time: 01/27/20  9:24 PM    Narrative    Test Reason : R00.0    Vent. Rate : 109 BPM     Atrial Rate : 109 BPM     P-R Int : 146 ms          QRS Dur : 084 ms      QT Int : 376 ms       P-R-T Axes : 074 071 072 degrees     QTc Int : 506 ms    Sinus tachycardia  Abnormal ECG  When compared with ECG of 27-JAN-2020 21:23,  No significant change was found  Confirmed by HANY ANGELES MD ([id number]) on 1/28/2020 10:55:16 AM    Referred By: JHONNY LUGO           Confirmed By:HANY ANGELES MD       2D ECHO:  TTE:  Results for orders placed or performed during the hospital encounter of 01/27/20   Echo Color Flow Doppler? Yes   Result Value Ref Range    BSA 2.35 m2    TDI SEPTAL 0.09 m/s    LA WIDTH 3.51 cm    TDI LATERAL 0.04 m/s    LVIDD 5.10 3.5 - 6.0 cm    IVS 1.10 (A) 0.6 - 1.1 cm    PW 1.10 0.6 - 1.1 cm    LVIDS 3.29 2.1 - 4.0 cm    FS 35 28 - 44 %    LA volume 50.77 cm3    Sinus 3.07 cm    STJ 3.20 cm    Ascending aorta 3.36 cm    LV mass 213.90 g    LA size 3.42 cm    RVDD 3.43 cm    TAPSE 2.42 cm    Left Ventricle Relative Wall Thickness 0.43 cm    AV mean gradient 4 mmHg    AV valve area 2.37 cm2    AV Velocity Ratio 0.63     AV index (prosthetic) 0.73     Mean e' 0.07 m/s    LVOT diameter 2.03 cm    LVOT area 3.2 cm2    LVOT peak shaheed 0.81 m/s    LVOT peak VTI 14.94 cm    Ao peak shaheed 1.29 m/s    Ao VTI 20.43 cm    LVOT stroke volume 48.33 cm3    AV peak gradient 7 mmHg    LV Systolic Volume 43.79 mL    LV Systolic Volume Index 19.1 mL/m2    LV Diastolic Volume 131.71 mL    LV Diastolic Volume Index 57.39 mL/m2    LA Volume Index 22.1 mL/m2    LV Mass Index 93 g/m2    RA Major Axis 4.03 cm     Left Atrium Minor Axis 5.12 cm    Left Atrium Major Axis 4.84 cm    RA Width 2.74 cm    Right Atrial Pressure (from IVC) 3 mmHg    Narrative    · Technically challenging portable study.  · Normal left ventricular systolic function. The estimated ejection   fraction is 60%.  · Concentric left ventricular remodeling.  · Normal LV diastolic function.  · Normal right ventricular systolic function.  · Normal central venous pressure (3 mmHg).  · Anterior pericardial effusion with echodensity adherent to the anterior   surface.          EMANI:  No results found for this or any previous visit.    ASSESSMENT/PLAN:                                                                                                                  02/14/2020  Ben Melvin is a 60 y.o., male.    Pre-op Assessment    I have reviewed the Patient Summary Reports.     I have reviewed the Nursing Notes.   I have reviewed the Medications.     Review of Systems  Anesthesia Hx:  No problems with previous Anesthesia  History of prior surgery of interest to airway management or planning: Denies Family Hx of Anesthesia complications.   Denies Personal Hx of Anesthesia complications.   Social:  Smoker, Social Alcohol Use  Alcohol Use:   Alcohol Abuse: (Hx of)   Hematology/Oncology:         -- Anemia:   Cardiovascular:   Hypertension, well controlled CAD  Dysrhythmias atrial fibrillation PVD hyperlipidemia DE LA CRUZ ECG has been reviewed.    Cardiomyopathy  Chronic Venous Insufficiency, bilateral lower extremity  Peripheral Arterial Disease Aorto-Iliac disease, s/p stent, right  Disorder of Cardiac Rhythm, on warfarin Rx    Pulmonary:   COPD Shortness of breath  Chronic Obstructive Pulmonary Disease (COPD):  is secondary to smoking. Inhaler use is maintenance inhaler daily.    Renal/:   BPH    Hepatic/GI:   Liver Disease,  Liver Disease, Abnormal Liver Enzymes , Cirrhosis    Musculoskeletal:   Arthritis   Lumbar Spine Disorders, Lumbar Disc Disease, Radiculopathy    Neurological:   Neuromuscular Disease, Lumbar disc disease, radiculopathy.   Endocrine:   Diabetes, well controlled, type 2  Diabetes, Type 2 Diabetes for 4 years , Complications include Diabetic Neuropathy, peripheral sensory neuropathy , controlled by insulin, diet, oral hypoglycemics.    Psych:   Psychiatric History anxiety depression          Physical Exam  General:  Well nourished, Obesity    Airway/Jaw/Neck:  Airway Findings: Mouth Opening: Normal Tongue: Normal  Pre-Existing Airway Tube(s): Oral Endotracheal tube  Size: 8.0  General Airway Assessment: Adult  Mallampati: III  Improves to II with phonation.  TM Distance: Normal, at least 6 cm  Jaw/Neck Findings:  Neck ROM: Normal ROM  Neck Findings: (neck brace)  Girth Increased     Eyes/Ears/Nose:  EYES/EARS/NOSE FINDINGS: Normal   Dental:  Dental Findings: In tact   Chest/Lungs:  Chest/Lungs Findings: Clear to auscultation, Normal Respiratory Rate     Heart/Vascular:  Heart Findings: Rate: Normal  Rhythm: Regular Rhythm  Sounds: Normal        Mental Status:  Mental Status Findings:  Somnolent, Cooperative         Anesthesia Plan  Type of Anesthesia, risks & benefits discussed:  Anesthesia Type:  general  Patient's Preference:   Intra-op Monitoring Plan: standard ASA monitors  Intra-op Monitoring Plan Comments:   Post Op Pain Control Plan: multimodal analgesia and IV/PO Opioids PRN  Post Op Pain Control Plan Comments:   Induction:   IV  Beta Blocker:  Patient is on a Beta-Blocker and has received one dose within the past 24 hours (No further documentation required).       Informed Consent: Patient representative understands risks and agrees with Anesthesia plan.  Questions answered. Anesthesia consent signed with patient representative.  ASA Score: 3     Day of Surgery Review of History & Physical:    H&P update referred to the surgeon.         Ready For Surgery From Anesthesia Perspective.

## 2020-02-14 NOTE — PROGRESS NOTES
Patient arrived via wheelchair , accompanied by wife, transported by EMS form [facility]. Both upper extremities secured in slings. Pain 3/10 , generalized, per patient. Large bruising areas noted to both shoulders, aquacell dressing to shoulders bilaterally clean, dry and intact. Patient assisted with changing into gown, and getting onto stretcher. Wife at bedside at all times, educated on use of call bell. Verbalized understanding. Safety maintained.

## 2020-02-14 NOTE — PROGRESS NOTES
Resting in bed, no distress noted, wife at bedside. Patient voided 400 ml of clear, hima urine. repositioned in bedside for comfort. No additional needs verbalized. Fall precautions in use. Monitoring ongoing.

## 2020-02-14 NOTE — TRANSFER OF CARE
Anesthesia Transfer of Care Note    Patient: Ben Melvin    Procedure(s) Performed: Procedure(s) (LRB):  ORIF, FRACTURE, HUMERUS, PROXIMAL - ORIF R greater tuberosity - suture repair. Trios, supine, fiberwire (Right)    Patient location: PACU    Anesthesia Type: general    Transport from OR: Transported from OR on 6-10 L/min O2 by face mask with adequate spontaneous ventilation    Post pain: adequate analgesia    Post assessment: no apparent anesthetic complications and tolerated procedure well    Post vital signs: stable    Level of consciousness: sedated and responds to stimulation    Nausea/Vomiting: no nausea/vomiting    Complications: none    Transfer of care protocol was followed      Last vitals:   Visit Vitals  BP (!) 106/53 (BP Location: Left arm, Patient Position: Lying)   Pulse 101   Temp 36.4 °C (97.5 °F) (Temporal)   Resp 18   Wt 120.2 kg (265 lb)   SpO2 100%   BMI 36.96 kg/m²

## 2020-02-14 NOTE — ANESTHESIA PROCEDURE NOTES
Intubation  Performed by: Sadie Doran CRNA  Authorized by: Nicolás Delacruz MD     Intubation:     Induction:  Intravenous    Intubated:  Postinduction    Mask Ventilation:  Easy mask    Attempts:  1    Attempted By:  CRNA    Method of Intubation:  Direct    Blade:  Villa 3    Laryngeal View Grade: Grade I - full view of chords      Difficult Airway Encountered?: No      Complications:  None    Airway Device:  Oral endotracheal tube    Airway Device Size:  7.5    Style/Cuff Inflation:  Cuffed    Secured at:  The lips    Placement Verified By:  Capnometry    Complicating Factors:  Obesity and short neck    Findings Post-Intubation:  BS equal bilateral and atraumatic/condition of teeth unchanged

## 2020-02-14 NOTE — H&P
CC:  R shoulder GT fx      HISTORY       HPI:  60-year-old male, obese, AFib on anticoagulation, peripheral vascular disease, hypertension, COPD, current smoker, liver cirrhosis who sustained a witnessed seizure while playing video poker 01/27/2020.  He fell from his chair.  He was brought to emergency department at outside facility.  Injuries identified were bilateral proximal humerus fracture dislocations, bilateral acromial fractures, subdural hemorrhage, L1 and L4 burst fractures.      01/28/2020 - left shoulder hemiarthroplasty, biceps tenodesis     01/30/2020 - right shoulder hemiarthroplasty, biceps tenodesis     Now at [facility]     Has had difficulties with maintaining his bilateral shoulder slings, which indeed is a difficult situation causing him difficulties with activities of daily living such as eating and transfers.     Has preferentially removed his right shoulder sling to assist in these activities per discussion with his wife and patient     02/10/2020 - obtained follow-up x-rays of bilateral shoulders which demonstrated unchanged appearance of left shoulder arthroplasty. The right shoulder arthroplasty was in good position, however there appeared to be failure of the repair of the greater tuberosity on the right shoulder.    I discussed this case with my shoulder colleagues, and then call to discuss with the patient and his wife while there at rehab.     Counseled patient and his wife on the current status of his bilateral shoulders.  I have concern that there is failure of tuberosity repair on the right shoulder.     We discussed multiple options     - do nothing now, let him recover from his multiple injuries including L1/L4 burst fracture, subdural hemorrhage, bilateral shoulder replacements.  Let him progress his rehab and see how his right shoulder does.  I anticipate that his tuberosity will not heal and in a functional position and he will have difficulty with elevation of shoulder  from here and into the foreseeable future.  In the future, after recovered from these acute injuries, can consider conversion of this fracture stem to a reverse total shoulder arthroplasty, which should provide him with improved shoulder function. At this time this is not an option due to his coexisting bilateral acromial avulsion fractures from his seizure     - perform revision fixation of the right greater tuberosity.  This would require return trip to the operating room. A prefer to do this sooner rather than later to prevent further retraction of the greater tuberosity.     After discussion with the patient and his wife decision made to go forward with revision fixation of his right greater tuberosity fracture.     Plan is for transfer from [facility] this Friday, 02/14/2020 for same-day surgery at [facility].  We will perform a suture repair of the greater tuberosity, with planned discharge back to [facility] the same day.     This will reset the clock on right tuberosity healing, with further postop plans detailed in operative note     The risks, benefits, and alternatives to surgery were discussed with the patient and/or family.    Specific risks discussed included, but were not limited to:  Failure of fixation, damage to nearby neurovascular structures, pain, dislocation, periprosthetic fracture,  need to convert to later total shoulder arthroplasty or reverse total shoulder arthroplasty, rotator cuff tear weakness, failure of tuberosity healing, periprosthetic joint infection, need for multiple staged procedures, damage to nearby structures, including neurovascular structures leading to loss of function or loss of limb, bleeding, pain, numbness, tingling, weakness, compartment syndrome, malunion/nonunion, hardware failure, hardware prominence, infection, need for multiple staged procedures, prolonged antibiotics, iatrogenic fracture, heterotopic ossification, arthritis, a variety of medical  complications including but not limited to heart attack, stroke, deep venous thrombosis, pulmonary embolism, prolonged hospitalization, prolonged intubation, and death.   Patient and/or family expressed an understanding and desires to proceed with surgery.   All questions were answered.  No guarantees were implied or stated.  Informed consent was obtained.     ROS:  Constitutional: Denies fever/chills  Neurological: Denies numbness/tingling (any exceptions noted in orthopaedic exam)   Psychiatric/Behavioral: Denies change in normal mood  Eyes: Denies change in vision  Cardiovascular: Denies chest pain  Respiratory: Denies shortness of breath  Hematologic/Lymphatic: Denies easy bleeding/bruising   Skin: Denies new rash or skin lesions   Gastrointestinal: Denies nausea/vomitting/diarrhea, change in bowel habits, abdominal pain   Allergic/Immunologic: Denies adverse reactions to current medications  Musculoskeletal: see HPI - has back pain at known fx. Occasional radicular pain in BLE    PAST MEDICAL HISTORY:   Past Medical History:   Diagnosis Date    Adenoma of right adrenal gland 11/09/2017    Alcohol abuse     ASCVD (arteriosclerotic cardiovascular disease) 10/2008    [place]    BPH (benign prostatic hyperplasia)     Cardiomyopathy     Chronic anxiety     Cirrhosis of liver     COPD (chronic obstructive pulmonary disease)     Current every day smoker     2-3 ppd    Elevated LFTs 08/2001    GERD with esophagitis 11/09/2017    History of colon polyps     History of epididymitis 2016    Juan Antonio    Hyperlipidemia     Hypertension, essential     Long term (current) use of anticoagulants     Lumbar disc disease with radiculopathy 05/2015    Major depression, recurrent, chronic     PAF (paroxysmal atrial fibrillation)     Peripheral autonomic neuropathy due to DM     Proteinuria     PVD (peripheral vascular disease) 05/29/2009    Venous insufficiency of both lower extremities     Vitamin D  deficiency      PAST SURGICAL HISTORY:   Past Surgical History:   Procedure Laterality Date    biopsy back  05/20/2019    benign Martinez    biopsy right ankle Right 05/20/2019    Martinez, benign    COLONOSCOPY N/A 6/13/2019    Procedure: COLONOSCOPY;  Surgeon: Delmer Kerr MD;  [facility]  Service: [facility];  Laterality: N/A;    COLONOSCOPY W/ POLYPECTOMY      EPIDURAL STEROID INJECTION INTO LUMBAR SPINE  06/2015    LASHAE Harvey    ESOPHAGOGASTRODUODENOSCOPY N/A 6/13/2019    Procedure: ESOPHAGOGASTRODUODENOSCOPY (EGD);  Surgeon: Delmer Kerr MD;  [facility];  Service: Endoscopy;  Laterality: N/A;    INTESTINAL MALROTATION REPAIR Right 1961    2 weeks old, Martinez    PARTIAL ARTHROPLASTY OF SHOULDER Left 1/28/2020    Procedure: HEMIARTHROPLASTY, SHOULDER;  Surgeon: Chandler Chris MD;  [facility];  Service: Orthopedics;  Laterality: Left;    PARTIAL ARTHROPLASTY OF SHOULDER Right 1/30/2020    Procedure: HEMIARTHROPLASTY, SHOULDER- RIGHT- MEKA- SUPINE-  TRIOS;  Surgeon: Chandler Chris MD;  [facility] OR [facility]LR;  Service: Orthopedics;  Laterality: Right;    PERCUTANEOUS TRANSLUMINAL ANGIOPLASTY (PTA) OF PERIPHERAL VESSEL Right 12/2014    NIKOLAS Childs     FAMILY HISTORY:   Family History   Problem Relation Age of Onset    Diabetes Mother     Hypertension Mother     Hypertension Father     Acromegaly Father     Heart attack Father     Kidney cancer Brother      SOCIAL HISTORY:   Social History     Socioeconomic History    Marital status:      Spouse name: Eboni Rhodes x37 years    Number of children: 1    Years of education: Not on file    Highest education level: High school graduate   Occupational History    Occupation: Retired  x 37 years     Comment: [employer] [place]   Social Needs    Financial resource strain: Not on file    Food insecurity:     Worry: Not on file     Inability: Not on file     Transportation needs:     Medical: Not on file     Non-medical: Not on file   Tobacco Use    Smoking status: Current Every Day Smoker     Packs/day: 9.00     Years: 40.00     Pack years: 360.00     Types: Cigarettes    Smokeless tobacco: Never Used    Tobacco comment: smokes parts all day long continuously   Substance and Sexual Activity    Alcohol use: Not Currently     Frequency: Never     Comment: quit 2 years ago was heavily    Drug use: Never    Sexual activity: Yes     Partners: Female   Lifestyle    Physical activity:     Days per week: Not on file     Minutes per session: Not on file    Stress: Not at all   Relationships    Social connections:     Talks on phone: Not on file     Gets together: Not on file     Attends Restorationism service: Not on file     Active member of club or organization: Not on file     Attends meetings of clubs or organizations: Not on file     Relationship status: Not on file   Other Topics Concern    Not on file   Social History Narrative    Not on file     MEDICATIONS:   Current Facility-Administered Medications:     0.9%  NaCl infusion, , Intravenous, Continuous, Breanna Gannon MD, Last Rate: 10 mL/hr at 02/14/20 0734, 1,000 mL at 02/14/20 0734    acetaminophen tablet 650 mg, 650 mg, Oral, Q8H PRN, Aram Cheung MD    melatonin tablet 6 mg, 6 mg, Oral, Nightly PRN, Aram Cheung MD    morphine injection 3 mg, 3 mg, Intravenous, Q3H PRN, Aram Cheung MD    ondansetron disintegrating tablet 8 mg, 8 mg, Oral, Q8H PRN, Aram Cheung MD    oxyCODONE-acetaminophen  mg per tablet 1 tablet, 1 tablet, Oral, Q4H PRN, Aram Cheung MD    sodium chloride 0.9% flush 10 mL, 10 mL, Intravenous, PRN, Aram Cheung MD  ALLERGIES: Review of patient's allergies indicates:  No Known Allergies      EXAM      VITAL SIGNS:   BP (!) 93/47 (BP Location: Right leg, Patient Position: Lying)   Pulse 92   Temp 97.9 °F (36.6 °C) (Temporal)   Resp 18   Wt 120.2 kg  (265 lb)   SpO2 95%   BMI 36.96 kg/m²       PE:  General:  no acute distress, appears stated age    Neuro: alert and oriented x3  Psych: normal mood  Head: normocephalic, atraumatic.   Eyes: no scleral icterus  Mouth: moist mucous membranes  Cardiovascular: extremities warm and well perfused  Lungs: breathing comfortably, equal chest rise bilat  Skin: clean, dry, intact (any exceptions noted in below musculoskeletal exam)    Musculoskeletal:  RUE:  Incision well healed ant shoulder, no SOI  Appropriate postop TTP  Motor  Fires deltoid 1/5  4/5 bicep, tricep  5/5 wrist flexion/extension, finger flexion/extension, interossei  Sensation intact axillary, radial, median, ulnar nerves  Palp rad pulse, BCR  ROM deferred    LUE:  Incision well healed ant shoulder, no SOI  Appropriate postop TTP  Motor  Fires deltoid 1/5  4/5 bicep, tricep  5/5 wrist flexion/extension, finger flexion/extension, interossei  Sensation intact axillary, radial, median, ulnar nerves  Palp rad pulse, BCR  ROM deferred      XRAYS:  X-ray right shoulder demonstrate right hemiarthroplasty in appropriate position with failure fixation of the greater tuberosity fracture fragment which has pulled superiorly.   (I independently reviewed and interpreted the above imaging)    MEDICAL DECISION MAKING       60-year-old male, obese, AFib on anticoagulation, peripheral vascular disease, hypertension, COPD, current smoker, liver cirrhosis who sustained a witnessed seizure while playing video poker 01/27/2020.  He fell from his chair.  He was brought to emergency department at outside facility.  Injuries identified were bilateral proximal humerus fracture dislocations, bilateral acromial fractures, subdural hemorrhage, L1 and L4 burst fractures.      01/28/2020 - left shoulder hemiarthroplasty, biceps tenodesis     01/30/2020 - right shoulder hemiarthroplasty, biceps tenodesis     Now at [facility]     Has had difficulties with maintaining his bilateral  shoulder slings, which indeed is a difficult situation causing him difficulties with activities of daily living such as eating and transfers.     Has preferentially removed his right shoulder sling to assist in these activities per discussion with his wife and patient     02/10/2020 - obtained follow-up x-rays of bilateral shoulders which demonstrated unchanged appearance of left shoulder arthroplasty. The right shoulder arthroplasty was in good position, however there appeared to be failure of the repair of the greater tuberosity on the right shoulder.     I discussed this case with my shoulder colleagues, and then call to discuss with the patient and his wife while there at rehab.     Counseled patient and his wife on the current status of his bilateral shoulders.  I have concern that there is failure of tuberosity repair on the right shoulder.     We discussed multiple options     - do nothing now, let him recover from his multiple injuries including L1/L4 burst fracture, subdural hemorrhage, bilateral shoulder replacements.  Let him progress his rehab and see how his right shoulder does.  I anticipate that his tuberosity will not heal and in a functional position and he will have difficulty with elevation of shoulder from here and into the foreseeable future.  In the future, after recovered from these acute injuries, can consider conversion of this fracture stem to a reverse total shoulder arthroplasty, which should provide him with improved shoulder function. At this time this is not an option due to his coexisting bilateral acromial avulsion fractures from his seizure     - perform revision fixation of the right greater tuberosity.  This would require return trip to the operating room. A prefer to do this sooner rather than later to prevent further retraction of the greater tuberosity.     After discussion with the patient and his wife decision made to go forward with revision fixation of his right greater  tuberosity fracture.     Plan is for transfer from [facility] this Friday, 02/14/2020 for same-day surgery at [facility].  We will perform a suture repair of the greater tuberosity, with planned discharge back to [facility] the same day.     This will reset the clock on right tuberosity healing, with further postop plans detailed in operative note     The risks, benefits, and alternatives to surgery were discussed with the patient and/or family.    Specific risks discussed included, but were not limited to:  Failure of fixation, damage to nearby neurovascular structures, pain, dislocation, periprosthetic fracture,  need to convert to later total shoulder arthroplasty or reverse total shoulder arthroplasty, rotator cuff tear weakness, failure of tuberosity healing, periprosthetic joint infection, need for multiple staged procedures, damage to nearby structures, including neurovascular structures leading to loss of function or loss of limb, bleeding, pain, numbness, tingling, weakness, compartment syndrome, malunion/nonunion, hardware failure, hardware prominence, infection, need for multiple staged procedures, prolonged antibiotics, iatrogenic fracture, heterotopic ossification, arthritis, a variety of medical complications including but not limited to heart attack, stroke, deep venous thrombosis, pulmonary embolism, prolonged hospitalization, prolonged intubation, and death.   Patient and/or family expressed an understanding and desires to proceed with surgery.   All questions were answered.  No guarantees were implied or stated.  Informed consent was obtained.    Plan - revision ORIF R shoulder GT    =====================  Chandler Chris MD  Orthopaedic Surgery

## 2020-02-14 NOTE — TELEPHONE ENCOUNTER
Spouse states the pt is scheduled for shoulder Sx today and should transfer to [facility] by the beginning of the week.  Will touch base w/inpt rehab about coordinating the pt's visits scheduled for 02.21.2020 after he has been discharged from [facility].

## 2020-02-14 NOTE — ANESTHESIA POSTPROCEDURE EVALUATION
Anesthesia Post Evaluation    Patient: Ben Melvin    Procedure(s) Performed: Procedure(s) (LRB):  ORIF, FRACTURE, HUMERUS, PROXIMAL - ORIF R greater tuberosity - suture repair. Trios, supine, fiberwire (Right)    Final Anesthesia Type: general    Patient location during evaluation: PACU  Patient participation: Yes- Able to Participate  Level of consciousness: awake and alert  Post-procedure vital signs: reviewed and stable  Pain management: adequate  Airway patency: patent    PONV status at discharge: No PONV  Anesthetic complications: no      Cardiovascular status: blood pressure returned to baseline and stable  Respiratory status: unassisted  Hydration status: euvolemic  Follow-up not needed.          Vitals Value Taken Time   /60 2/14/2020  3:30 PM   Temp 36.2 °C (97.2 °F) 2/14/2020  3:30 PM   Pulse 95 2/14/2020  3:30 PM   Resp 16 2/14/2020  3:30 PM   SpO2 95 % 2/14/2020  3:30 PM         Event Time     Out of Recovery 13:15:00          Pain/Maria Del Carmen Score: Pain Rating Prior to Med Admin: 3 (2/14/2020  9:51 AM)  Maria Del Carmen Score: 10 (2/14/2020  2:15 PM)

## 2020-02-14 NOTE — OP NOTE
OPERATIVE NOTE    DATE OF PROCEDURE:  02/14/2020    PREOPERATIVE DIAGNOSIS:   Right proximal humerus greater tuberosity fracture, closed, displaced, subsequent encounter  Status post right shoulder hemiarthroplasty for fracture    POSTOPERATIVE DIAGNOSIS:   Right proximal humerus greater tuberosity fracture, closed, displaced, subsequent encounter  Status post right shoulder hemiarthroplasty for fracture    PROCEDURE:   Open reduction internal fixation right proximal humerus greater tuberosity fracture    SURGEON:   Chandler Chris MD    ASSISTANT:    Marco Antonio Cheung MD    ANESTHESIA:   General with regional block    EBL:    100 mL    COMPLICATIONS:  None    IMPLANTS:   Arthrex fiber tape suture, 3  Depuy Synthes Vivigen bone graft, 5 mL  Vancomycin powder 1 g    SPECIMENS:   None    INDICATIONS FOR PROCEDURE:  60-year-old male, obese, AFib on anticoagulation, peripheral vascular disease, hypertension, COPD, current smoker, liver cirrhosis who sustained a witnessed seizure while playing video poker 01/27/2020.  He fell from his chair.  He was brought to emergency department at outside facility.  Injuries identified were bilateral proximal humerus fracture dislocations, bilateral acromial fractures, subdural hemorrhage, L1 and L4 burst fractures.      01/28/2020 - left shoulder hemiarthroplasty, biceps tenodesis     01/30/2020 - right shoulder hemiarthroplasty, biceps tenodesis     Now at [facility]     Has had difficulties with maintaining his bilateral shoulder slings, which indeed is a difficult situation causing him difficulties with activities of daily living such as eating and transfers.     Has preferentially removed his right shoulder sling to assist in these activities per discussion with his wife and patient     02/10/2020 - obtained follow-up x-rays of bilateral shoulders which demonstrated unchanged appearance of left shoulder arthroplasty. The right shoulder arthroplasty was in good position,  however there appeared to be failure of the repair of the greater tuberosity on the right shoulder.     I discussed this case with my shoulder colleagues, and then discussed with the patient and his wife his medical diagnosis and treatment options. Counseled patient and his wife on the current status of his bilateral shoulders.  I have concern that there is failure of tuberosity repair on the right shoulder.     We discussed multiple options     - do nothing now, let him recover from his multiple injuries including L1/L4 burst fracture, subdural hemorrhage, bilateral shoulder replacements.  Let him progress his rehab and see how his right shoulder does.  I anticipate that his tuberosity will not heal and in a functional position and he will have difficulty with elevation of shoulder from here and into the foreseeable future.  In the future, after recovered from these acute injuries, can consider conversion of this fracture stem to a reverse total shoulder arthroplasty, which should provide him with improved shoulder function. At this time this is not an option due to his coexisting bilateral acromial avulsion fractures from his seizure     - perform revision fixation of the right greater tuberosity.  This would require return trip to the operating room. I prefer to do this sooner rather than later to prevent further retraction of the greater tuberosity.     After discussion with the patient and his wife decision made to go forward with revision fixation of his right greater tuberosity fracture.    He presents today for open reduction internal fixation of right greater tuberosity     The risks, benefits, and alternatives to surgery were discussed with the patient and/or family.    Specific risks discussed included, but were not limited to:  Failure of fixation, damage to nearby neurovascular structures, pain, dislocation, periprosthetic fracture,  need to convert to later total shoulder arthroplasty or reverse total  shoulder arthroplasty, rotator cuff tear weakness, failure of tuberosity healing, periprosthetic joint infection, need for multiple staged procedures, damage to nearby structures, including neurovascular structures leading to loss of function or loss of limb, bleeding, pain, numbness, tingling, weakness, compartment syndrome, malunion/nonunion, hardware failure, hardware prominence, infection, need for multiple staged procedures, prolonged antibiotics, iatrogenic fracture, heterotopic ossification, arthritis, a variety of medical complications including but not limited to heart attack, stroke, deep venous thrombosis, pulmonary embolism, prolonged hospitalization, prolonged intubation, and death.   Patient and/or family expressed an understanding and desires to proceed with surgery.   All questions were answered.  No guarantees were implied or stated.  Informed consent was obtained.      OPERATIVE PROCEDURE:  Patient met in the preop hold area and the correct site and side of surgery being the right shoulder marked and verified.  Regional block was placed by our anesthesia colleagues.  Patient brought back to the operative suite.  General anesthesia smoothly induced.  Carefully transferred patient over to the operating room table. All bony prominences were appropriately padded. A plexiglass was placed under the upper extremity to allow treatment of his right upper extremity while in the supine position. Right upper extremity then pre prepped with chlorhexidine, peroxide, alcohol, and then prepped and draped in normal sterile fashion. Preoperative antibiotics of clindamycin and Ancef were given. Time-out performed verifying the correct site and side of surgery being the right shoulder.    We reopened his deltopectoral incision. Subcutaneous tissue was divided.  Deltopectoral interval was identified by removing prior sutures.  There were no signs of purulence.  Some hematoma was removed. We dissected under the deltoid  laterally.  Again no purulence was noted.  We identified the proximal humerus and stem.  We removed previously placed bone graft.  We irrigated the wound with pulse lavage saline to further visualize the anatomy.  Deltoid retractor was placed. Greater tuberosity was displaced and retracted superiorly and posteriorly. It appeared that the some of the sutures had pulled through the tendon and loosened. There appeared to be a good solid repair of the lesser tuberosity fracture fragment.  Greater tuberosity was grasped with 0 Vicryl sutures.  It was freed of adhesions and mobilized to its anatomic position. We then passed FiberTape sutures, x3 around the entirety of the greater tuberosity bony fragment capturing the greater tuberosity and its tendinous attachments.  We then used a free needle to shuttle 1 of the fiber tapes through the intact lesser tuberosity fracture fragment for later side to side repair. 2 drill holes were made in the anterior humeral shaft for passage of the suture. Sutures were passed using free needles.    Wound was then thoroughly irrigated with pulse lavage saline. MostLikely bone graft was placed deep to the tuberosity.  Arm was brought into approximately 60° of abduction and 45° external rotation. Fiber tape sutures were securely tied down to reduce the tuberosity to its anatomic position. There was good compression and secure fixation of the tuberosity fracture fragment.    Hemostasis was achieved as needed with electrocautery. 1g Vancomycin powder was placed deep in the wound.  Deltopectoral interval was closed with 0 Vicryl.  Subcutaneous fat closed with 2 O Vicryl.  Skin closed with 3 Monocryl. Dermabond applied Aquacel dressing applied.    Shoulder slings, bilaterally were applied.    Prior to final closure all counts were confirmed to be correct. Patient tolerated the procedure well, was extubated transferred to PACU for further recovery    At the conclusion of the procedure the  patient had palpable radial pulse, brisk cap refill, soft compressible compartments on his operative extremity    POSTOPERATIVE PLAN:  60-year-old male, obese, AFib on anticoagulation, peripheral vascular disease, hypertension, COPD, current smoker, liver cirrhosis who sustained a witnessed seizure while playing video poker 01/27/2020.  He fell from his chair.  He was brought to emergency department at outside facility.  Injuries identified were bilateral proximal humerus fracture dislocations, bilateral acromial fractures, subdural hemorrhage, L1 and L4 burst fractures.      01/28/2020 - left shoulder hemiarthroplasty, biceps tenodesis     01/30/2020 - right shoulder hemiarthroplasty, biceps tenodesis    Failure of right greater tuberosity fixation    02/14/2020 - open reduction internal fixation right greater tuberosity, humerus    Antibiotics times 24 hr     OK to resume coumadin POD1  lvx 40mg qday until coumadin therapeutic  Consult medicine for medical comanagement  Discussed management of spine with NS team  Plan for eventual d/c to [facility], Monday if no operative intervention planned for spine during this admission.     Nonweightbearing bilateral upper extremity times 3 months postop   shoulder sling x6 weeks, may remove for hygiene and physical therapy.    Okay for range of motion as tolerated hand wrist elbow now.  Okay for active assisted range of motion of shoulder and pendulums at 2 weeks postop.  No strengthening exercises until 3 months postop     Follow-up 2 weeks postop for wound check and x-rays     X-ray bilateral shoulders at subsequent postop visits     Follow-up postop 2 weeks, 6 weeks, 3 months, 6 months, 1 year         =====================  Chandler Chris MD  Orthopaedic Surgery

## 2020-02-14 NOTE — NURSING TRANSFER
Nursing Transfer Note      2/14/2020     Transfer To: 540A    Transfer via stretcher    Transfer with 02 2 Liters    Transported by PCT    Medicines sent: None    Chart send with patient: Yes    Notified: daughter    Patient reassessed at: 2/14/20 1415    Upon arrival to floor: patient oriented to room, call bell in reach and bed in lowest position

## 2020-02-15 PROBLEM — E11.9 TYPE 2 DIABETES MELLITUS: Status: ACTIVE | Noted: 2019-06-03

## 2020-02-15 PROBLEM — S32.011A: Status: ACTIVE | Noted: 2020-02-15

## 2020-02-15 PROBLEM — K21.9 GERD (GASTROESOPHAGEAL REFLUX DISEASE): Status: ACTIVE | Noted: 2020-02-15

## 2020-02-15 LAB
ALBUMIN SERPL BCP-MCNC: 3 G/DL (ref 3.5–5.2)
ALP SERPL-CCNC: 275 U/L (ref 55–135)
ALT SERPL W/O P-5'-P-CCNC: 39 U/L (ref 10–44)
ANION GAP SERPL CALC-SCNC: 10 MMOL/L (ref 8–16)
AST SERPL-CCNC: 23 U/L (ref 10–40)
BASOPHILS # BLD AUTO: 0.04 K/UL (ref 0–0.2)
BASOPHILS NFR BLD: 0.5 % (ref 0–1.9)
BILIRUB SERPL-MCNC: 0.7 MG/DL (ref 0.1–1)
BUN SERPL-MCNC: 34 MG/DL (ref 6–20)
CALCIUM SERPL-MCNC: 9.1 MG/DL (ref 8.7–10.5)
CHLORIDE SERPL-SCNC: 104 MMOL/L (ref 95–110)
CO2 SERPL-SCNC: 24 MMOL/L (ref 23–29)
CREAT SERPL-MCNC: 1.3 MG/DL (ref 0.5–1.4)
DIFFERENTIAL METHOD: ABNORMAL
EOSINOPHIL # BLD AUTO: 0.2 K/UL (ref 0–0.5)
EOSINOPHIL NFR BLD: 2.5 % (ref 0–8)
ERYTHROCYTE [DISTWIDTH] IN BLOOD BY AUTOMATED COUNT: 18.1 % (ref 11.5–14.5)
EST. GFR  (AFRICAN AMERICAN): >60 ML/MIN/1.73 M^2
EST. GFR  (NON AFRICAN AMERICAN): 59.7 ML/MIN/1.73 M^2
GLUCOSE SERPL-MCNC: 111 MG/DL (ref 70–110)
HCT VFR BLD AUTO: 35.5 % (ref 40–54)
HGB BLD-MCNC: 10.6 G/DL (ref 14–18)
IMM GRANULOCYTES # BLD AUTO: 0.06 K/UL (ref 0–0.04)
IMM GRANULOCYTES NFR BLD AUTO: 0.8 % (ref 0–0.5)
INR PPP: 1 (ref 0.8–1.2)
LYMPHOCYTES # BLD AUTO: 1.9 K/UL (ref 1–4.8)
LYMPHOCYTES NFR BLD: 24.2 % (ref 18–48)
MAGNESIUM SERPL-MCNC: 1.9 MG/DL (ref 1.6–2.6)
MCH RBC QN AUTO: 28.1 PG (ref 27–31)
MCHC RBC AUTO-ENTMCNC: 29.9 G/DL (ref 32–36)
MCV RBC AUTO: 94 FL (ref 82–98)
MONOCYTES # BLD AUTO: 0.6 K/UL (ref 0.3–1)
MONOCYTES NFR BLD: 7.5 % (ref 4–15)
NEUTROPHILS # BLD AUTO: 5.1 K/UL (ref 1.8–7.7)
NEUTROPHILS NFR BLD: 64.5 % (ref 38–73)
NRBC BLD-RTO: 0 /100 WBC
PHOSPHATE SERPL-MCNC: 4.1 MG/DL (ref 2.7–4.5)
PLATELET # BLD AUTO: 326 K/UL (ref 150–350)
PMV BLD AUTO: 9.1 FL (ref 9.2–12.9)
POTASSIUM SERPL-SCNC: 4.4 MMOL/L (ref 3.5–5.1)
PROT SERPL-MCNC: 6.7 G/DL (ref 6–8.4)
PROTHROMBIN TIME: 10.5 SEC (ref 9–12.5)
RBC # BLD AUTO: 3.77 M/UL (ref 4.6–6.2)
SODIUM SERPL-SCNC: 138 MMOL/L (ref 136–145)
WBC # BLD AUTO: 7.88 K/UL (ref 3.9–12.7)

## 2020-02-15 PROCEDURE — 94761 N-INVAS EAR/PLS OXIMETRY MLT: CPT

## 2020-02-15 PROCEDURE — 99223 1ST HOSP IP/OBS HIGH 75: CPT | Mod: GC,,, | Performed by: NEUROLOGICAL SURGERY

## 2020-02-15 PROCEDURE — 85025 COMPLETE CBC W/AUTO DIFF WBC: CPT

## 2020-02-15 PROCEDURE — 80053 COMPREHEN METABOLIC PANEL: CPT

## 2020-02-15 PROCEDURE — 97535 SELF CARE MNGMENT TRAINING: CPT

## 2020-02-15 PROCEDURE — 99900035 HC TECH TIME PER 15 MIN (STAT)

## 2020-02-15 PROCEDURE — 25000003 PHARM REV CODE 250: Performed by: STUDENT IN AN ORGANIZED HEALTH CARE EDUCATION/TRAINING PROGRAM

## 2020-02-15 PROCEDURE — 94640 AIRWAY INHALATION TREATMENT: CPT

## 2020-02-15 PROCEDURE — 99222 PR INITIAL HOSPITAL CARE,LEVL II: ICD-10-PCS | Mod: GC,,, | Performed by: INTERNAL MEDICINE

## 2020-02-15 PROCEDURE — 25000242 PHARM REV CODE 250 ALT 637 W/ HCPCS: Performed by: STUDENT IN AN ORGANIZED HEALTH CARE EDUCATION/TRAINING PROGRAM

## 2020-02-15 PROCEDURE — 97110 THERAPEUTIC EXERCISES: CPT

## 2020-02-15 PROCEDURE — 84100 ASSAY OF PHOSPHORUS: CPT

## 2020-02-15 PROCEDURE — 63600175 PHARM REV CODE 636 W HCPCS: Performed by: STUDENT IN AN ORGANIZED HEALTH CARE EDUCATION/TRAINING PROGRAM

## 2020-02-15 PROCEDURE — 97161 PT EVAL LOW COMPLEX 20 MIN: CPT

## 2020-02-15 PROCEDURE — 83735 ASSAY OF MAGNESIUM: CPT

## 2020-02-15 PROCEDURE — 99222 1ST HOSP IP/OBS MODERATE 55: CPT | Mod: GC,,, | Performed by: INTERNAL MEDICINE

## 2020-02-15 PROCEDURE — 36415 COLL VENOUS BLD VENIPUNCTURE: CPT

## 2020-02-15 PROCEDURE — 97530 THERAPEUTIC ACTIVITIES: CPT

## 2020-02-15 PROCEDURE — 97167 OT EVAL HIGH COMPLEX 60 MIN: CPT

## 2020-02-15 PROCEDURE — S0077 INJECTION, CLINDAMYCIN PHOSP: HCPCS | Performed by: STUDENT IN AN ORGANIZED HEALTH CARE EDUCATION/TRAINING PROGRAM

## 2020-02-15 PROCEDURE — 11000001 HC ACUTE MED/SURG PRIVATE ROOM

## 2020-02-15 PROCEDURE — 85610 PROTHROMBIN TIME: CPT

## 2020-02-15 PROCEDURE — 99223 PR INITIAL HOSPITAL CARE,LEVL III: ICD-10-PCS | Mod: GC,,, | Performed by: NEUROLOGICAL SURGERY

## 2020-02-15 RX ORDER — INSULIN ASPART 100 [IU]/ML
0-5 INJECTION, SOLUTION INTRAVENOUS; SUBCUTANEOUS
Status: DISCONTINUED | OUTPATIENT
Start: 2020-02-15 | End: 2020-02-18 | Stop reason: HOSPADM

## 2020-02-15 RX ORDER — AMOXICILLIN 250 MG
2 CAPSULE ORAL 2 TIMES DAILY
Status: DISCONTINUED | OUTPATIENT
Start: 2020-02-15 | End: 2020-02-18 | Stop reason: HOSPADM

## 2020-02-15 RX ORDER — GLUCAGON 1 MG
1 KIT INJECTION
Status: DISCONTINUED | OUTPATIENT
Start: 2020-02-15 | End: 2020-02-18 | Stop reason: HOSPADM

## 2020-02-15 RX ORDER — LEVALBUTEROL INHALATION SOLUTION 0.63 MG/3ML
0.63 SOLUTION RESPIRATORY (INHALATION) EVERY 8 HOURS
Status: DISCONTINUED | OUTPATIENT
Start: 2020-02-15 | End: 2020-02-17

## 2020-02-15 RX ORDER — PANTOPRAZOLE SODIUM 40 MG/1
40 TABLET, DELAYED RELEASE ORAL DAILY
Status: DISCONTINUED | OUTPATIENT
Start: 2020-02-15 | End: 2020-02-18 | Stop reason: HOSPADM

## 2020-02-15 RX ORDER — IBUPROFEN 200 MG
16 TABLET ORAL
Status: DISCONTINUED | OUTPATIENT
Start: 2020-02-15 | End: 2020-02-18 | Stop reason: HOSPADM

## 2020-02-15 RX ORDER — IBUPROFEN 200 MG
24 TABLET ORAL
Status: DISCONTINUED | OUTPATIENT
Start: 2020-02-15 | End: 2020-02-18 | Stop reason: HOSPADM

## 2020-02-15 RX ORDER — IPRATROPIUM BROMIDE 0.5 MG/2.5ML
500 SOLUTION RESPIRATORY (INHALATION) EVERY 4 HOURS PRN
Status: DISCONTINUED | OUTPATIENT
Start: 2020-02-15 | End: 2020-02-18 | Stop reason: HOSPADM

## 2020-02-15 RX ORDER — LEVETIRACETAM 500 MG/1
1000 TABLET ORAL 2 TIMES DAILY
Status: DISCONTINUED | OUTPATIENT
Start: 2020-02-15 | End: 2020-02-18 | Stop reason: HOSPADM

## 2020-02-15 RX ORDER — ATORVASTATIN CALCIUM 20 MG/1
80 TABLET, FILM COATED ORAL NIGHTLY
Status: DISCONTINUED | OUTPATIENT
Start: 2020-02-15 | End: 2020-02-18 | Stop reason: HOSPADM

## 2020-02-15 RX ORDER — POLYETHYLENE GLYCOL 3350 17 G/17G
17 POWDER, FOR SOLUTION ORAL 2 TIMES DAILY
Status: DISCONTINUED | OUTPATIENT
Start: 2020-02-15 | End: 2020-02-18 | Stop reason: HOSPADM

## 2020-02-15 RX ORDER — ERGOCALCIFEROL 1.25 MG/1
50000 CAPSULE ORAL
Status: DISCONTINUED | OUTPATIENT
Start: 2020-02-15 | End: 2020-02-18 | Stop reason: HOSPADM

## 2020-02-15 RX ORDER — METOPROLOL TARTRATE 25 MG/1
25 TABLET, FILM COATED ORAL 2 TIMES DAILY
Status: DISCONTINUED | OUTPATIENT
Start: 2020-02-15 | End: 2020-02-18 | Stop reason: HOSPADM

## 2020-02-15 RX ORDER — AMITRIPTYLINE HYDROCHLORIDE 25 MG/1
50 TABLET, FILM COATED ORAL NIGHTLY
Status: DISCONTINUED | OUTPATIENT
Start: 2020-02-15 | End: 2020-02-16

## 2020-02-15 RX ADMIN — LEVALBUTEROL HYDROCHLORIDE 0.63 MG: 0.63 SOLUTION RESPIRATORY (INHALATION) at 09:02

## 2020-02-15 RX ADMIN — GABAPENTIN 300 MG: 300 CAPSULE ORAL at 09:02

## 2020-02-15 RX ADMIN — CLINDAMYCIN PHOSPHATE 900 MG: 900 INJECTION INTRAVENOUS at 05:02

## 2020-02-15 RX ADMIN — LEVETIRACETAM 1000 MG: 500 TABLET ORAL at 08:02

## 2020-02-15 RX ADMIN — METOPROLOL TARTRATE 25 MG: 25 TABLET ORAL at 11:02

## 2020-02-15 RX ADMIN — METHOCARBAMOL TABLETS 500 MG: 500 TABLET, COATED ORAL at 03:02

## 2020-02-15 RX ADMIN — GABAPENTIN 300 MG: 300 CAPSULE ORAL at 03:02

## 2020-02-15 RX ADMIN — OXYCODONE HYDROCHLORIDE 10 MG: 10 TABLET ORAL at 11:02

## 2020-02-15 RX ADMIN — METHOCARBAMOL TABLETS 500 MG: 500 TABLET, COATED ORAL at 10:02

## 2020-02-15 RX ADMIN — GABAPENTIN 300 MG: 300 CAPSULE ORAL at 08:02

## 2020-02-15 RX ADMIN — POLYETHYLENE GLYCOL 3350 17 G: 17 POWDER, FOR SOLUTION ORAL at 11:02

## 2020-02-15 RX ADMIN — LEVALBUTEROL HYDROCHLORIDE 0.63 MG: 0.63 SOLUTION RESPIRATORY (INHALATION) at 02:02

## 2020-02-15 RX ADMIN — LEVETIRACETAM 1000 MG: 500 TABLET ORAL at 11:02

## 2020-02-15 RX ADMIN — METHOCARBAMOL TABLETS 500 MG: 500 TABLET, COATED ORAL at 08:02

## 2020-02-15 RX ADMIN — Medication 6 MG: at 08:02

## 2020-02-15 RX ADMIN — ATORVASTATIN CALCIUM 80 MG: 20 TABLET, FILM COATED ORAL at 08:02

## 2020-02-15 RX ADMIN — CLINDAMYCIN PHOSPHATE 900 MG: 900 INJECTION INTRAVENOUS at 11:02

## 2020-02-15 RX ADMIN — DOCUSATE SODIUM - SENNOSIDES 2 TABLET: 50; 8.6 TABLET, FILM COATED ORAL at 11:02

## 2020-02-15 RX ADMIN — OXYCODONE HYDROCHLORIDE 10 MG: 10 TABLET ORAL at 08:02

## 2020-02-15 RX ADMIN — PANTOPRAZOLE SODIUM 40 MG: 40 TABLET, DELAYED RELEASE ORAL at 11:02

## 2020-02-15 RX ADMIN — AMITRIPTYLINE HYDROCHLORIDE 50 MG: 25 TABLET, FILM COATED ORAL at 08:02

## 2020-02-15 RX ADMIN — ENOXAPARIN SODIUM 40 MG: 100 INJECTION SUBCUTANEOUS at 05:02

## 2020-02-15 RX ADMIN — ERGOCALCIFEROL 50000 UNITS: 1.25 CAPSULE ORAL at 11:02

## 2020-02-15 RX ADMIN — METOPROLOL TARTRATE 25 MG: 25 TABLET ORAL at 08:02

## 2020-02-15 NOTE — ASSESSMENT & PLAN NOTE
59 M with hx of paroxysmal afib on coumadin presents for eval of bilateral shoulder dislocations, left convexity SDH, L1 compression fracture, L4 burst fracture.     -continue care per  Orhto  -CT L spine stable  -TLSO when OOB/upright  -Will follow up as scheduled w/Dr. Barry on 2/21/20    Neurosurgery will sign off. Please call w/questions.

## 2020-02-15 NOTE — ASSESSMENT & PLAN NOTE
L1 and L4 burst fractures  Repeat CT L spine from 02/15/20 stable. Pt evaluated by neurosurgery. Recommend continuing TSLO brace and will f/u in clinic as scheduled on 02/21/20.

## 2020-02-15 NOTE — ASSESSMENT & PLAN NOTE
Home medications: metoprolol and ramipril at home. BP was intermittently low during last hospitalization and currently normotensive. Will resume Metoprolol and hold on ramipril. Will reinitiate as BP allow.     -- Metoprolol 25 mg BID

## 2020-02-15 NOTE — SUBJECTIVE & OBJECTIVE
Interval History: NAEON. CT L spine stable. At neurologic baseline from prior exam. S/p ORIF humerus fracture yesterday w/Ortho.  C/o some radicular pain in legs o/w intact.     Medications:  Continuous Infusions:   sodium chloride 0.9% Stopped (02/14/20 1235)     Scheduled Meds:   clindamycin (CLEOCIN) IVPB  900 mg Intravenous Q8H    enoxaparin  40 mg Subcutaneous Daily    gabapentin  300 mg Oral TID    methocarbamol  500 mg Oral TID     PRN Meds:melatonin, morphine, ondansetron, oxyCODONE, oxyCODONE, oxyCODONE, sodium chloride 0.9%     Review of Systems  Objective:     Weight: 120.2 kg (265 lb)  Body mass index is 36.96 kg/m².  Vital Signs (Most Recent):  Temp: 98.6 °F (37 °C) (02/15/20 0515)  Pulse: 101 (02/15/20 0515)  Resp: 18 (02/15/20 0515)  BP: 131/60 (02/15/20 0515)  SpO2: 96 % (02/15/20 0554) Vital Signs (24h Range):  Temp:  [96.9 °F (36.1 °C)-98.6 °F (37 °C)] 98.6 °F (37 °C)  Pulse:  [] 101  Resp:  [12-20] 18  SpO2:  [94 %-100 %] 96 %  BP: ()/(42-76) 131/60                          Urethral Catheter 02/14/20 1116 Straight-tip;Non-latex 16 Fr. (Active)   Site Assessment Clean;Intact 2/14/2020  9:00 PM   Output (mL) 200 mL 2/14/2020  5:00 PM       Male External Urinary Catheter 01/31/20 1756 Small (Active)       Neurosurgery Physical Exam   [id number]  AOx3, FCx4  PERRL  BUE in cast, 5/5 strength in hand   5/5 strength BLE in all muscle groups  SILT    Significant Labs:  No results for input(s): GLU, NA, K, CL, CO2, BUN, CREATININE, CALCIUM, MG in the last 48 hours.  No results for input(s): WBC, HGB, HCT, PLT in the last 48 hours.  No results for input(s): LABPT, INR, APTT in the last 48 hours.  Microbiology Results (last 7 days)     ** No results found for the last 168 hours. **          Significant Diagnostics:  X-ray Shoulder 1 View Right    Result Date: 2/14/2020  No detrimental changes. Electronically signed by: David Bradshaw MD Date:    02/14/2020 Time:    14:59    Ct Lumbar Spine  Without Contrast    Result Date: 2/15/2020  1.  Redemonstration of acute burst type fractures of the L1 and L4 vertebral bodies, similar in appearance compared to prior examination.  There is stable posterior retropulsion, most notably of the L4 vertebral body which in conjunction with degenerative change results in apparent moderate spinal canal narrowing, better assessed on prior MRI examination. 2.  Stable age-indeterminate mild superior endplate deformities of the T10, T11, and T12 vertebral bodies.  No new compression deformity identified. Electronically signed by: Harlna Mazariegos MD Date:    02/15/2020 Time:    02:09

## 2020-02-15 NOTE — SUBJECTIVE & OBJECTIVE
Past Medical History:   Diagnosis Date    Adenoma of right adrenal gland 11/09/2017    Alcohol abuse     ASCVD (arteriosclerotic cardiovascular disease) 10/2008    [place]    BPH (benign prostatic hyperplasia)     Cardiomyopathy     Chronic anxiety     Cirrhosis of liver     COPD (chronic obstructive pulmonary disease)     Current every day smoker     2-3 ppd    Elevated LFTs 08/2001    GERD with esophagitis 11/09/2017    History of colon polyps     History of epididymitis 2016    Juan Antonio    Hyperlipidemia     Hypertension, essential     Long term (current) use of anticoagulants     Lumbar disc disease with radiculopathy 05/2015    Major depression, recurrent, chronic     PAF (paroxysmal atrial fibrillation)     Peripheral autonomic neuropathy due to DM     Proteinuria     PVD (peripheral vascular disease) 05/29/2009    Venous insufficiency of both lower extremities     Vitamin D deficiency        Past Surgical History:   Procedure Laterality Date    biopsy back  05/20/2019    benign Martinez    biopsy right ankle Right 05/20/2019    Martinez, benign    COLONOSCOPY N/A 6/13/2019    Procedure: COLONOSCOPY;  Surgeon: Delmer Kerr MD;  Location: [facility];  Service: Endoscopy;  Laterality: N/A;    COLONOSCOPY W/ POLYPECTOMY      EPIDURAL STEROID INJECTION INTO LUMBAR SPINE  06/2015    LASHAE Harvey    ESOPHAGOGASTRODUODENOSCOPY N/A 6/13/2019    Procedure: ESOPHAGOGASTRODUODENOSCOPY (EGD);  Surgeon: Delmer Kerr MD;  Location: [facility];  Service: Endoscopy;  Laterality: N/A;    INTESTINAL MALROTATION REPAIR Right 1961    2 weeks old, Martinez    PARTIAL ARTHROPLASTY OF SHOULDER Left 1/28/2020    Procedure: HEMIARTHROPLASTY, SHOULDER;  Surgeon: Chandler Chris MD;  Location: [address];  Service: Orthopedics;  Laterality: Left;    PARTIAL ARTHROPLASTY OF SHOULDER Right 1/30/2020    Procedure: HEMIARTHROPLASTY, SHOULDER- RIGHT- MEKA- SUPINE-  TRIOS;  Surgeon:  Chandler Chris MD;  Location: [facility] OR [address] [place] [address];  Service: Orthopedics;  Laterality: Right;    PERCUTANEOUS TRANSLUMINAL ANGIOPLASTY (PTA) OF PERIPHERAL VESSEL Right 12/2014    NIKOLAS Childs       Review of patient's allergies indicates:  No Known Allergies    No current facility-administered medications on file prior to encounter.      Current Outpatient Medications on File Prior to Encounter   Medication Sig    amitriptyline (ELAVIL) 50 MG tablet Take 1 tablet (50 mg total) by mouth every evening.    metoprolol tartrate (LOPRESSOR) 25 MG tablet Take 25 mg by mouth 2 (two) times daily.    aspirin 81 MG Chew Take 162 mg by mouth once daily.    atorvastatin (LIPITOR) 80 MG tablet Take 80 mg by mouth every evening.    BASAGLAR KWIKPEN U-100 INSULIN glargine 100 units/mL (3mL) SubQ pen INJECT 25 UNITS DAILY    bisacodyL (DULCOLAX) 10 mg Supp Place 1 suppository (10 mg total) rectally daily as needed.    celecoxib (CELEBREX) 200 MG capsule Take 1 capsule (200 mg total) by mouth once daily.    cyanocobalamin (VITAMIN B-12) 1000 MCG tablet Take 100 mcg by mouth once daily.    dextromethorphan-guaifenesin  mg (MUCINEX DM)  mg per 12 hr tablet Take 1 tablet by mouth 2 (two) times daily. for 10 days    ergocalciferol (ERGOCALCIFEROL) 50,000 unit Cap Take 1 capsule (50,000 Units total) by mouth every 7 days.    insulin aspart U-100 (NOVOLOG) 100 unit/mL (3 mL) InPn pen Inject 1-10 Units into the skin before meals and at bedtime as needed (Hyperglycemia).    ipratropium (ATROVENT) 0.02 % nebulizer solution Take 2.5 mLs (500 mcg total) by nebulization every 4 (four) hours as needed for Wheezing. Rescue    levalbuterol (XOPENEX) 0.63 mg/3 mL nebulizer solution Take 3 mLs (0.63 mg total) by nebulization every 8 (eight) hours. Rescue    levETIRAcetam (KEPPRA) 1000 MG tablet Take 1 tablet (1,000 mg total) by mouth 2 (two) times daily.    metFORMIN (GLUCOPHAGE) 1000 MG tablet Take 1 tablet (1,000  mg total) by mouth daily with breakfast.    methocarbamol (ROBAXIN) 750 MG Tab Take 1 tablet (750 mg total) by mouth 3 (three) times daily. for 10 days    multivitamin with minerals tablet Take 1 tablet by mouth once daily.    nicotine (NICODERM CQ) 21 mg/24 hr Place 1 patch onto the skin once daily.    oxyCODONE (ROXICODONE) 5 MG immediate release tablet Take 1 tablet (5 mg total) by mouth every 4 (four) hours as needed (pain 1-10).    pantoprazole (PROTONIX) 40 MG tablet Take 1 tablet (40 mg total) by mouth once daily.    polyethylene glycol (GLYCOLAX) 17 gram PwPk Take 17 g by mouth 2 (two) times daily.    ramipril (ALTACE) 5 MG capsule TAKE 1 CAPSULE (5 MG) BY ORAL ROUTE ONCE DAILY    senna-docusate 8.6-50 mg (PERICOLACE) 8.6-50 mg per tablet Take 2 tablets by mouth 2 (two) times daily.    TRADJENTA 5 mg Tab tablet TAKE 1 TABLET BY MOUTH EVERY DAY     Family History     Problem Relation (Age of Onset)    Acromegaly Father    Diabetes Mother    Heart attack Father    Hypertension Mother, Father    Kidney cancer Brother        Tobacco Use    Smoking status: Current Every Day Smoker     Packs/day: 9.00     Years: 40.00     Pack years: 360.00     Types: Cigarettes    Smokeless tobacco: Never Used    Tobacco comment: smokes parts all day long continuously   Substance and Sexual Activity    Alcohol use: Not Currently     Frequency: Never     Comment: quit 2 years ago was heavily    Drug use: Never    Sexual activity: Yes     Partners: Female     Review of Systems   Constitutional: Negative for chills and fever.   Eyes: Negative for visual disturbance.   Respiratory: Negative for shortness of breath.    Cardiovascular: Negative for chest pain.   Gastrointestinal: Negative for abdominal pain, constipation, diarrhea, nausea and vomiting.   Musculoskeletal: Positive for back pain.        BL shoulder pain   Skin: Negative for color change, rash and wound.   Neurological: Negative for weakness, numbness and  headaches.     Objective:     Vital Signs (Most Recent):  Temp: 98 °F (36.7 °C) (02/15/20 1119)  Pulse: (!) 116 (02/15/20 1119)  Resp: 16 (02/15/20 0756)  BP: (!) 139/57 (02/15/20 1119)  SpO2: 95 % (02/15/20 1119) Vital Signs (24h Range):  Temp:  [96.9 °F (36.1 °C)-98.6 °F (37 °C)] 98 °F (36.7 °C)  Pulse:  [] 116  Resp:  [13-20] 16  SpO2:  [94 %-100 %] 95 %  BP: (106-139)/(53-73) 139/57     Weight: 120.2 kg (265 lb)  Body mass index is 36.96 kg/m².    Physical Exam   Constitutional: He is oriented to person, place, and time. No distress.   HENT:   Head: Normocephalic and atraumatic.   Eyes: EOM are normal.   Neck: Normal range of motion. Neck supple.   Cardiovascular: Regular rhythm. Tachycardia present.   Pulmonary/Chest: Effort normal and breath sounds normal. No respiratory distress. He has no wheezes.   Abdominal: Soft. Bowel sounds are normal. He exhibits no distension. There is no tenderness.   Musculoskeletal:   BUE in cast.    Neurological: He is alert and oriented to person, place, and time.   Equal hand  5/5. BLE strength 5/5   Skin: Skin is warm and dry. He is not diaphoretic.   Healing surgical scar on L shoulder. Surgical incision on R shoulder with dressing in place. No erythema, tenderness or warmth.    Psychiatric: He has a normal mood and affect.   Nursing note and vitals reviewed.      Significant Labs:   A1C:   Recent Labs   Lab 10/16/19  0913 12/30/19  1209 01/27/20  2256   HGBA1C 7.0 6.6 5.9*       Bilirubin:   Recent Labs   Lab 02/04/20  0455 02/05/20  0501 02/06/20  0416 02/07/20  0542 02/15/20  0935   BILIDIR 0.4*  --   --   --   --    BILITOT 1.3* 1.1* 0.9 0.9 0.7       CBC:   Recent Labs   Lab 02/15/20  0935   WBC 7.88   HGB 10.6*   HCT 35.5*        CMP:   Recent Labs   Lab 02/15/20  0935      K 4.4      CO2 24   *   BUN 34*   CREATININE 1.3   CALCIUM 9.1   PROT 6.7   ALBUMIN 3.0*   BILITOT 0.7   ALKPHOS 275*   AST 23   ALT 39   ANIONGAP 10   EGFRNONAA  59.7*     Coagulation:   Recent Labs   Lab 02/15/20  0935   INR 1.0     Magnesium:   Recent Labs   Lab 02/15/20  0935   MG 1.9       POCT Glucose:   Recent Labs   Lab 02/14/20  0729   POCTGLUCOSE 130*     TSH:   Recent Labs   Lab 01/27/20  2256   TSH 0.667  0.667     All pertinent labs within the past 24 hours have been reviewed.    Significant Imaging: I have reviewed all pertinent imaging results/findings within the past 24 hours.

## 2020-02-15 NOTE — PLAN OF CARE
Problem: Physical Therapy Goal  Goal: Physical Therapy Goal  Description  Goals to be met by: 20     Patient will increase functional independence with mobility by performin. Supine to sit with Contact Guard Assistance.  2. Sit to supine with Contact Guard Assistance.  3. Sit to stand transfer with Contact Guard Assistance.  4. Bed to chair transfer with Contact Guard Assistance.  5. Gait  x 100 feet with Contact Guard Assistance.   6. Ascend/descend 3 stair without use of handrails Contact Guard Assistance.   7. Lower extremity exercise program x 20 reps per handout, with assistance as needed.     Outcome: Ongoing, Progressing     PT goals established.

## 2020-02-15 NOTE — ASSESSMENT & PLAN NOTE
A1c 5.9 from 01/27/20.  today. Will initial LDSSI and titrate insulin regiment as appropriate.     -- LDSSI   -- Accu checks AC/HS

## 2020-02-15 NOTE — PLAN OF CARE
Mode complete. Pt tolerated session well. Initiate OT POC.  Discharge Recommendation: SNF  DME rec: TBD at next level    Problem: Occupational Therapy Goal  Goal: Occupational Therapy Goal  Description  Goals to be met by: 3/13/2020     Patient will increase functional independence with ADLs by performing:    Feeding with Set-up Assistance and Assistive Devices as needed using Left arm.  Grooming while bedside chair with Set-up Assistance using Left arm.  Toileting from bedside commode with Minimal Assistance and Assistive Devices as needed for hygiene and clothing management.   Bathing from  edge of bed with Minimal Assistance using Left arm.  Toilet transfer to bedside commode with Contact Guard Assistance and maintaining weight-bearing precaution(s).     Outcome: Ongoing, Progressing

## 2020-02-15 NOTE — CONSULTS
[facility] Hospital Medicine  Consult Note    Patient Name: Ben Melvin  MRN: [id number]  Admission Date: 2/14/2020  Hospital Length of Stay: 1 days  Attending Physician: Emily Mcdaniel MD  Primary Care Provider: Cristal Ohara NP     [facility] Medicine Team: Networked reference to record PCT  Stephanie Acevedo MD      Patient information was obtained from patient, spouse/SO and past medical records.     Inpatient consult to Hospital Medicine-Ortho Comanagement Only  Consult performed by: Stephanie Acevedo MD  Consult ordered by: Guicho Adkins MD        Subjective:     Principal Problem: <principal problem not specified>    Chief Complaint: No chief complaint on file.       HPI: Ben Melvin is a 60 y.o. male with PMH of AFib on coumadin, peripheral vascular disease, hypertension, COPD, current smoker, liver cirrhosis, who had a witnessed seizure while playing video poker 01/27/2020 and sustained bilateral proximal humerus fracture dislocations, bilateral acromial fractures, subdural hemorrhage, L1 and L4 burst fractures. He underwent left shoulder hemiarthroplasty, biceps tenodesis on 01/28/20 and right shoulder hemiarthroplasty, biceps tenodesis on 01/30/20 and discharged to [facility]. BL shoulder XRs from 02/10/20 notable for failure of the repair of the greater tuberosity on the right shoulder and he returned to [facility] on 02/14/20 for revision fixation of his right greater tuberosity fracture. Pt had a stable CTH on 02/10/20 and warfarin was reinitiated 02/11, last dose was 02/12. Currently on Lovenox 40 mg SQ daily. He was seen by neurosurgery and will f/u with pt outpt as scheduled on 02/21/20. Pts pain is well managed with current regiment. He has been able to tolerate a regular diet. No abd pain or n/v/d. Denies fevers or chills.     IM 6 consulted for ortho co-management.     Past Medical History:   Diagnosis Date    Adenoma of right adrenal gland 11/09/2017    Alcohol abuse      ASCVD (arteriosclerotic cardiovascular disease) 10/2008    [place]    BPH (benign prostatic hyperplasia)     Cardiomyopathy     Chronic anxiety     Cirrhosis of liver     COPD (chronic obstructive pulmonary disease)     Current every day smoker     2-3 ppd    Elevated LFTs 08/2001    GERD with esophagitis 11/09/2017    History of colon polyps     History of epididymitis 2016    Juan Antonio    Hyperlipidemia     Hypertension, essential     Long term (current) use of anticoagulants     Lumbar disc disease with radiculopathy 05/2015    Major depression, recurrent, chronic     PAF (paroxysmal atrial fibrillation)     Peripheral autonomic neuropathy due to DM     Proteinuria     PVD (peripheral vascular disease) 05/29/2009    Venous insufficiency of both lower extremities     Vitamin D deficiency        Past Surgical History:   Procedure Laterality Date    biopsy back  05/20/2019    benign Martinez    biopsy right ankle Right 05/20/2019    Martinez, benign    COLONOSCOPY N/A 6/13/2019    Procedure: COLONOSCOPY;  Surgeon: Delmer Kerr MD;  Location: [facility];  Service: Endoscopy;  Laterality: N/A;    COLONOSCOPY W/ POLYPECTOMY      EPIDURAL STEROID INJECTION INTO LUMBAR SPINE  06/2015    LASHAE Harvey    ESOPHAGOGASTRODUODENOSCOPY N/A 6/13/2019    Procedure: ESOPHAGOGASTRODUODENOSCOPY (EGD);  Surgeon: Delmer Kerr MD;  Location: [facility];  Service: Endoscopy;  Laterality: N/A;    INTESTINAL MALROTATION REPAIR Right 1961    2 weeks old, Martinez    PARTIAL ARTHROPLASTY OF SHOULDER Left 1/28/2020    Procedure: HEMIARTHROPLASTY, SHOULDER;  Surgeon: Chandler Chris MD;  Location: [facility] OR [address] [place] [address];  Service: Orthopedics;  Laterality: Left;    PARTIAL ARTHROPLASTY OF SHOULDER Right 1/30/2020    Procedure: HEMIARTHROPLASTY, SHOULDER- RIGHT- MEKA- SUPINE-  TRIOS;  Surgeon: Chandler Chris MD;  Location: [facility] OR [address] [place] [address];  Service: Orthopedics;  Laterality: Right;     PERCUTANEOUS TRANSLUMINAL ANGIOPLASTY (PTA) OF PERIPHERAL VESSEL Right 12/2014    NIKOLAS Childs       Review of patient's allergies indicates:  No Known Allergies    No current facility-administered medications on file prior to encounter.      Current Outpatient Medications on File Prior to Encounter   Medication Sig    amitriptyline (ELAVIL) 50 MG tablet Take 1 tablet (50 mg total) by mouth every evening.    metoprolol tartrate (LOPRESSOR) 25 MG tablet Take 25 mg by mouth 2 (two) times daily.    aspirin 81 MG Chew Take 162 mg by mouth once daily.    atorvastatin (LIPITOR) 80 MG tablet Take 80 mg by mouth every evening.    BASAGLAR KWIKPEN U-100 INSULIN glargine 100 units/mL (3mL) SubQ pen INJECT 25 UNITS DAILY    bisacodyL (DULCOLAX) 10 mg Supp Place 1 suppository (10 mg total) rectally daily as needed.    celecoxib (CELEBREX) 200 MG capsule Take 1 capsule (200 mg total) by mouth once daily.    cyanocobalamin (VITAMIN B-12) 1000 MCG tablet Take 100 mcg by mouth once daily.    dextromethorphan-guaifenesin  mg (MUCINEX DM)  mg per 12 hr tablet Take 1 tablet by mouth 2 (two) times daily. for 10 days    ergocalciferol (ERGOCALCIFEROL) 50,000 unit Cap Take 1 capsule (50,000 Units total) by mouth every 7 days.    insulin aspart U-100 (NOVOLOG) 100 unit/mL (3 mL) InPn pen Inject 1-10 Units into the skin before meals and at bedtime as needed (Hyperglycemia).    ipratropium (ATROVENT) 0.02 % nebulizer solution Take 2.5 mLs (500 mcg total) by nebulization every 4 (four) hours as needed for Wheezing. Rescue    levalbuterol (XOPENEX) 0.63 mg/3 mL nebulizer solution Take 3 mLs (0.63 mg total) by nebulization every 8 (eight) hours. Rescue    levETIRAcetam (KEPPRA) 1000 MG tablet Take 1 tablet (1,000 mg total) by mouth 2 (two) times daily.    metFORMIN (GLUCOPHAGE) 1000 MG tablet Take 1 tablet (1,000 mg total) by mouth daily with breakfast.    methocarbamol (ROBAXIN) 750 MG Tab Take 1 tablet (750 mg  total) by mouth 3 (three) times daily. for 10 days    multivitamin with minerals tablet Take 1 tablet by mouth once daily.    nicotine (NICODERM CQ) 21 mg/24 hr Place 1 patch onto the skin once daily.    oxyCODONE (ROXICODONE) 5 MG immediate release tablet Take 1 tablet (5 mg total) by mouth every 4 (four) hours as needed (pain 1-10).    pantoprazole (PROTONIX) 40 MG tablet Take 1 tablet (40 mg total) by mouth once daily.    polyethylene glycol (GLYCOLAX) 17 gram PwPk Take 17 g by mouth 2 (two) times daily.    ramipril (ALTACE) 5 MG capsule TAKE 1 CAPSULE (5 MG) BY ORAL ROUTE ONCE DAILY    senna-docusate 8.6-50 mg (PERICOLACE) 8.6-50 mg per tablet Take 2 tablets by mouth 2 (two) times daily.    TRADJENTA 5 mg Tab tablet TAKE 1 TABLET BY MOUTH EVERY DAY     Family History     Problem Relation (Age of Onset)    Acromegaly Father    Diabetes Mother    Heart attack Father    Hypertension Mother, Father    Kidney cancer Brother        Tobacco Use    Smoking status: Current Every Day Smoker     Packs/day: 9.00     Years: 40.00     Pack years: 360.00     Types: Cigarettes    Smokeless tobacco: Never Used    Tobacco comment: smokes parts all day long continuously   Substance and Sexual Activity    Alcohol use: Not Currently     Frequency: Never     Comment: quit 2 years ago was heavily    Drug use: Never    Sexual activity: Yes     Partners: Female     Review of Systems   Constitutional: Negative for chills and fever.   Eyes: Negative for visual disturbance.   Respiratory: Negative for shortness of breath.    Cardiovascular: Negative for chest pain.   Gastrointestinal: Negative for abdominal pain, constipation, diarrhea, nausea and vomiting.   Musculoskeletal: Positive for back pain.        BL shoulder pain   Skin: Negative for color change, rash and wound.   Neurological: Negative for weakness, numbness and headaches.     Objective:     Vital Signs (Most Recent):  Temp: 98 °F (36.7 °C) (02/15/20 1119)  Pulse:  (!) 116 (02/15/20 1119)  Resp: 16 (02/15/20 0756)  BP: (!) 139/57 (02/15/20 1119)  SpO2: 95 % (02/15/20 1119) Vital Signs (24h Range):  Temp:  [96.9 °F (36.1 °C)-98.6 °F (37 °C)] 98 °F (36.7 °C)  Pulse:  [] 116  Resp:  [13-20] 16  SpO2:  [94 %-100 %] 95 %  BP: (106-139)/(53-73) 139/57     Weight: 120.2 kg (265 lb)  Body mass index is 36.96 kg/m².    Physical Exam   Constitutional: He is oriented to person, place, and time. No distress.   HENT:   Head: Normocephalic and atraumatic.   Eyes: EOM are normal.   Neck: Normal range of motion. Neck supple.   Cardiovascular: Regular rhythm. Tachycardia present.   Pulmonary/Chest: Effort normal and breath sounds normal. No respiratory distress. He has no wheezes.   Abdominal: Soft. Bowel sounds are normal. He exhibits no distension. There is no tenderness.   Musculoskeletal:   BUE in cast.    Neurological: He is alert and oriented to person, place, and time.   Equal hand  5/5. BLE strength 5/5   Skin: Skin is warm and dry. He is not diaphoretic.   Healing surgical scar on L shoulder. Surgical incision on R shoulder with dressing in place. No erythema, tenderness or warmth.    Psychiatric: He has a normal mood and affect.   Nursing note and vitals reviewed.      Significant Labs:   A1C:   Recent Labs   Lab 10/16/19  0913 12/30/19  1209 01/27/20  2256   HGBA1C 7.0 6.6 5.9*       Bilirubin:   Recent Labs   Lab 02/04/20  0455 02/05/20  0501 02/06/20  0416 02/07/20  0542 02/15/20  0935   BILIDIR 0.4*  --   --   --   --    BILITOT 1.3* 1.1* 0.9 0.9 0.7       CBC:   Recent Labs   Lab 02/15/20  0935   WBC 7.88   HGB 10.6*   HCT 35.5*        CMP:   Recent Labs   Lab 02/15/20  0935      K 4.4      CO2 24   *   BUN 34*   CREATININE 1.3   CALCIUM 9.1   PROT 6.7   ALBUMIN 3.0*   BILITOT 0.7   ALKPHOS 275*   AST 23   ALT 39   ANIONGAP 10   EGFRNONAA 59.7*     Coagulation:   Recent Labs   Lab 02/15/20  0935   INR 1.0     Magnesium:   Recent Labs   Lab  02/15/20  0935   MG 1.9       POCT Glucose:   Recent Labs   Lab 02/14/20  0729   POCTGLUCOSE 130*     TSH:   Recent Labs   Lab 01/27/20  2256   TSH 0.667  0.667     All pertinent labs within the past 24 hours have been reviewed.    Significant Imaging: I have reviewed all pertinent imaging results/findings within the past 24 hours.    Assessment/Plan:     GERD (gastroesophageal reflux disease)  Continue Pantoprazole 40 mg     Stable burst fracture of first lumbar vertebra  L1 and L4 burst fractures  Repeat CT L spine from 02/15/20 stable. Pt evaluated by neurosurgery. Recommend continuing TSLO brace and will f/u in clinic as scheduled on 02/21/20.     Constipation  Continue daily miralax and senokot-s     fracture dislocation of bilateral shoulders  Bilateral acromial fractures with bilateral shoulder dislocation S/p bilateral shoulder hemiarthroplasty: right on 1/28/2020 and left on 1/30/2020  Pt underwent revision of fixation of R greater tuberosity fracture on 02/14/20. BUE in a brace.     -- Pain management per primary team         Subdural hematoma  Stable on CTH from 02/10/20. Warfarin was resumed on 02/11, but held since 02/13 for procedure.      -- Continue keppra       Type 2 diabetes mellitus  A1c 5.9 from 01/27/20.  today. Will initial LDSSI and titrate insulin regiment as appropriate.     -- LDSSI   -- Accu checks AC/HS        Vitamin D deficiency  Level of 7 on 2/4    -- Ergocalciferol 50k weekly    Essential hypertension  Home medications: metoprolol and ramipril at home. BP was intermittently low during last hospitalization and currently normotensive. Will resume Metoprolol and hold on ramipril. Will reinitiate as BP allow.     -- Metoprolol 25 mg BID       PAF (paroxysmal atrial fibrillation)  PAF on coumadin. Coumadin held due to SDH and restarted on 02/11/20 after repeat CTH showed stable SDH. Last dose on 02/13/20, held for revision fixation for R greater tuberosity fracture that pt  underwent 02/14/20. He is currently on Lovenox 40 mg sq daily. INR today 1.0, goal 2-3. Surgery team must determine when it is most appropriate for pt to restart AC.     -- AC initiation per primary surgery team  -- Metoprolol 25 mg BID      COPD (chronic obstructive pulmonary disease)  Smoker    -- Continue levoalbuterol nebs (tachy)  -- Limit oxygen to avoid hypercapnea    VTE Risk Mitigation (From admission, onward)         Ordered     enoxaparin injection 40 mg  Daily      02/14/20 1313     Place NOAH hose  Until discontinued      02/14/20 0540     IP VTE HIGH RISK PATIENT  Once      02/14/20 0540                    Thank you for your consult. I will follow-up with patient. Please contact us if you have any additional questions.    Stephanie Acevedo MD  Department of Hospital Medicine   [facility]

## 2020-02-15 NOTE — ASSESSMENT & PLAN NOTE
Stable on CTH from 02/10/20. Warfarin was resumed on 02/11, but held since 02/13 for procedure.      -- Continue keppra

## 2020-02-15 NOTE — PROGRESS NOTES
[facility]  Neurosurgery  Progress Note    Subjective:     History of Present Illness: No notes on file    Post-Op Info:  Procedure(s) (LRB):  ORIF, FRACTURE, HUMERUS, PROXIMAL - ORIF R greater tuberosity - suture repair. Trios, supine, fiberwire (Right)   1 Day Post-Op     Interval History: NAEON. CT L spine stable. At neurologic baseline from prior exam. S/p ORIF humerus fracture yesterday w/Ortho.  C/o some radicular pain in legs o/w intact.     Medications:  Continuous Infusions:   sodium chloride 0.9% Stopped (02/14/20 1235)     Scheduled Meds:   clindamycin (CLEOCIN) IVPB  900 mg Intravenous Q8H    enoxaparin  40 mg Subcutaneous Daily    gabapentin  300 mg Oral TID    methocarbamol  500 mg Oral TID     PRN Meds:melatonin, morphine, ondansetron, oxyCODONE, oxyCODONE, oxyCODONE, sodium chloride 0.9%     Review of Systems  Objective:     Weight: 120.2 kg (265 lb)  Body mass index is 36.96 kg/m².  Vital Signs (Most Recent):  Temp: 98.6 °F (37 °C) (02/15/20 0515)  Pulse: 101 (02/15/20 0515)  Resp: 18 (02/15/20 0515)  BP: 131/60 (02/15/20 0515)  SpO2: 96 % (02/15/20 0554) Vital Signs (24h Range):  Temp:  [96.9 °F (36.1 °C)-98.6 °F (37 °C)] 98.6 °F (37 °C)  Pulse:  [] 101  Resp:  [12-20] 18  SpO2:  [94 %-100 %] 96 %  BP: ()/(42-76) 131/60                          Urethral Catheter 02/14/20 1116 Straight-tip;Non-latex 16 Fr. (Active)   Site Assessment Clean;Intact 2/14/2020  9:00 PM   Output (mL) 200 mL 2/14/2020  5:00 PM       Male External Urinary Catheter 01/31/20 1756 Small (Active)       Neurosurgery Physical Exam   [id number]  AOx3, FCx4  PERRL  BUE in cast, 5/5 strength in hand   5/5 strength BLE in all muscle groups  SILT    Significant Labs:  No results for input(s): GLU, NA, K, CL, CO2, BUN, CREATININE, CALCIUM, MG in the last 48 hours.  No results for input(s): WBC, HGB, HCT, PLT in the last 48 hours.  No results for input(s): LABPT, INR, APTT in the last 48  hours.  Microbiology Results (last 7 days)     ** No results found for the last 168 hours. **          Significant Diagnostics:  X-ray Shoulder 1 View Right    Result Date: 2/14/2020  No detrimental changes. Electronically signed by: David Bradshaw MD Date:    02/14/2020 Time:    14:59    Ct Lumbar Spine Without Contrast    Result Date: 2/15/2020  1.  Redemonstration of acute burst type fractures of the L1 and L4 vertebral bodies, similar in appearance compared to prior examination.  There is stable posterior retropulsion, most notably of the L4 vertebral body which in conjunction with degenerative change results in apparent moderate spinal canal narrowing, better assessed on prior MRI examination. 2.  Stable age-indeterminate mild superior endplate deformities of the T10, T11, and T12 vertebral bodies.  No new compression deformity identified. Electronically signed by: Harlan Mazariegos MD Date:    02/15/2020 Time:    02:09      Assessment/Plan:     Lumbar disc disease with radiculopathy  59 M with hx of paroxysmal afib on coumadin presents for eval of bilateral shoulder dislocations, left convexity SDH, L1 compression fracture, L4 burst fracture.     -continue care per  [facility]  -CT L spine stable  -TLSO when OOB/upright  -Will follow up as scheduled w/Dr. Barry on 2/21/20    Neurosurgery will sign off. Please call w/questions.         Amber Zuniga MD  Neurosurgery  [facility]

## 2020-02-15 NOTE — PT/OT/SLP EVAL
Occupational Therapy   Evaluation    Name: Ben Melvin  MRN: [id number]  Admitting Diagnosis:  <principal problem not specified> 1 Day Post-Op    Recommendations:     Discharge Recommendations: nursing facility, skilled  Discharge Equipment Recommendations:  (TBD at next level)  Barriers to discharge:  None    Assessment:     Ben Melvin is a 60 y.o. male with a medical diagnosis of <principal problem not specified>.  He presents with limited range in LUE shoulder, and no range in RUE 2* Sx.  This limits Pts ability to perform all ADLs w/o assistance.  Attempts where made to PROM the LUE to move joint in a protected and pain free arc to simulate a gravity assisted pendulum.  Pt experience some pain near max functional range for flexion and abd of shoulder.  During mobilization support was given to seat the humeral head to the glenohumeral fossa to avoid stressing ligaments.  RUE was supported at the elbow to allow some elbow flexion/ext and wrist AROM.  Pt reported some pain but tolerated ROM well. Pt was able to assist w/ Stand pivot transfer, but was limited by LLE pain.  Performance deficits affecting function: weakness, impaired endurance, impaired self care skills, impaired functional mobilty, decreased upper extremity function, decreased lower extremity function, decreased safety awareness, decreased ROM, pain, impaired coordination, orthopedic precautions.      Rehab Prognosis: Fair; patient would benefit from acute skilled OT services to address these deficits and reach maximum level of function.       Plan:     Patient to be seen 6 x/week to address the above listed problems via self-care/home management, therapeutic activities, therapeutic exercises  · Plan of Care Expires: 03/13/20  · Plan of Care Reviewed with: patient, spouse    Subjective     Chief Complaint: Pain in BUE and LLE  Patient/Family Comments/goals: Return to PLOF.     Occupational Profile:  Pt lives with wife in 1 SH, 3 KATHRYN, R HR  (working on getting ramp built). 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 Pt and wife are disabled. Son and brother live nearby. Sleeps in a water bed. Wife is considering having pt sleep in recliner upon return home. Tub-shower with tub bench at home.  Prior to admission, patients level of function was (I).  Equipment used at home: Tub-bench; DME owned (not currently used): none.  Upon discharge, patient will have assistance from wife, son, and brother.    Pain/Comfort:  · Pain Rating 1: other (see comments)(did not rate shoulder pain)  · Location - Side 1: Bilateral  · Location - Orientation 1: anterior  · Location 1: shoulder  · Pain Addressed 1: Pre-medicate for activity, Reposition, Distraction  · Pain Rating Post-Intervention 1: 5/10  · Pain Rating 2: 0/10  · Location - Side 2: Left  · Location - Orientation 2: posterior  · Location 2: leg  · Pain Addressed 2: Pre-medicate for activity, Reposition, Distraction, Cessation of Activity, Nurse notified  · Pain Rating Post-Intervention 2: 10/10    Patients cultural, spiritual, Tenriism conflicts given the current situation: no    Objective:     Communicated with: RN prior to session.  Patient found HOB elevated with peripheral IV, go catheter upon OT entry to room.    General Precautions: Standard, diabetic, fall, seizure   Orthopedic Precautions:RUE non weight bearing, LUE non weight bearing, spinal precautions   Braces: LSO, Shoulder abduction brace(abduction brace x2)     Occupational Performance:    Bed Mobility:    · Patient completed Rolling/Turning to Right with total assistance  · Patient completed Scooting/Bridging with total assistance and 2 persons  · Patient completed Supine to Sit with total assistance and 2 persons    Functional Mobility/Transfers:  · Patient completed Sit <> Stand Transfer with maximal assistance and of 2 persons  with  hand-held assist   · Patient completed Bed <> Chair Transfer using Stand Pivot  technique with maximal assistance and of 2 persons with hand-held assist  · Functional Mobility: Pt able to stand for ~2 mins and rotate to room chair w/ Max Assistance.    Activities of Daily Living:  · Upper Body Dressing: dependence and of 2 persons seated at EOB to don LSO and adjust abd slings.  · Lower Body Dressing: dependence to don hospital socks.     Cognitive/Visual Perceptual:  Completely cognitively intact adult w/ no glasses worn or present during eval.     Physical Exam:  Balance:    -       Seated; Fair +, Standing: Fair -  Dominant hand:    -       RH  Upper Extremity Range of Motion:     -       Right Upper Extremity: Limited by Sx precautions to some elbox flex/ext and full wrist movement  -       Left Upper Extremity: NWB, but PROM and some AAROM in pain free arc.  95* shoulder flexion, 80* shoulder abd.  Can tolerate some slow circular PROM overhead.   Upper Extremity Strength:    -       Right Upper Extremity: NT  -       Left Upper Extremity: NT   Strength:    -       Right Upper Extremity: WFL  -       Left Upper Extremity: WFL  Fine Motor Coordination:    -       Intact  Gross motor coordination:   NT    AMPAC 6 Click ADL:  AMPAC Total Score: 7    Treatment & Education:  -Pt edu on OT role/POC  -Importance of OOB activity with staff assistance  -Safety during functional t/f and mobility  - White board updated  - Multiple self care tasks/functional mobility completed-- assistance level noted above  - All questions/concerns answered within OT scope of practice    Education:    Patient left up in chair with all lines intact, call button in reach, RN notified and spouse present    GOALS:   Multidisciplinary Problems     Occupational Therapy Goals        Problem: Occupational Therapy Goal    Goal Priority Disciplines Outcome Interventions   Occupational Therapy Goal     OT, PT/OT Ongoing, Progressing    Description:  Goals to be met by: 3/13/2020     Patient will increase functional  independence with ADLs by performing:    Feeding with Set-up Assistance and Assistive Devices as needed using Left arm.  Grooming while bedside chair with Set-up Assistance using Left arm.  Toileting from bedside commode with Minimal Assistance and Assistive Devices as needed for hygiene and clothing management.   Bathing from  edge of bed with Minimal Assistance using Left arm.  Toilet transfer to bedside commode with Contact Guard Assistance and maintaining weight-bearing precaution(s).                      History:     Past Medical History:   Diagnosis Date    Adenoma of right adrenal gland 11/09/2017    Alcohol abuse     ASCVD (arteriosclerotic cardiovascular disease) 10/2008    [place]    BPH (benign prostatic hyperplasia)     Cardiomyopathy     Chronic anxiety     Cirrhosis of liver     COPD (chronic obstructive pulmonary disease)     Current every day smoker     2-3 ppd    Elevated LFTs 08/2001    GERD with esophagitis 11/09/2017    History of colon polyps     History of epididymitis 2016    Juan Antonio    Hyperlipidemia     Hypertension, essential     Long term (current) use of anticoagulants     Lumbar disc disease with radiculopathy 05/2015    Major depression, recurrent, chronic     PAF (paroxysmal atrial fibrillation)     Peripheral autonomic neuropathy due to DM     Proteinuria     PVD (peripheral vascular disease) 05/29/2009    Venous insufficiency of both lower extremities     Vitamin D deficiency        Past Surgical History:   Procedure Laterality Date    biopsy back  05/20/2019    benign Martinez    biopsy right ankle Right 05/20/2019    Martinez, benign    COLONOSCOPY N/A 6/13/2019    Procedure: COLONOSCOPY;  Surgeon: Delmer Kerr MD;  [facility];  Service: Endoscopy;  Laterality: N/A;    COLONOSCOPY W/ POLYPECTOMY      EPIDURAL STEROID INJECTION INTO LUMBAR SPINE  06/2015    LASHAE Harvey    ESOPHAGOGASTRODUODENOSCOPY N/A 6/13/2019    Procedure:  ESOPHAGOGASTRODUODENOSCOPY (EGD);  Surgeon: Delmer Kerr MD;  Location: [facility];  Service: Endoscopy;  Laterality: N/A;    INTESTINAL MALROTATION REPAIR Right 1961    2 weeks old, Martinez    PARTIAL ARTHROPLASTY OF SHOULDER Left 1/28/2020    Procedure: HEMIARTHROPLASTY, SHOULDER;  Surgeon: Chandler Chris MD;  Location: [facility] OR [address] [place] [address];  Service: Orthopedics;  Laterality: Left;    PARTIAL ARTHROPLASTY OF SHOULDER Right 1/30/2020    Procedure: HEMIARTHROPLASTY, SHOULDER- RIGHT- MEKA- SUPINE-  TRIOS;  Surgeon: Chandler Chris MD;  Location: [facility] OR [address] [place] [address];  Service: Orthopedics;  Laterality: Right;    PERCUTANEOUS TRANSLUMINAL ANGIOPLASTY (PTA) OF PERIPHERAL VESSEL Right 12/2014    NIKOLAS Childs       Time Tracking:     OT Date of Treatment: 02/15/20  OT Start Time: 1110  OT Stop Time: 1146  OT Total Time (min): 36 min    Billable Minutes:Evaluation 10 mins  Self Care/Home Management 13 mins  Therapeutic Exercise 13 mins    Dru Dotson, SPENCER  2/15/2020

## 2020-02-15 NOTE — ASSESSMENT & PLAN NOTE
Bilateral acromial fractures with bilateral shoulder dislocation S/p bilateral shoulder hemiarthroplasty: right on 1/28/2020 and left on 1/30/2020  Pt underwent revision of fixation of R greater tuberosity fracture on 02/14/20. BUE in a brace.     -- Pain management per primary team

## 2020-02-15 NOTE — PT/OT/SLP EVAL
Physical Therapy Evaluation    Patient Name:  Ben Melvin   MRN:  [id number]    Recommendations:     Discharge Recommendations:  nursing facility, skilled   Discharge Equipment Recommendations: (TBD)   Barriers to discharge: Inaccessible home and Decreased caregiver support    Assessment:     Ben Melvni is a 60 y.o. male admitted with a medical diagnosis of proximal humeral fracture, R.  He presents with the following impairments/functional limitations:  weakness, impaired endurance, impaired self care skills, impaired functional mobilty, gait instability, impaired balance, pain, decreased upper extremity function, decreased ROM, orthopedic precautions.     Pt presents AFib on anticoagulation, peripheral vascular disease, hypertension, COPD, current smoker, liver cirrhosis who sustained a witnessed seizure while playing video poker 01/27/2020.  He fell from his chair.  He was brought to emergency department at outside facility.  Injuries identified were bilateral proximal humerus fracture dislocations, bilateral acromial fractures, subdural hemorrhage, L1 and L4 burst fractures. 01/28/2020 - left shoulder hemiarthroplasty, biceps tenodesis. 01/30/2020 - right shoulder hemiarthroplasty, biceps tenodesis.  At [facility] re[facility], pt had difficulties with maintaining his bilateral shoulder slings, which indeed is a difficult situation causing him difficulties with activities of daily living such as eating and transfers.  Has preferentially removed his right shoulder sling to assist in these activities per discussion with his wife and patient.  02/10/2020 - obtained follow-up x-rays of bilateral shoulders which demonstrated unchanged appearance of left shoulder arthroplasty. The right shoulder arthroplasty was in good position, however there appeared to be failure of the repair of the greater tuberosity on the right shoulder.  After discussion with the patient and his wife decision made to go forward with revision  "fixation of his right greater tuberosity fracture.  Pt is s/p R proximal humerus ORIF.  Current precautions: B NWB UEs.     Pt currently requires Gabby to perform stand pivot transfers, without AD.  Pt tolerate PROM of the L shoulder with minimal complaints of pain      Rehab Prognosis: Good; patient would benefit from acute skilled PT services to address these deficits and reach maximum level of function.    Recent Surgery: Procedure(s) (LRB):  ORIF, FRACTURE, HUMERUS, PROXIMAL - ORIF R greater tuberosity - suture repair. Trios, supine, fiberwire (Right) 1 Day Post-Op    Plan:     During this hospitalization, patient to be seen daily to address the identified rehab impairments via gait training, therapeutic activities, therapeutic exercises, neuromuscular re-education and progress toward the following goals:    · Plan of Care Expires:  03/14/20    Subjective     Chief Complaint: R shoulder pain with movement and L LE pain in stance  Patient/Family Comments/goals: "Yeah I was about to fall backwards if you didn't have me."  Pain/Comfort:  · Pain Rating 1: 0/10  · Location - Side 1: Right  · Location 1: shoulder  · Pain Addressed 1: Pre-medicate for activity, Reposition, Cessation of Activity  · Pain Rating Post-Intervention 1: 5/10  · Pain Rating 2: 0/10  · Location - Side 2: Left  · Location - Orientation 2: posterior  · Location 2: leg  · Pain Addressed 2: Pre-medicate for activity, Cessation of Activity  · Pain Rating Post-Intervention 2: 10/10    Patients cultural, spiritual, Faith conflicts given the current situation: no    Living Environment:  Pt lives with spouse, SSH, 3 KATHRYN with R HR.   Prior to admission, patients level of function was I with all functional mobility and ADLs.  Equipment used at home: none.  DME owned (not currently used): none.  Upon discharge, patient will have assistance from spouse.    Objective:     Communicated with nursing prior to session.  Patient found supine with peripheral " IV, go catheter  upon PT entry to room.    General Precautions: Standard, fall, diabetic, seizure   Orthopedic Precautions:RUE non weight bearing, LUE non weight bearing(L shoulder PROM, R UE no AROM, R UE PROM of wrist)   Braces: Shoulder abduction brace, LSO     Exams:  · Cognitive Exam:  Patient is oriented to Person, Place, Time and Situation  · Fine Motor Coordination:    · -       Intact  Left hand thumb/finger opposition skills and Right hand thumb/finger opposition skills  · Gross Motor Coordination:  WFL  · Postural Exam:  Patient presented with the following abnormalities:    · -       No postural abnormalities identified  · Sensation:    · -       Intact  · Skin Integrity/Edema:      · -       Edema: None noted B LEs  · RUE ROM: not tested  · RUE Strength: not tested  · LUE ROM: not tested  · LUE Strength: not tested  · RLE ROM: WFL  · RLE Strength: WFL  · LLE ROM: WFL  · LLE Strength: WFL    Functional Mobility:  · Bed Mobility:     · Scooting: min assistance  · Supine to Sit: minimum assistance    · Transfers:     · Sit to Stand:  minimum assistance with no AD  · Bed to Chair: minimum assistance with  no AD  using  Stand Pivot    · Gait: Pt amb a few steps during stand pivot transfer, Gabby, without AD, with complaints of pain in the L LE    · Balance: Gabby: dynamic standing balance without AD      Therapeutic Activities and Exercises:   Whiteboard updated  PROM: L UE: flex, abduction, adduction, to approx 85deg sh flex    AM-PAC 6 CLICK MOBILITY  Total Score:16     Patient left up in chair with all lines intact, call button in reach, nursing notified and spouse present.    GOALS:   Multidisciplinary Problems     Physical Therapy Goals        Problem: Physical Therapy Goal    Goal Priority Disciplines Outcome Goal Variances Interventions   Physical Therapy Goal     PT, PT/OT Ongoing, Progressing     Description:  Goals to be met by: 2/28/20     Patient will increase functional independence with  mobility by performin. Supine to sit with Contact Guard Assistance.  2. Sit to supine with Contact Guard Assistance.  3. Sit to stand transfer with Contact Guard Assistance.  4. Bed to chair transfer with Contact Guard Assistance.  5. Gait  x 100 feet with Contact Guard Assistance.   6. Ascend/descend 3 stair without use of handrails Contact Guard Assistance.   7. Lower extremity exercise program x 20 reps per handout, with assistance as needed.                      History:     Past Medical History:   Diagnosis Date    Adenoma of right adrenal gland 2017    Alcohol abuse     ASCVD (arteriosclerotic cardiovascular disease) 10/2008    [place]    BPH (benign prostatic hyperplasia)     Cardiomyopathy     Chronic anxiety     Cirrhosis of liver     COPD (chronic obstructive pulmonary disease)     Current every day smoker     2-3 ppd    Elevated LFTs 2001    GERD with esophagitis 2017    History of colon polyps     History of epididymitis     Juan Antonio    Hyperlipidemia     Hypertension, essential     Long term (current) use of anticoagulants     Lumbar disc disease with radiculopathy 2015    Major depression, recurrent, chronic     PAF (paroxysmal atrial fibrillation)     Peripheral autonomic neuropathy due to DM     Proteinuria     PVD (peripheral vascular disease) 2009    Venous insufficiency of both lower extremities     Vitamin D deficiency        Past Surgical History:   Procedure Laterality Date    biopsy back  2019    benign Martinez    biopsy right ankle Right 2019    Martinez, benign    COLONOSCOPY N/A 2019    Procedure: COLONOSCOPY;  Surgeon: Delmer Kerr MD;  [facility]: [facility];  Service: Endoscopy;  Laterality: N/A;    COLONOSCOPY W/ POLYPECTOMY      EPIDURAL STEROID INJECTION INTO LUMBAR SPINE  2015    LASHAE Harvey    ESOPHAGOGASTRODUODENOSCOPY N/A 2019    Procedure: ESOPHAGOGASTRODUODENOSCOPY (EGD);  Surgeon: Delmer CHAVARRIA  MD Usha;  [facility]: [facility] ENDO;  Service: Endoscopy;  Laterality: N/A;    INTESTINAL MALROTATION REPAIR Right 1961    2 weeks old, Martinez    PARTIAL ARTHROPLASTY OF SHOULDER Left 1/28/2020    Procedure: HEMIARTHROPLASTY, SHOULDER;  Surgeon: Chandler Chris MD;  Location: [facility] OR [address] [place] [address];  Service: Orthopedics;  Laterality: Left;    PARTIAL ARTHROPLASTY OF SHOULDER Right 1/30/2020    Procedure: HEMIARTHROPLASTY, SHOULDER- RIGHT- MEKA- SUPINE-  TRIOS;  Surgeon: Chandler Chris MD;  Location: [facility] OR [address] [place] [address];  Service: Orthopedics;  Laterality: Right;    PERCUTANEOUS TRANSLUMINAL ANGIOPLASTY (PTA) OF PERIPHERAL VESSEL Right 12/2014    NIKOLAS Childs       Time Tracking:     PT Received On: 02/15/20  PT Start Time: 1150     PT Stop Time: 1218  PT Total Time (min): 28 min     Billable Minutes: Evaluation 8 and Therapeutic Activity 14      Em Valle, PT  02/15/2020

## 2020-02-15 NOTE — HPI
Ben Melvin is a 60 y.o. male with PMH of AFib on coumadin, peripheral vascular disease, hypertension, COPD, current smoker, liver cirrhosis, who had a witnessed seizure while playing video poker 01/27/2020 and sustained bilateral proximal humerus fracture dislocations, bilateral acromial fractures, subdural hemorrhage, L1 and L4 burst fractures. He underwent left shoulder hemiarthroplasty, biceps tenodesis on 01/28/20 and right shoulder hemiarthroplasty, biceps tenodesis on 01/30/20 and [facility]. BL shoulder XRs from 02/10/20 notable for failure of the repair of the greater tuberosity on the right shoulder and he returned to [facility] on 02/14/20 for revision fixation of his right greater tuberosity fracture. Pt had a stable CTH on 02/10/20 and warfarin was reinitiated 02/11, last dose was 02/12. Currently on Lovenox 40 mg SQ daily. He was seen by neurosurgery and will f/u with pt outpt as scheduled on 02/21/20. Pts pain is well managed with current regiment. He has been able to tolerate a regular diet. No abd pain or n/v/d. Denies fevers or chills.     IM 6 consulted for ortho co-management.

## 2020-02-16 LAB
POCT GLUCOSE: 153 MG/DL (ref 70–110)
POCT GLUCOSE: 98 MG/DL (ref 70–110)

## 2020-02-16 PROCEDURE — 97110 THERAPEUTIC EXERCISES: CPT | Mod: CQ

## 2020-02-16 PROCEDURE — 11000001 HC ACUTE MED/SURG PRIVATE ROOM

## 2020-02-16 PROCEDURE — 94761 N-INVAS EAR/PLS OXIMETRY MLT: CPT

## 2020-02-16 PROCEDURE — 25000242 PHARM REV CODE 250 ALT 637 W/ HCPCS: Performed by: STUDENT IN AN ORGANIZED HEALTH CARE EDUCATION/TRAINING PROGRAM

## 2020-02-16 PROCEDURE — 99232 SBSQ HOSP IP/OBS MODERATE 35: CPT | Mod: GC,,, | Performed by: INTERNAL MEDICINE

## 2020-02-16 PROCEDURE — 25000003 PHARM REV CODE 250: Performed by: STUDENT IN AN ORGANIZED HEALTH CARE EDUCATION/TRAINING PROGRAM

## 2020-02-16 PROCEDURE — 94640 AIRWAY INHALATION TREATMENT: CPT

## 2020-02-16 PROCEDURE — 63600175 PHARM REV CODE 636 W HCPCS: Performed by: STUDENT IN AN ORGANIZED HEALTH CARE EDUCATION/TRAINING PROGRAM

## 2020-02-16 PROCEDURE — 97530 THERAPEUTIC ACTIVITIES: CPT | Mod: CQ

## 2020-02-16 PROCEDURE — 99232 PR SUBSEQUENT HOSPITAL CARE,LEVL II: ICD-10-PCS | Mod: GC,,, | Performed by: INTERNAL MEDICINE

## 2020-02-16 PROCEDURE — 97530 THERAPEUTIC ACTIVITIES: CPT

## 2020-02-16 RX ORDER — AMITRIPTYLINE HYDROCHLORIDE 25 MG/1
50 TABLET, FILM COATED ORAL
Status: DISCONTINUED | OUTPATIENT
Start: 2020-02-16 | End: 2020-02-18 | Stop reason: HOSPADM

## 2020-02-16 RX ADMIN — LEVETIRACETAM 1000 MG: 500 TABLET ORAL at 08:02

## 2020-02-16 RX ADMIN — DOCUSATE SODIUM - SENNOSIDES 2 TABLET: 50; 8.6 TABLET, FILM COATED ORAL at 09:02

## 2020-02-16 RX ADMIN — OXYCODONE HYDROCHLORIDE 10 MG: 10 TABLET ORAL at 09:02

## 2020-02-16 RX ADMIN — GABAPENTIN 300 MG: 300 CAPSULE ORAL at 02:02

## 2020-02-16 RX ADMIN — PANTOPRAZOLE SODIUM 40 MG: 40 TABLET, DELAYED RELEASE ORAL at 09:02

## 2020-02-16 RX ADMIN — AMITRIPTYLINE HYDROCHLORIDE 50 MG: 25 TABLET, FILM COATED ORAL at 05:02

## 2020-02-16 RX ADMIN — LEVALBUTEROL HYDROCHLORIDE 0.63 MG: 0.63 SOLUTION RESPIRATORY (INHALATION) at 08:02

## 2020-02-16 RX ADMIN — LEVETIRACETAM 1000 MG: 500 TABLET ORAL at 09:02

## 2020-02-16 RX ADMIN — OXYCODONE HYDROCHLORIDE 10 MG: 10 TABLET ORAL at 08:02

## 2020-02-16 RX ADMIN — METHOCARBAMOL TABLETS 500 MG: 500 TABLET, COATED ORAL at 09:02

## 2020-02-16 RX ADMIN — POLYETHYLENE GLYCOL 3350 17 G: 17 POWDER, FOR SOLUTION ORAL at 09:02

## 2020-02-16 RX ADMIN — METHOCARBAMOL TABLETS 500 MG: 500 TABLET, COATED ORAL at 02:02

## 2020-02-16 RX ADMIN — ATORVASTATIN CALCIUM 80 MG: 20 TABLET, FILM COATED ORAL at 08:02

## 2020-02-16 RX ADMIN — METOPROLOL TARTRATE 25 MG: 25 TABLET ORAL at 09:02

## 2020-02-16 RX ADMIN — GABAPENTIN 300 MG: 300 CAPSULE ORAL at 08:02

## 2020-02-16 RX ADMIN — LEVALBUTEROL HYDROCHLORIDE 0.63 MG: 0.63 SOLUTION RESPIRATORY (INHALATION) at 03:02

## 2020-02-16 RX ADMIN — GABAPENTIN 300 MG: 300 CAPSULE ORAL at 09:02

## 2020-02-16 RX ADMIN — ENOXAPARIN SODIUM 40 MG: 100 INJECTION SUBCUTANEOUS at 05:02

## 2020-02-16 RX ADMIN — METHOCARBAMOL TABLETS 500 MG: 500 TABLET, COATED ORAL at 08:02

## 2020-02-16 RX ADMIN — LEVALBUTEROL HYDROCHLORIDE 0.63 MG: 0.63 SOLUTION RESPIRATORY (INHALATION) at 11:02

## 2020-02-16 RX ADMIN — DOCUSATE SODIUM - SENNOSIDES 2 TABLET: 50; 8.6 TABLET, FILM COATED ORAL at 08:02

## 2020-02-16 RX ADMIN — METOPROLOL TARTRATE 25 MG: 25 TABLET ORAL at 08:02

## 2020-02-16 NOTE — ASSESSMENT & PLAN NOTE
PAF on coumadin. Coumadin held due to SDH and restarted on 02/11/20 after repeat CTH showed stable SDH. Last dose on 02/13/20, held for revision fixation for R greater tuberosity fracture that pt underwent 02/14/20. He is currently on Lovenox 40 mg sq daily. INR today 1.0, goal 2-3. Surgery team must determine when it is most appropriate for pt to restart AC.     -- AC initiation per primary team  -- Metoprolol 25 mg BID

## 2020-02-16 NOTE — PROGRESS NOTES
[facility]  Orthopedics  Progress Note    Patient Name: Ben Melvin  MRN: [id number]  Admission Date: 2/14/2020  Hospital Length of Stay: 2 days  Attending Provider: Chandler Chris,*  Primary Care Provider: Cristal Ohara NP  Follow-up For: Procedure(s) (LRB):  ORIF, FRACTURE, HUMERUS, PROXIMAL - ORIF R greater tuberosity - suture repair. Trios, supine, fiberwire (Right)    Post-Operative Day: 2 Days Post-Op  Subjective:     Principal Problem:Proximal humeral fracture    Principal Orthopedic Problem:  Same    Interval History:  Patient seen and examined at bedside.  No acute events overnight.  His pain is controlled.  He states that he does have some plantar fasciitis flaring up in his left foot.  Recommended stretches and NSAIDs.     Review of patient's allergies indicates:  No Known Allergies    Current Facility-Administered Medications   Medication    0.9%  NaCl infusion    amitriptyline tablet 50 mg    atorvastatin tablet 80 mg    dextrose 10% (D10W) Bolus    dextrose 10% (D10W) Bolus    enoxaparin injection 40 mg    ergocalciferol capsule 50,000 Units    gabapentin capsule 300 mg    glucagon (human recombinant) injection 1 mg    glucose chewable tablet 16 g    glucose chewable tablet 24 g    insulin aspart U-100 pen 0-5 Units    ipratropium 0.02 % nebulizer solution 500 mcg    levalbuterol nebulizer solution 0.63 mg    levETIRAcetam tablet 1,000 mg    melatonin tablet 6 mg    methocarbamol tablet 500 mg    metoprolol tartrate (LOPRESSOR) tablet 25 mg    morphine injection 3 mg    ondansetron disintegrating tablet 8 mg    oxyCODONE immediate release tablet 10 mg    oxyCODONE immediate release tablet 15 mg    oxyCODONE immediate release tablet 5 mg    pantoprazole EC tablet 40 mg    polyethylene glycol packet 17 g    senna-docusate 8.6-50 mg per tablet 2 tablet    sodium chloride 0.9% flush 10 mL     Objective:     Vital Signs (Most Recent):  Temp: 98  °F (36.7 °C) (02/16/20 0444)  Pulse: 101 (02/16/20 0444)  Resp: 18 (02/16/20 0444)  BP: 112/62 (02/16/20 0444)  SpO2: (!) 92 % (02/16/20 0444) Vital Signs (24h Range):  Temp:  [96.1 °F (35.6 °C)-98 °F (36.7 °C)] 98 °F (36.7 °C)  Pulse:  [] 101  Resp:  [16-18] 18  SpO2:  [92 %-95 %] 92 %  BP: ()/(52-62) 112/62     Weight: 120.2 kg (265 lb)     Body mass index is 36.96 kg/m².      Intake/Output Summary (Last 24 hours) at 2/16/2020 0710  Last data filed at 2/16/2020 0600  Gross per 24 hour   Intake 1658 ml   Output 2100 ml   Net -442 ml       Ortho/SPM Exam   Bilateral arm slings in place  Surgical dressing clean, dry, and intact  Sensation to light touch intact throughout bilaterally  Motor intact throughout bilaterally  Good perfusion distally in bilateral arms    Significant Labs: All pertinent labs within the past 24 hours have been reviewed.    Significant Imaging: I have reviewed all pertinent imaging results/findings.    Assessment/Plan:     * Proximal humeral fracture  Ben Melvin is a 60 y.o. male s/p bilateral reverse total shoulder replacements on 1/28 and 1/30 with a revision of the right side due to greater tuberosity avulsion fixed on 2/14  - nonweightbearing bilateral upper extremities in sling  - postop antibiotics  - pain control: multimodal    Social work to work on getting him back to inpatient rehab          Marlon Cordoba MD  Orthopedics  [facility]

## 2020-02-16 NOTE — SUBJECTIVE & OBJECTIVE
"Interval History:   NAEON  Pain well controlled. Pt notes burning pain and "pins and needles" sensation in BL heels when bearing weight. He had similar sxs in the past. Tolerating a regular diet. No abd pain or n/v/d.     Review of Systems   Constitutional: Negative for appetite change, chills and fever.   HENT: Negative for rhinorrhea.    Respiratory: Negative for cough, chest tightness and shortness of breath.    Gastrointestinal: Negative for abdominal pain, diarrhea, nausea and vomiting.   Musculoskeletal: Positive for back pain and gait problem.   Neurological: Negative for dizziness and headaches.     Objective:     Vital Signs (Most Recent):  Temp: 97.9 °F (36.6 °C) (02/16/20 0718)  Pulse: 104 (02/16/20 0848)  Resp: 18 (02/16/20 0848)  BP: (!) 111/53 (02/16/20 0718)  SpO2: (!) 93 % (02/16/20 0848) Vital Signs (24h Range):  Temp:  [96.1 °F (35.6 °C)-98 °F (36.7 °C)] 97.9 °F (36.6 °C)  Pulse:  [] 104  Resp:  [16-18] 18  SpO2:  [92 %-95 %] 93 %  BP: ()/(52-62) 111/53     Weight: 120.2 kg (265 lb)  Body mass index is 36.96 kg/m².    Intake/Output Summary (Last 24 hours) at 2/16/2020 1001  Last data filed at 2/16/2020 0719  Gross per 24 hour   Intake 1790 ml   Output 2300 ml   Net -510 ml      Physical Exam   Constitutional: He is oriented to person, place, and time. No distress.   Eyes: EOM are normal.   Neck: Normal range of motion. Neck supple.   Cardiovascular: Normal rate and regular rhythm.   Pulmonary/Chest: Effort normal and breath sounds normal. No respiratory distress. He has no wheezes.   Abdominal: Soft. Bowel sounds are normal. There is no tenderness.   Musculoskeletal:   BUE in cast.     Neurological: He is alert and oriented to person, place, and time.   Equal hand  5/5. BLE strength 5/5    Skin: Skin is warm and dry. He is not diaphoretic.   Healing surgical scar on L shoulder. Surgical incision on R shoulder with dressing in place. No erythema, tenderness or warmth.   "   Psychiatric: He has a normal mood and affect.   Nursing note and vitals reviewed.      Significant Labs:   A1C:   Recent Labs   Lab 10/16/19  0913 12/30/19  1209 01/27/20  2256   HGBA1C 7.0 6.6 5.9*     Bilirubin:   Recent Labs   Lab 02/04/20  0455 02/05/20  0501 02/06/20  0416 02/07/20  0542 02/15/20  0935   BILIDIR 0.4*  --   --   --   --    BILITOT 1.3* 1.1* 0.9 0.9 0.7       CBC:   Recent Labs   Lab 02/15/20  0935   WBC 7.88   HGB 10.6*   HCT 35.5*        CMP:   Recent Labs   Lab 02/15/20  0935      K 4.4      CO2 24   *   BUN 34*   CREATININE 1.3   CALCIUM 9.1   PROT 6.7   ALBUMIN 3.0*   BILITOT 0.7   ALKPHOS 275*   AST 23   ALT 39   ANIONGAP 10   EGFRNONAA 59.7*     Coagulation:   Recent Labs   Lab 02/15/20  0935   INR 1.0       Magnesium:   Recent Labs   Lab 02/15/20  0935   MG 1.9       TSH:   Recent Labs   Lab 01/27/20  2256   TSH 0.667  0.667     All pertinent labs within the past 24 hours have been reviewed.    Significant Imaging: I have reviewed all pertinent imaging results/findings within the past 24 hours.

## 2020-02-16 NOTE — PLAN OF CARE
Problem: Physical Therapy Goal  Goal: Physical Therapy Goal  Description  Goals to be met by: 20     Patient will increase functional independence with mobility by performin. Supine to sit with Contact Guard Assistance.  2. Sit to supine with Contact Guard Assistance.  3. Sit to stand transfer with Contact Guard Assistance.  4. Bed to chair transfer with Contact Guard Assistance.  5. Gait  x 100 feet with Contact Guard Assistance.   6. Ascend/descend 3 stair without use of handrails Contact Guard Assistance.   7. Lower extremity exercise program x 20 reps per handout, with assistance as needed.     Outcome: Ongoing, Progressing.  Patient progressing slowly towards his goals due to orthopedic restriction and L LE pain with weigh bearing.

## 2020-02-16 NOTE — PLAN OF CARE
Pt goals remain appropriate, continue w/ OT POC.    Problem: Occupational Therapy Goal  Goal: Occupational Therapy Goal  Description  Goals to be met by: 3/13/2020     Patient will increase functional independence with ADLs by performing:    Feeding with Set-up Assistance and Assistive Devices as needed using Left arm.  Grooming while bedside chair with Set-up Assistance using Left arm.  Toileting from bedside commode with Minimal Assistance and Assistive Devices as needed for hygiene and clothing management.   Bathing from  edge of bed with Minimal Assistance using Left arm.  Toilet transfer to bedside commode with Contact Guard Assistance and maintaining weight-bearing precaution(s).     Outcome: Ongoing, Progressing

## 2020-02-16 NOTE — ASSESSMENT & PLAN NOTE
Ben Melvin is a 60 y.o. male s/p bilateral reverse total shoulder replacements on 1/28 and 1/30 with a revision of the right side due to greater tuberosity avulsion fixed on 2/14  - nonweightbearing bilateral upper extremities in sling  - postop antibiotics  - pain control: multimodal    Social work to work on getting him back to inpatient rehab

## 2020-02-16 NOTE — PLAN OF CARE
CM spoke w/ MD - pt ready for transfer back to [facility]. CM spoke w/ Devi w/ [facility] inquiring if pt could transfer and she stated that MD was review. CM inquired several times throughout the day about pt's transfer and told in review or received no answer. ZULMA forwarded case to   Director Shereen

## 2020-02-16 NOTE — PT/OT/SLP PROGRESS
Physical Therapy Treatment    Patient Name:  Ben Melvin   MRN:  [id number]    Recommendations:     Discharge Recommendations:  nursing facility, skilled   Discharge Equipment Recommendations: (TBD at next level of care.)   Barriers to discharge: Inaccessible home    Assessment:     Ben Melvin is a 60 y.o. male admitted with a medical diagnosis of Proximal humeral fracture.  He presents with the following impairments/functional limitations:  weakness, impaired endurance, impaired self care skills, impaired functional mobilty, gait instability, impaired balance, decreased upper extremity function, decreased ROM, edema, orthopedic precautions, pain, decreased safety awareness, decreased lower extremity function . Patient required time with functional mobility in order to limit pain. Patient was able to take a few steps to bedside chair, but was limited due to pain on his L leg and min anxiety.    Rehab Prognosis: Fair; patient would benefit from acute skilled PT services to address these deficits and reach maximum level of function.    Recent Surgery: Procedure(s) (LRB):  ORIF, FRACTURE, HUMERUS, PROXIMAL - ORIF R greater tuberosity - suture repair. Trios, supine, fiberwire (Right) 2 Days Post-Op    Plan:     During this hospitalization, patient to be seen daily to address the identified rehab impairments via gait training, therapeutic activities, therapeutic exercises, neuromuscular re-education and progress toward the following goals:    · Plan of Care Expires:  03/14/20    Subjective     Chief Complaint: pain with mobility.  Patient/Family Comments/goals: to get stronger and to heal well.  Pain/Comfort:  · Pain Rating 1: 0/10(Pain only with mobility.)  · Location - Side 1: Bilateral  · Location - Orientation 1: anterior  · Location 1: shoulder  · Pain Addressed 1: Pre-medicate for activity, Reposition, Distraction  · Pain Rating Post-Intervention 1: 0/10      Objective:     Communicated with nsg prior to  session.  Patient found HOB elevated with go catheter upon PT entry to room.     General Precautions: Standard, diabetic, fall, seizure   Orthopedic Precautions:RUE non weight bearing, LUE non weight bearing   Braces: TLSO, Shoulder abduction brace     Functional Mobility:  · Bed Mobility:     · Rolling Right: minimum assistance  · Scooting: minimum assistance and moderate assistance  · Supine to Sit: maximal assistance  · Transfers:     · Sit to Stand:  minimum assistance and moderate assistance with no AD  · Gait: 4 side steps to bedside chair with min assistance.  · Balance: fair in sitting and poor in standing.      AM-PAC 6 CLICK MOBILITY  Turning over in bed (including adjusting bedclothes, sheets and blankets)?: 3  Sitting down on and standing up from a chair with arms (e.g., wheelchair, bedside commode, etc.): 3  Moving from lying on back to sitting on the side of the bed?: 2  Moving to and from a bed to a chair (including a wheelchair)?: 2  Need to walk in hospital room?: 2  Climbing 3-5 steps with a railing?: 1  Basic Mobility Total Score: 13       Therapeutic Activities and Exercises:   Donned TLSO while sitting at EOB. Patient assisted to bedside chair. There ex in sitting: LAQ, HIP FLEX AND HEEL/TOERAISES 2X12 REPS B LE.    Patient left up in chair with all lines intact, call button in reach and SPOUSE present..    GOALS:   Multidisciplinary Problems     Physical Therapy Goals        Problem: Physical Therapy Goal    Goal Priority Disciplines Outcome Goal Variances Interventions   Physical Therapy Goal     PT, PT/OT Ongoing, Progressing     Description:  Goals to be met by: 20     Patient will increase functional independence with mobility by performin. Supine to sit with Contact Guard Assistance.  2. Sit to supine with Contact Guard Assistance.  3. Sit to stand transfer with Contact Guard Assistance.  4. Bed to chair transfer with Contact Guard Assistance.  5. Gait  x 100 feet with  Contact Guard Assistance.   6. Ascend/descend 3 stair without use of handrails Contact Guard Assistance.   7. Lower extremity exercise program x 20 reps per handout, with assistance as needed.                      Time Tracking:     PT Received On: 02/16/20  PT Start Time: 0943     PT Stop Time: 1008  PT Total Time (min): 25 min     Billable Minutes: Therapeutic Activity 16 and Therapeutic Exercise 9    Treatment Type: Treatment  PT/PTA: PTA     PTA Visit Number: Paul Worrell, PTA  02/16/2020

## 2020-02-16 NOTE — ASSESSMENT & PLAN NOTE
Home medications: metoprolol and ramipril at home. BP was intermittently low during last hospitalization and currently normotensive. Will resume Metoprolol and hold on ramipril. Will reinitiate as BP allows.     -- Metoprolol 25 mg BID

## 2020-02-16 NOTE — PROGRESS NOTES
"[facility] Hospital Medicine  Progress Note    Patient Name: Ben Melvin  MRN: [id number]  Patient Class: IP- Inpatient   Admission Date: 2/14/2020  Length of Stay: 2 days  Attending Physician: Chandler Chris,*  Primary Care Provider: Cristal Ohara NP    Hospital Medicine Team: Networked reference to record PCT  Stephanie Acevedo MD    Subjective:     Principal Problem:Proximal humeral fracture        HPI:  Ben Melvin is a 60 y.o. male with PMH of AFib on coumadin, peripheral vascular disease, hypertension, COPD, current smoker, liver cirrhosis, who had a witnessed seizure while playing video poker 01/27/2020 and sustained bilateral proximal humerus fracture dislocations, bilateral acromial fractures, subdural hemorrhage, L1 and L4 burst fractures. He underwent left shoulder hemiarthroplasty, biceps tenodesis on 01/28/20 and right shoulder hemiarthroplasty, biceps tenodesis on 01/30/20 and [facility]. BL shoulder XRs from 02/10/20 notable for failure of the repair of the greater tuberosity on the right shoulder and he returned to [facility] on 02/14/20 for revision fixation of his right greater tuberosity fracture. Pt had a stable CTH on 02/10/20 and warfarin was reinitiated 02/11, last dose was 02/12. Currently on Lovenox 40 mg SQ daily. He was seen by neurosurgery and will f/u with pt outpt as scheduled on 02/21/20. Pts pain is well managed with current regiment. He has been able to tolerate a regular diet. No abd pain or n/v/d. Denies fevers or chills.     IM 6 consulted for ortho co-management.     Overview/Hospital Course:  No notes on file    Interval History:   NAEON  Pain well controlled. Pt notes burning pain and "pins and needles" sensation in BL heels when bearing weight. He had similar sxs in the past. Tolerating a regular diet. No abd pain or n/v/d.     Review of Systems   Constitutional: Negative for appetite change, chills and fever.   HENT: Negative for " rhinorrhea.    Respiratory: Negative for cough, chest tightness and shortness of breath.    Gastrointestinal: Negative for abdominal pain, diarrhea, nausea and vomiting.   Musculoskeletal: Positive for back pain and gait problem.   Neurological: Negative for dizziness and headaches.     Objective:     Vital Signs (Most Recent):  Temp: 97.9 °F (36.6 °C) (02/16/20 0718)  Pulse: 104 (02/16/20 0848)  Resp: 18 (02/16/20 0848)  BP: (!) 111/53 (02/16/20 0718)  SpO2: (!) 93 % (02/16/20 0848) Vital Signs (24h Range):  Temp:  [96.1 °F (35.6 °C)-98 °F (36.7 °C)] 97.9 °F (36.6 °C)  Pulse:  [] 104  Resp:  [16-18] 18  SpO2:  [92 %-95 %] 93 %  BP: ()/(52-62) 111/53     Weight: 120.2 kg (265 lb)  Body mass index is 36.96 kg/m².    Intake/Output Summary (Last 24 hours) at 2/16/2020 1001  Last data filed at 2/16/2020 0719  Gross per 24 hour   Intake 1790 ml   Output 2300 ml   Net -510 ml      Physical Exam   Constitutional: He is oriented to person, place, and time. No distress.   Eyes: EOM are normal.   Neck: Normal range of motion. Neck supple.   Cardiovascular: Normal rate and regular rhythm.   Pulmonary/Chest: Effort normal and breath sounds normal. No respiratory distress. He has no wheezes.   Abdominal: Soft. Bowel sounds are normal. There is no tenderness.   Musculoskeletal:   BUE in cast.     Neurological: He is alert and oriented to person, place, and time.   Equal hand  5/5. BLE strength 5/5    Skin: Skin is warm and dry. He is not diaphoretic.   Healing surgical scar on L shoulder. Surgical incision on R shoulder with dressing in place. No erythema, tenderness or warmth.     Psychiatric: He has a normal mood and affect.   Nursing note and vitals reviewed.      Significant Labs:   A1C:   Recent Labs   Lab 10/16/19  0913 12/30/19  1209 01/27/20  2256   HGBA1C 7.0 6.6 5.9*     Bilirubin:   Recent Labs   Lab 02/04/20  0455 02/05/20  0501 02/06/20  0416 02/07/20  0542 02/15/20  0935   BILIDIR 0.4*  --   --   --    --    BILITOT 1.3* 1.1* 0.9 0.9 0.7       CBC:   Recent Labs   Lab 02/15/20  0935   WBC 7.88   HGB 10.6*   HCT 35.5*        CMP:   Recent Labs   Lab 02/15/20  0935      K 4.4      CO2 24   *   BUN 34*   CREATININE 1.3   CALCIUM 9.1   PROT 6.7   ALBUMIN 3.0*   BILITOT 0.7   ALKPHOS 275*   AST 23   ALT 39   ANIONGAP 10   EGFRNONAA 59.7*     Coagulation:   Recent Labs   Lab 02/15/20  0935   INR 1.0       Magnesium:   Recent Labs   Lab 02/15/20  0935   MG 1.9       TSH:   Recent Labs   Lab 01/27/20  2256   TSH 0.667  0.667     All pertinent labs within the past 24 hours have been reviewed.    Significant Imaging: I have reviewed all pertinent imaging results/findings within the past 24 hours.      Assessment/Plan:      GERD (gastroesophageal reflux disease)  Continue Pantoprazole 40 mg     Stable burst fracture of first lumbar vertebra  L1 and L4 burst fractures  Repeat CT L spine from 02/15/20 stable. Pt evaluated by neurosurgery. Recommend continuing TSLO brace and will f/u in clinic as scheduled on 02/21/20.     Constipation  Continue daily miralax and senokot-s     fracture dislocation of bilateral shoulders  Bilateral acromial fractures with bilateral shoulder dislocation S/p bilateral shoulder hemiarthroplasty: right on 1/28/2020 and left on 1/30/2020  Pt underwent revision of fixation of R greater tuberosity fracture on 02/14/20. BUE in a brace.     -- Pain management per primary team         Subdural hematoma  Stable on CTH from 02/10/20. Warfarin was resumed on 02/11, but held since 02/13 for procedure.      -- Continue keppra       Type 2 diabetes mellitus  A1c 5.9 from 01/27/20.  today. Will initial LDSSI and titrate insulin regiment as appropriate.     -- LDSSI   -- Accu checks AC/HS        Vitamin D deficiency  Level of 7 on 2/4    -- Ergocalciferol 50k weekly    Essential hypertension  Home medications: metoprolol and ramipril at home. BP was intermittently low during last  hospitalization and currently normotensive. Will resume Metoprolol and hold on ramipril. Will reinitiate as BP allows.     -- Metoprolol 25 mg BID       PAF (paroxysmal atrial fibrillation)  PAF on coumadin. Coumadin held due to SDH and restarted on 02/11/20 after repeat CTH showed stable SDH. Last dose on 02/13/20, held for revision fixation for R greater tuberosity fracture that pt underwent 02/14/20. He is currently on Lovenox 40 mg sq daily. INR today 1.0, goal 2-3. Surgery team must determine when it is most appropriate for pt to restart AC.     -- AC initiation per primary team  -- Metoprolol 25 mg BID      COPD (chronic obstructive pulmonary disease)  Smoker    -- Continue levoalbuterol nebs  -- Limit oxygen to avoid hypercapnea      VTE Risk Mitigation (From admission, onward)         Ordered     enoxaparin injection 40 mg  Daily      02/14/20 1313     Place NOAH hose  Until discontinued      02/14/20 0540     IP VTE HIGH RISK PATIENT  Once      02/14/20 0540                      Stephanie Acevedo MD  Department of Hospital Medicine   [facility]

## 2020-02-16 NOTE — SUBJECTIVE & OBJECTIVE
Principal Problem:Proximal humeral fracture    Principal Orthopedic Problem:  Same    Interval History:  Patient seen and examined at bedside.  No acute events overnight.  His pain is controlled.  He states that he does have some plantar fasciitis flaring up in his left foot.  Recommended stretches and NSAIDs.     Review of patient's allergies indicates:  No Known Allergies    Current Facility-Administered Medications   Medication    0.9%  NaCl infusion    amitriptyline tablet 50 mg    atorvastatin tablet 80 mg    dextrose 10% (D10W) Bolus    dextrose 10% (D10W) Bolus    enoxaparin injection 40 mg    ergocalciferol capsule 50,000 Units    gabapentin capsule 300 mg    glucagon (human recombinant) injection 1 mg    glucose chewable tablet 16 g    glucose chewable tablet 24 g    insulin aspart U-100 pen 0-5 Units    ipratropium 0.02 % nebulizer solution 500 mcg    levalbuterol nebulizer solution 0.63 mg    levETIRAcetam tablet 1,000 mg    melatonin tablet 6 mg    methocarbamol tablet 500 mg    metoprolol tartrate (LOPRESSOR) tablet 25 mg    morphine injection 3 mg    ondansetron disintegrating tablet 8 mg    oxyCODONE immediate release tablet 10 mg    oxyCODONE immediate release tablet 15 mg    oxyCODONE immediate release tablet 5 mg    pantoprazole EC tablet 40 mg    polyethylene glycol packet 17 g    senna-docusate 8.6-50 mg per tablet 2 tablet    sodium chloride 0.9% flush 10 mL     Objective:     Vital Signs (Most Recent):  Temp: 98 °F (36.7 °C) (02/16/20 0444)  Pulse: 101 (02/16/20 0444)  Resp: 18 (02/16/20 0444)  BP: 112/62 (02/16/20 0444)  SpO2: (!) 92 % (02/16/20 0444) Vital Signs (24h Range):  Temp:  [96.1 °F (35.6 °C)-98 °F (36.7 °C)] 98 °F (36.7 °C)  Pulse:  [] 101  Resp:  [16-18] 18  SpO2:  [92 %-95 %] 92 %  BP: ()/(52-62) 112/62     Weight: 120.2 kg (265 lb)     Body mass index is 36.96 kg/m².      Intake/Output Summary (Last 24 hours) at 2/16/2020 0710  Last data  filed at 2/16/2020 0600  Gross per 24 hour   Intake 1658 ml   Output 2100 ml   Net -442 ml       Ortho/SPM Exam   Bilateral arm slings in place  Surgical dressing clean, dry, and intact  Sensation to light touch intact throughout bilaterally  Motor intact throughout bilaterally  Good perfusion distally in bilateral arms    Significant Labs: All pertinent labs within the past 24 hours have been reviewed.    Significant Imaging: I have reviewed all pertinent imaging results/findings.

## 2020-02-16 NOTE — HOSPITAL COURSE
60 y.o. M admitted for repeat R shoulder repeat ORIF. Pain controlled with multimodal regiment. Plan for discharge to SNF, awaiting bed at this time.

## 2020-02-16 NOTE — PT/OT/SLP PROGRESS
Occupational Therapy   Treatment    Name: Ben Melvin  MRN: [id number]  Admitting Diagnosis:  Proximal humeral fracture  2 Days Post-Op    Recommendations:     Discharge Recommendations: nursing facility, skilled  Discharge Equipment Recommendations:  (TBD at next level)  Barriers to discharge:  None    Assessment:     Ben Melvin is a 60 y.o. male with a medical diagnosis of Proximal humeral fracture.  He presents with pain in BUE and orthopedic precautions 2* surgery the limit hsi functional performance. Performance deficits affecting function are weakness, impaired endurance, impaired self care skills, impaired functional mobilty, decreased upper extremity function, decreased lower extremity function, decreased safety awareness, decreased ROM, pain, impaired coordination, orthopedic precautions.     Rehab Prognosis:  Fair; patient would benefit from acute skilled OT services to address these deficits and reach maximum level of function.       Plan:     Patient to be seen 6 x/week to address the above listed problems via self-care/home management, therapeutic activities, therapeutic exercises  · Plan of Care Expires: 03/13/20  · Plan of Care Reviewed with: patient, spouse    Subjective     Pain/Comfort:  · Pain Rating 1: other (see comments)(did not rate pain w/ Mobility)  · Location - Side 1: Bilateral  · Location - Orientation 1: anterior  · Location 1: shoulder  · Pain Addressed 1: Pre-medicate for activity, Reposition, Distraction  · Pain Rating Post-Intervention 1: other (see comments)(did not rate)    Objective:     Communicated with: RN prior to session.  Patient found up in chair with go catheter upon OT entry to room.    General Precautions: Standard, diabetic, fall, seizure   Orthopedic Precautions:RUE non weight bearing, LUE non weight bearing, spinal precautions   Braces:       Occupational Performance:     Activities of Daily Living:  · Feeding:  minimum assistance worked on supporting LUE  and simulated feeding w/ L hand to improve independence w/ feeding.       [facility] 6 Click ADL: 7    Treatment & Education:  -Pt edu on OT role/POC  -Importance of OOB activity with staff assistance  -Safety during functional t/f and mobility  - White board updated  - Multiple self care tasks/functional mobility completed-- assistance level noted above  - All questions/concerns answered within OT scope of practice      Patient left up in chair with all lines intact, call button in reach and RN notifiedEducation:      GOALS:   Multidisciplinary Problems     Occupational Therapy Goals        Problem: Occupational Therapy Goal    Goal Priority Disciplines Outcome Interventions   Occupational Therapy Goal     OT, PT/OT Ongoing, Progressing    Description:  Goals to be met by: 3/13/2020     Patient will increase functional independence with ADLs by performing:    Feeding with Set-up Assistance and Assistive Devices as needed using Left arm.  Grooming while bedside chair with Set-up Assistance using Left arm.  Toileting from bedside commode with Minimal Assistance and Assistive Devices as needed for hygiene and clothing management.   Bathing from  edge of bed with Minimal Assistance using Left arm.  Toilet transfer to bedside commode with Contact Guard Assistance and maintaining weight-bearing precaution(s).                      Time Tracking:     OT Date of Treatment: 02/16/20  OT Start Time: 1150  OT Stop Time: 1216  OT Total Time (min): 26 min    Billable Minutes:Self Care/Home Management 26 mins    Dru Dotson, OT  2/16/2020

## 2020-02-17 LAB
ALBUMIN SERPL BCP-MCNC: 2.8 G/DL (ref 3.5–5.2)
ALP SERPL-CCNC: 257 U/L (ref 55–135)
ALT SERPL W/O P-5'-P-CCNC: 30 U/L (ref 10–44)
ANION GAP SERPL CALC-SCNC: 13 MMOL/L (ref 8–16)
AST SERPL-CCNC: 20 U/L (ref 10–40)
BASOPHILS # BLD AUTO: 0.07 K/UL (ref 0–0.2)
BASOPHILS NFR BLD: 0.7 % (ref 0–1.9)
BILIRUB SERPL-MCNC: 0.6 MG/DL (ref 0.1–1)
BUN SERPL-MCNC: 29 MG/DL (ref 6–20)
CALCIUM SERPL-MCNC: 8.9 MG/DL (ref 8.7–10.5)
CHLORIDE SERPL-SCNC: 104 MMOL/L (ref 95–110)
CO2 SERPL-SCNC: 22 MMOL/L (ref 23–29)
CREAT SERPL-MCNC: 1.4 MG/DL (ref 0.5–1.4)
DIFFERENTIAL METHOD: ABNORMAL
EOSINOPHIL # BLD AUTO: 0.1 K/UL (ref 0–0.5)
EOSINOPHIL NFR BLD: 1.3 % (ref 0–8)
ERYTHROCYTE [DISTWIDTH] IN BLOOD BY AUTOMATED COUNT: 17.7 % (ref 11.5–14.5)
EST. GFR  (AFRICAN AMERICAN): >60 ML/MIN/1.73 M^2
EST. GFR  (NON AFRICAN AMERICAN): 54.6 ML/MIN/1.73 M^2
GLUCOSE SERPL-MCNC: 120 MG/DL (ref 70–110)
HCT VFR BLD AUTO: 35.9 % (ref 40–54)
HGB BLD-MCNC: 11 G/DL (ref 14–18)
IMM GRANULOCYTES # BLD AUTO: 0.05 K/UL (ref 0–0.04)
IMM GRANULOCYTES NFR BLD AUTO: 0.5 % (ref 0–0.5)
INR PPP: 1 (ref 0.8–1.2)
LYMPHOCYTES # BLD AUTO: 1.8 K/UL (ref 1–4.8)
LYMPHOCYTES NFR BLD: 18 % (ref 18–48)
MAGNESIUM SERPL-MCNC: 1.7 MG/DL (ref 1.6–2.6)
MCH RBC QN AUTO: 28.5 PG (ref 27–31)
MCHC RBC AUTO-ENTMCNC: 30.6 G/DL (ref 32–36)
MCV RBC AUTO: 93 FL (ref 82–98)
MONOCYTES # BLD AUTO: 0.7 K/UL (ref 0.3–1)
MONOCYTES NFR BLD: 7.5 % (ref 4–15)
NEUTROPHILS # BLD AUTO: 7.1 K/UL (ref 1.8–7.7)
NEUTROPHILS NFR BLD: 72 % (ref 38–73)
NRBC BLD-RTO: 0 /100 WBC
PHOSPHATE SERPL-MCNC: 4.2 MG/DL (ref 2.7–4.5)
PLATELET # BLD AUTO: 324 K/UL (ref 150–350)
PMV BLD AUTO: 9.3 FL (ref 9.2–12.9)
POTASSIUM SERPL-SCNC: 4.6 MMOL/L (ref 3.5–5.1)
PROT SERPL-MCNC: 6.7 G/DL (ref 6–8.4)
PROTHROMBIN TIME: 10.1 SEC (ref 9–12.5)
RBC # BLD AUTO: 3.86 M/UL (ref 4.6–6.2)
SODIUM SERPL-SCNC: 139 MMOL/L (ref 136–145)
WBC # BLD AUTO: 9.84 K/UL (ref 3.9–12.7)

## 2020-02-17 PROCEDURE — 63600175 PHARM REV CODE 636 W HCPCS: Performed by: STUDENT IN AN ORGANIZED HEALTH CARE EDUCATION/TRAINING PROGRAM

## 2020-02-17 PROCEDURE — 94761 N-INVAS EAR/PLS OXIMETRY MLT: CPT

## 2020-02-17 PROCEDURE — 99232 PR SUBSEQUENT HOSPITAL CARE,LEVL II: ICD-10-PCS | Mod: GC,,, | Performed by: INTERNAL MEDICINE

## 2020-02-17 PROCEDURE — 97530 THERAPEUTIC ACTIVITIES: CPT

## 2020-02-17 PROCEDURE — 25000003 PHARM REV CODE 250: Performed by: ANESTHESIOLOGY

## 2020-02-17 PROCEDURE — 25000003 PHARM REV CODE 250: Performed by: STUDENT IN AN ORGANIZED HEALTH CARE EDUCATION/TRAINING PROGRAM

## 2020-02-17 PROCEDURE — 80053 COMPREHEN METABOLIC PANEL: CPT

## 2020-02-17 PROCEDURE — 99222 PR INITIAL HOSPITAL CARE,LEVL II: ICD-10-PCS | Mod: ,,, | Performed by: NURSE PRACTITIONER

## 2020-02-17 PROCEDURE — 76942 INTERSCALENE BRACHIAL PLEXUS SINGLE INJECTION BLOCK: ICD-10-PCS | Mod: 26,,, | Performed by: ANESTHESIOLOGY

## 2020-02-17 PROCEDURE — 64415 NJX AA&/STRD BRCH PLXS IMG: CPT | Mod: 59,RT,, | Performed by: ANESTHESIOLOGY

## 2020-02-17 PROCEDURE — 85025 COMPLETE CBC W/AUTO DIFF WBC: CPT

## 2020-02-17 PROCEDURE — 64415 NJX AA&/STRD BRCH PLXS IMG: CPT | Performed by: STUDENT IN AN ORGANIZED HEALTH CARE EDUCATION/TRAINING PROGRAM

## 2020-02-17 PROCEDURE — 97110 THERAPEUTIC EXERCISES: CPT

## 2020-02-17 PROCEDURE — 84100 ASSAY OF PHOSPHORUS: CPT

## 2020-02-17 PROCEDURE — 94640 AIRWAY INHALATION TREATMENT: CPT

## 2020-02-17 PROCEDURE — 85610 PROTHROMBIN TIME: CPT

## 2020-02-17 PROCEDURE — 25000242 PHARM REV CODE 250 ALT 637 W/ HCPCS: Performed by: ORTHOPAEDIC SURGERY

## 2020-02-17 PROCEDURE — 99222 1ST HOSP IP/OBS MODERATE 55: CPT | Mod: ,,, | Performed by: NURSE PRACTITIONER

## 2020-02-17 PROCEDURE — 83735 ASSAY OF MAGNESIUM: CPT

## 2020-02-17 PROCEDURE — 76942 ECHO GUIDE FOR BIOPSY: CPT | Performed by: STUDENT IN AN ORGANIZED HEALTH CARE EDUCATION/TRAINING PROGRAM

## 2020-02-17 PROCEDURE — 99232 SBSQ HOSP IP/OBS MODERATE 35: CPT | Mod: GC,,, | Performed by: INTERNAL MEDICINE

## 2020-02-17 PROCEDURE — 64415 INTERSCALENE BRACHIAL PLEXUS SINGLE INJECTION BLOCK: ICD-10-PCS | Mod: 59,RT,, | Performed by: ANESTHESIOLOGY

## 2020-02-17 PROCEDURE — 36415 COLL VENOUS BLD VENIPUNCTURE: CPT

## 2020-02-17 PROCEDURE — 11000001 HC ACUTE MED/SURG PRIVATE ROOM

## 2020-02-17 PROCEDURE — 76942 ECHO GUIDE FOR BIOPSY: CPT | Mod: 26,,, | Performed by: ANESTHESIOLOGY

## 2020-02-17 RX ORDER — LEVALBUTEROL INHALATION SOLUTION 0.63 MG/3ML
0.63 SOLUTION RESPIRATORY (INHALATION) EVERY 8 HOURS
Status: DISCONTINUED | OUTPATIENT
Start: 2020-02-17 | End: 2020-02-18 | Stop reason: HOSPADM

## 2020-02-17 RX ORDER — MAGNESIUM SULFATE HEPTAHYDRATE 40 MG/ML
2 INJECTION, SOLUTION INTRAVENOUS ONCE
Status: COMPLETED | OUTPATIENT
Start: 2020-02-17 | End: 2020-02-17

## 2020-02-17 RX ORDER — BUPIVACAINE HYDROCHLORIDE AND EPINEPHRINE 2.5; 5 MG/ML; UG/ML
INJECTION, SOLUTION EPIDURAL; INFILTRATION; INTRACAUDAL; PERINEURAL
Status: DISCONTINUED | OUTPATIENT
Start: 2020-02-17 | End: 2020-02-17

## 2020-02-17 RX ADMIN — OXYCODONE HYDROCHLORIDE 10 MG: 10 TABLET ORAL at 03:02

## 2020-02-17 RX ADMIN — ATORVASTATIN CALCIUM 80 MG: 20 TABLET, FILM COATED ORAL at 09:02

## 2020-02-17 RX ADMIN — LEVETIRACETAM 1000 MG: 500 TABLET ORAL at 09:02

## 2020-02-17 RX ADMIN — LEVALBUTEROL HYDROCHLORIDE 0.63 MG: 0.63 SOLUTION RESPIRATORY (INHALATION) at 03:02

## 2020-02-17 RX ADMIN — GABAPENTIN 300 MG: 300 CAPSULE ORAL at 03:02

## 2020-02-17 RX ADMIN — GABAPENTIN 300 MG: 300 CAPSULE ORAL at 10:02

## 2020-02-17 RX ADMIN — MAGNESIUM SULFATE IN WATER 2 G: 40 INJECTION, SOLUTION INTRAVENOUS at 11:02

## 2020-02-17 RX ADMIN — METOPROLOL TARTRATE 25 MG: 25 TABLET ORAL at 09:02

## 2020-02-17 RX ADMIN — BUPIVACAINE HYDROCHLORIDE AND EPINEPHRINE BITARTRATE 30 ML: 2.5; .0091 INJECTION, SOLUTION EPIDURAL; INFILTRATION; INTRACAUDAL; PERINEURAL at 02:02

## 2020-02-17 RX ADMIN — ENOXAPARIN SODIUM 40 MG: 100 INJECTION SUBCUTANEOUS at 05:02

## 2020-02-17 RX ADMIN — DOCUSATE SODIUM - SENNOSIDES 2 TABLET: 50; 8.6 TABLET, FILM COATED ORAL at 09:02

## 2020-02-17 RX ADMIN — Medication 6 MG: at 11:02

## 2020-02-17 RX ADMIN — GABAPENTIN 300 MG: 300 CAPSULE ORAL at 09:02

## 2020-02-17 RX ADMIN — LEVALBUTEROL HYDROCHLORIDE 0.63 MG: 0.63 SOLUTION RESPIRATORY (INHALATION) at 07:02

## 2020-02-17 RX ADMIN — OXYCODONE HYDROCHLORIDE 10 MG: 10 TABLET ORAL at 09:02

## 2020-02-17 RX ADMIN — LEVETIRACETAM 1000 MG: 500 TABLET ORAL at 10:02

## 2020-02-17 RX ADMIN — METHOCARBAMOL TABLETS 500 MG: 500 TABLET, COATED ORAL at 10:02

## 2020-02-17 RX ADMIN — AMITRIPTYLINE HYDROCHLORIDE 50 MG: 25 TABLET, FILM COATED ORAL at 05:02

## 2020-02-17 RX ADMIN — PANTOPRAZOLE SODIUM 40 MG: 40 TABLET, DELAYED RELEASE ORAL at 10:02

## 2020-02-17 RX ADMIN — METOPROLOL TARTRATE 25 MG: 25 TABLET ORAL at 10:02

## 2020-02-17 RX ADMIN — METHOCARBAMOL TABLETS 500 MG: 500 TABLET, COATED ORAL at 03:02

## 2020-02-17 RX ADMIN — METHOCARBAMOL TABLETS 500 MG: 500 TABLET, COATED ORAL at 09:02

## 2020-02-17 NOTE — SUBJECTIVE & OBJECTIVE
Principal Problem:Proximal humeral fracture    Principal Orthopedic Problem:  Same    Interval History:  Patient seen and examined at bedside.  No acute events overnight.  His pain is controlled.  [facility]     Review of patient's allergies indicates:  No Known Allergies    Current Facility-Administered Medications   Medication    0.9%  NaCl infusion    amitriptyline tablet 50 mg    atorvastatin tablet 80 mg    dextrose 10% (D10W) Bolus    dextrose 10% (D10W) Bolus    enoxaparin injection 40 mg    ergocalciferol capsule 50,000 Units    gabapentin capsule 300 mg    glucagon (human recombinant) injection 1 mg    glucose chewable tablet 16 g    glucose chewable tablet 24 g    insulin aspart U-100 pen 0-5 Units    ipratropium 0.02 % nebulizer solution 500 mcg    levalbuterol nebulizer solution 0.63 mg    levETIRAcetam tablet 1,000 mg    melatonin tablet 6 mg    methocarbamol tablet 500 mg    metoprolol tartrate (LOPRESSOR) tablet 25 mg    morphine injection 3 mg    ondansetron disintegrating tablet 8 mg    oxyCODONE immediate release tablet 10 mg    oxyCODONE immediate release tablet 15 mg    oxyCODONE immediate release tablet 5 mg    pantoprazole EC tablet 40 mg    polyethylene glycol packet 17 g    senna-docusate 8.6-50 mg per tablet 2 tablet    sodium chloride 0.9% flush 10 mL     Objective:     Vital Signs (Most Recent):  Temp: 96.1 °F (35.6 °C) (02/17/20 0842)  Pulse: 98 (02/17/20 0842)  Resp: 16 (02/17/20 0842)  BP: 122/65 (02/17/20 0842)  SpO2: (!) 92 % (02/17/20 0842) Vital Signs (24h Range):  Temp:  [96 °F (35.6 °C)-98.3 °F (36.8 °C)] 96.1 °F (35.6 °C)  Pulse:  [] 98  Resp:  [16-19] 16  SpO2:  [92 %-100 %] 92 %  BP: (110-129)/(57-65) 122/65     Weight: 120.2 kg (265 lb)     Body mass index is 36.96 kg/m².      Intake/Output Summary (Last 24 hours) at 2/17/2020 0854  Last data filed at 2/17/2020 0400  Gross per 24 hour   Intake 2064 ml   Output  1900 ml   Net 164 ml       Ortho/SPM Exam     Bilateral arm slings in place  Surgical dressing clean, dry, and intact  Sensation to light touch intact throughout bilaterally  Motor intact throughout bilaterally  Good perfusion distally in bilateral arms    Significant Labs: All pertinent labs within the past 24 hours have been reviewed.    Significant Imaging: I have reviewed all pertinent imaging results/findings.

## 2020-02-17 NOTE — ASSESSMENT & PLAN NOTE
Ben Melvin is a 60 y.o. male s/p bilateral reverse total shoulder replacements on 1/28 and 1/30 with a revision of the right side due to greater tuberosity avulsion fixed on 2/14  - nonweightbearing bilateral upper extremities in sling  - postop antibiotics  - pain control: multimodal    Working to get him back to [facility] today

## 2020-02-17 NOTE — PROGRESS NOTES
"[facility] Hospital Medicine  Progress Note    Patient Name: Ben Melvin  MRN: [id number]  Patient Class: IP- Inpatient   Admission Date: 2/14/2020  Length of Stay: 3 days  Attending Physician: Chandler Chris,*  Primary Care Provider: Cristal Ohara NP    Hospital Medicine Team: Networked reference to record PCT  Stephanie Acevedo MD    Subjective:     Principal Problem:Proximal humeral fracture        HPI:  Ben Melvin is a 60 y.o. male with PMH of AFib on coumadin, peripheral vascular disease, hypertension, COPD, current smoker, liver cirrhosis, who had a witnessed seizure while playing video poker 01/27/2020 and sustained bilateral proximal humerus fracture dislocations, bilateral acromial fractures, subdural hemorrhage, L1 and L4 burst fractures. He underwent left shoulder hemiarthroplasty, biceps tenodesis on 01/28/20 and right shoulder hemiarthroplasty, biceps tenodesis on 01/30/20 and discharged to [facility]. BL shoulder XRs from 02/10/20 notable for failure of the repair of the greater tuberosity on the right shoulder and he returned to [facility] on 02/14/20 for revision fixation of his right greater tuberosity fracture. Pt had a stable CTH on 02/10/20 and warfarin was reinitiated 02/11, last dose was 02/12. Currently on Lovenox 40 mg SQ daily. He was seen by neurosurgery and will f/u with pt outpt as scheduled on 02/21/20. Pts pain is well managed with current regiment. He has been able to tolerate a regular diet. No abd pain or n/v/d. Denies fevers or chills.     IM 6 consulted for ortho co-management.     Overview/Hospital Course:  60 y.o. M admitted for repeat R shoulder repeat ORIF. Pain controlled with multimodal regiment. Plan to return to rehab.     Interval History:   NAEON  Pain well controlled. Continues to have burning pain and "pins and needles" sensation in BL heels when bearing weight. He had similar sxs in the past and his sxs improved with applying topical " bengay. Tolerating a regular diet. No abd pain or n/v/d.     Review of Systems   Constitutional: Negative for appetite change, chills and fever.   HENT: Negative for rhinorrhea.    Respiratory: Negative for cough, chest tightness and shortness of breath.    Gastrointestinal: Negative for abdominal pain, diarrhea, nausea and vomiting.   Musculoskeletal: Positive for back pain and gait problem.   Neurological: Negative for dizziness and headaches.     Objective:     Vital Signs (Most Recent):  Temp: 96.1 °F (35.6 °C) (02/17/20 0842)  Pulse: 98 (02/17/20 0842)  Resp: 16 (02/17/20 0842)  BP: 122/65 (02/17/20 0842)  SpO2: (!) 92 % (02/17/20 0842) Vital Signs (24h Range):  Temp:  [96 °F (35.6 °C)-98.3 °F (36.8 °C)] 96.1 °F (35.6 °C)  Pulse:  [] 98  Resp:  [16-19] 16  SpO2:  [92 %-100 %] 92 %  BP: (110-129)/(57-65) 122/65     Weight: 120.2 kg (265 lb)  Body mass index is 36.96 kg/m².    Intake/Output Summary (Last 24 hours) at 2/17/2020 0933  Last data filed at 2/17/2020 0400  Gross per 24 hour   Intake 2064 ml   Output 1900 ml   Net 164 ml      Physical Exam   Constitutional: He is oriented to person, place, and time. No distress.   Eyes: EOM are normal.   Neck: Normal range of motion. Neck supple.   Cardiovascular: Normal rate and regular rhythm.   Pulmonary/Chest: Effort normal and breath sounds normal. No respiratory distress. He has no wheezes.   Abdominal: Soft. Bowel sounds are normal. There is no tenderness.   Musculoskeletal:   BUE in cast.     Neurological: He is alert and oriented to person, place, and time.   Equal hand  5/5. BLE strength 5/5    Skin: Skin is warm and dry. He is not diaphoretic.   Healing surgical scar on L shoulder. Surgical incision on R shoulder with dressing in place. No erythema, tenderness or warmth.     Psychiatric: He has a normal mood and affect.   Nursing note and vitals reviewed.      Significant Labs:   A1C:   Recent Labs   Lab 10/16/19  0913 12/30/19  1209 01/27/20  2250    HGBA1C 7.0 6.6 5.9*     Bilirubin:   Recent Labs   Lab 02/04/20  0455 02/05/20  0501 02/06/20  0416 02/07/20  0542 02/15/20  0935 02/17/20  0434   BILIDIR 0.4*  --   --   --   --   --    BILITOT 1.3* 1.1* 0.9 0.9 0.7 0.6       CBC:   Recent Labs   Lab 02/15/20  0935 02/17/20  0434   WBC 7.88 9.84   HGB 10.6* 11.0*   HCT 35.5* 35.9*    324     CMP:   Recent Labs   Lab 02/15/20  0935 02/17/20  0434    139   K 4.4 4.6    104   CO2 24 22*   * 120*   BUN 34* 29*   CREATININE 1.3 1.4   CALCIUM 9.1 8.9   PROT 6.7 6.7   ALBUMIN 3.0* 2.8*   BILITOT 0.7 0.6   ALKPHOS 275* 257*   AST 23 20   ALT 39 30   ANIONGAP 10 13   EGFRNONAA 59.7* 54.6*     Coagulation:   Recent Labs   Lab 02/17/20  0434   INR 1.0       Magnesium:   Recent Labs   Lab 02/15/20  0935 02/17/20  0434   MG 1.9 1.7       TSH:   Recent Labs   Lab 01/27/20  2256   TSH 0.667  0.667     All pertinent labs within the past 24 hours have been reviewed.    Significant Imaging: I have reviewed all pertinent imaging results/findings within the past 24 hours.      Assessment/Plan:      GERD (gastroesophageal reflux disease)  Continue Pantoprazole 40 mg     Stable burst fracture of first lumbar vertebra  L1 and L4 burst fractures  Repeat CT L spine from 02/15/20 stable. Pt evaluated by neurosurgery. Recommend continuing TSLO brace and will f/u in clinic as scheduled on 02/21/20.     Constipation  Continue daily miralax and senokot-s     fracture dislocation of bilateral shoulders  Bilateral acromial fractures with bilateral shoulder dislocation S/p bilateral shoulder hemiarthroplasty: right on 1/28/2020 and left on 1/30/2020  Pt underwent revision of fixation of R greater tuberosity fracture on 02/14/20. BUE in a brace.     -- Pain management per primary team         Subdural hematoma  Stable on CTH from 02/10/20. Warfarin was resumed on 02/11, but held since 02/13 for procedure.      -- Continue keppra       Type 2 diabetes mellitus  A1c 5.9  from 01/27/20.  on admission. Will initial LDSSI and titrate insulin regiment as appropriate.     -- LDSSI   -- Accu checks AC/HS        Vitamin D deficiency  Level of 7 on 2/4    -- Ergocalciferol 50k weekly    Essential hypertension  Home medications: metoprolol and ramipril at home. BP was intermittently low during last hospitalization and currently normotensive. Will resume Metoprolol and hold on ramipril. Will reinitiate as BP allows.     -- Metoprolol 25 mg BID       PAF (paroxysmal atrial fibrillation)  PAF on coumadin. Coumadin held due to SDH and restarted on 02/11/20 after repeat CTH showed stable SDH. Last dose on 02/13/20, held for revision fixation for R greater tuberosity fracture that pt underwent 02/14/20. He is currently on Lovenox 40 mg sq daily. INR today 1.0, goal 2-3. Surgery team to resume AC when appropriate.     -- AC initiation per primary team  -- Metoprolol 25 mg BID      COPD (chronic obstructive pulmonary disease)  Smoker    -- Continue levoalbuterol nebs  -- Limit oxygen to avoid hypercapnea      VTE Risk Mitigation (From admission, onward)         Ordered     enoxaparin injection 40 mg  Daily      02/14/20 1313     Place NOAH hose  Until discontinued      02/14/20 0540     IP VTE HIGH RISK PATIENT  Once      02/14/20 0540                      Stephanie Acevedo MD  Department of Hospital Medicine   [facility]

## 2020-02-17 NOTE — ASSESSMENT & PLAN NOTE
A1c 5.9 from 01/27/20.  on admission. Will initial LDSSI and titrate insulin regiment as appropriate.     -- LDSSI   -- Accu checks AC/HS

## 2020-02-17 NOTE — CONSULTS
[facility]  Physical Medicine & Rehab  Consult Note    Patient Name: Ben Melvin  MRN: [id number]  Admission Date: 2/14/2020  Hospital Length of Stay: 3 days  Attending Physician: Chandler Chris,*     Inpatient consult to Physical Medicine & Rehabilitation  Consult performed by: Kristina Wilkinson NP  Consult requested by:  Chandler Chris,*    Reason for Consult:  assess rehabilitation needs    Consults  Subjective:     Principal Problem: Proximal humeral fracture    HPI:  Ben Melvin is a 60-year-old male with PMHx of obese, A-Fib (on AC), PVD, hypertension, COPD, current smoker, liver cirrhosis .  Patient presented to [facility] from OSH on 1/27 s/p raheel seizure while playing video poker.  Injuries identified were bilateral proximal humerus fracture dislocations, bilateral acromial fractures, subdural hemorrhage, L1 and L4 burst fractures. S/p left shoulder hemiarthroplasty on 01/28/20 and s/p right shoulder hemiarthroplasty on 01/30/20. Per Ortho, Nonweightbearing bilateral upper extremity times 3 months postop, shoulder sling x6 weeks, may remove for hygiene and physical therapy. Okay for range of motion as tolerated hand wrist elbow now.Okay for active assisted range of motion of shoulder and pendulums at 2 weeks postop. Hospital course complicated by elevated LFTs, A-fib, constipation, anemia, & Vit D deficiency. Discharged to [facility] on 2/7/20.     Re-presented to [facility] on 2/14 from [facility] for RUE pain. S/p revision of the right side due to greater tuberosity avulsion fixed on 2/14. Nonweightbearing bilateral upper extremities in sling.     Functional History: Prior to [facility], patient lives with wife in a single story home with 3 steps to enter.  Prior to admission, (I) with ADLs and mobility. DME: none.    Hospital Course: 02/16/2020: Bed mobility Min-MaxA. Sit to stand Gabby & RW. 4 side steps Gabby. Feeding Gabby.     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    Alcohol abuse     ASCVD (arteriosclerotic cardiovascular disease) 10/2008    [place]    BPH (benign prostatic hyperplasia)     Cardiomyopathy     Chronic anxiety     Cirrhosis of liver     COPD (chronic obstructive pulmonary disease)     Current every day smoker     2-3 ppd    Elevated LFTs 08/2001    GERD with esophagitis 11/09/2017    History of colon polyps     History of epididymitis 2016    Juan Antonio    Hyperlipidemia     Hypertension, essential     Long term (current) use of anticoagulants     Lumbar disc disease with radiculopathy 05/2015    Major depression, recurrent, chronic     PAF (paroxysmal atrial fibrillation)     Peripheral autonomic neuropathy due to DM     Proteinuria     PVD (peripheral vascular disease) 05/29/2009    Venous insufficiency of both lower extremities     Vitamin D deficiency      Past Surgical History:   Procedure Laterality Date    biopsy back  05/20/2019    benign Martinez    biopsy right ankle Right 05/20/2019    Martinez, benign    COLONOSCOPY N/A 6/13/2019    Procedure: COLONOSCOPY;  Surgeon: Delmer Kerr MD;  Location: [facility];  Service: Endoscopy;  Laterality: N/A;    COLONOSCOPY W/ POLYPECTOMY      EPIDURAL STEROID INJECTION INTO LUMBAR SPINE  06/2015    LASHAE Harvey    ESOPHAGOGASTRODUODENOSCOPY N/A 6/13/2019    Procedure: ESOPHAGOGASTRODUODENOSCOPY (EGD);  Surgeon: Delmer Kerr MD;  Location: [facility];  Service: Endoscopy;  Laterality: N/A;    INTESTINAL MALROTATION REPAIR Right 1961    2 weeks old, Martinez    OPEN REDUCTION AND INTERNAL FIXATION (ORIF) OF FRACTURE OF PROXIMAL HUMERUS Right 2/14/2020    Procedure: ORIF, FRACTURE, HUMERUS, PROXIMAL - ORIF R greater tuberosity - suture repair. Trios, supine, fiberwire;  Surgeon: Chandler Chris MD;  Location: [address];  Service: Orthopedics;  Laterality: Right;    PARTIAL ARTHROPLASTY OF SHOULDER Left 1/28/2020    Procedure: HEMIARTHROPLASTY,  SHOULDER;  Surgeon: Chandler Chris MD;  [facility];  Service: Orthopedics;  Laterality: Left;    PARTIAL ARTHROPLASTY OF SHOULDER Right 1/30/2020    Procedure: HEMIARTHROPLASTY, SHOULDER- RIGHT- MEKA- SUPINE-  TRIOS;  Surgeon: Chandler Chris MD;  [facility];  Service: Orthopedics;  Laterality: Right;    PERCUTANEOUS TRANSLUMINAL ANGIOPLASTY (PTA) OF PERIPHERAL VESSEL Right 12/2014    NIKOLAS Childs     Review of patient's allergies indicates:  No Known Allergies    Scheduled Medications:    amitriptyline  50 mg Oral Daily with dinner    atorvastatin  80 mg Oral QHS    enoxaparin  40 mg Subcutaneous Daily    ergocalciferol  50,000 Units Oral Q7 Days    gabapentin  300 mg Oral TID    levalbuterol  0.63 mg Nebulization Q8H    levETIRAcetam  1,000 mg Oral BID    magnesium sulfate IVPB  2 g Intravenous Once    methocarbamol  500 mg Oral TID    metoprolol tartrate  25 mg Oral BID    pantoprazole  40 mg Oral Daily    polyethylene glycol  17 g Oral BID    senna-docusate 8.6-50 mg  2 tablet Oral BID       PRN Medications: Dextrose 10% Bolus, Dextrose 10% Bolus, glucagon (human recombinant), glucose, glucose, insulin aspart U-100, ipratropium, melatonin, morphine, ondansetron, oxyCODONE, oxyCODONE, oxyCODONE, sodium chloride 0.9%    Family History     Problem Relation (Age of Onset)    Acromegaly Father    Diabetes Mother    Heart attack Father    Hypertension Mother, Father    Kidney cancer Brother        Tobacco Use    Smoking status: Current Every Day Smoker     Packs/day: 9.00     Years: 40.00     Pack years: 360.00     Types: Cigarettes    Smokeless tobacco: Never Used    Tobacco comment: smokes parts all day long continuously   Substance and Sexual Activity    Alcohol use: Not Currently     Frequency: Never     Comment: quit 2 years ago was heavily    Drug use: Never    Sexual activity: Yes     Partners: Female     Review of Systems    Constitutional: Positive for activity change. Negative for fatigue and fever.   HENT: Negative for trouble swallowing and voice change.    Respiratory: Negative for cough and shortness of breath.    Cardiovascular: Negative for chest pain and palpitations.   Gastrointestinal: Negative for nausea and vomiting.   Genitourinary: Negative for difficulty urinating and flank pain.   Musculoskeletal: Positive for arthralgias and gait problem.   Skin: Negative for color change and rash.   Neurological: Positive for weakness. Negative for numbness.   Psychiatric/Behavioral: Positive for confusion. Negative for agitation.     Objective:     Vital Signs (Most Recent):  Temp: 96.1 °F (35.6 °C) (02/17/20 0842)  Pulse: 98 (02/17/20 0842)  Resp: 16 (02/17/20 0842)  BP: 122/65 (02/17/20 0842)  SpO2: (!) 92 % (02/17/20 0842)    Vital Signs (24h Range):  Temp:  [96 °F (35.6 °C)-98.3 °F (36.8 °C)] 96.1 °F (35.6 °C)  Pulse:  [] 98  Resp:  [16-19] 16  SpO2:  [92 %-100 %] 92 %  BP: (110-129)/(57-65) 122/65     Body mass index is 36.96 kg/m².    Physical Exam   Constitutional: He is oriented to person, place, and time. He appears well-developed and well-nourished.   HENT:   Head: Normocephalic and atraumatic.   Eyes: Right eye exhibits no discharge. Left eye exhibits no discharge.   Neck: Neck supple.   Cardiovascular: Intact distal pulses.   Pulmonary/Chest: Effort normal. No respiratory distress.   Abdominal: Soft. He exhibits no distension.   Musculoskeletal: He exhibits no edema or deformity.   B/l slings, moves fingers    Neurological: He is alert and oriented to person, place, and time.   Follows commands    Skin: Skin is warm and dry.   Dressing in place to BUE   Psychiatric: He has a normal mood and affect. His behavior is normal. Cognition and memory are impaired.     Diagnostic Results:   Labs: Reviewed  ECG: Reviewed  X-Ray: Reviewed  CT: Reviewed    Assessment/Plan:     * Proximal humeral fracture  - s/p bilateral  reverse total shoulder replacements on 1/28 and 1/30 with a revision of the right side due to greater tuberosity avulsion fixed on 2/14  - NWB bilateral upper extremities in sling    Subdural hematoma  See hospital course for functional, cognitive/speech/language, and nutrition/swallow status.      Recommendations  -  Monitor sleep disturbances and establish consistent sleep-wake cycle  -  Environmental modifications to limit agitation/confusion   -  Reorient patient to person, place, time, and situation on each encounter  -  Avoid restraints  -  May benefit from 24/7 supervision  -  Avoid/limit medications that can worsen delirium (benzodiazepines, antihistamines, anticholinergics, hypnotics, opiates)  -  Encourage mobility, OOB in chair, and early ambulation as appropriate  -  PT/OT evaluate and treat  -  SLP speech and cognitive evaluate and treat  -  Monitor for bowel and bladder dysfunction  -  Monitor for and prevent skin breakdown and pressure ulcers  · Early mobility, repositioning/weight shifting every 20-30 minutes when sitting, turn patient every 2 hours, proper mattress/overlay and chair cushioning, pressure relief/heel protector boots    PAC recommendation: SNF.    Thank you for your consult.     Kristina Wilkinson NP  Department of Physical Medicine & Rehab  [facility]

## 2020-02-17 NOTE — ASSESSMENT & PLAN NOTE
See hospital course for functional, cognitive/speech/language, and nutrition/swallow status.      Recommendations  -  Monitor sleep disturbances and establish consistent sleep-wake cycle  -  Environmental modifications to limit agitation/confusion   -  Reorient patient to person, place, time, and situation on each encounter  -  Avoid restraints  -  May benefit from 24/7 supervision  -  Avoid/limit medications that can worsen delirium (benzodiazepines, antihistamines, anticholinergics, hypnotics, opiates)  -  Encourage mobility, OOB in chair, and early ambulation as appropriate  -  PT/OT evaluate and treat  -  SLP speech and cognitive evaluate and treat  -  Monitor for bowel and bladder dysfunction  -  Monitor for and prevent skin breakdown and pressure ulcers  · Early mobility, repositioning/weight shifting every 20-30 minutes when sitting, turn patient every 2 hours, proper mattress/overlay and chair cushioning, pressure relief/heel protector boots

## 2020-02-17 NOTE — PT/OT/SLP PROGRESS
Physical Therapy Treatment    Patient Name:  Ben Melvin   MRN:  [id number]    Recommendations:     Discharge Recommendations:  nursing facility, skilled   Discharge Equipment Recommendations: wheelchair, bedside commode, shower chair   Barriers to discharge: Decreased caregiver support    Assessment:     Ben Melvin is a 60 y.o. male admitted with a medical diagnosis of Proximal humeral fracture.  He presents with the following impairments/functional limitations:  weakness, impaired functional mobilty, decreased safety awareness, impaired cardiopulmonary response to activity, impaired joint extensibility, pain, gait instability, impaired endurance, impaired balance, impaired muscle length, orthopedic precautions, decreased lower extremity function, decreased ROM, impaired self care skills.      Patient demonstrates decreased activity tolerance secondary to pain of B UE and L hip.  Patient demonstrates decreased functional mobility secondary to pain, decreased safety awareness and mild confusion.  Patient required between CGA and moderate assistance for bed mobility, transfers and ambulation.  Patient able to obtain stance with CGA but required moderate assistance for 5-6 steps of ambulation secondary to decreased balance, weakness and pain.  Patient continues to benefit from further PT for strengthening and mobility training.      Rehab Prognosis: Good; patient continues to benefit from acute skilled PT services to address these deficits and reach maximum level of function.  Patient remains most appropriate to discharge to nursing facility, skilled  Recent Surgery: Procedure(s) (LRB):  ORIF, FRACTURE, HUMERUS, PROXIMAL - ORIF R greater tuberosity - suture repair. Trios, supine, fiberwire (Right) 3 Days Post-Op    Plan:     During this hospitalization, patient to be seen daily to address the identified rehab impairments via gait training, therapeutic activities, therapeutic exercises, wheelchair  "management/training and progress toward the following goals:    · Plan of Care Expires:  03/14/20    Subjective     Subjective: "I want to go walk."   Pain/Comfort:  · Pain Rating 1: (complaints of discomfort but did not rate)  · Location - Side 1: Bilateral  · Location - Orientation 1: generalized  · Location 1: shoulder  · Pain Addressed 1: Reposition, Cessation of Activity  · Pain Rating 2: (did not rate but with increased pain during stance)  · Location - Side 2: Left  · Location - Orientation 2: generalized  · Location 2: hip  · Pain Addressed 2: Cessation of Activity      Objective:     Communicated with RN prior to session.  Patient found supine with peripheral IV upon PT entry to room.     General Precautions: Standard, fall, diabetic, seizure   Orthopedic Precautions:RUE non weight bearing, LUE non weight bearing(LSO on when OOB; B AROM of elbows and Wrists )   Braces: LSO, Shoulder abduction brace     Functional Mobility:  · Bed Mobility:     · Supine to Sit: moderate assistance for trunk management.  · Transfers:     · Sit to Stand:  contact guard assistance with no AD and cues needed to not use UE for weight bearing.  · Bed to Chair: moderate assistance with  no AD  using  Step Transfer  · Gait: Patient ambulated 5-6 steps with none and moderate assistance secondary to L hip pain and poor balance.   · Balance:   · Sitting: GOOD-: Incosistently Maintains balance through MODERATE excursions of active trunk movement,     · Standing: POOR: Needs MOD (moderate) assist during gait partially secondary to pain.      AM-PAC 6 CLICK MOBILITY  Turning over in bed (including adjusting bedclothes, sheets and blankets)?: 3  Sitting down on and standing up from a chair with arms (e.g., wheelchair, bedside commode, etc.): 3  Moving from lying on back to sitting on the side of the bed?: 2  Moving to and from a bed to a chair (including a wheelchair)?: 2  Need to walk in hospital room?: 2  Climbing 3-5 steps with a " railing?: 1  Basic Mobility Total Score: 13       Therapeutic Activities and Exercises:  Exercises: Patient performed 1 set(s) of 10 repetitions of  active assisted elbow flexion, supination/pronation, wrist flexion and finger extension for bilateral UE. Patient required skilled PT for instruction of exercises and appropriate cues to perform exercises safely and appropriately.       Patient was very impulsive with poor safety.  Patient transferred from sitting <>standing without notifying therapist.  Safety current primary limit.    Gait training:  Patient required cues for width of base of support and to maintain B UE weight bearing status. to increase independence and safety.  Patient required cues ~ 25% of the time.    Patient Education:    Patient educated on Fall risk, gait training, home safety, Home exercise program and transfer training by explanation.  Patient was receptive to education and needs reinforcement.     Patient left up in chair with all lines intact, call button in reach and significant other present.    GOALS:   Multidisciplinary Problems     Physical Therapy Goals        Problem: Physical Therapy Goal    Goal Priority Disciplines Outcome Goal Variances Interventions   Physical Therapy Goal     PT, PT/OT Ongoing, Progressing     Description:  Goals to be met by: 20     Patient will increase functional independence with mobility by performin. Supine to sit with Contact Guard Assistance.  2. Sit to supine with Contact Guard Assistance.  3. Sit to stand transfer with Contact Guard Assistance.  4. Bed to chair transfer with Contact Guard Assistance.  5. Gait  x 100 feet with Contact Guard Assistance.   6. Ascend/descend 3 stair without use of handrails Contact Guard Assistance.   7. Lower extremity exercise program x 20 reps per handout, with assistance as needed.  8. Propel manual wheelchair 75 ft with B LE and CGA.                         Time Tracking:     PT Received On:  02/17/20  PT Start Time: 1357     PT Stop Time: 1428  PT Total Time (min): 31 min     Billable Minutes: Therapeutic Activity 15 min and Therapeutic Exercise 16 min    Treatment Type: Treatment        PTA Visit Number: 0     Riley Freeman, PT  02/17/2020

## 2020-02-17 NOTE — PT/OT/SLP PROGRESS
Occupational Therapy      Patient Name:  Ben Melvin   MRN:  [id number]    Pt not seen on this date; Chart reviewed and pt remain appropriate for OT services at this time.  Will see pt as schedule allows.      Dru Dotson, OT  2/17/2020

## 2020-02-17 NOTE — SUBJECTIVE & OBJECTIVE
Past Medical History:   Diagnosis Date    Adenoma of right adrenal gland 11/09/2017    Alcohol abuse     ASCVD (arteriosclerotic cardiovascular disease) 10/2008    [place]    BPH (benign prostatic hyperplasia)     Cardiomyopathy     Chronic anxiety     Cirrhosis of liver     COPD (chronic obstructive pulmonary disease)     Current every day smoker     2-3 ppd    Elevated LFTs 08/2001    GERD with esophagitis 11/09/2017    History of colon polyps     History of epididymitis 2016    Juan Antonio    Hyperlipidemia     Hypertension, essential     Long term (current) use of anticoagulants     Lumbar disc disease with radiculopathy 05/2015    Major depression, recurrent, chronic     PAF (paroxysmal atrial fibrillation)     Peripheral autonomic neuropathy due to DM     Proteinuria     PVD (peripheral vascular disease) 05/29/2009    Venous insufficiency of both lower extremities     Vitamin D deficiency      Past Surgical History:   Procedure Laterality Date    biopsy back  05/20/2019    benign Martinez    biopsy right ankle Right 05/20/2019    Martinez, benign    COLONOSCOPY N/A 6/13/2019    Procedure: COLONOSCOPY;  Surgeon: Delmer Kerr MD;  Location: [facility];  Service: Endoscopy;  Laterality: N/A;    COLONOSCOPY W/ POLYPECTOMY      EPIDURAL STEROID INJECTION INTO LUMBAR SPINE  06/2015    LASHAE Harvey    ESOPHAGOGASTRODUODENOSCOPY N/A 6/13/2019    Procedure: ESOPHAGOGASTRODUODENOSCOPY (EGD);  Surgeon: Delmer Kerr MD;  Location: [facility];  Service: Endoscopy;  Laterality: N/A;    INTESTINAL MALROTATION REPAIR Right 1961    2 weeks old, Martinez    OPEN REDUCTION AND INTERNAL FIXATION (ORIF) OF FRACTURE OF PROXIMAL HUMERUS Right 2/14/2020    Procedure: ORIF, FRACTURE, HUMERUS, PROXIMAL - ORIF R greater tuberosity - suture repair. Trios, supine, fiberwire;  Surgeon: Chandler Chris MD;  Location: [address];  Service: Orthopedics;  Laterality: Right;    PARTIAL ARTHROPLASTY OF  SHOULDER Left 1/28/2020    Procedure: HEMIARTHROPLASTY, SHOULDER;  Surgeon: Chandler Chris MD;  Location: [facility] OR 2ND FLR;  Service: Orthopedics;  Laterality: Left;    PARTIAL ARTHROPLASTY OF SHOULDER Right 1/30/2020    Procedure: HEMIARTHROPLASTY, SHOULDER- RIGHT- MEKA- SUPINE-  TRIOS;  Surgeon: Chandler Chris MD;  Location: [facility] OR 2ND FLR;  Service: Orthopedics;  Laterality: Right;    PERCUTANEOUS TRANSLUMINAL ANGIOPLASTY (PTA) OF PERIPHERAL VESSEL Right 12/2014    NIKOLAS Childs     Review of patient's allergies indicates:  No Known Allergies    Scheduled Medications:    amitriptyline  50 mg Oral Daily with dinner    atorvastatin  80 mg Oral QHS    enoxaparin  40 mg Subcutaneous Daily    ergocalciferol  50,000 Units Oral Q7 Days    gabapentin  300 mg Oral TID    levalbuterol  0.63 mg Nebulization Q8H    levETIRAcetam  1,000 mg Oral BID    magnesium sulfate IVPB  2 g Intravenous Once    methocarbamol  500 mg Oral TID    metoprolol tartrate  25 mg Oral BID    pantoprazole  40 mg Oral Daily    polyethylene glycol  17 g Oral BID    senna-docusate 8.6-50 mg  2 tablet Oral BID       PRN Medications: Dextrose 10% Bolus, Dextrose 10% Bolus, glucagon (human recombinant), glucose, glucose, insulin aspart U-100, ipratropium, melatonin, morphine, ondansetron, oxyCODONE, oxyCODONE, oxyCODONE, sodium chloride 0.9%    Family History     Problem Relation (Age of Onset)    Acromegaly Father    Diabetes Mother    Heart attack Father    Hypertension Mother, Father    Kidney cancer Brother        Tobacco Use    Smoking status: Current Every Day Smoker     Packs/day: 9.00     Years: 40.00     Pack years: 360.00     Types: Cigarettes    Smokeless tobacco: Never Used    Tobacco comment: smokes parts all day long continuously   Substance and Sexual Activity    Alcohol use: Not Currently     Frequency: Never     Comment: quit 2 years ago was heavily    Drug use: Never    Sexual activity: Yes      Partners: Female     Review of Systems   Constitutional: Positive for activity change. Negative for fatigue and fever.   HENT: Negative for trouble swallowing and voice change.    Respiratory: Negative for cough and shortness of breath.    Cardiovascular: Negative for chest pain and palpitations.   Gastrointestinal: Negative for nausea and vomiting.   Genitourinary: Negative for difficulty urinating and flank pain.   Musculoskeletal: Positive for arthralgias and gait problem.   Skin: Negative for color change and rash.   Neurological: Positive for weakness. Negative for numbness.   Psychiatric/Behavioral: Positive for confusion. Negative for agitation.     Objective:     Vital Signs (Most Recent):  Temp: 96.1 °F (35.6 °C) (02/17/20 0842)  Pulse: 98 (02/17/20 0842)  Resp: 16 (02/17/20 0842)  BP: 122/65 (02/17/20 0842)  SpO2: (!) 92 % (02/17/20 0842)    Vital Signs (24h Range):  Temp:  [96 °F (35.6 °C)-98.3 °F (36.8 °C)] 96.1 °F (35.6 °C)  Pulse:  [] 98  Resp:  [16-19] 16  SpO2:  [92 %-100 %] 92 %  BP: (110-129)/(57-65) 122/65     Body mass index is 36.96 kg/m².    Physical Exam   Constitutional: He is oriented to person, place, and time. He appears well-developed and well-nourished.   HENT:   Head: Normocephalic and atraumatic.   Eyes: Right eye exhibits no discharge. Left eye exhibits no discharge.   Neck: Neck supple.   Cardiovascular: Intact distal pulses.   Pulmonary/Chest: Effort normal. No respiratory distress.   Abdominal: Soft. He exhibits no distension.   Musculoskeletal: He exhibits no edema or deformity.   B/l slings, moves fingers    Neurological: He is alert and oriented to person, place, and time.   Follows commands    Skin: Skin is warm and dry.   Dressing in place to BUE   Psychiatric: He has a normal mood and affect. His behavior is normal. Cognition and memory are impaired.     NEUROLOGICAL EXAMINATION:     MENTAL STATUS   Oriented to person, place, and time.       Diagnostic Results:   Labs:  Reviewed  ECG: Reviewed  X-Ray: Reviewed  CT: Reviewed

## 2020-02-17 NOTE — PLAN OF CARE
ZULMA spoke with Dr. Cordoba from ortho. MD reports patient is medically stable and should return to [facility] today. Call placed to Yossi [facility] Liaison. Message left, Awaiting call back. Referral sent to [facility] per right care. Will continue to monitor.

## 2020-02-17 NOTE — PROGRESS NOTES
[facility]  Orthopedics  Progress Note    Patient Name: Ben Melvin  MRN: [id number]  Admission Date: 2/14/2020  Hospital Length of Stay: 3 days  Attending Provider: Chandler Chris,*  Primary Care Provider: Cristal Ohara NP  Follow-up For: Procedure(s) (LRB):  ORIF, FRACTURE, HUMERUS, PROXIMAL - ORIF R greater tuberosity - suture repair. Trios, supine, fiberwire (Right)    Post-Operative Day: 3 Days Post-Op  Subjective:     Principal Problem:Proximal humeral fracture    Principal Orthopedic Problem:  Same    Interval History:  Patient seen and examined at bedside.  No acute events overnight.  His pain is controlled.  Will work to get him back to [facility] today     Review of patient's allergies indicates:  No Known Allergies    Current Facility-Administered Medications   Medication    0.9%  NaCl infusion    amitriptyline tablet 50 mg    atorvastatin tablet 80 mg    dextrose 10% (D10W) Bolus    dextrose 10% (D10W) Bolus    enoxaparin injection 40 mg    ergocalciferol capsule 50,000 Units    gabapentin capsule 300 mg    glucagon (human recombinant) injection 1 mg    glucose chewable tablet 16 g    glucose chewable tablet 24 g    insulin aspart U-100 pen 0-5 Units    ipratropium 0.02 % nebulizer solution 500 mcg    levalbuterol nebulizer solution 0.63 mg    levETIRAcetam tablet 1,000 mg    melatonin tablet 6 mg    methocarbamol tablet 500 mg    metoprolol tartrate (LOPRESSOR) tablet 25 mg    morphine injection 3 mg    ondansetron disintegrating tablet 8 mg    oxyCODONE immediate release tablet 10 mg    oxyCODONE immediate release tablet 15 mg    oxyCODONE immediate release tablet 5 mg    pantoprazole EC tablet 40 mg    polyethylene glycol packet 17 g    senna-docusate 8.6-50 mg per tablet 2 tablet    sodium chloride 0.9% flush 10 mL     Objective:     Vital Signs (Most Recent):  Temp: 96.1 °F (35.6 °C) (02/17/20 0842)  Pulse: 98 (02/17/20 0842)  Resp: 16  (02/17/20 0842)  BP: 122/65 (02/17/20 0842)  SpO2: (!) 92 % (02/17/20 0842) Vital Signs (24h Range):  Temp:  [96 °F (35.6 °C)-98.3 °F (36.8 °C)] 96.1 °F (35.6 °C)  Pulse:  [] 98  Resp:  [16-19] 16  SpO2:  [92 %-100 %] 92 %  BP: (110-129)/(57-65) 122/65     Weight: 120.2 kg (265 lb)     Body mass index is 36.96 kg/m².      Intake/Output Summary (Last 24 hours) at 2/17/2020 0854  Last data filed at 2/17/2020 0400  Gross per 24 hour   Intake 2064 ml   Output 1900 ml   Net 164 ml       Ortho/SPM Exam     Bilateral arm slings in place  Surgical dressing clean, dry, and intact  Sensation to light touch intact throughout bilaterally  Motor intact throughout bilaterally  Good perfusion distally in bilateral arms    Significant Labs: All pertinent labs within the past 24 hours have been reviewed.    Significant Imaging: I have reviewed all pertinent imaging results/findings.    Assessment/Plan:     * Proximal humeral fracture  Ben Melvin is a 60 y.o. male s/p bilateral reverse total shoulder replacements on 1/28 and 1/30 with a revision of the right side due to greater tuberosity avulsion fixed on 2/14  - nonweightbearing bilateral upper extremities in sling  - postop antibiotics  - pain control: multimodal    Working to get him back to [facility] today          Marlon Cordoba MD  Orthopedics  [facility]

## 2020-02-17 NOTE — ASSESSMENT & PLAN NOTE
- s/p bilateral reverse total shoulder replacements on 1/28 and 1/30 with a revision of the right side due to greater tuberosity avulsion fixed on 2/14  - NWB bilateral upper extremities in sling

## 2020-02-17 NOTE — ADDENDUM NOTE
Addendum  created 02/17/20 1506 by David Gibbs MD    Attestation recorded in Intraprocedure, Intraprocedure Attestations filed

## 2020-02-17 NOTE — PLAN OF CARE
02/17/20 0850   Post-Acute Status   Post-Acute Authorization Placement   Post-Acute Placement Status Referrals Sent   [facility] referral sent by CM via [facility]. PROMISE will follow up with Yossi at [facility] for update on patient date of acceptance. Sw will continue following patient for discharge needs. SW in communication with CM and patient care team.  Dyan Rose, BOBBY  [facility]

## 2020-02-17 NOTE — ANESTHESIA PROCEDURE NOTES
Interscalene Brachial Plexus Single Injection Block    Patient location during procedure: pre-op   Block not for primary anesthetic.  Reason for block: at surgeon's request and post-op pain management   Post-op Pain Location: R arm pain  Start time: 2/17/2020 10:31 AM  Timeout: 2/14/2020 10:30 AM   End time: 2/17/2020 10:39 AM    Staffing  Authorizing Provider: David Gibbs MD  Performing Provider: Harlan Fuller MD    Preanesthetic Checklist  Completed: patient identified, site marked, surgical consent, pre-op evaluation, timeout performed, IV checked, risks and benefits discussed and monitors and equipment checked  Peripheral Block  Patient position: sitting  Prep: ChloraPrep  Patient monitoring: heart rate, cardiac monitor, continuous pulse ox, continuous capnometry and frequent blood pressure checks  Block type: interscalene  Laterality: right  Injection technique: single shot  Needle  Needle type: Stimuplex   Needle gauge: 22 G  Needle length: 2 in  Needle localization: anatomical landmarks and ultrasound guidance   -ultrasound image captured on disc.  Assessment  Injection assessment: negative aspiration, negative parasthesia and local visualized surrounding nerve  Paresthesia pain: none  Heart rate change: no  Slow fractionated injection: yes  Additional Notes  VSS.  [facility] RN monitoring vitals throughout procedure.  Patient tolerated procedure well.

## 2020-02-17 NOTE — HPI
Ben Melvin is a 60-year-old male with PMHx of obese, AFib on anticoagulation, peripheral vascular disease, hypertension, COPD, current smoker, liver cirrhosis .  Patient presented to [facility] from OSH on 1/27 s/p raheel seizure while playing video poker.  Injuries identified were bilateral proximal humerus fracture dislocations, bilateral acromial fractures, subdural hemorrhage, L1 and L4 burst fractures. S/p left shoulder hemiarthroplasty on 01/28/20 and s/p right shoulder hemiarthroplasty on 01/30/20. Per Ortho, Nonweightbearing bilateral upper extremity times 3 months postop, shoulder sling x6 weeks, may remove for hygiene and physical therapy. Okay for range of motion as tolerated hand wrist elbow now.Okay for active assisted range of motion of shoulder and pendulums at 2 weeks postop. Hospital course complicated by elevated LFTs, A-fib, constipation, anemia, & Vit D deficiency. Discharged to [facility] on 2/7/20.     Re-presented to [facility] on 2/14 from [facility] for RUE pain. S/p revision of the right side due to greater tuberosity avulsion fixed on 2/14. Nonweightbearing bilateral upper extremities in sling.       Functional History: Prior to [facility], patient lives with wife in a single story home with 3 steps to enter.  Prior to admission, (I) with ADLs and mobility. DME: none.

## 2020-02-17 NOTE — PLAN OF CARE
Received call from veronica @ [facility]. Medical director Dr. Dinero reports to liaison that patient should discharge to SNF and Not rehab because he will be NWB to upper extremities for the next 3 months. Dr. Dinero reports to Liaison that he discussed this with Dr. Chris on 2/14. Message sent to providers that patient will not be returning to [facility]. Referral placed to [facility]. Will continue to follow.

## 2020-02-17 NOTE — PLAN OF CARE
Pt resting in bed comfortably. Up with PT/OT this morning. PIV line intact and saline locked, free of infection and irritation. Fall precautions maintained, no falls noted. Call light within reach, bed locked and in lowest position. Non-skid socks on while out of bed. Patient instructed to call for assistance. Skin integrity maintained as patient is independent with frequent repositioning. No complaints of pain. Progressing towards goals. Will continue to monitor and follow plan of care.

## 2020-02-17 NOTE — CONSULTS
Inpatient consult to Physical Medicine Rehab  Consult performed by: Kristina Wilkinson NP  Consult ordered by: Chandler Chris MD  Reason for consult: assess rehab needs        Reviewed patient history and current admission.  Rehab team following.  Full consult to follow.    IHSAN Carballo, FNP-C  Physical Medicine & Rehabilitation   02/17/2020

## 2020-02-17 NOTE — ASSESSMENT & PLAN NOTE
PAF on coumadin. Coumadin held due to SDH and restarted on 02/11/20 after repeat CTH showed stable SDH. Last dose on 02/13/20, held for revision fixation for R greater tuberosity fracture that pt underwent 02/14/20. He is currently on Lovenox 40 mg sq daily. INR today 1.0, goal 2-3. Surgery team to resume AC when appropriate.     -- AC initiation per primary team  -- Metoprolol 25 mg BID

## 2020-02-17 NOTE — ADDENDUM NOTE
Addendum  created 02/17/20 1416 by Edilson Aj MD    Child order released for a procedure order, Diagnosis association updated, Intraprocedure Blocks edited, Sign clinical note

## 2020-02-17 NOTE — PLAN OF CARE
ZULMA spoke with Yossi at [facility] regarding patient's return to facility. Liaison reports patient's prescreen has been done and should return to facility later this afternoon once medical director reviews case. Will continue to follow.

## 2020-02-17 NOTE — PLAN OF CARE
02/17/20 1003   Post-Acute Status   Post-Acute Authorization Placement   Post-Acute Placement Status Pending Post-Acute Clinical Review   PROMISE outreached Yossi with [facility] to follow up on patient referral. Yossi reports going to speak with treatment team to get update on patient and then will call SW about determination of readmit. SW will continue following patient for discharge needs. SW in communication with CM and patient care team.   Dyan Rose PROMISE  [facility]     (10:08AM) PROMISE received call back from Yossi with [facility] who is requesting PMR be placed and states will have him evaluated today. SW informed CM to place.  Dyan Rose PROMISE  [facility]

## 2020-02-17 NOTE — SUBJECTIVE & OBJECTIVE
"Interval History:   NAEON  Pain well controlled. Continues to have burning pain and "pins and needles" sensation in BL heels when bearing weight. He had similar sxs in the past and his sxs improved with applying topical bengay. Tolerating a regular diet. No abd pain or n/v/d.     Review of Systems   Constitutional: Negative for appetite change, chills and fever.   HENT: Negative for rhinorrhea.    Respiratory: Negative for cough, chest tightness and shortness of breath.    Gastrointestinal: Negative for abdominal pain, diarrhea, nausea and vomiting.   Musculoskeletal: Positive for back pain and gait problem.   Neurological: Negative for dizziness and headaches.     Objective:     Vital Signs (Most Recent):  Temp: 96.1 °F (35.6 °C) (02/17/20 0842)  Pulse: 98 (02/17/20 0842)  Resp: 16 (02/17/20 0842)  BP: 122/65 (02/17/20 0842)  SpO2: (!) 92 % (02/17/20 0842) Vital Signs (24h Range):  Temp:  [96 °F (35.6 °C)-98.3 °F (36.8 °C)] 96.1 °F (35.6 °C)  Pulse:  [] 98  Resp:  [16-19] 16  SpO2:  [92 %-100 %] 92 %  BP: (110-129)/(57-65) 122/65     Weight: 120.2 kg (265 lb)  Body mass index is 36.96 kg/m².    Intake/Output Summary (Last 24 hours) at 2/17/2020 0933  Last data filed at 2/17/2020 0400  Gross per 24 hour   Intake 2064 ml   Output 1900 ml   Net 164 ml      Physical Exam   Constitutional: He is oriented to person, place, and time. No distress.   Eyes: EOM are normal.   Neck: Normal range of motion. Neck supple.   Cardiovascular: Normal rate and regular rhythm.   Pulmonary/Chest: Effort normal and breath sounds normal. No respiratory distress. He has no wheezes.   Abdominal: Soft. Bowel sounds are normal. There is no tenderness.   Musculoskeletal:   BUE in cast.     Neurological: He is alert and oriented to person, place, and time.   Equal hand  5/5. BLE strength 5/5    Skin: Skin is warm and dry. He is not diaphoretic.   Healing surgical scar on L shoulder. Surgical incision on R shoulder with dressing in " place. No erythema, tenderness or warmth.     Psychiatric: He has a normal mood and affect.   Nursing note and vitals reviewed.      Significant Labs:   A1C:   Recent Labs   Lab 10/16/19  0913 12/30/19  1209 01/27/20  2256   HGBA1C 7.0 6.6 5.9*     Bilirubin:   Recent Labs   Lab 02/04/20  0455 02/05/20  0501 02/06/20  0416 02/07/20  0542 02/15/20  0935 02/17/20  0434   BILIDIR 0.4*  --   --   --   --   --    BILITOT 1.3* 1.1* 0.9 0.9 0.7 0.6       CBC:   Recent Labs   Lab 02/15/20  0935 02/17/20  0434   WBC 7.88 9.84   HGB 10.6* 11.0*   HCT 35.5* 35.9*    324     CMP:   Recent Labs   Lab 02/15/20  0935 02/17/20  0434    139   K 4.4 4.6    104   CO2 24 22*   * 120*   BUN 34* 29*   CREATININE 1.3 1.4   CALCIUM 9.1 8.9   PROT 6.7 6.7   ALBUMIN 3.0* 2.8*   BILITOT 0.7 0.6   ALKPHOS 275* 257*   AST 23 20   ALT 39 30   ANIONGAP 10 13   EGFRNONAA 59.7* 54.6*     Coagulation:   Recent Labs   Lab 02/17/20  0434   INR 1.0       Magnesium:   Recent Labs   Lab 02/15/20  0935 02/17/20  0434   MG 1.9 1.7       TSH:   Recent Labs   Lab 01/27/20  2256   TSH 0.667  0.667     All pertinent labs within the past 24 hours have been reviewed.    Significant Imaging: I have reviewed all pertinent imaging results/findings within the past 24 hours.

## 2020-02-17 NOTE — PLAN OF CARE
Problem: Physical Therapy Goal  Goal: Physical Therapy Goal  Description  Goals to be met by: 20     Patient will increase functional independence with mobility by performin. Supine to sit with Contact Guard Assistance.  2. Sit to supine with Contact Guard Assistance.  3. Sit to stand transfer with Contact Guard Assistance.  4. Bed to chair transfer with Contact Guard Assistance.  5. Gait  x 100 feet with Contact Guard Assistance.   6. Ascend/descend 3 stair without use of handrails Contact Guard Assistance.   7. Lower extremity exercise program x 20 reps per handout, with assistance as needed.     Outcome: Ongoing, Progressing     Patient progressing appropriately towards current goals and plan of care remains appropriate at this time. Patient demonstrates decreased activity tolerance secondary to pain of B UE and L hip.  Patient demonstrates decreased functional mobility secondary to pain, decreased safety awareness and mild confusion.  Patient required between CGA and moderate assistance for bed mobility, transfers and ambulation.  Patient able to obtain stance with CGA but required moderate assistance for 5-6 steps of ambulation secondary to decreased balance, weakness and pain.  Patient continues to benefit from further PT for strengthening and mobility training.      Riley Freeman PT, DPT  2020  Pager #: [phone]

## 2020-02-18 ENCOUNTER — HOSPITAL ENCOUNTER (INPATIENT)
Facility: HOSPITAL | Age: 60
LOS: 13 days | Discharge: NURSING, REHAB OR LONG-TERM CARE | DRG: 559 | End: 2020-03-02
Attending: INTERNAL MEDICINE | Admitting: ORTHOPAEDIC SURGERY
Payer: MEDICARE

## 2020-02-18 VITALS
TEMPERATURE: 98 F | DIASTOLIC BLOOD PRESSURE: 65 MMHG | HEART RATE: 93 BPM | BODY MASS INDEX: 36.96 KG/M2 | OXYGEN SATURATION: 94 % | WEIGHT: 265 LBS | RESPIRATION RATE: 18 BRPM | SYSTOLIC BLOOD PRESSURE: 103 MMHG

## 2020-02-18 DIAGNOSIS — S42.202A BILATERAL PROXIMAL HUMERAL FRACTURES: ICD-10-CM

## 2020-02-18 DIAGNOSIS — S42.209A PROXIMAL HUMERAL FRACTURE: ICD-10-CM

## 2020-02-18 DIAGNOSIS — S42.201A BILATERAL PROXIMAL HUMERAL FRACTURES: ICD-10-CM

## 2020-02-18 PROBLEM — R79.89 CREATININE ELEVATION: Status: ACTIVE | Noted: 2020-02-18

## 2020-02-18 PROBLEM — M79.2 NEUROPATHIC PAIN: Status: ACTIVE | Noted: 2020-02-18

## 2020-02-18 LAB
POCT GLUCOSE: 100 MG/DL (ref 70–110)
POCT GLUCOSE: 113 MG/DL (ref 70–110)
POCT GLUCOSE: 120 MG/DL (ref 70–110)
POCT GLUCOSE: 122 MG/DL (ref 70–110)

## 2020-02-18 PROCEDURE — 99232 SBSQ HOSP IP/OBS MODERATE 35: CPT | Mod: GC,,, | Performed by: INTERNAL MEDICINE

## 2020-02-18 PROCEDURE — 97530 THERAPEUTIC ACTIVITIES: CPT

## 2020-02-18 PROCEDURE — 11000004 HC SNF PRIVATE

## 2020-02-18 PROCEDURE — 94640 AIRWAY INHALATION TREATMENT: CPT

## 2020-02-18 PROCEDURE — 99356 PR PROLONGED SERV,INPATIENT,1ST HR: CPT | Mod: ,,, | Performed by: INTERNAL MEDICINE

## 2020-02-18 PROCEDURE — 25000003 PHARM REV CODE 250: Performed by: STUDENT IN AN ORGANIZED HEALTH CARE EDUCATION/TRAINING PROGRAM

## 2020-02-18 PROCEDURE — 25000003 PHARM REV CODE 250: Performed by: INTERNAL MEDICINE

## 2020-02-18 PROCEDURE — 94761 N-INVAS EAR/PLS OXIMETRY MLT: CPT

## 2020-02-18 PROCEDURE — 99232 PR SUBSEQUENT HOSPITAL CARE,LEVL II: ICD-10-PCS | Mod: GC,,, | Performed by: INTERNAL MEDICINE

## 2020-02-18 PROCEDURE — 63600175 PHARM REV CODE 636 W HCPCS: Performed by: STUDENT IN AN ORGANIZED HEALTH CARE EDUCATION/TRAINING PROGRAM

## 2020-02-18 PROCEDURE — 97110 THERAPEUTIC EXERCISES: CPT

## 2020-02-18 PROCEDURE — 99900035 HC TECH TIME PER 15 MIN (STAT)

## 2020-02-18 PROCEDURE — 99306 PR NURSING FACILITY CARE, INIT, HIGH SEVERITY: ICD-10-PCS | Mod: ,,, | Performed by: INTERNAL MEDICINE

## 2020-02-18 PROCEDURE — 99356 PR PROLONGED SERV,INPATIENT,1ST HR: ICD-10-PCS | Mod: ,,, | Performed by: INTERNAL MEDICINE

## 2020-02-18 PROCEDURE — 99306 1ST NF CARE HIGH MDM 50: CPT | Mod: ,,, | Performed by: INTERNAL MEDICINE

## 2020-02-18 PROCEDURE — 25000242 PHARM REV CODE 250 ALT 637 W/ HCPCS: Performed by: ORTHOPAEDIC SURGERY

## 2020-02-18 RX ORDER — GLUCAGON 1 MG
1 KIT INJECTION
Status: CANCELLED | OUTPATIENT
Start: 2020-02-18

## 2020-02-18 RX ORDER — OXYCODONE HYDROCHLORIDE 5 MG/1
5 TABLET ORAL EVERY 4 HOURS PRN
Status: CANCELLED | OUTPATIENT
Start: 2020-02-18

## 2020-02-18 RX ORDER — PANTOPRAZOLE SODIUM 40 MG/1
40 TABLET, DELAYED RELEASE ORAL DAILY
Status: CANCELLED | OUTPATIENT
Start: 2020-02-19

## 2020-02-18 RX ORDER — PANTOPRAZOLE SODIUM 40 MG/1
40 TABLET, DELAYED RELEASE ORAL DAILY
Status: DISCONTINUED | OUTPATIENT
Start: 2020-02-19 | End: 2020-03-03 | Stop reason: HOSPADM

## 2020-02-18 RX ORDER — AMOXICILLIN 250 MG
1 CAPSULE ORAL 2 TIMES DAILY
Status: CANCELLED | OUTPATIENT
Start: 2020-02-18

## 2020-02-18 RX ORDER — ATORVASTATIN CALCIUM 20 MG/1
80 TABLET, FILM COATED ORAL NIGHTLY
Status: CANCELLED | OUTPATIENT
Start: 2020-02-18

## 2020-02-18 RX ORDER — METHOCARBAMOL 500 MG/1
500 TABLET, FILM COATED ORAL 3 TIMES DAILY
Status: DISCONTINUED | OUTPATIENT
Start: 2020-02-18 | End: 2020-02-19

## 2020-02-18 RX ORDER — TALC
6 POWDER (GRAM) TOPICAL NIGHTLY PRN
Status: CANCELLED | OUTPATIENT
Start: 2020-02-18

## 2020-02-18 RX ORDER — POLYETHYLENE GLYCOL 3350 17 G/17G
17 POWDER, FOR SOLUTION ORAL 2 TIMES DAILY
Status: DISCONTINUED | OUTPATIENT
Start: 2020-02-18 | End: 2020-03-03 | Stop reason: HOSPADM

## 2020-02-18 RX ORDER — LEVETIRACETAM 500 MG/1
1000 TABLET ORAL 2 TIMES DAILY
Status: CANCELLED | OUTPATIENT
Start: 2020-02-18

## 2020-02-18 RX ORDER — ENOXAPARIN SODIUM 100 MG/ML
40 INJECTION SUBCUTANEOUS EVERY 24 HOURS
Status: DISCONTINUED | OUTPATIENT
Start: 2020-02-19 | End: 2020-02-28

## 2020-02-18 RX ORDER — SODIUM CHLORIDE 0.9 % (FLUSH) 0.9 %
10 SYRINGE (ML) INJECTION
Status: CANCELLED | OUTPATIENT
Start: 2020-02-18

## 2020-02-18 RX ORDER — LEVALBUTEROL INHALATION SOLUTION 0.63 MG/3ML
0.63 SOLUTION RESPIRATORY (INHALATION) EVERY 8 HOURS
Status: DISCONTINUED | OUTPATIENT
Start: 2020-02-19 | End: 2020-02-19

## 2020-02-18 RX ORDER — ATORVASTATIN CALCIUM 20 MG/1
80 TABLET, FILM COATED ORAL NIGHTLY
Status: DISCONTINUED | OUTPATIENT
Start: 2020-02-18 | End: 2020-03-03 | Stop reason: HOSPADM

## 2020-02-18 RX ORDER — MORPHINE SULFATE 2 MG/ML
3 INJECTION, SOLUTION INTRAMUSCULAR; INTRAVENOUS
Status: DISCONTINUED | OUTPATIENT
Start: 2020-02-18 | End: 2020-02-18

## 2020-02-18 RX ORDER — ACETAMINOPHEN 325 MG/1
650 TABLET ORAL EVERY 6 HOURS PRN
Status: DISCONTINUED | OUTPATIENT
Start: 2020-02-18 | End: 2020-03-03 | Stop reason: HOSPADM

## 2020-02-18 RX ORDER — METOPROLOL TARTRATE 25 MG/1
25 TABLET, FILM COATED ORAL 2 TIMES DAILY
Status: CANCELLED | OUTPATIENT
Start: 2020-02-18

## 2020-02-18 RX ORDER — IBUPROFEN 200 MG
16 TABLET ORAL
Status: DISCONTINUED | OUTPATIENT
Start: 2020-02-18 | End: 2020-02-19

## 2020-02-18 RX ORDER — METHOCARBAMOL 500 MG/1
500 TABLET, FILM COATED ORAL 3 TIMES DAILY
Status: CANCELLED | OUTPATIENT
Start: 2020-02-18

## 2020-02-18 RX ORDER — IBUPROFEN 200 MG
1 TABLET ORAL DAILY
Status: DISCONTINUED | OUTPATIENT
Start: 2020-02-19 | End: 2020-02-24

## 2020-02-18 RX ORDER — CAPSAICIN 0.03 G/100G
CREAM TOPICAL 3 TIMES DAILY
Status: DISCONTINUED | OUTPATIENT
Start: 2020-02-18 | End: 2020-02-18 | Stop reason: HOSPADM

## 2020-02-18 RX ORDER — OXYCODONE HYDROCHLORIDE 5 MG/1
5 TABLET ORAL EVERY 4 HOURS PRN
Status: DISCONTINUED | OUTPATIENT
Start: 2020-02-18 | End: 2020-02-18

## 2020-02-18 RX ORDER — AMOXICILLIN 250 MG
2 CAPSULE ORAL 2 TIMES DAILY
Status: DISCONTINUED | OUTPATIENT
Start: 2020-02-18 | End: 2020-03-03 | Stop reason: HOSPADM

## 2020-02-18 RX ORDER — OXYCODONE AND ACETAMINOPHEN 5; 325 MG/1; MG/1
2 TABLET ORAL EVERY 6 HOURS PRN
Status: DISCONTINUED | OUTPATIENT
Start: 2020-02-18 | End: 2020-03-03 | Stop reason: HOSPADM

## 2020-02-18 RX ORDER — LEVALBUTEROL INHALATION SOLUTION 0.63 MG/3ML
0.63 SOLUTION RESPIRATORY (INHALATION) EVERY 8 HOURS
Status: CANCELLED | OUTPATIENT
Start: 2020-02-19

## 2020-02-18 RX ORDER — OXYCODONE HYDROCHLORIDE 10 MG/1
10 TABLET ORAL EVERY 4 HOURS PRN
Status: DISCONTINUED | OUTPATIENT
Start: 2020-02-18 | End: 2020-02-18

## 2020-02-18 RX ORDER — LEVETIRACETAM 500 MG/1
1000 TABLET ORAL 2 TIMES DAILY
Status: DISCONTINUED | OUTPATIENT
Start: 2020-02-18 | End: 2020-03-03 | Stop reason: HOSPADM

## 2020-02-18 RX ORDER — IBUPROFEN 200 MG
16 TABLET ORAL
Status: CANCELLED | OUTPATIENT
Start: 2020-02-18

## 2020-02-18 RX ORDER — SODIUM CHLORIDE 9 MG/ML
INJECTION, SOLUTION INTRAVENOUS CONTINUOUS
Status: CANCELLED | OUTPATIENT
Start: 2020-02-18

## 2020-02-18 RX ORDER — CALCIUM CARBONATE 200(500)MG
500 TABLET,CHEWABLE ORAL 2 TIMES DAILY PRN
Status: CANCELLED | OUTPATIENT
Start: 2020-02-18

## 2020-02-18 RX ORDER — TALC
6 POWDER (GRAM) TOPICAL NIGHTLY PRN
Status: DISCONTINUED | OUTPATIENT
Start: 2020-02-18 | End: 2020-03-03 | Stop reason: HOSPADM

## 2020-02-18 RX ORDER — ENOXAPARIN SODIUM 100 MG/ML
40 INJECTION SUBCUTANEOUS EVERY 24 HOURS
Status: CANCELLED | OUTPATIENT
Start: 2020-02-18

## 2020-02-18 RX ORDER — IPRATROPIUM BROMIDE 0.5 MG/2.5ML
500 SOLUTION RESPIRATORY (INHALATION) EVERY 4 HOURS PRN
Status: CANCELLED | OUTPATIENT
Start: 2020-02-18

## 2020-02-18 RX ORDER — IBUPROFEN 200 MG
24 TABLET ORAL
Status: DISCONTINUED | OUTPATIENT
Start: 2020-02-18 | End: 2020-02-19

## 2020-02-18 RX ORDER — GABAPENTIN 400 MG/1
400 CAPSULE ORAL 3 TIMES DAILY
Status: DISCONTINUED | OUTPATIENT
Start: 2020-02-18 | End: 2020-02-18 | Stop reason: HOSPADM

## 2020-02-18 RX ORDER — ONDANSETRON 8 MG/1
8 TABLET, ORALLY DISINTEGRATING ORAL EVERY 8 HOURS PRN
Status: DISCONTINUED | OUTPATIENT
Start: 2020-02-18 | End: 2020-03-03 | Stop reason: HOSPADM

## 2020-02-18 RX ORDER — POLYETHYLENE GLYCOL 3350 17 G/17G
17 POWDER, FOR SOLUTION ORAL 2 TIMES DAILY
Status: CANCELLED | OUTPATIENT
Start: 2020-02-18

## 2020-02-18 RX ORDER — AMOXICILLIN 250 MG
1 CAPSULE ORAL 2 TIMES DAILY
Status: DISCONTINUED | OUTPATIENT
Start: 2020-02-18 | End: 2020-02-18

## 2020-02-18 RX ORDER — CALCIUM CARBONATE 200(500)MG
500 TABLET,CHEWABLE ORAL 2 TIMES DAILY PRN
Status: DISCONTINUED | OUTPATIENT
Start: 2020-02-18 | End: 2020-03-03 | Stop reason: HOSPADM

## 2020-02-18 RX ORDER — ACETAMINOPHEN 325 MG/1
650 TABLET ORAL EVERY 6 HOURS PRN
Status: CANCELLED | OUTPATIENT
Start: 2020-02-18

## 2020-02-18 RX ORDER — CAPSAICIN 0.75 MG/G
CREAM TOPICAL 3 TIMES DAILY
Status: CANCELLED | OUTPATIENT
Start: 2020-02-18

## 2020-02-18 RX ORDER — AMITRIPTYLINE HYDROCHLORIDE 25 MG/1
50 TABLET, FILM COATED ORAL
Status: CANCELLED | OUTPATIENT
Start: 2020-02-18

## 2020-02-18 RX ORDER — ERGOCALCIFEROL 1.25 MG/1
50000 CAPSULE ORAL
Status: CANCELLED | OUTPATIENT
Start: 2020-02-22

## 2020-02-18 RX ORDER — GLUCAGON 1 MG
1 KIT INJECTION
Status: DISCONTINUED | OUTPATIENT
Start: 2020-02-18 | End: 2020-02-19

## 2020-02-18 RX ORDER — TALC
6 POWDER (GRAM) TOPICAL NIGHTLY PRN
Status: DISCONTINUED | OUTPATIENT
Start: 2020-02-18 | End: 2020-02-18

## 2020-02-18 RX ORDER — AMITRIPTYLINE HYDROCHLORIDE 25 MG/1
50 TABLET, FILM COATED ORAL
Status: DISCONTINUED | OUTPATIENT
Start: 2020-02-19 | End: 2020-02-19

## 2020-02-18 RX ORDER — GABAPENTIN 400 MG/1
400 CAPSULE ORAL 3 TIMES DAILY
Status: CANCELLED | OUTPATIENT
Start: 2020-02-18

## 2020-02-18 RX ORDER — IPRATROPIUM BROMIDE 0.5 MG/2.5ML
500 SOLUTION RESPIRATORY (INHALATION) EVERY 4 HOURS PRN
Status: DISCONTINUED | OUTPATIENT
Start: 2020-02-18 | End: 2020-02-18

## 2020-02-18 RX ORDER — INSULIN ASPART 100 [IU]/ML
0-5 INJECTION, SOLUTION INTRAVENOUS; SUBCUTANEOUS
Status: DISCONTINUED | OUTPATIENT
Start: 2020-02-18 | End: 2020-02-19

## 2020-02-18 RX ORDER — SODIUM CHLORIDE 0.9 % (FLUSH) 0.9 %
10 SYRINGE (ML) INJECTION
Status: DISCONTINUED | OUTPATIENT
Start: 2020-02-18 | End: 2020-02-20

## 2020-02-18 RX ORDER — MORPHINE SULFATE 2 MG/ML
3 INJECTION, SOLUTION INTRAMUSCULAR; INTRAVENOUS
Status: CANCELLED | OUTPATIENT
Start: 2020-02-18

## 2020-02-18 RX ORDER — INSULIN ASPART 100 [IU]/ML
0-5 INJECTION, SOLUTION INTRAVENOUS; SUBCUTANEOUS
Status: CANCELLED | OUTPATIENT
Start: 2020-02-18

## 2020-02-18 RX ORDER — ERGOCALCIFEROL 1.25 MG/1
50000 CAPSULE ORAL
Status: DISCONTINUED | OUTPATIENT
Start: 2020-02-22 | End: 2020-03-03 | Stop reason: HOSPADM

## 2020-02-18 RX ORDER — OXYCODONE HYDROCHLORIDE 10 MG/1
10 TABLET ORAL EVERY 4 HOURS PRN
Status: CANCELLED | OUTPATIENT
Start: 2020-02-18

## 2020-02-18 RX ORDER — CETIRIZINE HYDROCHLORIDE 5 MG/1
10 TABLET ORAL DAILY
Status: DISCONTINUED | OUTPATIENT
Start: 2020-02-19 | End: 2020-03-03 | Stop reason: HOSPADM

## 2020-02-18 RX ORDER — CAPSAICIN 0.75 MG/G
CREAM TOPICAL 3 TIMES DAILY
Status: DISCONTINUED | OUTPATIENT
Start: 2020-02-18 | End: 2020-02-19

## 2020-02-18 RX ORDER — METOPROLOL TARTRATE 25 MG/1
25 TABLET, FILM COATED ORAL 2 TIMES DAILY
Status: DISCONTINUED | OUTPATIENT
Start: 2020-02-18 | End: 2020-03-03 | Stop reason: HOSPADM

## 2020-02-18 RX ORDER — IBUPROFEN 200 MG
24 TABLET ORAL
Status: CANCELLED | OUTPATIENT
Start: 2020-02-18

## 2020-02-18 RX ORDER — ONDANSETRON 8 MG/1
8 TABLET, ORALLY DISINTEGRATING ORAL EVERY 8 HOURS PRN
Status: CANCELLED | OUTPATIENT
Start: 2020-02-18

## 2020-02-18 RX ORDER — SODIUM CHLORIDE 9 MG/ML
INJECTION, SOLUTION INTRAVENOUS CONTINUOUS
Status: DISCONTINUED | OUTPATIENT
Start: 2020-02-18 | End: 2020-02-18

## 2020-02-18 RX ORDER — AMOXICILLIN 250 MG
2 CAPSULE ORAL 2 TIMES DAILY
Status: CANCELLED | OUTPATIENT
Start: 2020-02-18

## 2020-02-18 RX ADMIN — OXYCODONE HYDROCHLORIDE 10 MG: 10 TABLET ORAL at 05:02

## 2020-02-18 RX ADMIN — LEVALBUTEROL HYDROCHLORIDE 0.63 MG: 0.63 SOLUTION RESPIRATORY (INHALATION) at 08:02

## 2020-02-18 RX ADMIN — ENOXAPARIN SODIUM 40 MG: 100 INJECTION SUBCUTANEOUS at 05:02

## 2020-02-18 RX ADMIN — LEVALBUTEROL HYDROCHLORIDE 0.63 MG: 0.63 SOLUTION RESPIRATORY (INHALATION) at 12:02

## 2020-02-18 RX ADMIN — METHOCARBAMOL TABLETS 500 MG: 500 TABLET, COATED ORAL at 09:02

## 2020-02-18 RX ADMIN — DOCUSATE SODIUM - SENNOSIDES 2 TABLET: 50; 8.6 TABLET, FILM COATED ORAL at 09:02

## 2020-02-18 RX ADMIN — POLYETHYLENE GLYCOL 3350 17 G: 17 POWDER, FOR SOLUTION ORAL at 09:02

## 2020-02-18 RX ADMIN — METOPROLOL TARTRATE 25 MG: 25 TABLET ORAL at 09:02

## 2020-02-18 RX ADMIN — GABAPENTIN 400 MG: 400 CAPSULE ORAL at 03:02

## 2020-02-18 RX ADMIN — METOPROLOL TARTRATE 25 MG: 25 TABLET, FILM COATED ORAL at 09:02

## 2020-02-18 RX ADMIN — ATORVASTATIN CALCIUM 80 MG: 20 TABLET, FILM COATED ORAL at 09:02

## 2020-02-18 RX ADMIN — OXYCODONE HYDROCHLORIDE AND ACETAMINOPHEN 2 TABLET: 5; 325 TABLET ORAL at 09:02

## 2020-02-18 RX ADMIN — METHOCARBAMOL TABLETS 500 MG: 500 TABLET, COATED ORAL at 03:02

## 2020-02-18 RX ADMIN — LEVETIRACETAM 1000 MG: 500 TABLET, FILM COATED ORAL at 09:02

## 2020-02-18 RX ADMIN — LEVALBUTEROL HYDROCHLORIDE 0.63 MG: 0.63 SOLUTION RESPIRATORY (INHALATION) at 03:02

## 2020-02-18 RX ADMIN — GABAPENTIN 400 MG: 100 CAPSULE ORAL at 09:02

## 2020-02-18 RX ADMIN — GABAPENTIN 300 MG: 300 CAPSULE ORAL at 09:02

## 2020-02-18 RX ADMIN — LEVALBUTEROL HYDROCHLORIDE 0.63 MG: 0.63 SOLUTION RESPIRATORY (INHALATION) at 11:02

## 2020-02-18 RX ADMIN — PANTOPRAZOLE SODIUM 40 MG: 40 TABLET, DELAYED RELEASE ORAL at 09:02

## 2020-02-18 RX ADMIN — LEVETIRACETAM 1000 MG: 500 TABLET ORAL at 09:02

## 2020-02-18 RX ADMIN — AMITRIPTYLINE HYDROCHLORIDE 50 MG: 25 TABLET, FILM COATED ORAL at 05:02

## 2020-02-18 NOTE — ASSESSMENT & PLAN NOTE
Mild elevation in sCR. Cr 1.2 on admission and 1.4 on 02/17/20. Appropriate PO intake and urine output. Lower than usual urine output. Bladder scan qshift and straight cath prn.     -- Monitor with labs   -- Bladder scan q shift   -- PRN straight cath

## 2020-02-18 NOTE — PLAN OF CARE
02/18/20 1352   Post-Acute Status   Post-Acute Authorization Placement   Post-Acute Placement Status Awaiting Orders for SNF   [facility] and able to discharge today. SW will continue working on patient discharge to [facility]. Pending SNF orders at this time.   Dyan Rose, BOBBY  [facility]

## 2020-02-18 NOTE — NURSING
Called report to Joel @ [facility]. Removed piv. Tolerated well. jann HURTADOE remain in slings. Spouse packed pt belongings. Alex on site to transport pt.

## 2020-02-18 NOTE — PLAN OF CARE
02/18/20 1518   Post-Acute Status   Post-Acute Authorization Placement   Post-Acute Placement Status Set-up Complete   SW scheduled discharge transportation (stretcher) to [facility] through [facility]. Patient scheduled for pickup at 6:00PM. Sw called and provided patient nurse with update about transport arrangements. Nurse can call report to [phone]. SW updated patient about discharge/transport arrangements. SW in  communication with patient CM and patient care team.  Dyan Rose, BOBBY  [facility]

## 2020-02-18 NOTE — PLAN OF CARE
AAOx4. Pt updated on poc. Verbalized understanding. No complaints of N/V. Pain managed with prn meds. NADN. VS as charted. Bilat UE placed in slings. Surgical dsg to R shoulder CDI. Regular diet tolerated. No falls noted this shift. Call bell in reach. Will continue to monitor.

## 2020-02-18 NOTE — SUBJECTIVE & OBJECTIVE
"Interval History:   R brachial plexus block by anesthesia yesterday.   Pain well controlled. Continues to have burning pain and "pins and needles" sensation in BL heels only when bearing weight. He has tried applying ice to the area w/o improvement in his sxs. He had similar sxs in the past and his sxs improved with applying topical bengay. Tolerating a regular diet. No abd pain or n/v/d.     Review of Systems   Constitutional: Negative for appetite change, chills and fever.   HENT: Negative for rhinorrhea.    Respiratory: Negative for cough, chest tightness and shortness of breath.    Gastrointestinal: Negative for abdominal pain, diarrhea, nausea and vomiting.   Musculoskeletal: Positive for back pain and gait problem.   Neurological: Negative for dizziness and headaches.     Objective:     Vital Signs (Most Recent):  Temp: (!) 95.6 °F (35.3 °C) (02/18/20 0824)  Pulse: 96 (02/18/20 0824)  Resp: 18 (02/18/20 0824)  BP: 114/72 (02/18/20 0824)  SpO2: (!) 92 % (02/18/20 0824) Vital Signs (24h Range):  Temp:  [95.6 °F (35.3 °C)-98.7 °F (37.1 °C)] 95.6 °F (35.3 °C)  Pulse:  [] 96  Resp:  [16-20] 18  SpO2:  [92 %-97 %] 92 %  BP: ()/(49-72) 114/72     Weight: 120.2 kg (265 lb)  Body mass index is 36.96 kg/m².    Intake/Output Summary (Last 24 hours) at 2/18/2020 0945  Last data filed at 2/17/2020 1700  Gross per 24 hour   Intake 810 ml   Output 250 ml   Net 560 ml      Physical Exam   Constitutional: He is oriented to person, place, and time. No distress.   Eyes: EOM are normal.   Neck: Normal range of motion. Neck supple.   Cardiovascular: Normal rate and regular rhythm.   Pulmonary/Chest: Effort normal and breath sounds normal. No respiratory distress. He has no wheezes.   Abdominal: Soft. Bowel sounds are normal. There is no tenderness.   Musculoskeletal:   BUE in cast.     Neurological: He is alert and oriented to person, place, and time.   Equal hand  5/5. BLE strength 5/5    Skin: Skin is warm and " dry. He is not diaphoretic.   Healing surgical scar on L shoulder. Surgical incision on R shoulder with dressing in place. No erythema, tenderness or warmth.     Psychiatric: He has a normal mood and affect.   Nursing note and vitals reviewed.      Significant Labs:   A1C:   Recent Labs   Lab 10/16/19  0913 12/30/19  1209 01/27/20  2256   HGBA1C 7.0 6.6 5.9*     Bilirubin:   Recent Labs   Lab 02/04/20  0455 02/05/20  0501 02/06/20  0416 02/07/20  0542 02/15/20  0935 02/17/20  0434   BILIDIR 0.4*  --   --   --   --   --    BILITOT 1.3* 1.1* 0.9 0.9 0.7 0.6       CBC:   Recent Labs   Lab 02/17/20  0434   WBC 9.84   HGB 11.0*   HCT 35.9*        CMP:   Recent Labs   Lab 02/17/20  0434      K 4.6      CO2 22*   *   BUN 29*   CREATININE 1.4   CALCIUM 8.9   PROT 6.7   ALBUMIN 2.8*   BILITOT 0.6   ALKPHOS 257*   AST 20   ALT 30   ANIONGAP 13   EGFRNONAA 54.6*     Coagulation:   Recent Labs   Lab 02/17/20  0434   INR 1.0       Magnesium:   Recent Labs   Lab 02/17/20  0434   MG 1.7       TSH:   Recent Labs   Lab 01/27/20  2256   TSH 0.667  0.667     All pertinent labs within the past 24 hours have been reviewed.    Significant Imaging: I have reviewed all pertinent imaging results/findings within the past 24 hours.

## 2020-02-18 NOTE — PROGRESS NOTES
"[facility] Hospital Medicine  Progress Note    Patient Name: Ben Melvin  MRN: [id number]  Patient Class: IP- Inpatient   Admission Date: 2/14/2020  Length of Stay: 4 days  Attending Physician: Chandler Chris,*  Primary Care Provider: Cristal Ohara NP    [facility] Medicine Team: Networked reference to record PCT  Stephanie Acevedo MD    Subjective:     Principal Problem:Proximal humeral fracture        HPI:  Ben Melvin is a 60 y.o. male with PMH of AFib on coumadin, peripheral vascular disease, hypertension, COPD, current smoker, liver cirrhosis, who had a witnessed seizure while playing video poker 01/27/2020 and sustained bilateral proximal humerus fracture dislocations, bilateral acromial fractures, subdural hemorrhage, L1 and L4 burst fractures. He underwent left shoulder hemiarthroplasty, biceps tenodesis on 01/28/20 and right shoulder hemiarthroplasty, biceps tenodesis on 01/30/20 and [facility]. BL shoulder XRs from 02/10/20 notable for failure of the repair of the greater tuberosity on the right shoulder and he returned to [facility] on 02/14/20 for revision fixation of his right greater tuberosity fracture. Pt had a stable CTH on 02/10/20 and warfarin was reinitiated 02/11, last dose was 02/12. Currently on Lovenox 40 mg SQ daily. He was seen by neurosurgery and will f/u with pt outpt as scheduled on 02/21/20. Pts pain is well managed with current regiment. He has been able to tolerate a regular diet. No abd pain or n/v/d. Denies fevers or chills.     IM 6 consulted for ortho co-management.     Overview/Hospital Course:  60 y.o. M admitted for repeat R shoulder repeat ORIF. Pain controlled with multimodal regiment. Plan for discharge to SNF, awaiting bed at this time.     Interval History:   R brachial plexus block by anesthesia yesterday.   Pain well controlled. Continues to have burning pain and "pins and needles" sensation in BL heels only when bearing " weight. He has tried applying ice to the area w/o improvement in his sxs. He had similar sxs in the past and his sxs improved with applying topical bengay. Tolerating a regular diet. No abd pain or n/v/d.     Review of Systems   Constitutional: Negative for appetite change, chills and fever.   HENT: Negative for rhinorrhea.    Respiratory: Negative for cough, chest tightness and shortness of breath.    Gastrointestinal: Negative for abdominal pain, diarrhea, nausea and vomiting.   Musculoskeletal: Positive for back pain and gait problem.   Neurological: Negative for dizziness and headaches.     Objective:     Vital Signs (Most Recent):  Temp: (!) 95.6 °F (35.3 °C) (02/18/20 0824)  Pulse: 96 (02/18/20 0824)  Resp: 18 (02/18/20 0824)  BP: 114/72 (02/18/20 0824)  SpO2: (!) 92 % (02/18/20 0824) Vital Signs (24h Range):  Temp:  [95.6 °F (35.3 °C)-98.7 °F (37.1 °C)] 95.6 °F (35.3 °C)  Pulse:  [] 96  Resp:  [16-20] 18  SpO2:  [92 %-97 %] 92 %  BP: ()/(49-72) 114/72     Weight: 120.2 kg (265 lb)  Body mass index is 36.96 kg/m².    Intake/Output Summary (Last 24 hours) at 2/18/2020 0945  Last data filed at 2/17/2020 1700  Gross per 24 hour   Intake 810 ml   Output 250 ml   Net 560 ml      Physical Exam   Constitutional: He is oriented to person, place, and time. No distress.   Eyes: EOM are normal.   Neck: Normal range of motion. Neck supple.   Cardiovascular: Normal rate and regular rhythm.   Pulmonary/Chest: Effort normal and breath sounds normal. No respiratory distress. He has no wheezes.   Abdominal: Soft. Bowel sounds are normal. There is no tenderness.   Musculoskeletal:   BUE in cast.     Neurological: He is alert and oriented to person, place, and time.   Equal hand  5/5. BLE strength 5/5    Skin: Skin is warm and dry. He is not diaphoretic.   Healing surgical scar on L shoulder. Surgical incision on R shoulder with dressing in place. No erythema, tenderness or warmth.     Psychiatric: He has a  normal mood and affect.   Nursing note and vitals reviewed.      Significant Labs:   A1C:   Recent Labs   Lab 10/16/19  0913 12/30/19  1209 01/27/20  2256   HGBA1C 7.0 6.6 5.9*     Bilirubin:   Recent Labs   Lab 02/04/20  0455 02/05/20  0501 02/06/20  0416 02/07/20  0542 02/15/20  0935 02/17/20  0434   BILIDIR 0.4*  --   --   --   --   --    BILITOT 1.3* 1.1* 0.9 0.9 0.7 0.6       CBC:   Recent Labs   Lab 02/17/20  0434   WBC 9.84   HGB 11.0*   HCT 35.9*        CMP:   Recent Labs   Lab 02/17/20  0434      K 4.6      CO2 22*   *   BUN 29*   CREATININE 1.4   CALCIUM 8.9   PROT 6.7   ALBUMIN 2.8*   BILITOT 0.6   ALKPHOS 257*   AST 20   ALT 30   ANIONGAP 13   EGFRNONAA 54.6*     Coagulation:   Recent Labs   Lab 02/17/20  0434   INR 1.0       Magnesium:   Recent Labs   Lab 02/17/20  0434   MG 1.7       TSH:   Recent Labs   Lab 01/27/20  2256   TSH 0.667  0.667     All pertinent labs within the past 24 hours have been reviewed.    Significant Imaging: I have reviewed all pertinent imaging results/findings within the past 24 hours.      Assessment/Plan:      Neuropathic pain  Intermittent Pins and needles sensation to BL heels. Also worsens when bearing weight. Increased gabapentin dose to 400 TID. Capsaicin cream prn.      -- Gabapentin 400 mg TID   -- Capsaicin cream prn       Creatinine elevation  Mild elevation in sCR. Cr 1.2 on admission and 1.4 on 02/17/20. Urine appears concentrated, encouraged increased hydration. Urine output lower than usual. Bladder scan qshift and straight cath prn.     -- Monitor with labs   -- Encourage hydration   -- Bladder scan q shift   -- PRN straight cath     GERD (gastroesophageal reflux disease)  Continue Pantoprazole 40 mg     Stable burst fracture of first lumbar vertebra  L1 and L4 burst fractures  Repeat CT L spine from 02/15/20 stable. Pt evaluated by neurosurgery. Recommend continuing TSLO brace and will f/u in clinic as scheduled on 02/21/20.      Constipation  Continue daily miralax and senokot-s     fracture dislocation of bilateral shoulders  Bilateral acromial fractures with bilateral shoulder dislocation S/p bilateral shoulder hemiarthroplasty: right on 1/28/2020 and left on 1/30/2020  Pt underwent revision of fixation of R greater tuberosity fracture on 02/14/20. BUE in a brace.     -- Pain management per primary team         Subdural hematoma  Stable on CTH from 02/10/20. Warfarin was resumed on 02/11, but held since 02/13 for procedure.      -- Continue keppra       Type 2 diabetes mellitus  A1c 5.9 from 01/27/20.  on admission. Will initial LDSSI and titrate insulin regiment as appropriate.     -- LDSSI   -- Accu checks AC/HS        Vitamin D deficiency  Level of 7 on 2/4    -- Ergocalciferol 50k weekly    Essential hypertension  Home medications: metoprolol and ramipril at home. BP was intermittently low during last hospitalization and currently normotensive. Will resume Metoprolol and hold on ramipril. Will reinitiate as BP allows.     -- Metoprolol 25 mg BID       PAF (paroxysmal atrial fibrillation)  PAF on coumadin. Coumadin held due to SDH and restarted on 02/11/20 after repeat CTH showed stable SDH. Last dose on 02/13/20, held for revision fixation for R greater tuberosity fracture that pt underwent 02/14/20. He is currently on Lovenox 40 mg sq daily. INR today 1.0, goal 2-3. Surgery team to resume AC when appropriate.     -- AC initiation per primary team  -- Metoprolol 25 mg BID      COPD (chronic obstructive pulmonary disease)  Smoker    -- Continue levoalbuterol nebs  -- Limit oxygen to avoid hypercapnea    VTE Risk Mitigation (From admission, onward)         Ordered     enoxaparin injection 40 mg  Daily      02/14/20 1313     Place NOAH hose  Until discontinued      02/14/20 0540     IP VTE HIGH RISK PATIENT  Once      02/14/20 0540                      Stephanie Acevedo MD  Department of Hospital Medicine   [facility]  [place]

## 2020-02-18 NOTE — ASSESSMENT & PLAN NOTE
Mild elevation in sCR. Cr 1.2 on admission and 1.4 on 02/17/20. Urine appears concentrated, encouraged increased hydration. Urine output lower than usual. Bladder scan qshift and straight cath prn.     -- Monitor with labs   -- Encourage hydration   -- Bladder scan q shift   -- PRN straight cath

## 2020-02-18 NOTE — PLAN OF CARE
02/18/20 0914   Post-Acute Status   Post-Acute Authorization Placement   Post-Acute Placement Status Accepted Pending Bed Availability   PROMISE spoke with Marine with [facility] who states patient able to admit although no available beds at this time. Marine reports if anything falls through that patient will be first on list to admit. Marine states will keep SW updated with bed availability.  Dyan Rose, BOBBY  [facility]

## 2020-02-18 NOTE — PT/OT/SLP PROGRESS
"Physical Therapy Treatment    Patient Name:  Ben Melvin   MRN:  [id number]    Recommendations:     Discharge Recommendations:  nursing facility, skilled   Discharge Equipment Recommendations: wheelchair   Barriers to discharge: decreased level of functional mobility and independence    Assessment:     Ben Melvin is a 60 y.o. male admitted with a medical diagnosis of Proximal humeral fracture.  He presents with the following impairments/functional limitations:  weakness, impaired endurance, impaired sensation, impaired self care skills, impaired functional mobilty, gait instability, impaired balance, decreased lower extremity function, decreased upper extremity function, decreased safety awareness, pain, decreased ROM, impaired fine motor, orthopedic precautions, impaired cardiopulmonary response to activity Patient motivated to work with therapy but with poor activity tolerance 2* to orthopedic precautions and L hip pain. Patient with poor safety awareness and poor adherence to orthopedic precautions. Patient is not safe to return home when medically ready at current level of mobility and would benefit from skilled nursing facility to improve functional mobility and safety.      Rehab Prognosis: Fair; patient would benefit from acute skilled PT services to address these deficits and reach maximum level of function.    Recent Surgery: Procedure(s) (LRB):  ORIF, FRACTURE, HUMERUS, PROXIMAL - ORIF R greater tuberosity - suture repair. Trios, supine, fiberwire (Right) 4 Days Post-Op    Plan:     During this hospitalization, patient to be seen daily to address the identified rehab impairments via gait training, therapeutic activities, therapeutic exercises, neuromuscular re-education, wheelchair management/training and progress toward the following goals:    · Plan of Care Expires:  03/14/20    Subjective     Chief Complaint: L hip pain in weightbearing   Patient/Family Comments/goals: "I have to use my hands " "some or I can't move"  Pain/Comfort:  · Pain Rating 1: 10/10  · Location - Side 1: Left  · Location 1: hip  · Pain Addressed 1: Cessation of Activity, Reposition  · Pain Rating Post-Intervention 1: 10/10  · Pain Rating 2: other (see comments)  · Location - Side 2: Bilateral  · Location - Orientation 2: generalized  · Location 2: shoulder  · Pain Addressed 2: Pre-medicate for activity  · Pain Rating Post-Intervention 2: other (see comments)      Objective:     Communicated with nurse prior to session.  Patient found HOB elevated with peripheral IV, telemetry upon PT entry to room.     General Precautions: Standard, fall, diabetic, seizure   Orthopedic Precautions:LUE non weight bearing, RUE non weight bearing   Braces: TLSO, Shoulder abduction brace     Functional Mobility:  · Bed Mobility:     · Scooting: maximal assistance  · Supine to Sit: moderate assistance  · Transfers:     · Sit to Stand:  moderate assistance with no AD  · Bed to Chair: minimum assistance with  no AD  using  Step Transfer  · Gait: 3-4 steps for chair transfer. Antalgic gait. Decreased weight bearing through LLE.       AM-PAC 6 CLICK MOBILITY  Turning over in bed (including adjusting bedclothes, sheets and blankets)?: 3  Sitting down on and standing up from a chair with arms (e.g., wheelchair, bedside commode, etc.): 2  Moving from lying on back to sitting on the side of the bed?: 2  Moving to and from a bed to a chair (including a wheelchair)?: 2  Need to walk in hospital room?: 1  Climbing 3-5 steps with a railing?: 1  Basic Mobility Total Score: 11       Therapeutic Activities and Exercises:   Pt educated on importance of OOB activity to decrease the risks associated with bed rest. Instruction provided for safety and technique for gait and transfers with orthopedic precautions. Patient performed seated 20x LAQ and heel raises. Patient sat EOB for ~12 minutes while donning/doffing TLSO and UE slings. Provided instruction for brace and sling " fit. Pt educated on plan and goals with physical therapy.        Patient left up in chair with all lines intact, call button in reach and spouse present..    GOALS:   Multidisciplinary Problems     Physical Therapy Goals        Problem: Physical Therapy Goal    Goal Priority Disciplines Outcome Goal Variances Interventions   Physical Therapy Goal     PT, PT/OT Ongoing, Progressing     Description:  Goals to be met by: 20     Patient will increase functional independence with mobility by performin. Supine to sit with Contact Guard Assistance.  2. Sit to supine with Contact Guard Assistance.  3. Sit to stand transfer with Contact Guard Assistance.  4. Bed to chair transfer with Contact Guard Assistance.  5. Gait  x 100 feet with Contact Guard Assistance.   6. Ascend/descend 3 stair without use of handrails Contact Guard Assistance.   7. Lower extremity exercise program x 20 reps per handout, with assistance as needed.  8. Propel manual wheelchair 75 ft with B LE and CGA.                         Time Tracking:     PT Received On: 20  PT Start Time: 1342     PT Stop Time: 1405  PT Total Time (min): 23 min     Billable Minutes: Therapeutic Activity 13 and Therapeutic Exercise 10    Treatment Type: Treatment  PT/PTA: PT     PTA Visit Number: 0     Tiffanie Zaragoza, PT  2020

## 2020-02-18 NOTE — ASSESSMENT & PLAN NOTE
Intermittent Pins and needles sensation to BL heels. Also worsens when bearing weight. Increased gabapentin dose to 400 TID. Capsaicin cream prn.      -- Gabapentin 400 mg TID   -- Capsaicin cream prn

## 2020-02-18 NOTE — PLAN OF CARE
Problem: Occupational Therapy Goal  Goal: Occupational Therapy Goal  Description  Goals to be met by: 3/13/2020     Patient will increase functional independence with ADLs by performing:    Feeding with Set-up Assistance and Assistive Devices as needed using Left arm.  Grooming while bedside chair with Set-up Assistance using Left arm.  Toileting from bedside commode with Minimal Assistance and Assistive Devices as needed for hygiene and clothing management.   Bathing from  edge of bed with Minimal Assistance using Left arm.  Toilet transfer to bedside commode with Contact Guard Assistance and maintaining weight-bearing precaution(s).     Outcome: Ongoing, Progressing    Continue with POC.  Clarita Wahl OT  2/18/2020

## 2020-02-18 NOTE — PLAN OF CARE
Problem: Physical Therapy Goal  Goal: Physical Therapy Goal  Description  Goals to be met by: 20     Patient will increase functional independence with mobility by performin. Supine to sit with Contact Guard Assistance.  2. Sit to supine with Contact Guard Assistance.  3. Sit to stand transfer with Contact Guard Assistance.  4. Bed to chair transfer with Contact Guard Assistance.  5. Gait  x 100 feet with Contact Guard Assistance.   6. Ascend/descend 3 stair without use of handrails Contact Guard Assistance.   7. Lower extremity exercise program x 20 reps per handout, with assistance as needed.  8. Propel manual wheelchair 75 ft with B LE and CGA.        Outcome: Ongoing, Progressing     Mobility limited by L hip pain.     Tiffanie Zaragoza, PT, DPT  2020

## 2020-02-18 NOTE — PT/OT/SLP PROGRESS
Occupational Therapy   Treatment    Name: Ben Melvin  MRN: [id number]  Admitting Diagnosis:  Proximal humeral fracture  4 Days Post-Op   ORIF, FRACTURE, HUMERUS, PROXIMAL - ORIF R greater tuberosity - suture repair. Trios, supine, fiberwire (Right)    Recommendations:     Discharge Recommendations: nursing facility, skilled  Discharge Equipment Recommendations:  (TBD)  Barriers to discharge:  Other (Comment)(Requires increased assistance)    Assessment:     Ben Melvin is a 60 y.o. male with a medical diagnosis of Proximal humeral fracture.  He presents with fatigue but agreeable to participate with therapy. Patient reports he had just moved back to the bed from the bedside chair and will be leaving for SNF soon. Performance deficits affecting function are weakness, impaired endurance, impaired self care skills, impaired functional mobilty, impaired sensation, gait instability, impaired balance, decreased upper extremity function, decreased lower extremity function, pain, decreased ROM, impaired coordination, impaired cardiopulmonary response to activity, orthopedic precautions.     Rehab Prognosis:  Good; patient would benefit from acute skilled OT services to address these deficits and reach maximum level of function.       Plan:     Patient to be seen 6 x/week to address the above listed problems via self-care/home management, therapeutic activities, therapeutic exercises  · Plan of Care Expires: 03/13/20  · Plan of Care Reviewed with: patient, friend    Subjective     Pain/Comfort:  · Pain Rating 1: other (see comments)(Reported pain but did not rate)  · Location - Side 1: Bilateral  · Location 1: arm    Objective:     Communicated with: RN prior to session.  Patient found supine with telemetry, peripheral IV upon OT entry to room.    General Precautions: Standard, fall, diabetic, seizure   Orthopedic Precautions:LUE non weight bearing, RUE non weight bearing   Braces: LSO, Shoulder abduction brace      Occupational Performance:     Bed Mobility:    · Did not observe    Functional Mobility/Transfers:  · Did not observe    Activities of Daily Living:  · Denied need for ADLs    Therapeutic Exercise:    · Patient facilitated in BUE A/AROM exercises with patient completing 2 x10 of the following: elbow flex/ext, wrist flex/ext, radial/ulnar deviation, wrist flex/ext, supination/pronation      AMPAC 6 Click ADL: 7    Treatment & Education:  Role of OT/POC  Call button for assistance    Patient left HOB elevated with all lines intact, call button in reach and RN and friend presentEducation:      GOALS:   Multidisciplinary Problems     Occupational Therapy Goals        Problem: Occupational Therapy Goal    Goal Priority Disciplines Outcome Interventions   Occupational Therapy Goal     OT, PT/OT Ongoing, Progressing    Description:  Goals to be met by: 3/13/2020     Patient will increase functional independence with ADLs by performing:    Feeding with Set-up Assistance and Assistive Devices as needed using Left arm.  Grooming while bedside chair with Set-up Assistance using Left arm.  Toileting from bedside commode with Minimal Assistance and Assistive Devices as needed for hygiene and clothing management.   Bathing from  edge of bed with Minimal Assistance using Left arm.  Toilet transfer to bedside commode with Contact Guard Assistance and maintaining weight-bearing precaution(s).                      Time Tracking:     OT Date of Treatment: 02/18/20  OT Start Time: 1526  OT Stop Time: 1541  OT Total Time (min): 15 min    Billable Minutes:Therapeutic Exercise 15 minutes    Clarita Wahl OT  2/18/2020

## 2020-02-18 NOTE — PLAN OF CARE
[facility]     Department of Hospital Medicine     [address] [place] [address]     [phone] after hours  [phone] fax       NURSING HOME ORDERS    02/18/2020    Admit to Nursing Home:  Skilled Bed                                                Diagnoses:  Active Hospital Problems    Diagnosis  POA    *Proximal humeral fracture [S42.209A]  Yes    Creatinine elevation [R79.89]  No    Neuropathic pain [M79.2]  Yes    Stable burst fracture of first lumbar vertebra [S32.011A]  Yes    GERD (gastroesophageal reflux disease) [K21.9]  Yes    Constipation [K59.00]  Yes     fracture dislocation of bilateral shoulders [S42.91XA]  Yes    Subdural hematoma [S06.5X9A]  Yes    Type 2 diabetes mellitus [E11.9]  Yes    Vitamin D deficiency [E55.9]  Yes    PAF (paroxysmal atrial fibrillation) [I48.0]  Yes    COPD (chronic obstructive pulmonary disease) [J44.9]  Yes     Chronic    Essential hypertension [I10]  Yes    Lumbar disc disease with radiculopathy [M51.16]  Yes     Chronic      Resolved Hospital Problems   No resolved problems to display.       Patient is homebound due to:  Proximal humeral fracture    Allergies:Review of patient's allergies indicates:  No Known Allergies    Vitals:        Every shift (Skilled Nursing patients)    Diet: Diabetic Adult Diet   Supplement:  1 can every three times a day with meals                         Type:  Ensure           Acitivities:   - Up in a chair each morning as tolerated   - Ambulate with assistance to bathroom   - Scheduled walks once each shift (every 8 hours)   - May ambulate independently   - Weight bearing: NWB BUE     LABS:  Per facility protocol     CMP, CBC each month for 3 months   PT-INR each week for 1 month then monthly   Pre-albumin each month for 3 months   Digoxin level in 1 month and every 6 months   TSH every year   Dilantin level in 1 month and every 3 months   Tegretol level in 1 month and  every 3 months    Phenobarbital level in 1 month and every 3 months    Nursing Precautions:        - Fall precautions per nursing home protocol   - Seizure precaution per nursing home protocol    CONSULTS:     Physical Therapy to evaluate and treat     Occupational Therapy to evaluate and treat           DIABETES CARE:      Check blood sugar:     Fingerstick blood sugar a.m and p.m.         Report CBG < 60 or > 400 to physician.                                          Insulin Sliding Scale          Glucose  Novolog Insulin Subcutaneous        0 - 60   Orange juice or glucose tablet, hold insulin      No insulin   201-250  2 units   251-300  4 units   301-350  6 units   351-400  8 units   >400   10 units then call physician      Medications: Discontinue all previous medication orders, if any. See new list below.     Ben Melvin   Home Medication Instructions [place]:[id number]    Printed on:02/18/20 0105   Medication Information                      amitriptyline (ELAVIL) 50 MG tablet  Take 1 tablet (50 mg total) by mouth every evening.             aspirin 81 MG Chew  Take 162 mg by mouth once daily.             atorvastatin (LIPITOR) 80 MG tablet  Take 80 mg by mouth every evening.             BASAGLAR KWIKPEN U-100 INSULIN glargine 100 units/mL (3mL) SubQ pen  INJECT 25 UNITS DAILY             bisacodyL (DULCOLAX) 10 mg Supp  Place 1 suppository (10 mg total) rectally daily as needed.             celecoxib (CELEBREX) 200 MG capsule  Take 1 capsule (200 mg total) by mouth once daily.             cyanocobalamin (VITAMIN B-12) 1000 MCG tablet  Take 100 mcg by mouth once daily.             ergocalciferol (ERGOCALCIFEROL) 50,000 unit Cap  Take 1 capsule (50,000 Units total) by mouth every 7 days.             insulin aspart U-100 (NOVOLOG) 100 unit/mL (3 mL) InPn pen  Inject 1-10 Units into the skin before meals and at bedtime as needed (Hyperglycemia).             ipratropium (ATROVENT) 0.02 % nebulizer  solution  Take 2.5 mLs (500 mcg total) by nebulization every 4 (four) hours as needed for Wheezing. Rescue             levalbuterol (XOPENEX) 0.63 mg/3 mL nebulizer solution  Take 3 mLs (0.63 mg total) by nebulization every 8 (eight) hours. Rescue             levETIRAcetam (KEPPRA) 1000 MG tablet  Take 1 tablet (1,000 mg total) by mouth 2 (two) times daily.             metFORMIN (GLUCOPHAGE) 1000 MG tablet  Take 1 tablet (1,000 mg total) by mouth daily with breakfast.             metoprolol tartrate (LOPRESSOR) 25 MG tablet  Take 25 mg by mouth 2 (two) times daily.             multivitamin with minerals tablet  Take 1 tablet by mouth once daily.             nicotine (NICODERM CQ) 21 mg/24 hr  Place 1 patch onto the skin once daily.             oxyCODONE (ROXICODONE) 5 MG immediate release tablet  Take 1 tablet (5 mg total) by mouth every 4 (four) hours as needed (pain 1-10).             pantoprazole (PROTONIX) 40 MG tablet  Take 1 tablet (40 mg total) by mouth once daily.             polyethylene glycol (GLYCOLAX) 17 gram PwPk  Take 17 g by mouth 2 (two) times daily.             ramipril (ALTACE) 5 MG capsule  TAKE 1 CAPSULE (5 MG) BY ORAL ROUTE ONCE DAILY             senna-docusate 8.6-50 mg (PERICOLACE) 8.6-50 mg per tablet  Take 2 tablets by mouth 2 (two) times daily.             TRADJENTA 5 mg Tab tablet  TAKE 1 TABLET BY MOUTH EVERY DAY                       _________________________________  Marlon Cordoba MD  02/18/2020

## 2020-02-18 NOTE — PLAN OF CARE
02/18/20 0852   Post-Acute Status   Post-Acute Authorization Placement   Post-Acute Placement Status Pending Post-Acute Medical Review   SW outreached [facility] with [facility] to follow up on patient referral. No answer. Left voicemail requesting return call. SW will continue to follow patient for discharge needs. SW will continue attempts to follow up on provider determination.     UPDATE: patient originally planned to [facility]. As of 2/17, tx team informed by provider that patient denied due to low level of functioning. Provider recommended SNF for patient for post acute care. CM placed referral to [facility]. AT this time, pending payor review for acceptance.   Dyan Rose, BOBBY  [facility]

## 2020-02-19 PROBLEM — N18.30 HYPERTENSION ASSOCIATED WITH STAGE 3 CHRONIC KIDNEY DISEASE DUE TO TYPE 2 DIABETES MELLITUS: Status: ACTIVE | Noted: 2020-02-19

## 2020-02-19 PROBLEM — I12.9 HYPERTENSION ASSOCIATED WITH STAGE 3 CHRONIC KIDNEY DISEASE DUE TO TYPE 2 DIABETES MELLITUS: Status: ACTIVE | Noted: 2020-02-19

## 2020-02-19 PROBLEM — E11.22 HYPERTENSION ASSOCIATED WITH STAGE 3 CHRONIC KIDNEY DISEASE DUE TO TYPE 2 DIABETES MELLITUS: Status: ACTIVE | Noted: 2020-02-19

## 2020-02-19 PROBLEM — S42.202A BILATERAL PROXIMAL HUMERAL FRACTURES: Status: ACTIVE | Noted: 2020-02-14

## 2020-02-19 PROBLEM — M54.50 ACUTE BILATERAL LOW BACK PAIN: Status: RESOLVED | Noted: 2020-01-27 | Resolved: 2020-02-19

## 2020-02-19 PROBLEM — R06.02 SHORTNESS OF BREATH: Status: RESOLVED | Noted: 2019-06-03 | Resolved: 2020-02-19

## 2020-02-19 PROBLEM — S42.201D CLOSED FRACTURE OF PROXIMAL END OF RIGHT HUMERUS WITH ROUTINE HEALING: Status: ACTIVE | Noted: 2020-02-14

## 2020-02-19 PROBLEM — S42.201A BILATERAL PROXIMAL HUMERAL FRACTURES: Status: ACTIVE | Noted: 2020-02-14

## 2020-02-19 LAB
POCT GLUCOSE: 123 MG/DL (ref 70–110)
POCT GLUCOSE: 132 MG/DL (ref 70–110)

## 2020-02-19 PROCEDURE — 11000004 HC SNF PRIVATE

## 2020-02-19 PROCEDURE — 97535 SELF CARE MNGMENT TRAINING: CPT

## 2020-02-19 PROCEDURE — 94640 AIRWAY INHALATION TREATMENT: CPT

## 2020-02-19 PROCEDURE — 97530 THERAPEUTIC ACTIVITIES: CPT

## 2020-02-19 PROCEDURE — 97162 PT EVAL MOD COMPLEX 30 MIN: CPT

## 2020-02-19 PROCEDURE — 63600175 PHARM REV CODE 636 W HCPCS: Performed by: INTERNAL MEDICINE

## 2020-02-19 PROCEDURE — 25000003 PHARM REV CODE 250: Performed by: INTERNAL MEDICINE

## 2020-02-19 PROCEDURE — 25000242 PHARM REV CODE 250 ALT 637 W/ HCPCS: Performed by: STUDENT IN AN ORGANIZED HEALTH CARE EDUCATION/TRAINING PROGRAM

## 2020-02-19 PROCEDURE — 25000003 PHARM REV CODE 250: Performed by: NURSE PRACTITIONER

## 2020-02-19 PROCEDURE — S4991 NICOTINE PATCH NONLEGEND: HCPCS | Performed by: INTERNAL MEDICINE

## 2020-02-19 PROCEDURE — 97116 GAIT TRAINING THERAPY: CPT

## 2020-02-19 PROCEDURE — 25000003 PHARM REV CODE 250: Performed by: STUDENT IN AN ORGANIZED HEALTH CARE EDUCATION/TRAINING PROGRAM

## 2020-02-19 PROCEDURE — 97166 OT EVAL MOD COMPLEX 45 MIN: CPT

## 2020-02-19 PROCEDURE — 97110 THERAPEUTIC EXERCISES: CPT

## 2020-02-19 RX ORDER — WARFARIN 2.5 MG/1
5 TABLET ORAL DAILY
Status: DISCONTINUED | OUTPATIENT
Start: 2020-02-19 | End: 2020-02-24

## 2020-02-19 RX ORDER — CAPSAICIN 0.03 G/100G
CREAM TOPICAL 3 TIMES DAILY
Status: DISCONTINUED | OUTPATIENT
Start: 2020-02-19 | End: 2020-03-03 | Stop reason: HOSPADM

## 2020-02-19 RX ORDER — LEVALBUTEROL INHALATION SOLUTION 0.63 MG/3ML
0.63 SOLUTION RESPIRATORY (INHALATION) EVERY 8 HOURS PRN
Status: DISCONTINUED | OUTPATIENT
Start: 2020-02-19 | End: 2020-03-03 | Stop reason: HOSPADM

## 2020-02-19 RX ORDER — METHOCARBAMOL 500 MG/1
500 TABLET, FILM COATED ORAL 3 TIMES DAILY PRN
Status: DISCONTINUED | OUTPATIENT
Start: 2020-02-19 | End: 2020-03-03 | Stop reason: HOSPADM

## 2020-02-19 RX ADMIN — DOCUSATE SODIUM - SENNOSIDES 2 TABLET: 50; 8.6 TABLET, FILM COATED ORAL at 09:02

## 2020-02-19 RX ADMIN — METOPROLOL TARTRATE 25 MG: 25 TABLET, FILM COATED ORAL at 09:02

## 2020-02-19 RX ADMIN — CAPSAICIN: 0.25 CREAM TOPICAL at 02:02

## 2020-02-19 RX ADMIN — LEVETIRACETAM 1000 MG: 500 TABLET, FILM COATED ORAL at 08:02

## 2020-02-19 RX ADMIN — PANTOPRAZOLE SODIUM 40 MG: 40 TABLET, DELAYED RELEASE ORAL at 09:02

## 2020-02-19 RX ADMIN — GABAPENTIN 400 MG: 100 CAPSULE ORAL at 08:02

## 2020-02-19 RX ADMIN — GABAPENTIN 400 MG: 100 CAPSULE ORAL at 09:02

## 2020-02-19 RX ADMIN — NICOTINE 1 PATCH: 21 PATCH, EXTENDED RELEASE TRANSDERMAL at 09:02

## 2020-02-19 RX ADMIN — OXYCODONE HYDROCHLORIDE AND ACETAMINOPHEN 2 TABLET: 5; 325 TABLET ORAL at 09:02

## 2020-02-19 RX ADMIN — WARFARIN SODIUM 5 MG: 2.5 TABLET ORAL at 06:02

## 2020-02-19 RX ADMIN — ATORVASTATIN CALCIUM 80 MG: 20 TABLET, FILM COATED ORAL at 08:02

## 2020-02-19 RX ADMIN — METHOCARBAMOL TABLETS 500 MG: 500 TABLET, COATED ORAL at 06:02

## 2020-02-19 RX ADMIN — ENOXAPARIN SODIUM 40 MG: 100 INJECTION SUBCUTANEOUS at 05:02

## 2020-02-19 RX ADMIN — GABAPENTIN 400 MG: 100 CAPSULE ORAL at 02:02

## 2020-02-19 RX ADMIN — CETIRIZINE HYDROCHLORIDE 10 MG: 5 TABLET ORAL at 09:02

## 2020-02-19 RX ADMIN — LEVALBUTEROL HYDROCHLORIDE 0.63 MG: 0.63 SOLUTION RESPIRATORY (INHALATION) at 07:02

## 2020-02-19 RX ADMIN — LEVETIRACETAM 1000 MG: 500 TABLET, FILM COATED ORAL at 09:02

## 2020-02-19 NOTE — PLAN OF CARE
PT eval completed. Pt will begin PT POC.  Taryn Ocasio, PT  2020    Problem: Physical Therapy Goal  Goal: Physical Therapy Goal  Description  Goals to be met by: 3/11/20     Patient will increase functional independence with mobility by performin. Supine to sit with MInimal Assistance  2. Sit to supine with Set-up [place]  3. Sit to stand transfer with Stand-by Assistance  4. Bed to chair transfer with Stand-by Assistance w/o AD  5. Gait  x 50ft w/o AD w/ SBA  6. Wheelchair propulsion x200 feet with Supervision using bilateral lower extremities  7. Ascend/descend 3 stair without Handrails Minimal Assistance   8. Stand for 3 minutes with Stand-by Assistance w/o AD  9. Pt will complete car transfer (actual or simulated) w/o AD w/ CGA     Outcome: Ongoing, Progressing

## 2020-02-19 NOTE — PT/OT/SLP EVAL
PhysicalTherapy   Evaluation    Ben Melvin   MRN: [id number]     PT Received On: 02/19/20  PT Start Time: 1049     PT Stop Time: 1147    PT Total Time (min): 58 min       Billable Minutes:  Evaluation 15, Gait Training 8, Therapeutic Activity 22, Therapeutic Exercise 13 and Total Time 43    Diagnosis: Bilateral proximal humeral fractures   Seizure causing SDH, L1 and L4 burst fxs both non surgical, and (B) proximal humerus fxs and acromial fxs  Surgeries: (L) shoulder brennon arthroplasty 1/28, (L) shoulder brennon arthroplasty 1/30  Pt was then sent to [facility] but then sent back to [facility] for (R) shoulder revision which was performed on 2/14    Past Medical History:   Diagnosis Date    Adenoma of right adrenal gland 11/09/2017    Alcohol abuse     ASCVD (arteriosclerotic cardiovascular disease) 10/2008    [place]    BPH (benign prostatic hyperplasia)     Cardiomyopathy     Chronic anxiety     Cirrhosis of liver     COPD (chronic obstructive pulmonary disease)     Current every day smoker     2-3 ppd    Elevated LFTs 08/2001    GERD with esophagitis 11/09/2017    History of colon polyps     History of epididymitis 2016    Juan Antonio    Hyperlipidemia     Hypertension, essential     Long term (current) use of anticoagulants     Lumbar disc disease with radiculopathy 05/2015    Major depression, recurrent, chronic     PAF (paroxysmal atrial fibrillation)     Peripheral autonomic neuropathy due to DM     Proteinuria     PVD (peripheral vascular disease) 05/29/2009    Venous insufficiency of both lower extremities     Vitamin D deficiency       Past Surgical History:   Procedure Laterality Date    biopsy back  05/20/2019    benign Martinez    biopsy right ankle Right 05/20/2019    Martinez, benign    COLONOSCOPY N/A 6/13/2019    Procedure: COLONOSCOPY;  Surgeon: Delmer Kerr MD;  Location: [facility];  Service: Endoscopy;  Laterality: N/A;    COLONOSCOPY W/ POLYPECTOMY      EPIDURAL STEROID  INJECTION INTO LUMBAR SPINE  06/2015    LASHAE Harvey    ESOPHAGOGASTRODUODENOSCOPY N/A 6/13/2019    Procedure: ESOPHAGOGASTRODUODENOSCOPY (EGD);  Surgeon: Delmer Kerr MD;  Location: [facility];  Service: Endoscopy;  Laterality: N/A;    INTESTINAL MALROTATION REPAIR Right 1961    2 weeks old, Martinez    OPEN REDUCTION AND INTERNAL FIXATION (ORIF) OF FRACTURE OF PROXIMAL HUMERUS Right 2/14/2020    Procedure: ORIF, FRACTURE, HUMERUS, PROXIMAL - ORIF R greater tuberosity - suture repair. Trios, supine, fiberwire;  Surgeon: Chandler Chris MD;  Location: [address];  Service: Orthopedics;  Laterality: Right;    PARTIAL ARTHROPLASTY OF SHOULDER Left 1/28/2020    Procedure: HEMIARTHROPLASTY, SHOULDER;  Surgeon: Chandler Chris MD;  [facility];  Service: Orthopedics;  Laterality: Left;    PARTIAL ARTHROPLASTY OF SHOULDER Right 1/30/2020    Procedure: HEMIARTHROPLASTY, SHOULDER- RIGHT- MEKA- SUPINE-  TRIOS;  Surgeon: Chandler Chris MD;  Location: [address];  Service: Orthopedics;  Laterality: Right;    PERCUTANEOUS TRANSLUMINAL ANGIOPLASTY (PTA) OF PERIPHERAL VESSEL Right 12/2014    NIKOLAS Childs         General Precautions: Standard, diabetic, fall, seizure  Orthopedic Precautions: RUE non weight bearing, LUE non weight bearing, spinal precautions(ROMAT (B)elb/wrist/hand,(L)shldrAAROM/pend,(R)shldr no ROM)   Braces: LSO, Shoulder abduction brace    Spiritual, Cultural Beliefs, Pentec[employer]al Practices, Values that Affect Care: no    Patient History:  Lives With: significant other  Living Arrangements: house  Home Accessibility: stairs to enter home  Home Layout: Able to live on 1st floor  Stair Railings at Home: outside, present on right side  Transportation Anticipated: family or friend will provide  Living Environment Comment: Pt lives w/ his S.O. in a 1SH w/ 3 KATHRYN and RHR. Pt has a tub/shower combo w/ a grab bar. Pt's S.O. is retired and able to assist him 24/7  upon D/C.   Equipment Currently Used at Home: none  DME owned (not currently used): rolling walker, bedside commode and transfer tub bench    Previous Level of Function: PTA pt was IND w/ all mobility and ADLs w/o AD. He and his S.O. Are retired. Both drive.  Ambulation Skills: independent  Transfer Skills: independent  ADL Skills: independent  Work/Leisure Activity: independent    Subjective:  Communicated with patient prior to session.  Pt agreeable to session.  Chief Complaint: shoulder pain/WB restrictions  Patient goals: to be as IND as possible    Pain/Comfort  Pain Rating 1: 7/10  Location - Side 1: Bilateral  Location - Orientation 1: generalized  Location 1: shoulder  Pain Addressed 1: Reposition, Pre-medicate for activity  Pain Rating Post-Intervention 1: 7/10    Objective:  Patient found sitting on commode. Patient found with: (bilateral shoulder abd braces, LSO)    Cognitive Exam:  Oriented to: Person, Place, Time and Situation  Follows Commands/attention: Follows two-step commands  Communication: clear/fluent  Safety awareness/insight to disability: intact    Physical Exam:  Postural examination/scapula alignment:    -       Rounded shoulders    Skin integrity: Visible skin intact  Edema: Mild BUE    Sensation:      -       Impaired  light/touch LLE    Upper Extremity Range of Motion and Strength:  Not tested due to WB restrictions    Lower Extremity Range of Motion:  Right Lower Extremity: WFL  Left Lower Extremity: WFL    Lower Extremity Strength:  Right Lower Extremity: WFL  Left Lower Extremity: WFL     Fine motor coordination:     -       Intact    Gross motor coordination: WFL      AM-PAC 6 CLICK MOBILITY  Total Score:10    Bed Mobility:  Sit>Supine:on mat w/ close SBA  Supine>Sit: on mat w/ ModA for trunk  Cues to not use BUE    Transfers:  Sit<>Stand: to/from w/c w/ ModA(x1) to rise, CGA of tech for safety  Stand Pivot Transfer: bedside commode>w/c and w/c<>EOM; all w/o AD w/ ModA(x1) to rise  and pivot, CGA of tech for safety  Cues for BUE NWB    Gait:  Amb 2ft w/o AD w/ Mod/[place](x1) for stability, CGA of tech for safety  Antalgic gait pattern due to neuropathic pain w/ LLE WB- limiting distance  Close w/c follow for safety     Wheelchair Mobility:  Patient propels w/c 150ft w/ BLE and SBA/SPV     Therex:  Supine GS 2x10 reps  Seated modified crunches w/ red theraball 2x10 reps  Cues for form w/ both therex    Balance:  Static sit EOM w/ SPV  Static stand w/o AD w/ Gabby for stability, posterior lean noted upon initial stand    Additional Treatment:  Pt was educated on PT role and POC. Pt verb understanding.    Patient left up in chair with call button in reach.    Assessment:  Ben Melvin is a 60 y.o. male with a medical diagnosis of Bilateral proximal humeral fractures. Pt mary session well w/ good participation. PTA he was IND w/ all mobility but is currently limited by the below listed deficits requiring ModA for transfers and short distance gait. He is appropriate for skilled PT services and will begin PT POC.    Rehab identified problem list/impairments: weakness, impaired endurance, impaired sensation, impaired self care skills, impaired functional mobilty, gait instability, impaired balance, decreased upper extremity function, decreased lower extremity function, pain, orthopedic precautions    Rehab potential is good.    Activity tolerance: Good    Discharge recommendations: rehabilitation facility     Barriers to discharge: Inaccessible home environment    Equipment recommendations: hospital bed, wheelchair     GOALS:   Multidisciplinary Problems     Physical Therapy Goals        Problem: Physical Therapy Goal    Goal Priority Disciplines Outcome Goal Variances Interventions   Physical Therapy Goal     PT, PT/OT Ongoing, Progressing     Description:  Goals to be met by: 3/11/20     Patient will increase functional independence with mobility by performin. Supine to sit with MInimal  Assistance  2. Sit to supine with Set-up [place]  3. Sit to stand transfer with Stand-by Assistance  4. Bed to chair transfer with Stand-by Assistance w/o AD  5. Gait  x 50ft w/o AD w/ SBA  6. Wheelchair propulsion x200 feet with Supervision using bilateral lower extremities  7. Ascend/descend 3 stair without Handrails Minimal Assistance   8. Stand for 3 minutes with Stand-by Assistance w/o AD  9. Pt will complete car transfer (actual or simulated) w/o AD w/ CGA                      PLAN:    Patient to be seen 5 x/week  to address the above listed problems via gait training, therapeutic activities, therapeutic exercises, neuromuscular re-education, wheelchair management/training  Plan of Care Expires: 03/20/20    Taryn Ocasio, PT 2/19/2020

## 2020-02-19 NOTE — PROGRESS NOTES
[facility]                                  Skilled Nursing Facility                   Progress Note     Admit Date: 2/18/2020  KIRBY 2/18/2020  Principal Problem:  Bilateral proximal humeral fractures   HPI obtained from patient interview and chart review     Chief Complaint: Establish Care/ Initial Visit     HPI:   Mr. Melvin is a 60 year old male with PMHx of AFib on coumadin, peripheral vascular disease, hypertension, COPD, current smoker, liver cirrhosis, who presents to SNF following hospitalization for failure of the repair of the greater tuberosity on the right shoulder s/p revision fixation of right greater tuberosity fracture on 2/14 by Dr. Chris.  Admission to SNF for secondary weakness and debility.    Patient originally presented to [facility] ED on 01/27 after suffering a witnessed seizure while playing video poker and sustained bilateral proximal humerus fracture dislocations, bilateral acromial fractures, subdural hemorrhage, L1 and L4 burst fractures. He underwent left shoulder hemiarthroplasty, biceps tenodesis on 01/28/20 and right shoulder hemiarthroplasty, biceps tenodesis on 01/30/20 and [facility]. BL shoulder XRs from 02/10/20 notable for failure of the repair of the greater tuberosity on the right shoulder and he returned to [facility] on 02/14/20 for revision fixation of his right greater tuberosity fracture. Pt had a stable CTH on 02/10/20 and warfarin was reinitiated 02/11, last dose was 02/12. Currently on Lovenox 40 mg SQ daily. He was seen by neurosurgery and will f/u with pt outpt as scheduled on 02/21/20.     Patient reports his pain is well controlled with current regimen.  He endorses a productive cough and trouble sleeping.      Patient will be treated at [facility] with PT and OT to improve functional status and ability to perform ADLs.     Past Medical History: Patient has a past medical history of  Adenoma of right adrenal gland (11/09/2017), Alcohol abuse, ASCVD (arteriosclerotic cardiovascular disease) (10/2008), BPH (benign prostatic hyperplasia), Cardiomyopathy, Chronic anxiety, Cirrhosis of liver, COPD (chronic obstructive pulmonary disease), Current every day smoker, Elevated LFTs (08/2001), GERD with esophagitis (11/09/2017), History of colon polyps, History of epididymitis (2016), Hyperlipidemia, Hypertension, essential, Long term (current) use of anticoagulants, Lumbar disc disease with radiculopathy (05/2015), Major depression, recurrent, chronic, PAF (paroxysmal atrial fibrillation), Peripheral autonomic neuropathy due to DM, Proteinuria, PVD (peripheral vascular disease) (05/29/2009), Venous insufficiency of both lower extremities, and Vitamin D deficiency.    Past Surgical History: Patient has a past surgical history that includes Percutaneous transluminal angioplasty (PTA) of peripheral vessel (Right, 12/2014); Intestinal malrotation repair (Right, 1961); Epidural steroid injection into lumbar spine (06/2015); biopsy right ankle (Right, 05/20/2019); biopsy back (05/20/2019); Colonoscopy w/ polypectomy; Esophagogastroduodenoscopy (N/A, 6/13/2019); Colonoscopy (N/A, 6/13/2019); Partial arthroplasty of shoulder (Left, 1/28/2020); Partial arthroplasty of shoulder (Right, 1/30/2020); and Open reduction and internal fixation (ORIF) of fracture of proximal humerus (Right, 2/14/2020).    Social History: Patient reports that he has been smoking cigarettes. He has a 360.00 pack-year smoking history. He has never used smokeless tobacco. He reports that he drank alcohol. He reports that he does not use drugs.    Family History: family history includes Acromegaly in his father; Diabetes in his mother; Heart attack in his father; Hypertension in his father and mother; Kidney cancer in his brother.    Allergies: Patient has No Known Allergies.    ROS  Constitutional: Negative for fever.  +insomnia, fatigue.    Eyes: Negative for blurred vision, double vision and discharge.   Respiratory:  +intermittent productive cough.  Negative for shortness of breath and wheezing.    Cardiovascular: Negative for chest pain, palpitations, and leg swelling.   Gastrointestinal: Negative for abdominal pain, constipation, diarrhea, nausea and vomiting.   Genitourinary: Negative for dysuria, frequency and urgency.   Musculoskeletal:  + generalized weakness, intermittent bilateral upper extremity pain. Negative for back pain and myalgias.   Skin: Negative for itching and rash.   Neurological: Negative for dizziness, speech change, and headaches.   Psychiatric/Behavioral: Negative for depression. The patient is not nervous/anxious.      PEx  Temp:  [98.2 °F (36.8 °C)-98.4 °F (36.9 °C)]   Pulse:  []   Resp:  [17-20]   BP: ()/(39-76)   SpO2:  [87 %-96 %]      Constitutional: Patient appears well-developed.  No distress noted  HENT:   Head: Normocephalic and atraumatic.   Eyes: Pupils are equal, round  Neck: Normal range of motion. Neck supple.   Cardiovascular: Normal rate, regular rhythm and normal heart sounds.    Pulmonary/Chest: Effort normal and breath sounds are clear  Abdominal: Soft. Bowel sounds are normal.   Musculoskeletal: Normal range of motion.  Decreased range of motion to bilateral upper extremity  Neurological: Alert and oriented to person, place, and time.  Higher level thinking impaired  Psychiatric: Normal mood and affect. Behavior is normal.   Skin: Skin is warm and dry. Full skin assessment to be completed on next visit.  Surgical incision open to air to LUE, was Aquacel x1 to RUE.     No results for input(s): GLUCOSE, NA, K, CL, CO2, BUN, CREATININE, MG in the last 24 hours.    Invalid input(s):  CALCIUM    No results for input(s): WBC, RBC, HGB, HCT, PLT, MCV, MCH, MCHC in the last 24 hours.      Assessment and Plan:    Bilateral fracture dislocation of proximal humerus fracture  Bilateral acromial fractures  with bilateral shoulder dislocation  S/p bilateral shoulder hemiarthroplasty: right on 1/28/2020 and left on 1/30/2020  S/p Pt underwent revision of fixation of R greater tuberosity fracture on 02/14/20  - Follow up with orthopedics as scheduled  - PT/OT - nonweightbearing bilateral upper extremities in sling  - DVT prophylaxis - continue      Neuropathic pain  - continue Gabapentin 400 mg TID   - continue  Capsaicin cream prn  -  continue percocet 5/325 mg  po q6h prnpain     Pathologic osteoporotic fractures  - Treat vitamin D deficiency  - Further evaluation and management as per PCP     CKD III  - SCr with wandering baseline 0.8-1.4     GERD (gastroesophageal reflux disease)  - Continue Pantoprazole 40 mg daily     Stable burst fracture of first lumbar vertebra  L1 and L4 burst fractures  - f/u in neurosurgery clinic as scheduled on 02/21/20 for further management  - TSLO brace whenever not lying in bed     Constipation  - Continue daily miralax and senokot-s      Subdural hematoma  - Stable  - Cleared to resume anticoagualtion     Type 2 diabetes mellitus with CKD III and HTN  - A1c 5.9 from 01/27/20  - Not on treatment current  - Acuchecks in general <120  - Stop accucheck and SSI     Severe Vitamin D deficiency  - Level of 7 on 2/4  - continue Ergocalciferol 50k weekly     Essential hypertension  - Home medications: metoprolol 25 mg BID and ramipril 5 mg daily at home.   - continue Metoprolol 25 mg BID with low normal BPs      PAF (paroxysmal atrial fibrillation)  Chronic anticoagulation  - continue warfarin at 5 mg daily              For INR 2-3  - Continue metoprolol     COPD (chronic obstructive pulmonary disease)  Smoker   - levalbuterol nebs  prn  - Limit oxygen to avoid hypercapnea  - continue nicotine patch 21 mg daily  - smoking cessation advised     Seizure disorder   - continue keppra 1 g BID  - Follow up with neurology after discharge - appointment needs to be made     Hyperlipidemia   - continue  atorvastatin 80 mg daily     GERD   - continue protonix 40 mg daily     Expected blood loss anemia   - improving without specific intervention; will monitor with twice weekly CBCs     Moderate protein calorie malnutrition   - boost plus TID with meals  - Regular diet  - Dietary consult     Acute metabolic encephalopathy  Probable mild Cognitive impairment  - speech therapy eval  - Consider neuropsych eval  - continue scheduled methocarbamol to prn  - Hold amitriptyline for now, will use melatonin for sleep     Go catheter  - Placed for surgery and never removed  - Remove go catheter     DJD of multiple sites   - PT/OT. Pain management as above     Cirrhosis of liver   - attributed to alcohol. Patient rarely drinks alcohol at this time.       Future Appointments   Date Time Provider Department Center   2/21/2020  7:30 AM RUST-CT2 500 LB LIMIT [facility]   2/21/2020  8:15 AM RUST EOS [facility]   2/21/2020  9:30 AM Carl Barry MD [facility] NEUROS[address]   2/26/2020  2:15 PM [facility] XRORTHO1 485 LB LIMIT [facility] XRAYORT [address]   2/26/2020  2:45 PM Chandler Chris MD [facility] ORTHO [address]   3/24/2020  8:00 AM Cristal Ohara NP [facility]   4/2/2020  3:00 PM Carl Barry MD [facility] [address]     I certify that SNF services are required to be given on an inpatient basis because Ben Melvin needs for skilled nursing care and/or skilled rehabilitation are required on a daily basis and such services can only practically be provided in a skilled nursing facility setting and are for an ongoing condition for which she received inpatient care in the hospital.     Total time of the visit 68 minutes [id number]  Non physical exam/ non charting time: 48 minutes   Description of non physical exam/non charting time: counseling patient on clinical conditions and therapies provided regarding postop care, pain control regimen, antihypertensive regimen,  antiseizure regimen, smoking cessation and the beginning of discharge planning.  Extensive chart review completed including all consultation notes.  All pertinent laboratory and radiographical images reviewed.        Ave Craig NP      DOS: 2/19/2020       Patient note was created using MModal Dictation.  Any errors in syntax or even information may not have been identified and edited on initial review prior to signing this note.

## 2020-02-19 NOTE — PT/OT/SLP EVAL
Occupational Therapy  Evaluation/tx    Ben Melvin   MRN: [id number]   Admitting Diagnosis: Bilateral proximal humeral fractures R shoulder ORIF 02-14-20 (original R shoulder hemiarthroplasty 01-30-20; L shoulder hemiarthroplasty 01-28 L1 and L4 burst fxs    OT Date of Treatment: 02/19/20   OT Start Time: 0845  OT Stop Time: 0940  OT Total Time (min): 55 min    Billable Minutes:  Evaluation 15  Self Care/Home Management 40    Diagnosis: Bilateral proximal humeral fractures   R shoulder ORIF 02-14-20 (original R shoulder hemiarthroplasty 01-30-20; L shoulder hemiarthroplasty 01-28 L1 and L4 burst fxs        Past Medical History:   Diagnosis Date    Adenoma of right adrenal gland 11/09/2017    Alcohol abuse     ASCVD (arteriosclerotic cardiovascular disease) 10/2008    [place]    BPH (benign prostatic hyperplasia)     Cardiomyopathy     Chronic anxiety     Cirrhosis of liver     COPD (chronic obstructive pulmonary disease)     Current every day smoker     2-3 ppd    Elevated LFTs 08/2001    GERD with esophagitis 11/09/2017    History of colon polyps     History of epididymitis 2016    Juan Antonio    Hyperlipidemia     Hypertension, essential     Long term (current) use of anticoagulants     Lumbar disc disease with radiculopathy 05/2015    Major depression, recurrent, chronic     PAF (paroxysmal atrial fibrillation)     Peripheral autonomic neuropathy due to DM     Proteinuria     PVD (peripheral vascular disease) 05/29/2009    Venous insufficiency of both lower extremities     Vitamin D deficiency       Past Surgical History:   Procedure Laterality Date    biopsy back  05/20/2019    benign Martinez    biopsy right ankle Right 05/20/2019    Martinez, benign    COLONOSCOPY N/A 6/13/2019    Procedure: COLONOSCOPY;  Surgeon: Delmer Kerr MD;  Location: [facility];  Service: Endoscopy;  Laterality: N/A;    COLONOSCOPY W/ POLYPECTOMY      EPIDURAL STEROID INJECTION INTO LUMBAR SPINE  06/2015     LASHAE Harvey    ESOPHAGOGASTRODUODENOSCOPY N/A 6/13/2019    Procedure: ESOPHAGOGASTRODUODENOSCOPY (EGD);  Surgeon: Delmer Kerr MD;  [facility];  Service: Endoscopy;  Laterality: N/A;    INTESTINAL MALROTATION REPAIR Right 1961    2 weeks old, Martinez    OPEN REDUCTION AND INTERNAL FIXATION (ORIF) OF FRACTURE OF PROXIMAL HUMERUS Right 2/14/2020    Procedure: ORIF, FRACTURE, HUMERUS, PROXIMAL - ORIF R greater tuberosity - suture repair. Trios, supine, fiberwire;  Surgeon: Chandler Chris MD;  Location: [address];  Service: Orthopedics;  Laterality: Right;    PARTIAL ARTHROPLASTY OF SHOULDER Left 1/28/2020    Procedure: HEMIARTHROPLASTY, SHOULDER;  Surgeon: Chandler Chris MD;  [facility];  Service: Orthopedics;  Laterality: Left;    PARTIAL ARTHROPLASTY OF SHOULDER Right 1/30/2020    Procedure: HEMIARTHROPLASTY, SHOULDER- RIGHT- MEKA- SUPINE-  TRIOS;  Surgeon: Chandler Chris MD;  Location: [address];  Service: Orthopedics;  Laterality: Right;    PERCUTANEOUS TRANSLUMINAL ANGIOPLASTY (PTA) OF PERIPHERAL VESSEL Right 12/2014    NIKOLAS Childs         General Precautions: Standard, diabetic, fall, seizure  Orthopedic Precautions: RUE non weight bearing, LUE non weight bearing, spinal precautions(BROMelbowristhand; LAAROM shld pendNOshldRUE ROM/pend to2-28)  Braces: TLSO, Shoulder abduction brace(bilateral)       Nonweightbearing bilateral upper extremity times 3 months postop   shoulder sling x6 weeks, may remove for hygiene and physical therapy.    Okay for range of motion as tolerated hand wrist elbow now.  Okay for active assisted range of motion of shoulder and pendulums at 2 weeks postop.  No strengthening exercises until 3 months postop  LUE 01-28; RUE 02-14      Spiritual, Cultural Beliefs, Jainism Practices, Values that Affect Care: no     Patient History:  Lives With: significant other  Living Arrangements: house  Home Accessibility:  stairs to enter home  Home Layout: Able to live on 1st floor  Stair Railings at Home: outside, present on right side  Transportation Anticipated: family or friend will provide    Prior level of function:    Bed Mobility/Transfers: independent  Grooming: independent  Bathing: needs device(seat)  Upper Body Dressing: independent  Lower Body Dressing: independent  Toileting: independent  Driving License: Yes  Occupation: On disability  Leisure and Hobbies: casino  IADL Comments: per significant other report: Pt. lives with significant other in [place] with 3 steps .  S.O> can asssit pt. during the day.  pt. was completely independent     Dominant hand: right    Subjective:  Communicated with nurse prior to session.    Chief Complaint: Pain   Patient/Family stated goals: To be able to use his arms    Pain/Comfort  Pain Rating 1: 6/10  Location - Side 1: Bilateral  Location 1: shoulder  Pain Addressed 1: Reposition, Distraction, Nurse notified  Pain Rating Post-Intervention 1: 6/10    Objective: supine in bed BUE shoulder abduction braces but ill fitting/significant other present       Cognitive Exam:  Oriented to: Person, Place and Situation  Follows Commands/attention: Follows multistep  commands  Communication: clear/fluent  Memory:  No Deficits noted  Safety awareness/insight to disability: intact  Coping skills/emotional control: Appropriate to situation    Visual/perceptual:  Intact    Physical Exam:  Postural examination/scapula alignment:    -       Rounded shoulders  -       Forward head  -       Posterior pelvic tilt  Skin integrity: Visible skin intact  Edema: Moderate in RUE    Sensation:      -       Intact    Upper Extremity Range of Motion:  Right Upper Extremity: RUE elbow and below WFL except supination  Left Upper Extremity: LUE elbow and below WFL except supination    Upper Extremity Strength:  Right Upper Extremity: not tested  Left Upper Extremity: not tested   Strength: decreased    Fine motor  coordination:   Impaired     Gross motor coordination: impaired in BUE    Occupational Performance:    Bed Mobility:    · Patient completed Supine to Sit with maximal assistance     Functional Mobility/Transfers:  · Patient completed Sit <> Stand Transfer with moderate assistance and of 2 persons  with  no assistive device   · Functional Mobility: Pt. Performed SPT to w/c with Mod A x 2     Activities of Daily Living:  · Feeding:  total assistance to drink from cup  · Grooming: total assistance to wipe face  · Bathing: total assistance spouse assisted  · Upper Body Dressing: total assistance to don shirt from signifant other; Total A all braces  · Lower Body Dressing: total assistance to don shorts  · Toileting: total assistance to use urinal    AMPAC 6 Click:  AMPAC Total Score: 6    OT Exercises: AROM /AAROM to BUE for all planes of motion elbow , wrist and hands x 2 sets 10 reps    Additional Treatment:  Pt. Educated on role of oT and pOC    Patient left up in chair with call button in reach and braces in place    Assessment:  Ben Melvin is a 60 y.o. male with a medical diagnosis of Bilateral proximal humeral fractures    R shoulder ORIF 02-14-20 (original R shoulder hemiarthroplasty 01-30-20; L shoulder hemiarthroplasty 01-28 L1 and L4 burst fxs and presents with sdignificant limitationsin self-care skills as well as mobility 2/2 orthpedic precautions and pain.   Rehab identified problem list/impairments: impaired endurance, impaired self care skills, impaired functional mobilty, decreased upper extremity function    Rehab potential is good    Activity tolerance: Good    Discharge recommendations: rehabilitation facility     Barriers to discharge: Inaccessible home environment     Equipment recommendations: bedside commode, wheelchair, hospital bed     GOALS:   Multidisciplinary Problems     Occupational Therapy Goals        Problem: Occupational Therapy Goal    Goal Priority Disciplines Outcome  Interventions   Occupational Therapy Goal     OT, PT/OT     Description:  Goals to be met by: 03-11-20   Pt. Family to be I with assisting pt. With UBD, LBD   Pt. Family to be I with don/doff all braces  Pt. Family and pt. To be I with performing there ex to BUE as per ortho orders  Pt. To perform SPT without an AD and SBA  Pt. To engage in standing activities with SBA x 10 minutes    Patient will increase functional independence with ADLs by performing:                          PLAN: Patient to be seen 6 x/week to address the above listed problems via self-care/home management, therapeutic activities, therapeutic exercises  Plan of Care expires: 03/20/20  Plan of Care reviewed with: patient, caregiver    LILIAM Irvin  02/19/2020

## 2020-02-19 NOTE — PHYSICIAN QUERY
PT Name: Ben Melvin  MR #: [id number]     Physician Query Form - Etiology of Condition Clarification      CDS/: Mouna Silvestre RN               Contact information: [email or web address]  This form is a permanent document in the medical record.     Query Date: February 19, 2020    By submitting this query, we are merely seeking further clarification of documentation.  Please utilize your independent clinical judgment when addressing the question(s) below.     The medical record contains the following:    Findings Supporting Clinical Information Location in Medical Record    -Right proximal humerus greater tuberosity fracture, closed, displaced, subsequent encounter. Status post right shoulder hemiarthroplasty for fracture. Failure of right greater tuberosity fixation  -PROCEDURE:              Open reduction internal fixation right proximal humerus greater tuberosity fracture  -We removed previously placed bone graft. Greater tuberosity was displaced and retracted superiorly and posteriorly. It appeared that the some of the sutures had pulled through the tendon and loosened. There appeared to be a good solid repair of the lesser tuberosity fracture fragment.  It was freed of adhesions and mobilized to its anatomic position. There was good compression and secure fixation of the tuberosity fracture fragment.    02/10/2020 - obtained follow-up x-rays of bilateral shoulders which demonstrated unchanged appearance of left shoulder arthroplasty. The right shoulder arthroplasty was in good position, however there appeared to be failure of the repair of the greater tuberosity on the right shoulder OP note 2/14                            H&P     Please document your best medical opinion regarding the etiology of _the failure of the repair of the greater tuberosity on the right shoulder_ for which treatment is/was directed.         Provider use only      [   ] Failure of the right greater tuberosity fixation  related to device failure  [   ] Failure of the right greater tuberosity fixation related to nonunion  [   ] Failure of the right greater tuberosity fixation related to malunion  [ x  ] Failure of the right greater tuberosity fixation related to suture pullout______(please specify)    [   ] Other __________________(please specify)         [  ] Clinically Undetermined     Please document in your progress notes daily for the duration of treatment, until resolved, and include in your discharge summary.

## 2020-02-20 ENCOUNTER — TELEPHONE (OUTPATIENT)
Dept: NEUROSURGERY | Facility: CLINIC | Age: 60
End: 2020-02-20

## 2020-02-20 LAB
ANION GAP SERPL CALC-SCNC: 11 MMOL/L (ref 8–16)
ANISOCYTOSIS BLD QL SMEAR: SLIGHT
BACTERIA #/AREA URNS AUTO: ABNORMAL /HPF
BASOPHILS # BLD AUTO: 0.05 K/UL (ref 0–0.2)
BASOPHILS NFR BLD: 0.4 % (ref 0–1.9)
BILIRUB UR QL STRIP: NEGATIVE
BUN SERPL-MCNC: 36 MG/DL (ref 6–20)
CALCIUM SERPL-MCNC: 9.5 MG/DL (ref 8.7–10.5)
CHLORIDE SERPL-SCNC: 103 MMOL/L (ref 95–110)
CLARITY UR REFRACT.AUTO: ABNORMAL
CO2 SERPL-SCNC: 21 MMOL/L (ref 23–29)
COLOR UR AUTO: YELLOW
CREAT SERPL-MCNC: 1.5 MG/DL (ref 0.5–1.4)
DIFFERENTIAL METHOD: ABNORMAL
EOSINOPHIL # BLD AUTO: 0.3 K/UL (ref 0–0.5)
EOSINOPHIL NFR BLD: 2.1 % (ref 0–8)
ERYTHROCYTE [DISTWIDTH] IN BLOOD BY AUTOMATED COUNT: 16.6 % (ref 11.5–14.5)
EST. GFR  (AFRICAN AMERICAN): 58.1 ML/MIN/1.73 M^2
EST. GFR  (NON AFRICAN AMERICAN): 50.2 ML/MIN/1.73 M^2
GLUCOSE SERPL-MCNC: 111 MG/DL (ref 70–110)
GLUCOSE UR QL STRIP: NEGATIVE
HCT VFR BLD AUTO: 40.9 % (ref 40–54)
HGB BLD-MCNC: 11.8 G/DL (ref 14–18)
HGB UR QL STRIP: ABNORMAL
HYALINE CASTS UR QL AUTO: 0 /LPF
HYPOCHROMIA BLD QL SMEAR: ABNORMAL
IMM GRANULOCYTES # BLD AUTO: 0.06 K/UL (ref 0–0.04)
IMM GRANULOCYTES NFR BLD AUTO: 0.5 % (ref 0–0.5)
INR PPP: 0.9 (ref 0.8–1.2)
KETONES UR QL STRIP: NEGATIVE
LEUKOCYTE ESTERASE UR QL STRIP: ABNORMAL
LYMPHOCYTES # BLD AUTO: 1.1 K/UL (ref 1–4.8)
LYMPHOCYTES NFR BLD: 9.4 % (ref 18–48)
MAGNESIUM SERPL-MCNC: 1.9 MG/DL (ref 1.6–2.6)
MCH RBC QN AUTO: 27.1 PG (ref 27–31)
MCHC RBC AUTO-ENTMCNC: 28.9 G/DL (ref 32–36)
MCV RBC AUTO: 94 FL (ref 82–98)
MICROSCOPIC COMMENT: ABNORMAL
MONOCYTES # BLD AUTO: 0.8 K/UL (ref 0.3–1)
MONOCYTES NFR BLD: 6.4 % (ref 4–15)
NEUTROPHILS # BLD AUTO: 9.6 K/UL (ref 1.8–7.7)
NEUTROPHILS NFR BLD: 81.2 % (ref 38–73)
NITRITE UR QL STRIP: POSITIVE
NRBC BLD-RTO: 0 /100 WBC
OVALOCYTES BLD QL SMEAR: ABNORMAL
PH UR STRIP: 6 [PH] (ref 5–8)
PHOSPHATE SERPL-MCNC: 3.5 MG/DL (ref 2.7–4.5)
PLATELET # BLD AUTO: 263 K/UL (ref 150–350)
PMV BLD AUTO: ABNORMAL FL (ref 9.2–12.9)
POIKILOCYTOSIS BLD QL SMEAR: SLIGHT
POLYCHROMASIA BLD QL SMEAR: ABNORMAL
POTASSIUM SERPL-SCNC: 5.2 MMOL/L (ref 3.5–5.1)
PROT UR QL STRIP: ABNORMAL
PROTHROMBIN TIME: 9.9 SEC (ref 9–12.5)
RBC # BLD AUTO: 4.35 M/UL (ref 4.6–6.2)
RBC #/AREA URNS AUTO: 6 /HPF (ref 0–4)
SODIUM SERPL-SCNC: 135 MMOL/L (ref 136–145)
SP GR UR STRIP: 1.01 (ref 1–1.03)
SQUAMOUS #/AREA URNS AUTO: 1 /HPF
URN SPEC COLLECT METH UR: ABNORMAL
WBC # BLD AUTO: 11.81 K/UL (ref 3.9–12.7)
WBC #/AREA URNS AUTO: >100 /HPF (ref 0–5)
WBC CLUMPS UR QL AUTO: ABNORMAL

## 2020-02-20 PROCEDURE — 25000003 PHARM REV CODE 250: Performed by: INTERNAL MEDICINE

## 2020-02-20 PROCEDURE — 97110 THERAPEUTIC EXERCISES: CPT

## 2020-02-20 PROCEDURE — S4991 NICOTINE PATCH NONLEGEND: HCPCS | Performed by: INTERNAL MEDICINE

## 2020-02-20 PROCEDURE — 85025 COMPLETE CBC W/AUTO DIFF WBC: CPT

## 2020-02-20 PROCEDURE — 80048 BASIC METABOLIC PNL TOTAL CA: CPT

## 2020-02-20 PROCEDURE — 81001 URINALYSIS AUTO W/SCOPE: CPT

## 2020-02-20 PROCEDURE — 36415 COLL VENOUS BLD VENIPUNCTURE: CPT

## 2020-02-20 PROCEDURE — 84100 ASSAY OF PHOSPHORUS: CPT

## 2020-02-20 PROCEDURE — 97535 SELF CARE MNGMENT TRAINING: CPT

## 2020-02-20 PROCEDURE — 83735 ASSAY OF MAGNESIUM: CPT

## 2020-02-20 PROCEDURE — 87077 CULTURE AEROBIC IDENTIFY: CPT

## 2020-02-20 PROCEDURE — 85610 PROTHROMBIN TIME: CPT

## 2020-02-20 PROCEDURE — 87086 URINE CULTURE/COLONY COUNT: CPT

## 2020-02-20 PROCEDURE — 92523 SPEECH SOUND LANG COMPREHEN: CPT

## 2020-02-20 PROCEDURE — 11000004 HC SNF PRIVATE

## 2020-02-20 PROCEDURE — 97802 MEDICAL NUTRITION INDIV IN: CPT

## 2020-02-20 PROCEDURE — 97110 THERAPEUTIC EXERCISES: CPT | Mod: CQ

## 2020-02-20 PROCEDURE — 63600175 PHARM REV CODE 636 W HCPCS: Performed by: INTERNAL MEDICINE

## 2020-02-20 PROCEDURE — 25000003 PHARM REV CODE 250: Performed by: NURSE PRACTITIONER

## 2020-02-20 PROCEDURE — 87088 URINE BACTERIA CULTURE: CPT

## 2020-02-20 PROCEDURE — 25000003 PHARM REV CODE 250: Performed by: STUDENT IN AN ORGANIZED HEALTH CARE EDUCATION/TRAINING PROGRAM

## 2020-02-20 PROCEDURE — 97530 THERAPEUTIC ACTIVITIES: CPT | Mod: CQ

## 2020-02-20 PROCEDURE — 87186 SC STD MICRODIL/AGAR DIL: CPT

## 2020-02-20 RX ORDER — FUROSEMIDE 40 MG/1
80 TABLET ORAL ONCE
Status: COMPLETED | OUTPATIENT
Start: 2020-02-20 | End: 2020-02-20

## 2020-02-20 RX ADMIN — GABAPENTIN 400 MG: 100 CAPSULE ORAL at 08:02

## 2020-02-20 RX ADMIN — METHOCARBAMOL TABLETS 500 MG: 500 TABLET, COATED ORAL at 10:02

## 2020-02-20 RX ADMIN — OXYCODONE HYDROCHLORIDE AND ACETAMINOPHEN 2 TABLET: 5; 325 TABLET ORAL at 03:02

## 2020-02-20 RX ADMIN — WARFARIN SODIUM 5 MG: 2.5 TABLET ORAL at 05:02

## 2020-02-20 RX ADMIN — DOCUSATE SODIUM - SENNOSIDES 2 TABLET: 50; 8.6 TABLET, FILM COATED ORAL at 08:02

## 2020-02-20 RX ADMIN — TRAZODONE HYDROCHLORIDE 25 MG: 50 TABLET ORAL at 08:02

## 2020-02-20 RX ADMIN — METOPROLOL TARTRATE 25 MG: 25 TABLET, FILM COATED ORAL at 08:02

## 2020-02-20 RX ADMIN — ENOXAPARIN SODIUM 40 MG: 100 INJECTION SUBCUTANEOUS at 05:02

## 2020-02-20 RX ADMIN — POLYETHYLENE GLYCOL 3350 17 G: 17 POWDER, FOR SOLUTION ORAL at 08:02

## 2020-02-20 RX ADMIN — CAPSAICIN: 0.25 CREAM TOPICAL at 08:02

## 2020-02-20 RX ADMIN — PANTOPRAZOLE SODIUM 40 MG: 40 TABLET, DELAYED RELEASE ORAL at 08:02

## 2020-02-20 RX ADMIN — ATORVASTATIN CALCIUM 80 MG: 20 TABLET, FILM COATED ORAL at 08:02

## 2020-02-20 RX ADMIN — CETIRIZINE HYDROCHLORIDE 10 MG: 5 TABLET ORAL at 08:02

## 2020-02-20 RX ADMIN — NICOTINE 1 PATCH: 21 PATCH, EXTENDED RELEASE TRANSDERMAL at 10:02

## 2020-02-20 RX ADMIN — LEVETIRACETAM 1000 MG: 500 TABLET, FILM COATED ORAL at 08:02

## 2020-02-20 RX ADMIN — DOCUSATE SODIUM - SENNOSIDES 2 TABLET: 50; 8.6 TABLET, FILM COATED ORAL at 09:02

## 2020-02-20 RX ADMIN — GABAPENTIN 400 MG: 100 CAPSULE ORAL at 03:02

## 2020-02-20 RX ADMIN — FUROSEMIDE 80 MG: 40 TABLET ORAL at 01:02

## 2020-02-20 NOTE — CLINICAL REVIEW
Clinical Pharmacy Chart Review Note      Admit Date: 2/18/2020   LOS: 2 days       Ben Melvin is a 60 y.o. male admitted to SNF for PT/OT after hospitalization for bilateral proximal fractures.    Active Hospital Problems    Diagnosis  POA    *Bilateral proximal humeral fractures [S42.201A, S42.202A]  Yes    Hypertension associated with stage 3 chronic kidney disease due to type 2 diabetes mellitus [E11.22, I12.9, N18.3]  Yes    Neuropathic pain [M79.2]  Yes    GERD (gastroesophageal reflux disease) [K21.9]  Yes    Stable burst fracture of first lumbar vertebra [S32.011A]  Yes    Acute blood loss anemia [D62]  Yes    Constipation [K59.00]  Yes    Subdural hematoma [S06.5X9A]  Yes    Vitamin D deficiency [E55.9]  Yes    COPD (chronic obstructive pulmonary disease) [J44.9]  Yes     Chronic    PAF (paroxysmal atrial fibrillation) [I48.0]  Yes    Essential hypertension [I10]  Yes    Current every day smoker [F17.200]  Yes     8 ppd      Long term (current) use of anticoagulants [Z79.01]  Not Applicable    Hyperlipidemia [E78.5]  Yes    GERD with esophagitis [K21.0]  Yes      Resolved Hospital Problems   No resolved problems to display.     Review of patient's allergies indicates:  No Known Allergies  Patient Active Problem List    Diagnosis Date Noted    Hypertension associated with stage 3 chronic kidney disease due to type 2 diabetes mellitus 02/19/2020    Neuropathic pain 02/18/2020    Stable burst fracture of first lumbar vertebra 02/15/2020    GERD (gastroesophageal reflux disease) 02/15/2020    Bilateral proximal humeral fractures 02/14/2020    Acute blood loss anemia     Constipation     Closed displaced fracture of acromial process of right scapula 01/29/2020    Closed displaced fracture of acromial process of left scapula 01/29/2020    Dislocation of left shoulder joint     Dislocation of right shoulder joint      fracture dislocation of bilateral shoulders 01/28/2020     Fracture dislocation of joint of left shoulder girdle 01/27/2020    Subdural hematoma 01/27/2020    Salpingitis of Eustachian tube, right 10/16/2019    Person consulting for explanation of examination or test findings 07/16/2019    Insomnia 07/16/2019    Dietary counseling 07/15/2019    Abnormal computed tomography of esophagus 06/13/2019    Encounter for therapeutic drug monitoring 06/03/2019    Metabolic syndrome 06/03/2019    Encounter for long-term (current) use of medications 06/03/2019    Screening for malignant neoplasm of prostate 06/03/2019    Generalized osteoarthrosis, involving multiple sites 06/03/2019    Abnormal CAT scan 06/03/2019    Multiple vitamin deficiency disease 06/03/2019    Chronic fatigue and malaise 06/03/2019    Vitamin D deficiency 06/03/2019    Diabetes 1.5, managed as type 2 06/03/2019    Hereditary and idiopathic neuropathy  06/03/2019    Other long term (current) drug therapy  06/03/2019    Current every day smoker     Cardiomyopathy     Hyperlipidemia     Proteinuria     COPD (chronic obstructive pulmonary disease)     Alcohol abuse     Cirrhosis of liver     Venous insufficiency of both lower extremities     Long term (current) use of anticoagulants     PAF (paroxysmal atrial fibrillation)     Essential hypertension     Peripheral autonomic neuropathy due to DM     BPH (benign prostatic hyperplasia)     Chronic anxiety     Major depression, recurrent, chronic     Adenoma of right adrenal gland 11/09/2017    GERD with esophagitis 11/09/2017    History of epididymitis 01/01/2016    Lumbar disc disease with radiculopathy 05/01/2015    PVD (peripheral vascular disease) 05/29/2009    ASCVD (arteriosclerotic cardiovascular disease) 10/01/2008    Elevated LFTs 08/01/2001       Scheduled Meds:    atorvastatin  80 mg Oral QHS    capsaicin   Topical (Top) TID    cetirizine  10 mg Oral Daily    enoxaparin  40 mg Subcutaneous Daily    [START ON  2/22/2020] ergocalciferol  50,000 Units Oral Q7 Days    gabapentin  400 mg Oral TID    levETIRAcetam  1,000 mg Oral BID    metoprolol tartrate  25 mg Oral BID    nicotine  1 patch Transdermal Daily    pantoprazole  40 mg Oral Daily    polyethylene glycol  17 g Oral BID    senna-docusate 8.6-50 mg  2 tablet Oral BID    warfarin  5 mg Oral Daily     Continuous Infusions:   PRN Meds: acetaminophen, calcium carbonate, diphenhydrAMINE-zinc acetate 2-0.1%, levalbuterol, melatonin, methocarbamol, ondansetron, oxyCODONE-acetaminophen    OBJECTIVE:     Vital Signs (Last 24H)  Temp:  [98.1 °F (36.7 °C)-98.2 °F (36.8 °C)]   Pulse:  []   Resp:  [18-19]   BP: ()/(48-58)   SpO2:  [96 %]     Laboratory:  CBC:   Recent Labs   Lab 02/15/20  0935 02/17/20  0434 02/20/20  0547   WBC 7.88 9.84 11.81   RBC 3.77* 3.86* 4.35*   HGB 10.6* 11.0* 11.8*   HCT 35.5* 35.9* 40.9    324 263   MCV 94 93 94   MCH 28.1 28.5 27.1   MCHC 29.9* 30.6* 28.9*     BMP:   Recent Labs   Lab 02/15/20  0935 02/17/20  0434 02/20/20  0547   * 120* 111*    139 135*   K 4.4 4.6 5.2*    104 103   CO2 24 22* 21*   BUN 34* 29* 36*   CREATININE 1.3 1.4 1.5*   CALCIUM 9.1 8.9 9.5   MG 1.9 1.7 1.9     CMP:   Recent Labs   Lab 02/15/20  0935 02/17/20  0434 02/20/20  0547   * 120* 111*   CALCIUM 9.1 8.9 9.5   ALBUMIN 3.0* 2.8*  --    PROT 6.7 6.7  --     139 135*   K 4.4 4.6 5.2*   CO2 24 22* 21*    104 103   BUN 34* 29* 36*   CREATININE 1.3 1.4 1.5*   ALKPHOS 275* 257*  --    ALT 39 30  --    AST 23 20  --    BILITOT 0.7 0.6  --      LFTs:   Recent Labs   Lab 02/15/20  0935 02/17/20  0434   ALT 39 30   AST 23 20   ALKPHOS 275* 257*   BILITOT 0.7 0.6   PROT 6.7 6.7   ALBUMIN 3.0* 2.8*     Coagulation:   Recent Labs   Lab 02/15/20  0935 02/17/20  0434 02/20/20  0549   INR 1.0 1.0 0.9     Lab Results   Component Value Date    HGBA1C 5.9 (H) 01/27/2020         ASSESSMENT/PLAN:     I have reviewed the  medications in compliance with CMS Regulation F756 of the [place]. Based on information gathered, the following items may need to be addressed:    **According to medication hx, patient takes the following medications at home and they are not currently ordered at SNF.  · Ramipril 5 mg daily  · Basaglar, metformin and Tradjenta, however MD progress notes indicate he is not on current treatment at home. A1c and BGs stable.    Medications reviewed by PharmD, please re-consult if needed.      Rosa Isela Trujillo, Pharm. D.  Clinical Pharmacist  [facility]

## 2020-02-20 NOTE — ASSESSMENT & PLAN NOTE
Contributing Nutrition Diagnosis  Increased nutrient needs, protein    Related to (etiology):   Wound healing    Signs and Symptoms (as evidenced by):   S/p shoulder surgery    Interventions:  Collaboration with other providers  Nutrition Supplementation-Alvaro BID    Nutrition Diagnosis Status:   New

## 2020-02-20 NOTE — PLAN OF CARE
Problem: SLP Goal  Goal: SLP Goal  Description  Speech Language Pathology Goals  Goals expected to be met by 2/27:  1. Pt will complete short term memory tasks with 70% accuracy given min-mod cues.   2. Pt will follow 3-step commands with 70% accuracy given mod cues.   3. Pt will complete moderate level problem solving/reasoning tasks with 80% accuracy given min-mod cues.   4. Pt will participate in evaluation of reading, writing, and visual spatial abilities to determine need for further intervention.       Outcome: Ongoing, Progressing  Speech/language/cognitive evaluation completed.  SLP to follow 3x/wk to address cognitive-linguistic impairments.   MELANI Dominguez, CCC-SLP  Speech Language Pathologist  [phone]  2/20/2020

## 2020-02-20 NOTE — PLAN OF CARE
Problem: Adult Inpatient Plan of Care  Goal: Plan of Care Review  Outcome: Ongoing, Progressing     Recommendation:  1. Continue current diabetic diet order, consider the addition of cardiac restriction.   2. Addition of Alvaro mixed at bedside BID to promote wound healing.     Goals:   1. Pt PO intake >/= 75% EEN/EPN daily.    Nutrition Goal Status: new  Communication of RD Recs: other (comment)(POC)

## 2020-02-20 NOTE — PT/OT/SLP PROGRESS
"Physical Therapy  Treatment    Ben Melvin   MRN: [id number]   Admitting Diagnosis: Bilateral proximal humeral fractures    PT Received On: 02/20/20        Billable Minutes:  Therapeutic Activity 28 and Therapeutic Exercise 25    Treatment Type: Treatment  PT/PTA: PTA     PTA Visit Number: 1       General Precautions: Standard, diabetic, fall, seizure  Orthopedic Precautions: RUE non weight bearing, LUE non weight bearing, spinal precautions(ROMAT (B)elb/wrist/hand,(L)shldrAAROM/pend,(R)shldr no ROM)   Braces: LSO, Shoulder abduction brace    Spiritual, Cultural Beliefs, Baptism Practices, Values that Affect Care: no    Subjective:  "they want to weight him" nsg informed it would be safer in the future to weigh in bed or in wc, legs are not steady/strong enough at this time    Pain/Comfort  Pain Rating 1: 2/10  Location - Side 1: Bilateral  Location - Orientation 1: generalized  Location 1: shoulder(back)  Pain Addressed 1: Pre-medicate for activity, Reposition, Distraction, Cessation of Activity    Objective:  Patient found with: (in bed S.O. present, B UE swathe donned, A to re ajust fit)     AM-PAC 6 CLICK MOBILITY  Total Score:10    Bed Mobility:  Supine>Sit: max A with trunk elev    Transfers:  Sit<>Stand: from EOB to scale mod A x 2 ppl  Stand Pivot Transfer: mod A x 2 ppl EOB>WC ~ 3-4 steps    Gait: deferred today    Wheelchair Mobility:  Patient propels w/c ~ 150 ft with BLE S    Therex:  2x10 reps AP,LAQ,hip flex,abd/add    Balance:  Static standing on scale mod A x 2 ppl ~ 20 sec, again in gym ~ 60 sec mod/min x 2 ppl    Additional Treatment:  Total A to tesfaye back brace and adjust B swathe to proper fit  Mini elliptical x 10 min    Patient left up in chair with in gym with OT.    Assessment:  Ben Melvin is a 60 y.o. male with a medical diagnosis of Bilateral proximal humeral fractures.  Pt tolerated well, pt would continue to benefit from skilled PT services to improve overall functional " mobility, strength and endurance.  .    Rehab identified problem list/impairments: weakness, impaired endurance, impaired sensation, impaired self care skills, impaired functional mobilty, gait instability, impaired balance, decreased upper extremity function, decreased lower extremity function, pain, orthopedic precautions    Rehab potential is good.    Activity tolerance: Fair    Discharge recommendations: rehabilitation facility     Barriers to discharge: Inaccessible home environment    Equipment recommendations: hospital bed, wheelchair     GOALS:   Multidisciplinary Problems     Physical Therapy Goals        Problem: Physical Therapy Goal    Goal Priority Disciplines Outcome Goal Variances Interventions   Physical Therapy Goal     PT, PT/OT Ongoing, Progressing     Description:  Goals to be met by: 3/11/20     Patient will increase functional independence with mobility by performin. Supine to sit with MInimal Assistance  2. Sit to supine with Set-up Sharkey  3. Sit to stand transfer with Stand-by Assistance  4. Bed to chair transfer with Stand-by Assistance w/o AD  5. Gait  x 50ft w/o AD w/ SBA  6. Wheelchair propulsion x200 feet with Supervision using bilateral lower extremities  7. Ascend/descend 3 stair without Handrails Minimal Assistance   8. Stand for 3 minutes with Stand-by Assistance w/o AD  9. Pt will complete car transfer (actual or simulated) w/o AD w/ CGA                      PLAN:    Patient to be seen 5 x/week  to address the above listed problems via gait training, therapeutic activities, therapeutic exercises, neuromuscular re-education, wheelchair management/training  Plan of Care expires: 20  Plan of Care reviewed with: patient    Selina Mckoy, PTA  2020

## 2020-02-20 NOTE — TELEPHONE ENCOUNTER
Pt's spouse confirmed the pt's imaging and ED F/U visit w/Dr. Barry scheduled for 02.21.2020.  Confirmed they have transportation, V/U.

## 2020-02-20 NOTE — PT/OT/SLP EVAL
Speech Language Pathology  Evaluation    Ben Melvin   MRN: [id number]   Admitting Diagnosis: Bilateral proximal humeral fractures    Diet recommendations: Solid Diet Level: Regular  Liquid Diet Level: Thin Assistance with meals, HOB to 90 degrees, Monitor for s/s of aspiration and Standard aspiration precautions    SLP Treatment Date: 02/20/20  Speech Start Time: 0904     Speech Stop Time: 0924     Speech Total (min): 20 min       TREATMENT BILLABLE MINUTES:  Eval 12  and [facility]/Home Management Training 8    Diagnosis: Bilateral proximal humeral fractures      Past Medical History:   Diagnosis Date    Adenoma of right adrenal gland 11/09/2017    Alcohol abuse     ASCVD (arteriosclerotic cardiovascular disease) 10/2008    [place]    BPH (benign prostatic hyperplasia)     Cardiomyopathy     Chronic anxiety     Cirrhosis of liver     COPD (chronic obstructive pulmonary disease)     Current every day smoker     2-3 ppd    Elevated LFTs 08/2001    GERD with esophagitis 11/09/2017    History of colon polyps     History of epididymitis 2016    Juan Antonio    Hyperlipidemia     Hypertension, essential     Long term (current) use of anticoagulants     Lumbar disc disease with radiculopathy 05/2015    Major depression, recurrent, chronic     PAF (paroxysmal atrial fibrillation)     Peripheral autonomic neuropathy due to DM     Proteinuria     PVD (peripheral vascular disease) 05/29/2009    Venous insufficiency of both lower extremities     Vitamin D deficiency      Past Surgical History:   Procedure Laterality Date    biopsy back  05/20/2019    benign Martinez    biopsy right ankle Right 05/20/2019    Martinez, benign    COLONOSCOPY N/A 6/13/2019    Procedure: COLONOSCOPY;  Surgeon: Delmer Kerr MD;  Location[facility];  Service: Endoscopy;  Laterality: N/A;    COLONOSCOPY W/ POLYPECTOMY      EPIDURAL STEROID INJECTION INTO LUMBAR SPINE  06/2015    LASHAE Harvey    ESOPHAGOGASTRODUODENOSCOPY N/A  6/13/2019    Procedure: ESOPHAGOGASTRODUODENOSCOPY (EGD);  Surgeon: Delmer Kerr MD;  [facility];  Service: Endoscopy;  Laterality: N/A;    INTESTINAL MALROTATION REPAIR Right 1961    2 weeks old, Martinez    OPEN REDUCTION AND INTERNAL FIXATION (ORIF) OF FRACTURE OF PROXIMAL HUMERUS Right 2/14/2020    Procedure: ORIF, FRACTURE, HUMERUS, PROXIMAL - ORIF R greater tuberosity - suture repair. Trios, supine, fiberwire;  Surgeon: Chandler Chris MD;  Location: [facility] OR [address] [place] [address];  Service: Orthopedics;  Laterality: Right;    PARTIAL ARTHROPLASTY OF SHOULDER Left 1/28/2020    Procedure: HEMIARTHROPLASTY, SHOULDER;  Surgeon: Chandler Chris MD;  [facility];  Service: Orthopedics;  Laterality: Left;    PARTIAL ARTHROPLASTY OF SHOULDER Right 1/30/2020    Procedure: HEMIARTHROPLASTY, SHOULDER- RIGHT- MEKA- SUPINE-  TRIOS;  Surgeon: Chandler Chris MD;  [facility];  Service: Orthopedics;  Laterality: Right;    PERCUTANEOUS TRANSLUMINAL ANGIOPLASTY (PTA) OF PERIPHERAL VESSEL Right 12/2014    Amadeo, NIKOLAS            General Precautions: Standard, diabetic, fall, seizure    Current Respiratory Status: room air     HPI:   Ben Melvin is a 60 y.o. male with PMH of AFib on coumadin, peripheral vascular disease, hypertension, COPD, current smoker, liver cirrhosis, who had a witnessed seizure while playing video poker 01/27/2020 and sustained bilateral proximal humerus fracture dislocations, bilateral acromial fractures, subdural hemorrhage, L1 and L4 burst fractures. He underwent left shoulder hemiarthroplasty, biceps tenodesis on 01/28/20 and right shoulder hemiarthroplasty, biceps tenodesis on 01/30/20 and [facility]. BL shoulder XRs from 02/10/20 notable for failure of the repair of the greater tuberosity on the right shoulder and he returned to [facility] on 02/14/20 for revision fixation of his right greater tuberosity fracture. Pt had a stable  "CTH on 02/10/20 and warfarin was reinitiated 02/11, last dose was 02/12. Currently on Lovenox 40 mg SQ daily. He was seen by neurosurgery and will f/u with pt outpt as scheduled on 02/21/20. Patient tonight was not clear about the quality, severity, frequency, exacerbating and releiving factors of his bilateral shoulder pain. He been requesting prn medications twice a day previously.     Patient transferred to [facility] with PT and OT to improve functional status and ability to perform ADLs.      Past Medical History: Patient has a past medical history of Adenoma of right adrenal gland (11/09/2017), Alcohol abuse, ASCVD (arteriosclerotic cardiovascular disease) (10/2008), BPH (benign prostatic hyperplasia), Cardiomyopathy, Chronic anxiety, Cirrhosis of liver, COPD (chronic obstructive pulmonary disease), Current every day smoker, Elevated LFTs (08/2001), GERD with esophagitis (11/09/2017), History of colon polyps, History of epididymitis (2016), Hyperlipidemia, Hypertension, essential, Long term (current) use of anticoagulants, Lumbar disc disease with radiculopathy (05/2015), Major depression, recurrent, chronic, PAF (paroxysmal atrial fibrillation), Peripheral autonomic neuropathy due to DM, Proteinuria, PVD (peripheral vascular disease) (05/29/2009), Venous insufficiency of both lower extremities, and Vitamin D deficiency.       Social Hx: Patient lives with significant other.  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  He and SO manages medications together.      Subjective:     "He still asks sometimes 'why am I here? I want to go home.'" pt's SO commented regarding pt's cognition not returned to baseline yet.     Objective:              Cognitive Status:  Behavioral Observations: alert and cooperative-  Memory and Orientation: O x 4. Following a 3 minute delay, pt recalled 3/3 unrelated words given mod cues (1/3 ind'ly).    Attention: fair  Problem Solving: Pt provided solutions to problem " situations ind'ly.  Supervision was required to generate multiple causes for a hypothetical situations.    Pragmatics: topic maintenance problems  Executive Function: insight/awareness and self-monitoring    Auditory Comprehension: answers yes/no questions complex = 100%; Pt followed a 2-step command correctly, but did not follow a 3-step command.     Verbal Expression: conversational speech Pt demonstrated some difficulty maintaining topic and providing complete information at times.  During a word fluency task, pt listed 13 items in a category within one minute (15-20 is wnl).     Motor Speech: motor speech deficits not evident, but pt's dialect and fast rate of speech impacted intelligibility at times.      Voice: wfl    Reading: tba    Writing: tba    Visual-Spatial: tba    Additional Treatment:  Education provided to pt and significant other regarding role of SLP, purpose of cognitive evaluation, cognition, potential for cognitive impairments s/p SDH and seizure, assessment of cognitive evaluation, benefits of cognitive therapy to increase functional independence for discharge home, and SLP treatment plan and POC.  Pt expressed agreement, but does not appear fully aware of cognitive impairments.  SO does not feel pt has returned to baseline from a cognitive standpoint.     Assessment:  Ben Melvin is a 60 y.o. male with a medical diagnosis of Bilateral proximal humeral fractures and presents with cognitive-linguistic deficits.      Discharge recommendations: Discharge Facility/Level of Care Needs: (tbd)     Goals:   Multidisciplinary Problems     SLP Goals        Problem: SLP Goal    Goal Priority Disciplines Outcome   SLP Goal     SLP Ongoing, Progressing   Description:  Speech Language Pathology Goals  Goals expected to be met by 2/27:  1. Pt will complete short term memory tasks with 70% accuracy given min-mod cues.   2. Pt will follow 3-step commands with 70% accuracy given mod cues.   3. Pt will  complete moderate level problem solving/reasoning tasks with 80% accuracy given min-mod cues.   4. Pt will participate in evaluation of reading, writing, and visual spatial abilities to determine need for further intervention.                         Plan:   Patient to be seen Therapy Frequency: 3 x/week   Planned Interventions: Cognitive-Linguistic Therapy  Plan of Care expires: 03/18/20  Plan of Care reviewed with: patient, significant other  SLP Follow-up?: Yes              MELANI Dominguez, CCC-SLP  02/20/2020      MELANI Dominguez, CCC-SLP  Speech Language Pathologist  [phone]  2/20/2020

## 2020-02-20 NOTE — CONSULTS
"  [facility] - Skilled Nursing Care  Adult Nutrition  Consult Note    SUMMARY     Recommendations    Recommendation:  1. Continue current diabetic diet order, consider the addition of cardiac restriction.   2. Addition of Alvaro mixed at bedside BID to promote wound healing.     Goals:   1. Pt PO intake >/= 75% EEN/EPN daily.    Nutrition Goal Status: new  Communication of RD Recs: other (comment)(POC)    Reason for Assessment    Reason For Assessment: consult(assess/educate regarding nutritional needs)  Diagnosis: other (see comments)(bilateral proximal humeral fractures)  Relevant Medical History: COPD, DM, CKD stage 3, HLD, ASCVD, PVD, current smoker, ETOH abuse, cirrhosis of liver, PAF, HTN, GERD, BPH, anxiety, depression, Vit D deficiency   Interdisciplinary Rounds: attended    General Information Comments: Pt was alert with wife at bedside. Reports good appetite, no difficulties swallowing or chewing, and no N/V/D/C at this time. Eating 100% of meals and willing to drink Alvaro BID to promote healing. Reports his UBW is 230 lbs "but they pulled fluid off." Reports liking "grandma's oatmeal," which contains cane syrup and sugar discussed that they do not make oatmeal like that for the diabetic diet. Pt also reports liking fried foods, discussed that most foods are baked here. Wife aids pt with eating and other needs along with nursing staff. NFPE completed today 2/20/20 pt appears nourished, unable to assess all body parts related to pt's recent surgeries.     Nutrition Discharge Planning: d/c on diabetic, cardiac diet    Nutrition Risk Screen    Nutrition Risk Screen: no indicators present    Nutrition/Diet History    Food Preferences: likes sweetned oatmeal, and fried pork chops, no cultural or spiritual preferences identified at this time  Spiritual, Cultural Beliefs, Yazidism Practices, Values that Affect Care: no  Food Allergies: NKFA  Factors Affecting Nutritional Intake: inability to feed self(wife " feeds)    Anthropometrics    Temp: 98.2 °F (36.8 °C)  Height: 6' (182.9 cm)  Height (inches): 72 in  Weight Method: Standard Scale(assisted per two therapists.)  Weight: 98.8 kg (217 lb 13 oz)  Weight (lb): 217.82 lb  Ideal Body Weight (IBW), Male: 178 lb  % Ideal Body Weight, Male (lb): 122.37 %  BMI (Calculated): 29.5  BMI Grade: 25 - 29.9 - overweight  Usual Body Weight (UBW), k.54 kg  % Usual Body Weight: 94.71  % Weight Change From Usual Weight: -5.49 %       Lab/Procedures/Meds    Pertinent Labs Reviewed: reviewed  Pertinent Labs Comments: H/H 11.8/40.9, Na 135, K 5.2, CO2 21, Cr 1.5, BUN 36, eGFR 50.2, Glu 111. HDL 22, LDL 61.0, Trig 195, A1C 5.9(20)  Pertinent Medications Reviewed: reviewed  Pertinent Medications Comments: statin, cetirizine, enoxaparin, ergocalciferol, gabapentin, levetiracetam, lopressor, nicotine patch, pantopraole, poly glycol, senna docusate, coumadin    Physical Findings/Assessment    Cristhian Score: 16  Incision    Right Shoulder    Estimated/Assessed Needs    Weight Used For Calorie Calculations: 98.8 kg (217 lb 13 oz)  Energy Calorie Requirements (kcal): 2200(MSJ x 1.2)  Energy Need Method: [place]-St Jeor(x 1.2)  Protein Requirements: 99(1.0 gm/kg)  Weight Used For Protein Calculations: 98.8 kg (217 lb 13 oz)     Estimated Fluid Requirement Method: RDA Method(or Per MD)  RDA Method (mL): 2200  CHO Requirement: 247.5 gm/day      Nutrition Prescription Ordered    Current Diet Order: Diabetic, 2000 kcal    Evaluation of Received Nutrient/Fluid Intake    I/O: -880  Energy Calories Required: meeting needs  Protein Required: not meeting needs  Fluid Required: meeting needs  Comments: LBM 20  Tolerance: tolerating  % Intake of Estimated Energy Needs: 75 - 100 %  % Meal Intake: 75 - 100 %    Nutrition Risk    Level of Risk/Frequency of Follow-up: (1 x/week)     Assessment and Plan    * Bilateral proximal humeral fractures  Contributing Nutrition Diagnosis  Increased nutrient  needs, protein    Related to (etiology):   Wound healing    Signs and Symptoms (as evidenced by):   S/p shoulder surgery    Interventions:  Collaboration with other providers  Nutrition Supplementation-Alvaro BID    Nutrition Diagnosis Status:   New           Monitor and Evaluation    Food and Nutrient Intake: energy intake, food and beverage intake  Food and Nutrient Adminstration: diet order  Knowledge/Beliefs/Attitudes: food and nutrition knowledge/skill  Physical Activity and Function: nutrition-related ADLs and IADLs  Anthropometric Measurements: height/length, weight, weight change, body mass index  Biochemical Data, Medical Tests and Procedures: electrolyte and renal panel, gastrointestinal profile, glucose/endocrine profile, inflammatory profile, lipid profile  Nutrition-Focused Physical Findings: overall appearance     Malnutrition Assessment                 Orbital Region (Subcutaneous Fat Loss): well nourished  Upper Arm Region (Subcutaneous Fat Loss): well nourished  Thoracic and Lumbar Region: well nourished   [place] Region (Muscle Loss): well nourished  Clavicle Bone Region (Muscle Loss): well nourished  Posterior Calf Region (Muscle Loss): well nourished   Edema (Fluid Accumulation): 0-->no edema present   Subcutaneous Fat Loss (Final Summary): well nourished  Muscle Loss Evaluation (Final Summary): well nourished         Nutrition Follow-Up    RD Follow-up?: Yes

## 2020-02-20 NOTE — PROGRESS NOTES
[facility]                                  Skilled Nursing Facility                   Progress Note     Admit Date: 2/18/2020  KIRBY 2/18/2020  Principal Problem:  Bilateral proximal humeral fractures   HPI obtained from patient interview and chart review     Chief Complaint: Revaluation of medical treatment and therapy status: Lab review; CANDY, hyperkalemia    HPI:   Mr. Melvin is a 60 year old male with PMHx of AFib on coumadin, peripheral vascular disease, hypertension, COPD, current smoker, liver cirrhosis, who presents to SNF following hospitalization for failure of the repair of the greater tuberosity on the right shoulder s/p revision fixation of right greater tuberosity fracture on 2/14 by Dr. Chris.  Admission to SNF for secondary weakness and debility.    Interval history: All of today's labs reviewed and are listed below.  BUN/CR increased to 36/1.5 from 29/1.4. K 5.2.   24 hr vital sign ranges listed below.  Patient's Zee is removed in he reports adequate urine output, denies dysuria and bladder scans show no urinary retention.  Patient continues to endorse intermittent productive cough.  Patient denies shortness of breath, abdominal discomfort, nausea, or vomiting.  Patient reports an adequate appetite.  Patient reports having regular bowel movements.  Patient progessing with PT/OT. Continuing to follow and treat all acute and chronic conditions.      Past Medical History: Patient has a past medical history of Adenoma of right adrenal gland (11/09/2017), Alcohol abuse, ASCVD (arteriosclerotic cardiovascular disease) (10/2008), BPH (benign prostatic hyperplasia), Cardiomyopathy, Chronic anxiety, Cirrhosis of liver, COPD (chronic obstructive pulmonary disease), Current every day smoker, Elevated LFTs (08/2001), GERD with esophagitis (11/09/2017), History of colon polyps, History of epididymitis (2016), Hyperlipidemia,  Hypertension, essential, Long term (current) use of anticoagulants, Lumbar disc disease with radiculopathy (05/2015), Major depression, recurrent, chronic, PAF (paroxysmal atrial fibrillation), Peripheral autonomic neuropathy due to DM, Proteinuria, PVD (peripheral vascular disease) (05/29/2009), Venous insufficiency of both lower extremities, and Vitamin D deficiency.    Past Surgical History: Patient has a past surgical history that includes Percutaneous transluminal angioplasty (PTA) of peripheral vessel (Right, 12/2014); Intestinal malrotation repair (Right, 1961); Epidural steroid injection into lumbar spine (06/2015); biopsy right ankle (Right, 05/20/2019); biopsy back (05/20/2019); Colonoscopy w/ polypectomy; Esophagogastroduodenoscopy (N/A, 6/13/2019); Colonoscopy (N/A, 6/13/2019); Partial arthroplasty of shoulder (Left, 1/28/2020); Partial arthroplasty of shoulder (Right, 1/30/2020); and Open reduction and internal fixation (ORIF) of fracture of proximal humerus (Right, 2/14/2020).    Social History: Patient reports that he has been smoking cigarettes. He has a 360.00 pack-year smoking history. He has never used smokeless tobacco. He reports that he drank alcohol. He reports that he does not use drugs.    Family History: family history includes Acromegaly in his father; Diabetes in his mother; Heart attack in his father; Hypertension in his father and mother; Kidney cancer in his brother.    Allergies: Patient has No Known Allergies.    ROS  Constitutional: Negative for fever or fatigue.  +insomnia, fatigue   Eyes: Negative for blurred vision, double vision and discharge.   Respiratory:  +intermittent productive cough. negative for shortness of breath and wheezing.    Cardiovascular: Negative for chest pain, palpitations, and leg swelling.   Gastrointestinal: Negative for abdominal pain, constipation, diarrhea, nausea and vomiting.   Genitourinary: Negative for dysuria, frequency and urgency.    Musculoskeletal:  + generalized weakness, intermittent bilateral upper extremity pain. Negative for back pain and myalgias.   Skin: Negative for itching and rash.   Neurological: Negative for dizziness, speech change, and headaches.   Psychiatric/Behavioral: Negative for depression. The patient is not nervous/anxious.      PEx  Temp:  [98.1 °F (36.7 °C)-98.2 °F (36.8 °C)]   Pulse:  []   Resp:  [18-19]   BP: ()/(48-58)   SpO2:  [96 %]      Constitutional: Patient appears well-developed.  No distress noted  HENT:   Head: Normocephalic and atraumatic.   Eyes: Pupils are equal, round  Neck: Normal range of motion. Neck supple.   Cardiovascular: Normal rate, regular rhythm and normal heart sounds.    Pulmonary/Chest: Effort normal and breath sounds are clear  Abdominal: Soft. Bowel sounds are normal.   Musculoskeletal: Normal range of motion.  Decreased range of motion to bilateral upper extremity  Neurological: Alert and oriented to person, place, and time.  Higher level thinking impaired  Psychiatric: Normal mood and affect. Behavior is normal.   Skin: Skin is warm and dry.  Surgical incision open to air to UZMAE, was Aquacel x1 to JULIAE.     Recent Labs   Lab 02/20/20  0547   *   K 5.2*      CO2 21*   BUN 36*   CREATININE 1.5*   MG 1.9       Recent Labs   Lab 02/20/20  0547   WBC 11.81   RBC 4.35*   HGB 11.8*   HCT 40.9      MCV 94   MCH 27.1   MCHC 28.9*         Assessment and Plan:       Problems addressed today    CKD III  - SCr with wandering baseline 0.8-1.4  - initiated Lasix 80 mg x1 dose, checking BMP tomorrow to see if there is improvement in BUN/creatinine    Insomnia  - currently holding home amitriptyline due to patient with mild cognitive impairment, will initiate trazodone 25 mg QHS    Essential hypertension  - Home medications: metoprolol 25 mg BID and ramipril 5 mg daily at home.   - continue Metoprolol 25 mg BID with low normal BPs   - 2/20 stable, continue current  treatment at this time    PAF (paroxysmal atrial fibrillation)  Chronic anticoagulation  - continue warfarin at 5 mg daily               For INR 2-3  - Continue metoprolol  - 2/20 INR 0.9, warfarin initiated on 02/19 will wait until 2/21 INR for dose changes    Expected blood loss anemia   - improving without specific intervention; will monitor with twice weekly CBCs       Ongoing but stable problems        Bilateral fracture dislocation of proximal humerus fracture  Bilateral acromial fractures with bilateral shoulder dislocation  S/p bilateral shoulder hemiarthroplasty: right on 1/28/2020 and left on 1/30/2020  S/p Pt underwent revision of fixation of R greater tuberosity fracture on 02/14/20  - Follow up with orthopedics as scheduled  - PT/OT - nonweightbearing bilateral upper extremities in sling  - DVT prophylaxis - continue      Neuropathic pain  - continue Gabapentin 400 mg TID   - continue  Capsaicin cream prn  -  continue percocet 5/325 mg  po q6h prnpain     Pathologic osteoporotic fractures  - Treat vitamin D deficiency  - Further evaluation and management as per PCP     GERD (gastroesophageal reflux disease)  - Continue Pantoprazole 40 mg daily     Stable burst fracture of first lumbar vertebra  L1 and L4 burst fractures  - f/u in neurosurgery clinic as scheduled on 02/21/20 for further management  - TSLO brace whenever not lying in bed     Constipation  - Continue daily miralax and senokot-s      Subdural hematoma  - Stable  - Cleared to resume anticoagualtion     Type 2 diabetes mellitus with CKD III and HTN  - A1c 5.9 from 01/27/20  - Not on treatment current  - Acuchecks in general <120  - Stop accucheck and SSI     Severe Vitamin D deficiency  - Level of 7 on 2/4  - continue Ergocalciferol 50k weekly     COPD (chronic obstructive pulmonary disease)  Smoker   - levalbuterol nebs  prn  - Limit oxygen to avoid hypercapnea  - continue nicotine patch 21 mg daily  - smoking cessation advised     Seizure  disorder   - continue keppra 1 g BID  - Follow up with neurology after discharge - appointment needs to be made     Hyperlipidemia   - continue atorvastatin 80 mg daily     GERD   - continue protonix 40 mg daily     Moderate protein calorie malnutrition   - boost plus TID with meals  - Regular diet  - Dietary consult     Acute metabolic encephalopathy  Probable mild Cognitive impairment  - speech therapy eval  - Consider neuropsych eval  - continue scheduled methocarbamol to prn  - Hold amitriptyline for now, will use melatonin for sleep     DJD of multiple sites   - PT/OT. Pain management as above     Cirrhosis of liver   - attributed to alcohol. Patient rarely drinks alcohol at this time.       Future Appointments   Date Time Provider Department Center   2/21/2020  7:30 AM [facility]-CT2 500 LB LIMIT [facility] CTSC [facility]   2/21/2020  8:15 AM [facility] EOS [facility] EOS [facility]   2/21/2020  9:30 AM Carl Barry MD [facility] NEUROS7 [facility]   2/26/2020  2:15 PM [facility] XRORTHO1 485 LB LIMIT [facility] XRAYORT Tai lorri Ort   2/26/2020  2:45 PM Chandler Chris MD [facility] ORTHO Tai lorri   3/24/2020  8:00 AM Cristal Ohara NP [facility]  [facility]   4/2/2020  3:00 PM Carl Barry MD [facility] NEUROS7 Tai lorri Craig NP      DOS: 2/20/2020       Patient note was created using MModal Dictation.  Any errors in syntax or even information may not have been identified and edited on initial review prior to signing this note.

## 2020-02-21 ENCOUNTER — HOSPITAL ENCOUNTER (OUTPATIENT)
Dept: RADIOLOGY | Facility: HOSPITAL | Age: 60
Discharge: HOME | End: 2020-02-21
Attending: PHYSICIAN ASSISTANT
Payer: MEDICARE

## 2020-02-21 ENCOUNTER — OFFICE VISIT (OUTPATIENT)
Dept: NEUROSURGERY | Facility: CLINIC | Age: 60
DRG: 559 | End: 2020-02-21
Attending: INTERNAL MEDICINE
Payer: MEDICARE

## 2020-02-21 VITALS — SYSTOLIC BLOOD PRESSURE: 119 MMHG | TEMPERATURE: 99 F | DIASTOLIC BLOOD PRESSURE: 75 MMHG | HEART RATE: 125 BPM

## 2020-02-21 DIAGNOSIS — S32.001D CLOSED BURST FRACTURE OF LUMBAR VERTEBRA WITH ROUTINE HEALING, SUBSEQUENT ENCOUNTER: ICD-10-CM

## 2020-02-21 DIAGNOSIS — S32.010D COMPRESSION FRACTURE OF L1 VERTEBRA WITH ROUTINE HEALING, SUBSEQUENT ENCOUNTER: ICD-10-CM

## 2020-02-21 DIAGNOSIS — S06.5XAA SUBDURAL HEMATOMA: ICD-10-CM

## 2020-02-21 DIAGNOSIS — S32.041A CLOSED STABLE BURST FRACTURE OF FOURTH LUMBAR VERTEBRA, INITIAL ENCOUNTER: Primary | ICD-10-CM

## 2020-02-21 LAB
ANION GAP SERPL CALC-SCNC: 12 MMOL/L (ref 8–16)
BUN SERPL-MCNC: 35 MG/DL (ref 6–20)
CALCIUM SERPL-MCNC: 9.4 MG/DL (ref 8.7–10.5)
CHLORIDE SERPL-SCNC: 102 MMOL/L (ref 95–110)
CO2 SERPL-SCNC: 26 MMOL/L (ref 23–29)
CREAT SERPL-MCNC: 1.4 MG/DL (ref 0.5–1.4)
EST. GFR  (AFRICAN AMERICAN): >60 ML/MIN/1.73 M^2
EST. GFR  (NON AFRICAN AMERICAN): 54.6 ML/MIN/1.73 M^2
GLUCOSE SERPL-MCNC: 111 MG/DL (ref 70–110)
INR PPP: 1 (ref 0.8–1.2)
POTASSIUM SERPL-SCNC: 4.3 MMOL/L (ref 3.5–5.1)
PROTHROMBIN TIME: 10.2 SEC (ref 9–12.5)
SODIUM SERPL-SCNC: 140 MMOL/L (ref 136–145)

## 2020-02-21 PROCEDURE — 25000003 PHARM REV CODE 250: Performed by: INTERNAL MEDICINE

## 2020-02-21 PROCEDURE — 72100 XR LUMBAR SPINE 2 OR 3 VIEWS: ICD-10-PCS | Mod: 26,,, | Performed by: RADIOLOGY

## 2020-02-21 PROCEDURE — 99999 PR PBB SHADOW E&M-EST. PATIENT-LVL III: CPT | Mod: PBBFAC,,, | Performed by: NEUROLOGICAL SURGERY

## 2020-02-21 PROCEDURE — 70450 CT HEAD/BRAIN W/O DYE: CPT | Mod: TC

## 2020-02-21 PROCEDURE — 36415 COLL VENOUS BLD VENIPUNCTURE: CPT

## 2020-02-21 PROCEDURE — 25000003 PHARM REV CODE 250: Performed by: STUDENT IN AN ORGANIZED HEALTH CARE EDUCATION/TRAINING PROGRAM

## 2020-02-21 PROCEDURE — 97530 THERAPEUTIC ACTIVITIES: CPT | Mod: CQ

## 2020-02-21 PROCEDURE — 97110 THERAPEUTIC EXERCISES: CPT | Mod: CQ

## 2020-02-21 PROCEDURE — 97110 THERAPEUTIC EXERCISES: CPT

## 2020-02-21 PROCEDURE — 80048 BASIC METABOLIC PNL TOTAL CA: CPT

## 2020-02-21 PROCEDURE — 72100 X-RAY EXAM L-S SPINE 2/3 VWS: CPT | Mod: TC

## 2020-02-21 PROCEDURE — 70450 CT HEAD/BRAIN W/O DYE: CPT | Mod: 26,,, | Performed by: RADIOLOGY

## 2020-02-21 PROCEDURE — 70450 CT HEAD WITHOUT CONTRAST: ICD-10-PCS | Mod: 26,,, | Performed by: RADIOLOGY

## 2020-02-21 PROCEDURE — 72100 X-RAY EXAM L-S SPINE 2/3 VWS: CPT | Mod: 26,,, | Performed by: RADIOLOGY

## 2020-02-21 PROCEDURE — 97116 GAIT TRAINING THERAPY: CPT | Mod: CQ

## 2020-02-21 PROCEDURE — 25000003 PHARM REV CODE 250: Performed by: NURSE PRACTITIONER

## 2020-02-21 PROCEDURE — 99999 PR PBB SHADOW E&M-EST. PATIENT-LVL III: ICD-10-PCS | Mod: PBBFAC,,, | Performed by: NEUROLOGICAL SURGERY

## 2020-02-21 PROCEDURE — 11000004 HC SNF PRIVATE

## 2020-02-21 PROCEDURE — 99215 PR OFFICE/OUTPT VISIT, EST, LEVL V, 40-54 MIN: ICD-10-PCS | Mod: S$PBB,,, | Performed by: NEUROLOGICAL SURGERY

## 2020-02-21 PROCEDURE — 85610 PROTHROMBIN TIME: CPT

## 2020-02-21 PROCEDURE — 99215 OFFICE O/P EST HI 40 MIN: CPT | Mod: S$PBB,,, | Performed by: NEUROLOGICAL SURGERY

## 2020-02-21 PROCEDURE — 63600175 PHARM REV CODE 636 W HCPCS: Performed by: INTERNAL MEDICINE

## 2020-02-21 PROCEDURE — 99213 OFFICE O/P EST LOW 20 MIN: CPT | Mod: PBBFAC,25 | Performed by: NEUROLOGICAL SURGERY

## 2020-02-21 RX ORDER — CELECOXIB 200 MG/1
200 CAPSULE ORAL
Status: ON HOLD | COMMUNITY
Start: 2020-02-14 | End: 2020-03-05 | Stop reason: HOSPADM

## 2020-02-21 RX ORDER — AMITRIPTYLINE HYDROCHLORIDE 100 MG/1
100 TABLET ORAL
Status: ON HOLD | COMMUNITY
Start: 2020-02-13 | End: 2020-03-04 | Stop reason: CLARIF

## 2020-02-21 RX ORDER — OXYCODONE HYDROCHLORIDE 10 MG/1
10 TABLET ORAL EVERY 4 HOURS PRN
COMMUNITY
Start: 2020-02-13 | End: 2020-02-21

## 2020-02-21 RX ORDER — IBUPROFEN 200 MG
21 TABLET ORAL
Status: ON HOLD | COMMUNITY
Start: 2020-02-14 | End: 2020-03-05 | Stop reason: HOSPADM

## 2020-02-21 RX ORDER — OXYCODONE HYDROCHLORIDE 5 MG/1
5 TABLET ORAL EVERY 4 HOURS PRN
COMMUNITY
Start: 2020-02-13 | End: 2020-02-21

## 2020-02-21 RX ORDER — LEVETIRACETAM 1000 MG/1
1000 TABLET ORAL
Status: ON HOLD | COMMUNITY
Start: 2020-02-13 | End: 2020-03-04 | Stop reason: CLARIF

## 2020-02-21 RX ORDER — PANTOPRAZOLE SODIUM 40 MG/1
40 TABLET, DELAYED RELEASE ORAL
Status: ON HOLD | COMMUNITY
Start: 2020-02-14 | End: 2020-03-04 | Stop reason: CLARIF

## 2020-02-21 RX ORDER — METHOCARBAMOL 750 MG/1
750 TABLET, FILM COATED ORAL
Status: ON HOLD | COMMUNITY
Start: 2020-02-13 | End: 2020-03-24 | Stop reason: HOSPADM

## 2020-02-21 RX ORDER — OXYCODONE HYDROCHLORIDE 15 MG/1
15 TABLET ORAL EVERY 4 HOURS PRN
COMMUNITY
Start: 2020-02-13 | End: 2020-02-21

## 2020-02-21 RX ADMIN — LEVETIRACETAM 1000 MG: 500 TABLET, FILM COATED ORAL at 12:02

## 2020-02-21 RX ADMIN — OXYCODONE HYDROCHLORIDE AND ACETAMINOPHEN 2 TABLET: 5; 325 TABLET ORAL at 05:02

## 2020-02-21 RX ADMIN — METOPROLOL TARTRATE 25 MG: 25 TABLET, FILM COATED ORAL at 12:02

## 2020-02-21 RX ADMIN — ENOXAPARIN SODIUM 40 MG: 100 INJECTION SUBCUTANEOUS at 05:02

## 2020-02-21 RX ADMIN — METOPROLOL TARTRATE 25 MG: 25 TABLET, FILM COATED ORAL at 08:02

## 2020-02-21 RX ADMIN — CETIRIZINE HYDROCHLORIDE 10 MG: 5 TABLET ORAL at 12:02

## 2020-02-21 RX ADMIN — POLYETHYLENE GLYCOL 3350 17 G: 17 POWDER, FOR SOLUTION ORAL at 08:02

## 2020-02-21 RX ADMIN — PANTOPRAZOLE SODIUM 40 MG: 40 TABLET, DELAYED RELEASE ORAL at 12:02

## 2020-02-21 RX ADMIN — GABAPENTIN 400 MG: 100 CAPSULE ORAL at 02:02

## 2020-02-21 RX ADMIN — LEVETIRACETAM 1000 MG: 500 TABLET, FILM COATED ORAL at 08:02

## 2020-02-21 RX ADMIN — TRAZODONE HYDROCHLORIDE 25 MG: 50 TABLET ORAL at 08:02

## 2020-02-21 RX ADMIN — OXYCODONE HYDROCHLORIDE AND ACETAMINOPHEN 2 TABLET: 5; 325 TABLET ORAL at 06:02

## 2020-02-21 RX ADMIN — CAPSAICIN: 0.25 CREAM TOPICAL at 08:02

## 2020-02-21 RX ADMIN — GABAPENTIN 400 MG: 100 CAPSULE ORAL at 08:02

## 2020-02-21 RX ADMIN — DOCUSATE SODIUM - SENNOSIDES 2 TABLET: 50; 8.6 TABLET, FILM COATED ORAL at 08:02

## 2020-02-21 RX ADMIN — ATORVASTATIN CALCIUM 80 MG: 20 TABLET, FILM COATED ORAL at 08:02

## 2020-02-21 RX ADMIN — WARFARIN SODIUM 5 MG: 2.5 TABLET ORAL at 05:02

## 2020-02-21 RX ADMIN — DOCUSATE SODIUM - SENNOSIDES 2 TABLET: 50; 8.6 TABLET, FILM COATED ORAL at 12:02

## 2020-02-21 RX ADMIN — CAPSAICIN: 0.25 CREAM TOPICAL at 02:02

## 2020-02-21 NOTE — DISCHARGE SUMMARY
[facility]  Orthopedics  Discharge Summary      Patient Name: Ben Melvin  MRN: [id number]  Admission Date: 2/14/2020  Hospital Length of Stay: 4 days  Discharge Date and Time: 2/18/20  Attending Physician: Pietro Chris MD   Discharging Provider: Marlon Cordoba MD  Primary Care Provider: Cristal Ohara NP    HPI:   Ben Melvin is a 60 y.o. male with PMH of AFib on coumadin, peripheral vascular disease, hypertension, COPD, current smoker, liver cirrhosis, who had a witnessed seizure while playing video poker 01/27/2020 and sustained bilateral proximal humerus fracture dislocations, bilateral acromial fractures, subdural hemorrhage, L1 and L4 burst fractures. He underwent left shoulder hemiarthroplasty, biceps tenodesis on 01/28/20 and right shoulder hemiarthroplasty, biceps tenodesis on 01/30/20 and discharged to [facility]hab. BL shoulder XRs from 02/10/20 notable for failure of the repair of the greater tuberosity on the right shoulder and [place] [facility] [place] for revision fixation of his right greater tuberosity fracture. Pt had a stable CTH on 02/10/20 and warfarin was reinitiated 02/11, last dose was 02/12. Currently on Lovenox 40 mg SQ daily. He was seen by neurosurgery and will f/u with pt outpt as scheduled on 02/21/20. Patient tonight was not clear about the quality, severity, frequency, exacerbating and releiving factors of his bilateral shoulder pain. He been requesting prn medications twice a day previously.    Procedure(s) (LRB):  ORIF, FRACTURE, HUMERUS, PROXIMAL - ORIF R greater tuberosity - suture repair. Trios, supine, fiberwire (Right)      Hospital Course:  Patient returned from inpatient rehab on 2/13/20 with avulsion of right greater tuberosity of humerus s/p reverse total shoulder arthroplasty on 1/28/20. He underwent ORIF for this fracture on 2/14/20 without complication. He worked with therapy during his inpatient stay. He was then discharged to skilled  nursing facility on 2/18/20.     Consults (From admission, onward)        Status Ordering Provider     Inpatient consult to Hospital Medicine-Ortho Comanagement Only  Once     Provider:  (Not yet assigned)    Completed DIGNA SCHWARZ     Inpatient consult to Midline team  Once     Provider:  (Not yet assigned)    Completed MCKINLEY ZAVALA     Inpatient consult to Physical Medicine Rehab  Once     Provider:  (Not yet assigned)    Completed MCKINLEY ZAVALA          Significant Diagnostic Studies: No pertinent studies.    Pending Diagnostic Studies:     None        Final Active Diagnoses:    Diagnosis Date Noted POA    PRINCIPAL PROBLEM:  Bilateral proximal humeral fractures [S42.201A, S42.202A] 02/14/2020 Yes    Neuropathic pain [M79.2] 02/18/2020 Yes    Stable burst fracture of first lumbar vertebra [S32.011A] 02/15/2020 Yes    GERD (gastroesophageal reflux disease) [K21.9] 02/15/2020 Yes    Constipation [K59.00]  Yes     fracture dislocation of bilateral shoulders [S42.91XA] 01/28/2020 Yes    Subdural hematoma [S06.5X9A] 01/27/2020 Yes    Vitamin D deficiency [E55.9] 06/03/2019 Yes    PAF (paroxysmal atrial fibrillation) [I48.0]  Yes    COPD (chronic obstructive pulmonary disease) [J44.9]  Yes     Chronic    Essential hypertension [I10]  Yes    Lumbar disc disease with radiculopathy [M51.16] 05/01/2015 Yes     Chronic      Problems Resolved During this Admission:      Discharged Condition: good    Disposition: Skilled Nursing Facility    Follow Up:    Patient Instructions:   No discharge procedures on file.  Medications:  Reconciled Home Medications:      Medication List      CONTINUE taking these medications    amitriptyline 50 MG tablet  Commonly known as:  ELAVIL  Take 1 tablet (50 mg total) by mouth every evening.     aspirin 81 MG Chew  Take 162 mg by mouth once daily.     atorvastatin 80 MG tablet  Commonly known as:  LIPITOR  Take 80 mg by mouth every evening.     Basaglar  KwikPen U-100 Insulin glargine 100 units/mL (3mL) SubQ pen  Generic drug:  insulin  INJECT 25 UNITS DAILY     bisacodyL 10 mg Supp  Commonly known as:  DULCOLAX  Place 1 suppository (10 mg total) rectally daily as needed.     celecoxib 200 MG capsule  Commonly known as:  CeleBREX  Take 1 capsule (200 mg total) by mouth once daily.     cyanocobalamin 1000 MCG tablet  Commonly known as:  VITAMIN B-12  Take 100 mcg by mouth once daily.     ergocalciferol 50,000 unit Cap  Commonly known as:  ERGOCALCIFEROL  Take 1 capsule (50,000 Units total) by mouth every 7 days.     insulin aspart U-100 100 unit/mL (3 mL) Inpn pen  Commonly known as:  NovoLOG  Inject 1-10 Units into the skin before meals and at bedtime as needed (Hyperglycemia).     ipratropium 0.02 % nebulizer solution  Commonly known as:  ATROVENT  Take 2.5 mLs (500 mcg total) by nebulization every 4 (four) hours as needed for Wheezing. Rescue     levalbuterol 0.63 mg/3 mL nebulizer solution  Commonly known as:  XOPENEX  Take 3 mLs (0.63 mg total) by nebulization every 8 (eight) hours. Rescue     levETIRAcetam 1000 MG tablet  Commonly known as:  KEPPRA  Take 1 tablet (1,000 mg total) by mouth 2 (two) times daily.     metFORMIN 1000 MG tablet  Commonly known as:  GLUCOPHAGE  Take 1 tablet (1,000 mg total) by mouth daily with breakfast.     metoprolol tartrate 25 MG tablet  Commonly known as:  LOPRESSOR  Take 25 mg by mouth 2 (two) times daily.     multivitamin with minerals tablet  Take 1 tablet by mouth once daily.     nicotine 21 mg/24 hr  Commonly known as:  NICODERM CQ  Place 1 patch onto the skin once daily.     oxyCODONE 5 MG immediate release tablet  Commonly known as:  ROXICODONE  Take 1 tablet (5 mg total) by mouth every 4 (four) hours as needed (pain 1-10).     pantoprazole 40 MG tablet  Commonly known as:  PROTONIX  Take 1 tablet (40 mg total) by mouth once daily.     polyethylene glycol 17 gram Pwpk  Commonly known as:  GLYCOLAX  Take 17 g by mouth 2  (two) times daily.     ramipril 5 MG capsule  Commonly known as:  ALTACE  TAKE 1 CAPSULE (5 MG) BY ORAL ROUTE ONCE DAILY     senna-docusate 8.6-50 mg 8.6-50 mg per tablet  Commonly known as:  PERICOLACE  Take 2 tablets by mouth 2 (two) times daily.     Tradjenta 5 mg Tab tablet  Generic drug:  linaGLIPtin  TAKE 1 TABLET BY MOUTH EVERY DAY        ASK your doctor about these medications    dextromethorphan-guaifenesin  mg  mg per 12 hr tablet  Commonly known as:  MUCINEX DM  Take 1 tablet by mouth 2 (two) times daily. for 10 days  Ask about: Should I take this medication?     heparin (porcine) 5,000 unit/mL injection  Inject 1 mL (5,000 Units total) into the skin every 8 (eight) hours. for 7 days  Ask about: Should I take this medication?     methocarbamol 750 MG Tab  Commonly known as:  ROBAXIN  Take 1 tablet (750 mg total) by mouth 3 (three) times daily. for 10 days  Ask about: Should I take this medication?            Marlon Cordoba MD  Orthopedics  [facility]

## 2020-02-21 NOTE — HOSPITAL COURSE
Patient returned from inpatient rehab on 2/13/20 with avulsion of right greater tuberosity of humerus s/p reverse total shoulder arthroplasty on 1/28/20. He underwent ORIF for this fracture on 2/14/20 without complication. He worked with therapy during his inpatient stay. He was then discharged to skilled nursing facility on 2/18/20.

## 2020-02-21 NOTE — HPI
Ben Melvin is a 60 y.o. male with PMH of AFib on coumadin, peripheral vascular disease, hypertension, COPD, current smoker, liver cirrhosis, who had a witnessed seizure while playing video poker 01/27/2020 and sustained bilateral proximal humerus fracture dislocations, bilateral acromial fractures, subdural hemorrhage, L1 and L4 burst fractures. He underwent left shoulder hemiarthroplasty, biceps tenodesis on 01/28/20 and right shoulder hemiarthroplasty, biceps tenodesis on 01/30/20 and discharged to [facility]rehab. BL shoulder XRs from 02/10/20 notable for failure of the repair of the greater tuberosity on the right shoulder and he returned to [facility] on 02/14/20 for revision fixation of his right greater tuberosity fracture. Pt had a stable CTH on 02/10/20 and warfarin was reinitiated 02/11, last dose was 02/12. Currently on Lovenox 40 mg SQ daily. He was seen by neurosurgery and will f/u with pt outpt as scheduled on 02/21/20. Patient tonight was not clear about the quality, severity, frequency, exacerbating and releiving factors of his bilateral shoulder pain. He been requesting prn medications twice a day previously.

## 2020-02-21 NOTE — PT/OT/SLP PROGRESS
Occupational Therapy  Treatment    Ben Melvin   MRN: [id number]   Admitting Diagnosis: Bilateral proximal humeral fractures    OT Date of Treatment: 02/20/20       Billable Minutes:  Therapeutic Exercise 45    General Precautions: Standard, diabetic, fall, seizure  Orthopedic Precautions: RUE non weight bearing, LUE non weight bearing, spinal precautions(BROMelbowristhand; LAAROM shld pendNOshldRUE ROM/pend to2-28)  Braces: TLSO, Shoulder abduction brace(bilateral)    Spiritual, Cultural Beliefs, Church Practices, Values that Affect Care: no    Subjective:  Communicated with nursing prior to session.  Pt c/o pain in B shoulders.     Objective:   Pt was seen this day for therex as follows:  RUE: arom elbow, forearm, wrist and digits;  strengthening with Hand Gripper 15 reps with elbow supported.   LUE: arom elbow, forearm, wrist and digits;  strengthening with Hand Gripper 15 reps with elbow supported. L shoulder forward flexion prom 0-40*; ER to neutral. (Note: pt unable to perform pendulum ex's due to precautions associated with lumbar spine burst fractures.     OT educated pt and pts wife in proper technique of donning/ doffing B sling and swathe braes. Pts wife returned demo with min A from OT.    Patient left up in chair with call button in reach    ASSESSMENT:  Ben Melvin is a 60 y.o. male with a medical diagnosis of Bilateral proximal humeral fractures who is steadily progressing with above set therex plan. He has pain at rest which is minimized when properly positioned in BUE braces and proper positioning.    Rehab identified problem list/impairments: impaired endurance, impaired self care skills, impaired functional mobilty, decreased upper extremity function    Rehab potential is good    Activity tolerance: Good    Discharge recommendations: rehabilitation facility     Barriers to discharge: Inaccessible home environment     Equipment recommendations: bedside commode, wheelchair,  hospital bed     GOALS:   Multidisciplinary Problems     Occupational Therapy Goals        Problem: Occupational Therapy Goal    Goal Priority Disciplines Outcome Interventions   Occupational Therapy Goal     OT, PT/OT     Description:  Goals to be met by: 03-11-20   Pt. Family to be I with assisting pt. With UBD, LBD   Pt. Family to be I with don/doff all braces  Pt. Family and pt. To be I with performing there ex to BUE as per ortho orders  Pt. To perform SPT without an AD and SBA  Pt. To engage in standing activities with SBA x 10 minutes    Patient will increase functional independence with ADLs by performing:                          Plan:  Patient to be seen 6 x/week to address the above listed problems via self-care/home management, therapeutic activities, therapeutic exercises  Plan of Care expires: 03/20/20  Plan of Care reviewed with: patient, spouse    Tamara Carbajal, SPENCER  02/20/2020

## 2020-02-21 NOTE — PT/OT/SLP PROGRESS
Occupational Therapy  Treatment    Ben Melvin   MRN: [id number]   Admitting Diagnosis: Bilateral proximal humeral fractures    OT Date of Treatment: 02/21/20       Billable Minutes:  Therapeutic Exercise 50    General Precautions: Standard, diabetic, fall, seizure  Orthopedic Precautions: RUE non weight bearing, LUE non weight bearing, spinal precautions(BROMelbowristhand; LAAROM shld pendNOshldRUE ROM/pend to2-28)  Braces: TLSO, Shoulder abduction brace(bilateral)    Spiritual, Cultural Beliefs, Church Practices, Values that Affect Care: no    Subjective:  Communicated with wife prior to session.  Pt reported he had an appointment this am and that he has goal of wanting to go to the bathroom by himself. He also states he wants to utilize RW. This OT educated him on the importance of strict adherence to BUE orthopedic precautions for maximum functional outcomes.      Objective:   OT educated pt in proper bed positioning with BUE's properly supported in slight forward flexion.    Bed Mobility:    · Patient completed Supine to Sit with moderate assistance  · Patient completed Sit to Supine with moderate assistance     Functional Mobility/Transfers:  · Patient completed Sit <> Stand Transfer with minimum assistance  with  hand-held assist   · Patient completed Bed <> Chair Transfer using Step Transfer technique with minimum assistance with hand-held assist  · Patient completed Toilet Transfer Step Transfer technique with moderate assistance with  hand-held assist  · Functional Mobility: Pt limited in functional transfers by sensory deficits, weakness in LLE and strict ortho precautions BUE.     Activities of Daily Living:  · Toileting: maximal assistance for clothing adjustment    AMPA 6 Click:  AMPA Total Score: 6    OT Exercises: AROM BUE elbow -> distally in unsupported sitting.  PROM L shoulder forward flexion 0-40*; L shoulder ER to neutral.    Additional Treatment:  Pt was seen for standing balance/  tolerance activities with weight shifting over his ADELA.  OT educated pts wife on proper donning/ doffing of BUE sling and swathe.    Patient left up in chair with call button in reach    ASSESSMENT:  Ben Melvin is a 60 y.o. male with a medical diagnosis of Bilateral proximal humeral fractures who is significantly limited by ortho precautions, sensory deficits, weakness LLE and impaired cognition including impaired attention to task. He needs significant skilled OT intervention.     Rehab identified problem list/impairments: impaired endurance, impaired self care skills, impaired functional mobilty, decreased upper extremity function    Rehab potential is good    Activity tolerance: Good    Discharge recommendations: rehabilitation facility     Barriers to discharge: Inaccessible home environment     Equipment recommendations: bedside commode, wheelchair, hospital bed     GOALS:   Multidisciplinary Problems     Occupational Therapy Goals        Problem: Occupational Therapy Goal    Goal Priority Disciplines Outcome Interventions   Occupational Therapy Goal     OT, PT/OT Ongoing, Progressing    Description:  Goals to be met by: 03-11-20   Pt. Family to be I with assisting pt. With UBD, LBD   Pt. Family to be I with don/doff all braces  Pt. Family and pt. To be I with performing there ex to BUE as per ortho orders  Pt. To perform SPT without an AD and SBA  Pt. To engage in standing activities with SBA x 10 minutes    Patient will increase functional independence with ADLs by performing:                          Plan:  Patient to be seen 6 x/week to address the above listed problems via self-care/home management, therapeutic activities, therapeutic exercises  Plan of Care expires: 03/20/20  Plan of Care reviewed with: patient, spouse    Tamara Carbajal OT  02/21/2020

## 2020-02-21 NOTE — PLAN OF CARE
Problem: Occupational Therapy Goal  Goal: Occupational Therapy Goal  Description  Goals to be met by: 03-11-20   Pt. Family to be I with assisting pt. With UBD, LBD   Pt. Family to be I with don/doff all braces  Pt. Family and pt. To be I with performing there ex to BUE as per ortho orders  Pt. To perform SPT without an AD and SBA  Pt. To engage in standing activities with SBA x 10 minutes  Pt to perform toilet transfers using raised commode with SBA.  Pt to perform arom for BUE elbow -> distally with min A from caregiver.  Pt to exhibit 0-90* L shoulder forward flexion prom    Patient will increase functional independence with ADLs by performing:         Outcome: Ongoing, Progressing

## 2020-02-21 NOTE — PROGRESS NOTES
CHIEF COMPLAINT:  Follow up from ED    I, Car May, attest that this documentation has been prepared under the direction and in the presence of Carl Barry MD.    HPI:  Ben Melvin is a 60 y.o.  male with history of COPD, cardiomyopathy, EtOH abuse, tobacco abuse, paroxysmal A-fib, liver cirrhosis, PVD s/p angioplasty of peripheral vessel, on coumadin, and recent ORIF of right humerus fracture. Pt was recently hospitalized for seizure with mutli injuries including SDH, bilateral humerus fractures, bilateral acromial fractures, and L1 and L4 burst fractures. Pt states that he never fell preceding his injuries and ED visit. Per pt's wife he was sitting up and stated that he was having difficulty breathing then he stiffened in his chair and they held him up. Pt was then lowered to the ground until the ambulance arrived.  Pt's family reports no recent seizure activity. His wife states that his eyes intermittently roll while they are speaking to him. She denies any associated tongue biting, convulsions, or urinary incontinence. Pt states that when he stands up he experiences burning posterior BLE pain and intermittent pain in his bilateral heels. Pt has noticed urinary frequency and his wife states that he is being given diuretics. Pt denies any numbness in his feet.  Pt reports some ear problems noting difficulty hearing from his right ear beginning 6 weeks ago. He denies any ear pain. Pt began taking coumadin in 2014 and resumed it 2 days ago. Pt presents today in a wheelchair and wearing bilateral arm slings and a LSO brace. Pt has quit smoking cigarettes and continues to use nicotine replacement therapy.          Review of patient's allergies indicates:  No Known Allergies    Past Medical History:   Diagnosis Date    Adenoma of right adrenal gland 11/09/2017    Alcohol abuse     ASCVD (arteriosclerotic cardiovascular disease) 10/2008    [place]    BPH (benign prostatic hyperplasia)      Cardiomyopathy     Chronic anxiety     Cirrhosis of liver     COPD (chronic obstructive pulmonary disease)     Current every day smoker     2-3 ppd    Elevated LFTs 08/2001    GERD with esophagitis 11/09/2017    History of colon polyps     History of epididymitis 2016    Juan Antonio    Hyperlipidemia     Hypertension, essential     Long term (current) use of anticoagulants     Lumbar disc disease with radiculopathy 05/2015    Major depression, recurrent, chronic     PAF (paroxysmal atrial fibrillation)     Peripheral autonomic neuropathy due to DM     Proteinuria     PVD (peripheral vascular disease) 05/29/2009    Venous insufficiency of both lower extremities     Vitamin D deficiency      Past Surgical History:   Procedure Laterality Date    biopsy back  05/20/2019    benign Martinez    biopsy right ankle Right 05/20/2019    Martinez, benign    COLONOSCOPY N/A 6/13/2019    Procedure: COLONOSCOPY;  Surgeon: Delmer Kerr MD;  Location: [facility];  Service: Endoscopy;  Laterality: N/A;    COLONOSCOPY W/ POLYPECTOMY      EPIDURAL STEROID INJECTION INTO LUMBAR SPINE  06/2015    LASHAE Harvey    ESOPHAGOGASTRODUODENOSCOPY N/A 6/13/2019    Procedure: ESOPHAGOGASTRODUODENOSCOPY (EGD);  Surgeon: Delmer Kerr MD;  Location: [facility];  Service: Endoscopy;  Laterality: N/A;    INTESTINAL MALROTATION REPAIR Right 1960    2 weeks old, Martinez    OPEN REDUCTION AND INTERNAL FIXATION (ORIF) OF FRACTURE OF PROXIMAL HUMERUS Right 2/14/2020    Procedure: ORIF, FRACTURE, HUMERUS, PROXIMAL - ORIF R greater tuberosity - suture repair. Trios, supine, fiberwire;  Surgeon: Chandler Chris MD;  Location: [facility] OR [address] [place] [address];  Service: Orthopedics;  Laterality: Right;    PARTIAL ARTHROPLASTY OF SHOULDER Left 1/28/2020    Procedure: HEMIARTHROPLASTY, SHOULDER;  Surgeon: Chandler Chris MD;  Location: [facility] OR [address] [place] [address];  Service: Orthopedics;  Laterality: Left;    PARTIAL ARTHROPLASTY OF SHOULDER Right  1/30/2020    Procedure: HEMIARTHROPLASTY, SHOULDER- RIGHT- MEKA- SUPINE-  TRIOS;  Surgeon: Chandler Chris MD;  Location: [facility] OR [address] [place] [address];  Service: Orthopedics;  Laterality: Right;    PERCUTANEOUS TRANSLUMINAL ANGIOPLASTY (PTA) OF PERIPHERAL VESSEL Right 12/2014    NIKOLAS Childs     Family History   Problem Relation Age of Onset    Diabetes Mother     Hypertension Mother     Hypertension Father     Acromegaly Father     Heart attack Father     Kidney cancer Brother      Social History     Tobacco Use    Smoking status: Current Every Day Smoker     Packs/day: 9.00     Years: 40.00     Pack years: 360.00     Types: Cigarettes    Smokeless tobacco: Never Used    Tobacco comment: smokes parts all day long continuously   Substance Use Topics    Alcohol use: Not Currently     Frequency: Never     Comment: quit 2 years ago was heavily    Drug use: Never        Review of Systems   Constitutional: Negative.    HENT: Positive for hearing loss (right ear).    Eyes: Negative.    Respiratory: Negative.    Cardiovascular: Negative.    Gastrointestinal: Negative.    Endocrine: Negative.    Genitourinary: Positive for frequency.   Musculoskeletal: Positive for back pain (BLE) and myalgias (BLE). Negative for gait problem and neck pain.   Skin: Negative.    Allergic/Immunologic: Negative.    Neurological: Negative for weakness, light-headedness, numbness and headaches.   Hematological: Negative.    Psychiatric/Behavioral: Negative.        OBJECTIVE:   Vital Signs:  Temp: 98.7 °F (37.1 °C) (02/21/20 0952)  Pulse: (!) 125 (02/21/20 0952)  BP: 119/75 (02/21/20 0952)    Physical Exam:    Vital signs: All nursing notes and vital signs reviewed -- afebrile, vital signs stable.  Constitutional: Patient sitting comfortably in chair. Appears well developed and well nourished.  Skin: Exposed areas are intact without abnormal markings, rashes or other lesions.  HEENT: Normocephalic. Normal conjunctivae.  Cardiovascular:  Normal rate and regular rhythm.  Respiratory: Chest wall rises and falls symmetrically, without signs of respiratory distress.  Abdomen: Soft and non-tender.  Extremities: Warm and without edema. Calves supple, non-tender.  Psych/Behavior: Normal affect.    Neurological:    Mental status: Alert and oriented. Conversational and appropriate.       Cranial Nerves: Grossly intact.     Motor:    Upper:  Deltoids Triceps Biceps WE WF     R 5/5 5/5 5/5 5/5 5/5 5/5    L 5/5 5/5 5/5 5/5 5/5 5/5      Lower:  HF KE KF DF PF EHL    R 5/5 5/5 5/5 5/5 5/5 5/5    L 5/5 5/5 5/5 5/5 5/5 5/5     Sensory: Intact sensation to light touch in all extremities. Romberg negative.    Reflexes:          DTR: 2+ symmetrically throughout.     Duran's: Negative.     Babinski's: Negative.     Clonus: Negative.    Cerebellar: Finger-to-nose and rapid alternating movements normal. Gait stable, fluid.    Spine:    Posture: Head well aligned over pelvis in front and side views.  No focal or global spinal deformity visible on inspection. Shoulders and hips even. No obvious leg length discrepancy. No scapula winging.    Bending: Full ROM with forward, back and lateral bending. No rib prominence with forward bend.    Cervical:      ROM: Full with flexion, extension, lateral rotation and ear-to-shoulder bend.      Midline TTP: Negative.     Spurling's test: Negative.     Lhermitte's: Negative.    Thoracic:     Midline TTP: Negative    Lumbar:     Midline TTP: Positive over lower lumbar. Negative over L1.     Straight Leg Test: Negative     Crossed Straight Leg Test: Negative     Sciatic notch tenderness: Negative.    Other:     SI joint TTP: Negative.     Greater trochanter TTP: Negative.     Tenderness with external/internal hip rotation: Negative.    Diagnostic Results:  All imaging was independently reviewed by me.    CT Head, dated 02/21/2020:  1. Thin left subdural hematoma without significant mass effect  2. Small poster falcine hemorrhage  with layering along the tent  3. Slightly smaller compared to previous imaging    CT L-spine, dated 2/15/2020:  1. Stable L1 and L4 burst fractures  2. Mild L1 retropulsion  3. L4 shows stable burst fracture with bilateral pedicle fractures with well apposed fracture surfaces  4. Mild L4 retropulsion  5. No distraction or translation    AP and Lateral X-ray L-spine, dated 02/21/2020:  1. Stable appearance of L1 and L4 compression fractures  2. Congenitally narrow spinal canal  3. Mild centrals tenosis at L4  4. Degenerative changes causing moderate right and left neuroforaminal stenosis   5. No signifiant central stenosis at L1  6. No clear disruption of the posterior ligaments    ASSESSMENT/PLAN:     Ben Melvin is doing well several weeks after a coagulopathy-related spontaneous left subdural hematoma and subsequent seizure causing polytrauma. His subdural hematoma has decreased in size on recent CTH and he has had no further seizures. As such the subdural hematoma remains nonoperative, but we will repeat his CTH in 8 weeks because he has recently restarted Coumadin.  He also sustained multiple skeletal fractures status post humerus ORIF and bracing with orthopedic surgery, and he also has L1 and L4 burst fractures.  Neither lumbar fracture is operative now (TLICS 2), and he is a poor surgical candidate anyway due to medical comorbidities, recent orthopedic surgery, and heavy nicotine abuse.  He is neurologically intact and has no bowel or bladder dysfunction. Persistent tenderness to palpation over the L4 fracture.  My recommendation is to brace the lumbar spine fractures and repeat the CT in 8 weeks.  He is cleared to attempt ambulation from my standpoint if he remains in his TLSO brace.  New neurologic symptoms should prompt a call to my office for an ED visit.    The patient understands and agrees with the plan of care. All questions were answered.     1. CT Head  2. CT L-spine  3. RTC 2 months         I,  Dr. Carl Barry personally performed the services described in this documentation. All medical record entries made by the scribe, Car May, were at my direction and in my presence.  I have reviewed the chart and agree that the record reflects my personal performance and is accurate and complete.      Carl Barry M.D.  Department of Neurosurgery  [facility]      .

## 2020-02-21 NOTE — PT/OT/SLP PROGRESS
"Physical Therapy  Treatment    Ben Melvin   MRN: [id number]   Admitting Diagnosis: Bilateral proximal humeral fractures    PT Received On: 02/21/20        Billable Minutes:  Gait Training 15, Therapeutic Activity 10 and Therapeutic Exercise 20    Treatment Type: Treatment  PT/PTA: PTA     PTA Visit Number: 2       General Precautions: Standard, diabetic, fall, seizure  Orthopedic Precautions: RUE non weight bearing, LUE non weight bearing, spinal precautions(ROMAT (B)elb/wrist/hand,(L)shldrAAROM/pend,(R)shldr no ROM)   Braces: LSO, Shoulder abduction brace    Spiritual, Cultural Beliefs, Yarsanism Practices, Values that Affect Care: no    Subjective:  "want to walk"    Pain/Comfort  Pain Rating 1:   Location - Side 1: Bilateral  Location - Orientation 1: generalized  Location 1: shoulder(and back   Pain Addressed 1: Pre-medicate for activity  Pain Rating Post-Intervention 1: (no further mention   Objective:   Patient found with: (EOM finishing with OT, BUE swathe and LSO)     AM-PAC 6 CLICK MOBILITY  Total Score:10    Transfers:  Sit<>Stand: from EOM and WC vcs for tech, ant wt shift, control descent min x 1, 2 ppl for safety    Gait:  Amb with 2 ppl mod A ~ 7 ft and 13 ft seated rest break, wc follow close , with shoes, pt reported 1 st trial LLE instability also noted and  wanting to buckle    Wheelchair Mobility:  Patient propels w/c ~120 ft with BLE x 2      Therex:  2x10 reps AP,LAQ,hip flex    Additional Treatment:  Mini elliptical x 10 min    Patient left up in chair with with S.O brought back to room.    Assessment:  Ben Melvin is a 60 y.o. male with a medical diagnosis of Bilateral proximal humeral fractures.  Pt tolerated well, amb today with 2 ppl mod A 7 ft and 13 ft, pt would continue to benefit from skilled PT services to improve overall functional mobility, strength and endurance.  .    Rehab identified problem list/impairments: weakness, impaired endurance, impaired sensation, impaired self " care skills, impaired functional mobilty, gait instability, impaired balance, decreased upper extremity function, decreased lower extremity function, pain, orthopedic precautions    Rehab potential is good.    Activity tolerance: Fair    Discharge recommendations: rehabilitation facility     Barriers to discharge: Inaccessible home environment    Equipment recommendations: hospital bed, wheelchair     GOALS:   Multidisciplinary Problems     Physical Therapy Goals        Problem: Physical Therapy Goal    Goal Priority Disciplines Outcome Goal Variances Interventions   Physical Therapy Goal     PT, PT/OT Ongoing, Progressing     Description:  Goals to be met by: 3/11/20     Patient will increase functional independence with mobility by performin. Supine to sit with MInimal Assistance  2. Sit to supine with Set-up [place]  3. Sit to stand transfer with Stand-by Assistance  4. Bed to chair transfer with Stand-by Assistance w/o AD  5. Gait  x 50ft w/o AD w/ SBA  6. Wheelchair propulsion x200 feet with Supervision using bilateral lower extremities  7. Ascend/descend 3 stair without Handrails Minimal Assistance   8. Stand for 3 minutes with Stand-by Assistance w/o AD  9. Pt will complete car transfer (actual or simulated) w/o AD w/ CGA                      PLAN:    Patient to be seen 5 x/week  to address the above listed problems via gait training, therapeutic activities, therapeutic exercises, neuromuscular re-education, wheelchair management/training  Plan of Care expires: 20  Plan of Care reviewed with: patient    Selina Mckoy, PTA  2020

## 2020-02-21 NOTE — LETTER
February 21, 2020      Jennifer Anne MD  [address]  [facility] [address]           [facility]  [address]  [facility] [address]  Phone: [phone]  Fax: [phone]          Patient: Ben Melvin   MR Number: [id number]   YOB: 1960   Date of Visit: 2/21/2020       Dear Dr. Jennifer Anne:    Thank you for referring Ben Melvin to me for evaluation. Attached you will find relevant portions of my assessment and plan of care.    If you have questions, please do not hesitate to call me. I look forward to following Ben Melvin along with you.    Sincerely,    Carl Barry MD    Enclosure  CC:  No Recipients    If you would like to receive this communication electronically, please contact [email or web address] or [phone] to request more information on [employer] access.    For providers and/or their staff who would like to refer a patient to [facility], please contact us through our one-stop-shop provider referral line, [facility], at [phone].    If you feel you have received this communication in error or would no longer like to receive these types of communications, please e-mail [email or web address]

## 2020-02-22 LAB
INR PPP: 1 (ref 0.8–1.2)
PROTHROMBIN TIME: 10.4 SEC (ref 9–12.5)

## 2020-02-22 PROCEDURE — 25000003 PHARM REV CODE 250: Performed by: INTERNAL MEDICINE

## 2020-02-22 PROCEDURE — 97116 GAIT TRAINING THERAPY: CPT | Mod: CQ

## 2020-02-22 PROCEDURE — 25000003 PHARM REV CODE 250: Performed by: STUDENT IN AN ORGANIZED HEALTH CARE EDUCATION/TRAINING PROGRAM

## 2020-02-22 PROCEDURE — 11000004 HC SNF PRIVATE

## 2020-02-22 PROCEDURE — 97110 THERAPEUTIC EXERCISES: CPT | Mod: CQ

## 2020-02-22 PROCEDURE — 25000003 PHARM REV CODE 250: Performed by: NURSE PRACTITIONER

## 2020-02-22 PROCEDURE — 85610 PROTHROMBIN TIME: CPT

## 2020-02-22 PROCEDURE — 63600175 PHARM REV CODE 636 W HCPCS: Performed by: INTERNAL MEDICINE

## 2020-02-22 PROCEDURE — 97530 THERAPEUTIC ACTIVITIES: CPT | Mod: CQ

## 2020-02-22 RX ADMIN — LEVETIRACETAM 1000 MG: 500 TABLET, FILM COATED ORAL at 10:02

## 2020-02-22 RX ADMIN — ERGOCALCIFEROL 50000 UNITS: 1.25 CAPSULE ORAL at 10:02

## 2020-02-22 RX ADMIN — DOCUSATE SODIUM - SENNOSIDES 2 TABLET: 50; 8.6 TABLET, FILM COATED ORAL at 10:02

## 2020-02-22 RX ADMIN — DOCUSATE SODIUM - SENNOSIDES 2 TABLET: 50; 8.6 TABLET, FILM COATED ORAL at 09:02

## 2020-02-22 RX ADMIN — CETIRIZINE HYDROCHLORIDE 10 MG: 5 TABLET ORAL at 10:02

## 2020-02-22 RX ADMIN — GABAPENTIN 400 MG: 100 CAPSULE ORAL at 09:02

## 2020-02-22 RX ADMIN — METOPROLOL TARTRATE 25 MG: 25 TABLET, FILM COATED ORAL at 09:02

## 2020-02-22 RX ADMIN — CAPSAICIN: 0.25 CREAM TOPICAL at 09:02

## 2020-02-22 RX ADMIN — OXYCODONE HYDROCHLORIDE AND ACETAMINOPHEN 2 TABLET: 5; 325 TABLET ORAL at 05:02

## 2020-02-22 RX ADMIN — OXYCODONE HYDROCHLORIDE AND ACETAMINOPHEN 2 TABLET: 5; 325 TABLET ORAL at 11:02

## 2020-02-22 RX ADMIN — ENOXAPARIN SODIUM 40 MG: 100 INJECTION SUBCUTANEOUS at 05:02

## 2020-02-22 RX ADMIN — PANTOPRAZOLE SODIUM 40 MG: 40 TABLET, DELAYED RELEASE ORAL at 10:02

## 2020-02-22 RX ADMIN — GABAPENTIN 400 MG: 100 CAPSULE ORAL at 10:02

## 2020-02-22 RX ADMIN — TRAZODONE HYDROCHLORIDE 25 MG: 50 TABLET ORAL at 09:02

## 2020-02-22 RX ADMIN — POLYETHYLENE GLYCOL 3350 17 G: 17 POWDER, FOR SOLUTION ORAL at 09:02

## 2020-02-22 RX ADMIN — ATORVASTATIN CALCIUM 80 MG: 20 TABLET, FILM COATED ORAL at 09:02

## 2020-02-22 RX ADMIN — LEVETIRACETAM 1000 MG: 500 TABLET, FILM COATED ORAL at 09:02

## 2020-02-22 RX ADMIN — GABAPENTIN 400 MG: 100 CAPSULE ORAL at 03:02

## 2020-02-22 RX ADMIN — WARFARIN SODIUM 5 MG: 2.5 TABLET ORAL at 05:02

## 2020-02-22 NOTE — PLAN OF CARE
Problem: Adult Inpatient Plan of Care  Goal: Plan of Care Review  Outcome: Ongoing, Progressing     Problem: Adult Inpatient Plan of Care  Goal: Patient-Specific Goal (Individualization)  Outcome: Ongoing, Progressing     Problem: Adult Inpatient Plan of Care  Goal: Absence of Hospital-Acquired Illness or Injury  Outcome: Ongoing, Progressing     Problem: Adult Inpatient Plan of Care  Goal: Optimal Comfort and Wellbeing  Outcome: Ongoing, Progressing

## 2020-02-22 NOTE — PT/OT/SLP PROGRESS
"Physical Therapy  Treatment    Ben Melvin   MRN: [id number]   Admitting Diagnosis: Bilateral proximal humeral fractures    PT Received On: 02/22/20          Billable Minutes:  Gait Training 13, Therapeutic Activity 15 and Therapeutic Exercise 10    Treatment Type: Treatment  PT/PTA: PTA     PTA Visit Number: 3       General Precautions: Standard, diabetic, fall, seizure  Orthopedic Precautions: RUE non weight bearing, LUE non weight bearing, spinal precautions(ROMAT (B)elb/wrist/hand,(L)shldrAAROM/pend,(R)shldr no ROM)   Braces: LSO, Shoulder abduction brace    Spiritual, Cultural Beliefs, Alevism Practices, Values that Affect Care: no    Subjective:  "I'm good" Pt agreebale to therapy    Pain/Comfort  Pain Rating 1: 5/10  Location - Side 1: Bilateral  Location - Orientation 1: generalized  Location 1: shoulder  Pain Addressed 1: Pre-medicate for activity, Reposition, Distraction, Cessation of Activity  Pain Rating Post-Intervention 1: 5/10  Pain Rating 2: 5/10  Location - Side 2: Left  Location - Orientation 2: generalized  Location 2: knee  Pain Addressed 2: Pre-medicate for activity, Reposition, Cessation of Activity    Objective:  Patient found supine in bed with shoulder abduction brace bilateraly       AM-PAC 6 CLICK MOBILITY  Total Score:13    Bed Mobility:  Supine>Sit: Max x 2 for trunk support    Transfers:  Sit<>Stand: from bed, t/f wc Min A for stability and CGA from second person for safety no AD  Stand Pivot Transfer: bed>wc Mod A for stability no AD    Gait:  Amb ~3 steps Mod A for stability no AD limited by pain and buckling in L knee    Wheelchair Mobility:  Patient propels w/c 200' SBA BLE vcs for object negotiation    Therex:  2 x 10 AP, LAQ, HF, GS    Balance:  Static stand 2 minutes Min A x 2 for stability    Additional Treatment:  Donned LSO with max A from therapist and spouse   Donned shoes Max A  Patient educated on importance of increased time out of bed and SONDRA throughout the " day    Patient left up in chair with call button in reach.    Assessment:  Ben Melvin is a 60 y.o. male with a medical diagnosis of Bilateral proximal humeral fractures.  Patient tolerated treatment well focusing on bed mobility, transfers, gait, therex and standing balance/tolerance. Patient limited by pain on this date. Patient will continue to improve with skilled physical therapy services in order to return to functional baseline.    Rehab identified problem list/impairments: weakness, impaired endurance, impaired sensation, impaired self care skills, impaired functional mobilty, gait instability, impaired balance, decreased upper extremity function, decreased lower extremity function, pain, orthopedic precautions    Rehab potential is good.    Activity tolerance: Good    Discharge recommendations: rehabilitation facility     Barriers to discharge: Inaccessible home environment    Equipment recommendations: hospital bed, wheelchair     GOALS:   Multidisciplinary Problems     Physical Therapy Goals        Problem: Physical Therapy Goal    Goal Priority Disciplines Outcome Goal Variances Interventions   Physical Therapy Goal     PT, PT/OT Ongoing, Progressing     Description:  Goals to be met by: 3/11/20     Patient will increase functional independence with mobility by performin. Supine to sit with MInimal Assistance  2. Sit to supine with Set-up [place]  3. Sit to stand transfer with Stand-by Assistance  4. Bed to chair transfer with Stand-by Assistance w/o AD  5. Gait  x 50ft w/o AD w/ SBA  6. Wheelchair propulsion x200 feet with Supervision using bilateral lower extremities  7. Ascend/descend 3 stair without Handrails Minimal Assistance   8. Stand for 3 minutes with Stand-by Assistance w/o AD  9. Pt will complete car transfer (actual or simulated) w/o AD w/ CGA                      PLAN:    Patient to be seen 5 x/week  to address the above listed problems via gait training, therapeutic  activities, therapeutic exercises, neuromuscular re-education, wheelchair management/training  Plan of Care expires: 03/20/20  Plan of Care reviewed with: patient    Shereen Lucia, PTA  02/22/2020

## 2020-02-23 LAB
INR PPP: 1.1 (ref 0.8–1.2)
PROTHROMBIN TIME: 11.3 SEC (ref 9–12.5)

## 2020-02-23 PROCEDURE — 25000003 PHARM REV CODE 250: Performed by: INTERNAL MEDICINE

## 2020-02-23 PROCEDURE — 85610 PROTHROMBIN TIME: CPT

## 2020-02-23 PROCEDURE — S4991 NICOTINE PATCH NONLEGEND: HCPCS | Performed by: INTERNAL MEDICINE

## 2020-02-23 PROCEDURE — 25000003 PHARM REV CODE 250: Performed by: STUDENT IN AN ORGANIZED HEALTH CARE EDUCATION/TRAINING PROGRAM

## 2020-02-23 PROCEDURE — 11000004 HC SNF PRIVATE

## 2020-02-23 PROCEDURE — 25000003 PHARM REV CODE 250: Performed by: NURSE PRACTITIONER

## 2020-02-23 PROCEDURE — 63600175 PHARM REV CODE 636 W HCPCS: Performed by: INTERNAL MEDICINE

## 2020-02-23 RX ADMIN — NICOTINE 1 PATCH: 21 PATCH, EXTENDED RELEASE TRANSDERMAL at 10:02

## 2020-02-23 RX ADMIN — LEVETIRACETAM 1000 MG: 500 TABLET, FILM COATED ORAL at 09:02

## 2020-02-23 RX ADMIN — ATORVASTATIN CALCIUM 80 MG: 20 TABLET, FILM COATED ORAL at 09:02

## 2020-02-23 RX ADMIN — TRAZODONE HYDROCHLORIDE 25 MG: 50 TABLET ORAL at 09:02

## 2020-02-23 RX ADMIN — GABAPENTIN 400 MG: 100 CAPSULE ORAL at 02:02

## 2020-02-23 RX ADMIN — ENOXAPARIN SODIUM 40 MG: 100 INJECTION SUBCUTANEOUS at 05:02

## 2020-02-23 RX ADMIN — WARFARIN SODIUM 5 MG: 2.5 TABLET ORAL at 05:02

## 2020-02-23 RX ADMIN — GABAPENTIN 400 MG: 100 CAPSULE ORAL at 10:02

## 2020-02-23 RX ADMIN — DOCUSATE SODIUM - SENNOSIDES 2 TABLET: 50; 8.6 TABLET, FILM COATED ORAL at 10:02

## 2020-02-23 RX ADMIN — DOCUSATE SODIUM - SENNOSIDES 2 TABLET: 50; 8.6 TABLET, FILM COATED ORAL at 09:02

## 2020-02-23 RX ADMIN — CETIRIZINE HYDROCHLORIDE 10 MG: 5 TABLET ORAL at 10:02

## 2020-02-23 RX ADMIN — OXYCODONE HYDROCHLORIDE AND ACETAMINOPHEN 2 TABLET: 5; 325 TABLET ORAL at 05:02

## 2020-02-23 RX ADMIN — METOPROLOL TARTRATE 25 MG: 25 TABLET, FILM COATED ORAL at 09:02

## 2020-02-23 RX ADMIN — GABAPENTIN 400 MG: 100 CAPSULE ORAL at 09:02

## 2020-02-23 RX ADMIN — PANTOPRAZOLE SODIUM 40 MG: 40 TABLET, DELAYED RELEASE ORAL at 10:02

## 2020-02-23 RX ADMIN — LEVETIRACETAM 1000 MG: 500 TABLET, FILM COATED ORAL at 10:02

## 2020-02-24 LAB
ANION GAP SERPL CALC-SCNC: 10 MMOL/L (ref 8–16)
BACTERIA UR CULT: ABNORMAL
BASOPHILS # BLD AUTO: 0.08 K/UL (ref 0–0.2)
BASOPHILS NFR BLD: 1 % (ref 0–1.9)
BUN SERPL-MCNC: 24 MG/DL (ref 6–20)
CALCIUM SERPL-MCNC: 8.9 MG/DL (ref 8.7–10.5)
CHLORIDE SERPL-SCNC: 103 MMOL/L (ref 95–110)
CO2 SERPL-SCNC: 26 MMOL/L (ref 23–29)
CREAT SERPL-MCNC: 1.1 MG/DL (ref 0.5–1.4)
DIFFERENTIAL METHOD: ABNORMAL
EOSINOPHIL # BLD AUTO: 0.4 K/UL (ref 0–0.5)
EOSINOPHIL NFR BLD: 4.2 % (ref 0–8)
ERYTHROCYTE [DISTWIDTH] IN BLOOD BY AUTOMATED COUNT: 16.7 % (ref 11.5–14.5)
EST. GFR  (AFRICAN AMERICAN): >60 ML/MIN/1.73 M^2
EST. GFR  (NON AFRICAN AMERICAN): >60 ML/MIN/1.73 M^2
GLUCOSE SERPL-MCNC: 116 MG/DL (ref 70–110)
HCT VFR BLD AUTO: 42.6 % (ref 40–54)
HGB BLD-MCNC: 12.2 G/DL (ref 14–18)
IMM GRANULOCYTES # BLD AUTO: 0.05 K/UL (ref 0–0.04)
IMM GRANULOCYTES NFR BLD AUTO: 0.6 % (ref 0–0.5)
INR PPP: 1.1 (ref 0.8–1.2)
LYMPHOCYTES # BLD AUTO: 1.7 K/UL (ref 1–4.8)
LYMPHOCYTES NFR BLD: 20.9 % (ref 18–48)
MAGNESIUM SERPL-MCNC: 1.7 MG/DL (ref 1.6–2.6)
MCH RBC QN AUTO: 26.8 PG (ref 27–31)
MCHC RBC AUTO-ENTMCNC: 28.6 G/DL (ref 32–36)
MCV RBC AUTO: 94 FL (ref 82–98)
MONOCYTES # BLD AUTO: 0.7 K/UL (ref 0.3–1)
MONOCYTES NFR BLD: 8.5 % (ref 4–15)
NEUTROPHILS # BLD AUTO: 5.4 K/UL (ref 1.8–7.7)
NEUTROPHILS NFR BLD: 64.8 % (ref 38–73)
NRBC BLD-RTO: 0 /100 WBC
PHOSPHATE SERPL-MCNC: 3.4 MG/DL (ref 2.7–4.5)
PLATELET # BLD AUTO: 261 K/UL (ref 150–350)
PMV BLD AUTO: 9.8 FL (ref 9.2–12.9)
POTASSIUM SERPL-SCNC: 4.3 MMOL/L (ref 3.5–5.1)
PROTHROMBIN TIME: 11.7 SEC (ref 9–12.5)
RBC # BLD AUTO: 4.55 M/UL (ref 4.6–6.2)
SODIUM SERPL-SCNC: 139 MMOL/L (ref 136–145)
WBC # BLD AUTO: 8.28 K/UL (ref 3.9–12.7)

## 2020-02-24 PROCEDURE — 83735 ASSAY OF MAGNESIUM: CPT

## 2020-02-24 PROCEDURE — 80048 BASIC METABOLIC PNL TOTAL CA: CPT

## 2020-02-24 PROCEDURE — 25000003 PHARM REV CODE 250: Performed by: STUDENT IN AN ORGANIZED HEALTH CARE EDUCATION/TRAINING PROGRAM

## 2020-02-24 PROCEDURE — 97130 THER IVNTJ EA ADDL 15 MIN: CPT

## 2020-02-24 PROCEDURE — 25000003 PHARM REV CODE 250: Performed by: INTERNAL MEDICINE

## 2020-02-24 PROCEDURE — 97535 SELF CARE MNGMENT TRAINING: CPT

## 2020-02-24 PROCEDURE — 63600175 PHARM REV CODE 636 W HCPCS: Performed by: INTERNAL MEDICINE

## 2020-02-24 PROCEDURE — 25000003 PHARM REV CODE 250: Performed by: NURSE PRACTITIONER

## 2020-02-24 PROCEDURE — 97116 GAIT TRAINING THERAPY: CPT | Mod: CQ

## 2020-02-24 PROCEDURE — 97110 THERAPEUTIC EXERCISES: CPT

## 2020-02-24 PROCEDURE — S4991 NICOTINE PATCH NONLEGEND: HCPCS | Performed by: INTERNAL MEDICINE

## 2020-02-24 PROCEDURE — 97110 THERAPEUTIC EXERCISES: CPT | Mod: CQ

## 2020-02-24 PROCEDURE — 85025 COMPLETE CBC W/AUTO DIFF WBC: CPT

## 2020-02-24 PROCEDURE — 36415 COLL VENOUS BLD VENIPUNCTURE: CPT

## 2020-02-24 PROCEDURE — 11000004 HC SNF PRIVATE

## 2020-02-24 PROCEDURE — 85610 PROTHROMBIN TIME: CPT

## 2020-02-24 PROCEDURE — 97530 THERAPEUTIC ACTIVITIES: CPT | Mod: CQ

## 2020-02-24 PROCEDURE — 84100 ASSAY OF PHOSPHORUS: CPT

## 2020-02-24 RX ORDER — LANOLIN ALCOHOL/MO/W.PET/CERES
400 CREAM (GRAM) TOPICAL 2 TIMES DAILY
Status: COMPLETED | OUTPATIENT
Start: 2020-02-24 | End: 2020-02-25

## 2020-02-24 RX ORDER — FUROSEMIDE 40 MG/1
40 TABLET ORAL DAILY
Status: DISCONTINUED | OUTPATIENT
Start: 2020-02-24 | End: 2020-02-24

## 2020-02-24 RX ORDER — AMITRIPTYLINE HYDROCHLORIDE 25 MG/1
50 TABLET, FILM COATED ORAL DAILY
Status: DISCONTINUED | OUTPATIENT
Start: 2020-02-24 | End: 2020-02-27

## 2020-02-24 RX ORDER — CIPROFLOXACIN 500 MG/1
500 TABLET ORAL EVERY 12 HOURS
Status: COMPLETED | OUTPATIENT
Start: 2020-02-24 | End: 2020-03-01

## 2020-02-24 RX ORDER — FUROSEMIDE 20 MG/1
20 TABLET ORAL DAILY
Status: DISCONTINUED | OUTPATIENT
Start: 2020-02-25 | End: 2020-02-26

## 2020-02-24 RX ORDER — WARFARIN 2.5 MG/1
7.5 TABLET ORAL DAILY
Status: DISCONTINUED | OUTPATIENT
Start: 2020-02-24 | End: 2020-03-01

## 2020-02-24 RX ORDER — IBUPROFEN 200 MG
1 TABLET ORAL DAILY
Status: DISCONTINUED | OUTPATIENT
Start: 2020-02-25 | End: 2020-03-03

## 2020-02-24 RX ADMIN — DOCUSATE SODIUM - SENNOSIDES 2 TABLET: 50; 8.6 TABLET, FILM COATED ORAL at 09:02

## 2020-02-24 RX ADMIN — LEVETIRACETAM 1000 MG: 500 TABLET, FILM COATED ORAL at 09:02

## 2020-02-24 RX ADMIN — GABAPENTIN 400 MG: 100 CAPSULE ORAL at 09:02

## 2020-02-24 RX ADMIN — AMITRIPTYLINE HYDROCHLORIDE 50 MG: 25 TABLET, FILM COATED ORAL at 05:02

## 2020-02-24 RX ADMIN — NICOTINE 1 PATCH: 21 PATCH, EXTENDED RELEASE TRANSDERMAL at 09:02

## 2020-02-24 RX ADMIN — CAPSAICIN: 0.25 CREAM TOPICAL at 09:02

## 2020-02-24 RX ADMIN — ENOXAPARIN SODIUM 40 MG: 100 INJECTION SUBCUTANEOUS at 05:02

## 2020-02-24 RX ADMIN — CETIRIZINE HYDROCHLORIDE 10 MG: 5 TABLET ORAL at 09:02

## 2020-02-24 RX ADMIN — METOPROLOL TARTRATE 25 MG: 25 TABLET, FILM COATED ORAL at 09:02

## 2020-02-24 RX ADMIN — Medication 400 MG: at 10:02

## 2020-02-24 RX ADMIN — Medication 400 MG: at 09:02

## 2020-02-24 RX ADMIN — CIPROFLOXACIN HYDROCHLORIDE 500 MG: 500 TABLET, FILM COATED ORAL at 09:02

## 2020-02-24 RX ADMIN — GABAPENTIN 400 MG: 100 CAPSULE ORAL at 02:02

## 2020-02-24 RX ADMIN — OXYCODONE HYDROCHLORIDE AND ACETAMINOPHEN 2 TABLET: 5; 325 TABLET ORAL at 04:02

## 2020-02-24 RX ADMIN — FUROSEMIDE 40 MG: 40 TABLET ORAL at 10:02

## 2020-02-24 RX ADMIN — POLYETHYLENE GLYCOL 3350 17 G: 17 POWDER, FOR SOLUTION ORAL at 09:02

## 2020-02-24 RX ADMIN — ATORVASTATIN CALCIUM 80 MG: 20 TABLET, FILM COATED ORAL at 09:02

## 2020-02-24 RX ADMIN — WARFARIN SODIUM 7.5 MG: 2.5 TABLET ORAL at 05:02

## 2020-02-24 RX ADMIN — PANTOPRAZOLE SODIUM 40 MG: 40 TABLET, DELAYED RELEASE ORAL at 09:02

## 2020-02-24 RX ADMIN — CIPROFLOXACIN HYDROCHLORIDE 500 MG: 500 TABLET, FILM COATED ORAL at 01:02

## 2020-02-24 NOTE — PROGRESS NOTES
[facility]                                  Skilled Nursing Facility                   Progress Note     Admit Date: 2/18/2020  KIRBY 3/11/2020  Principal Problem:  Bilateral proximal humeral fractures   HPI obtained from patient interview and chart review     Chief Complaint: Revaluation of medical treatment and therapy status: Lab review; UTI    HPI:   Mr. Melvin is a 60 year old male with PMHx of AFib on coumadin, peripheral vascular disease, hypertension, COPD, current smoker, liver cirrhosis, who presents to SNF following hospitalization for failure of the repair of the greater tuberosity on the right shoulder s/p revision fixation of right greater tuberosity fracture on 2/14 by Dr. Chris.  Admission to SNF for secondary weakness and debility.    Interval history: All of today's labs reviewed and are listed below.  BUN/CR improved 24 hr vital sign ranges listed below.  INR is subtherapeutic at 1.1.  UA/UCx positive for UTI.  Patient states that trazodone is not treating his insomnia, patient requesting to resume home medication amitriptyline.  Patient continues to endorse intermittent productive cough.  Patient denies shortness of breath, abdominal discomfort, nausea, or vomiting.  Patient reports an adequate appetite.  Patient reports having regular bowel movements.  Patient progessing with PT/OT. Continuing to follow and treat all acute and chronic conditions.    Past Medical History: Patient has a past medical history of Adenoma of right adrenal gland (11/09/2017), Alcohol abuse, ASCVD (arteriosclerotic cardiovascular disease) (10/2008), BPH (benign prostatic hyperplasia), Cardiomyopathy, Chronic anxiety, Cirrhosis of liver, COPD (chronic obstructive pulmonary disease), Current every day smoker, Elevated LFTs (08/2001), GERD with esophagitis (11/09/2017), History of colon polyps, History of epididymitis (2016), Hyperlipidemia,  Hypertension, essential, Long term (current) use of anticoagulants, Lumbar disc disease with radiculopathy (05/2015), Major depression, recurrent, chronic, PAF (paroxysmal atrial fibrillation), Peripheral autonomic neuropathy due to DM, Proteinuria, PVD (peripheral vascular disease) (05/29/2009), Venous insufficiency of both lower extremities, and Vitamin D deficiency.    Past Surgical History: Patient has a past surgical history that includes Percutaneous transluminal angioplasty (PTA) of peripheral vessel (Right, 12/2014); Intestinal malrotation repair (Right, 1961); Epidural steroid injection into lumbar spine (06/2015); biopsy right ankle (Right, 05/20/2019); biopsy back (05/20/2019); Colonoscopy w/ polypectomy; Esophagogastroduodenoscopy (N/A, 6/13/2019); Colonoscopy (N/A, 6/13/2019); Partial arthroplasty of shoulder (Left, 1/28/2020); Partial arthroplasty of shoulder (Right, 1/30/2020); and Open reduction and internal fixation (ORIF) of fracture of proximal humerus (Right, 2/14/2020).    Social History: Patient reports that he has been smoking cigarettes. He has a 360.00 pack-year smoking history. He has never used smokeless tobacco. He reports that he drank alcohol. He reports that he does not use drugs.    Family History: family history includes Acromegaly in his father; Diabetes in his mother; Heart attack in his father; Hypertension in his father and mother; Kidney cancer in his brother.    Allergies: Patient has No Known Allergies.    ROS  Constitutional: Negative for fever or fatigue.  +insomnia, fatigue   Eyes: Negative for blurred vision, double vision and discharge.   Respiratory:  +intermittent productive cough. negative for shortness of breath and wheezing.    Cardiovascular: Negative for chest pain, palpitations, and leg swelling.   Gastrointestinal: Negative for abdominal pain, constipation, diarrhea, nausea and vomiting.   Genitourinary: Negative for dysuria, frequency and urgency.    Musculoskeletal:  + generalized weakness, intermittent bilateral upper extremity pain. Negative for back pain and myalgias.   Skin: Negative for itching and rash.   Neurological: Negative for dizziness, speech change, and headaches.   Psychiatric/Behavioral: Negative for depression. The patient is not nervous/anxious.      PEx  Temp:  [98.3 °F (36.8 °C)-98.4 °F (36.9 °C)]   Pulse:  []   Resp:  [18]   BP: (130-137)/(60-70)   SpO2:  [93 %-95 %]      Constitutional: Patient appears well-developed.  No distress noted  HENT:   Head: Normocephalic and atraumatic.   Eyes: Pupils are equal, round  Neck: Normal range of motion. Neck supple.   Cardiovascular: Normal rate, regular rhythm and normal heart sounds.    Pulmonary/Chest: Effort normal and breath sounds are clear  Abdominal: Soft. Bowel sounds are normal.   Musculoskeletal: Normal range of motion.  Decreased range of motion to bilateral upper extremity  Neurological: Alert and oriented to person, place, and time.  Higher level thinking impaired  Psychiatric: Normal mood and affect. Behavior is normal.   Skin: Skin is warm and dry.  Surgical incision open to air to LUE, was Aquacel x1 to RUE.     Recent Labs   Lab 02/24/20  0536      K 4.3      CO2 26   BUN 24*   CREATININE 1.1   MG 1.7       Recent Labs   Lab 02/24/20  1032   WBC 8.28   RBC 4.55*   HGB 12.2*   HCT 42.6      MCV 94   MCH 26.8*   MCHC 28.6*         Assessment and Plan:       Problems addressed today      Enterobacter Aerogenes UTI  - initiated Cipro 500 mg BID x7 days; sensitivity finalized    CKD III  - SCr with wandering baseline 0.8-1.4  - initiated Lasix 80 mg x1 dose, checking BMP tomorrow to see if there is improvement in BUN/creatinine  - 2/24 creatinine/BUN improved, initiating Lasix 20 mg daily    Insomnia  - discontinue trazodone, initiating amitriptyline 50 mg at 6:00 p.m. per family's request patient takes 100 mg at home.  Will increase as appropriate    Essential  hypertension  - Home medications: metoprolol 25 mg BID and ramipril 5 mg daily at home.   - continue Metoprolol 25 mg BID with low normal BPs   - 2/24 stable, continue current treatment at this time    PAF (paroxysmal atrial fibrillation)  Chronic anticoagulation  - continue warfarin at 5 mg daily               For INR 2-3  - Continue metoprolol  - 2/24 INR 1.1, increase Coumadin to 7.5 mg daily    Hypomagnesemia  - initiated magnesium oxide 400 mg BID x2 days    Expected blood loss anemia   - improving without specific intervention; will monitor with twice weekly CBCs    COPD (chronic obstructive pulmonary disease)  Smoker   - levalbuterol nebs  prn  - Limit oxygen to avoid hypercapnea  - smoking cessation advised  - reduced nicotine patch to 14 mg from 21 mg daily       Ongoing but stable problems        Bilateral fracture dislocation of proximal humerus fracture  Bilateral acromial fractures with bilateral shoulder dislocation  S/p bilateral shoulder hemiarthroplasty: right on 1/28/2020 and left on 1/30/2020  S/p Pt underwent revision of fixation of R greater tuberosity fracture on 02/14/20  - Follow up with orthopedics as scheduled  - PT/OT - nonweightbearing bilateral upper extremities in sling  - DVT prophylaxis - continue      Neuropathic pain  - continue Gabapentin 400 mg TID   - continue  Capsaicin cream prn  -  continue percocet 5/325 mg  po q6h prnpain     Pathologic osteoporotic fractures  - Treat vitamin D deficiency  - Further evaluation and management as per PCP     GERD (gastroesophageal reflux disease)  - Continue Pantoprazole 40 mg daily     Stable burst fracture of first lumbar vertebra  L1 and L4 burst fractures  - f/u in neurosurgery clinic as scheduled on 02/21/20 for further management  - TSLO brace whenever not lying in bed     Constipation  - Continue daily miralax and senokot-s      Subdural hematoma  - Stable  - Cleared to resume anticoagualtion     Type 2 diabetes mellitus with CKD III  and HTN  - A1c 5.9 from 01/27/20  - Not on treatment current  - Acuchecks in general <120  - Stop accucheck and SSI     Severe Vitamin D deficiency  - Level of 7 on 2/4  - continue Ergocalciferol 50k weekly     Seizure disorder   - continue keppra 1 g BID  - Follow up with neurology after discharge - appointment needs to be made     Hyperlipidemia   - continue atorvastatin 80 mg daily     GERD   - continue protonix 40 mg daily     Moderate protein calorie malnutrition   - boost plus TID with meals  - Regular diet  - Dietary consult     Acute metabolic encephalopathy  Probable mild Cognitive impairment  - speech therapy eval  - Consider neuropsych eval  - continue scheduled methocarbamol to prn  - Hold amitriptyline for now, will use melatonin for sleep     DJD of multiple sites   - PT/OT. Pain management as above     Cirrhosis of liver   - attributed to alcohol. Patient rarely drinks alcohol at this time.       Future Appointments   Date Time Provider Department Center   2/26/2020  2:15 PM [facility] XRORTHO1 485 LB LIMIT [facility] XRAYORT Tai Maxwell Ort   2/26/2020  2:45 PM Chandler Chris MD [facility] ORTHO Tai Maxwell   3/24/2020  8:00 AM Cristal Ohara NP [facility]   4/23/2020  8:15 AM [facility] OIC-CT2 500 LB LIMIT [facility]   4/23/2020  8:30 AM [facility] OIC-CT2 500 LB LIMIT [facility]   4/23/2020  9:30 AM Carl Barry MD [facility] NEUROS7 Tai Craig NP      DOS: 2/24/2020       Patient note was created using MModal Dictation.  Any errors in syntax or even information may not have been identified and edited on initial review prior to signing this note.

## 2020-02-24 NOTE — PLAN OF CARE
Problem: SLP Goal  Goal: SLP Goal  Description  Speech Language Pathology Goals  Goals expected to be met by 2/27:  1. Pt will complete short term memory tasks with 70% accuracy given min-mod cues.   2. Pt will follow 3-step commands with 70% accuracy given mod cues.   3. Pt will complete moderate level problem solving/reasoning tasks with 80% accuracy given min-mod cues.   4. Pt will participate in evaluation of reading, writing, and visual spatial abilities to determine need for further intervention. Reading and math abilities assessed 2/24. Mildly impaired math abilities (baseline?).  Unable to assess writing due to bilateral humeral fractures.        2/24/2020 1221 by MELANI Bone, CCC-SLP  Outcome: Ongoing, Progressing   Pt's cognitive-linguistic abilities appear to have improved, but some difficulty solving math word problems.  Unsure if this is below baseline.  Will continue to follow, but will discharge when pt appears to be at/near baseline.   MELANI Dominguez, CCC-SLP  Speech Language Pathologist  [phone]  2/24/2020

## 2020-02-24 NOTE — PT/OT/SLP PROGRESS
Speech Language Pathology  Treatment    Ben Melvin   MRN: [id number]   Admitting Diagnosis: Bilateral proximal humeral fractures    Diet recommendations: Solid Diet Level: Regular  Liquid Diet Level: Thin Assistance with meals, HOB to 90 degrees, Monitor for s/s of aspiration and Standard aspiration precautions    SLP Treatment Date: 02/24/20  Speech Start Time: 1001     Speech Stop Time: 1033     Speech Total (min): 32 min       TREATMENT BILLABLE MINUTES:  Speech Therapy Individual 24 and Seld Care/Home Management Training 8           General Precautions: Standard, diabetic, fall, seizure  Current Respiratory Status: room air       Subjective:  Pt seen sitting up in w/c in room,a wake/alert.  SO present part of session.        Objective:      Pt wore reading glasses to complete reading assessment. Pt read a paragraph with only a few minor errors.  Appearing to be near baseline.  Pt completed a picture retention task with 100% accuracy.  He completed a delayed recall task recalling facts provided by SLP after a 3 minute delay with 83% accuracy.  Pt solved word problems related to money and time with 40% accuracy ind'ly/90% given cues. Education provided to pt regarding role of SLP, ongoing assessment of reading and higher level cognitive skills, memory tasks, memory strategies, and SLP treatment plan and POC. Pt expressed understanding.     Assessment:  Ben Melvin is a 60 y.o. male with a medical diagnosis of Bilateral proximal humeral fractures and presents with mild cognitive limitations.    Discharge recommendations: Discharge Facility/Level of Care Needs: (tbd)     Goals:   Multidisciplinary Problems     SLP Goals        Problem: SLP Goal    Goal Priority Disciplines Outcome   SLP Goal     SLP Ongoing, Progressing   Description:  Speech Language Pathology Goals  Goals expected to be met by 2/27:  1. Pt will complete short term memory tasks with 70% accuracy given min-mod cues.   2. Pt will follow  3-step commands with 70% accuracy given mod cues.   3. Pt will complete moderate level problem solving/reasoning tasks with 80% accuracy given min-mod cues.   4. Pt will participate in evaluation of reading, writing, and visual spatial abilities to determine need for further intervention. Reading and math abilities assessed 2/24. Mildly impaired math abilities (baseline?).  Unable to assess writing due to bilateral humeral fractures.                          Plan:   Patient to be seen Therapy Frequency: 3 x/week  Planned Interventions: Cognitive-Linguistic Therapy  Plan of Care expires: 03/18/20  Plan of Care reviewed with: patient, significant other  SLP Follow-up?: Yes              MELANI Dominguez, CCC-SLP  02/24/2020     MELANI Dominguez, CCC-SLP  Speech Language Pathologist  [phone]  2/24/2020

## 2020-02-24 NOTE — PROGRESS NOTES
PHARMACY  NOTE: WARFARIN  Ben Melvin is a 60 y.o. male on warfarin therapy for Atrial Fibrillation. PharmD has been consulted for warfarin dosing.    Current order: warfarin 5 mg daily  Home dose: warfarin 5 mg daily  Coumadin clinic enrollment: Requires enrollment  INR goal: 2-3    Lab Results   Component Value Date    INR 1.1 02/24/2020    INR 1.1 02/23/2020    INR 1.0 02/22/2020     Significant drug interactions:NA  Diet: Adult Regular without supplements     Recommendation(s):    Increase warfarin dose to 7.5 mg daily. (Continue daily enoxaparin for DVT ppx until INR >=2.0 per MD)   Monitor INR daily   Pharmacy will continue to follow and monitor warfarin        Rosa Isela Trujillo, Pharm. D.  Pharmacist  [facility]      **Note:  THE ABOVE RECOMMENDATIONS ARE ONLY SUGGESTED.THE RECOMMENDATIONS SHOULD BE CONSIDERED IN CONJUNCTION WITH ALL PATIENT FACTORS. **

## 2020-02-24 NOTE — PT/OT/SLP PROGRESS
"Physical Therapy  Treatment    Ben Melvin   MRN: [id number]   Admitting Diagnosis: Bilateral proximal humeral fractures    PT Received On: 02/24/20          Billable Minutes:  Gait Training 13, Therapeutic Activity 15 and Therapeutic Exercise 25    Treatment Type: Treatment  PT/PTA: PTA     PTA Visit Number: 4       General Precautions: Standard, diabetic, fall, seizure  Orthopedic Precautions: RUE non weight bearing, LUE non weight bearing, spinal precautions(ROMAT (B)elb/wrist/hand,(L)shldrAAROM/pend,(R)shldr no ROM)   Braces: LSO, Shoulder abduction brace    Spiritual, Cultural Beliefs, Hindu Practices, Values that Affect Care: no    Subjective:  "I'm good" Pt agreebale to therapy    Pain/Comfort  Pain Rating 1: 5/10  Location - Side 1: Bilateral  Location - Orientation 1: generalized  Location 1: shoulder  Pain Addressed 1: Reposition, Distraction  Pain Rating Post-Intervention 1: (not rated)    Objective:  Patient found in wc in gym with wife present       AM-PAC 6 CLICK MOBILITY  Total Score:12    Transfers:  Sit<>Stand: t/f wc Mod/Min A x 2    Gait:  Amb attempted 3 times, pt unable to bear weight through LLE with out buckling at knee and hip     Wheelchair Mobility:  Patient propels w/c 200' and 100' BLE S     Therex:  LAQ, HF and AP 2 x 15  Mini elliptical 23 minutes    Balance:  Static stand CG/SBA    Additional Treatment:  Patient educated on importance of increased time out of bed and SONDRA throughout the day    Patient left up in chair with call button in reach and wife present.    Assessment:  Ben Melvin is a 60 y.o. male with a medical diagnosis of Bilateral proximal humeral fractures.  Patient tolerated treatment  fairly well focusing on transfers, therex, standing balance/tolerance and wc mgmt. Patient will continue to improve with skilled physical therapy services in order to return to functional baseline.    Rehab identified problem list/impairments: weakness, impaired endurance, " impaired sensation, impaired self care skills, impaired functional mobilty, gait instability, impaired balance, decreased upper extremity function, decreased lower extremity function, pain, orthopedic precautions    Rehab potential is good.    Activity tolerance: Fair    Discharge recommendations: rehabilitation facility     Barriers to discharge: Inaccessible home environment    Equipment recommendations: hospital bed, wheelchair     GOALS:   Multidisciplinary Problems     Physical Therapy Goals        Problem: Physical Therapy Goal    Goal Priority Disciplines Outcome Goal Variances Interventions   Physical Therapy Goal     PT, PT/OT Ongoing, Progressing     Description:  Goals to be met by: 3/11/20     Patient will increase functional independence with mobility by performin. Supine to sit with MInimal Assistance  2. Sit to supine with Set-up [place]  3. Sit to stand transfer with Stand-by Assistance  4. Bed to chair transfer with Stand-by Assistance w/o AD  5. Gait  x 50ft w/o AD w/ SBA  6. Wheelchair propulsion x200 feet with Supervision using bilateral lower extremities  7. Ascend/descend 3 stair without Handrails Minimal Assistance   8. Stand for 3 minutes with Stand-by Assistance w/o AD  9. Pt will complete car transfer (actual or simulated) w/o AD w/ CGA                      PLAN:    Patient to be seen 5 x/week  to address the above listed problems via gait training, therapeutic activities, therapeutic exercises, neuromuscular re-education, wheelchair management/training  Plan of Care expires: 20  Plan of Care reviewed with: patient    Shereen Lucia, PTA  2020

## 2020-02-25 LAB
INR PPP: 1.3 (ref 0.8–1.2)
PROTHROMBIN TIME: 13.4 SEC (ref 9–12.5)

## 2020-02-25 PROCEDURE — 25000003 PHARM REV CODE 250: Performed by: NURSE PRACTITIONER

## 2020-02-25 PROCEDURE — S4991 NICOTINE PATCH NONLEGEND: HCPCS | Performed by: NURSE PRACTITIONER

## 2020-02-25 PROCEDURE — 25000003 PHARM REV CODE 250: Performed by: STUDENT IN AN ORGANIZED HEALTH CARE EDUCATION/TRAINING PROGRAM

## 2020-02-25 PROCEDURE — 63600175 PHARM REV CODE 636 W HCPCS: Performed by: INTERNAL MEDICINE

## 2020-02-25 PROCEDURE — 85610 PROTHROMBIN TIME: CPT

## 2020-02-25 PROCEDURE — 11000004 HC SNF PRIVATE

## 2020-02-25 PROCEDURE — 25000003 PHARM REV CODE 250: Performed by: INTERNAL MEDICINE

## 2020-02-25 RX ADMIN — OXYCODONE HYDROCHLORIDE AND ACETAMINOPHEN 2 TABLET: 5; 325 TABLET ORAL at 09:02

## 2020-02-25 RX ADMIN — ENOXAPARIN SODIUM 40 MG: 100 INJECTION SUBCUTANEOUS at 05:02

## 2020-02-25 RX ADMIN — GABAPENTIN 400 MG: 100 CAPSULE ORAL at 09:02

## 2020-02-25 RX ADMIN — Medication 400 MG: at 09:02

## 2020-02-25 RX ADMIN — PANTOPRAZOLE SODIUM 40 MG: 40 TABLET, DELAYED RELEASE ORAL at 09:02

## 2020-02-25 RX ADMIN — DOCUSATE SODIUM - SENNOSIDES 2 TABLET: 50; 8.6 TABLET, FILM COATED ORAL at 09:02

## 2020-02-25 RX ADMIN — AMITRIPTYLINE HYDROCHLORIDE 50 MG: 25 TABLET, FILM COATED ORAL at 05:02

## 2020-02-25 RX ADMIN — CETIRIZINE HYDROCHLORIDE 10 MG: 5 TABLET ORAL at 09:02

## 2020-02-25 RX ADMIN — METOPROLOL TARTRATE 25 MG: 25 TABLET, FILM COATED ORAL at 09:02

## 2020-02-25 RX ADMIN — FUROSEMIDE 20 MG: 20 TABLET ORAL at 09:02

## 2020-02-25 RX ADMIN — ATORVASTATIN CALCIUM 80 MG: 20 TABLET, FILM COATED ORAL at 09:02

## 2020-02-25 RX ADMIN — LEVETIRACETAM 1000 MG: 500 TABLET, FILM COATED ORAL at 09:02

## 2020-02-25 RX ADMIN — POLYETHYLENE GLYCOL 3350 17 G: 17 POWDER, FOR SOLUTION ORAL at 09:02

## 2020-02-25 RX ADMIN — CIPROFLOXACIN HYDROCHLORIDE 500 MG: 500 TABLET, FILM COATED ORAL at 09:02

## 2020-02-25 RX ADMIN — NICOTINE 1 PATCH: 14 PATCH TRANSDERMAL at 09:02

## 2020-02-25 RX ADMIN — CAPSAICIN: 0.25 CREAM TOPICAL at 09:02

## 2020-02-25 RX ADMIN — GABAPENTIN 400 MG: 100 CAPSULE ORAL at 02:02

## 2020-02-25 RX ADMIN — WARFARIN SODIUM 7.5 MG: 2.5 TABLET ORAL at 05:02

## 2020-02-26 ENCOUNTER — OFFICE VISIT (OUTPATIENT)
Dept: ORTHOPEDICS | Facility: CLINIC | Age: 60
End: 2020-02-26
Payer: MEDICARE

## 2020-02-26 ENCOUNTER — HOSPITAL ENCOUNTER (OUTPATIENT)
Dept: RADIOLOGY | Facility: HOSPITAL | Age: 60
Discharge: HOME | End: 2020-02-26
Attending: ORTHOPAEDIC SURGERY
Payer: MEDICARE

## 2020-02-26 DIAGNOSIS — Z96.611 STATUS POST REPLACEMENT OF BOTH SHOULDER JOINTS: ICD-10-CM

## 2020-02-26 DIAGNOSIS — S42.91XD CLOSED FRACTURE DISLOCATION OF JOINT OF RIGHT SHOULDER GIRDLE WITH ROUTINE HEALING, SUBSEQUENT ENCOUNTER: ICD-10-CM

## 2020-02-26 DIAGNOSIS — S42.121D CLOSED DISPLACED FRACTURE OF RIGHT ACROMIAL PROCESS WITH ROUTINE HEALING, SUBSEQUENT ENCOUNTER: ICD-10-CM

## 2020-02-26 DIAGNOSIS — S42.251K CLOSED DISPLACED FRACTURE OF GREATER TUBEROSITY OF RIGHT HUMERUS WITH NONUNION, SUBSEQUENT ENCOUNTER: Primary | ICD-10-CM

## 2020-02-26 DIAGNOSIS — Z96.612 STATUS POST REPLACEMENT OF BOTH SHOULDER JOINTS: ICD-10-CM

## 2020-02-26 DIAGNOSIS — S42.122D: ICD-10-CM

## 2020-02-26 LAB
INR PPP: 1.5 (ref 0.8–1.2)
PROTHROMBIN TIME: 14.5 SEC (ref 9–12.5)

## 2020-02-26 PROCEDURE — 97110 THERAPEUTIC EXERCISES: CPT

## 2020-02-26 PROCEDURE — 25000003 PHARM REV CODE 250: Performed by: NURSE PRACTITIONER

## 2020-02-26 PROCEDURE — 97535 SELF CARE MNGMENT TRAINING: CPT

## 2020-02-26 PROCEDURE — 11000004 HC SNF PRIVATE

## 2020-02-26 PROCEDURE — 73030 X-RAY EXAM OF SHOULDER: CPT | Mod: TC,50

## 2020-02-26 PROCEDURE — 73030 XR SHOULDER COMPLETE 2 OR MORE VIEWS BILATERAL: ICD-10-PCS | Mod: 26,50,, | Performed by: RADIOLOGY

## 2020-02-26 PROCEDURE — 97116 GAIT TRAINING THERAPY: CPT

## 2020-02-26 PROCEDURE — 99999 PR PBB SHADOW E&M-EST. PATIENT-LVL II: CPT | Mod: PBBFAC,,, | Performed by: ORTHOPAEDIC SURGERY

## 2020-02-26 PROCEDURE — 99212 OFFICE O/P EST SF 10 MIN: CPT | Mod: PBBFAC,25 | Performed by: ORTHOPAEDIC SURGERY

## 2020-02-26 PROCEDURE — S4991 NICOTINE PATCH NONLEGEND: HCPCS | Performed by: NURSE PRACTITIONER

## 2020-02-26 PROCEDURE — 85610 PROTHROMBIN TIME: CPT

## 2020-02-26 PROCEDURE — 73030 X-RAY EXAM OF SHOULDER: CPT | Mod: 26,50,, | Performed by: RADIOLOGY

## 2020-02-26 PROCEDURE — 99024 PR POST-OP FOLLOW-UP VISIT: ICD-10-PCS | Mod: POP,,, | Performed by: ORTHOPAEDIC SURGERY

## 2020-02-26 PROCEDURE — 25000003 PHARM REV CODE 250: Performed by: INTERNAL MEDICINE

## 2020-02-26 PROCEDURE — 36415 COLL VENOUS BLD VENIPUNCTURE: CPT

## 2020-02-26 PROCEDURE — 97530 THERAPEUTIC ACTIVITIES: CPT

## 2020-02-26 PROCEDURE — 25000003 PHARM REV CODE 250: Performed by: STUDENT IN AN ORGANIZED HEALTH CARE EDUCATION/TRAINING PROGRAM

## 2020-02-26 PROCEDURE — 99999 PR PBB SHADOW E&M-EST. PATIENT-LVL II: ICD-10-PCS | Mod: PBBFAC,,, | Performed by: ORTHOPAEDIC SURGERY

## 2020-02-26 PROCEDURE — 92507 TX SP LANG VOICE COMM INDIV: CPT

## 2020-02-26 PROCEDURE — 99024 POSTOP FOLLOW-UP VISIT: CPT | Mod: POP,,, | Performed by: ORTHOPAEDIC SURGERY

## 2020-02-26 PROCEDURE — 63600175 PHARM REV CODE 636 W HCPCS: Performed by: INTERNAL MEDICINE

## 2020-02-26 RX ADMIN — CIPROFLOXACIN HYDROCHLORIDE 500 MG: 500 TABLET, FILM COATED ORAL at 09:02

## 2020-02-26 RX ADMIN — FUROSEMIDE 20 MG: 20 TABLET ORAL at 10:02

## 2020-02-26 RX ADMIN — WARFARIN SODIUM 7.5 MG: 2.5 TABLET ORAL at 05:02

## 2020-02-26 RX ADMIN — GABAPENTIN 400 MG: 100 CAPSULE ORAL at 10:02

## 2020-02-26 RX ADMIN — METOPROLOL TARTRATE 25 MG: 25 TABLET, FILM COATED ORAL at 09:02

## 2020-02-26 RX ADMIN — LEVETIRACETAM 1000 MG: 500 TABLET, FILM COATED ORAL at 09:02

## 2020-02-26 RX ADMIN — LEVETIRACETAM 1000 MG: 500 TABLET, FILM COATED ORAL at 10:02

## 2020-02-26 RX ADMIN — ATORVASTATIN CALCIUM 80 MG: 20 TABLET, FILM COATED ORAL at 09:02

## 2020-02-26 RX ADMIN — GABAPENTIN 400 MG: 100 CAPSULE ORAL at 09:02

## 2020-02-26 RX ADMIN — METOPROLOL TARTRATE 25 MG: 25 TABLET, FILM COATED ORAL at 10:02

## 2020-02-26 RX ADMIN — CIPROFLOXACIN HYDROCHLORIDE 500 MG: 500 TABLET, FILM COATED ORAL at 10:02

## 2020-02-26 RX ADMIN — ENOXAPARIN SODIUM 40 MG: 100 INJECTION SUBCUTANEOUS at 05:02

## 2020-02-26 RX ADMIN — DOCUSATE SODIUM - SENNOSIDES 2 TABLET: 50; 8.6 TABLET, FILM COATED ORAL at 10:02

## 2020-02-26 RX ADMIN — AMITRIPTYLINE HYDROCHLORIDE 50 MG: 25 TABLET, FILM COATED ORAL at 05:02

## 2020-02-26 RX ADMIN — OXYCODONE HYDROCHLORIDE AND ACETAMINOPHEN 2 TABLET: 5; 325 TABLET ORAL at 09:02

## 2020-02-26 RX ADMIN — CETIRIZINE HYDROCHLORIDE 10 MG: 5 TABLET ORAL at 10:02

## 2020-02-26 RX ADMIN — PANTOPRAZOLE SODIUM 40 MG: 40 TABLET, DELAYED RELEASE ORAL at 10:02

## 2020-02-26 RX ADMIN — NICOTINE 1 PATCH: 14 PATCH TRANSDERMAL at 10:02

## 2020-02-26 NOTE — PT/OT/SLP PROGRESS
Speech Language Pathology  Treatment    Ben Melvin   MRN: [id number]   [id number]    Admitting Diagnosis: Bilateral proximal humeral fractures    Diet recommendations: Solid Diet Level: Regular  Liquid Diet Level: Thin Assistance with meals, HOB to 90 degrees, Monitor for s/s of aspiration and Standard aspiration precautions    SLP Treatment Date: 02/26/20  Speech Start Time: 0750     Speech Stop Time: 0825     Speech Total (min): 35 min       TREATMENT BILLABLE MINUTES:  Speech Therapy Individual 25 and Seld Care/Home Management Training 10    Has the patient been evaluated by SLP for swallowing? : Yes  Keep patient NPO?: No   General Precautions: Standard, diabetic, fall, seizure  Current Respiratory Status: room air       Subjective:  Patient seen at bedside with significant other present in room.   Patient and significant other report significant improvement noted with short term memory.         Objective:     Patient seen for ongoing cognitive-linguistic therapy. He recalled functional information after a 3+ minute filled delay with 100% acc no cues. He answered problem solving and reasoning questions given hypothetical situations with 90% acc min cues. He requires min-mod cues to answer safety awareness questions. He followed multi-unit directions with 75% acc no repetitions. SLP provided patient education on reasoning for therapy tasks, goals of care, ways to independently target cognitive-linguistic skills, and SLP POC. All questions addressed and patient verbalized understanding of all discussed.     Assessment:  Ben Melvin is a 60 y.o. male with a medical diagnosis of Bilateral proximal humeral fractures and presents with mild cognitive limitations.    Discharge recommendations: Discharge Facility/Level of Care Needs: (tbd)     Goals:   Multidisciplinary Problems     SLP Goals        Problem: SLP Goal    Goal Priority Disciplines Outcome   SLP Goal     SLP Ongoing, Progressing    Description:  Speech Language Pathology Goals  Goals expected to be met by 2/27:  1. Pt will complete short term memory tasks with 70% accuracy given min-mod cues.   2. Pt will follow 3-step commands with 70% accuracy given mod cues.   3. Pt will complete moderate level problem solving/reasoning tasks with 80% accuracy given min-mod cues.   4. Pt will participate in evaluation of reading, writing, and visual spatial abilities to determine need for further intervention. Reading and math abilities assessed 2/24. Mildly impaired math abilities (baseline?).  Unable to assess writing due to bilateral humeral fractures.                          Plan:   Patient to be seen Therapy Frequency: 3 x/week  Planned Interventions: Cognitive-Linguistic Therapy  Plan of Care expires: 03/18/20  Plan of Care reviewed with: patient, significant other  SLP Follow-up?: Yes              MELANI Zepeda, CCC-SLP   02/26/2020

## 2020-02-26 NOTE — PLAN OF CARE
Problem: SLP Goal  Goal: SLP Goal  Description  Speech Language Pathology Goals  Goals expected to be met by 2/27:  1. Pt will complete short term memory tasks with 70% accuracy given min-mod cues.   2. Pt will follow 3-step commands with 70% accuracy given mod cues.   3. Pt will complete moderate level problem solving/reasoning tasks with 80% accuracy given min-mod cues.   4. Pt will participate in evaluation of reading, writing, and visual spatial abilities to determine need for further intervention. Reading and math abilities assessed 2/24. Mildly impaired math abilities (baseline?).  Unable to assess writing due to bilateral humeral fractures.        Outcome: Ongoing, Progressing   ST will continue to follow.   FELISA Schmitt., CCC-SLP  02/26/2020

## 2020-02-26 NOTE — PT/OT/SLP PROGRESS
Physical Therapy  Treatment    Ben Melvin   MRN: [id number]   Admitting Diagnosis: Bilateral proximal humeral fractures    PT Received On: 02/26/20          Billable Minutes:  Gait Training 25, Therapeutic Activity 15, Therapeutic Exercise 15 and Total Time 55    Treatment Type: Treatment  PT/PTA: PT     PTA Visit Number: 0       General Precautions: Standard, diabetic, fall, seizure  Orthopedic Precautions: RUE non weight bearing, LUE non weight bearing, spinal precautions(ROMAT (B)elb/wrist/hand,(L)shldrAAROM/pend,(R)shldr no ROM)   Braces: LSO, Shoulder abduction brace    Spiritual, Cultural Beliefs, Shinto Practices, Values that Affect Care: no    Subjective:  Communicated with patient prior to session.  Pt agreeable to session. Pt's wife present t/o session for observation.    Pain/Comfort  Pain Rating 1: 6/10  Location - Side 1: Left  Location - Orientation 1: generalized  Location 1: thigh  Pain Addressed 1: Pre-medicate for activity  Pain Rating Post-Intervention 1: 6/10    Objective:  Patient found supine in bed. Patient found with: (LSO and (B) shoulder abdiction braces)   All braces were adjusted for proper fit at start of session.     AM-PAC 6 CLICK MOBILITY  Total Score:15    Bed Mobility:  Supine>Sit: on bed w/ ModA for trunk  HOB elevated, BUE NWB    Transfers:  Sit<>Stand: to/from w/c, x3 trials, w/o AD w/ Min/CGA for stability w/ rise  Stand Pivot Transfer: EOB>w/c and w/c<>nustep w/o AD w/ Min/CGA for stability  Pt maintains BUE NWB    Gait:  Amb 3 trials (38ft, 45ft, and 95ft) w/o AD w/ Min/CGA for stability due to lateral sway  Pt also noting a pre existing leg length discrepancy that causes some instability  Cues to slow down at times for safety    Wheelchair Mobility:  Patient propels w/c 200ft w/ BLE and SPV     Additional Treatment:  Recumbent cross , Level 3, x15min w/ BLE only to inc strength/endurance    Patient left up in chair with call button in  reach.    Assessment:  Ben Melvin is a 60 y.o. male with a medical diagnosis of Bilateral proximal humeral fractures.  Pt mary session well w/ good participation. He had a much improved tolerance to LLE WB thus was able to improve transfers and inc gait distance significantly. He will continue PT POC.    Rehab identified problem list/impairments: weakness, impaired endurance, impaired sensation, impaired self care skills, impaired functional mobilty, gait instability, impaired balance, decreased upper extremity function, decreased lower extremity function, pain, orthopedic precautions    Rehab potential is good.    Activity tolerance: Good    Discharge recommendations: rehabilitation facility     Barriers to discharge: Inaccessible home environment    Equipment recommendations: hospital bed, wheelchair     GOALS:   Multidisciplinary Problems     Physical Therapy Goals        Problem: Physical Therapy Goal    Goal Priority Disciplines Outcome Goal Variances Interventions   Physical Therapy Goal     PT, PT/OT Ongoing, Progressing     Description:  Goals to be met by: 3/11/20     Patient will increase functional independence with mobility by performin. Supine to sit with MInimal Assistance  2. Sit to supine with Set-up [place]  3. Sit to stand transfer with Stand-by Assistance  4. Bed to chair transfer with Stand-by Assistance w/o AD  5. Gait  x 50ft w/o AD w/ SBA  6. Wheelchair propulsion x200 feet with Supervision using bilateral lower extremities  7. Ascend/descend 3 stair without Handrails Minimal Assistance   8. Stand for 3 minutes with Stand-by Assistance w/o AD  9. Pt will complete car transfer (actual or simulated) w/o AD w/ CGA                      PLAN:    Patient to be seen 5 x/week  to address the above listed problems via gait training, therapeutic activities, therapeutic exercises, neuromuscular re-education, wheelchair management/training  Plan of Care expires: 20  Plan of Care  reviewed with: patient    Taryn MELENDEZ Amarjit, PT  02/26/2020

## 2020-02-27 LAB
ANION GAP SERPL CALC-SCNC: 9 MMOL/L (ref 8–16)
BASOPHILS # BLD AUTO: 0.08 K/UL (ref 0–0.2)
BASOPHILS NFR BLD: 1.2 % (ref 0–1.9)
BUN SERPL-MCNC: 31 MG/DL (ref 6–20)
CALCIUM SERPL-MCNC: 8.8 MG/DL (ref 8.7–10.5)
CHLORIDE SERPL-SCNC: 102 MMOL/L (ref 95–110)
CO2 SERPL-SCNC: 29 MMOL/L (ref 23–29)
CREAT SERPL-MCNC: 1.4 MG/DL (ref 0.5–1.4)
DIFFERENTIAL METHOD: ABNORMAL
EOSINOPHIL # BLD AUTO: 0.4 K/UL (ref 0–0.5)
EOSINOPHIL NFR BLD: 6.3 % (ref 0–8)
ERYTHROCYTE [DISTWIDTH] IN BLOOD BY AUTOMATED COUNT: 16.6 % (ref 11.5–14.5)
EST. GFR  (AFRICAN AMERICAN): >60 ML/MIN/1.73 M^2
EST. GFR  (NON AFRICAN AMERICAN): 54.6 ML/MIN/1.73 M^2
GLUCOSE SERPL-MCNC: 98 MG/DL (ref 70–110)
HCT VFR BLD AUTO: 36.9 % (ref 40–54)
HGB BLD-MCNC: 10.8 G/DL (ref 14–18)
IMM GRANULOCYTES # BLD AUTO: 0.09 K/UL (ref 0–0.04)
IMM GRANULOCYTES NFR BLD AUTO: 1.4 % (ref 0–0.5)
INR PPP: 1.7 (ref 0.8–1.2)
LYMPHOCYTES # BLD AUTO: 2.1 K/UL (ref 1–4.8)
LYMPHOCYTES NFR BLD: 33 % (ref 18–48)
MAGNESIUM SERPL-MCNC: 1.7 MG/DL (ref 1.6–2.6)
MCH RBC QN AUTO: 26.7 PG (ref 27–31)
MCHC RBC AUTO-ENTMCNC: 29.3 G/DL (ref 32–36)
MCV RBC AUTO: 91 FL (ref 82–98)
MONOCYTES # BLD AUTO: 0.5 K/UL (ref 0.3–1)
MONOCYTES NFR BLD: 8 % (ref 4–15)
NEUTROPHILS # BLD AUTO: 3.3 K/UL (ref 1.8–7.7)
NEUTROPHILS NFR BLD: 50.1 % (ref 38–73)
NRBC BLD-RTO: 0 /100 WBC
PHOSPHATE SERPL-MCNC: 4.4 MG/DL (ref 2.7–4.5)
PLATELET # BLD AUTO: 230 K/UL (ref 150–350)
PMV BLD AUTO: 9.2 FL (ref 9.2–12.9)
POTASSIUM SERPL-SCNC: 4 MMOL/L (ref 3.5–5.1)
PROTHROMBIN TIME: 16.2 SEC (ref 9–12.5)
RBC # BLD AUTO: 4.05 M/UL (ref 4.6–6.2)
SODIUM SERPL-SCNC: 140 MMOL/L (ref 136–145)
WBC # BLD AUTO: 6.49 K/UL (ref 3.9–12.7)

## 2020-02-27 PROCEDURE — S4991 NICOTINE PATCH NONLEGEND: HCPCS | Performed by: NURSE PRACTITIONER

## 2020-02-27 PROCEDURE — 84100 ASSAY OF PHOSPHORUS: CPT

## 2020-02-27 PROCEDURE — 80048 BASIC METABOLIC PNL TOTAL CA: CPT

## 2020-02-27 PROCEDURE — 97110 THERAPEUTIC EXERCISES: CPT | Mod: CO

## 2020-02-27 PROCEDURE — 63600175 PHARM REV CODE 636 W HCPCS: Performed by: INTERNAL MEDICINE

## 2020-02-27 PROCEDURE — 83735 ASSAY OF MAGNESIUM: CPT

## 2020-02-27 PROCEDURE — 25000003 PHARM REV CODE 250: Performed by: STUDENT IN AN ORGANIZED HEALTH CARE EDUCATION/TRAINING PROGRAM

## 2020-02-27 PROCEDURE — 36415 COLL VENOUS BLD VENIPUNCTURE: CPT

## 2020-02-27 PROCEDURE — 97110 THERAPEUTIC EXERCISES: CPT

## 2020-02-27 PROCEDURE — 25000003 PHARM REV CODE 250: Performed by: INTERNAL MEDICINE

## 2020-02-27 PROCEDURE — 25000003 PHARM REV CODE 250: Performed by: NURSE PRACTITIONER

## 2020-02-27 PROCEDURE — 97803 MED NUTRITION INDIV SUBSEQ: CPT

## 2020-02-27 PROCEDURE — 11000004 HC SNF PRIVATE

## 2020-02-27 PROCEDURE — 97116 GAIT TRAINING THERAPY: CPT

## 2020-02-27 PROCEDURE — 97530 THERAPEUTIC ACTIVITIES: CPT

## 2020-02-27 PROCEDURE — 85610 PROTHROMBIN TIME: CPT

## 2020-02-27 PROCEDURE — 85025 COMPLETE CBC W/AUTO DIFF WBC: CPT

## 2020-02-27 RX ORDER — LANOLIN ALCOHOL/MO/W.PET/CERES
400 CREAM (GRAM) TOPICAL 2 TIMES DAILY
Status: COMPLETED | OUTPATIENT
Start: 2020-02-27 | End: 2020-02-29

## 2020-02-27 RX ORDER — AMITRIPTYLINE HYDROCHLORIDE 25 MG/1
100 TABLET, FILM COATED ORAL DAILY
Status: DISCONTINUED | OUTPATIENT
Start: 2020-02-27 | End: 2020-03-03 | Stop reason: HOSPADM

## 2020-02-27 RX ORDER — FUROSEMIDE 20 MG/1
20 TABLET ORAL DAILY
Status: DISCONTINUED | OUTPATIENT
Start: 2020-02-27 | End: 2020-03-02

## 2020-02-27 RX ADMIN — METOPROLOL TARTRATE 25 MG: 25 TABLET, FILM COATED ORAL at 09:02

## 2020-02-27 RX ADMIN — NICOTINE 1 PATCH: 14 PATCH TRANSDERMAL at 10:02

## 2020-02-27 RX ADMIN — DOCUSATE SODIUM - SENNOSIDES 2 TABLET: 50; 8.6 TABLET, FILM COATED ORAL at 10:02

## 2020-02-27 RX ADMIN — AMITRIPTYLINE HYDROCHLORIDE 100 MG: 25 TABLET, FILM COATED ORAL at 05:02

## 2020-02-27 RX ADMIN — ENOXAPARIN SODIUM 40 MG: 100 INJECTION SUBCUTANEOUS at 05:02

## 2020-02-27 RX ADMIN — WARFARIN SODIUM 7.5 MG: 2.5 TABLET ORAL at 05:02

## 2020-02-27 RX ADMIN — METOPROLOL TARTRATE 25 MG: 25 TABLET, FILM COATED ORAL at 10:02

## 2020-02-27 RX ADMIN — CAPSAICIN: 0.25 CREAM TOPICAL at 03:02

## 2020-02-27 RX ADMIN — OXYCODONE HYDROCHLORIDE AND ACETAMINOPHEN 2 TABLET: 5; 325 TABLET ORAL at 09:02

## 2020-02-27 RX ADMIN — LEVETIRACETAM 1000 MG: 500 TABLET, FILM COATED ORAL at 09:02

## 2020-02-27 RX ADMIN — CIPROFLOXACIN HYDROCHLORIDE 500 MG: 500 TABLET, FILM COATED ORAL at 10:02

## 2020-02-27 RX ADMIN — CAPSAICIN: 0.25 CREAM TOPICAL at 09:02

## 2020-02-27 RX ADMIN — FUROSEMIDE 20 MG: 20 TABLET ORAL at 01:02

## 2020-02-27 RX ADMIN — GABAPENTIN 400 MG: 100 CAPSULE ORAL at 10:02

## 2020-02-27 RX ADMIN — CETIRIZINE HYDROCHLORIDE 10 MG: 5 TABLET ORAL at 10:02

## 2020-02-27 RX ADMIN — GABAPENTIN 400 MG: 100 CAPSULE ORAL at 02:02

## 2020-02-27 RX ADMIN — GABAPENTIN 400 MG: 100 CAPSULE ORAL at 09:02

## 2020-02-27 RX ADMIN — PANTOPRAZOLE SODIUM 40 MG: 40 TABLET, DELAYED RELEASE ORAL at 10:02

## 2020-02-27 RX ADMIN — Medication 400 MG: at 09:02

## 2020-02-27 RX ADMIN — ATORVASTATIN CALCIUM 80 MG: 20 TABLET, FILM COATED ORAL at 09:02

## 2020-02-27 RX ADMIN — LEVETIRACETAM 1000 MG: 500 TABLET, FILM COATED ORAL at 10:02

## 2020-02-27 RX ADMIN — CIPROFLOXACIN HYDROCHLORIDE 500 MG: 500 TABLET, FILM COATED ORAL at 09:02

## 2020-02-27 NOTE — PT/OT/SLP PROGRESS
Physical Therapy  Treatment    Ben Melvin   MRN: [id number]   Admitting Diagnosis: Bilateral proximal humeral fractures    PT Received On: 02/27/20        Billable Minutes:  Gait Training 23, Therapeutic Activity 10, Therapeutic Exercise 20 and Total Time 53    Treatment Type: Treatment  PT/PTA: PT     PTA Visit Number: 0       General Precautions: Standard, diabetic, fall, seizure  Orthopedic Precautions: RUE non weight bearing, LUE non weight bearing, spinal precautions(ROMAT (B)elb/wrist/hand,(L)shldrAAROM/pend,(R)shldr no ROM)   Braces: LSO, Shoulder abduction brace    Spiritual, Cultural Beliefs, Cheondoism Practices, Values that Affect Care: no    Subjective:  Communicated with patient and his wife prior to session.  Pt agreeable to session.     Pain/Comfort  Pain Rating 1: 8/10  Location - Side 1: Left  Location - Orientation 1: generalized  Location 1: thigh  Pain Addressed 1: Reposition, Distraction  Pain Rating Post-Intervention 1: 4/10    Objective:  Patient found sitting EOB. Pt's wife present t/o session for observation. Patient found with: (LSO and (B) shoulder abduction braces)  PT adjusted all braces at start of session w/ pt sitting EOB. Pt's wife assisting.     AM-PAC 6 CLICK MOBILITY  Total Score:15    Bed Mobility:  Not performed    Transfers:  Sit<>Stand: to/from EOB (1 trial), w/c (3 trials), and EOM (1 trial); all w/o AD w/ CGA/close SBA for safety  Stand Pivot Transfer: EOB>w/c, to/from EOM, and to/from nustep w/o AD w/ CGA/close SBA for safety    Gait:  Amb 4 trials (50ft, 50ft, 50ft, and 30ft) w/o AD w/ CGA for safety  Lateral sway noted due to leg length discrepancy    Limited in distance by LLE pain    Wheelchair Mobility:  Patient propels w/c 100ft w/ BLE and SPV     Therex:  Seated EOM modified crunches w/ red theraball, 2x15 reps    Balance:  Static stand w/o AD w/ CGA for safety    Additional Treatment:  Recumbent cross , Level 4, x15min w/ BLE to inc overall  strength/endurance    Patient left up in chair with call button in reach.    Assessment:  Ben Melvin is a 60 y.o. male with a medical diagnosis of Bilateral proximal humeral fractures.  Pt mary session well despite LLE pain. He remains at a CGA level overall for mobility w/o AD. He is progressing well but remains limited by pain and instability. He will continue to benefit from skilled PT services.    Rehab identified problem list/impairments: weakness, impaired endurance, impaired sensation, impaired self care skills, impaired functional mobilty, gait instability, impaired balance, decreased upper extremity function, decreased lower extremity function, pain, orthopedic precautions    Rehab potential is good.    Activity tolerance: Good    Discharge recommendations: rehabilitation facility     Barriers to discharge: Inaccessible home environment    Equipment recommendations: hospital bed, wheelchair     GOALS:   Multidisciplinary Problems     Physical Therapy Goals        Problem: Physical Therapy Goal    Goal Priority Disciplines Outcome Goal Variances Interventions   Physical Therapy Goal     PT, PT/OT Ongoing, Progressing     Description:  Goals to be met by: 3/11/20     Patient will increase functional independence with mobility by performin. Supine to sit with MInimal Assistance  2. Sit to supine with Set-up [place]  3. Sit to stand transfer with Stand-by Assistance  4. Bed to chair transfer with Stand-by Assistance w/o AD  5. Gait  x 50ft w/o AD w/ SBA  6. Wheelchair propulsion x200 feet with Supervision using bilateral lower extremities  7. Ascend/descend 3 stair without Handrails Minimal Assistance   8. Stand for 3 minutes with Stand-by Assistance w/o AD  9. Pt will complete car transfer (actual or simulated) w/o AD w/ CGA                      PLAN:    Patient to be seen 5 x/week  to address the above listed problems via gait training, therapeutic activities, therapeutic exercises,  neuromuscular re-education, wheelchair management/training  Plan of Care expires: 03/20/20  Plan of Care reviewed with: patient    Taryn Ocasio, PT  02/27/2020

## 2020-02-27 NOTE — PROGRESS NOTES
[facility]                                  Skilled Nursing Facility                   Progress Note     Admit Date: 2/18/2020  KIRBY 3/11/2020  Principal Problem:  Bilateral proximal humeral fractures   HPI obtained from patient interview and chart review     Chief Complaint: Revaluation of medical treatment and therapy status: Lab review; re-evaluation of insomnia    HPI:   Mr. Melvin is a 60 year old male with PMHx of AFib on coumadin, peripheral vascular disease, hypertension, COPD, current smoker, liver cirrhosis, who presents to SNF following hospitalization for failure of the repair of the greater tuberosity on the right shoulder s/p revision fixation of right greater tuberosity fracture on 2/14 by Dr. Chris.  Admission to SNF for secondary weakness and debility.    Interval history: All of today's labs reviewed and are listed below.  BUN/Cr 31/1.4 from 24/1.1. 24 hr vital sign ranges listed below.  INR is subtherapeutic at 1.7- steadily coming up since dose increase.  Continues on p.o. antibiotics for UTI.  Patient resumed home medication amitriptyline for insomnia at decreased dose at 50 mg QHS- patient states this is not adequate and he would like his home dose of 100 mg.. Patient denies shortness of breath or cough, abdominal discomfort, nausea, or vomiting.  Patient reports an adequate appetite.  Patient reports having regular bowel movements.  Patient progessing with PT/OT. Continuing to follow and treat all acute and chronic conditions.    Past Medical History: Patient has a past medical history of Adenoma of right adrenal gland (11/09/2017), Alcohol abuse, ASCVD (arteriosclerotic cardiovascular disease) (10/2008), BPH (benign prostatic hyperplasia), Cardiomyopathy, Chronic anxiety, Cirrhosis of liver, COPD (chronic obstructive pulmonary disease), Current every day smoker, Elevated LFTs (08/2001), GERD with esophagitis  (11/09/2017), History of colon polyps, History of epididymitis (2016), Hyperlipidemia, Hypertension, essential, Long term (current) use of anticoagulants, Lumbar disc disease with radiculopathy (05/2015), Major depression, recurrent, chronic, PAF (paroxysmal atrial fibrillation), Peripheral autonomic neuropathy due to DM, Proteinuria, PVD (peripheral vascular disease) (05/29/2009), Venous insufficiency of both lower extremities, and Vitamin D deficiency.    Past Surgical History: Patient has a past surgical history that includes Percutaneous transluminal angioplasty (PTA) of peripheral vessel (Right, 12/2014); Intestinal malrotation repair (Right, 1961); Epidural steroid injection into lumbar spine (06/2015); biopsy right ankle (Right, 05/20/2019); biopsy back (05/20/2019); Colonoscopy w/ polypectomy; Esophagogastroduodenoscopy (N/A, 6/13/2019); Colonoscopy (N/A, 6/13/2019); Partial arthroplasty of shoulder (Left, 1/28/2020); Partial arthroplasty of shoulder (Right, 1/30/2020); and Open reduction and internal fixation (ORIF) of fracture of proximal humerus (Right, 2/14/2020).    Social History: Patient reports that he has been smoking cigarettes. He has a 360.00 pack-year smoking history. He has never used smokeless tobacco. He reports that he drank alcohol. He reports that he does not use drugs.    Family History: family history includes Acromegaly in his father; Diabetes in his mother; Heart attack in his father; Hypertension in his father and mother; Kidney cancer in his brother.    Allergies: Patient has No Known Allergies.    ROS  Constitutional: Negative for fever or fatigue.  +insomnia, fatigue   Eyes: Negative for blurred vision, double vision and discharge.   Respiratory: negative for cough, shortness of breath and wheezing.    Cardiovascular: Negative for chest pain, palpitations, and leg swelling.   Gastrointestinal: Negative for abdominal pain, constipation, diarrhea, nausea and vomiting.    Genitourinary: Negative for dysuria, frequency and urgency.   Musculoskeletal:  + generalized weakness, intermittent bilateral upper extremity pain. Negative for back pain and myalgias.   Skin: Negative for itching and rash.   Neurological: Negative for dizziness, speech change, and headaches.   Psychiatric/Behavioral: Negative for depression. The patient is not nervous/anxious.      PEx  Temp:  [98.2 °F (36.8 °C)]   Pulse:  [103]   Resp:  [18]   BP: (125)/(56)      Constitutional: Patient appears well-developed.  No distress noted  HENT:   Head: Normocephalic and atraumatic.   Eyes: Pupils are equal, round  Neck: Normal range of motion. Neck supple.   Cardiovascular: Normal rate, regular rhythm and normal heart sounds.    Pulmonary/Chest: Effort normal and breath sounds are clear  Abdominal: Soft. Bowel sounds are normal.   Musculoskeletal: Normal range of motion.  Decreased range of motion to bilateral upper extremity  Neurological: Alert and oriented to person, place, and time.  Higher level thinking impaired  Psychiatric: Normal mood and affect. Behavior is normal.   Skin: Skin is warm and dry.  Surgical incision to RUE 14cm in length, LUE 15 cm in length- no drainage noted, fully approximated, no signs of infection- following weekly last visualized on 02/27    Recent Labs   Lab 02/27/20  0546      K 4.0      CO2 29   BUN 31*   CREATININE 1.4   MG 1.7       No results for input(s): WBC, RBC, HGB, HCT, PLT, MCV, MCH, MCHC in the last 24 hours.      Assessment and Plan:       Problems addressed today      Insomnia  - increased amitriptyline to 100 mg at 6:00 p.m.     Enterobacter Aerogenes UTI  - continue Cipro 500 mg BID x7 days; sensitivity finalized    CKD III  - SCr with wandering baseline 0.8-1.4  - initiated Lasix 80 mg x1 dose, checking BMP tomorrow to see if there is improvement in BUN/creatinine\  - 2/25 DC lasix for concerns of dehydration  - 2/27 creatinine/BUN with slight increase, slight  increase in weight, initiating Lasix 20 mg daily    Essential hypertension  - Home medications: metoprolol 25 mg BID and ramipril 5 mg daily at home.   - continue Metoprolol 25 mg BID with low normal BPs   - 2/27 stable, continue current treatment at this time    PAF (paroxysmal atrial fibrillation)  Chronic anticoagulation  - continue warfarin at 5 mg daily               For INR 2-3  - Continue metoprolol  - 2/24 INR 1.1, increase Coumadin to 7.5 mg daily  - 2/27 INR increased to 1.7- will hold off on further increase at this time    Hypomagnesemia  - initiated magnesium oxide 400 mg BID x2 days    Expected blood loss anemia   - stable without specific intervention; will monitor with twice weekly CBCs    Bilateral fracture dislocation of proximal humerus fracture  Bilateral acromial fractures with bilateral shoulder dislocation  S/p bilateral shoulder hemiarthroplasty: right on 1/28/2020 and left on 1/30/2020  S/p Pt underwent revision of fixation of R greater tuberosity fracture on 02/14/20  - Follow up with orthopedics as scheduled  - PT/OT - nonweightbearing bilateral upper extremities in sling  - DVT prophylaxis - continue    - 2/27 patient went to ortho follow-up appointment on 02/26 following recommendations were given:  Nonweightbearing bilateral upper extremity times 3 months postop   shoulder sling x6 weeks postop, may remove for hygiene and physical therapy.       DC left sling in 2 weeks from today - 3.11  DC right sling in 4 weeks from today - 3.26     Okay for range of motion as tolerated hand wrist elbow now.  Okay for active assisted range of motion of shoulder and pendulums     Okay for range of motion as tolerated of shoulders after slings d/c (see above dates)  No strengthening exercises until 3 months postop       Ongoing but stable problems        COPD (chronic obstructive pulmonary disease)  Smoker   - levalbuterol nebs  prn  - Limit oxygen to avoid hypercapnea  - smoking cessation advised  -  reduced nicotine patch to 14 mg from 21 mg daily     Neuropathic pain  - continue Gabapentin 400 mg TID   - continue Capsaicin cream prn  -  continue percocet 5/325 mg  po q6h prnpain     Pathologic osteoporotic fractures  - Treat vitamin D deficiency  - Further evaluation and management as per PCP     GERD (gastroesophageal reflux disease)  - Continue Pantoprazole 40 mg daily     Stable burst fracture of first lumbar vertebra  L1 and L4 burst fractures  - f/u in neurosurgery clinic as scheduled on 02/21/20 for further management  - TSLO brace whenever not lying in bed     Constipation  - Continue daily miralax and senokot-s      Subdural hematoma  - Stable  - Cleared to resume anticoagualtion     Type 2 diabetes mellitus with CKD III and HTN  - A1c 5.9 from 01/27/20  - Not on treatment current  - Acuchecks in general <120  - Stop accucheck and SSI     Severe Vitamin D deficiency  - Level of 7 on 2/4  - continue Ergocalciferol 50k weekly     Seizure disorder   - continue keppra 1 g BID  - Follow up with neurology after discharge - appointment needs to be made     Hyperlipidemia   - continue atorvastatin 80 mg daily     GERD   - continue protonix 40 mg daily     Moderate protein calorie malnutrition   - boost plus TID with meals  - Regular diet  - Dietary consult     Acute metabolic encephalopathy  Probable mild Cognitive impairment  - speech therapy eval  - Consider neuropsych eval  - continue scheduled methocarbamol to prn  - Hold amitriptyline for now, will use melatonin for sleep     DJD of multiple sites   - PT/OT. Pain management as above     Cirrhosis of liver   - attributed to alcohol. Patient rarely drinks alcohol at this time.       Future Appointments   Date Time Provider Department Center   3/24/2020  8:00 AM Cristal Ohara NP [facility]  [facility]   3/25/2020  8:15 AM Chandler Chris MD [facility] ORTHO [address]   4/23/2020  8:15 AM [facility] OI-CT2 [address] LIMIT [facility]   4/23/2020   8:30 AM [facility] OI-CT2 [address] LIMIT [facility]   4/23/2020  9:30 AM Carl Barry MD [facility] NEUROS7 [address] NP      DOS: 2/27/2020       Patient note was created using MModal Dictation.  Any errors in syntax or even information may not have been identified and edited on initial review prior to signing this note.

## 2020-02-27 NOTE — PT/OT/SLP PROGRESS
Occupational Therapy  Treatment    Ben Melvin   MRN: [id number]   Admitting Diagnosis: Bilateral proximal humeral fractures    OT Date of Treatment: 02/27/20       Billable Minutes:  Therapeutic Exercise 49    General Precautions: Standard, diabetic, fall, seizure  Orthopedic Precautions: RUE non weight bearing, LUE non weight bearing, spinal precautions(BROMelbowristhand; LAAROM shld pendNOshldRUE ROM/pend to2-28) Per MD post op visit on 2/26, ok for AAROM to BUE shoulders and pendulums as tolerated  Braces: TLSO, Shoulder abduction brace(bilateral)    Spiritual, Cultural Beliefs, Judaism Practices, Values that Affect Care: no    Subjective:  Communicated with patient prior to session.  Pt agreeable to therapy.     Pain/Comfort  Pain Rating 1: 2/10  Location - Side 1: Bilateral  Location 1: shoulder  Pain Addressed 1: Reposition, Cessation of Activity  Pain Rating Post-Intervention 1: 2/10    Objective:   Pt found seated in w/c in gym.    Occupational Performance:    Bed Mobility:    · Patient completed Supine to Sit with moderate assistance on therapy mat  · Patient completed Sit to Supine with moderate assistance on therapy mat     Functional Mobility/Transfers:  · Patient completed Sit <> Stand Transfer with contact guard assistance  with  no assistive device   · Patient completed w/c <> Mat Stand Pivot technique with contact guard assistance with no assistive device    Activities of Daily Living:  · Upper Body Dressing: donned/doff B shoulder braces for therex, pts wife able to assist      AMPAC 6 Click:  AMPAC Total Score: 6    OT Exercises: BUE AROM forearm, wrist, digits, elbow (with support) while seated EOM  Supine on mat: RUE PROM shoulder forward flexion neutral position ~90 degrees, minimal c/o pain (OK for AAROM to shoulder per MD post op note 2/26, LUE PROM shoulder forward flexion neutral position ~70 degrees with increased pain . Pt tends to guard with pain and requires cues to relax shoulder.  Pain greater in LUE than RUE with ROM.    Additional Treatment:  -UE ROM therex per MD order as noted above  -bed mobility and transfers assessed as noted above      Patient left up in chair with wife present    ASSESSMENT:  Ben Melvin is a 60 y.o. male with a medical diagnosis of Bilateral proximal humeral fractures. Pt tolerated OT session well today with improvements noted in functional transfers this date. Pt tolerated UE ROM  (per MD orders/post op visit 2/26). Pt continues to be limited with basic self care tasks 2* strict ortho precautions. He will continue to require extensive OT intervention to address pt functional deficits and for caregiver training/education. Pt with supportive spouse who has been managing and assisting in his care at this time.    Rehab identified problem list/impairments: impaired endurance, impaired self care skills, impaired functional mobilty, decreased upper extremity function    Rehab potential is good    Activity tolerance: Good    Discharge recommendations: rehabilitation facility     Barriers to discharge: Inaccessible home environment     Equipment recommendations: bedside commode, wheelchair, hospital bed     GOALS:   Multidisciplinary Problems     Occupational Therapy Goals        Problem: Occupational Therapy Goal    Goal Priority Disciplines Outcome Interventions   Occupational Therapy Goal     OT, PT/OT Ongoing, Progressing    Description:  Goals to be met by: 03-11-20   Pt. Family to be I with assisting pt. With UBD, LBD   Pt. Family to be I with don/doff all braces  Pt. Family and pt. To be I with performing there ex to BUE as per ortho orders  Pt. To perform SPT without an AD and SBA  Pt. To engage in standing activities with SBA x 10 minutes    Patient will increase functional independence with ADLs by performing:                          Plan:  Patient to be seen 6 x/week to address the above listed problems via self-care/home management, therapeutic  activities, therapeutic exercises  Plan of Care expires: 03/20/20  Plan of Care reviewed with: patient, spouse    BRANDIN Blackmon  02/27/2020     Occupational Therapy  Patient Encounter Note  Ben Melvin   MRN: [id number]     A client care conference was performed between the LOTR and TURNER, prior to treatment by TURNER, to discuss the patient's status, treatment plan and established goals.       BRANDIN Blackmon 2/27/2020

## 2020-02-27 NOTE — PLAN OF CARE
Problem: Adult Inpatient Plan of Care  Goal: Plan of Care Review  Outcome: Ongoing, Progressing     Recommendation:   1. Continue regular diet as tolerated.   2. Encourage Alvaro BID to aid in wound healing.     Goals: PO intake > 75% EEN, EPN daily   Nutrition Goal Status: goal met  Communication of RD Recs: other (comment)(POC)

## 2020-02-27 NOTE — HPI
Mr. Melvin is a 60 year old male with PMHx of AFib on coumadin, peripheral vascular disease, hypertension, COPD, current smoker, liver cirrhosis, who presents to SNF following hospitalization for failure of the repair of the greater tuberosity on the right shoulder s/p revision fixation of right greater tuberosity fracture on 2/14 by Dr. Chris.  Admission to SNF for secondary weakness and debility.     Patient originally presented to [facility] ED on 01/27 after suffering a witnessed seizure while playing video poker and sustained bilateral proximal humerus fracture dislocations, bilateral acromial fractures, subdural hemorrhage, L1 and L4 burst fractures. He underwent left shoulder hemiarthroplasty, biceps tenodesis on 01/28/20 and right shoulder hemiarthroplasty, biceps tenodesis on 01/30/20 and discharged to [facility]. BL shoulder XRs from 02/10/20 notable for failure of the repair of the greater tuberosity on the right shoulder and he returned to [facility] on 02/14/20 for revision fixation of his right greater tuberosity fracture. Pt had a stable CTH on 02/10/20 and warfarin was reinitiated 02/11, last dose was 02/12. Currently on Lovenox 40 mg SQ daily. He was seen by neurosurgery and will f/u with pt outpt as scheduled on 02/21/20.      Patient reports his pain is well controlled with current regimen.  He endorses a productive cough and trouble sleeping.      Patient will be treated at [facility] with PT and OT to improve functional status and ability to perform ADLs.

## 2020-02-27 NOTE — PROGRESS NOTES
CC:  Postop follow-up bilateral shoulder hemiarthroplasty    HPI:  60-year-old male, obese, AFib on anticoagulation, peripheral vascular disease, hypertension, COPD, current smoker, liver cirrhosis who sustained a witnessed seizure while playing video poker 01/27/2020.  He fell from his chair.  He was brought to emergency department at outside facility.  Injuries identified were bilateral proximal humerus fracture dislocations, bilateral acromial fractures, subdural hemorrhage, L1 and L4 burst fractures.      01/28/2020 - left shoulder hemiarthroplasty, biceps tenodesis     01/30/2020 - right shoulder hemiarthroplasty, biceps tenodesis     Failure of right greater tuberosity fixation     02/14/2020 - open reduction internal fixation right greater tuberosity, humerus    SUBJECTIVE:  At [facility] nursing facility  Doing well  Has been ambulatory with his lumbar brace, no surgery planned per Neurosurgery/patient discussion  Denies fevers, chills, wound drainage  Pain controlled with Percocet  Has been wearing his bilateral slings    OBJECTIVE:  PE  RUE:  Incision well healed, no signs of infection  Appropriate postoperative tenderness at shoulder  Passive range of motion to 90° of forward flexion  Motor  Fires deltoid, 1/5  4/5 bicep, tricep,   5/5 wrist flexion/extension, finger flexion/extension, interossei  Sensation intact axillary, radial, median, ulnar nerves  Palp rad pulse, BCR    LUE:  Incision well healed, no signs of infection  Appropriate postoperative tenderness at shoulder  Passive range of motion to 90° of forward flexion  Motor  Fires deltoid, 1/5  4/5 bicep, tricep,   5/5 wrist flexion/extension, finger flexion/extension, interossei  Sensation intact axillary, radial, median, ulnar nerves  Palp rad pulse, BCR    XRAYS:  X-ray bilateral shoulders demonstrate hemiarthroplasty in place with stable fixation of his tuberosities bilaterally    ASSESSMENT:  60-year-old male, obese, AFib on anticoagulation,  peripheral vascular disease, hypertension, COPD, current smoker, liver cirrhosis who sustained a witnessed seizure while playing video poker 01/27/2020.  He fell from his chair.  He was brought to emergency department at outside facility.  Injuries identified were bilateral proximal humerus fracture dislocations, bilateral acromial fractures, subdural hemorrhage, L1 and L4 burst fractures.      01/28/2020 - left shoulder hemiarthroplasty, biceps tenodesis     01/30/2020 - right shoulder hemiarthroplasty, biceps tenodesis     Failure of right greater tuberosity fixation     02/14/2020 - open reduction internal fixation right greater tuberosity, humerus    Doing well  Incisions healed  X-ray stable      PLAN:  Nonweightbearing bilateral upper extremity times 3 months postop   shoulder sling x6 weeks postop, may remove for hygiene and physical therapy.      DC left sling in 2 weeks from today - 3.11  DC right sling in 4 weeks from today - 3.26    Okay for range of motion as tolerated hand wrist elbow now.  Okay for active assisted range of motion of shoulder and pendulums    Okay for range of motion as tolerated of shoulders after slings d/c (see above dates)  No strengthening exercises until 3 months postop     X-ray bilateral shoulders at subsequent postop visits     Follow-up in one month  Subsequent f/u @ postop 3 months, 6 months, 1 year

## 2020-02-27 NOTE — PLAN OF CARE
Problem: Occupational Therapy Goal  Goal: Occupational Therapy Goal  Description  Goals to be met by: 03-11-20   Pt. Family to be I with assisting pt. With UBD, LBD   Pt. Family to be I with don/doff all braces  Pt. Family and pt. To be I with performing there ex to BUE as per ortho orders  Pt. To perform SPT without an AD and SBA  Pt. To engage in standing activities with SBA x 10 minutes    Patient will increase functional independence with ADLs by performing:         Outcome: Ongoing, Progressing   Goals ongoing, continue OT POC as indicated.

## 2020-02-27 NOTE — HOSPITAL COURSE
Patient prematurely discharge from SNF.  Patient was sent to ED on 03/02 for infectious workup with concerning leukocytosis, encephalopathic and visual hallucinations.  Patient also noted to have new CANDY.

## 2020-02-27 NOTE — PT/OT/SLP PROGRESS
Occupational Therapy  Treatment    Ben Melvin   MRN: [id number]   Admitting Diagnosis: Bilateral proximal humeral fractures    OT Date of Treatment: (P) 02/24/20       Billable Minutes:  Self Care/Home Management 40 and Therapeutic Exercise 15    General Precautions: Standard, diabetic, fall, seizure  Orthopedic Precautions: RUE non weight bearing, LUE non weight bearing, spinal precautions(BROMelbowristhand; LAAROM shld pendNOshldRUE ROM/pend to2-28)  Braces: TLSO, Shoulder abduction brace(bilateral)    Spiritual, Cultural Beliefs, Latter day Practices, Values that Affect Care: no    Subjective:  Communicated with nursing prior to session.  Pt reported he has not had a shower in over a month.     Objective:     Bed Mobility:    · Patient completed Rolling/Turning to Right with moderate assistance  · Patient completed Supine to Sit with maximal assistance  · Patient completed Sit to Supine with maximal assistance     Functional Mobility/Transfers:  · Patient completed Sit <> Stand Transfer with moderate assistance  with  hand-held assist   · Patient completed Bed <> Chair Transfer using Step Transfer technique with minimum assistance with hand-held assist  · Patient completed Toilet Transfer Step Transfer technique with moderate assistance with  hand-held assist  · Patient completed  Shower Transfer Step Transfer technique with moderate assistance with hand-held assist  · Functional Mobility: While stepping for all functional transfers, pts L knee buckled 3/3 times.    Activities of Daily Living:  · Feeding:  maximal assistance limited secondary to ortho precautions of no movement B shoulders  · Grooming: maximal assistance sitting at sink  · Bathing: maximal assistance with all care  · Upper Body Dressing: maximal assistance with pt able to thread shirt to elbows bilaterally.  · Lower Body Dressing: total assistance to thread pants and to pull up pants due to ortho restriction  · Toileting: maximal assistance       OT Exercises: AROM BUE elbow, forearm, wrist and hand. PROM L shoulder 0-75* forward flexion and to neutral position towards ER. Pt unable to achieve > 0* in rotation L shoulder    Additional Treatment:  Pt educated in compensatory technique for UB dressing, toileting and LB dressing. He requires strict adherence to ortho precautions due to tendencies towards non-compliance.    Patient left up in chair with call button in reach    ASSESSMENT:  Ben Melvin is a 60 y.o. male with a medical diagnosis of Bilateral proximal humeral fractures who is limited by decreased safety awareness, impaired standing balance with buckling L knee, pain, impaired functional use of BUE required for even basic self care tasks. He has a very supportive wife who is being trained throughout OT sessions in managing his self care tasks.     Rehab identified problem list/impairments: impaired endurance, impaired self care skills, impaired functional mobilty, decreased upper extremity function    Rehab potential is good    Activity tolerance: Good    Discharge recommendations: rehabilitation facility     Barriers to discharge: Inaccessible home environment     Equipment recommendations: bedside commode, wheelchair, hospital bed     GOALS:   Multidisciplinary Problems     Occupational Therapy Goals        Problem: Occupational Therapy Goal    Goal Priority Disciplines Outcome Interventions   Occupational Therapy Goal     OT, PT/OT Ongoing, Progressing    Description:  Goals to be met by: 03-11-20   Pt. Family to be I with assisting pt. With UBD, LBD   Pt. Family to be I with don/doff all braces  Pt. Family and pt. To be I with performing there ex to BUE as per ortho orders  Pt. To perform SPT without an AD and SBA  Pt. To engage in standing activities with SBA x 10 minutes    Patient will increase functional independence with ADLs by performing:                          Plan:  Patient to be seen 6 x/week to address the above listed  problems via self-care/home management, therapeutic activities, therapeutic exercises  Plan of Care expires: 03/20/20  Plan of Care reviewed with: patient, spouse    Tamara Carbajal OT  02/24/2020

## 2020-02-27 NOTE — PROGRESS NOTES
"[facility] - Skilled Nursing Care  Adult Nutrition  Progress Note    SUMMARY       Recommendations    Recommendation:   1. Continue regular diet as tolerated.   2. Encourage Alvaro BID to aid in wound healing.     Goals: PO intake > 75% EEN, EPN daily   Nutrition Goal Status: goal met  Communication of RD Recs: other (comment)(POC)    Reason for Assessment    Reason For Assessment: RD follow-up  Diagnosis: other (see comments)(bilateral proximal humeral fractures)  Relevant Medical History: COPD, DM, CKD stage 3, HLD, ASCVD, PVD, current smoker, ETOH abuse, cirrhosis of liver, PAF, HTN, GERD, BPH, anxiety, depression, Vit D deficiency   Interdisciplinary Rounds: did not attend    General Information Comments:   2/27/20: Pt reports having a good appetite; PO intake 100% of meals. Pt drinking Alvaro BID. Denies N/V/D/C.   2/20/20: Pt was alert with wife at bedside. Reports good appetite, no difficulties swallowing or chewing, and no N/V/D/C at this time. Eating 100% of meals and willing to drink Alvaro BID to promote healing. Reports his UBW is 230 lbs "but they pulled fluid off." Reports liking "grandma's oatmeal," which contains cane syrup and sugar discussed that they do not make oatmeal like that for the diabetic diet. Pt also reports liking fried foods, discussed that most foods are baked here. Wife aids pt with eating and other needs along with nursing staff. NFPE completed today 2/20/20 pt appears nourished, unable to assess all body parts related to pt's recent surgeries.     Nutrition Discharge Planning: d/c on diabetic, cardiac diet    Nutrition Risk Screen    Nutrition Risk Screen: no indicators present    Nutrition/Diet History    Food Preferences: likes sweetned oatmeal, and fried pork chops, no cultural or spiritual preferences identified at this time  Spiritual, Cultural Beliefs, Confucianism Practices, Values that Affect Care: no  Food Allergies: NKFA  Factors Affecting Nutritional Intake: inability to feed " self(wife feeds)    Anthropometrics    Temp: 97.9 °F (36.6 °C)  Height: 6' (182.9 cm)  Height (inches): 72 in  Weight Method: Bed Scale  Weight: 102.7 kg (226 lb 6.6 oz)  Weight (lb): 226.41 lb  Ideal Body Weight (IBW), Male: 178 lb  % Ideal Body Weight, Male (lb): 122.37 %  BMI (Calculated): 30.7  BMI Grade: 25 - 29.9 - overweight  Usual Body Weight (UBW), k.54 kg  % Usual Body Weight: 94.71  % Weight Change From Usual Weight: -5.49 %     Lab/Procedures/Meds    Pertinent Labs Reviewed: reviewed  Pertinent Labs Comments: BUN 31, eGFR 54.6, A1C 5.9 ()   Pertinent Medications Reviewed: reviewed  Pertinent Medications Comments: statin, cetirizine, enoxaparin, ergocalciferol, gabapentin, levetiracetam, lopressor, nicotine patch, pantopraole, poly glycol, senna docusate, coumadin    Physical Findings/Assessment    Cristhian Score: 18  Incision - Right Shoulder    Estimated/Assessed Needs    Weight Used For Calorie Calculations: 98.8 kg (217 lb 13 oz)  Energy Calorie Requirements (kcal): 2200(MSJ x 1.2)  Energy Need Method: [place]-St Jeor(x 1.2)  Protein Requirements: 99(1.0 gm/kg)  Weight Used For Protein Calculations: 98.8 kg (217 lb 13 oz)  Estimated Fluid Requirement Method: RDA Method(or Per MD)  RDA Method (mL): 2200  CHO Requirement: 247.5 gm/day    Nutrition Prescription Ordered    Current Diet Order: Regular   Oral Nutrition Supplement: Alvaro BID     Evaluation of Received Nutrient/Fluid Intake    I/O: 600/0  Energy Calories Required: meeting needs  Protein Required: meeting needs  Fluid Required: meeting needs  Comments: LBM   Tolerance: tolerating  % Intake of Estimated Energy Needs: 75 - 100 %  % Meal Intake: 75 - 100 %    Nutrition Risk    Level of Risk/Frequency of Follow-up: (1 x/week)     Assessment and Plan    * Bilateral proximal humeral fractures  Contributing Nutrition Diagnosis  Increased nutrient needs, protein    Related to (etiology):   Wound healing    Signs and Symptoms (as evidenced  by):   S/p shoulder surgery    Interventions:  Collaboration with other providers  Nutrition Supplementation-Alvaro BID    Nutrition Diagnosis Status:   Continues       Monitor and Evaluation    Food and Nutrient Intake: energy intake, food and beverage intake  Food and Nutrient Adminstration: diet order  Knowledge/Beliefs/Attitudes: food and nutrition knowledge/skill  Physical Activity and Function: nutrition-related ADLs and IADLs  Anthropometric Measurements: height/length, weight, weight change, body mass index  Biochemical Data, Medical Tests and Procedures: electrolyte and renal panel, gastrointestinal profile, glucose/endocrine profile, inflammatory profile, lipid profile  Nutrition-Focused Physical Findings: overall appearance     Malnutrition Assessment    Orbital Region (Subcutaneous Fat Loss): well nourished  Upper Arm Region (Subcutaneous Fat Loss): well nourished  Thoracic and Lumbar Region: well nourished   [place] Region (Muscle Loss): well nourished  Clavicle Bone Region (Muscle Loss): well nourished  Posterior Calf Region (Muscle Loss): well nourished   Edema (Fluid Accumulation): 0-->no edema present   Subcutaneous Fat Loss (Final Summary): well nourished  Muscle Loss Evaluation (Final Summary): well nourished         Nutrition Follow-Up    RD Follow-up?: Yes

## 2020-02-28 LAB
INR PPP: 2 (ref 0.8–1.2)
POCT GLUCOSE: 118 MG/DL (ref 70–110)
POCT GLUCOSE: 150 MG/DL (ref 70–110)
POCT GLUCOSE: 170 MG/DL (ref 70–110)
PROTHROMBIN TIME: 19.6 SEC (ref 9–12.5)

## 2020-02-28 PROCEDURE — 11000004 HC SNF PRIVATE

## 2020-02-28 PROCEDURE — 97110 THERAPEUTIC EXERCISES: CPT | Mod: CQ

## 2020-02-28 PROCEDURE — 25000003 PHARM REV CODE 250: Performed by: INTERNAL MEDICINE

## 2020-02-28 PROCEDURE — 25000003 PHARM REV CODE 250: Performed by: NURSE PRACTITIONER

## 2020-02-28 PROCEDURE — S4991 NICOTINE PATCH NONLEGEND: HCPCS | Performed by: NURSE PRACTITIONER

## 2020-02-28 PROCEDURE — 36415 COLL VENOUS BLD VENIPUNCTURE: CPT

## 2020-02-28 PROCEDURE — 97530 THERAPEUTIC ACTIVITIES: CPT | Mod: CQ

## 2020-02-28 PROCEDURE — 85610 PROTHROMBIN TIME: CPT

## 2020-02-28 PROCEDURE — 25000003 PHARM REV CODE 250: Performed by: STUDENT IN AN ORGANIZED HEALTH CARE EDUCATION/TRAINING PROGRAM

## 2020-02-28 PROCEDURE — 97116 GAIT TRAINING THERAPY: CPT | Mod: CQ

## 2020-02-28 RX ORDER — ALUMINUM HYDROXIDE, MAGNESIUM HYDROXIDE, AND SIMETHICONE 2400; 240; 2400 MG/30ML; MG/30ML; MG/30ML
30 SUSPENSION ORAL EVERY 6 HOURS PRN
Status: DISCONTINUED | OUTPATIENT
Start: 2020-02-28 | End: 2020-03-03 | Stop reason: HOSPADM

## 2020-02-28 RX ADMIN — Medication 400 MG: at 09:02

## 2020-02-28 RX ADMIN — METHOCARBAMOL TABLETS 500 MG: 500 TABLET, COATED ORAL at 11:02

## 2020-02-28 RX ADMIN — LEVETIRACETAM 1000 MG: 500 TABLET, FILM COATED ORAL at 09:02

## 2020-02-28 RX ADMIN — ATORVASTATIN CALCIUM 80 MG: 20 TABLET, FILM COATED ORAL at 10:02

## 2020-02-28 RX ADMIN — Medication 6 MG: at 10:02

## 2020-02-28 RX ADMIN — NICOTINE 1 PATCH: 14 PATCH TRANSDERMAL at 09:02

## 2020-02-28 RX ADMIN — CIPROFLOXACIN HYDROCHLORIDE 500 MG: 500 TABLET, FILM COATED ORAL at 10:02

## 2020-02-28 RX ADMIN — ACETAMINOPHEN 650 MG: 325 TABLET ORAL at 11:02

## 2020-02-28 RX ADMIN — OXYCODONE HYDROCHLORIDE AND ACETAMINOPHEN 2 TABLET: 5; 325 TABLET ORAL at 04:02

## 2020-02-28 RX ADMIN — PANTOPRAZOLE SODIUM 40 MG: 40 TABLET, DELAYED RELEASE ORAL at 09:02

## 2020-02-28 RX ADMIN — CAPSAICIN: 0.25 CREAM TOPICAL at 09:02

## 2020-02-28 RX ADMIN — METOPROLOL TARTRATE 25 MG: 25 TABLET, FILM COATED ORAL at 10:02

## 2020-02-28 RX ADMIN — GABAPENTIN 400 MG: 100 CAPSULE ORAL at 03:02

## 2020-02-28 RX ADMIN — AMITRIPTYLINE HYDROCHLORIDE 100 MG: 25 TABLET, FILM COATED ORAL at 05:02

## 2020-02-28 RX ADMIN — CETIRIZINE HYDROCHLORIDE 10 MG: 5 TABLET ORAL at 09:02

## 2020-02-28 RX ADMIN — CAPSAICIN: 0.25 CREAM TOPICAL at 03:02

## 2020-02-28 RX ADMIN — DOCUSATE SODIUM - SENNOSIDES 2 TABLET: 50; 8.6 TABLET, FILM COATED ORAL at 09:02

## 2020-02-28 RX ADMIN — LEVETIRACETAM 1000 MG: 500 TABLET, FILM COATED ORAL at 10:02

## 2020-02-28 RX ADMIN — CIPROFLOXACIN HYDROCHLORIDE 500 MG: 500 TABLET, FILM COATED ORAL at 09:02

## 2020-02-28 RX ADMIN — FUROSEMIDE 20 MG: 20 TABLET ORAL at 09:02

## 2020-02-28 RX ADMIN — OXYCODONE HYDROCHLORIDE AND ACETAMINOPHEN 2 TABLET: 5; 325 TABLET ORAL at 06:02

## 2020-02-28 RX ADMIN — Medication 400 MG: at 10:02

## 2020-02-28 RX ADMIN — WARFARIN SODIUM 7.5 MG: 2.5 TABLET ORAL at 04:02

## 2020-02-28 RX ADMIN — GABAPENTIN 400 MG: 100 CAPSULE ORAL at 10:02

## 2020-02-28 RX ADMIN — DOCUSATE SODIUM - SENNOSIDES 2 TABLET: 50; 8.6 TABLET, FILM COATED ORAL at 10:02

## 2020-02-28 RX ADMIN — GABAPENTIN 400 MG: 100 CAPSULE ORAL at 09:02

## 2020-02-28 RX ADMIN — METOPROLOL TARTRATE 25 MG: 25 TABLET, FILM COATED ORAL at 09:02

## 2020-02-28 RX ADMIN — ONDANSETRON 8 MG: 8 TABLET, ORALLY DISINTEGRATING ORAL at 08:02

## 2020-02-28 NOTE — PROGRESS NOTES
PHARMACY CONSULT NOTE: WARFARIN  Ben Melvin is a 60 y.o. male on warfarin therapy for PAF.    Current order: warfarin 7.5 mg daily, bridging with Lovenox  Home dose: ?  Coumadin clinic enrollment: Active  INR goal: 2-3    Lab Results   Component Value Date    INR 2.0 (H) 02/28/2020    INR 1.7 (H) 02/27/2020    INR 1.5 (H) 02/26/2020     Significant drug interactions: NA  Diet: Adult Regular without supplements     Recommendation(s):    Continue current warfarin dose, d/c lovenox   Monitor INR daily   Pharmacy will continue to follow and monitor warfarin        Rosa Isela Trujillo, Pharm. D.  Clinical Pharmacist  [facility]    THE ABOVE RECOMMENDATIONS ARE ONLY SUGGESTED.THE RECOMMENDATIONS SHOULD BE CONSIDERED IN CONJUNCTION WITH ALL PATIENT FACTORS. **

## 2020-02-28 NOTE — PT/OT/SLP PROGRESS
Occupational Therapy      Patient Name:  Ben Melvin   MRN:  [id number]    Patient not seen for OT today secondary to pt deferred tx today 2* pt ill with nausea and vomiting. Will follow-up as able.    BRANDIN Blackmon  2/28/2020

## 2020-02-28 NOTE — PT/OT/SLP PROGRESS
Speech Language Pathology      Ben Melvin  MRN: [id number]    Patient not seen today secondary to Patient ill (Comment), Patient fatigue(pt reported not sleeping well last night due to N/V through the night. Pt reported recently recieving nausea medicaton and decline therapy at this time  preferring to rest. ). Will follow-up according to POC.    MELANI Dominguez, CCC-SLP     MELANI Dominguez, CCC-SLP  Speech Language Pathologist  [phone]  2/28/2020

## 2020-02-29 LAB
INR PPP: 4.4 (ref 0.8–1.2)
PROTHROMBIN TIME: 42.2 SEC (ref 9–12.5)

## 2020-02-29 PROCEDURE — 94640 AIRWAY INHALATION TREATMENT: CPT

## 2020-02-29 PROCEDURE — 25000003 PHARM REV CODE 250: Performed by: STUDENT IN AN ORGANIZED HEALTH CARE EDUCATION/TRAINING PROGRAM

## 2020-02-29 PROCEDURE — 25000003 PHARM REV CODE 250: Performed by: INTERNAL MEDICINE

## 2020-02-29 PROCEDURE — 85610 PROTHROMBIN TIME: CPT

## 2020-02-29 PROCEDURE — 11000004 HC SNF PRIVATE

## 2020-02-29 PROCEDURE — 94761 N-INVAS EAR/PLS OXIMETRY MLT: CPT

## 2020-02-29 PROCEDURE — 25000242 PHARM REV CODE 250 ALT 637 W/ HCPCS: Performed by: INTERNAL MEDICINE

## 2020-02-29 PROCEDURE — S4991 NICOTINE PATCH NONLEGEND: HCPCS | Performed by: NURSE PRACTITIONER

## 2020-02-29 PROCEDURE — 25000003 PHARM REV CODE 250: Performed by: NURSE PRACTITIONER

## 2020-02-29 RX ADMIN — ERGOCALCIFEROL 50000 UNITS: 1.25 CAPSULE ORAL at 09:02

## 2020-02-29 RX ADMIN — GABAPENTIN 400 MG: 100 CAPSULE ORAL at 09:02

## 2020-02-29 RX ADMIN — OXYCODONE HYDROCHLORIDE AND ACETAMINOPHEN 2 TABLET: 5; 325 TABLET ORAL at 12:02

## 2020-02-29 RX ADMIN — LEVALBUTEROL HYDROCHLORIDE 0.63 MG: 0.63 SOLUTION RESPIRATORY (INHALATION) at 08:02

## 2020-02-29 RX ADMIN — LEVETIRACETAM 1000 MG: 500 TABLET, FILM COATED ORAL at 09:02

## 2020-02-29 RX ADMIN — GABAPENTIN 400 MG: 100 CAPSULE ORAL at 02:02

## 2020-02-29 RX ADMIN — CETIRIZINE HYDROCHLORIDE 10 MG: 5 TABLET ORAL at 09:02

## 2020-02-29 RX ADMIN — CIPROFLOXACIN HYDROCHLORIDE 500 MG: 500 TABLET, FILM COATED ORAL at 09:02

## 2020-02-29 RX ADMIN — CAPSAICIN: 0.25 CREAM TOPICAL at 09:02

## 2020-02-29 RX ADMIN — OXYCODONE HYDROCHLORIDE AND ACETAMINOPHEN 2 TABLET: 5; 325 TABLET ORAL at 09:02

## 2020-02-29 RX ADMIN — OXYCODONE HYDROCHLORIDE AND ACETAMINOPHEN 2 TABLET: 5; 325 TABLET ORAL at 02:02

## 2020-02-29 RX ADMIN — DOCUSATE SODIUM - SENNOSIDES 2 TABLET: 50; 8.6 TABLET, FILM COATED ORAL at 09:02

## 2020-02-29 RX ADMIN — NICOTINE 1 PATCH: 14 PATCH TRANSDERMAL at 09:02

## 2020-02-29 RX ADMIN — FUROSEMIDE 20 MG: 20 TABLET ORAL at 09:02

## 2020-02-29 RX ADMIN — ATORVASTATIN CALCIUM 80 MG: 20 TABLET, FILM COATED ORAL at 09:02

## 2020-02-29 RX ADMIN — PANTOPRAZOLE SODIUM 40 MG: 40 TABLET, DELAYED RELEASE ORAL at 09:02

## 2020-02-29 RX ADMIN — CAPSAICIN: 0.25 CREAM TOPICAL at 03:02

## 2020-02-29 RX ADMIN — Medication 1 CAPSULE: at 09:02

## 2020-02-29 RX ADMIN — METOPROLOL TARTRATE 25 MG: 25 TABLET, FILM COATED ORAL at 09:02

## 2020-02-29 RX ADMIN — AMITRIPTYLINE HYDROCHLORIDE 100 MG: 25 TABLET, FILM COATED ORAL at 05:02

## 2020-02-29 RX ADMIN — Medication 400 MG: at 09:02

## 2020-02-29 NOTE — DISCHARGE SUMMARY
[facility]  Orthopedics  Discharge Summary      Patient Name: Ben Melvin  MRN: [id number]  Admission Date: 2/14/2020  Hospital Length of Stay: 4 days  Discharge Date and Time: 2/18/2020  5:57 PM  Attending Physician: No att. providers found   Discharging Provider: Aram Cheung MD  Primary Care Provider: Cristal Ohara NP    HPI: 60-year-old male, obese, AFib on anticoagulation, peripheral vascular disease, hypertension, COPD, current smoker, liver cirrhosis who sustained a witnessed seizure while playing video poker 01/27/2020.  He fell from his chair.  He was brought to emergency department at outside facility.  Injuries identified were bilateral proximal humerus fracture dislocations, bilateral acromial fractures, subdural hemorrhage, L1 and L4 burst fractures.      01/28/2020 - left shoulder hemiarthroplasty, biceps tenodesis     01/30/2020 - right shoulder hemiarthroplasty, biceps tenodesis     Now at [facility]     Has had difficulties with maintaining his bilateral shoulder slings, which indeed is a difficult situation causing him difficulties with activities of daily living such as eating and transfers.     Has preferentially removed his right shoulder sling to assist in these activities per discussion with his wife and patient     02/10/2020 - obtained follow-up x-rays of bilateral shoulders which demonstrated unchanged appearance of left shoulder arthroplasty. The right shoulder arthroplasty was in good position, however there appeared to be failure of the repair of the greater tuberosity on the right shoulder.     I discussed this case with my shoulder colleagues, and then call to discuss with the patient and his wife while there at rehab.     Counseled patient and his wife on the current status of his bilateral shoulders.  I have concern that there is failure of tuberosity repair on the right shoulder.     We discussed multiple options    Procedure(s) (LRB):  ORIF,  FRACTURE, HUMERUS, PROXIMAL - ORIF R greater tuberosity - suture repair. Trios, supine, fiberwire (Right)      Hospital Course: Patient presented for above procedure.  Tolerated it well and was discharged home POD 4 after voiding, tolerating diet, ambulating, pain controlled.  Discharge instructions, follow-up appointment, and med rec are below.      Consults (From admission, onward)        Status Ordering Provider     Inpatient consult to Hospital Medicine-Ortho Comanagement Only  Once     Provider:  (Not yet assigned)    Completed DIGNA SCHWARZ     Inpatient consult to Midline team  Once     Provider:  (Not yet assigned)    Completed MCKINLEY ZAVALA     Inpatient consult to Physical Medicine Rehab  Once     Provider:  (Not yet assigned)    Completed MCKINLEY ZAVALA          Significant Diagnostic Studies: No pertinent studies.    Pending Diagnostic Studies:     None        Final Active Diagnoses:    Diagnosis Date Noted POA    PRINCIPAL PROBLEM:  Bilateral proximal humeral fractures [S42.201A, S42.202A] 02/14/2020 Yes    Neuropathic pain [M79.2] 02/18/2020 Yes    Stable burst fracture of first lumbar vertebra [S32.011A] 02/15/2020 Yes    GERD (gastroesophageal reflux disease) [K21.9] 02/15/2020 Yes    Constipation [K59.00]  Yes     fracture dislocation of bilateral shoulders [S42.91XA] 01/28/2020 Yes    Subdural hematoma [S06.5X9A] 01/27/2020 Yes    Vitamin D deficiency [E55.9] 06/03/2019 Yes    PAF (paroxysmal atrial fibrillation) [I48.0]  Yes    COPD (chronic obstructive pulmonary disease) [J44.9]  Yes     Chronic    Essential hypertension [I10]  Yes    Lumbar disc disease with radiculopathy [M51.16] 05/01/2015 Yes     Chronic      Problems Resolved During this Admission:      Discharged Condition: stable    Disposition: Home or Self Care    Follow Up:    Patient Instructions:   No discharge procedures on file.  Medications:  Reconciled Home Medications:      Medication List       CONTINUE taking these medications    amitriptyline 50 MG tablet  Commonly known as:  ELAVIL  Take 1 tablet (50 mg total) by mouth every evening.     aspirin 81 MG Chew  Take 162 mg by mouth once daily.     atorvastatin 80 MG tablet  Commonly known as:  LIPITOR  Take 80 mg by mouth every evening.     Basaglar KwikPen U-100 Insulin glargine 100 units/mL (3mL) SubQ pen  Generic drug:  insulin  INJECT 25 UNITS DAILY     bisacodyL 10 mg Supp  Commonly known as:  DULCOLAX  Place 1 suppository (10 mg total) rectally daily as needed.     celecoxib 200 MG capsule  Commonly known as:  CeleBREX  Take 1 capsule (200 mg total) by mouth once daily.     cyanocobalamin 1000 MCG tablet  Commonly known as:  VITAMIN B-12  Take 100 mcg by mouth once daily.     ergocalciferol 50,000 unit Cap  Commonly known as:  ERGOCALCIFEROL  Take 1 capsule (50,000 Units total) by mouth every 7 days.     insulin aspart U-100 100 unit/mL (3 mL) Inpn pen  Commonly known as:  NovoLOG  Inject 1-10 Units into the skin before meals and at bedtime as needed (Hyperglycemia).     ipratropium 0.02 % nebulizer solution  Commonly known as:  ATROVENT  Take 2.5 mLs (500 mcg total) by nebulization every 4 (four) hours as needed for Wheezing. Rescue     levalbuterol 0.63 mg/3 mL nebulizer solution  Commonly known as:  XOPENEX  Take 3 mLs (0.63 mg total) by nebulization every 8 (eight) hours. Rescue     levETIRAcetam 1000 MG tablet  Commonly known as:  KEPPRA  Take 1 tablet (1,000 mg total) by mouth 2 (two) times daily.     metFORMIN 1000 MG tablet  Commonly known as:  GLUCOPHAGE  Take 1 tablet (1,000 mg total) by mouth daily with breakfast.     metoprolol tartrate 25 MG tablet  Commonly known as:  LOPRESSOR  Take 25 mg by mouth 2 (two) times daily.     multivitamin with minerals tablet  Take 1 tablet by mouth once daily.     nicotine 21 mg/24 hr  Commonly known as:  NICODERM CQ  Place 1 patch onto the skin once daily.     oxyCODONE 5 MG immediate release  tablet  Commonly known as:  ROXICODONE  Take 1 tablet (5 mg total) by mouth every 4 (four) hours as needed (pain 1-10).     pantoprazole 40 MG tablet  Commonly known as:  PROTONIX  Take 1 tablet (40 mg total) by mouth once daily.     polyethylene glycol 17 gram Pwpk  Commonly known as:  GLYCOLAX  Take 17 g by mouth 2 (two) times daily.     ramipril 5 MG capsule  Commonly known as:  ALTACE  TAKE 1 CAPSULE (5 MG) BY ORAL ROUTE ONCE DAILY     senna-docusate 8.6-50 mg 8.6-50 mg per tablet  Commonly known as:  PERICOLACE  Take 2 tablets by mouth 2 (two) times daily.     Tradjenta 5 mg Tab tablet  Generic drug:  linaGLIPtin  TAKE 1 TABLET BY MOUTH EVERY DAY        ASK your doctor about these medications    dextromethorphan-guaifenesin  mg  mg per 12 hr tablet  Commonly known as:  MUCINEX DM  Take 1 tablet by mouth 2 (two) times daily. for 10 days  Ask about: Should I take this medication?     heparin (porcine) 5,000 unit/mL injection  Inject 1 mL (5,000 Units total) into the skin every 8 (eight) hours. for 7 days  Ask about: Should I take this medication?     methocarbamol 750 MG Tab  Commonly known as:  ROBAXIN  Take 1 tablet (750 mg total) by mouth 3 (three) times daily. for 10 days  Ask about: Should I take this medication?             Nonweightbearing bilateral upper extremity times 3 months postop   shoulder sling x6 weeks postop, may remove for hygiene and physical therapy.       DC left sling in 2 weeks from today - 3.11  DC right sling in 4 weeks from today - 3.26     Okay for range of motion as tolerated hand wrist elbow now.  Okay for active assisted range of motion of shoulder and pendulums     Okay for range of motion as tolerated of shoulders after slings d/c (see above dates)  No strengthening exercises until 3 months postop     X-ray bilateral shoulders at subsequent postop visits     Follow-up in one month  Subsequent f/u @ postop 3 months, 6 months, 1 year    Aram Cheung,  MD  Orthopedics  [facility]

## 2020-03-01 LAB
INR PPP: 3.5 (ref 0.8–1.2)
PROTHROMBIN TIME: 32.7 SEC (ref 9–12.5)

## 2020-03-01 PROCEDURE — 25000003 PHARM REV CODE 250: Performed by: STUDENT IN AN ORGANIZED HEALTH CARE EDUCATION/TRAINING PROGRAM

## 2020-03-01 PROCEDURE — 97110 THERAPEUTIC EXERCISES: CPT | Mod: CQ

## 2020-03-01 PROCEDURE — 25000003 PHARM REV CODE 250: Performed by: INTERNAL MEDICINE

## 2020-03-01 PROCEDURE — 85610 PROTHROMBIN TIME: CPT

## 2020-03-01 PROCEDURE — S4991 NICOTINE PATCH NONLEGEND: HCPCS | Performed by: NURSE PRACTITIONER

## 2020-03-01 PROCEDURE — 97530 THERAPEUTIC ACTIVITIES: CPT | Mod: CO

## 2020-03-01 PROCEDURE — 97530 THERAPEUTIC ACTIVITIES: CPT | Mod: CQ

## 2020-03-01 PROCEDURE — 97116 GAIT TRAINING THERAPY: CPT | Mod: CQ

## 2020-03-01 PROCEDURE — 25000003 PHARM REV CODE 250: Performed by: NURSE PRACTITIONER

## 2020-03-01 PROCEDURE — 11000004 HC SNF PRIVATE

## 2020-03-01 RX ORDER — WARFARIN 2.5 MG/1
7.5 TABLET ORAL DAILY
Status: DISCONTINUED | OUTPATIENT
Start: 2020-03-02 | End: 2020-03-02

## 2020-03-01 RX ADMIN — Medication 1 CAPSULE: at 08:03

## 2020-03-01 RX ADMIN — ATORVASTATIN CALCIUM 80 MG: 20 TABLET, FILM COATED ORAL at 09:03

## 2020-03-01 RX ADMIN — LEVETIRACETAM 1000 MG: 500 TABLET, FILM COATED ORAL at 08:03

## 2020-03-01 RX ADMIN — CAPSAICIN: 0.25 CREAM TOPICAL at 09:03

## 2020-03-01 RX ADMIN — CAPSAICIN: 0.25 CREAM TOPICAL at 03:03

## 2020-03-01 RX ADMIN — PANTOPRAZOLE SODIUM 40 MG: 40 TABLET, DELAYED RELEASE ORAL at 08:03

## 2020-03-01 RX ADMIN — NICOTINE 1 PATCH: 14 PATCH TRANSDERMAL at 08:03

## 2020-03-01 RX ADMIN — METOPROLOL TARTRATE 25 MG: 25 TABLET, FILM COATED ORAL at 08:03

## 2020-03-01 RX ADMIN — CIPROFLOXACIN HYDROCHLORIDE 500 MG: 500 TABLET, FILM COATED ORAL at 09:03

## 2020-03-01 RX ADMIN — LEVETIRACETAM 1000 MG: 500 TABLET, FILM COATED ORAL at 09:03

## 2020-03-01 RX ADMIN — FUROSEMIDE 20 MG: 20 TABLET ORAL at 08:03

## 2020-03-01 RX ADMIN — GABAPENTIN 400 MG: 100 CAPSULE ORAL at 08:03

## 2020-03-01 RX ADMIN — METOPROLOL TARTRATE 25 MG: 25 TABLET, FILM COATED ORAL at 09:03

## 2020-03-01 RX ADMIN — OXYCODONE HYDROCHLORIDE AND ACETAMINOPHEN 2 TABLET: 5; 325 TABLET ORAL at 09:03

## 2020-03-01 RX ADMIN — DOCUSATE SODIUM - SENNOSIDES 2 TABLET: 50; 8.6 TABLET, FILM COATED ORAL at 09:03

## 2020-03-01 RX ADMIN — CETIRIZINE HYDROCHLORIDE 10 MG: 5 TABLET ORAL at 08:03

## 2020-03-01 RX ADMIN — DOCUSATE SODIUM - SENNOSIDES 2 TABLET: 50; 8.6 TABLET, FILM COATED ORAL at 08:03

## 2020-03-01 RX ADMIN — CIPROFLOXACIN HYDROCHLORIDE 500 MG: 500 TABLET, FILM COATED ORAL at 08:03

## 2020-03-01 RX ADMIN — GABAPENTIN 400 MG: 100 CAPSULE ORAL at 09:03

## 2020-03-01 NOTE — PT/OT/SLP PROGRESS
"Physical Therapy  Treatment    Ben Melvin   MRN: [id number]   Admitting Diagnosis: Bilateral proximal humeral fractures    PT Received On: 03/01/20          Billable Minutes:  Gait Training 10, Therapeutic Activity 10 and Therapeutic Exercise 20    Treatment Type: Treatment  PT/PTA: PTA     PTA Visit Number: 2       General Precautions: Standard, diabetic, fall, seizure  Orthopedic Precautions: RUE non weight bearing, LUE non weight bearing, spinal precautions(ROMAT (B)elb/wrist/hand,(L)shldrAAROM/pend,(R)shldr no ROM)   Braces: LSO, Shoulder abduction brace    Spiritual, Cultural Beliefs, Jainism Practices, Values that Affect Care: no    Subjective:  "I'm alright, very tired and don't feel good" Pt agreebale to therapy    Pain/Comfort  Pain Rating 1: 7/10  Location - Side 1: Bilateral  Location - Orientation 1: generalized  Location 1: shoulder  Pain Addressed 1: Reposition, Distraction  Pain Rating Post-Intervention 1: other (see comments)(not rated)    Objective:  Patient found in wc in gym with wife present       AM-PAC 6 CLICK MOBILITY  Total Score:15    Transfers:  Sit<>Stand: t/f wc multiple trials, ~4 attempts unable pt required vcs for maintaining NWB BUE, 4 attempts Mod A no AD    Gait:  Amb x 2 trials 1 step with LLE before losing balance requiring Mod A to regain and return to seated position     Therex:  Mini elliptical x 18 minutes with frequent rest breaks, pt with eyes closed most of activity requiring vcs to redirect    Balance:  Static standing Mod A no AD multiple trials pt only able to maintain balance ~10s before leaning posteriorly and required Mod A for stability to sit, on last attempt pt with c/o nausea and fatigue, pt returned to room, nurse notfied    Additional Treatment:  Patient educated on importance of increased time out of bed and SONDRA throughout the day    Patient left up in chair with call button in reach.    Assessment:  Ben Melvin is a 60 y.o. male with a medical " diagnosis of Bilateral proximal humeral fractures.  Patient tolerated treatment fairly focusing on transfers, therex and standing balance/tolerance. Patient very limited on this date due to increased fatigue and nausea, pt unable to maintain standing balance for more than 10s and had eyes closed for more than half of session. Patient will continue to improve with skilled physical therapy services in order to return to functional baseline.    Rehab identified problem list/impairments: weakness, impaired endurance, impaired sensation, impaired self care skills, impaired functional mobilty, gait instability, impaired balance, decreased upper extremity function, decreased lower extremity function, pain, orthopedic precautions    Rehab potential is good.    Activity tolerance: Good    Discharge recommendations: rehabilitation facility     Barriers to discharge: Inaccessible home environment    Equipment recommendations: hospital bed, wheelchair     GOALS:   Multidisciplinary Problems     Physical Therapy Goals        Problem: Physical Therapy Goal    Goal Priority Disciplines Outcome Goal Variances Interventions   Physical Therapy Goal     PT, PT/OT Ongoing, Progressing     Description:  Goals to be met by: 3/11/20     Patient will increase functional independence with mobility by performin. Supine to sit with MInimal Assistance  2. Sit to supine with Set-up [place]  3. Sit to stand transfer with Stand-by Assistance  4. Bed to chair transfer with Stand-by Assistance w/o AD  5. Gait  x 50ft w/o AD w/ SBA  6. Wheelchair propulsion x200 feet with Supervision using bilateral lower extremities  7. Ascend/descend 3 stair without Handrails Minimal Assistance   8. Stand for 3 minutes with Stand-by Assistance w/o AD  9. Pt will complete car transfer (actual or simulated) w/o AD w/ CGA                      PLAN:    Patient to be seen 5 x/week  to address the above listed problems via gait training, therapeutic  activities, therapeutic exercises, neuromuscular re-education, wheelchair management/training  Plan of Care expires: 03/20/20  Plan of Care reviewed with: patient    Shereen Riddleond, PTA  03/01/2020

## 2020-03-01 NOTE — PT/OT/SLP PROGRESS
Occupational Therapy  Treatment    Ben Melvin   MRN: [id number]   Admitting Diagnosis: Bilateral proximal humeral fractures    OT Date of Treatment: 03/01/20       Billable Minutes:  Therapeutic Activity 30    General Precautions: Standard, diabetic, fall, seizure  Orthopedic Precautions: RUE non weight bearing, LUE non weight bearing, spinal precautions(BROMelbowristhand; LAAROM shld pendNOshldRUE ROM/pend to2-28) Per MD post op visit on 2/26, ok for AAROM to BUE shoulders and pendulums as tolerated  Braces: TLSO, Shoulder abduction brace(bilateral)    Spiritual, Cultural Beliefs, Evangelical Practices, Values that Affect Care: no    Subjective:  Communicated with nsg prior to session.  Pt. Agreeable to tx    Pain/Comfort  Pain Rating 1: (Pt. did not rate pain number)  Location - Side 1: Bilateral  Location - Orientation 1: generalized  Location 1: shoulder  Pain Addressed 1: Pre-medicate for activity, Reposition, Distraction    Objective:   Pt. supine on arrival with S/O present     Occupational Performance:    AMPA 6 Click:  [facility] Total Score: 6    Additional Treatment:  Pt. Seen for for UE ROM only on this day while in supine.   Pt. With BUE AAROM with support  At  elbow, wrist and digits flex ext followed by digit opposition.  Pt. Also with RUE PROM  in supine with with forward flex in neutral position   Pt. Also with LUE PROM in supine with forward flex in neutral position   Pt. With no pain noted with PROM movements  Pt. Braces re adjusted    Patient left supine with all lines intact, call button in reach and s/o present    ASSESSMENT:  Ben Melvin is a 60 y.o. male with a medical diagnosis of Bilateral proximal humeral fractures Pt. Tolerated session fair on this day. Pt. Very Drowsy during session but eyes open   Pt. S/o stated that Pt. Was up around 3:30 am talking as usual But after receiving his medication he was overly drowsy on on this and also incoherent speech  With referencing [place] cunningham   from [employer] and other random phrases when talking.  Pt. S/o stated that he had been like this for the Past two days and notified nursing during pt. Session. Pt demos physical deficits with balance  functional mobility, UB strength, endurance  level of functional indep with daily tasks and activities and selfcare skills .Pt. Will continue to benefit from continued OT to progress towards goals  .    Rehab identified problem list/impairments: impaired endurance, impaired self care skills, impaired functional mobilty, decreased upper extremity function    Rehab potential is fair    Activity tolerance: Fair    Discharge recommendations: rehabilitation facility     Barriers to discharge: Inaccessible home environment     Equipment recommendations: bedside commode, wheelchair, hospital bed     GOALS:   Multidisciplinary Problems     Occupational Therapy Goals        Problem: Occupational Therapy Goal    Goal Priority Disciplines Outcome Interventions   Occupational Therapy Goal     OT, PT/OT Ongoing, Progressing    Description:  Goals to be met by: 03-11-20   Pt. Family to be I with assisting pt. With UBD, LBD   Pt. Family to be I with don/doff all braces  Pt. Family and pt. To be I with performing there ex to BUE as per ortho orders  Pt. To perform SPT without an AD and SBA  Pt. To engage in standing activities with SBA x 10 minutes    Patient will increase functional independence with ADLs by performing:                      Plan:  Patient to be seen 6 x/week to address the above listed problems via self-care/home management, therapeutic activities, therapeutic exercises  Plan of Care expires: 03/20/20  Plan of Care reviewed with: patient  The LILIAM and JOHNNY have collaborated and discussed the patient's status, treatment plan and progress toward established goals.   ESTEFANY Rothman 3/1/2020

## 2020-03-01 NOTE — PLAN OF CARE
Problem: Occupational Therapy Goal  Goal: Occupational Therapy Goal  Description  Goals to be met by: 03-11-20   Pt. Family to be I with assisting pt. With UBD, LBD   Pt. Family to be I with don/doff all braces  Pt. Family and pt. To be I with performing there ex to BUE as per ortho orders  Pt. To perform SPT without an AD and SBA  Pt. To engage in standing activities with SBA x 10 minutes    Patient will increase functional independence with ADLs by performing:         Outcome: Ongoing, Progressing

## 2020-03-02 ENCOUNTER — HOSPITAL ENCOUNTER (INPATIENT)
Facility: HOSPITAL | Age: 60
LOS: 3 days | Discharge: NURSING, REHAB OR LONG-TERM CARE | DRG: 871 | End: 2020-03-06
Attending: EMERGENCY MEDICINE | Admitting: INTERNAL MEDICINE
Payer: MEDICARE

## 2020-03-02 VITALS
TEMPERATURE: 98 F | OXYGEN SATURATION: 91 % | BODY MASS INDEX: 30.42 KG/M2 | WEIGHT: 224.63 LBS | HEIGHT: 72 IN | SYSTOLIC BLOOD PRESSURE: 106 MMHG | HEART RATE: 114 BPM | DIASTOLIC BLOOD PRESSURE: 61 MMHG | RESPIRATION RATE: 16 BRPM

## 2020-03-02 DIAGNOSIS — A41.9 SEPSIS, DUE TO UNSPECIFIED ORGANISM, UNSPECIFIED WHETHER ACUTE ORGAN DYSFUNCTION PRESENT: Primary | ICD-10-CM

## 2020-03-02 DIAGNOSIS — K80.00 CALCULUS OF GALLBLADDER WITH ACUTE CHOLECYSTITIS WITHOUT OBSTRUCTION: ICD-10-CM

## 2020-03-02 DIAGNOSIS — A41.9 SEPSIS: ICD-10-CM

## 2020-03-02 DIAGNOSIS — A41.01 STAPHYLOCOCCUS AUREUS BACTEREMIA WITH SEPSIS: ICD-10-CM

## 2020-03-02 DIAGNOSIS — R41.82 AMS (ALTERED MENTAL STATUS): ICD-10-CM

## 2020-03-02 LAB
ALBUMIN SERPL BCP-MCNC: 2.2 G/DL (ref 3.5–5.2)
ALLENS TEST: ABNORMAL
ALP SERPL-CCNC: 281 U/L (ref 55–135)
ALT SERPL W/O P-5'-P-CCNC: 38 U/L (ref 10–44)
AMMONIA PLAS-SCNC: 25 UMOL/L (ref 10–50)
ANION GAP SERPL CALC-SCNC: 12 MMOL/L (ref 8–16)
ANION GAP SERPL CALC-SCNC: 12 MMOL/L (ref 8–16)
ANISOCYTOSIS BLD QL SMEAR: SLIGHT
AST SERPL-CCNC: 58 U/L (ref 10–40)
BACTERIA #/AREA URNS AUTO: ABNORMAL /HPF
BASOPHILS # BLD AUTO: 0.05 K/UL (ref 0–0.2)
BASOPHILS # BLD AUTO: 0.06 K/UL (ref 0–0.2)
BASOPHILS NFR BLD: 0.2 % (ref 0–1.9)
BASOPHILS NFR BLD: 0.2 % (ref 0–1.9)
BILIRUB SERPL-MCNC: 1.5 MG/DL (ref 0.1–1)
BILIRUB UR QL STRIP: NEGATIVE
BUN SERPL-MCNC: 43 MG/DL (ref 6–20)
BUN SERPL-MCNC: 49 MG/DL (ref 6–20)
CALCIUM SERPL-MCNC: 9.1 MG/DL (ref 8.7–10.5)
CALCIUM SERPL-MCNC: 9.2 MG/DL (ref 8.7–10.5)
CHLORIDE SERPL-SCNC: 93 MMOL/L (ref 95–110)
CHLORIDE SERPL-SCNC: 93 MMOL/L (ref 95–110)
CLARITY UR REFRACT.AUTO: ABNORMAL
CO2 SERPL-SCNC: 28 MMOL/L (ref 23–29)
CO2 SERPL-SCNC: 28 MMOL/L (ref 23–29)
COLOR UR AUTO: ABNORMAL
CREAT SERPL-MCNC: 2.5 MG/DL (ref 0.5–1.4)
CREAT SERPL-MCNC: 2.8 MG/DL (ref 0.5–1.4)
DELSYS: ABNORMAL
DIFFERENTIAL METHOD: ABNORMAL
DIFFERENTIAL METHOD: ABNORMAL
EOSINOPHIL # BLD AUTO: 0 K/UL (ref 0–0.5)
EOSINOPHIL # BLD AUTO: 0.1 K/UL (ref 0–0.5)
EOSINOPHIL NFR BLD: 0.1 % (ref 0–8)
EOSINOPHIL NFR BLD: 0.5 % (ref 0–8)
ERYTHROCYTE [DISTWIDTH] IN BLOOD BY AUTOMATED COUNT: 17.2 % (ref 11.5–14.5)
ERYTHROCYTE [DISTWIDTH] IN BLOOD BY AUTOMATED COUNT: 17.7 % (ref 11.5–14.5)
EST. GFR  (AFRICAN AMERICAN): 27.3 ML/MIN/1.73 M^2
EST. GFR  (AFRICAN AMERICAN): 31.3 ML/MIN/1.73 M^2
EST. GFR  (NON AFRICAN AMERICAN): 23.6 ML/MIN/1.73 M^2
EST. GFR  (NON AFRICAN AMERICAN): 27.1 ML/MIN/1.73 M^2
GLUCOSE SERPL-MCNC: 88 MG/DL (ref 70–110)
GLUCOSE SERPL-MCNC: 98 MG/DL (ref 70–110)
GLUCOSE UR QL STRIP: NEGATIVE
HCO3 UR-SCNC: 25.9 MMOL/L (ref 24–28)
HCT VFR BLD AUTO: 31.7 % (ref 40–54)
HCT VFR BLD AUTO: 37.1 % (ref 40–54)
HGB BLD-MCNC: 10.9 G/DL (ref 14–18)
HGB BLD-MCNC: 9.6 G/DL (ref 14–18)
HGB UR QL STRIP: NEGATIVE
HYALINE CASTS UR QL AUTO: 5 /LPF
HYPOCHROMIA BLD QL SMEAR: ABNORMAL
IMM GRANULOCYTES # BLD AUTO: 0.29 K/UL (ref 0–0.04)
IMM GRANULOCYTES # BLD AUTO: 0.95 K/UL (ref 0–0.04)
IMM GRANULOCYTES NFR BLD AUTO: 1.1 % (ref 0–0.5)
IMM GRANULOCYTES NFR BLD AUTO: 3.5 % (ref 0–0.5)
INR PPP: 3.1 (ref 0.8–1.2)
KETONES UR QL STRIP: NEGATIVE
LACTATE SERPL-SCNC: 1 MMOL/L (ref 0.5–2.2)
LDH SERPL L TO P-CCNC: 1.74 MMOL/L (ref 0.5–2.2)
LEUKOCYTE ESTERASE UR QL STRIP: NEGATIVE
LYMPHOCYTES # BLD AUTO: 1.4 K/UL (ref 1–4.8)
LYMPHOCYTES # BLD AUTO: 1.5 K/UL (ref 1–4.8)
LYMPHOCYTES NFR BLD: 5.2 % (ref 18–48)
LYMPHOCYTES NFR BLD: 5.6 % (ref 18–48)
MAGNESIUM SERPL-MCNC: 2.1 MG/DL (ref 1.6–2.6)
MCH RBC QN AUTO: 26.7 PG (ref 27–31)
MCH RBC QN AUTO: 27.4 PG (ref 27–31)
MCHC RBC AUTO-ENTMCNC: 29.4 G/DL (ref 32–36)
MCHC RBC AUTO-ENTMCNC: 30.3 G/DL (ref 32–36)
MCV RBC AUTO: 90 FL (ref 82–98)
MCV RBC AUTO: 91 FL (ref 82–98)
MICROSCOPIC COMMENT: ABNORMAL
MODE: ABNORMAL
MONOCYTES # BLD AUTO: 1.2 K/UL (ref 0.3–1)
MONOCYTES # BLD AUTO: 1.3 K/UL (ref 0.3–1)
MONOCYTES NFR BLD: 4.6 % (ref 4–15)
MONOCYTES NFR BLD: 4.8 % (ref 4–15)
NEUTROPHILS # BLD AUTO: 23.2 K/UL (ref 1.8–7.7)
NEUTROPHILS # BLD AUTO: 23.6 K/UL (ref 1.8–7.7)
NEUTROPHILS NFR BLD: 85.4 % (ref 38–73)
NEUTROPHILS NFR BLD: 88.8 % (ref 38–73)
NITRITE UR QL STRIP: NEGATIVE
NON-SQ EPI CELLS #/AREA URNS AUTO: 1 /HPF
NRBC BLD-RTO: 0 /100 WBC
NRBC BLD-RTO: 0 /100 WBC
OVALOCYTES BLD QL SMEAR: ABNORMAL
PCO2 BLDA: 45.6 MMHG (ref 35–45)
PH SMN: 7.36 [PH] (ref 7.35–7.45)
PH UR STRIP: 5 [PH] (ref 5–8)
PHOSPHATE SERPL-MCNC: 4.9 MG/DL (ref 2.7–4.5)
PLATELET # BLD AUTO: 221 K/UL (ref 150–350)
PLATELET # BLD AUTO: 238 K/UL (ref 150–350)
PMV BLD AUTO: 10.1 FL (ref 9.2–12.9)
PMV BLD AUTO: 10.8 FL (ref 9.2–12.9)
PO2 BLDA: 29 MMHG (ref 40–60)
POC BE: 0 MMOL/L
POC SATURATED O2: 53 % (ref 95–100)
POC TCO2: 27 MMOL/L (ref 24–29)
POIKILOCYTOSIS BLD QL SMEAR: SLIGHT
POLYCHROMASIA BLD QL SMEAR: ABNORMAL
POTASSIUM SERPL-SCNC: 4.1 MMOL/L (ref 3.5–5.1)
POTASSIUM SERPL-SCNC: 4.7 MMOL/L (ref 3.5–5.1)
PROCALCITONIN SERPL IA-MCNC: 2.02 NG/ML
PROT SERPL-MCNC: 7.5 G/DL (ref 6–8.4)
PROT UR QL STRIP: ABNORMAL
PROTHROMBIN TIME: 29.2 SEC (ref 9–12.5)
RBC # BLD AUTO: 3.51 M/UL (ref 4.6–6.2)
RBC # BLD AUTO: 4.08 M/UL (ref 4.6–6.2)
RBC #/AREA URNS AUTO: 4 /HPF (ref 0–4)
SAMPLE: ABNORMAL
SITE: ABNORMAL
SODIUM SERPL-SCNC: 133 MMOL/L (ref 136–145)
SODIUM SERPL-SCNC: 133 MMOL/L (ref 136–145)
SP GR UR STRIP: 1.01 (ref 1–1.03)
SQUAMOUS #/AREA URNS AUTO: 1 /HPF
URN SPEC COLLECT METH UR: ABNORMAL
WBC # BLD AUTO: 26.57 K/UL (ref 3.9–12.7)
WBC # BLD AUTO: 27.13 K/UL (ref 3.9–12.7)
WBC #/AREA URNS AUTO: 2 /HPF (ref 0–5)

## 2020-03-02 PROCEDURE — 96365 THER/PROPH/DIAG IV INF INIT: CPT

## 2020-03-02 PROCEDURE — 80048 BASIC METABOLIC PNL TOTAL CA: CPT

## 2020-03-02 PROCEDURE — 25000003 PHARM REV CODE 250: Performed by: INTERNAL MEDICINE

## 2020-03-02 PROCEDURE — 25000003 PHARM REV CODE 250: Performed by: NURSE PRACTITIONER

## 2020-03-02 PROCEDURE — 93010 EKG 12-LEAD: ICD-10-PCS | Mod: ,,, | Performed by: INTERNAL MEDICINE

## 2020-03-02 PROCEDURE — 99291 CRITICAL CARE FIRST HOUR: CPT | Mod: 25

## 2020-03-02 PROCEDURE — 97530 THERAPEUTIC ACTIVITIES: CPT | Mod: CQ

## 2020-03-02 PROCEDURE — 25000003 PHARM REV CODE 250: Performed by: EMERGENCY MEDICINE

## 2020-03-02 PROCEDURE — 81001 URINALYSIS AUTO W/SCOPE: CPT

## 2020-03-02 PROCEDURE — 96361 HYDRATE IV INFUSION ADD-ON: CPT

## 2020-03-02 PROCEDURE — S0030 INJECTION, METRONIDAZOLE: HCPCS | Performed by: STUDENT IN AN ORGANIZED HEALTH CARE EDUCATION/TRAINING PROGRAM

## 2020-03-02 PROCEDURE — 83605 ASSAY OF LACTIC ACID: CPT

## 2020-03-02 PROCEDURE — 82962 GLUCOSE BLOOD TEST: CPT

## 2020-03-02 PROCEDURE — 87040 BLOOD CULTURE FOR BACTERIA: CPT | Mod: 59

## 2020-03-02 PROCEDURE — 63600175 PHARM REV CODE 636 W HCPCS: Performed by: EMERGENCY MEDICINE

## 2020-03-02 PROCEDURE — 25000003 PHARM REV CODE 250: Performed by: STUDENT IN AN ORGANIZED HEALTH CARE EDUCATION/TRAINING PROGRAM

## 2020-03-02 PROCEDURE — 83735 ASSAY OF MAGNESIUM: CPT

## 2020-03-02 PROCEDURE — 99291 PR CRITICAL CARE, E/M 30-74 MINUTES: ICD-10-PCS | Mod: ,,, | Performed by: EMERGENCY MEDICINE

## 2020-03-02 PROCEDURE — 85025 COMPLETE CBC W/AUTO DIFF WBC: CPT | Mod: 91

## 2020-03-02 PROCEDURE — 85610 PROTHROMBIN TIME: CPT

## 2020-03-02 PROCEDURE — 80053 COMPREHEN METABOLIC PANEL: CPT

## 2020-03-02 PROCEDURE — 36430 TRANSFUSION BLD/BLD COMPNT: CPT

## 2020-03-02 PROCEDURE — 99900035 HC TECH TIME PER 15 MIN (STAT)

## 2020-03-02 PROCEDURE — 96366 THER/PROPH/DIAG IV INF ADDON: CPT

## 2020-03-02 PROCEDURE — 18500000 HC LEAVE OF ABSENCE HOSPITAL SERVICES

## 2020-03-02 PROCEDURE — 96367 TX/PROPH/DG ADDL SEQ IV INF: CPT

## 2020-03-02 PROCEDURE — 99291 CRITICAL CARE FIRST HOUR: CPT | Mod: ,,, | Performed by: EMERGENCY MEDICINE

## 2020-03-02 PROCEDURE — 93005 ELECTROCARDIOGRAM TRACING: CPT

## 2020-03-02 PROCEDURE — 97110 THERAPEUTIC EXERCISES: CPT | Mod: CQ

## 2020-03-02 PROCEDURE — 97130 THER IVNTJ EA ADDL 15 MIN: CPT

## 2020-03-02 PROCEDURE — 84100 ASSAY OF PHOSPHORUS: CPT

## 2020-03-02 PROCEDURE — 97530 THERAPEUTIC ACTIVITIES: CPT | Mod: CO

## 2020-03-02 PROCEDURE — 84145 PROCALCITONIN (PCT): CPT

## 2020-03-02 PROCEDURE — 83690 ASSAY OF LIPASE: CPT

## 2020-03-02 PROCEDURE — 93010 ELECTROCARDIOGRAM REPORT: CPT | Mod: ,,, | Performed by: INTERNAL MEDICINE

## 2020-03-02 PROCEDURE — 82803 BLOOD GASES ANY COMBINATION: CPT

## 2020-03-02 PROCEDURE — 36415 COLL VENOUS BLD VENIPUNCTURE: CPT

## 2020-03-02 PROCEDURE — 63600175 PHARM REV CODE 636 W HCPCS: Performed by: STUDENT IN AN ORGANIZED HEALTH CARE EDUCATION/TRAINING PROGRAM

## 2020-03-02 PROCEDURE — 97129 THER IVNTJ 1ST 15 MIN: CPT

## 2020-03-02 PROCEDURE — S4991 NICOTINE PATCH NONLEGEND: HCPCS | Performed by: NURSE PRACTITIONER

## 2020-03-02 PROCEDURE — 85025 COMPLETE CBC W/AUTO DIFF WBC: CPT

## 2020-03-02 PROCEDURE — 82140 ASSAY OF AMMONIA: CPT

## 2020-03-02 RX ORDER — WARFARIN 2.5 MG/1
5 TABLET ORAL DAILY
Status: DISCONTINUED | OUTPATIENT
Start: 2020-03-02 | End: 2020-03-03 | Stop reason: HOSPADM

## 2020-03-02 RX ORDER — METRONIDAZOLE 500 MG/100ML
500 INJECTION, SOLUTION INTRAVENOUS
Status: DISCONTINUED | OUTPATIENT
Start: 2020-03-02 | End: 2020-03-03

## 2020-03-02 RX ORDER — ACETAMINOPHEN 325 MG/1
650 TABLET ORAL
Status: COMPLETED | OUTPATIENT
Start: 2020-03-02 | End: 2020-03-02

## 2020-03-02 RX ORDER — VANCOMYCIN 2 GRAM/500 ML IN 0.9 % SODIUM CHLORIDE INTRAVENOUS
2000 ONCE
Status: COMPLETED | OUTPATIENT
Start: 2020-03-02 | End: 2020-03-02

## 2020-03-02 RX ORDER — GABAPENTIN 300 MG/1
300 CAPSULE ORAL 3 TIMES DAILY
Status: DISCONTINUED | OUTPATIENT
Start: 2020-03-02 | End: 2020-03-03 | Stop reason: HOSPADM

## 2020-03-02 RX ADMIN — PANTOPRAZOLE SODIUM 40 MG: 40 TABLET, DELAYED RELEASE ORAL at 09:03

## 2020-03-02 RX ADMIN — GABAPENTIN 400 MG: 100 CAPSULE ORAL at 09:03

## 2020-03-02 RX ADMIN — LEVETIRACETAM 1000 MG: 500 TABLET, FILM COATED ORAL at 09:03

## 2020-03-02 RX ADMIN — FUROSEMIDE 20 MG: 20 TABLET ORAL at 09:03

## 2020-03-02 RX ADMIN — NICOTINE 1 PATCH: 14 PATCH TRANSDERMAL at 09:03

## 2020-03-02 RX ADMIN — Medication 1 CAPSULE: at 09:03

## 2020-03-02 RX ADMIN — CETIRIZINE HYDROCHLORIDE 10 MG: 5 TABLET ORAL at 09:03

## 2020-03-02 RX ADMIN — METRONIDAZOLE 500 MG: 500 INJECTION, SOLUTION INTRAVENOUS at 11:03

## 2020-03-02 RX ADMIN — VANCOMYCIN HYDROCHLORIDE 2000 MG: 100 INJECTION, POWDER, LYOPHILIZED, FOR SOLUTION INTRAVENOUS at 08:03

## 2020-03-02 RX ADMIN — PIPERACILLIN AND TAZOBACTAM 4.5 G: 4; .5 INJECTION, POWDER, LYOPHILIZED, FOR SOLUTION INTRAVENOUS; PARENTERAL at 08:03

## 2020-03-02 RX ADMIN — SODIUM CHLORIDE, SODIUM LACTATE, POTASSIUM CHLORIDE, AND CALCIUM CHLORIDE 500 ML: .6; .31; .03; .02 INJECTION, SOLUTION INTRAVENOUS at 06:03

## 2020-03-02 RX ADMIN — METOPROLOL TARTRATE 25 MG: 25 TABLET, FILM COATED ORAL at 09:03

## 2020-03-02 RX ADMIN — SODIUM CHLORIDE 500 ML: 0.9 INJECTION, SOLUTION INTRAVENOUS at 09:03

## 2020-03-02 RX ADMIN — ACETAMINOPHEN 650 MG: 325 TABLET ORAL at 09:03

## 2020-03-02 RX ADMIN — SODIUM CHLORIDE 500 ML: 0.9 INJECTION, SOLUTION INTRAVENOUS at 08:03

## 2020-03-02 RX ADMIN — SODIUM CHLORIDE, SODIUM LACTATE, POTASSIUM CHLORIDE, AND CALCIUM CHLORIDE 1000 ML: .6; .31; .03; .02 INJECTION, SOLUTION INTRAVENOUS at 09:03

## 2020-03-02 RX ADMIN — DOCUSATE SODIUM - SENNOSIDES 2 TABLET: 50; 8.6 TABLET, FILM COATED ORAL at 09:03

## 2020-03-02 RX ADMIN — CAPSAICIN: 0.25 CREAM TOPICAL at 09:03

## 2020-03-02 NOTE — PT/OT/SLP PROGRESS
Occupational Therapy  Treatment    Ben Melvin   MRN: [id number]   Admitting Diagnosis: Bilateral proximal humeral fractures    OT Date of Treatment: 03/02/20       Billable Minutes:  Therapeutic Activity 45    General Precautions: Standard, diabetic, fall, seizure  Orthopedic Precautions: RUE non weight bearing, LUE non weight bearing, spinal precautions(BROMelbowristhand; LAAROM shld pendNOshldRUE ROM/pend to2-28)  Braces: TLSO, Shoulder abduction brace(bilateral)    Spiritual, Cultural Beliefs, Sabianist Practices, Values that Affect Care: no    Subjective:  Communicated with patient spouse prior to session. Per pt spouse, pt has been increasingly lethargic this weekend. She feels it is medication related, waiting to discuss with NP today. She really would like pt OOB today as she feels he has declined the last few days.   Pt agreeable to therapy.      Objective:   Pt found supine in bed.    Occupational Performance:    Bed Mobility:    · Patient completed Scooting/Bridging with minimum assistance  · Patient completed Supine to Sit with moderate assistance     Functional Mobility/Transfers:  · Patient completed Sit <> Stand Transfer with minimum assistance  with  no assistive device   · Patient completed Bed > w/cTransfer using Stand Pivot technique with minimum assistance with no assistive device    Activities of Daily Living:  · LSO brace, and RUE/LUE shoulder braces applied at start of session with increased time and assistance from pt spouse  · Grooming: total assistance to wash face    AMPA 6 Click:  AMPA Total Score: 6    OT Exercises: Not performed    Additional Treatment:  -Pt propels w/c ~20 feet with BLE, increased time to perform  - Pt tolerated static standing balance ~30 secs with CGA and was assisted back to seated position 2* pt drowsy and c/o feeling sick  -Pt participated in seated task at table manipulating theraputty to remove/replace beads to promote increased FM/ hand  coordination/strengthening    Patient left up in chair with all lines intact and PTA and spouse present present    ASSESSMENT:  Ben Melvin is a 60 y.o. male with a medical diagnosis of Bilateral proximal humeral fractures . Pt limited during OT session 2* increased lethargy/drowsiness. Pt will benefit from continued OT services to address functional deficits, safety concerns, and to maximize level of functional independence prior to d/c.    Rehab identified problem list/impairments: impaired endurance, impaired self care skills, impaired functional mobilty, decreased upper extremity function    Rehab potential is fair    Activity tolerance: Fair    Discharge recommendations: rehabilitation facility     Barriers to discharge: Inaccessible home environment     Equipment recommendations: bedside commode, wheelchair, hospital bed     GOALS:   Multidisciplinary Problems     Occupational Therapy Goals        Problem: Occupational Therapy Goal    Goal Priority Disciplines Outcome Interventions   Occupational Therapy Goal     OT, PT/OT Ongoing, Progressing    Description:  Goals to be met by: 03-11-20   Pt. Family to be I with assisting pt. With UBD, LBD   Pt. Family to be I with don/doff all braces  Pt. Family and pt. To be I with performing there ex to BUE as per ortho orders  Pt. To perform SPT without an AD and SBA  Pt. To engage in standing activities with SBA x 10 minutes    Patient will increase functional independence with ADLs by performing:                          Plan:  Patient to be seen 6 x/week to address the above listed problems via self-care/home management, therapeutic activities, therapeutic exercises  Plan of Care expires: 03/20/20  Plan of Care reviewed with: patient    BRANDIN Blackmon  03/02/2020

## 2020-03-02 NOTE — PT/OT/SLP PROGRESS
"Speech Language Pathology  Treatment    Ben Melvin   MRN: [id number]   Admitting Diagnosis: Bilateral proximal humeral fractures    Diet recommendations: Solid Diet Level: Regular  Liquid Diet Level: Thin HOB to 90 degrees, Monitor for s/s of aspiration and Standard aspiration precautions    SLP Treatment Date: 03/02/20  Speech Start Time: 0930     Speech Stop Time: 1001     Speech Total (min): 31 min       TREATMENT BILLABLE MINUTES:  Speech Therapy Individual 31 (cognitive therapy, 2 units)           General Precautions: Standard, diabetic, fall, seizure  Current Respiratory Status: room air       Subjective:  "I find that funny I could lose that so quick." pt commented after having difficulty recalling information after a 3 minute delay.        Objective:      Pt seen at bedside for ongoing cognitive-linguistic therapy.  Pt observed swallowing multiple pills at one time with straw sips of water without difficulty, despite being unable to tolerate fully upright position.  Pt was oriented to month and year, but required phonemic cues to orient to place/[facility] which he attributed to unfamiliarity with [facility] until this hospitalization.  Following a 3 minute delay, pt recalled 3 facts given min-mod cues.  During a problem solving and reasoning task, pt provided advantages to hypothetical situations with 75% accuracy ind'ly/100% given cues and disadvantages with 75% accuracy.  Word deductions were solved with 40% accuracy ind'ly/100% given cues.     Assessment:  Ben Melvin is a 60 y.o. male with a medical diagnosis of Bilateral proximal humeral fractures and presents with cognitive-linguistic deficits.     Discharge recommendations: Discharge Facility/Level of Care Needs: (tbd)     Goals:   Multidisciplinary Problems     SLP Goals        Problem: SLP Goal    Goal Priority Disciplines Outcome   SLP Goal     SLP Ongoing, Progressing   Description:  Speech Language Pathology Goals  Revised goals expected to " be met 3/9:   1. Pt will complete short term memory tasks with 80% accuracy given mincues.   2. Pt will follow 3-step commands with 70% accuracy given mod cues.   3. Pt will complete moderate level problem solving/reasoning tasks with 80% accuracy given min-mod cues.   4. Pt will complete mental manipulation tasks with 80% accuracy given min-mod cues.     Goals expected to be met by 2/27:  1. Pt will complete short term memory tasks with 70% accuracy given min-mod cues. Goal met  2. Pt will follow 3-step commands with 70% accuracy given mod cues. ongoing  3. Pt will complete moderate level problem solving/reasoning tasks with 80% accuracy given min-mod cues. ongoing  4. Pt will participate in evaluation of reading, writing, and visual spatial abilities to determine need for further intervention. Reading and math abilities assessed 2/24. Mildly impaired math abilities (baseline?).  Unable to assess writing due to bilateral humeral fractures.                           Plan:   Patient to be seen Therapy Frequency: 3 x/week  Planned Interventions: Cognitive-Linguistic Therapy  Plan of Care expires: 03/18/20  Plan of Care reviewed with: patient, significant other  SLP Follow-up?: Yes              MELANI Dominguez, MARY-SLP  03/02/2020     MELANI Dominguez, CCC-SLP  Speech Language Pathologist  [phone]  3/2/2020

## 2020-03-02 NOTE — PROGRESS NOTES
[facility]                                  Skilled Nursing Facility                   Progress Note     Admit Date: 2/18/2020  KIRBY 3/11/2020  Principal Problem:  Bilateral proximal humeral fractures   HPI obtained from patient interview and chart review     Chief Complaint:  Leukocytosis, encephalopathic, visual hallucinations    HPI:   Mr. Melvin is a 60 year old male with PMHx of AFib on coumadin, peripheral vascular disease, hypertension, COPD, current smoker, liver cirrhosis, who presents to SNF following hospitalization for failure of the repair of the greater tuberosity on the right shoulder s/p revision fixation of right greater tuberosity fracture on 2/14 by Dr. Chris.  Admission to SNF for secondary weakness and debility.    Interval history:  Today, patient is WBCs increased 26.5 from 6.9.  Patient has remained afebrile.  Patient recently treated for UTI with a course of Cipro.  Patient exhibits altered mental status and wife reports that he is experiencing visual hallucinations that began 2 days ago.  Patient also noted to in CANDY- BUN/creatinine today is 43/2.5 from 31/1.4.     Past Medical History: Patient has a past medical history of Adenoma of right adrenal gland (11/09/2017), Alcohol abuse, ASCVD (arteriosclerotic cardiovascular disease) (10/2008), BPH (benign prostatic hyperplasia), Cardiomyopathy, Chronic anxiety, Cirrhosis of liver, COPD (chronic obstructive pulmonary disease), Current every day smoker, Elevated LFTs (08/2001), GERD with esophagitis (11/09/2017), History of colon polyps, History of epididymitis (2016), Hyperlipidemia, Hypertension, essential, Long term (current) use of anticoagulants, Lumbar disc disease with radiculopathy (05/2015), Major depression, recurrent, chronic, PAF (paroxysmal atrial fibrillation), Peripheral autonomic neuropathy due to DM, Proteinuria, PVD (peripheral vascular disease)  (05/29/2009), Venous insufficiency of both lower extremities, and Vitamin D deficiency.    Past Surgical History: Patient has a past surgical history that includes Percutaneous transluminal angioplasty (PTA) of peripheral vessel (Right, 12/2014); Intestinal malrotation repair (Right, 1961); Epidural steroid injection into lumbar spine (06/2015); biopsy right ankle (Right, 05/20/2019); biopsy back (05/20/2019); Colonoscopy w/ polypectomy; Esophagogastroduodenoscopy (N/A, 6/13/2019); Colonoscopy (N/A, 6/13/2019); Partial arthroplasty of shoulder (Left, 1/28/2020); Partial arthroplasty of shoulder (Right, 1/30/2020); and Open reduction and internal fixation (ORIF) of fracture of proximal humerus (Right, 2/14/2020).    Social History: Patient reports that he has been smoking cigarettes. He has a 360.00 pack-year smoking history. He has never used smokeless tobacco. He reports that he drank alcohol. He reports that he does not use drugs.    Family History: family history includes Acromegaly in his father; Diabetes in his mother; Heart attack in his father; Hypertension in his father and mother; Kidney cancer in his brother.    Allergies: Patient has No Known Allergies.    ROS  Constitutional: Negative for fever or fatigue.  +insomnia, fatigue   Eyes: Negative for blurred vision, double vision and discharge.   Respiratory: negative for cough, shortness of breath and wheezing.    Cardiovascular: Negative for chest pain, palpitations, and leg swelling.   Gastrointestinal: Negative for abdominal pain, constipation, diarrhea, nausea and vomiting.   Genitourinary: Negative for dysuria, frequency and urgency.   Musculoskeletal:  + generalized weakness, intermittent bilateral upper extremity pain. Negative for back pain and myalgias.   Skin: Negative for itching and rash.   Neurological: Negative for dizziness, speech change, and headaches.   Psychiatric/Behavioral: Negative for depression. The patient is not nervous/anxious.       PEx  Temp:  [98.2 °F (36.8 °C)-98.4 °F (36.9 °C)]   Pulse:  []   Resp:  [16-18]   BP: ()/(54-66)   SpO2:  [92 %-93 %]      Constitutional: Patient appears confused  HENT:   Head: Normocephalic and atraumatic.   Eyes: Pupils are equal, round  Neck: Normal range of motion. Neck supple.   Cardiovascular: Normal rate, regular rhythm and normal heart sounds.    Pulmonary/Chest: Effort normal and breath sounds are clear  Abdominal: Soft. Bowel sounds are normal.   Musculoskeletal: Normal range of motion.  Decreased range of motion to bilateral upper extremity  Neurological:  Lethargic and oriented to person.  Disoriented to place, and time.  disorganized thinking, visual hallucinations  Psychiatric:  Bizarre mood and affect.  Skin: Skin is warm and dry.  Surgical incision to RUE 14cm in length, LUE 15 cm in length- no drainage noted, fully approximated, no signs of infection- following weekly last visualized on 02/27    Recent Labs   Lab 03/02/20  0614   *   K 4.1   CL 93*   CO2 28   BUN 43*   CREATININE 2.5*   MG 2.1       Recent Labs   Lab 03/02/20  0614   WBC 26.57*   RBC 4.08*   HGB 10.9*   HCT 37.1*      MCV 91   MCH 26.7*   MCHC 29.4*         Assessment and Plan:       Problems addressed today    Leukocytosis  Encephalopathic  - discussed with Dr. Anne, decision made to transfer patient to ED for further infectious workup- workup ordered at SNF includes:  Chest x-ray, blood cultures x2, UA with reflex to culture, lactic acid and procalcitonin.  Patient to [facility] workup at [facility].  Patient's wife updated bedside       Ongoing problems      Insomnia  - continue amitriptyline 100 mg at 6:00 p.m.     Enterobacter Aerogenes UTI  - continue Cipro 500 mg BID x7 days; sensitivity finalized    CKD III  - SCr with wandering baseline 0.8-1.4  - initiated Lasix 80 mg x1 dose, checking BMP tomorrow to see if there is improvement in BUN/creatinine\  - 2/25 DC lasix for concerns of dehydration  -  2/27 creatinine/BUN with slight increase, slight increase in weight, initiating Lasix 20 mg daily    Essential hypertension  - Home medications: metoprolol 25 mg BID and ramipril 5 mg daily at home.   - continue Metoprolol 25 mg BID with low normal BPs   - 2/27 stable, continue current treatment at this time    PAF (paroxysmal atrial fibrillation)  Chronic anticoagulation  - continue warfarin at 5 mg daily               For INR 2-3  - Continue metoprolol  - 2/24 INR 1.1, increase Coumadin to 7.5 mg daily  - 2/27 INR increased to 1.7- will hold off on further increase at this time    Hypomagnesemia  - initiated magnesium oxide 400 mg BID x2 days    Expected blood loss anemia   - stable without specific intervention; will monitor with twice weekly CBCs    Bilateral fracture dislocation of proximal humerus fracture  Bilateral acromial fractures with bilateral shoulder dislocation  S/p bilateral shoulder hemiarthroplasty: right on 1/28/2020 and left on 1/30/2020  S/p Pt underwent revision of fixation of R greater tuberosity fracture on 02/14/20  - Follow up with orthopedics as scheduled  - PT/OT - nonweightbearing bilateral upper extremities in sling  - DVT prophylaxis - continue    - 2/27 patient went to ortho follow-up appointment on 02/26 following recommendations were given:  Nonweightbearing bilateral upper extremity times 3 months postop   shoulder sling x6 weeks postop, may remove for hygiene and physical therapy.       DC left sling in 2 weeks from today - 3.11  DC right sling in 4 weeks from today - 3.26     Okay for range of motion as tolerated hand wrist elbow now.  Okay for active assisted range of motion of shoulder and pendulums     Okay for range of motion as tolerated of shoulders after slings d/c (see above dates)  No strengthening exercises until 3 months postop        COPD (chronic obstructive pulmonary disease)  Smoker   - levalbuterol nebs  prn  - Limit oxygen to avoid hypercapnea  - smoking  cessation advised  - reduced nicotine patch to 14 mg from 21 mg daily     Neuropathic pain  - continue Gabapentin 400 mg TID   - continue Capsaicin cream prn  -  continue percocet 5/325 mg  po q6h prnpain     Pathologic osteoporotic fractures  - Treat vitamin D deficiency  - Further evaluation and management as per PCP     GERD (gastroesophageal reflux disease)  - Continue Pantoprazole 40 mg daily     Stable burst fracture of first lumbar vertebra  L1 and L4 burst fractures  - f/u in neurosurgery clinic as scheduled on 02/21/20 for further management  - TSLO brace whenever not lying in bed     Constipation  - Continue daily miralax and senokot-s      Subdural hematoma  - Stable  - Cleared to resume anticoagualtion     Type 2 diabetes mellitus with CKD III and HTN  - A1c 5.9 from 01/27/20  - Not on treatment current  - Acuchecks in general <120  - Stop accucheck and SSI     Severe Vitamin D deficiency  - Level of 7 on 2/4  - continue Ergocalciferol 50k weekly     Seizure disorder   - continue keppra 1 g BID  - Follow up with neurology after discharge - appointment needs to be made     Hyperlipidemia   - continue atorvastatin 80 mg daily     GERD   - continue protonix 40 mg daily     Moderate protein calorie malnutrition   - boost plus TID with meals  - Regular diet  - Dietary consult     Acute metabolic encephalopathy  Probable mild Cognitive impairment  - speech therapy eval  - Consider neuropsych eval  - continue scheduled methocarbamol to prn  - Hold amitriptyline for now, will use melatonin for sleep     DJD of multiple sites   - PT/OT. Pain management as above     Cirrhosis of liver   - attributed to alcohol. Patient rarely drinks alcohol at this time.       Future Appointments   Date Time Provider Department Center   3/24/2020  8:00 AM Cristal Ohara NP [facility]   3/25/2020  8:15 AM Chandler Chris MD [facility] ORTHO [address]   4/23/2020  8:15 AM [facility] OI-CT2 [address] LIMIT [facility] CTS IC  Imaging Ctr   4/23/2020  8:30 AM [facility] OI-CT2 [address] LIMIT [facility] IC Imaging Ctr   4/23/2020  9:30 AM Carl Barry MD [facility] NEUROS7 [address] NP      DOS: 3/2/2020       Patient note was created using MModal Dictation.  Any errors in syntax or even information may not have been identified and edited on initial review prior to signing this note.

## 2020-03-02 NOTE — PLAN OF CARE
Problem: SLP Goal  Goal: SLP Goal  Description  Speech Language Pathology Goals  Revised goals expected to be met 3/9:   1. Pt will complete short term memory tasks with 80% accuracy given mincues.   2. Pt will follow 3-step commands with 70% accuracy given mod cues.   3. Pt will complete moderate level problem solving/reasoning tasks with 80% accuracy given min-mod cues.   4. Pt will complete mental manipulation tasks with 80% accuracy given min-mod cues.     Goals expected to be met by 2/27:  1. Pt will complete short term memory tasks with 70% accuracy given min-mod cues. Goal met  2. Pt will follow 3-step commands with 70% accuracy given mod cues. ongoing  3. Pt will complete moderate level problem solving/reasoning tasks with 80% accuracy given min-mod cues. ongoing  4. Pt will participate in evaluation of reading, writing, and visual spatial abilities to determine need for further intervention. Reading and math abilities assessed 2/24. Mildly impaired math abilities (baseline?).  Unable to assess writing due to bilateral humeral fractures.         Outcome: Ongoing, Progressing  Goals reviewed and revised appropriately. SLP to continue to follow 3x/wk.  MELANI Dominguez, CCC-SLP  Speech Language Pathologist  [phone]  3/2/2020

## 2020-03-02 NOTE — PT/OT/SLP PROGRESS
Physical Therapy  Treatment    Ben Melvin   MRN: [id number]   Admitting Diagnosis: Bilateral proximal humeral fractures    PT Received On: 03/02/20  Total Time (min): (--)       Billable Minutes:  Therapeutic Activity 8 and Therapeutic Exercise 22    Treatment Type: Treatment  PT/PTA: PTA     PTA Visit Number: 3       General Precautions: Standard, diabetic, fall, seizure  Orthopedic Precautions: RUE non weight bearing, LUE non weight bearing, spinal precautions(ROMAT (B)elb/wrist/hand,(L)shldrAAROM/pend,(R)shldr no ROM)   Braces: LSO, Shoulder abduction brace    Spiritual, Cultural Beliefs, Anabaptism Practices, Values that Affect Care: no    Subjective:  Communicated with nursing prior to session.  Pt agreed to work with therapy.     Pain/Comfort  Pain Rating 1: (did not rate)  Location - Side 1: Bilateral  Location - Orientation 1: generalized  Location 1: shoulder  Pain Addressed 1: Reposition, Distraction  Pain Rating Post-Intervention 1: (did not rate)    Objective:  Patient found seated w/c in gym with staff present.        AM-PAC 6 CLICK MOBILITY  Total Score:15    Transfers:  Sit<>Stand: to/from w/c no AD CGA for safety  Stand Pivot Transfer: w/c to EOB no AD and CGA for safety    Gait:  Not performed on this date 2* to pt safety. Pt lethargic. Nursing aware and will monitor.      Therex:  BLE therex x20 reps:    -AP   -LAQ   -hip flexion    -hip abd/add   -GS  Cueing for proper technique    Additional Treatment:  -Mini-Elliptical x18 min with cueing to stay on task and keep eyes open.     Patient left supine with call button in reach, nursing notified and friend present.    Assessment:  Ben Melvin is a 60 y.o. male with a medical diagnosis of Bilateral proximal humeral fractures.  Pt treatment session limited on this date 2* to pt being lethargic. Pt will continue to benefit from PT services at this time. Continue with PT POC as indicated.    Rehab identified problem list/impairments: weakness,  impaired endurance, impaired sensation, impaired self care skills, impaired functional mobilty, gait instability, impaired balance, decreased upper extremity function, decreased lower extremity function, pain, orthopedic precautions    Rehab potential is good.    Activity tolerance: good    Discharge recommendations: rehabilitation facility     Barriers to discharge: Inaccessible home environment    Equipment recommendations: hospital bed, wheelchair     GOALS:   Multidisciplinary Problems     Physical Therapy Goals        Problem: Physical Therapy Goal    Goal Priority Disciplines Outcome Goal Variances Interventions   Physical Therapy Goal     PT, PT/OT Ongoing, Progressing     Description:  Goals to be met by: 3/11/20     Patient will increase functional independence with mobility by performin. Supine to sit with MInimal Assistance  2. Sit to supine with Set-up [place]  3. Sit to stand transfer with Stand-by Assistance  4. Bed to chair transfer with Stand-by Assistance w/o AD  5. Gait  x 50ft w/o AD w/ SBA  6. Wheelchair propulsion x200 feet with Supervision using bilateral lower extremities  7. Ascend/descend 3 stair without Handrails Minimal Assistance   8. Stand for 3 minutes with Stand-by Assistance w/o AD  9. Pt will complete car transfer (actual or simulated) w/o AD w/ CGA                      PLAN:    Patient to be seen 5 x/week  to address the above listed problems via gait training, therapeutic activities, therapeutic exercises, neuromuscular re-education, wheelchair management/training  Plan of Care expires: 20  Plan of Care reviewed with: patient    Rosa Isela Pipe, PTA  2020

## 2020-03-03 PROBLEM — G93.40 ACUTE ENCEPHALOPATHY: Status: ACTIVE | Noted: 2020-03-03

## 2020-03-03 PROBLEM — K80.00 CHOLELITHIASIS WITH ACUTE CHOLECYSTITIS: Status: ACTIVE | Noted: 2020-03-03

## 2020-03-03 PROBLEM — A41.9 SEPSIS: Status: ACTIVE | Noted: 2020-03-03

## 2020-03-03 PROBLEM — N17.9 AKI (ACUTE KIDNEY INJURY): Status: ACTIVE | Noted: 2020-03-03

## 2020-03-03 LAB
ALBUMIN SERPL BCP-MCNC: 1.9 G/DL (ref 3.5–5.2)
ALP SERPL-CCNC: 253 U/L (ref 55–135)
ALT SERPL W/O P-5'-P-CCNC: 35 U/L (ref 10–44)
ANION GAP SERPL CALC-SCNC: 11 MMOL/L (ref 8–16)
ANISOCYTOSIS BLD QL SMEAR: SLIGHT
APTT BLDCRRT: 40.4 SEC (ref 21–32)
AST SERPL-CCNC: 53 U/L (ref 10–40)
BASOPHILS # BLD AUTO: 0.05 K/UL (ref 0–0.2)
BASOPHILS NFR BLD: 0.2 % (ref 0–1.9)
BILIRUB SERPL-MCNC: 1.5 MG/DL (ref 0.1–1)
BLD PROD TYP BPU: NORMAL
BLOOD UNIT EXPIRATION DATE: NORMAL
BLOOD UNIT TYPE CODE: 6200
BLOOD UNIT TYPE: NORMAL
BUN SERPL-MCNC: 40 MG/DL (ref 6–20)
CALCIUM SERPL-MCNC: 8.7 MG/DL (ref 8.7–10.5)
CHLORIDE SERPL-SCNC: 98 MMOL/L (ref 95–110)
CO2 SERPL-SCNC: 26 MMOL/L (ref 23–29)
CODING SYSTEM: NORMAL
CREAT SERPL-MCNC: 2.3 MG/DL (ref 0.5–1.4)
DIFFERENTIAL METHOD: ABNORMAL
DISPENSE STATUS: NORMAL
EOSINOPHIL # BLD AUTO: 0.2 K/UL (ref 0–0.5)
EOSINOPHIL NFR BLD: 0.9 % (ref 0–8)
ERYTHROCYTE [DISTWIDTH] IN BLOOD BY AUTOMATED COUNT: 17.6 % (ref 11.5–14.5)
EST. GFR  (AFRICAN AMERICAN): 34.6 ML/MIN/1.73 M^2
EST. GFR  (NON AFRICAN AMERICAN): 30 ML/MIN/1.73 M^2
GLUCOSE SERPL-MCNC: 103 MG/DL (ref 70–110)
GLUCOSE SERPL-MCNC: 114 MG/DL (ref 70–110)
HCT VFR BLD AUTO: 29.6 % (ref 40–54)
HGB BLD-MCNC: 8.8 G/DL (ref 14–18)
HYPOCHROMIA BLD QL SMEAR: ABNORMAL
IMM GRANULOCYTES # BLD AUTO: 0.24 K/UL (ref 0–0.04)
IMM GRANULOCYTES NFR BLD AUTO: 1 % (ref 0–0.5)
INR PPP: 1.1 (ref 0.8–1.2)
INR PPP: 2.2 (ref 0.8–1.2)
LIPASE SERPL-CCNC: 32 U/L (ref 4–60)
LYMPHOCYTES # BLD AUTO: 0.8 K/UL (ref 1–4.8)
LYMPHOCYTES NFR BLD: 3.2 % (ref 18–48)
MAGNESIUM SERPL-MCNC: 1.9 MG/DL (ref 1.6–2.6)
MCH RBC QN AUTO: 26.8 PG (ref 27–31)
MCHC RBC AUTO-ENTMCNC: 29.7 G/DL (ref 32–36)
MCV RBC AUTO: 90 FL (ref 82–98)
MONOCYTES # BLD AUTO: 1.1 K/UL (ref 0.3–1)
MONOCYTES NFR BLD: 4.8 % (ref 4–15)
NEUTROPHILS # BLD AUTO: 21.1 K/UL (ref 1.8–7.7)
NEUTROPHILS NFR BLD: 89.9 % (ref 38–73)
NRBC BLD-RTO: 0 /100 WBC
NUM UNITS TRANS FFP: NORMAL
OVALOCYTES BLD QL SMEAR: ABNORMAL
PHOSPHATE SERPL-MCNC: 4.1 MG/DL (ref 2.7–4.5)
PLATELET # BLD AUTO: 231 K/UL (ref 150–350)
PLATELET BLD QL SMEAR: ABNORMAL
PMV BLD AUTO: 9.4 FL (ref 9.2–12.9)
POCT GLUCOSE: 101 MG/DL (ref 70–110)
POCT GLUCOSE: 103 MG/DL (ref 70–110)
POCT GLUCOSE: 109 MG/DL (ref 70–110)
POCT GLUCOSE: 113 MG/DL (ref 70–110)
POCT GLUCOSE: 120 MG/DL (ref 70–110)
POLYCHROMASIA BLD QL SMEAR: ABNORMAL
POTASSIUM SERPL-SCNC: 4.3 MMOL/L (ref 3.5–5.1)
PROT SERPL-MCNC: 6.4 G/DL (ref 6–8.4)
PROTHROMBIN TIME: 11.4 SEC (ref 9–12.5)
PROTHROMBIN TIME: 21.3 SEC (ref 9–12.5)
RBC # BLD AUTO: 3.28 M/UL (ref 4.6–6.2)
SODIUM SERPL-SCNC: 135 MMOL/L (ref 136–145)
WBC # BLD AUTO: 23.43 K/UL (ref 3.9–12.7)

## 2020-03-03 PROCEDURE — 99223 PR INITIAL HOSPITAL CARE,LEVL III: ICD-10-PCS | Mod: AI,GC,, | Performed by: INTERNAL MEDICINE

## 2020-03-03 PROCEDURE — P9017 PLASMA 1 DONOR FRZ W/IN 8 HR: HCPCS

## 2020-03-03 PROCEDURE — 97167 OT EVAL HIGH COMPLEX 60 MIN: CPT

## 2020-03-03 PROCEDURE — 36430 TRANSFUSION BLD/BLD COMPNT: CPT

## 2020-03-03 PROCEDURE — 85730 THROMBOPLASTIN TIME PARTIAL: CPT

## 2020-03-03 PROCEDURE — 80053 COMPREHEN METABOLIC PANEL: CPT

## 2020-03-03 PROCEDURE — 83735 ASSAY OF MAGNESIUM: CPT

## 2020-03-03 PROCEDURE — 85610 PROTHROMBIN TIME: CPT

## 2020-03-03 PROCEDURE — 25500020 PHARM REV CODE 255: Performed by: INTERNAL MEDICINE

## 2020-03-03 PROCEDURE — 25000003 PHARM REV CODE 250: Performed by: STUDENT IN AN ORGANIZED HEALTH CARE EDUCATION/TRAINING PROGRAM

## 2020-03-03 PROCEDURE — 99223 1ST HOSP IP/OBS HIGH 75: CPT | Mod: AI,GC,, | Performed by: INTERNAL MEDICINE

## 2020-03-03 PROCEDURE — S4991 NICOTINE PATCH NONLEGEND: HCPCS | Performed by: STUDENT IN AN ORGANIZED HEALTH CARE EDUCATION/TRAINING PROGRAM

## 2020-03-03 PROCEDURE — 85025 COMPLETE CBC W/AUTO DIFF WBC: CPT

## 2020-03-03 PROCEDURE — 63600175 PHARM REV CODE 636 W HCPCS: Performed by: RADIOLOGY

## 2020-03-03 PROCEDURE — 94761 N-INVAS EAR/PLS OXIMETRY MLT: CPT

## 2020-03-03 PROCEDURE — S0030 INJECTION, METRONIDAZOLE: HCPCS | Performed by: STUDENT IN AN ORGANIZED HEALTH CARE EDUCATION/TRAINING PROGRAM

## 2020-03-03 PROCEDURE — 11000001 HC ACUTE MED/SURG PRIVATE ROOM

## 2020-03-03 PROCEDURE — C9399 UNCLASSIFIED DRUGS OR BIOLOG: HCPCS | Performed by: STUDENT IN AN ORGANIZED HEALTH CARE EDUCATION/TRAINING PROGRAM

## 2020-03-03 PROCEDURE — 84100 ASSAY OF PHOSPHORUS: CPT

## 2020-03-03 PROCEDURE — 63600175 PHARM REV CODE 636 W HCPCS: Performed by: STUDENT IN AN ORGANIZED HEALTH CARE EDUCATION/TRAINING PROGRAM

## 2020-03-03 PROCEDURE — 97161 PT EVAL LOW COMPLEX 20 MIN: CPT

## 2020-03-03 PROCEDURE — 36415 COLL VENOUS BLD VENIPUNCTURE: CPT

## 2020-03-03 PROCEDURE — 25000003 PHARM REV CODE 250: Performed by: RADIOLOGY

## 2020-03-03 PROCEDURE — 85610 PROTHROMBIN TIME: CPT | Mod: 91

## 2020-03-03 PROCEDURE — 63600175 PHARM REV CODE 636 W HCPCS: Performed by: INTERNAL MEDICINE

## 2020-03-03 RX ORDER — ERGOCALCIFEROL 1.25 MG/1
50000 CAPSULE ORAL
Status: DISCONTINUED | OUTPATIENT
Start: 2020-03-03 | End: 2020-03-06 | Stop reason: HOSPADM

## 2020-03-03 RX ORDER — PROCHLORPERAZINE MALEATE 5 MG
10 TABLET ORAL EVERY 6 HOURS PRN
Status: DISCONTINUED | OUTPATIENT
Start: 2020-03-03 | End: 2020-03-06 | Stop reason: HOSPADM

## 2020-03-03 RX ORDER — GLUCAGON 1 MG
1 KIT INJECTION
Status: DISCONTINUED | OUTPATIENT
Start: 2020-03-03 | End: 2020-03-06 | Stop reason: HOSPADM

## 2020-03-03 RX ORDER — INSULIN ASPART 100 [IU]/ML
0-5 INJECTION, SOLUTION INTRAVENOUS; SUBCUTANEOUS
Status: DISCONTINUED | OUTPATIENT
Start: 2020-03-03 | End: 2020-03-06 | Stop reason: HOSPADM

## 2020-03-03 RX ORDER — POLYETHYLENE GLYCOL 3350 17 G/17G
17 POWDER, FOR SOLUTION ORAL DAILY
Status: DISCONTINUED | OUTPATIENT
Start: 2020-03-03 | End: 2020-03-06 | Stop reason: HOSPADM

## 2020-03-03 RX ORDER — ACETAMINOPHEN 325 MG/1
650 TABLET ORAL EVERY 8 HOURS PRN
Status: DISCONTINUED | OUTPATIENT
Start: 2020-03-03 | End: 2020-03-03

## 2020-03-03 RX ORDER — LANOLIN ALCOHOL/MO/W.PET/CERES
1000 CREAM (GRAM) TOPICAL DAILY
Status: DISCONTINUED | OUTPATIENT
Start: 2020-03-03 | End: 2020-03-06 | Stop reason: HOSPADM

## 2020-03-03 RX ORDER — ACETAMINOPHEN 325 MG/1
650 TABLET ORAL EVERY 8 HOURS PRN
Status: DISCONTINUED | OUTPATIENT
Start: 2020-03-03 | End: 2020-03-06 | Stop reason: HOSPADM

## 2020-03-03 RX ORDER — ATORVASTATIN CALCIUM 20 MG/1
80 TABLET, FILM COATED ORAL NIGHTLY
Status: DISCONTINUED | OUTPATIENT
Start: 2020-03-03 | End: 2020-03-06 | Stop reason: HOSPADM

## 2020-03-03 RX ORDER — HYDROCODONE BITARTRATE AND ACETAMINOPHEN 500; 5 MG/1; MG/1
TABLET ORAL
Status: DISCONTINUED | OUTPATIENT
Start: 2020-03-03 | End: 2020-03-06 | Stop reason: HOSPADM

## 2020-03-03 RX ORDER — IBUPROFEN 200 MG
16 TABLET ORAL
Status: DISCONTINUED | OUTPATIENT
Start: 2020-03-03 | End: 2020-03-06 | Stop reason: HOSPADM

## 2020-03-03 RX ORDER — METRONIDAZOLE 500 MG/100ML
500 INJECTION, SOLUTION INTRAVENOUS
Status: DISCONTINUED | OUTPATIENT
Start: 2020-03-03 | End: 2020-03-05

## 2020-03-03 RX ORDER — PANTOPRAZOLE SODIUM 40 MG/1
40 TABLET, DELAYED RELEASE ORAL DAILY
Status: DISCONTINUED | OUTPATIENT
Start: 2020-03-03 | End: 2020-03-06 | Stop reason: HOSPADM

## 2020-03-03 RX ORDER — IBUPROFEN 200 MG
1 TABLET ORAL DAILY
Status: DISCONTINUED | OUTPATIENT
Start: 2020-03-03 | End: 2020-03-06 | Stop reason: HOSPADM

## 2020-03-03 RX ORDER — CEFEPIME HYDROCHLORIDE 1 G/1
1 INJECTION, POWDER, FOR SOLUTION INTRAMUSCULAR; INTRAVENOUS
Status: DISCONTINUED | OUTPATIENT
Start: 2020-03-03 | End: 2020-03-03

## 2020-03-03 RX ORDER — LIDOCAINE HYDROCHLORIDE 10 MG/ML
INJECTION INFILTRATION; PERINEURAL CODE/TRAUMA/SEDATION MEDICATION
Status: COMPLETED | OUTPATIENT
Start: 2020-03-03 | End: 2020-03-03

## 2020-03-03 RX ORDER — LEVETIRACETAM 500 MG/1
1000 TABLET ORAL 2 TIMES DAILY
Status: DISCONTINUED | OUTPATIENT
Start: 2020-03-03 | End: 2020-03-06 | Stop reason: HOSPADM

## 2020-03-03 RX ORDER — SODIUM CHLORIDE, SODIUM LACTATE, POTASSIUM CHLORIDE, CALCIUM CHLORIDE 600; 310; 30; 20 MG/100ML; MG/100ML; MG/100ML; MG/100ML
INJECTION, SOLUTION INTRAVENOUS CONTINUOUS
Status: ACTIVE | OUTPATIENT
Start: 2020-03-03 | End: 2020-03-04

## 2020-03-03 RX ORDER — IBUPROFEN 200 MG
24 TABLET ORAL
Status: DISCONTINUED | OUTPATIENT
Start: 2020-03-03 | End: 2020-03-06 | Stop reason: HOSPADM

## 2020-03-03 RX ORDER — OXYCODONE HYDROCHLORIDE 10 MG/1
10 TABLET ORAL EVERY 6 HOURS PRN
Status: DISCONTINUED | OUTPATIENT
Start: 2020-03-03 | End: 2020-03-06 | Stop reason: HOSPADM

## 2020-03-03 RX ORDER — MIDAZOLAM HYDROCHLORIDE 1 MG/ML
INJECTION INTRAMUSCULAR; INTRAVENOUS CODE/TRAUMA/SEDATION MEDICATION
Status: COMPLETED | OUTPATIENT
Start: 2020-03-03 | End: 2020-03-03

## 2020-03-03 RX ORDER — IPRATROPIUM BROMIDE AND ALBUTEROL SULFATE 2.5; .5 MG/3ML; MG/3ML
3 SOLUTION RESPIRATORY (INHALATION) EVERY 4 HOURS PRN
Status: DISCONTINUED | OUTPATIENT
Start: 2020-03-03 | End: 2020-03-06 | Stop reason: HOSPADM

## 2020-03-03 RX ORDER — FENTANYL CITRATE 50 UG/ML
INJECTION, SOLUTION INTRAMUSCULAR; INTRAVENOUS CODE/TRAUMA/SEDATION MEDICATION
Status: COMPLETED | OUTPATIENT
Start: 2020-03-03 | End: 2020-03-03

## 2020-03-03 RX ORDER — ONDANSETRON 2 MG/ML
4 INJECTION INTRAMUSCULAR; INTRAVENOUS EVERY 8 HOURS PRN
Status: DISCONTINUED | OUTPATIENT
Start: 2020-03-03 | End: 2020-03-06 | Stop reason: HOSPADM

## 2020-03-03 RX ORDER — CEFEPIME HYDROCHLORIDE 1 G/1
1 INJECTION, POWDER, FOR SOLUTION INTRAMUSCULAR; INTRAVENOUS
Status: DISCONTINUED | OUTPATIENT
Start: 2020-03-03 | End: 2020-03-05

## 2020-03-03 RX ORDER — METOPROLOL TARTRATE 25 MG/1
25 TABLET, FILM COATED ORAL 2 TIMES DAILY
Status: DISCONTINUED | OUTPATIENT
Start: 2020-03-03 | End: 2020-03-04

## 2020-03-03 RX ORDER — OXYCODONE HYDROCHLORIDE 5 MG/1
5 TABLET ORAL EVERY 4 HOURS PRN
Status: DISCONTINUED | OUTPATIENT
Start: 2020-03-03 | End: 2020-03-06 | Stop reason: HOSPADM

## 2020-03-03 RX ADMIN — METRONIDAZOLE 500 MG: 500 INJECTION, SOLUTION INTRAVENOUS at 05:03

## 2020-03-03 RX ADMIN — METOPROLOL TARTRATE 25 MG: 25 TABLET ORAL at 09:03

## 2020-03-03 RX ADMIN — METRONIDAZOLE 500 MG: 500 INJECTION, SOLUTION INTRAVENOUS at 11:03

## 2020-03-03 RX ADMIN — PANTOPRAZOLE SODIUM 40 MG: 40 TABLET, DELAYED RELEASE ORAL at 10:03

## 2020-03-03 RX ADMIN — CEFEPIME 1 G: 1 INJECTION, POWDER, FOR SOLUTION INTRAMUSCULAR; INTRAVENOUS at 04:03

## 2020-03-03 RX ADMIN — NICOTINE 1 PATCH: 21 PATCH, EXTENDED RELEASE TRANSDERMAL at 10:03

## 2020-03-03 RX ADMIN — SODIUM CHLORIDE, SODIUM LACTATE, POTASSIUM CHLORIDE, AND CALCIUM CHLORIDE 500 ML: .6; .31; .03; .02 INJECTION, SOLUTION INTRAVENOUS at 02:03

## 2020-03-03 RX ADMIN — IOHEXOL 25 ML: 300 INJECTION, SOLUTION INTRAVENOUS at 03:03

## 2020-03-03 RX ADMIN — PHYTONADIONE 10 MG: 10 INJECTION, EMULSION INTRAMUSCULAR; INTRAVENOUS; SUBCUTANEOUS at 09:03

## 2020-03-03 RX ADMIN — ERGOCALCIFEROL 50000 UNITS: 1.25 CAPSULE ORAL at 10:03

## 2020-03-03 RX ADMIN — Medication 500 MG: at 12:03

## 2020-03-03 RX ADMIN — OXYCODONE HYDROCHLORIDE 5 MG: 5 TABLET ORAL at 02:03

## 2020-03-03 RX ADMIN — LEVETIRACETAM 1000 MG: 500 TABLET ORAL at 10:03

## 2020-03-03 RX ADMIN — ATORVASTATIN CALCIUM 80 MG: 20 TABLET, FILM COATED ORAL at 09:03

## 2020-03-03 RX ADMIN — CYANOCOBALAMIN TAB 1000 MCG 1000 MCG: 1000 TAB at 10:03

## 2020-03-03 RX ADMIN — CEFEPIME 1 G: 1 INJECTION, POWDER, FOR SOLUTION INTRAMUSCULAR; INTRAVENOUS at 05:03

## 2020-03-03 RX ADMIN — INSULIN DETEMIR 15 UNITS: 100 INJECTION, SOLUTION SUBCUTANEOUS at 09:03

## 2020-03-03 RX ADMIN — LIDOCAINE HYDROCHLORIDE 3 ML: 10 INJECTION, SOLUTION INFILTRATION; PERINEURAL at 03:03

## 2020-03-03 RX ADMIN — FENTANYL CITRATE 100 MCG: 50 INJECTION, SOLUTION INTRAMUSCULAR; INTRAVENOUS at 03:03

## 2020-03-03 RX ADMIN — ATORVASTATIN CALCIUM 80 MG: 20 TABLET, FILM COATED ORAL at 02:03

## 2020-03-03 RX ADMIN — MIDAZOLAM HYDROCHLORIDE 2 MG: 1 INJECTION, SOLUTION INTRAMUSCULAR; INTRAVENOUS at 03:03

## 2020-03-03 RX ADMIN — PHYTONADIONE 5 MG: 10 INJECTION, EMULSION INTRAMUSCULAR; INTRAVENOUS; SUBCUTANEOUS at 02:03

## 2020-03-03 RX ADMIN — METOPROLOL TARTRATE 25 MG: 25 TABLET ORAL at 10:03

## 2020-03-03 RX ADMIN — LEVETIRACETAM 1000 MG: 500 TABLET ORAL at 09:03

## 2020-03-03 RX ADMIN — SODIUM CHLORIDE, SODIUM LACTATE, POTASSIUM CHLORIDE, AND CALCIUM CHLORIDE: .6; .31; .03; .02 INJECTION, SOLUTION INTRAVENOUS at 05:03

## 2020-03-03 RX ADMIN — METRONIDAZOLE 500 MG: 500 INJECTION, SOLUTION INTRAVENOUS at 06:03

## 2020-03-03 NOTE — ASSESSMENT & PLAN NOTE
Continue pain control, weight bearing precautions per Orthopedics.  Held muscle relaxants initially given reports of confusion but may consider restarting if resolves with sepsis tx.    PT/OT consult.

## 2020-03-03 NOTE — PLAN OF CARE
CM at bedside, pt confused spoke with significant other. CM explained the role of the CM/SW in assisting with discharge planning. Information in medical records verified with significant other . Patient lives with significant other in a single story home with three steps at entrance, home DME utilized. Patient transferred here from [facility]. Significant other  anticipates patient being discharged back to SNF  once medically stable. CM name and number on board.         PCP: Cristal Ohara NP  ]      PHARM:   [id number] - [place], [place] - [address] AT [facility]  [address] [place] [address]  Phone: [phone] Fax: [phone]      Payor: MEDICARE / Plan: MEDICARE PART A & B / Product Type: [facility] /          03/03/20 1317   Discharge Assessment   Assessment Type Discharge Planning Assessment   Confirmed/corrected address and phone number on facesheet? Yes   Assessment information obtained from? Medical Record;Other  (spouse )   Expected Length of Stay (days) 4   Communicated expected length of stay with patient/caregiver yes   Prior to hospitilization cognitive status: Unable to Assess   Prior to hospitalization functional status: Partially Dependent;Needs Assistance   Current cognitive status: Alert/Oriented   Current Functional Status: Needs Assistance   Facility Arrived From: [facility]    Lives With significant other   Able to Return to Prior Arrangements yes   Is patient able to care for self after discharge? No   Patient's perception of discharge disposition skilled nursing facility   Patient currently being followed by outpatient case management? No   Patient currently receives any other outside agency services? No   Equipment Currently Used at Home walker, standard;shower chair;bedside commode   Do you have any problems affording any of your prescribed medications? No   Is the patient taking medications as prescribed? yes   Does the patient have transportation home? Yes    Transportation Anticipated health plan transportation   Does the patient receive services at the [facility]? Yes  (lab core )   Discharge Plan A Skilled Nursing Facility   Discharge Plan B Rehab   Patient/Family in Agreement with Plan yes       Emily Callahan RN, BSN     Ext [phone]

## 2020-03-03 NOTE — ASSESSMENT & PLAN NOTE
Long Term (current) Use of Anticoagulants    Pt with AF on warfarin; holding until procedure finished. Restart home regimen after. Daily INR

## 2020-03-03 NOTE — PT/OT/SLP EVAL
Physical Therapy Evaluation    Patient Name:  Ben Melvin   MRN:  [id number]    Recommendations:     Discharge Recommendations:  nursing facility, skilled(return to [facility])   Discharge Equipment Recommendations: wheelchair, bedside commode, hospital bed(reassess needs at next level of care, pending progress)   Barriers to discharge: Inaccessible home and Decreased caregiver support    Assessment:     Ben Melvin is a 60 y.o. male admitted with a medical diagnosis of Sepsis.  He presents with the following impairments/functional limitations:  weakness, impaired functional mobilty, impaired endurance, gait instability, impaired balance, decreased upper extremity function, decreased lower extremity function, orthopedic precautions, pain . Patient did not bring his LSO which he needs for sitting and mobility (wife reports LSO is in her car in parking garage and will bring for next session). He did have B shoulder abduction slings, supine in bed. Patient to have procedure today (cholecystostomy tube placement) and reports plan to return to SNF to complete rehab. .    Rehab Prognosis: Good; patient would benefit from acute skilled PT services to address these deficits and reach maximum level of function.    Recent Surgery: * No surgery found *      Plan:     During this hospitalization, patient to be seen 3 x/week to address the identified rehab impairments via gait training, therapeutic activities, therapeutic exercises and progress toward the following goals:    · Plan of Care Expires:       Subjective     Chief Complaint: can't walk  Patient/Family Comments/goals: return home when able to walk  Pain/Comfort:  · Pain Rating 1: (did not rate)  · Location - Side 1: Bilateral  · Location - Orientation 1: generalized  · Location 1: shoulder  · Pain Addressed 1: Distraction    Patients cultural, spiritual, Gnosticism conflicts given the current situation:      Living Environment:  Lives w/ wife in 2SH w/  downstairs bed/ bath. Has 3 KATHRYN w/ RHR.   Prior to admission, patients level of function was independent prior to fall w/ orthopedic injuries on 20. Was performing mobility at SNF w/ min assist and observing orthopedic precautions.   Equipment used at home: walker, standard, shower chair, bedside commode.  DME owned (not currently used): none.  Upon discharge, patient will have assistance from wife.    Objective:     Communicated with nurse prior to session.  Patient found supine with peripheral IV, telemetry  upon PT entry to room w/ UE slings    General Precautions: Standard, fall   Orthopedic Precautions:spinal precautions, RUE non weight bearing, LUE non weight bearing   Braces: Shoulder abduction brace, LSO(Patient does not have LSO at bedside)     Exams:  · Cognitive Exam:  Patient is oriented to Person, Place, Time and Situation  · RLE ROM: WFL  · RLE Strength: WFL  · LLE ROM: WFL  · LLE Strength: WFL   per observation in supine  Functional Mobility:  · Not performed due to patient did not bring his LSO, which he needs for mobility- wife will bring from car prior to next session.      Therapeutic Activities and Exercises:   Patient educated in PT POC, precautions of NWB BUE and spinal precautions/LSO.   Patient can name spinal precautions.    AM-PAC 6 CLICK MOBILITY  Total Score:11     Patient left supine with all lines intact, call button in reach and wife present.    GOALS:   Multidisciplinary Problems     Physical Therapy Goals        Problem: Physical Therapy Goal    Goal Priority Disciplines Outcome Goal Variances Interventions   Physical Therapy Goal     PT, PT/OT      Description:  Goals to be met by: 20     Patient will increase functional independence with mobility by performin. Supine to sit with Contact Guard Assistance, observing spinal precautions/ LSO and NWB BUE  2. Sit to stand transfer with Minimal Assistance,  observing spinal precautions/LSO and NWB BUE  3. Bed to chair  transfer with Minimal Assistance using step transfer,  observing spinal precautions/LSO and NWB BUE  4. Gait  x 30 feet with Minimal Assistance w/o AD,  observing spinal precautions and NWB BUE.   5. Lower extremity exercise program x20 reps per handout, with assistance as needed                      History:     Past Medical History:   Diagnosis Date    Adenoma of right adrenal gland 11/09/2017    Alcohol abuse     ASCVD (arteriosclerotic cardiovascular disease) 10/2008    [place]    BPH (benign prostatic hyperplasia)     Cardiomyopathy     Chronic anxiety     Cirrhosis of liver     COPD (chronic obstructive pulmonary disease)     Current every day smoker     2-3 ppd    Elevated LFTs 08/2001    GERD with esophagitis 11/09/2017    History of colon polyps     History of epididymitis 2016    Juan Antonio    Hyperlipidemia     Hypertension, essential     Long term (current) use of anticoagulants     Lumbar disc disease with radiculopathy 05/2015    Major depression, recurrent, chronic     PAF (paroxysmal atrial fibrillation)     Peripheral autonomic neuropathy due to DM     Proteinuria     PVD (peripheral vascular disease) 05/29/2009    Venous insufficiency of both lower extremities     Vitamin D deficiency        Past Surgical History:   Procedure Laterality Date    biopsy back  05/20/2019    benign Martinez    biopsy right ankle Right 05/20/2019    Martinez, benign    COLONOSCOPY N/A 6/13/2019    Procedure: COLONOSCOPY;  Surgeon: Delmer Kerr MD;  Location: [facility];  Service: Endoscopy;  Laterality: N/A;    COLONOSCOPY W/ POLYPECTOMY      EPIDURAL STEROID INJECTION INTO LUMBAR SPINE  06/2015    LASHAE Harvey    ESOPHAGOGASTRODUODENOSCOPY N/A 6/13/2019    Procedure: ESOPHAGOGASTRODUODENOSCOPY (EGD);  Surgeon: Delmer Kerr MD;  Location: [facility];  Service: Endoscopy;  Laterality: N/A;    INTESTINAL MALROTATION REPAIR Right 1961    2 weeks old, Martinez    OPEN REDUCTION AND INTERNAL  FIXATION (ORIF) OF FRACTURE OF PROXIMAL HUMERUS Right 2/14/2020    Procedure: ORIF, FRACTURE, HUMERUS, PROXIMAL - ORIF R greater tuberosity - suture repair. Trios, supine, fiberwire;  Surgeon: Chandler Chris MD;  Location: [facility] OR [address] [place] [address];  Service: Orthopedics;  Laterality: Right;    PARTIAL ARTHROPLASTY OF SHOULDER Left 1/28/2020    Procedure: HEMIARTHROPLASTY, SHOULDER;  Surgeon: Chandler Chris MD;  [facility];  Service: Orthopedics;  Laterality: Left;    PARTIAL ARTHROPLASTY OF SHOULDER Right 1/30/2020    Procedure: HEMIARTHROPLASTY, SHOULDER- RIGHT- MEKA- SUPINE-  TRIOS;  Surgeon: Chandler Chris MD;  Location: [facility] OR [address] [place] [address];  Service: Orthopedics;  Laterality: Right;    PERCUTANEOUS TRANSLUMINAL ANGIOPLASTY (PTA) OF PERIPHERAL VESSEL Right 12/2014    NIKOLAS Childs       Time Tracking:     PT Received On: 03/03/20  PT Start Time: 1057     PT Stop Time: 1112  PT Total Time (min): 15 min     Billable Minutes: Evaluation 15      Lissette Cline, PT  03/03/2020

## 2020-03-03 NOTE — ED PROVIDER NOTES
Encounter Date: 3/2/2020       History     Chief Complaint   Patient presents with    Fatigue     Sent from [facility]. Low WBC.      60M with known COPD, Alcohol abuse (documented cirrhosis, no objective evidence on record), Paroxysmal Afib, DM2 c/b neuropathy who was recently discharged to [facility] after an admission to [facility] for multiple bone fractures from seizure is sent to ER from SNF after patient's lab showed leucocytosis and patient has altered mental status.     Patient reports he has been altered in the last 2-3 days where he just is drowsy all the time and they had to stop some of his medications which they thought were the reason initially (oxycontin, amitryptiline and robaxin) however patient still was confused per wife at bedside (even after >24 hours off from those meds). Patient reports being tired in the last few days after being able to actually move around at the rehab till Thursday-Friday. Wife also reports patient has been c/o some vague abdominal pain and notes patient has a bruise on the RUQ from some local trauma. Wife denies any head injury in patient from the seizure/fall.     Patient denies fever, chills, cough, SOB, runny nose/congestion/sore throat, nausea, vomiting, diarrhea/constipation, rash, skin ulcer/breakdown/incision wound pain or discharge, dysuria, melena/hematochezia, hematuria, neck pain/back pain, headache, light headedness.    Labs ordered at [facility] were reviewed and pt's WBC is 26k, INR 3.1, Pro-francisca 2.02, CXR - unremarkable for any acute changes, lactate 1.0 and UA showed no signs of UTI.     No alcohol, smoking or illicit drugs. Patient has been off warfarin for few days now due to supratherapeutic INR.   Last BM yesterday, not on any laxatives.  Last Echo in 01/20 - no signs of systolic or diastolic dysfunction, EF 60%        Review of patient's allergies indicates:  No Known Allergies  Past Medical History:   Diagnosis Date    Adenoma of right adrenal gland 11/09/2017     Alcohol abuse     ASCVD (arteriosclerotic cardiovascular disease) 10/2008    [place]    BPH (benign prostatic hyperplasia)     Cardiomyopathy     Chronic anxiety     Cirrhosis of liver     COPD (chronic obstructive pulmonary disease)     Current every day smoker     2-3 ppd    Elevated LFTs 08/2001    GERD with esophagitis 11/09/2017    History of colon polyps     History of epididymitis 2016    Juan Antonio    Hyperlipidemia     Hypertension, essential     Long term (current) use of anticoagulants     Lumbar disc disease with radiculopathy 05/2015    Major depression, recurrent, chronic     PAF (paroxysmal atrial fibrillation)     Peripheral autonomic neuropathy due to DM     Proteinuria     PVD (peripheral vascular disease) 05/29/2009    Venous insufficiency of both lower extremities     Vitamin D deficiency      Past Surgical History:   Procedure Laterality Date    biopsy back  05/20/2019    benign Martinez    biopsy right ankle Right 05/20/2019    Martinez, benign    COLONOSCOPY N/A 6/13/2019    Procedure: COLONOSCOPY;  Surgeon: Delmer Kerr MD;  Location: [facility];  Service: Endoscopy;  Laterality: N/A;    COLONOSCOPY W/ POLYPECTOMY      EPIDURAL STEROID INJECTION INTO LUMBAR SPINE  06/2015    LASHAE Harvey    ESOPHAGOGASTRODUODENOSCOPY N/A 6/13/2019    Procedure: ESOPHAGOGASTRODUODENOSCOPY (EGD);  Surgeon: Delmer Kerr MD;  Location: [facility];  Service: Endoscopy;  Laterality: N/A;    INTESTINAL MALROTATION REPAIR Right 1961    2 weeks old, Martinez    OPEN REDUCTION AND INTERNAL FIXATION (ORIF) OF FRACTURE OF PROXIMAL HUMERUS Right 2/14/2020    Procedure: ORIF, FRACTURE, HUMERUS, PROXIMAL - ORIF R greater tuberosity - suture repair. Trios, supine, fiberwire;  Surgeon: Chandler Chris MD;  Location: [address];  Service: Orthopedics;  Laterality: Right;    PARTIAL ARTHROPLASTY OF SHOULDER Left 1/28/2020    Procedure: HEMIARTHROPLASTY, SHOULDER;  Surgeon: Chandler  GARTH Chris MD;  Location: [facility] OR [address] [place] [address];  Service: Orthopedics;  Laterality: Left;    PARTIAL ARTHROPLASTY OF SHOULDER Right 1/30/2020    Procedure: HEMIARTHROPLASTY, SHOULDER- RIGHT- MEKA- SUPINE-  TRIOS;  Surgeon: Chandler Chris MD;  Location: [facility] OR [address] [place] [address];  Service: Orthopedics;  Laterality: Right;    PERCUTANEOUS TRANSLUMINAL ANGIOPLASTY (PTA) OF PERIPHERAL VESSEL Right 12/2014    NIKOLAS Childs     Family History   Problem Relation Age of Onset    Diabetes Mother     Hypertension Mother     Hypertension Father     Acromegaly Father     Heart attack Father     Kidney cancer Brother      Social History     Tobacco Use    Smoking status: Current Every Day Smoker     Packs/day: 9.00     Years: 40.00     Pack years: 360.00     Types: Cigarettes    Smokeless tobacco: Never Used    Tobacco comment: smokes parts all day long continuously   Substance Use Topics    Alcohol use: Not Currently     Frequency: Never     Comment: quit 2 years ago was heavily    Drug use: Never     Review of Systems   Constitutional: Positive for fatigue. Negative for activity change, appetite change, chills and fever.   HENT: Negative for congestion, rhinorrhea, sinus pain and trouble swallowing.    Eyes: Negative for visual disturbance.   Respiratory: Negative for cough and shortness of breath.    Cardiovascular: Negative for chest pain, palpitations and leg swelling.   Gastrointestinal: Positive for abdominal pain. Negative for abdominal distention, blood in stool, constipation, diarrhea, nausea and vomiting.   Genitourinary: Negative for difficulty urinating, dysuria, flank pain, frequency, hematuria and urgency.   Musculoskeletal: Positive for arthralgias. Negative for back pain, joint swelling, myalgias and neck stiffness.   Neurological: Negative for dizziness, seizures, syncope, weakness, light-headedness and headaches.   Psychiatric/Behavioral: Positive for confusion.       Physical Exam     Initial  Vitals [03/02/20 1532]   BP Pulse Resp Temp SpO2   (!) 116/57 (!) 118 16 97.9 °F (36.6 °C) (!) 92 %      MAP       --         Physical Exam    Constitutional: He appears well-developed and well-nourished.   HENT:   Head: Normocephalic and atraumatic.   Mouth/Throat: No oropharyngeal exudate.   Eyes: Conjunctivae and EOM are normal. Scleral icterus (mild) is present.   Neck: Normal range of motion. Neck supple. No thyromegaly present. No neck rigidity. No JVD present.   Cardiovascular: Normal heart sounds.   No murmur heard.  Tachycardic  Irregularly irregular rhythm  1+ LE pulses  2+ UE pulses   Pulmonary/Chest: No respiratory distress. He has no wheezes.   Chest clear to auscultation anteriorly   Abdominal: Soft. Normal appearance and bowel sounds are normal. He exhibits distension. There is tenderness (RUQ and epigastrium).   Dull to percussion   No hepatosplenomegaly   Musculoskeletal: He exhibits no edema or tenderness.   Neurological: He is alert. He has normal strength. No cranial nerve deficit or sensory deficit.   Oriented to person but not time and place  Answers appropriately  Following commands   Skin: Skin is warm. No rash noted. There is pallor.         ED Course   Critical Care  Date/Time: 3/3/2020 3:00 AM  Performed by: Aram Medina MD  Authorized by: Aram Medina MD   Direct patient critical care time: 25 minutes  Additional history critical care time: 8 minutes  Ordering / reviewing critical care time: 10 minutes  Documentation critical care time: 10 minutes  Consulting other physicians critical care time: 5 minutes  Total critical care time (exclusive of procedural time) : 58 minutes  Critical care time was exclusive of separately billable procedures and treating other patients and teaching time.  Critical care was necessary to treat or prevent imminent or life-threatening deterioration of the following conditions: sepsis.  Critical care was time spent personally by me on the  following activities: development of treatment plan with patient or surrogate, examination of patient, ordering and performing treatments and interventions, ordering and review of radiographic studies, re-evaluation of patient's condition, review of old charts, pulse oximetry, ordering and review of laboratory studies, obtaining history from patient or surrogate, evaluation of patient's response to treatment and discussions with consultants.        Labs Reviewed   CBC W/ AUTO DIFFERENTIAL   COMPREHENSIVE METABOLIC PANEL   AMMONIA          Imaging Results    None          Medical Decision Making:   Initial Assessment:   60M with known COPD, paroxysmal Afib, DM2 and seizure with recent multiple fractures s/p surgical fixation is back from [facility] for abnormal labs showing leucocytosis with patient reporting intermittent abd pain and subj fevers over last few days with confusion and fatigue.  Differential Diagnosis:   UTI vs PNA vs C difficile vs Cholecystitis vs Sacral decubitus ulcers vs incision wound infection   ED Management:  Initial work up was to get CBC, CMP, lactate, ammonia  CXR from [facility] was reviewed which showed no acute abnormality  CBC and CMP were grossly unchanged from what was obtained at the [facility]- leucocytosis to 27k and CANDY with creat 2.8 from baseline 1.2, T bili elevated to 1.5 and Alk Phos 281  Lactate was wnl  Ammonia was not elevated   UA from [facility] was unremarkable for any UTI  Blood cultures were already drawn at [facility]    We ordered one dose vanc and one dose zosyn for empiric treatment of sepsis when patient spiked a fever of 100.4F and BP started dropping to 70s/40s. Patient responded well to 2L IVF bolus (BP in 110/50s) and a BERT was performed which showed FOBT (-) however patient had watery stool on BRET so a C diff test was ordered and patient was started on C diff treatment protocol with oral vanc and iv flagyl for severe C Diff (WBC>14886 and hypotension).   CT Abdomen pelvis was ordered which  "did not show any signs of colitis but showed signs of acute cholecystitis: "Distended gallbladder with high attenuation material which may represent biliary sludge.  Surrounding inflammatory stranding which may be related to acute cholecystitis in the correct clinical setting.  Right upper quadrant ultrasound may be obtained for further characterization, as clinically indicated."   The findings were confirmed with a bedside US and also an official US Abd limited which confirmed Acute Cholecystitis.  With signs of RUQ pain, Fever, Jaundice, Altered mental status and hypotension - this patient is concerning for Acute Cholangitis and surgery was asked to come see the patient and patient was then admitted to [facility] under Dr De Jesus. Per Surgery likely plan would be to put in a percutaneous tube and drain the gall bladder given the current medical status of the patient.     Will admit patient to [facility].    Other:   I discussed test(s) with the performing physician.  I have discussed this case with another health care provider.    Additional MDM:   Sepsis - SIRS Criteria: Temperature: Temp > 38.3 C. Heart Rate: HR > 90. White Count: WBC > 12,000. .  Sepsis: The lactic acid was: normal (<3.0). Antibiotic selection is designed to cover the patient at risk for MRSA and pseudomonas. The patient was treated with Zosyn 4.5 g IVPB and Vancomycin 20 mg/kg IVPB. The patient was treated with the following IV Fluids to maximize the BP and the patient's CVP: several boluses of NS. stable            Attending Attestation:   Physician Attestation Statement for Resident:  As the supervising MD   Physician Attestation Statement: I have personally seen and examined this patient.   I agree with the above history. -: Reports subj fever 2d ago.  Mild R otalgia.  Denies other URI sxs, n/v/d, dysuria.  Denies bed sores.  States mild intermittent abd pain, none now.  Labs at [facility] today w/ elev wbc, oz, nl lactate.  cxr w/o new " findings.     As the supervising MD I agree with the above PE.   -: Nad, supine in bed, awake/alert  Mmm  Tm's clear  ctab  Tachy, irreg  abd soft, ntnd  No edema  No rash  No tremor  Seems slightly confused at times   As the supervising MD I agree with the above treatment, course, plan, and disposition.   -: Leukocytosis of unclear etiology.  Nl lactate earlier, but also w/ oz.  Poor historian though possibly abd pain.  Exam is nonfocal, no peritoneal signs.  bp stable, afeb.  Will monitor.  c diff could cause an elevation such as this though pt denying diarrhea at present. If develops fever or hemodynamic instability while still in process of determining source of leukocytosis, will need to escalate therapy w/ iv abx +/- fluids for possible sepsis.    I have reviewed and agree with the residents interpretation of the following: lab data, EKG and x-rays.  I have reviewed the following: old records at this facility and records from a referring facility.            Attending ED Notes:   11:43 PM bp improved w/ fluids.  Ct shows probable cholecystitis.  Taken with this, clinical picture c/w cholangitis.  abx given already.  Consulting surgery.                             Clinical Impression:       ICD-10-CM ICD-9-CM   1. Sepsis, due to unspecified organism, unspecified whether acute organ dysfunction present A41.9 038.9     995.91   2. AMS (altered mental status) R41.82 780.97         Disposition:   Disposition: Admitted  Condition: Critical                        Dequan Nuñez MD  Resident  03/03/20 8102       Aram Medina MD  03/03/20 7865

## 2020-03-03 NOTE — DISCHARGE SUMMARY
[facility] Medicine  Discharge Summary      Patient Name: Ben Melvin  MRN: [id number]  Admission Date: 2/18/2020  Hospital Length of Stay: 14 days  Discharge Date and Time:  03/02/2020  Attending Physician: Jennifer Anne MD   Discharging Provider: Ave Craig NP  Primary Care Provider: Cristal Ohara NP      HPI:   Mr. Melvin is a 60 year old male with PMHx of AFib on coumadin, peripheral vascular disease, hypertension, COPD, current smoker, liver cirrhosis, who presents to SNF following hospitalization for failure of the repair of the greater tuberosity on the right shoulder s/p revision fixation of right greater tuberosity fracture on 2/14 by Dr. Chris.  Admission to SNF for secondary weakness and debility.     Patient originally presented to [facility] ED on 01/27 after suffering a witnessed seizure while playing video poker and sustained bilateral proximal humerus fracture dislocations, bilateral acromial fractures, subdural hemorrhage, L1 and L4 burst fractures. He underwent left shoulder hemiarthroplasty, biceps tenodesis on 01/28/20 and right shoulder hemiarthroplasty, biceps tenodesis on 01/30/20 and [facility]. BL shoulder XRs from 02/10/20 notable for failure of the repair of the greater tuberosity on the right shoulder and he returned to [facility] on 02/14/20 for revision fixation of his right greater tuberosity fracture. Pt had a stable CTH on 02/10/20 and warfarin was reinitiated 02/11, last dose was 02/12. Currently on Lovenox 40 mg SQ daily. He was seen by neurosurgery and will f/u with pt outpt as scheduled on 02/21/20.      Patient reports his pain is well controlled with current regimen.  He endorses a productive cough and trouble sleeping.      Patient will be treated at [facility] with PT and OT to improve functional status and ability to perform ADLs.     * No surgery found *      Hospital Course:   Patient prematurely discharge from SNF.  Patient  was sent to ED on 03/02 for infectious workup with concerning leukocytosis, encephalopathic and visual hallucinations.  Patient also noted to have new CANDY.       Consults:   Consults (From admission, onward)        Status Ordering Provider     [Order Hold - Suspended Admission]  Inpatient consult to Physical Medicine Rehab  Once     (On hold since 03/02/20 1510)   Provider:  (Not yet assigned)    AJAY Grimes     Inpatient consult to Registered Dietitian/Nutritionist  Once     Provider:  (Not yet assigned)    RADHA Tineo          No new Assessment & Plan notes have been filed under this hospital service since the last note was generated.  Service: Hospital Medicine    Final Active Diagnoses:    Diagnosis Date Noted POA    PRINCIPAL PROBLEM:  Bilateral proximal humeral fractures [S42.201A, S42.202A] 02/14/2020 Yes    Hypertension associated with stage 3 chronic kidney disease due to type 2 diabetes mellitus [E11.22, I12.9, N18.3] 02/19/2020 Yes    Neuropathic pain [M79.2] 02/18/2020 Yes    GERD (gastroesophageal reflux disease) [K21.9] 02/15/2020 Yes    Stable burst fracture of first lumbar vertebra [S32.011A] 02/15/2020 Yes    Acute blood loss anemia [D62]  Yes    Constipation [K59.00]  Yes    Subdural hematoma [S06.5X9A] 01/27/2020 Yes    Vitamin D deficiency [E55.9] 06/03/2019 Yes    COPD (chronic obstructive pulmonary disease) [J44.9]  Yes     Chronic    PAF (paroxysmal atrial fibrillation) [I48.0]  Yes    Essential hypertension [I10]  Yes    Current every day smoker [F17.200]  Yes    Long term (current) use of anticoagulants [Z79.01]  Not Applicable    Hyperlipidemia [E78.5]  Yes    GERD with esophagitis [K21.0] 11/09/2017 Yes      Problems Resolved During this Admission:       Discharged Condition: fair    Disposition: Skilled Nursing Facility    Follow Up:    Patient Instructions:   No discharge procedures on file.    Significant Diagnostic Studies: Labs:    BMP:   Recent Labs   Lab 03/02/20  0614 03/02/20  1854 03/03/20  0409   GLU 98 88 114*   * 133* 135*   K 4.1 4.7 4.3   CL 93* 93* 98   CO2 28 28 26   BUN 43* 49* 40*   CREATININE 2.5* 2.8* 2.3*   CALCIUM 9.1 9.2 8.7   MG 2.1  --  1.9    and CBC   Recent Labs   Lab 03/02/20  0614 03/02/20  1854 03/03/20  0409   WBC 26.57* 27.13* 23.43*   HGB 10.9* 9.6* 8.8*   HCT 37.1* 31.7* 29.6*    238 231       Pending Diagnostic Studies:     None         Medications:  Reconciled Home Medications:      Medication List      ASK your doctor about these medications    * amitriptyline 50 MG tablet  Commonly known as:  ELAVIL  Take 1 tablet (50 mg total) by mouth every evening.     * amitriptyline 100 MG tablet  Commonly known as:  ELAVIL  Take 100 mg by mouth.     aspirin 81 MG Chew  Take 162 mg by mouth once daily.     atorvastatin 80 MG tablet  Commonly known as:  LIPITOR  Take 80 mg by mouth every evening.     Basaglar KwikPen U-100 Insulin glargine 100 units/mL (3mL) SubQ pen  Generic drug:  insulin  INJECT 25 UNITS DAILY     bisacodyL 10 mg Supp  Commonly known as:  DULCOLAX  Place 1 suppository (10 mg total) rectally daily as needed.     * celecoxib 200 MG capsule  Commonly known as:  CeleBREX  Take 1 capsule (200 mg total) by mouth once daily.     * celecoxib 200 MG capsule  Commonly known as:  CeleBREX  Take 200 mg by mouth.     cyanocobalamin 1000 MCG tablet  Commonly known as:  VITAMIN B-12  Take 100 mcg by mouth once daily.     ergocalciferol 50,000 unit Cap  Commonly known as:  ERGOCALCIFEROL  Take 1 capsule (50,000 Units total) by mouth every 7 days.     insulin aspart U-100 100 unit/mL (3 mL) Inpn pen  Commonly known as:  NovoLOG  Inject 1-10 Units into the skin before meals and at bedtime as needed (Hyperglycemia).     ipratropium 0.02 % nebulizer solution  Commonly known as:  ATROVENT  Take 2.5 mLs (500 mcg total) by nebulization every 4 (four) hours as needed for Wheezing. Rescue     levalbuterol 0.63  mg/3 mL nebulizer solution  Commonly known as:  XOPENEX  Take 3 mLs (0.63 mg total) by nebulization every 8 (eight) hours. Rescue     * levETIRAcetam 1000 MG tablet  Commonly known as:  KEPPRA  Take 1 tablet (1,000 mg total) by mouth 2 (two) times daily.     * levETIRAcetam 1000 MG tablet  Commonly known as:  KEPPRA  Take 1,000 mg by mouth.     metFORMIN 1000 MG tablet  Commonly known as:  GLUCOPHAGE  Take 1 tablet (1,000 mg total) by mouth daily with breakfast.     methocarbamol 750 MG Tab  Commonly known as:  ROBAXIN  Take 750 mg by mouth.     metoprolol tartrate 25 MG tablet  Commonly known as:  LOPRESSOR  Take 25 mg by mouth 2 (two) times daily.     multivitamin with minerals tablet  Take 1 tablet by mouth once daily.     * nicotine 21 mg/24 hr  Commonly known as:  NICODERM CQ  Place 1 patch onto the skin once daily.     * nicotine 21 mg/24 hr  Commonly known as:  NICODERM CQ  Place 21 mg onto the skin.     * oxyCODONE 5 MG immediate release tablet  Commonly known as:  ROXICODONE  Take 1 tablet (5 mg total) by mouth every 4 (four) hours as needed (pain 1-10).     * oxyCODONE 10 mg Tab immediate release tablet  Commonly known as:  ROXICODONE  Take 10 mg by mouth every 4 (four) hours as needed.  Ask about: Should I take this medication?     * oxyCODONE 15 MG Tab  Commonly known as:  ROXICODONE  Take 15 mg by mouth every 4 (four) hours as needed.  Ask about: Should I take this medication?     * oxyCODONE 5 MG immediate release tablet  Commonly known as:  ROXICODONE  Take 5 mg by mouth every 4 (four) hours as needed.  Ask about: Should I take this medication?     * pantoprazole 40 MG tablet  Commonly known as:  PROTONIX  Take 1 tablet (40 mg total) by mouth once daily.     * pantoprazole 40 MG tablet  Commonly known as:  PROTONIX  Take 40 mg by mouth.     polyethylene glycol 17 gram Pwpk  Commonly known as:  GLYCOLAX  Take 17 g by mouth 2 (two) times daily.     ramipril 5 MG capsule  Commonly known as:  ALTACE  TAKE  1 CAPSULE (5 MG) BY ORAL ROUTE ONCE DAILY     senna-docusate 8.6-50 mg 8.6-50 mg per tablet  Commonly known as:  PERICOLACE  Take 2 tablets by mouth 2 (two) times daily.     Tradjenta 5 mg Tab tablet  Generic drug:  linaGLIPtin  TAKE 1 TABLET BY MOUTH EVERY DAY         * This list has 14 medication(s) that are the same as other medications prescribed for you. Read the directions carefully, and ask your doctor or other care provider to review them with you.                Indwelling Lines/Drains at time of discharge:   Lines/Drains/Airways     None                 Time spent on the discharge of patient: 0 minutes           Ave Craig NP  Department of Hospital Medicine  [facility] - [facility] Nursing Care

## 2020-03-03 NOTE — HPI
"60M with hx seizure on 01/2020 with resultant bilateral prox humerus fractures, acromial fractures, subdural hemorrhage, and L1 + L4 burst fractures s/p L + R shoulder hemiarthroplasty and ORIF R prox humerus fx, AF on coumadin, HTN, COPD, DM on insulin (01/2019 A1c 5.9%) transferred from [facility] for evaluation of lethargy, confusion, and leukocytosis noted on recent labs. History provided by pt and his wife. They report that pt felt well until Thursday, when he had nausea and vomiting. Wife reports that several other patients at the [facility] got sick from the hamburgers that day. Wife reports that the next day patient "looked like a druggie," which she further characterizes as having general confusion and a "glassy" look to his eyes. She also says that his legs were weak even though the previous day he had walked far with the Rehab team. On Saturday pt was very sleepy so his pain meds and other sedating meds (robaxin, amitriptyline) were decreased but his mental status did not improve on Sunday. WBC was drawn Monday, returning at 26K. Pt denies any fevers, chills, myalgias, n/v, diarrhea/constipation, changes in urination, chest pain, cough, or dyspnea.    He was [place] to [facility] where he was noted to have a WBC of 27K and an CANDY with creatinine 2.8 (0.8 baseline) with tachycardia to 110s, mild hypotension to the 90s/50s improving with IV fluids, and a TMax 100.4F. CT A/P revealed a distended gallbladder "with high attenuation material which may represent biliary sludge and surrounding inflammatory changes" and commented that it "may be related to acute cholecystitis in the correct clinical setting." Followup US confirmed findings c/w cholecystitis and positive Rojas's sign, noting that the common bile duct was 4 mm. General Surgery was consulted but recommended that pt be treated for his CANDY, his INR be reversed, and that his sepsis be worked up with IR consult for perc tube placement.     Pt's chart mentions " alcohol abuse and cirrhosis; these were discussed with the patient. He says he quit daily alcohol use 10 years ago and will have an occasional beer but has not had any alcohol since his seizure 01/2020. IM notes with his primary care NP mention EtOH abuse as recently as 08/2019 but there is little mention afterward. In regards to cirrhosis he has never had jaundice, ascites, confusion / HE episodes, variceal bleed or any GIB, or other decompensations and does not have imaging or biopsy to suggest he has cirrhosis.

## 2020-03-03 NOTE — ASSESSMENT & PLAN NOTE
Subdural Hematoma    Reports from wife and in chart of confusion in the days preceding admission. On my exam pt is alert and oriented.     DDx includes sepsis, worsening hematoma, seizure    Plan  - Treat sepsis with antibiotics, source control  - Repeat CT head w/o contrast to ensure no recurrence / enlarging of bleed given continued warfarin  - Seizure precautions, continue Keppra at previous 1g BID dose

## 2020-03-03 NOTE — CONSULTS
Radiology Consult    Ben Melvin is a 60 y.o. male with a history of A-fib on coumadin, seizures, reported EtOH cirrhosis without current alcohol use, and recent b/l humeral fxs who presented from [facility] with leukocytosis, CANDY, and RUQ pain. CT A/P and RUQ U/S performed here c/w cholecystitis. IR consulted for cholecystostomy tube placement.    Past Medical History:   Diagnosis Date    Adenoma of right adrenal gland 11/09/2017    Alcohol abuse     ASCVD (arteriosclerotic cardiovascular disease) 10/2008    [place]    BPH (benign prostatic hyperplasia)     Cardiomyopathy     Chronic anxiety     Cirrhosis of liver     COPD (chronic obstructive pulmonary disease)     Current every day smoker     2-3 ppd    Elevated LFTs 08/2001    GERD with esophagitis 11/09/2017    History of colon polyps     History of epididymitis 2016    Juan Antonio    Hyperlipidemia     Hypertension, essential     Long term (current) use of anticoagulants     Lumbar disc disease with radiculopathy 05/2015    Major depression, recurrent, chronic     PAF (paroxysmal atrial fibrillation)     Peripheral autonomic neuropathy due to DM     Proteinuria     PVD (peripheral vascular disease) 05/29/2009    Venous insufficiency of both lower extremities     Vitamin D deficiency      Past Surgical History:   Procedure Laterality Date    biopsy back  05/20/2019    benign Martinez    biopsy right ankle Right 05/20/2019    Martinez, benign    COLONOSCOPY N/A 6/13/2019    Procedure: COLONOSCOPY;  Surgeon: Delmer Kerr MD;  Location: [facility];  Service: Endoscopy;  Laterality: N/A;    COLONOSCOPY W/ POLYPECTOMY      EPIDURAL STEROID INJECTION INTO LUMBAR SPINE  06/2015    LASHAE Harvey    ESOPHAGOGASTRODUODENOSCOPY N/A 6/13/2019    Procedure: ESOPHAGOGASTRODUODENOSCOPY (EGD);  Surgeon: Delmer Kerr MD;  Location: [facility] ENDO;  Service: Endoscopy;  Laterality: N/A;    INTESTINAL MALROTATION REPAIR Right 1961    2 weeks old,  Juan    OPEN REDUCTION AND INTERNAL FIXATION (ORIF) OF FRACTURE OF PROXIMAL HUMERUS Right 2/14/2020    Procedure: ORIF, FRACTURE, HUMERUS, PROXIMAL - ORIF R greater tuberosity - suture repair. Trios, supine, fiberwire;  Surgeon: Chandler Chris MD;  Location: [facility] OR 2ND FLR;  Service: Orthopedics;  Laterality: Right;    PARTIAL ARTHROPLASTY OF SHOULDER Left 1/28/2020    Procedure: HEMIARTHROPLASTY, SHOULDER;  Surgeon: Chandler Chris MD;  Location: [facility] OR 2ND FLR;  Service: Orthopedics;  Laterality: Left;    PARTIAL ARTHROPLASTY OF SHOULDER Right 1/30/2020    Procedure: HEMIARTHROPLASTY, SHOULDER- RIGHT- MEKA- SUPINE-  TRIOS;  Surgeon: Chandler Chris MD;  Location: [facility] OR [facility] FLR;  Service: Orthopedics;  Laterality: Right;    PERCUTANEOUS TRANSLUMINAL ANGIOPLASTY (PTA) OF PERIPHERAL VESSEL Right 12/2014    Amadeo, SFA       Discussed with primary team, Dr. Roman.    Imaging reviewed with Radiology staff, Dr. Mark.     Procedure: IR Cholecystostomy    Scheduled Meds:    atorvastatin  80 mg Oral QHS    ceFEPime (MAXIPIME) IVPB  1 g Intravenous Q12H    cyanocobalamin  1,000 mcg Oral Daily    ergocalciferol  50,000 Units Oral Q7 Days    levETIRAcetam  1,000 mg Oral BID    metoprolol tartrate  25 mg Oral BID    metronidazole  500 mg Intravenous Q8H    nicotine  1 patch Transdermal Daily    pantoprazole  40 mg Oral Daily    phytonadione ((AQUA-MEPHYTON) IVPB  10 mg Intravenous Once    polyethylene glycol  17 g Oral Daily     Continuous Infusions:   PRN Meds:sodium chloride, acetaminophen, albuterol-ipratropium, Dextrose 10% Bolus, Dextrose 10% Bolus, glucagon (human recombinant), glucose, glucose, insulin aspart U-100, ondansetron, oxyCODONE, oxyCODONE, prochlorperazine    Allergies: Review of patient's allergies indicates:  No Known Allergies    Labs:  Recent Labs   Lab 03/03/20  0409   INR 2.2*       Recent Labs   Lab 03/03/20  0409   WBC 23.43*   HGB 8.8*   HCT  29.6*   MCV 90         Recent Labs   Lab 03/03/20  0409   *   *   K 4.3   CL 98   CO2 26   BUN 40*   CREATININE 2.3*   CALCIUM 8.7   MG 1.9   ALT 35   AST 53*   ALBUMIN 1.9*   BILITOT 1.5*         Vitals (Most Recent):  Temp: 98.9 °F (37.2 °C) (03/03/20 0844)  Pulse: (!) 119 (03/03/20 0844)  Resp: 16 (03/03/20 0844)  BP: 128/61 (03/03/20 0844)  SpO2: 98 % (03/03/20 0844)    Plan:   1. Continue to correct INR and hold other anticoagulants  2. Keep patient NPO  3. Will plan for placement of percutaneous cholecystostomy tube for today 3/3/20 pending availability.    Joseph Mata MD PGY-II  Interventional Radiology  [facility]

## 2020-03-03 NOTE — ED NOTES
Report received from MATTHEW Torrez. Assume care of pt. Pt resting comfortably and repositioned in stretcher by 4 nurses with bed locked in lowest position for safety. NAD noted at this time. Respirations even and unlabored and visible chest rise noted.  Patient given urinal at bedside. Pt instructed to call if assistance is needed. Pt on continuous cardiac, BP, and O2 monitoring. Call light within reach. Family at bedside. No needs at this time. Will continue to monitor.

## 2020-03-03 NOTE — CONSULTS
[facility] Hospital Medicine  Consult Note    Patient Name: Ben Melvin  MRN: [id number]  Admission Date: 3/2/2020  Hospital Length of Stay: 0 days  Attending Physician: Giovanni De Jesus DO   Primary Care Provider: Cristal Ohara NP     Inpatient consult to [facility] Medicine-General  Consult performed by: Gricelda Carr DO  Consult ordered by: Tarun James MD      This patient is currently admitted to a hospital medicine team. Hospital medicine consult service will sign off.       Gricelda Carr DO  Department of Hospital Medicine   [facility]

## 2020-03-03 NOTE — ASSESSMENT & PLAN NOTE
Cholelithiasis with Acute Cholecystitis    60M with complex medical history transferred from SNF with confusion, lethargy, leukocytosis. Exam with severe RUQ pain, CT and US suggestive of cholecystitis. General Surgery consulted and recommend IR jose tube placement.    Plan  - F/U blood cultures  - Cefepime + metronidazole  - Consult IR in AM  - Reverse warfarin with 5 mg IV vit K; daily INR - can use FFP if needed vs repeat vit K dosing  - Gave additional 500 CC LR to bring IV fluids to 30 cc/kg (got approx 25 cc/kg in ED)

## 2020-03-03 NOTE — PROGRESS NOTES
Received pt awake and alert. Pt situated in the room, oriented to the unit,bed mechanics and call light. Primary MD at bedside.

## 2020-03-03 NOTE — PLAN OF CARE
Evaluation complete and goals set.  Cont with POC  Rika Adan OT  3/3/2020    Problem: Occupational Therapy Goal  Goal: Occupational Therapy Goal  Description  Goals to be met by: 3/20    Patient will increase functional independence with ADLs by performing:    UE Dressing with Minimal Assistance.  LE Dressing with Moderate Assistance.  Grooming while seated with Minimal Assistance.  Toileting from bedside commode with Minimal Assistance for hygiene and clothing management.      Outcome: Ongoing, Progressing

## 2020-03-03 NOTE — CONSULTS
Consult Note  General Surgery    CC:   Chief Complaint   Patient presents with    Fatigue     Sent from [facility]. Low WBC.      HPI: 61 yo male with hx of COPD, PVD, alcohol abuse (documented cirrhosis), pAfib (on coumadin) , DM2, recent multiple bone fractures 2/2 to seizure on 01/27 who presents as transfer from [facility] for fatigue and leukocytosis. Associated symptoms include AMS, vague abdominal pain for the last three days. He is a poor historian due to altered mental status. Wife is at bedside.   No nausea/vomiting, diarrhea/constipation, change in color of urine, dysuria, blood in stool or urine. Patient presented with wbc 26, INR 3.1, T.bili 1.5, with normal lactic acidosis. CT abdomen/pelvis non contrast shows distended gallbladder with some haziness but no gallbladder wall thickening and no stones, possible attenuation that could represent sludge. US abdomen showing pericholecystic fluid, gallbladder sludge with mild gallbladder thickening (.7cm). No dilated ducts.     Recent traumatic injuries included Injuries identified were bilateral proximal humerus fracture dislocations, bilateral acromial fractures, subdural hemorrhage, L1 and L4 burst fractures. Underwent left and right  shoulder hemiarthroplasty and ORIF of R greater tuberosity, humerus on 02/14/20.     PMH:   Past Medical History:   Diagnosis Date    Adenoma of right adrenal gland 11/09/2017    Alcohol abuse     ASCVD (arteriosclerotic cardiovascular disease) 10/2008    [place]    BPH (benign prostatic hyperplasia)     Cardiomyopathy     Chronic anxiety     Cirrhosis of liver     COPD (chronic obstructive pulmonary disease)     Current every day smoker     2-3 ppd    Elevated LFTs 08/2001    GERD with esophagitis 11/09/2017    History of colon polyps     History of epididymitis 2016    Juan Antonio    Hyperlipidemia     Hypertension, essential     Long term (current) use of anticoagulants     Lumbar disc disease with radiculopathy  05/2015    Major depression, recurrent, chronic     PAF (paroxysmal atrial fibrillation)     Peripheral autonomic neuropathy due to DM     Proteinuria     PVD (peripheral vascular disease) 05/29/2009    Venous insufficiency of both lower extremities     Vitamin D deficiency        PSH:   Past Surgical History:   Procedure Laterality Date    biopsy back  05/20/2019    benign Martinez    biopsy right ankle Right 05/20/2019    Martinez, benign    COLONOSCOPY N/A 6/13/2019    Procedure: COLONOSCOPY;  Surgeon: Delmer Kerr MD;  Location: [facility] ENDO;  Service: Endoscopy;  Laterality: N/A;    COLONOSCOPY W/ POLYPECTOMY      EPIDURAL STEROID INJECTION INTO LUMBAR SPINE  06/2015    LASHAE Croweharleen    ESOPHAGOGASTRODUODENOSCOPY N/A 6/13/2019    Procedure: ESOPHAGOGASTRODUODENOSCOPY (EGD);  Surgeon: Delmer Kerr MD;  Location: [facility] ENDO;  Service: Endoscopy;  Laterality: N/A;    INTESTINAL MALROTATION REPAIR Right 1961    2 weeks old, Martinez    OPEN REDUCTION AND INTERNAL FIXATION (ORIF) OF FRACTURE OF PROXIMAL HUMERUS Right 2/14/2020    Procedure: ORIF, FRACTURE, HUMERUS, PROXIMAL - ORIF R greater tuberosity - suture repair. Trios, supine, fiberwire;  Surgeon: Chandler Chris MD;  [facility];  Service: Orthopedics;  Laterality: Right;    PARTIAL ARTHROPLASTY OF SHOULDER Left 1/28/2020    Procedure: HEMIARTHROPLASTY, SHOULDER;  Surgeon: Chandler Chris MD;  Location: [facility] OR [facility] FLR;  Service: Orthopedics;  Laterality: Left;    PARTIAL ARTHROPLASTY OF SHOULDER Right 1/30/2020    Procedure: HEMIARTHROPLASTY, SHOULDER- RIGHT- MEKA- SUPINE-  TRIOS;  Surgeon: Chandler Chris MD;  Location: [facility] OR [facility] FLR;  Service: Orthopedics;  Laterality: Right;    PERCUTANEOUS TRANSLUMINAL ANGIOPLASTY (PTA) OF PERIPHERAL VESSEL Right 12/2014    NIKOLAS Childs       Allergies: Review of patient's allergies indicates:  No Known Allergies  Meds:   Patient's Medications   New  Prescriptions    No medications on file   Previous Medications    AMITRIPTYLINE (ELAVIL) 100 MG TABLET    Take 100 mg by mouth.    AMITRIPTYLINE (ELAVIL) 50 MG TABLET    Take 1 tablet (50 mg total) by mouth every evening.    ASPIRIN 81 MG CHEW    Take 162 mg by mouth once daily.    ATORVASTATIN (LIPITOR) 80 MG TABLET    Take 80 mg by mouth every evening.    BASAGLAR KWIKPEN U-100 INSULIN GLARGINE 100 UNITS/ML (3ML) SUBQ PEN    INJECT 25 UNITS DAILY    BISACODYL (DULCOLAX) 10 MG SUPP    Place 1 suppository (10 mg total) rectally daily as needed.    CELECOXIB (CELEBREX) 200 MG CAPSULE    Take 1 capsule (200 mg total) by mouth once daily.    CELECOXIB (CELEBREX) 200 MG CAPSULE    Take 200 mg by mouth.    CYANOCOBALAMIN (VITAMIN B-12) 1000 MCG TABLET    Take 100 mcg by mouth once daily.    ERGOCALCIFEROL (ERGOCALCIFEROL) 50,000 UNIT CAP    Take 1 capsule (50,000 Units total) by mouth every 7 days.    INSULIN ASPART U-100 (NOVOLOG) 100 UNIT/ML (3 ML) INPN PEN    Inject 1-10 Units into the skin before meals and at bedtime as needed (Hyperglycemia).    IPRATROPIUM (ATROVENT) 0.02 % NEBULIZER SOLUTION    Take 2.5 mLs (500 mcg total) by nebulization every 4 (four) hours as needed for Wheezing. Rescue    LEVALBUTEROL (XOPENEX) 0.63 MG/3 ML NEBULIZER SOLUTION    Take 3 mLs (0.63 mg total) by nebulization every 8 (eight) hours. Rescue    LEVETIRACETAM (KEPPRA) 1000 MG TABLET    Take 1 tablet (1,000 mg total) by mouth 2 (two) times daily.    LEVETIRACETAM (KEPPRA) 1000 MG TABLET    Take 1,000 mg by mouth.    METFORMIN (GLUCOPHAGE) 1000 MG TABLET    Take 1 tablet (1,000 mg total) by mouth daily with breakfast.    METHOCARBAMOL (ROBAXIN) 750 MG TAB    Take 750 mg by mouth.    METOPROLOL TARTRATE (LOPRESSOR) 25 MG TABLET    Take 25 mg by mouth 2 (two) times daily.    MULTIVITAMIN WITH MINERALS TABLET    Take 1 tablet by mouth once daily.    NICOTINE (NICODERM CQ) 21 MG/24 HR    Place 1 patch onto the skin once daily.    NICOTINE  (NICODERM CQ) 21 MG/24 HR    Place 21 mg onto the skin.    OXYCODONE (ROXICODONE) 5 MG IMMEDIATE RELEASE TABLET    Take 1 tablet (5 mg total) by mouth every 4 (four) hours as needed (pain 1-10).    PANTOPRAZOLE (PROTONIX) 40 MG TABLET    Take 1 tablet (40 mg total) by mouth once daily.    PANTOPRAZOLE (PROTONIX) 40 MG TABLET    Take 40 mg by mouth.    POLYETHYLENE GLYCOL (GLYCOLAX) 17 GRAM PWPK    Take 17 g by mouth 2 (two) times daily.    RAMIPRIL (ALTACE) 5 MG CAPSULE    TAKE 1 CAPSULE (5 MG) BY ORAL ROUTE ONCE DAILY    SENNA-DOCUSATE 8.6-50 MG (PERICOLACE) 8.6-50 MG PER TABLET    Take 2 tablets by mouth 2 (two) times daily.    TRADJENTA 5 MG TAB TABLET    TAKE 1 TABLET BY MOUTH EVERY DAY   Modified Medications    No medications on file   Discontinued Medications    No medications on file       Family History   Problem Relation Age of Onset    Diabetes Mother     Hypertension Mother     Hypertension Father     Acromegaly Father     Heart attack Father     Kidney cancer Brother        Review of Systems - For pertinent positives please see HPI     Positive for low grade fever. Positive for fatigue.     OBJECTIVE:     Vital Signs (Most Recent)  Temp: (!) 100.4 °F (38 °C) (03/02/20 2116)  Pulse: (!) 113 (03/02/20 2331)  Resp: 18 (03/02/20 2215)  BP: 109/62 (03/02/20 2331)  SpO2: 98 % (03/02/20 2331)    Vital Signs Range (Last 24H):  Temp:  [97.7 °F (36.5 °C)-100.4 °F (38 °C)]   Pulse:  [112-128]   Resp:  [16-23]   BP: ()/(32-63)   SpO2:  [89 %-98 %]     I & O (Last 24H):    Intake/Output Summary (Last 24 hours) at 3/2/2020 6999  Last data filed at 3/2/2020 1500  Gross per 24 hour   Intake 350 ml   Output 600 ml   Net -250 ml       Physical Exam:  General: well developed, well nourished, no distress  Lungs:  clear to auscultation bilaterally and normal respiratory effort  Heart: Irregular, tachycardiac  Abdomen: Soft, tender to palpation in RUQ and epigastrum. No rebound or guarding.   Oriented to person  only. Altered.       Laboratory:  CBC:   Recent Labs   Lab 03/02/20  1854   WBC 27.13*   RBC 3.51*   HGB 9.6*   HCT 31.7*      MCV 90   MCH 27.4   MCHC 30.3*     CMP:   Recent Labs   Lab 03/02/20  1854   GLU 88   CALCIUM 9.2   ALBUMIN 2.2*   PROT 7.5   *   K 4.7   CO2 28   CL 93*   BUN 49*   CREATININE 2.8*   ALKPHOS 281*   ALT 38   AST 58*   BILITOT 1.5*     Microbiology Results (last 7 days)     Procedure Component Value Units Date/Time    Clostridium difficile EIA [[id number]]     Order Status:  No result Specimen:  Stool          Blood cultures in process from 03/02.     Procalcitonin 2  Lactate 1.0  Ammonia 25    Labs within the past 24 hours have been reviewed.    CT abdomen/pelvis:  FINDINGS:  Heart: No cardiomegaly or pericardial effusion.    Lung Bases: Symmetrically expanded.  Subsegmental opacities at the bilateral lower lung zone suggestive of subsegmental atelectasis.    Liver: Enlarged measuring up to approximately 20.5 cm in maximal craniocaudal dimension.  No focal lesion identified.    Gallbladder: The gallbladder is distended with high attenuation material which may represent biliary sludge and surrounding inflammatory changes.  No significant gallbladder wall thickening.    Bile Ducts: No intra or extrahepatic biliary ductal dilatation.    Pancreas: No pancreatic mass or peripancreatic inflammatory stranding.    Spleen: Unremarkable.    Adrenals: Unremarkable.    Kidneys/Ureters: Stable in size and location without nephrolithiasis, or hydroureteronephrosis.  There are stable bilateral renal cysts.    Reproductive organs: Unremarkable.    GI tract/Mesentery: The large and small bowel are normal in course and caliber without evidence for obstruction or inflammation.    Peritoneal Space: No abdominopelvic ascites, intraperitoneal free air, or significant adenopathy.    Vasculature: The abdominal aorta is normal in course and caliber with mild calcific atherosclerosis noted along its  course.    Abdominal wall/Extraperitoneal soft tissues: Unremarkable.    Bones: Burst type fractures of the L1 and L4 vertebral bodies, better characterized on recent CT lumbar spine dated 02/15/2020.  No new fractures.    US abdomen:  FINDINGS:  Gallbladder: There is dependent biliary sludge, pericholecystic fluid, and gallbladder wall thickening measuring up to approximately 0.7 cm.  Positive sonographic Rojas's sign.    Biliary system: The common duct is not dilated, measuring 4 mm.  No intrahepatic ductal dilatation.    Pancreas: Obscured by overlying bowel gas.    Miscellaneous: No upper abdominal ascites and no abnormalities of the visualized liver.    ASSESSMENT/PLAN:     61 yo male with multiple comorbidities, recent polytrauma 2/2 to seizure who presents now with cholecystitis     - admit to medicine for CANDY, hx of seizures, over disability, correction of coagulopathy, treatment of unknown source of sepsis   - CT abdomen/pelvis showing no stones and although gallbladder distended. US abdomen showing signs of cholecystitis.   - AMS could be multifactorial. Has history of seizures, subdural hemorrhage and polypharmacy  - do not think patient has cholangitis with near normal bilirubin. However if patient does not improve with resuscitation and antibiotics, do recommend AES consult for ERCP and drainage.   - agree with broad spectrum antibiotics  - recommend IR jose tube once INR corrected with FFP, vitamin K    - would notify orthopaedics of patients hospitalization with recent surgery and hardware     Tarun James MD        I have personally performed a detailed history and physical examination on this patient. My findings are summarized in the resident's note included in the record.  Agree with choley tube  Will monitor

## 2020-03-03 NOTE — H&P
Inpatient Radiology Pre-procedure Note    History of Present Illness:  Ben Melvin is a 60 y.o. male with a history of A-fib on coumadin, seizures, reported EtOH cirrhosis without current alcohol use, and recent b/l humeral fxs who presented from [facility] with leukocytosis, CANDY, and RUQ pain. CT A/P and RUQ U/S performed here c/w cholecystitis. He presents for cholecystostomy tube placement.    Admission H&P reviewed.  Past Medical History:   Diagnosis Date    Adenoma of right adrenal gland 11/09/2017    Alcohol abuse     ASCVD (arteriosclerotic cardiovascular disease) 10/2008    [place]    BPH (benign prostatic hyperplasia)     Cardiomyopathy     Chronic anxiety     Cirrhosis of liver     COPD (chronic obstructive pulmonary disease)     Current every day smoker     2-3 ppd    Elevated LFTs 08/2001    GERD with esophagitis 11/09/2017    History of colon polyps     History of epididymitis 2016    Juan Antonio    Hyperlipidemia     Hypertension, essential     Long term (current) use of anticoagulants     Lumbar disc disease with radiculopathy 05/2015    Major depression, recurrent, chronic     PAF (paroxysmal atrial fibrillation)     Peripheral autonomic neuropathy due to DM     Proteinuria     PVD (peripheral vascular disease) 05/29/2009    Venous insufficiency of both lower extremities     Vitamin D deficiency      Past Surgical History:   Procedure Laterality Date    biopsy back  05/20/2019    benign Martinez    biopsy right ankle Right 05/20/2019    Martinez, benign    COLONOSCOPY N/A 6/13/2019    Procedure: COLONOSCOPY;  Surgeon: Delmer Kerr MD;  Location: [facility];  Service: Endoscopy;  Laterality: N/A;    COLONOSCOPY W/ POLYPECTOMY      EPIDURAL STEROID INJECTION INTO LUMBAR SPINE  06/2015    LASHAE Harvey    ESOPHAGOGASTRODUODENOSCOPY N/A 6/13/2019    Procedure: ESOPHAGOGASTRODUODENOSCOPY (EGD);  Surgeon: Delmer Kerr MD;  Location: [facility];  Service: Endoscopy;   Laterality: N/A;    INTESTINAL MALROTATION REPAIR Right 1961    2 weeks old, Martinez    OPEN REDUCTION AND INTERNAL FIXATION (ORIF) OF FRACTURE OF PROXIMAL HUMERUS Right 2/14/2020    Procedure: ORIF, FRACTURE, HUMERUS, PROXIMAL - ORIF R greater tuberosity - suture repair. Trios, supine, fiberwire;  Surgeon: Chandler Chris MD;  Location: [facility] OR 2ND FLR;  Service: Orthopedics;  Laterality: Right;    PARTIAL ARTHROPLASTY OF SHOULDER Left 1/28/2020    Procedure: HEMIARTHROPLASTY, SHOULDER;  Surgeon: Chandler Chris MD;  Location: [facility] OR 2ND FLR;  Service: Orthopedics;  Laterality: Left;    PARTIAL ARTHROPLASTY OF SHOULDER Right 1/30/2020    Procedure: HEMIARTHROPLASTY, SHOULDER- RIGHT- MEKA- SUPINE-  TRIOS;  Surgeon: Chandler Chris MD;  Location: [facility] OR 2ND FLR;  Service: Orthopedics;  Laterality: Right;    PERCUTANEOUS TRANSLUMINAL ANGIOPLASTY (PTA) OF PERIPHERAL VESSEL Right 12/2014    NIKOLAS Childs       Review of Systems:   As documented in primary team H&P    Home Meds:   Prior to Admission medications    Medication Sig Start Date End Date Taking? Authorizing Provider   amitriptyline (ELAVIL) 100 MG tablet Take 100 mg by mouth. 2/13/20   Historical Provider, MD   amitriptyline (ELAVIL) 50 MG tablet Take 1 tablet (50 mg total) by mouth every evening. 2/6/20 2/5/21  Kayy Perla MD   aspirin 81 MG Chew Take 162 mg by mouth once daily.    Historical Provider, MD   atorvastatin (LIPITOR) 80 MG tablet Take 80 mg by mouth every evening. 5/4/19   Historical Provider, MD SILVANA DOS SANTOS U-100 INSULIN glargine 100 units/mL (3mL) SubQ pen INJECT 25 UNITS DAILY 10/22/19   Cristal Ohara, WILLY   bisacodyL (DULCOLAX) 10 mg Supp Place 1 suppository (10 mg total) rectally daily as needed. 2/6/20   Kayy Perla MD   celecoxib (CELEBREX) 200 MG capsule Take 1 capsule (200 mg total) by mouth once daily. 2/7/20   Kayy Perla MD   celecoxib (CELEBREX) 200 MG capsule Take 200 mg  by mouth. 2/14/20   Historical Provider, MD   cyanocobalamin (VITAMIN B-12) 1000 MCG tablet Take 100 mcg by mouth once daily.    Historical Provider, MD   ergocalciferol (ERGOCALCIFEROL) 50,000 unit Cap Take 1 capsule (50,000 Units total) by mouth every 7 days. 2/11/20   Kayy Perla MD   insulin aspart U-100 (NOVOLOG) 100 unit/mL (3 mL) InPn pen Inject 1-10 Units into the skin before meals and at bedtime as needed (Hyperglycemia). 2/6/20 2/5/21  Kayy Perla MD   ipratropium (ATROVENT) 0.02 % nebulizer solution Take 2.5 mLs (500 mcg total) by nebulization every 4 (four) hours as needed for Wheezing. Rescue 2/6/20 2/5/21  Kayy Perla MD   levalbuterol (XOPENEX) 0.63 mg/3 mL nebulizer solution Take 3 mLs (0.63 mg total) by nebulization every 8 (eight) hours. Rescue 2/6/20 2/5/21  Kayy Perla MD   levETIRAcetam (KEPPRA) 1000 MG tablet Take 1 tablet (1,000 mg total) by mouth 2 (two) times daily. 2/6/20 2/5/21  Kayy Perla MD   levETIRAcetam (KEPPRA) 1000 MG tablet Take 1,000 mg by mouth. 2/13/20   Historical Provider, MD   metFORMIN (GLUCOPHAGE) 1000 MG tablet Take 1 tablet (1,000 mg total) by mouth daily with breakfast. 10/18/19 10/17/20  Cristal Ohara, NP   methocarbamol (ROBAXIN) 750 MG Tab Take 750 mg by mouth. 2/13/20   Historical Provider, MD   metoprolol tartrate (LOPRESSOR) 25 MG tablet Take 25 mg by mouth 2 (two) times daily. 5/8/19   Historical Provider, MD   multivitamin with minerals tablet Take 1 tablet by mouth once daily.    Historical Provider, MD   nicotine (NICODERM CQ) 21 mg/24 hr Place 1 patch onto the skin once daily. 2/7/20   Kayy Perla MD   nicotine (NICODERM CQ) 21 mg/24 hr Place 21 mg onto the skin. 2/14/20   Historical Provider, MD   oxyCODONE (ROXICODONE) 5 MG immediate release tablet Take 1 tablet (5 mg total) by mouth every 4 (four) hours as needed (pain 1-10). 2/6/20   Kayy Perla MD   pantoprazole (PROTONIX) 40 MG tablet Take 1 tablet (40 mg total)  by mouth once daily. 7/16/19 7/15/20  Cristal Ohara NP   pantoprazole (PROTONIX) 40 MG tablet Take 40 mg by mouth. 2/14/20   Historical Provider, MD   polyethylene glycol (GLYCOLAX) 17 gram PwPk Take 17 g by mouth 2 (two) times daily. 2/6/20   Kayy Perla MD   ramipril (ALTACE) 5 MG capsule TAKE 1 CAPSULE (5 MG) BY ORAL ROUTE ONCE DAILY 4/27/19   Historical Provider, MD   senna-docusate 8.6-50 mg (PERICOLACE) 8.6-50 mg per tablet Take 2 tablets by mouth 2 (two) times daily. 2/6/20   Kayy Perla MD   TRADJENTA 5 mg Tab tablet TAKE 1 TABLET BY MOUTH EVERY DAY 1/8/20   Cristal Ohara NP     Scheduled Meds:    atorvastatin  80 mg Oral QHS    ceFEPime (MAXIPIME) IVPB  1 g Intravenous Q12H    cyanocobalamin  1,000 mcg Oral Daily    ergocalciferol  50,000 Units Oral Q7 Days    levETIRAcetam  1,000 mg Oral BID    metoprolol tartrate  25 mg Oral BID    metronidazole  500 mg Intravenous Q8H    nicotine  1 patch Transdermal Daily    pantoprazole  40 mg Oral Daily    phytonadione ((AQUA-MEPHYTON) IVPB  10 mg Intravenous Once    polyethylene glycol  17 g Oral Daily     Continuous Infusions:   PRN Meds:sodium chloride, acetaminophen, albuterol-ipratropium, Dextrose 10% Bolus, Dextrose 10% Bolus, glucagon (human recombinant), glucose, glucose, insulin aspart U-100, ondansetron, oxyCODONE, oxyCODONE, prochlorperazine    Anticoagulants/Antiplatelets: Coumadin, currently held, being reversed.    Allergies: Review of patient's allergies indicates:  No Known Allergies  Sedation Hx: have not been any systemic reactions    Labs:  Recent Labs   Lab 03/03/20 0409   INR 2.2*       Recent Labs   Lab 03/03/20 0409   WBC 23.43*   HGB 8.8*   HCT 29.6*   MCV 90         Recent Labs   Lab 03/03/20 0409   *   *   K 4.3   CL 98   CO2 26   BUN 40*   CREATININE 2.3*   CALCIUM 8.7   MG 1.9   ALT 35   AST 53*   ALBUMIN 1.9*   BILITOT 1.5*         Vitals:  Temp: 98.9 °F (37.2 °C) (03/03/20  0844)  Pulse: (!) 119 (03/03/20 0844)  Resp: 16 (03/03/20 0844)  BP: 128/61 (03/03/20 0844)  SpO2: 98 % (03/03/20 0844)     Physical Exam:  ASA: 3  Mallampati: III    General: no acute distress  Mental Status: alert and oriented to person, place and time  HEENT: normocephalic, atraumatic  Chest: unlabored breathing  Heart: regular heart rate  Abdomen: Distended, tender  Extremity: BUE in sling devices    Plan: Patient will undergo cholecystostomy tube placement pending correction of INR to <1.5. Repeat vit K + FFP and repeat INR are pending.  Sedation Plan: moderate    Joseph Mata MD PGY-II  Interventional Radiology  [facility]

## 2020-03-03 NOTE — PROCEDURES
Radiology Post-Procedure Note    Pre Op Diagnosis: Cholecystitis    Post Op Diagnosis: Same    Procedure: Cholecystostomy    Procedure performed by: Lorne Henderson MD    Written Informed Consent Obtained: Yes  Specimen Removed: YES 90 cc dark bile   Estimated Blood Loss: Minimal    Findings:   Under US and fluoro guidance an 8 Fr APD was placed in the gallbladder via transhepatic approach. No complications.    Patient tolerated procedure well.    Lorne Henderson M.D.  Diagnostic and Interventional Radiologist  Department of Radiology  Pager: [phone]

## 2020-03-03 NOTE — PT/OT/SLP EVAL
Occupational Therapy   Evaluation    Name: Ben Melvin  MRN: [id number]  Admitting Diagnosis:  Sepsis      Recommendations:     Discharge Recommendations: nursing facility, skilled  Discharge Equipment Recommendations:     Barriers to discharge:  Inaccessible home environment    Assessment:     Ben Melvin is a 60 y.o. male with a medical diagnosis of Sepsis.  He presents supine with B shoulder abductors in place.  Pt without back brace in room and unable to perform bed mobility and out of bed due to requiring brace for sitting and OOB. Pt also nonweightbearing in B UE and unwilling to demonstrate out of bed.  Pt will benefit from skilled OT for max functional performance. Pt will also benefit from return to SNF for continued rehab gains  Performance deficits affecting function: impaired endurance, impaired self care skills, impaired functional mobilty, decreased upper extremity function.      Rehab Prognosis: Fair; patient would benefit from acute skilled OT services to address these deficits and reach maximum level of function.       Plan:     Patient to be seen 3 x/week to address the above listed problems via self-care/home management, therapeutic activities, therapeutic exercises  · Plan of Care Expires: 04/03/20  · Plan of Care Reviewed with: patient, spouse    Subjective     Chief Complaint: fatigue, agitation   Patient/Family Comments/goals: return to home     Occupational Profile:  Living Environment: Pt lives with wife in 2 story house with 3 steps to enter   Previous level of function: Pt was indep with self care prior to initial decline   Equipment Used at Home:  walker, standard, shower chair, bedside commode  Assistance upon Discharge: wife     Pain/Comfort:  · Location - Side 1: Bilateral  · Location - Orientation 1: generalized  · Location 1: shoulder  · Pain Addressed 1: Distraction    Patients cultural, spiritual, Orthodoxy conflicts given the current situation: no    Objective:      Communicated with: RN prior to session.  Patient found supine with peripheral IV, telemetry upon OT entry to room.    General Precautions: Standard, fall   Orthopedic Precautions:RUE non weight bearing, LUE non weight bearing, spinal precautions   Braces: Shoulder abduction brace     Occupational Performance:    Bed Mobility:    · Pt declined bed mobility due to pain and without back brace    Activities of Daily Living:  · Feeding:  moderate assistance    · Grooming: moderate assistance    · Bathing: maximal assistance    · Upper Body Dressing: maximal assistance    · Lower Body Dressing: maximal assistance    · Toileting: maximal assistance      Cognitive/Visual Perceptual:  Cognitive/Psychosocial Skills:     -       Oriented to: Person, Place, Time and Situation   -       Follows Commands/attention:Follows two-step commands  -       Communication: clear/fluent  -       Memory: No Deficits noted  -       Safety awareness/insight to disability: intact   -       Mood/Affect/Coping skills/emotional control: Appropriate to situation    AMPAC 6 Click ADL:  AMPAC Total Score: 6    Treatment & Education:  Evaluation complete and goals set.  Cont with POC  - Pt educated on safety, role of OT, importance of increased participation in self care for gains , expectations for participation, expectations for gains, POC, energy conservation, caregiver strain. White board updated.     Education:    Patient left supine with all lines intact and wife present    GOALS:   Multidisciplinary Problems     Occupational Therapy Goals        Problem: Occupational Therapy Goal    Goal Priority Disciplines Outcome Interventions   Occupational Therapy Goal     OT, PT/OT Ongoing, Progressing    Description:  Goals to be met by: 3/20    Patient will increase functional independence with ADLs by performing:    UE Dressing with Minimal Assistance.  LE Dressing with Moderate Assistance.  Grooming while seated with Minimal Assistance.  Toileting  from bedside commode with Minimal Assistance for hygiene and clothing management.                       History:     Past Medical History:   Diagnosis Date    Adenoma of right adrenal gland 11/09/2017    Alcohol abuse     ASCVD (arteriosclerotic cardiovascular disease) 10/2008    [place]    BPH (benign prostatic hyperplasia)     Cardiomyopathy     Chronic anxiety     Cirrhosis of liver     COPD (chronic obstructive pulmonary disease)     Current every day smoker     2-3 ppd    Elevated LFTs 08/2001    GERD with esophagitis 11/09/2017    History of colon polyps     History of epididymitis 2016    Juan Antonio    Hyperlipidemia     Hypertension, essential     Long term (current) use of anticoagulants     Lumbar disc disease with radiculopathy 05/2015    Major depression, recurrent, chronic     PAF (paroxysmal atrial fibrillation)     Peripheral autonomic neuropathy due to DM     Proteinuria     PVD (peripheral vascular disease) 05/29/2009    Venous insufficiency of both lower extremities     Vitamin D deficiency        Past Surgical History:   Procedure Laterality Date    biopsy back  05/20/2019    benign Martinez    biopsy right ankle Right 05/20/2019    Martinez, benign    COLONOSCOPY N/A 6/13/2019    Procedure: COLONOSCOPY;  Surgeon: Delmer Kerr MD;  Location: [facility];  Service: Endoscopy;  Laterality: N/A;    COLONOSCOPY W/ POLYPECTOMY      EPIDURAL STEROID INJECTION INTO LUMBAR SPINE  06/2015    LASHAE Harvey    ESOPHAGOGASTRODUODENOSCOPY N/A 6/13/2019    Procedure: ESOPHAGOGASTRODUODENOSCOPY (EGD);  Surgeon: Delmer Kerr MD;  Location: [facility];  Service: Endoscopy;  Laterality: N/A;    INTESTINAL MALROTATION REPAIR Right 1961    2 weeks old, Martinez    OPEN REDUCTION AND INTERNAL FIXATION (ORIF) OF FRACTURE OF PROXIMAL HUMERUS Right 2/14/2020    Procedure: ORIF, FRACTURE, HUMERUS, PROXIMAL - ORIF R greater tuberosity - suture repair. Trios, supine, fiberwire;  Surgeon:  Chandler Chris MD;  [facility];  Service: Orthopedics;  Laterality: Right;    PARTIAL ARTHROPLASTY OF SHOULDER Left 1/28/2020    Procedure: HEMIARTHROPLASTY, SHOULDER;  Surgeon: Chandler Chris MD;  [facility];  Service: Orthopedics;  Laterality: Left;    PARTIAL ARTHROPLASTY OF SHOULDER Right 1/30/2020    Procedure: HEMIARTHROPLASTY, SHOULDER- RIGHT- MEKA- SUPINE-  TRIOS;  Surgeon: Chandler Chris MD;  Location: [facility] OR [address] [place] [address];  Service: Orthopedics;  Laterality: Right;    PERCUTANEOUS TRANSLUMINAL ANGIOPLASTY (PTA) OF PERIPHERAL VESSEL Right 12/2014    NIKOLAS Childs       Time Tracking:     OT Date of Treatment: 03/03/20  OT Start Time: 1100  OT Stop Time: 1115  OT Total Time (min): 15 min    Billable Minutes:Evaluation 15    Rika Adan, OT  3/3/2020

## 2020-03-03 NOTE — ASSESSMENT & PLAN NOTE
A1c 5.9% 01/2019. Rx insulin (glargine 25U daily + MDSSI) at home but wife and pt report he hasn't been given insulin while admitted.    LDSSI  Diabetic Diet  BG checks AC/HS

## 2020-03-03 NOTE — ASSESSMENT & PLAN NOTE
PRN duonebs. Pt reports that he has smoked 8-9 PPD x40 years and quit shortly after his seizure and fractures.

## 2020-03-03 NOTE — ED NOTES
Report received from MATTHEW Sifuentes and care assumed at this time. Patient resting in stretcher with NAD noted. VSS, following commands, and speech clear and appropriate. All belongings within reach. Patient denies any pain at this time. Patient updated on plan of care and all questions addressed. Safety precautions remain in place.

## 2020-03-03 NOTE — SUBJECTIVE & OBJECTIVE
Past Medical History:   Diagnosis Date    Adenoma of right adrenal gland 11/09/2017    Alcohol abuse     ASCVD (arteriosclerotic cardiovascular disease) 10/2008    [place]    BPH (benign prostatic hyperplasia)     Cardiomyopathy     Chronic anxiety     Cirrhosis of liver     COPD (chronic obstructive pulmonary disease)     Current every day smoker     2-3 ppd    Elevated LFTs 08/2001    GERD with esophagitis 11/09/2017    History of colon polyps     History of epididymitis 2016    Juan Antonio    Hyperlipidemia     Hypertension, essential     Long term (current) use of anticoagulants     Lumbar disc disease with radiculopathy 05/2015    Major depression, recurrent, chronic     PAF (paroxysmal atrial fibrillation)     Peripheral autonomic neuropathy due to DM     Proteinuria     PVD (peripheral vascular disease) 05/29/2009    Venous insufficiency of both lower extremities     Vitamin D deficiency        Past Surgical History:   Procedure Laterality Date    biopsy back  05/20/2019    benign Martinez    biopsy right ankle Right 05/20/2019    Martinez, benign    COLONOSCOPY N/A 6/13/2019    Procedure: COLONOSCOPY;  Surgeon: Delmer Kerr MD;  Location: [facility];  Service: Endoscopy;  Laterality: N/A;    COLONOSCOPY W/ POLYPECTOMY      EPIDURAL STEROID INJECTION INTO LUMBAR SPINE  06/2015    LASHAE Harvey    ESOPHAGOGASTRODUODENOSCOPY N/A 6/13/2019    Procedure: ESOPHAGOGASTRODUODENOSCOPY (EGD);  Surgeon: Delmer Kerr MD;  Location: [facility];  Service: Endoscopy;  Laterality: N/A;    INTESTINAL MALROTATION REPAIR Right 1961    2 weeks old, Martinez    OPEN REDUCTION AND INTERNAL FIXATION (ORIF) OF FRACTURE OF PROXIMAL HUMERUS Right 2/14/2020    Procedure: ORIF, FRACTURE, HUMERUS, PROXIMAL - ORIF R greater tuberosity - suture repair. Trios, supine, fiberwire;  Surgeon: Chandler Chris MD;  Location: [address];  Service: Orthopedics;  Laterality: Right;    PARTIAL ARTHROPLASTY  OF SHOULDER Left 1/28/2020    Procedure: HEMIARTHROPLASTY, SHOULDER;  Surgeon: Chandler Chris MD;  [facility];  Service: Orthopedics;  Laterality: Left;    PARTIAL ARTHROPLASTY OF SHOULDER Right 1/30/2020    Procedure: HEMIARTHROPLASTY, SHOULDER- RIGHT- MEKA- SUPINE-  TRIOS;  Surgeon: Chandler Chris MD;  [facility];  Service: Orthopedics;  Laterality: Right;    PERCUTANEOUS TRANSLUMINAL ANGIOPLASTY (PTA) OF PERIPHERAL VESSEL Right 12/2014    NIKOLAS Childs       Review of patient's allergies indicates:  No Known Allergies    Current Facility-Administered Medications on File Prior to Encounter   Medication    [MAR Hold - Suspended Admission] acetaminophen tablet 650 mg    [MAR Hold - Suspended Admission] aluminum & magnesium hydroxide-simethicone 400-400-40 mg/5 mL suspension 30 mL    [MAR Hold - Suspended Admission] amitriptyline tablet 100 mg    [MAR Hold - Suspended Admission] atorvastatin tablet 80 mg    [MAR Hold - Suspended Admission] calcium carbonate 200 mg calcium (500 mg) chewable tablet 500 mg    [MAR Hold - Suspended Admission] capsaicin 0.025 % cream    [MAR Hold - Suspended Admission] cetirizine tablet 10 mg    [MAR Hold - Suspended Admission] diphenhydrAMINE-zinc acetate 2-0.1% cream    [MAR Hold - Suspended Admission] ergocalciferol capsule 50,000 Units    [MAR Hold - Suspended Admission] gabapentin capsule 300 mg    [MAR Hold - Suspended Admission] Lactobacillus rhamnosus GG capsule 1 capsule    [MAR Hold - Suspended Admission] levalbuterol nebulizer solution 0.63 mg    [MAR Hold - Suspended Admission] levETIRAcetam tablet 1,000 mg    [MAR Hold - Suspended Admission] melatonin tablet 6 mg    [MAR Hold - Suspended Admission] methocarbamol tablet 500 mg    [MAR Hold - Suspended Admission] metoprolol tartrate (LOPRESSOR) tablet 25 mg    [MAR Hold - Suspended Admission] nicotine 14 mg/24 hr 1 patch    [MAR Hold - Suspended Admission]  ondansetron disintegrating tablet 8 mg    [MAR Hold - Suspended Admission] oxyCODONE-acetaminophen 5-325 mg per tablet 2 tablet    [MAR Hold - Suspended Admission] pantoprazole EC tablet 40 mg    [MAR Hold - Suspended Admission] polyethylene glycol packet 17 g    [MAR Hold - Suspended Admission] senna-docusate 8.6-50 mg per tablet 2 tablet    [MAR Hold - Suspended Admission] warfarin (COUMADIN) tablet 5 mg    [DISCONTINUED] furosemide tablet 20 mg    [DISCONTINUED] gabapentin capsule 400 mg    [DISCONTINUED] warfarin (COUMADIN) tablet 7.5 mg     Current Outpatient Medications on File Prior to Encounter   Medication Sig    amitriptyline (ELAVIL) 100 MG tablet Take 100 mg by mouth.    amitriptyline (ELAVIL) 50 MG tablet Take 1 tablet (50 mg total) by mouth every evening.    aspirin 81 MG Chew Take 162 mg by mouth once daily.    atorvastatin (LIPITOR) 80 MG tablet Take 80 mg by mouth every evening.    BASAGLAR KWIKPEN U-100 INSULIN glargine 100 units/mL (3mL) SubQ pen INJECT 25 UNITS DAILY    bisacodyL (DULCOLAX) 10 mg Supp Place 1 suppository (10 mg total) rectally daily as needed.    celecoxib (CELEBREX) 200 MG capsule Take 1 capsule (200 mg total) by mouth once daily.    celecoxib (CELEBREX) 200 MG capsule Take 200 mg by mouth.    cyanocobalamin (VITAMIN B-12) 1000 MCG tablet Take 100 mcg by mouth once daily.    ergocalciferol (ERGOCALCIFEROL) 50,000 unit Cap Take 1 capsule (50,000 Units total) by mouth every 7 days.    insulin aspart U-100 (NOVOLOG) 100 unit/mL (3 mL) InPn pen Inject 1-10 Units into the skin before meals and at bedtime as needed (Hyperglycemia).    ipratropium (ATROVENT) 0.02 % nebulizer solution Take 2.5 mLs (500 mcg total) by nebulization every 4 (four) hours as needed for Wheezing. Rescue    levalbuterol (XOPENEX) 0.63 mg/3 mL nebulizer solution Take 3 mLs (0.63 mg total) by nebulization every 8 (eight) hours. Rescue    levETIRAcetam (KEPPRA) 1000 MG tablet Take 1 tablet  (1,000 mg total) by mouth 2 (two) times daily.    levETIRAcetam (KEPPRA) 1000 MG tablet Take 1,000 mg by mouth.    metFORMIN (GLUCOPHAGE) 1000 MG tablet Take 1 tablet (1,000 mg total) by mouth daily with breakfast.    methocarbamol (ROBAXIN) 750 MG Tab Take 750 mg by mouth.    metoprolol tartrate (LOPRESSOR) 25 MG tablet Take 25 mg by mouth 2 (two) times daily.    multivitamin with minerals tablet Take 1 tablet by mouth once daily.    nicotine (NICODERM CQ) 21 mg/24 hr Place 1 patch onto the skin once daily.    nicotine (NICODERM CQ) 21 mg/24 hr Place 21 mg onto the skin.    oxyCODONE (ROXICODONE) 5 MG immediate release tablet Take 1 tablet (5 mg total) by mouth every 4 (four) hours as needed (pain 1-10).    pantoprazole (PROTONIX) 40 MG tablet Take 1 tablet (40 mg total) by mouth once daily.    pantoprazole (PROTONIX) 40 MG tablet Take 40 mg by mouth.    polyethylene glycol (GLYCOLAX) 17 gram PwPk Take 17 g by mouth 2 (two) times daily.    ramipril (ALTACE) 5 MG capsule TAKE 1 CAPSULE (5 MG) BY ORAL ROUTE ONCE DAILY    senna-docusate 8.6-50 mg (PERICOLACE) 8.6-50 mg per tablet Take 2 tablets by mouth 2 (two) times daily.    TRADJENTA 5 mg Tab tablet TAKE 1 TABLET BY MOUTH EVERY DAY     Family History     Problem Relation (Age of Onset)    Acromegaly Father    Diabetes Mother    Heart attack Father    Hypertension Mother, Father    Kidney cancer Brother        Tobacco Use    Smoking status: Current Every Day Smoker     Packs/day: 9.00     Years: 40.00     Pack years: 360.00     Types: Cigarettes    Smokeless tobacco: Never Used    Tobacco comment: smokes parts all day long continuously   Substance and Sexual Activity    Alcohol use: Not Currently     Frequency: Never     Comment: quit 2 years ago was heavily    Drug use: Never    Sexual activity: Yes     Partners: Female     Review of Systems   Constitutional: Positive for activity change and fatigue. Negative for fever and unexpected weight  change.   HENT: Negative for congestion.    Eyes: Negative for photophobia and visual disturbance.   Respiratory: Negative for shortness of breath.    Cardiovascular: Negative for chest pain.   Gastrointestinal: Positive for nausea and vomiting. Negative for abdominal distention, abdominal pain (denies but is exqusitely tender on exam), constipation and diarrhea.   Genitourinary: Negative for difficulty urinating and flank pain.   Musculoskeletal: Positive for back pain and gait problem. Negative for arthralgias, myalgias, neck pain and neck stiffness.   Skin: Negative for wound.   Neurological: Negative for headaches.   Psychiatric/Behavioral: Negative for sleep disturbance.     Objective:     Vital Signs (Most Recent):  Temp: 97.9 °F (36.6 °C) (03/03/20 0032)  Pulse: (!) 114 (03/03/20 0131)  Resp: 19 (03/03/20 0131)  BP: 119/70 (03/03/20 0131)  SpO2: 100 % (03/03/20 0047) Vital Signs (24h Range):  Temp:  [97.7 °F (36.5 °C)-100.4 °F (38 °C)] 97.9 °F (36.6 °C)  Pulse:  [111-128] 114  Resp:  [15-23] 19  SpO2:  [89 %-100 %] 100 %  BP: ()/(32-70) 119/70     Weight: 101.6 kg (224 lb)  Body mass index is 31.24 kg/m².    Physical Exam   Constitutional: He is oriented to person, place, and time. No distress.   Obese man lying in bed in no apparent distress   HENT:   Head: Normocephalic and atraumatic.   Right Ear: External ear normal.   Left Ear: External ear normal.   Dry oral mucosa   Eyes: Pupils are equal, round, and reactive to light. EOM are normal. No scleral icterus.   Neck: No JVD present.   Cardiovascular: Normal rate, regular rhythm, normal heart sounds and intact distal pulses.   No murmur heard.  Pulmonary/Chest: Effort normal. No respiratory distress. He has no rales.   Abdominal: Soft. Bowel sounds are normal. There is tenderness. There is rebound and guarding.   Exquisite tenderness to RUQ light palpation, +rebound   Musculoskeletal: He exhibits no edema.   Bilateral shoulder slings   Neurological: He  is alert and oriented to person, place, and time.   Skin: Skin is warm and dry.         CRANIAL NERVES     CN III, IV, VI   Pupils are equal, round, and reactive to light.  Extraocular motions are normal.        Significant Labs:   CBC:   Recent Labs   Lab 03/02/20  0614 03/02/20  1854 03/03/20  0409   WBC 26.57* 27.13* 23.43*   HGB 10.9* 9.6* 8.8*   HCT 37.1* 31.7* 29.6*    238 231     CMP:   Recent Labs   Lab 03/02/20  0614 03/02/20  1854   * 133*   K 4.1 4.7   CL 93* 93*   CO2 28 28   GLU 98 88   BUN 43* 49*   CREATININE 2.5* 2.8*   CALCIUM 9.1 9.2   PROT  --  7.5   ALBUMIN  --  2.2*   BILITOT  --  1.5*   ALKPHOS  --  281*   AST  --  58*   ALT  --  38   ANIONGAP 12 12   EGFRNONAA 27.1* 23.6*     Lactic Acid:   Recent Labs   Lab 03/02/20  1358   LACTATE 1.0     All pertinent labs within the past 24 hours have been reviewed.    Significant Imaging: I have reviewed all pertinent imaging results/findings within the past 24 hours.

## 2020-03-03 NOTE — PROGRESS NOTES
Spoke to the primary team and informed of pts temp and HR of 130's sustaining. Pt is still off the unit.

## 2020-03-03 NOTE — ASSESSMENT & PLAN NOTE
Baseline creatinine 0.8. CANDY in the setting of sepsis with rising BUN in the days preceding admission. Most likely prerenal from distributive effect of sepsis + true volume depletion.    UA with 2+ protein. Pt denies urinary hesitancy or retention.  - sCr improving with fluid resuscitation    Plan  - CMP daily  - Renally dose meds, avoid nephrotoxic agents  - Strict I/O

## 2020-03-03 NOTE — H&P
"[facility] Medicine  History & Physical    Patient Name: Ben Melvin  MRN: [id number]  Admission Date: 3/2/2020  Attending Physician: Giovanni De Jesus DO   Primary Care Provider: Cristal Ohara NP    [facility] Medicine Team: [facility] HOSP MED 5 Sarita Navarrete MD     Patient information was obtained from patient, spouse/SO, past medical records and ER records.     Subjective:     Principal Problem:Sepsis    Chief Complaint:   Chief Complaint   Patient presents with    Fatigue        HPI: 60M with hx seizure on 01/2020 with resultant bilateral prox humerus fractures, acromial fractures, subdural hemorrhage, and L1 + L4 burst fractures s/p L + R shoulder hemiarthroplasty and ORIF R prox humerus fx, AF on coumadin, HTN, COPD, DM on insulin (01/2019 A1c 5.9%) transferred from [facility] for evaluation of lethargy, confusion, and leukocytosis noted on recent labs. History provided by pt and his wife. They report that pt felt well until Thursday, when he had nausea and vomiting. Wife reports that several other patients at the [facility] got sick from the hamburgers that day. Wife reports that the next day patient "looked like a druggie," which she further characterizes as having general confusion and a "glassy" look to his eyes. She also says that his legs were weak even though the previous day he had walked far with the Rehab team. On Saturday pt was very sleepy so his pain meds and other sedating meds (robaxin, amitriptyline) were decreased but his mental status did not improve on Sunday. WBC was drawn Monday, returning at 26K. Pt denies any fevers, chills, myalgias, n/v, diarrhea/constipation, changes in urination, chest pain, cough, or dyspnea.    He was sent to [facility] ED where he was noted to have a WBC of 27K and an CANDY with creatinine 2.8 (0.8 baseline) with tachycardia to 110s, mild hypotension to the 90s/50s improving with IV fluids, and a TMax 100.4F. CT A/P revealed a distended " "gallbladder "with high attenuation material which may represent biliary sludge and surrounding inflammatory changes" and commented that it "may be related to acute cholecystitis in the correct clinical setting." Followup US confirmed findings c/w cholecystitis and positive Rojas's sign, noting that the common bile duct was 4 mm. General Surgery was consulted but recommended that pt be treated for his CANDY, his INR be reversed, and that his sepsis be worked up with IR consult for perc tube placement.     Pt's chart mentions alcohol abuse and cirrhosis; these were discussed with the patient. He says he quit daily alcohol use 10 years ago and will have an occasional beer but has not had any alcohol since his seizure 01/2020. IM notes with his primary care NP mention EtOH abuse as recently as 08/2019 but there is little mention afterward. In regards to cirrhosis he has never had jaundice, ascites, confusion / HE episodes, variceal bleed or any GIB, or other decompensations and does not have imaging or biopsy to suggest he has cirrhosis.    Past Medical History:   Diagnosis Date    Adenoma of right adrenal gland 11/09/2017    Alcohol abuse     ASCVD (arteriosclerotic cardiovascular disease) 10/2008    [place]    BPH (benign prostatic hyperplasia)     Cardiomyopathy     Chronic anxiety     Cirrhosis of liver     COPD (chronic obstructive pulmonary disease)     Current every day smoker     2-3 ppd    Elevated LFTs 08/2001    GERD with esophagitis 11/09/2017    History of colon polyps     History of epididymitis 2016    Juan Antonio    Hyperlipidemia     Hypertension, essential     Long term (current) use of anticoagulants     Lumbar disc disease with radiculopathy 05/2015    Major depression, recurrent, chronic     PAF (paroxysmal atrial fibrillation)     Peripheral autonomic neuropathy due to DM     Proteinuria     PVD (peripheral vascular disease) 05/29/2009    Venous insufficiency of both lower " extremities     Vitamin D deficiency        Past Surgical History:   Procedure Laterality Date    biopsy back  05/20/2019    benign Martinez    biopsy right ankle Right 05/20/2019    Martinez, benign    COLONOSCOPY N/A 6/13/2019    Procedure: COLONOSCOPY;  Surgeon: Delmer Kerr MD;  Location: [facility] ENDO;  Service: Endoscopy;  Laterality: N/A;    COLONOSCOPY W/ POLYPECTOMY      EPIDURAL STEROID INJECTION INTO LUMBAR SPINE  06/2015    LASHAE Harvey    ESOPHAGOGASTRODUODENOSCOPY N/A 6/13/2019    Procedure: ESOPHAGOGASTRODUODENOSCOPY (EGD);  Surgeon: Delmer Kerr MD;  Location: [facility] ENDO;  Service: Endoscopy;  Laterality: N/A;    INTESTINAL MALROTATION REPAIR Right 1960    2 weeks old, Martinez    OPEN REDUCTION AND INTERNAL FIXATION (ORIF) OF FRACTURE OF PROXIMAL HUMERUS Right 2/14/2020    Procedure: ORIF, FRACTURE, HUMERUS, PROXIMAL - ORIF R greater tuberosity - suture repair. Trios, supine, fiberwire;  Surgeon: Chandler Chris MD;  Location: [facility] OR [facility] FLR;  Service: Orthopedics;  Laterality: Right;    PARTIAL ARTHROPLASTY OF SHOULDER Left 1/28/2020    Procedure: HEMIARTHROPLASTY, SHOULDER;  Surgeon: Chandler Chris MD;  Location: [facility] OR 2ND FLR;  Service: Orthopedics;  Laterality: Left;    PARTIAL ARTHROPLASTY OF SHOULDER Right 1/30/2020    Procedure: HEMIARTHROPLASTY, SHOULDER- RIGHT- MEKA- SUPINE-  TRIOS;  Surgeon: Chandler Chris MD;  Location: [facility] OR [facility] FLR;  Service: Orthopedics;  Laterality: Right;    PERCUTANEOUS TRANSLUMINAL ANGIOPLASTY (PTA) OF PERIPHERAL VESSEL Right 12/2014    NIKOLAS Childs       Review of patient's allergies indicates:  No Known Allergies    Current Facility-Administered Medications on File Prior to Encounter   Medication    [MAR Hold - Suspended Admission] acetaminophen tablet 650 mg    [MAR Hold - Suspended Admission] aluminum & magnesium hydroxide-simethicone 400-400-40 mg/5 mL suspension 30 mL    [MAR Hold - Suspended Admission]  amitriptyline tablet 100 mg    [MAR Hold - Suspended Admission] atorvastatin tablet 80 mg    [MAR Hold - Suspended Admission] calcium carbonate 200 mg calcium (500 mg) chewable tablet 500 mg    [MAR Hold - Suspended Admission] capsaicin 0.025 % cream    [MAR Hold - Suspended Admission] cetirizine tablet 10 mg    [MAR Hold - Suspended Admission] diphenhydrAMINE-zinc acetate 2-0.1% cream    [MAR Hold - Suspended Admission] ergocalciferol capsule 50,000 Units    [MAR Hold - Suspended Admission] gabapentin capsule 300 mg    [MAR Hold - Suspended Admission] Lactobacillus rhamnosus GG capsule 1 capsule    [MAR Hold - Suspended Admission] levalbuterol nebulizer solution 0.63 mg    [MAR Hold - Suspended Admission] levETIRAcetam tablet 1,000 mg    [MAR Hold - Suspended Admission] melatonin tablet 6 mg    [MAR Hold - Suspended Admission] methocarbamol tablet 500 mg    [MAR Hold - Suspended Admission] metoprolol tartrate (LOPRESSOR) tablet 25 mg    [MAR Hold - Suspended Admission] nicotine 14 mg/24 hr 1 patch    [MAR Hold - Suspended Admission] ondansetron disintegrating tablet 8 mg    [MAR Hold - Suspended Admission] oxyCODONE-acetaminophen 5-325 mg per tablet 2 tablet    [MAR Hold - Suspended Admission] pantoprazole EC tablet 40 mg    [MAR Hold - Suspended Admission] polyethylene glycol packet 17 g    [MAR Hold - Suspended Admission] senna-docusate 8.6-50 mg per tablet 2 tablet    [MAR Hold - Suspended Admission] warfarin (COUMADIN) tablet 5 mg    [DISCONTINUED] furosemide tablet 20 mg    [DISCONTINUED] gabapentin capsule 400 mg    [DISCONTINUED] warfarin (COUMADIN) tablet 7.5 mg     Current Outpatient Medications on File Prior to Encounter   Medication Sig    amitriptyline (ELAVIL) 100 MG tablet Take 100 mg by mouth.    amitriptyline (ELAVIL) 50 MG tablet Take 1 tablet (50 mg total) by mouth every evening.    aspirin 81 MG Chew Take 162 mg by mouth once daily.    atorvastatin (LIPITOR) 80 MG  tablet Take 80 mg by mouth every evening.    BASAGLAR KWIKPEN U-100 INSULIN glargine 100 units/mL (3mL) SubQ pen INJECT 25 UNITS DAILY    bisacodyL (DULCOLAX) 10 mg Supp Place 1 suppository (10 mg total) rectally daily as needed.    celecoxib (CELEBREX) 200 MG capsule Take 1 capsule (200 mg total) by mouth once daily.    celecoxib (CELEBREX) 200 MG capsule Take 200 mg by mouth.    cyanocobalamin (VITAMIN B-12) 1000 MCG tablet Take 100 mcg by mouth once daily.    ergocalciferol (ERGOCALCIFEROL) 50,000 unit Cap Take 1 capsule (50,000 Units total) by mouth every 7 days.    insulin aspart U-100 (NOVOLOG) 100 unit/mL (3 mL) InPn pen Inject 1-10 Units into the skin before meals and at bedtime as needed (Hyperglycemia).    ipratropium (ATROVENT) 0.02 % nebulizer solution Take 2.5 mLs (500 mcg total) by nebulization every 4 (four) hours as needed for Wheezing. Rescue    levalbuterol (XOPENEX) 0.63 mg/3 mL nebulizer solution Take 3 mLs (0.63 mg total) by nebulization every 8 (eight) hours. Rescue    levETIRAcetam (KEPPRA) 1000 MG tablet Take 1 tablet (1,000 mg total) by mouth 2 (two) times daily.    levETIRAcetam (KEPPRA) 1000 MG tablet Take 1,000 mg by mouth.    metFORMIN (GLUCOPHAGE) 1000 MG tablet Take 1 tablet (1,000 mg total) by mouth daily with breakfast.    methocarbamol (ROBAXIN) 750 MG Tab Take 750 mg by mouth.    metoprolol tartrate (LOPRESSOR) 25 MG tablet Take 25 mg by mouth 2 (two) times daily.    multivitamin with minerals tablet Take 1 tablet by mouth once daily.    nicotine (NICODERM CQ) 21 mg/24 hr Place 1 patch onto the skin once daily.    nicotine (NICODERM CQ) 21 mg/24 hr Place 21 mg onto the skin.    oxyCODONE (ROXICODONE) 5 MG immediate release tablet Take 1 tablet (5 mg total) by mouth every 4 (four) hours as needed (pain 1-10).    pantoprazole (PROTONIX) 40 MG tablet Take 1 tablet (40 mg total) by mouth once daily.    pantoprazole (PROTONIX) 40 MG tablet Take 40 mg by mouth.     polyethylene glycol (GLYCOLAX) 17 gram PwPk Take 17 g by mouth 2 (two) times daily.    ramipril (ALTACE) 5 MG capsule TAKE 1 CAPSULE (5 MG) BY ORAL ROUTE ONCE DAILY    senna-docusate 8.6-50 mg (PERICOLACE) 8.6-50 mg per tablet Take 2 tablets by mouth 2 (two) times daily.    TRADJENTA 5 mg Tab tablet TAKE 1 TABLET BY MOUTH EVERY DAY     Family History     Problem Relation (Age of Onset)    Acromegaly Father    Diabetes Mother    Heart attack Father    Hypertension Mother, Father    Kidney cancer Brother        Tobacco Use    Smoking status: Current Every Day Smoker     Packs/day: 9.00     Years: 40.00     Pack years: 360.00     Types: Cigarettes    Smokeless tobacco: Never Used    Tobacco comment: smokes parts all day long continuously   Substance and Sexual Activity    Alcohol use: Not Currently     Frequency: Never     Comment: quit 2 years ago was heavily    Drug use: Never    Sexual activity: Yes     Partners: Female     Review of Systems   Constitutional: Positive for activity change and fatigue. Negative for fever and unexpected weight change.   HENT: Negative for congestion.    Eyes: Negative for photophobia and visual disturbance.   Respiratory: Negative for shortness of breath.    Cardiovascular: Negative for chest pain.   Gastrointestinal: Positive for nausea and vomiting. Negative for abdominal distention, abdominal pain (denies but is exqusitely tender on exam), constipation and diarrhea.   Genitourinary: Negative for difficulty urinating and flank pain.   Musculoskeletal: Positive for back pain and gait problem. Negative for arthralgias, myalgias, neck pain and neck stiffness.   Skin: Negative for wound.   Neurological: Negative for headaches.   Psychiatric/Behavioral: Negative for sleep disturbance.     Objective:     Vital Signs (Most Recent):  Temp: 97.9 °F (36.6 °C) (03/03/20 0032)  Pulse: (!) 114 (03/03/20 0131)  Resp: 19 (03/03/20 0131)  BP: 119/70 (03/03/20 0131)  SpO2: 100 % (03/03/20  0047) Vital Signs (24h Range):  Temp:  [97.7 °F (36.5 °C)-100.4 °F (38 °C)] 97.9 °F (36.6 °C)  Pulse:  [111-128] 114  Resp:  [15-23] 19  SpO2:  [89 %-100 %] 100 %  BP: ()/(32-70) 119/70     Weight: 101.6 kg (224 lb)  Body mass index is 31.24 kg/m².    Physical Exam   Constitutional: He is oriented to person, place, and time. No distress.   Obese man lying in bed in no apparent distress   HENT:   Head: Normocephalic and atraumatic.   Right Ear: External ear normal.   Left Ear: External ear normal.   Dry oral mucosa   Eyes: Pupils are equal, round, and reactive to light. EOM are normal. No scleral icterus.   Neck: No JVD present.   Cardiovascular: Normal rate, regular rhythm, normal heart sounds and intact distal pulses.   No murmur heard.  Pulmonary/Chest: Effort normal. No respiratory distress. He has no rales.   Abdominal: Soft. Bowel sounds are normal. There is tenderness. There is rebound and guarding.   Exquisite tenderness to RUQ light palpation, +rebound   Musculoskeletal: He exhibits no edema.   Bilateral shoulder slings   Neurological: He is alert and oriented to person, place, and time.   Skin: Skin is warm and dry.         CRANIAL NERVES     CN III, IV, VI   Pupils are equal, round, and reactive to light.  Extraocular motions are normal.        Significant Labs:   CBC:   Recent Labs   Lab 03/02/20  0614 03/02/20  1854 03/03/20  0409   WBC 26.57* 27.13* 23.43*   HGB 10.9* 9.6* 8.8*   HCT 37.1* 31.7* 29.6*    238 231     CMP:   Recent Labs   Lab 03/02/20  0614 03/02/20  1854   * 133*   K 4.1 4.7   CL 93* 93*   CO2 28 28   GLU 98 88   BUN 43* 49*   CREATININE 2.5* 2.8*   CALCIUM 9.1 9.2   PROT  --  7.5   ALBUMIN  --  2.2*   BILITOT  --  1.5*   ALKPHOS  --  281*   AST  --  58*   ALT  --  38   ANIONGAP 12 12   EGFRNONAA 27.1* 23.6*     Lactic Acid:   Recent Labs   Lab 03/02/20  1358   LACTATE 1.0     All pertinent labs within the past 24 hours have been reviewed.    Significant Imaging: I  have reviewed all pertinent imaging results/findings within the past 24 hours.    Assessment/Plan:     * Sepsis  Cholelithiasis with Acute Cholecystitis    60M with complex medical history transferred from SNF with confusion, lethargy, leukocytosis. Exam with severe RUQ pain, CT and US suggestive of cholecystitis. General Surgery consulted and recommend IR jose tube placement.    Plan  - F/U blood cultures  - Cefepime + metronidazole  - Consult IR in AM  - Reverse warfarin with 5 mg IV vit K; daily INR - can use FFP if needed vs repeat vit K dosing  - Gave additional 500 CC LR to bring IV fluids to 30 cc/kg (got approx 25 cc/kg in ED)    Acute encephalopathy  Subdural Hematoma    Reports from wife and in chart of confusion in the days preceding admission. On my exam pt is alert and oriented.     DDx includes sepsis, worsening hematoma, seizure    Plan  - Treat sepsis with antibiotics, source control  - Repeat CT head w/o contrast to ensure no recurrence / enlarging of bleed given continued warfarin  - Seizure precautions, continue Keppra at previous 1g BID dose    CANDY (acute kidney injury)  Baseline creatinine 0.8. CANDY in the setting of sepsis with rising BUN in the days preceding admission. Most likely prerenal from distributive effect of sepsis + true volume depletion.    UA with 2+ protein. Pt denies urinary hesitancy or retention.    Plan  - Re-check metabolic panel in AM given that pt has had 30 cc/kg IV fluids; if persistent CANDY would do further workup (e.g. US, urine microscopy, etc)  - Renally dose meds, avoid nephrotoxic agents  - Strict I/O    GERD (gastroesophageal reflux disease)  Cont home PPI    Bilateral proximal humeral fractures  Continue pain control, weight bearing precautions per Orthopedics.  Held muscle relaxants initially given reports of confusion but may consider restarting if resolves with sepsis tx.    PT/OT consult.    Diabetes 1.5, managed as type 2  A1c 5.9% 01/2019. Rx insulin  (glargine 25U daily + MDSSI) at home but wife and pt report he hasn't been given insulin while admitted.    LDSSI  Diabetic Diet  BG checks AC/HS    Essential hypertension  Hold home ramipril 5 mg in CANDY + until pt's BP stable    PAF (paroxysmal atrial fibrillation)  Long Term (current) Use of Anticoagulants    Pt with AF on warfarin; holding until procedure finished. Restart home regimen after. Daily INR    COPD (chronic obstructive pulmonary disease)  PRN duonebs. Pt reports that he has smoked 8-9 PPD x40 years and quit shortly after his seizure and fractures.    Hyperlipidemia  Cont home atorvastatin 80      VTE Risk Mitigation (From admission, onward)    None             Sarita Navarrete MD  Department of Hospital Medicine   [facility]

## 2020-03-03 NOTE — ASSESSMENT & PLAN NOTE
Cholelithiasis with Acute Cholecystitis    60M with complex medical history transferred from SNF with confusion, lethargy, leukocytosis. Exam with severe RUQ pain, CT and US suggestive of cholecystitis. General Surgery consulted and recommend IR jose tube placement.    - fluid 30mg/kg resusc  - INR reversed with FFP and vit K  - IR jose tube placed 3/3/2020      Plan  - F/U blood cultures  - Cefepime, vanc + metronidazole  - pain control  - maintenance fluids

## 2020-03-03 NOTE — PROGRESS NOTES
"Pt arrived to room 189 for  Cholangiogram with possible tube insertion . Pt awake, alert, and oriented. Allergies reviewed, patient ID'd using 2 identifiers.  Has bilateral arm splints on and c/o's bilateral foot "broken toes".  "

## 2020-03-03 NOTE — PROGRESS NOTES
Pt arrived to [facility] via stretcher on 2LNC, sats 100%, pt drowsy but responds to voice, , temp 100.7 ORAL, spoke to pts nurse MA, she verbalized she will notify pts primary team with updates, pt denies any pain or SOB, choly tube intact, dressing intact

## 2020-03-03 NOTE — ASSESSMENT & PLAN NOTE
Baseline creatinine 0.8. CANDY in the setting of sepsis with rising BUN in the days preceding admission. Most likely prerenal from distributive effect of sepsis + true volume depletion.    UA with 2+ protein. Pt denies urinary hesitancy or retention.    Plan  - Re-check metabolic panel in AM given that pt has had 30 cc/kg IV fluids; if persistent CANDY would do further workup (e.g. US, urine microscopy, etc)  - Renally dose meds, avoid nephrotoxic agents  - Strict I/O

## 2020-03-03 NOTE — PROGRESS NOTES
Pharmacist Renal Dose Adjustment Note    Ben Melvin is a 60 y.o. male being treated with the medication cefepime    Patient Data:    Vital Signs (Most Recent):  Temp: 98.3 °F (36.8 °C) (03/03/20 0507)  Pulse: (!) 121 (03/03/20 0516)  Resp: 19 (03/03/20 0131)  BP: (!) 118/55 (03/03/20 0516)  SpO2: (!) 94 % (03/03/20 0516)   Vital Signs (72h Range):  Temp:  [97.6 °F (36.4 °C)-100.4 °F (38 °C)]   Pulse:  []   Resp:  [15-23]   BP: ()/(32-70)   SpO2:  [89 %-100 %]      Recent Labs   Lab 03/02/20  0614 03/02/20  1854 03/03/20  0409   CREATININE 2.5* 2.8* 2.3*     Serum creatinine: 2.3 mg/dL (H) 03/03/20 0409  Estimated creatinine clearance: 42 mL/min (A)    Medication:Cefepime 1 g q 24 hours will be changed to Cefepime 1 g q 48 hours    Pharmacist's Name: Makenna Pastrana  Pharmacist's Extension: [phone]

## 2020-03-04 LAB
ALBUMIN SERPL BCP-MCNC: 1.8 G/DL (ref 3.5–5.2)
ALP SERPL-CCNC: 244 U/L (ref 55–135)
ALT SERPL W/O P-5'-P-CCNC: 29 U/L (ref 10–44)
AMMONIA PLAS-SCNC: 33 UMOL/L (ref 10–50)
ANION GAP SERPL CALC-SCNC: 12 MMOL/L (ref 8–16)
AST SERPL-CCNC: 50 U/L (ref 10–40)
BASOPHILS # BLD AUTO: 0.06 K/UL (ref 0–0.2)
BASOPHILS NFR BLD: 0.3 % (ref 0–1.9)
BILIRUB SERPL-MCNC: 1 MG/DL (ref 0.1–1)
BUN SERPL-MCNC: 28 MG/DL (ref 6–20)
CALCIUM SERPL-MCNC: 9.2 MG/DL (ref 8.7–10.5)
CHLORIDE SERPL-SCNC: 102 MMOL/L (ref 95–110)
CO2 SERPL-SCNC: 25 MMOL/L (ref 23–29)
CREAT SERPL-MCNC: 1.6 MG/DL (ref 0.5–1.4)
DIFFERENTIAL METHOD: ABNORMAL
EOSINOPHIL # BLD AUTO: 0.2 K/UL (ref 0–0.5)
EOSINOPHIL NFR BLD: 1 % (ref 0–8)
ERYTHROCYTE [DISTWIDTH] IN BLOOD BY AUTOMATED COUNT: 17.8 % (ref 11.5–14.5)
EST. GFR  (AFRICAN AMERICAN): 53.7 ML/MIN/1.73 M^2
EST. GFR  (NON AFRICAN AMERICAN): 46.5 ML/MIN/1.73 M^2
GLUCOSE SERPL-MCNC: 75 MG/DL (ref 70–110)
HCT VFR BLD AUTO: 30.1 % (ref 40–54)
HGB BLD-MCNC: 8.9 G/DL (ref 14–18)
IMM GRANULOCYTES # BLD AUTO: 0.13 K/UL (ref 0–0.04)
IMM GRANULOCYTES NFR BLD AUTO: 0.7 % (ref 0–0.5)
INR PPP: 1 (ref 0.8–1.2)
INR PPP: 1 (ref 0.8–1.2)
LYMPHOCYTES # BLD AUTO: 0.9 K/UL (ref 1–4.8)
LYMPHOCYTES NFR BLD: 5 % (ref 18–48)
MAGNESIUM SERPL-MCNC: 1.9 MG/DL (ref 1.6–2.6)
MCH RBC QN AUTO: 26.6 PG (ref 27–31)
MCHC RBC AUTO-ENTMCNC: 29.6 G/DL (ref 32–36)
MCV RBC AUTO: 90 FL (ref 82–98)
MONOCYTES # BLD AUTO: 1.1 K/UL (ref 0.3–1)
MONOCYTES NFR BLD: 5.6 % (ref 4–15)
NEUTROPHILS # BLD AUTO: 16.2 K/UL (ref 1.8–7.7)
NEUTROPHILS NFR BLD: 87.4 % (ref 38–73)
NRBC BLD-RTO: 0 /100 WBC
PHOSPHATE SERPL-MCNC: 2.9 MG/DL (ref 2.7–4.5)
PLATELET # BLD AUTO: 257 K/UL (ref 150–350)
PMV BLD AUTO: 9.4 FL (ref 9.2–12.9)
POCT GLUCOSE: 84 MG/DL (ref 70–110)
POCT GLUCOSE: 90 MG/DL (ref 70–110)
POCT GLUCOSE: 96 MG/DL (ref 70–110)
POTASSIUM SERPL-SCNC: 4 MMOL/L (ref 3.5–5.1)
PROT SERPL-MCNC: 6.3 G/DL (ref 6–8.4)
PROTHROMBIN TIME: 10.5 SEC (ref 9–12.5)
PROTHROMBIN TIME: 10.5 SEC (ref 9–12.5)
RBC # BLD AUTO: 3.34 M/UL (ref 4.6–6.2)
SODIUM SERPL-SCNC: 139 MMOL/L (ref 136–145)
VANCOMYCIN SERPL-MCNC: 22 UG/ML
WBC # BLD AUTO: 18.59 K/UL (ref 3.9–12.7)

## 2020-03-04 PROCEDURE — 84100 ASSAY OF PHOSPHORUS: CPT

## 2020-03-04 PROCEDURE — 82140 ASSAY OF AMMONIA: CPT

## 2020-03-04 PROCEDURE — 25000003 PHARM REV CODE 250: Performed by: STUDENT IN AN ORGANIZED HEALTH CARE EDUCATION/TRAINING PROGRAM

## 2020-03-04 PROCEDURE — 99232 PR SUBSEQUENT HOSPITAL CARE,LEVL II: ICD-10-PCS | Mod: GC,,, | Performed by: INTERNAL MEDICINE

## 2020-03-04 PROCEDURE — 63600175 PHARM REV CODE 636 W HCPCS: Performed by: INTERNAL MEDICINE

## 2020-03-04 PROCEDURE — S4991 NICOTINE PATCH NONLEGEND: HCPCS | Performed by: STUDENT IN AN ORGANIZED HEALTH CARE EDUCATION/TRAINING PROGRAM

## 2020-03-04 PROCEDURE — 94761 N-INVAS EAR/PLS OXIMETRY MLT: CPT

## 2020-03-04 PROCEDURE — 25000003 PHARM REV CODE 250: Performed by: INTERNAL MEDICINE

## 2020-03-04 PROCEDURE — 63600175 PHARM REV CODE 636 W HCPCS: Performed by: STUDENT IN AN ORGANIZED HEALTH CARE EDUCATION/TRAINING PROGRAM

## 2020-03-04 PROCEDURE — 83735 ASSAY OF MAGNESIUM: CPT

## 2020-03-04 PROCEDURE — 80202 ASSAY OF VANCOMYCIN: CPT

## 2020-03-04 PROCEDURE — 36415 COLL VENOUS BLD VENIPUNCTURE: CPT

## 2020-03-04 PROCEDURE — 99232 SBSQ HOSP IP/OBS MODERATE 35: CPT | Mod: GC,,, | Performed by: INTERNAL MEDICINE

## 2020-03-04 PROCEDURE — 85025 COMPLETE CBC W/AUTO DIFF WBC: CPT

## 2020-03-04 PROCEDURE — 85610 PROTHROMBIN TIME: CPT

## 2020-03-04 PROCEDURE — 87040 BLOOD CULTURE FOR BACTERIA: CPT

## 2020-03-04 PROCEDURE — 80053 COMPREHEN METABOLIC PANEL: CPT

## 2020-03-04 PROCEDURE — S0030 INJECTION, METRONIDAZOLE: HCPCS | Performed by: STUDENT IN AN ORGANIZED HEALTH CARE EDUCATION/TRAINING PROGRAM

## 2020-03-04 PROCEDURE — 11000001 HC ACUTE MED/SURG PRIVATE ROOM

## 2020-03-04 RX ORDER — ENOXAPARIN SODIUM 100 MG/ML
100 INJECTION SUBCUTANEOUS 2 TIMES DAILY
Status: DISCONTINUED | OUTPATIENT
Start: 2020-03-04 | End: 2020-03-06 | Stop reason: HOSPADM

## 2020-03-04 RX ORDER — HEPARIN SODIUM 5000 [USP'U]/ML
5000 INJECTION, SOLUTION INTRAVENOUS; SUBCUTANEOUS EVERY 8 HOURS
Status: DISCONTINUED | OUTPATIENT
Start: 2020-03-04 | End: 2020-03-04

## 2020-03-04 RX ORDER — TALC
6 POWDER (GRAM) TOPICAL NIGHTLY PRN
Status: DISCONTINUED | OUTPATIENT
Start: 2020-03-04 | End: 2020-03-06 | Stop reason: HOSPADM

## 2020-03-04 RX ORDER — LACTULOSE 10 G/15ML
20 SOLUTION ORAL
Status: DISCONTINUED | OUTPATIENT
Start: 2020-03-04 | End: 2020-03-05

## 2020-03-04 RX ORDER — ENOXAPARIN SODIUM 100 MG/ML
40 INJECTION SUBCUTANEOUS EVERY 24 HOURS
Status: DISCONTINUED | OUTPATIENT
Start: 2020-03-04 | End: 2020-03-04

## 2020-03-04 RX ORDER — AMOXICILLIN 250 MG
1 CAPSULE ORAL 2 TIMES DAILY
Status: CANCELLED | OUTPATIENT
Start: 2020-03-04

## 2020-03-04 RX ORDER — FUROSEMIDE 20 MG/1
20 TABLET ORAL DAILY
Status: ON HOLD | COMMUNITY
End: 2020-03-24 | Stop reason: HOSPADM

## 2020-03-04 RX ORDER — AMITRIPTYLINE HYDROCHLORIDE 50 MG/1
100 TABLET, FILM COATED ORAL NIGHTLY
Status: DISCONTINUED | OUTPATIENT
Start: 2020-03-04 | End: 2020-03-06 | Stop reason: HOSPADM

## 2020-03-04 RX ORDER — OLANZAPINE 2.5 MG/1
5 TABLET ORAL ONCE AS NEEDED
Status: COMPLETED | OUTPATIENT
Start: 2020-03-04 | End: 2020-03-04

## 2020-03-04 RX ORDER — SODIUM CHLORIDE, SODIUM LACTATE, POTASSIUM CHLORIDE, CALCIUM CHLORIDE 600; 310; 30; 20 MG/100ML; MG/100ML; MG/100ML; MG/100ML
INJECTION, SOLUTION INTRAVENOUS CONTINUOUS
Status: ACTIVE | OUTPATIENT
Start: 2020-03-04 | End: 2020-03-04

## 2020-03-04 RX ORDER — METOPROLOL TARTRATE 25 MG/1
25 TABLET, FILM COATED ORAL ONCE
Status: COMPLETED | OUTPATIENT
Start: 2020-03-04 | End: 2020-03-04

## 2020-03-04 RX ORDER — GABAPENTIN 300 MG/1
300 CAPSULE ORAL 3 TIMES DAILY
Status: ON HOLD | COMMUNITY
End: 2020-03-06 | Stop reason: HOSPADM

## 2020-03-04 RX ORDER — WARFARIN SODIUM 5 MG/1
5 TABLET ORAL DAILY
Status: ON HOLD | COMMUNITY
End: 2020-03-06 | Stop reason: HOSPADM

## 2020-03-04 RX ORDER — CETIRIZINE HYDROCHLORIDE 10 MG/1
10 TABLET ORAL DAILY
COMMUNITY
End: 2023-08-18

## 2020-03-04 RX ORDER — SODIUM CHLORIDE, SODIUM LACTATE, POTASSIUM CHLORIDE, CALCIUM CHLORIDE 600; 310; 30; 20 MG/100ML; MG/100ML; MG/100ML; MG/100ML
INJECTION, SOLUTION INTRAVENOUS CONTINUOUS
Status: DISCONTINUED | OUTPATIENT
Start: 2020-03-04 | End: 2020-03-04

## 2020-03-04 RX ORDER — METOPROLOL TARTRATE 50 MG/1
50 TABLET ORAL 2 TIMES DAILY
Status: DISCONTINUED | OUTPATIENT
Start: 2020-03-04 | End: 2020-03-06 | Stop reason: HOSPADM

## 2020-03-04 RX ORDER — VANCOMYCIN 1.75 GRAM/500 ML IN 0.9 % SODIUM CHLORIDE INTRAVENOUS
1750
Status: DISCONTINUED | OUTPATIENT
Start: 2020-03-04 | End: 2020-03-04

## 2020-03-04 RX ADMIN — ENOXAPARIN SODIUM 100 MG: 100 INJECTION SUBCUTANEOUS at 09:03

## 2020-03-04 RX ADMIN — LEVETIRACETAM 1000 MG: 500 TABLET ORAL at 09:03

## 2020-03-04 RX ADMIN — CEFEPIME 1 G: 1 INJECTION, POWDER, FOR SOLUTION INTRAMUSCULAR; INTRAVENOUS at 03:03

## 2020-03-04 RX ADMIN — NICOTINE 1 PATCH: 21 PATCH, EXTENDED RELEASE TRANSDERMAL at 09:03

## 2020-03-04 RX ADMIN — WARFARIN SODIUM 7.5 MG: 2.5 TABLET ORAL at 05:03

## 2020-03-04 RX ADMIN — METRONIDAZOLE 500 MG: 500 INJECTION, SOLUTION INTRAVENOUS at 03:03

## 2020-03-04 RX ADMIN — ATORVASTATIN CALCIUM 80 MG: 20 TABLET, FILM COATED ORAL at 08:03

## 2020-03-04 RX ADMIN — OXYCODONE HYDROCHLORIDE 10 MG: 10 TABLET ORAL at 10:03

## 2020-03-04 RX ADMIN — CYANOCOBALAMIN TAB 1000 MCG 1000 MCG: 1000 TAB at 09:03

## 2020-03-04 RX ADMIN — POLYETHYLENE GLYCOL 3350 17 G: 17 POWDER, FOR SOLUTION ORAL at 09:03

## 2020-03-04 RX ADMIN — METRONIDAZOLE 500 MG: 500 INJECTION, SOLUTION INTRAVENOUS at 10:03

## 2020-03-04 RX ADMIN — VANCOMYCIN HYDROCHLORIDE 1750 MG: 100 INJECTION, POWDER, LYOPHILIZED, FOR SOLUTION INTRAVENOUS at 02:03

## 2020-03-04 RX ADMIN — METOPROLOL TARTRATE 50 MG: 50 TABLET, FILM COATED ORAL at 08:03

## 2020-03-04 RX ADMIN — OLANZAPINE 5 MG: 2.5 TABLET, FILM COATED ORAL at 02:03

## 2020-03-04 RX ADMIN — VANCOMYCIN HYDROCHLORIDE 1500 MG: 1.5 INJECTION, POWDER, LYOPHILIZED, FOR SOLUTION INTRAVENOUS at 05:03

## 2020-03-04 RX ADMIN — METOPROLOL TARTRATE 25 MG: 25 TABLET ORAL at 09:03

## 2020-03-04 RX ADMIN — SODIUM CHLORIDE, SODIUM LACTATE, POTASSIUM CHLORIDE, AND CALCIUM CHLORIDE: .6; .31; .03; .02 INJECTION, SOLUTION INTRAVENOUS at 12:03

## 2020-03-04 RX ADMIN — PANTOPRAZOLE SODIUM 40 MG: 40 TABLET, DELAYED RELEASE ORAL at 09:03

## 2020-03-04 RX ADMIN — LEVETIRACETAM 1000 MG: 500 TABLET ORAL at 08:03

## 2020-03-04 RX ADMIN — METRONIDAZOLE 500 MG: 500 INJECTION, SOLUTION INTRAVENOUS at 09:03

## 2020-03-04 RX ADMIN — AMITRIPTYLINE HYDROCHLORIDE 100 MG: 50 TABLET, FILM COATED ORAL at 08:03

## 2020-03-04 RX ADMIN — METOPROLOL TARTRATE 50 MG: 50 TABLET, FILM COATED ORAL at 09:03

## 2020-03-04 NOTE — PROGRESS NOTES
[facility]  General Surgery  Progress Note    Subjective:     Post-Op Info:  * No surgery found *         Interval History:   Patient seen and examined, no acute events overnight  No complaints of abdominal pain  More oriented   Paty tube in place   tmax 100.7, tachycardia    Medications:  Continuous Infusions:  Scheduled Meds:   atorvastatin  80 mg Oral QHS    ceFEPime (MAXIPIME) IVPB  1 g Intravenous Q12H    cyanocobalamin  1,000 mcg Oral Daily    ergocalciferol  50,000 Units Oral Q7 Days    heparin (porcine)  5,000 Units Subcutaneous Q8H    insulin detemir U-100  15 Units Subcutaneous QHS    levETIRAcetam  1,000 mg Oral BID    metoprolol tartrate  25 mg Oral BID    metronidazole  500 mg Intravenous Q8H    nicotine  1 patch Transdermal Daily    pantoprazole  40 mg Oral Daily    polyethylene glycol  17 g Oral Daily    vancomycin (VANCOCIN) IVPB  1,750 mg Intravenous Q24H     PRN Meds:sodium chloride, acetaminophen, albuterol-ipratropium, Dextrose 10% Bolus, Dextrose 10% Bolus, glucagon (human recombinant), glucose, glucose, insulin aspart U-100, ondansetron, oxyCODONE, oxyCODONE, prochlorperazine     Review of patient's allergies indicates:  No Known Allergies  Objective:     Vital Signs (Most Recent):  Temp: 97.8 °F (36.6 °C) (03/04/20 0355)  Pulse: (!) 113 (03/04/20 0751)  Resp: 18 (03/04/20 0355)  BP: (!) 103/55 (03/04/20 0355)  SpO2: (!) 92 % (03/04/20 0355) Vital Signs (24h Range):  Temp:  [96.7 °F (35.9 °C)-100.7 °F (38.2 °C)] 97.8 °F (36.6 °C)  Pulse:  [108-134] 113  Resp:  [12-21] 18  SpO2:  [90 %-100 %] 92 %  BP: ()/() 103/55     Weight: 101.6 kg (224 lb)  Body mass index is 31.24 kg/m².    Intake/Output - Last 3 Shifts       03/02 0700 - 03/03 0659 03/03 0700 - 03/04 0659 03/04 0700 - 03/05 0659    P.O.  240     Blood  300     Total Intake(mL/kg)  540 (5.3)     Urine (mL/kg/hr)  1700 (0.7)     Stool  0     Total Output  1700     Net  -1160            Stool  Occurrence  1 x           Physical Exam   Constitutional: He appears well-developed and well-nourished. No distress.   HENT:   Head: Normocephalic and atraumatic.   Cardiovascular:   tachycardic   Pulmonary/Chest: Effort normal. No respiratory distress.   Abdominal:   Soft, NTND  Paty tube in place with dark bilious drainage       Significant Labs:  CBC:   Recent Labs   Lab 03/04/20  0527   WBC 18.59*   RBC 3.34*   HGB 8.9*   HCT 30.1*      MCV 90   MCH 26.6*   MCHC 29.6*     BMP:   Recent Labs   Lab 03/04/20  0527   GLU 75      K 4.0      CO2 25   BUN 28*   CREATININE 1.6*   CALCIUM 9.2   MG 1.9     CMP:   Recent Labs   Lab 03/04/20  0527   GLU 75   CALCIUM 9.2   ALBUMIN 1.8*   PROT 6.3      K 4.0   CO2 25      BUN 28*   CREATININE 1.6*   ALKPHOS 244*   ALT 29   AST 50*   BILITOT 1.0     LFTs:   Recent Labs   Lab 03/04/20  0527   ALT 29   AST 50*   ALKPHOS 244*   BILITOT 1.0   PROT 6.3   ALBUMIN 1.8*         Assessment/Plan:     Cholelithiasis with acute cholecystitis  61 yo male with multiple comorbidities, recent polytrauma 2/2 to seizure who presents now with cholecystitis      - cholecystostomy tube will stay in place for 6 weeks regardless of output  - patient may follow up in clinic in 6 weeks for evaluation for removal  - would notify orthopaedics of patients hospitalization with recent surgery and hardware   - general surgery will sign off at this time, please call for questions, thank you for allowing us to participate in the care of Mr. Melvin        Amber Whitman PA-C  General Surgery  [facility]

## 2020-03-04 NOTE — PLAN OF CARE
03/04/20 0854   Post-Acute Status   Post-Acute Authorization Placement   Post-Acute Placement Status Awaiting Internal Medical Clearance

## 2020-03-04 NOTE — PLAN OF CARE
Pt remains free from falls and injury. Pt makes statement of no pain. Pt started out shift confused and hallucination. Pt is more oriented at this time, hallucinations are fewer. Cholostomy drain in place with dark brown output. Family member remains in room overnight. Bed low and locked, Call light within reach.

## 2020-03-04 NOTE — PROGRESS NOTES
Pharmacokinetic Initial Assessment: IV Vancomycin    Assessment/Plan:    Continue intravenous vancomycin, after loading dose of 2000 mg given before consult, with 1750mg every 24 hours  Desired empiric serum trough concentration is 15 to 20 mcg/mL  Draw vancomycin trough level 30 min prior to third dose on 3/06  at approximately 0130  Pharmacy will continue to follow and monitor vancomycin.      Please contact pharmacy at extension [phone] with any questions regarding this assessment.     Thank you for the consult,   Bora Luong       Patient brief summary:  Ben Melvin is a 60 y.o. male initiated on antimicrobial therapy with IV Vancomycin for treatment of suspected bacteremia    Drug Allergies:   Review of patient's allergies indicates:  No Known Allergies    Actual Body Weight:   101.6kg    Renal Function:   Estimated Creatinine Clearance: 42 mL/min (A) (based on SCr of 2.3 mg/dL (H)).,     Dialysis Method (if applicable):  N/A    CBC (last 72 hours):  Recent Labs   Lab Result Units 03/02/20  0614 03/02/20 1854 03/03/20  0409   WBC K/uL 26.57* 27.13* 23.43*   Hemoglobin g/dL 10.9* 9.6* 8.8*   Hematocrit % 37.1* 31.7* 29.6*   Platelets K/uL 221 238 231   Gran% % 88.8* 85.4* 89.9*   Lymph% % 5.2* 5.6* 3.2*   Mono% % 4.6 4.8 4.8   Eosinophil% % 0.1 0.5 0.9   Basophil% % 0.2 0.2 0.2   Differential Method  Automated Automated Automated       Metabolic Panel (last 72 hours):  Recent Labs   Lab Result Units 03/02/20  0614 03/02/20  1351 03/02/20  1854 03/03/20  0409   Sodium mmol/L 133*  --  133* 135*   Potassium mmol/L 4.1  --  4.7 4.3   Chloride mmol/L 93*  --  93* 98   CO2 mmol/L 28  --  28 26   Glucose mg/dL 98  --  88 114*   Glucose, UA   --  Negative  --   --    BUN, Bld mg/dL 43*  --  49* 40*   Creatinine mg/dL 2.5*  --  2.8* 2.3*   Albumin g/dL  --   --  2.2* 1.9*   Total Bilirubin mg/dL  --   --  1.5* 1.5*   Alkaline Phosphatase U/L  --   --  281* 253*   AST U/L  --   --  58* 53*   ALT U/L  --   --  38  35   Magnesium mg/dL 2.1  --   --  1.9   Phosphorus mg/dL 4.9*  --   --  4.1       Drug levels (last 3 results):  No results for input(s): VANCOMYCINRA, VANCOMYCINPE, VANCOMYCINTR in the last 72 hours.    Microbiologic Results:  Microbiology Results (last 7 days)     Procedure Component Value Units Date/Time    Clostridium difficile EIA [[id number]]     Order Status:  Canceled Specimen:  Stool

## 2020-03-04 NOTE — SUBJECTIVE & OBJECTIVE
Interval History:   Patient seen and examined, no acute events overnight  No complaints of abdominal pain  More oriented   Paty tube in place   tmax 100.7, tachycardia    Medications:  Continuous Infusions:  Scheduled Meds:   atorvastatin  80 mg Oral QHS    ceFEPime (MAXIPIME) IVPB  1 g Intravenous Q12H    cyanocobalamin  1,000 mcg Oral Daily    ergocalciferol  50,000 Units Oral Q7 Days    heparin (porcine)  5,000 Units Subcutaneous Q8H    insulin detemir U-100  15 Units Subcutaneous QHS    levETIRAcetam  1,000 mg Oral BID    metoprolol tartrate  25 mg Oral BID    metronidazole  500 mg Intravenous Q8H    nicotine  1 patch Transdermal Daily    pantoprazole  40 mg Oral Daily    polyethylene glycol  17 g Oral Daily    vancomycin (VANCOCIN) IVPB  1,750 mg Intravenous Q24H     PRN Meds:sodium chloride, acetaminophen, albuterol-ipratropium, Dextrose 10% Bolus, Dextrose 10% Bolus, glucagon (human recombinant), glucose, glucose, insulin aspart U-100, ondansetron, oxyCODONE, oxyCODONE, prochlorperazine     Review of patient's allergies indicates:  No Known Allergies  Objective:     Vital Signs (Most Recent):  Temp: 97.8 °F (36.6 °C) (03/04/20 0355)  Pulse: (!) 113 (03/04/20 0751)  Resp: 18 (03/04/20 0355)  BP: (!) 103/55 (03/04/20 0355)  SpO2: (!) 92 % (03/04/20 0355) Vital Signs (24h Range):  Temp:  [96.7 °F (35.9 °C)-100.7 °F (38.2 °C)] 97.8 °F (36.6 °C)  Pulse:  [108-134] 113  Resp:  [12-21] 18  SpO2:  [90 %-100 %] 92 %  BP: ()/() 103/55     Weight: 101.6 kg (224 lb)  Body mass index is 31.24 kg/m².    Intake/Output - Last 3 Shifts       03/02 0700 - 03/03 0659 03/03 0700 - 03/04 0659 03/04 0700 - 03/05 0659    P.O.  240     Blood  300     Total Intake(mL/kg)  540 (5.3)     Urine (mL/kg/hr)  1700 (0.7)     Stool  0     Total Output  1700     Net  -1160            Stool Occurrence  1 x           Physical Exam   Constitutional: He appears well-developed and well-nourished. No distress.   HENT:    Head: Normocephalic and atraumatic.   Cardiovascular:   tachycardic   Pulmonary/Chest: Effort normal. No respiratory distress.   Abdominal:   Soft, NTND  Paty tube in place with dark bilious drainage       Significant Labs:  CBC:   Recent Labs   Lab 03/04/20  0527   WBC 18.59*   RBC 3.34*   HGB 8.9*   HCT 30.1*      MCV 90   MCH 26.6*   MCHC 29.6*     BMP:   Recent Labs   Lab 03/04/20  0527   GLU 75      K 4.0      CO2 25   BUN 28*   CREATININE 1.6*   CALCIUM 9.2   MG 1.9     CMP:   Recent Labs   Lab 03/04/20  0527   GLU 75   CALCIUM 9.2   ALBUMIN 1.8*   PROT 6.3      K 4.0   CO2 25      BUN 28*   CREATININE 1.6*   ALKPHOS 244*   ALT 29   AST 50*   BILITOT 1.0     LFTs:   Recent Labs   Lab 03/04/20  0527   ALT 29   AST 50*   ALKPHOS 244*   BILITOT 1.0   PROT 6.3   ALBUMIN 1.8*

## 2020-03-04 NOTE — PROGRESS NOTES
"[facility] Hospital Medicine  Progress Note    Patient Name: Ben Melvin  MRN: [id number]  Patient Class: IP- Inpatient   Admission Date: 3/2/2020  Length of Stay: 1 days  Attending Physician: Giovanni De Jesus DO  Primary Care Provider: Cristal Ohara NP    Hospital Medicine Team: [facility] HOSP MED 5 Bora La DO    Subjective:     Principal Problem:Sepsis        HPI:  60M with hx seizure on 01/2020 with resultant bilateral prox humerus fractures, acromial fractures, subdural hemorrhage, and L1 + L4 burst fractures s/p L + R shoulder hemiarthroplasty and ORIF R prox humerus fx, AF on coumadin, HTN, COPD, DM on insulin (01/2019 A1c 5.9%) transferred from [facility] for evaluation of lethargy, confusion, and leukocytosis noted on recent labs. History provided by pt and his wife. They report that pt felt well until Thursday, when he had nausea and vomiting. Wife reports that several other patients at the [facility] got sick from the hamburgers that day. Wife reports that the next day patient "looked like a druggie," which she further characterizes as having general confusion and a "glassy" look to his eyes. She also says that his legs were weak even though the previous day he had walked far with the Rehab team. On Saturday pt was very sleepy so his pain meds and other sedating meds (robaxin, amitriptyline) were decreased but his mental status did not improve on Sunday. WBC was drawn Monday, returning at 26K. Pt denies any fevers, chills, myalgias, n/v, diarrhea/constipation, changes in urination, chest pain, cough, or dyspnea.    He was sent to [facility] ED where he was noted to have a WBC of 27K and an CANDY with creatinine 2.8 (0.8 baseline) with tachycardia to 110s, mild hypotension to the 90s/50s improving with IV fluids, and a TMax 100.4F. CT A/P revealed a distended gallbladder "with high attenuation material which may represent biliary sludge and surrounding inflammatory changes" and commented " "that it "may be related to acute cholecystitis in the correct clinical setting." Followup US confirmed findings c/w cholecystitis and positive Rojas's sign, noting that the common bile duct was 4 mm. General Surgery was consulted but recommended that pt be treated for his CANDY, his INR be reversed, and that his sepsis be worked up with IR consult for perc tube placement.     Pt's chart mentions alcohol abuse and cirrhosis; these were discussed with the patient. He says he quit daily alcohol use 10 years ago and will have an occasional beer but has not had any alcohol since his seizure 01/2020. IM notes with his primary care NP mention EtOH abuse as recently as 08/2019 but there is little mention afterward. In regards to cirrhosis he has never had jaundice, ascites, confusion / HE episodes, variceal bleed or any GIB, or other decompensations and does not have imaging or biopsy to suggest he has cirrhosis.    Overview/Hospital Course:  The patient is a 60 year old male with past medical history of pAF on coumadin, seizures, recent fall with bilateral shoulder hemiarthroplasty and R humerus fx ORIF, presenting from SNF for leukocytosis and encephalopathy, admitted with sepsis and acute cholecystitis. 3/3/2020 with general surgery recommendation, IR placed percutaneous cholecystostomy tube to be in place 6 weeks. Patient on vanc, cefepime, flagyl. Orthopedics notified of patient in hospital.     Interval History: NAEO. Patient remains alert but only oriented to self, likely due to infectious setting. Paty tube draining bile. Recent ortho surgical sites appear noninfectious.    Review of Systems   Unable to perform ROS: Mental status change     Objective:     Vital Signs (Most Recent):  Temp: 98 °F (36.7 °C) (03/04/20 1121)  Pulse: 101 (03/04/20 1131)  Resp: 18 (03/04/20 1121)  BP: (!) 92/54 (03/04/20 1121)  SpO2: (!) 92 % (03/04/20 0900) Vital Signs (24h Range):  Temp:  [96.7 °F (35.9 °C)-100.7 °F (38.2 °C)] 98 °F " (36.7 °C)  Pulse:  [100-134] 101  Resp:  [12-21] 18  SpO2:  [90 %-100 %] 92 %  BP: ()/() 92/54     Weight: 107 kg (235 lb 14.3 oz)  Body mass index is 32.9 kg/m².    Intake/Output Summary (Last 24 hours) at 3/4/2020 1259  Last data filed at 3/4/2020 1100  Gross per 24 hour   Intake 590 ml   Output 1675 ml   Net -1085 ml      Physical Exam   Constitutional: No distress.   Ill appearing   HENT:   Head: Normocephalic and atraumatic.   Eyes: Pupils are equal, round, and reactive to light. EOM are normal.   Neck: Normal range of motion. No thyromegaly present.   Cardiovascular: Normal heart sounds.   No murmur heard.  tachycardic   Pulmonary/Chest: Effort normal and breath sounds normal.   Abdominal: Soft. Bowel sounds are normal. There is tenderness (RUQ). There is no rebound and no guarding.   Paty tube draining bile appearing fluid   Neurological:   Alert, Oriented to self only   Skin: Skin is warm and dry.   Bilateral anterior shoulder well healing incisional wounds   Psychiatric:   Confused, agitated, visual hallucinations       Significant Labs:   Recent Lab Results       03/04/20  1115   03/04/20  0528   03/04/20  0527   03/03/20  2141   03/03/20  1720        Albumin     1.8         Alkaline Phosphatase     244         ALT     29         Anion Gap     12         AST     50         Baso #     0.06         Basophil%     0.3         BILIRUBIN TOTAL     1.0  Comment:  For infants and newborns, interpretation of results should be based  on gestational age, weight and in agreement with clinical  observations.  Premature Infant recommended reference ranges:  Up to 24 hours.............<8.0 mg/dL  Up to 48 hours............<12.0 mg/dL  3-5 days..................<15.0 mg/dL  6-29 days.................<15.0 mg/dL           BUN, Bld     28         Calcium     9.2         Chloride     102         CO2     25         Creatinine     1.6         Differential Method     Automated         eGFR if       53.7         eGFR if non      46.5  Comment:  Calculation used to obtain the estimated glomerular filtration  rate (eGFR) is the CKD-EPI equation.            Eos #     0.2         Eosinophil%     1.0         Glucose     75         Gran # (ANC)     16.2         Gran%     87.4         Hematocrit     30.1         Hemoglobin     8.9         Immature Grans (Abs)     0.13  Comment:  Mild elevation in immature granulocytes is non specific and   can be seen in a variety of conditions including stress response,   acute inflammation, trauma and pregnancy. Correlation with other   laboratory and clinical findings is essential.           Immature Granulocytes     0.7         INR   1.0  Comment:  Coumadin Therapy:  2.0 - 3.0 for INR for all indicators except mechanical heart valves  and antiphospholipid syndromes which should use 2.5 - 3.5.                1.0  Comment:  Coumadin Therapy:  2.0 - 3.0 for INR for all indicators except mechanical heart valves  and antiphospholipid syndromes which should use 2.5 - 3.5.             Lymph #     0.9         Lymph%     5.0         Magnesium     1.9         MCH     26.6         MCHC     29.6         MCV     90         Mono #     1.1         Mono%     5.6         MPV     9.4         nRBC     0         Phosphorus     2.9         Platelets     257         POCT Glucose 96     101 113     Potassium     4.0         PROTEIN TOTAL     6.3         Protime   10.5              10.5           RBC     3.34         RDW     17.8         Sodium     139         WBC     18.59                              Significant Imaging: I have reviewed all pertinent imaging results/findings within the past 24 hours.      Assessment/Plan:      * Sepsis  Cholelithiasis with Acute Cholecystitis    60M with complex medical history transferred from SNF with confusion, lethargy, leukocytosis. Exam with severe RUQ pain, CT and US suggestive of cholecystitis. General Surgery consulted and recommend BRYANT garcia  tube placement.    - fluid 30mg/kg resusc  - INR reversed with FFP and vit K  - IR jose tube placed 3/3/2020      Plan  - F/U blood cultures  - Cefepime, vanc + metronidazole  - pain control  - maintenance fluids      Acute encephalopathy  Subdural Hematoma    Reports from wife and in chart of confusion in the days preceding admission. On admission exam pt is alert and oriented.     DDx includes sepsis, worsening hematoma, seizure    Plan  - Treat sepsis with antibiotics, source control  - Head CT nonacute  - Seizure precautions, continue Keppra at previous 1g BID dose    CANDY (acute kidney injury)  Baseline creatinine 0.8. CANDY in the setting of sepsis with rising BUN in the days preceding admission. Most likely prerenal from distributive effect of sepsis + true volume depletion.    UA with 2+ protein. Pt denies urinary hesitancy or retention.  - sCr improving with fluid resuscitation    Plan  - CMP daily  - Renally dose meds, avoid nephrotoxic agents  - Strict I/O        Cholelithiasis with acute cholecystitis        GERD (gastroesophageal reflux disease)  Cont home PPI    Bilateral proximal humeral fractures  Continue pain control, weight bearing precautions per Orthopedics.  Held muscle relaxants initially given reports of confusion but may consider restarting if resolves with sepsis tx.    PT/OT consult.    Diabetes 1.5, managed as type 2  A1c 5.9% 01/2019. Rx insulin (glargine 25U daily + MDSSI) at home but wife and pt report he hasn't been given insulin while admitted.    LDSSI  Diabetic Diet  BG checks AC/HS    Essential hypertension  Hold home ramipril 5 mg in CANDY + until pt's BP stable    PAF (paroxysmal atrial fibrillation)  Long Term (current) Use of Anticoagulants    Pt with AF on warfarin; holding until procedure finished. Restart home regimen after. Daily INR        COPD (chronic obstructive pulmonary disease)  PRN duonebs. Pt reports that he has smoked 8-9 PPD x40 years and quit shortly after his  seizure and fractures.    Hyperlipidemia  Cont home atorvastatin 80      VTE Risk Mitigation (From admission, onward)         Ordered     warfarin tablet 7.5 mg  Once      03/04/20 9704                      Bora La DO  Department of Hospital Medicine   [facility]

## 2020-03-04 NOTE — PROGRESS NOTES
Pharmacokinetic Assessment Follow Up: IV Vancomycin    Vancomycin serum concentration assessment(s):    The random level was drawn correctly and can be used to guide therapy at this time. The measurement is above the desired definitive target range of 15 to 20 mcg/mL. Level resulted at 22 and was drawn about 12 hours post previous dose.     Vancomycin Regimen Plan:    Will schedule 1500 mg to be given at 1800 today on 3/4. With next level to be drawn with AM labs on 3/5  Re-dose when the random level is less than 20  mcg/mL.  Drug levels (last 3 results):  Recent Labs   Lab Result Units 03/04/20  1346   Vancomycin, Random ug/mL 22.0       Pharmacy will continue to follow and monitor vancomycin.    Please contact pharmacy at extension [phone] for questions regarding this assessment.    Thank you for the consult,   Makenna Pastrana       Patient brief summary:  Ben Melvin is a 60 y.o. male initiated on antimicrobial therapy with IV Vancomycin for treatment of bacteremia    The patient's current regimen is 1750 mg once with subsequent doses when levels are less than 20 mcg/mL    Drug Allergies:   Review of patient's allergies indicates:  No Known Allergies    Actual Body Weight:   107 kg    Renal Function:   Estimated Creatinine Clearance: 61.9 mL/min (A) (based on SCr of 1.6 mg/dL (H)).,     Dialysis Method (if applicable):  N/A    CBC (last 72 hours):  Recent Labs   Lab Result Units 03/02/20  0614 03/02/20  1854 03/03/20  0409 03/04/20  0527   WBC K/uL 26.57* 27.13* 23.43* 18.59*   Hemoglobin g/dL 10.9* 9.6* 8.8* 8.9*   Hematocrit % 37.1* 31.7* 29.6* 30.1*   Platelets K/uL 221 238 231 257   Gran% % 88.8* 85.4* 89.9* 87.4*   Lymph% % 5.2* 5.6* 3.2* 5.0*   Mono% % 4.6 4.8 4.8 5.6   Eosinophil% % 0.1 0.5 0.9 1.0   Basophil% % 0.2 0.2 0.2 0.3   Differential Method  Automated Automated Automated Automated       Metabolic Panel (last 72 hours):  Recent Labs   Lab Result Units 03/02/20  0614 03/02/20  1351 03/02/20  1853  03/03/20  0409 03/04/20  0527   Sodium mmol/L 133*  --  133* 135* 139   Potassium mmol/L 4.1  --  4.7 4.3 4.0   Chloride mmol/L 93*  --  93* 98 102   CO2 mmol/L 28  --  28 26 25   Glucose mg/dL 98  --  88 114* 75   Glucose, UA   --  Negative  --   --   --    BUN, Bld mg/dL 43*  --  49* 40* 28*   Creatinine mg/dL 2.5*  --  2.8* 2.3* 1.6*   Albumin g/dL  --   --  2.2* 1.9* 1.8*   Total Bilirubin mg/dL  --   --  1.5* 1.5* 1.0   Alkaline Phosphatase U/L  --   --  281* 253* 244*   AST U/L  --   --  58* 53* 50*   ALT U/L  --   --  38 35 29   Magnesium mg/dL 2.1  --   --  1.9 1.9   Phosphorus mg/dL 4.9*  --   --  4.1 2.9       Vancomycin Administrations:  vancomycin given in the last 96 hours                   vancomycin 1750 mg in 0.9% sodium chloride 500 mL IVPB (mg) 1,750 mg New Bag 03/04/20 0200    vancomycin 250mg / 10ml oral suspension 500 mg (mg) 500 mg Given 03/03/20 0031    vancomycin 2 g in 0.9% sodium chloride 500 mL IVPB (mg) 2,000 mg New Bag 03/02/20 2055                Microbiologic Results:  Microbiology Results (last 7 days)     Procedure Component Value Units Date/Time    Blood culture [[id number]] Collected:  03/04/20 0734    Order Status:  Completed Specimen:  Blood Updated:  03/04/20 1515     Blood Culture, Routine No Growth to date    Blood culture [[id number]] Collected:  03/04/20 0822    Order Status:  Completed Specimen:  Blood Updated:  03/04/20 1515     Blood Culture, Routine No Growth to date    Narrative:       Collection has been rescheduled by TRM at 03/04/2020 07:40 Reason:   will return for 2nd set of cultures. patient has a limb alert on left   arm   Collection has been rescheduled by TRM at 03/04/2020 07:40 Reason:   will return for 2nd set of cultures. patient has a limb alert on left   arm     Clostridium difficile EIA [[id number]]     Order Status:  Canceled Specimen:  Stool

## 2020-03-04 NOTE — ASSESSMENT & PLAN NOTE
Subdural Hematoma    Reports from wife and in chart of confusion in the days preceding admission. On admission exam pt is alert and oriented.     DDx includes sepsis, worsening hematoma, seizure    Plan  - Treat sepsis with antibiotics, source control  - Head CT nonacute  - Seizure precautions, continue Keppra at previous 1g BID dose

## 2020-03-04 NOTE — ASSESSMENT & PLAN NOTE
59 yo male with multiple comorbidities, recent polytrauma 2/2 to seizure who presents now with cholecystitis      - cholecystostomy tube will stay in place for 6 weeks regardless of output  - patient may follow up in clinic in 6 weeks for evaluation for removal  - would notify orthopaedics of patients hospitalization with recent surgery and hardware   - general surgery will sign off at this time, please call for questions, thank you for allowing us to participate in the care of Mr. Melvin

## 2020-03-04 NOTE — HOSPITAL COURSE
The patient is a 60 year old male with past medical history of pAF on coumadin, seizures, recent fall with bilateral shoulder hemiarthroplasty and R humerus fx ORIF, presenting from SNF for leukocytosis and encephalopathy, admitted with sepsis and acute cholecystitis. 3/3/2020 with general surgery recommendation, IR placed percutaneous cholecystostomy tube to be in place 6 weeks. Patient on vanc, cefepime, flagyl. Orthopedics notified of patient in hospital. Patient appears to be baseline mentation (orented to self only) and baseline appearance per wife 3/5. Will have cipro and flagyl for 7 days total upon discharge. Restarted warfarin. Medically ready for [facility]. Will stop lasix, ramipril due to low BP. Will stop long acting insulin due to lack of appetite.

## 2020-03-04 NOTE — PHARMACY MED REC
"  Admission Medication Reconciliation - Pharmacy Consult Note    The home medication history was taken by Rebecca Headley CPhT.  Based on information gathered and subsequent review by the clinical pharmacist, the items below may need attention.     You may go to "Admission" then "Reconcile Home Medications" tabs to review and/or act upon these items.     Potentially problematic discrepancies with current MAR  o Patient IS taking the following which was not ordered upon admit  o Furosemide 20 mg PO QD (Held for CANDY)  o Gabapentin 300 mg PO TID (Held for confusion and CANDY)  o Warfarin 7.5 mg PO QD every Monday, Wednesday and Saturday and takes 5 mg all other days(Held for surgery)  o Amitriptyline 100 mg PO QHS  o Senna/docusaste 8.6-50 -- 2 tablets PO BID      Please address this information as you see fit.  Feel free to contact us if you have any questions or require assistance.    Makenna Pastrana, PharmD  PGY-2 Pharmacy Resident- Internal Medicine  [id number]                .    .            "

## 2020-03-04 NOTE — PT/OT/SLP PROGRESS
Physical Therapy       [id number]     Pt not treated on this date but remains appropriate for current POC. Therapy will follow up as able.     Cesario Adam, PT, DPT  3/4/2020

## 2020-03-04 NOTE — SUBJECTIVE & OBJECTIVE
Interval History: NAEO. Patient remains alert but only oriented to self, likely due to infectious setting. Paty tube draining bile. Recent ortho surgical sites appear noninfectious.    Review of Systems   Unable to perform ROS: Mental status change     Objective:     Vital Signs (Most Recent):  Temp: 98 °F (36.7 °C) (03/04/20 1121)  Pulse: 101 (03/04/20 1131)  Resp: 18 (03/04/20 1121)  BP: (!) 92/54 (03/04/20 1121)  SpO2: (!) 92 % (03/04/20 0900) Vital Signs (24h Range):  Temp:  [96.7 °F (35.9 °C)-100.7 °F (38.2 °C)] 98 °F (36.7 °C)  Pulse:  [100-134] 101  Resp:  [12-21] 18  SpO2:  [90 %-100 %] 92 %  BP: ()/() 92/54     Weight: 107 kg (235 lb 14.3 oz)  Body mass index is 32.9 kg/m².    Intake/Output Summary (Last 24 hours) at 3/4/2020 1259  Last data filed at 3/4/2020 1100  Gross per 24 hour   Intake 590 ml   Output 1675 ml   Net -1085 ml      Physical Exam   Constitutional: No distress.   Ill appearing   HENT:   Head: Normocephalic and atraumatic.   Eyes: Pupils are equal, round, and reactive to light. EOM are normal.   Neck: Normal range of motion. No thyromegaly present.   Cardiovascular: Normal heart sounds.   No murmur heard.  tachycardic   Pulmonary/Chest: Effort normal and breath sounds normal.   Abdominal: Soft. Bowel sounds are normal. There is tenderness (RUQ). There is no rebound and no guarding.   Paty tube draining bile appearing fluid   Neurological:   Alert, Oriented to self only   Skin: Skin is warm and dry.   Bilateral anterior shoulder well healing incisional wounds   Psychiatric:   Confused, agitated, visual hallucinations       Significant Labs:   Recent Lab Results       03/04/20  1115   03/04/20  0528   03/04/20  0527   03/03/20  2141   03/03/20  1720        Albumin     1.8         Alkaline Phosphatase     244         ALT     29         Anion Gap     12         AST     50         Baso #     0.06         Basophil%     0.3         BILIRUBIN TOTAL     1.0  Comment:  For infants and  newborns, interpretation of results should be based  on gestational age, weight and in agreement with clinical  observations.  Premature Infant recommended reference ranges:  Up to 24 hours.............<8.0 mg/dL  Up to 48 hours............<12.0 mg/dL  3-5 days..................<15.0 mg/dL  6-29 days.................<15.0 mg/dL           BUN, Bld     28         Calcium     9.2         Chloride     102         CO2     25         Creatinine     1.6         Differential Method     Automated         eGFR if      53.7         eGFR if non      46.5  Comment:  Calculation used to obtain the estimated glomerular filtration  rate (eGFR) is the CKD-EPI equation.            Eos #     0.2         Eosinophil%     1.0         Glucose     75         Gran # (ANC)     16.2         Gran%     87.4         Hematocrit     30.1         Hemoglobin     8.9         Immature Grans (Abs)     0.13  Comment:  Mild elevation in immature granulocytes is non specific and   can be seen in a variety of conditions including stress response,   acute inflammation, trauma and pregnancy. Correlation with other   laboratory and clinical findings is essential.           Immature Granulocytes     0.7         INR   1.0  Comment:  Coumadin Therapy:  2.0 - 3.0 for INR for all indicators except mechanical heart valves  and antiphospholipid syndromes which should use 2.5 - 3.5.                1.0  Comment:  Coumadin Therapy:  2.0 - 3.0 for INR for all indicators except mechanical heart valves  and antiphospholipid syndromes which should use 2.5 - 3.5.             Lymph #     0.9         Lymph%     5.0         Magnesium     1.9         MCH     26.6         MCHC     29.6         MCV     90         Mono #     1.1         Mono%     5.6         MPV     9.4         nRBC     0         Phosphorus     2.9         Platelets     257         POCT Glucose 96     101 113     Potassium     4.0         PROTEIN TOTAL     6.3         Protime   10.5               10.5           RBC     3.34         RDW     17.8         Sodium     139         WBC     18.59                              Significant Imaging: I have reviewed all pertinent imaging results/findings within the past 24 hours.

## 2020-03-05 LAB
ALBUMIN SERPL BCP-MCNC: 1.8 G/DL (ref 3.5–5.2)
ALP SERPL-CCNC: 261 U/L (ref 55–135)
ALT SERPL W/O P-5'-P-CCNC: 30 U/L (ref 10–44)
ANION GAP SERPL CALC-SCNC: 9 MMOL/L (ref 8–16)
AST SERPL-CCNC: 51 U/L (ref 10–40)
BACTERIA BLD CULT: ABNORMAL
BASOPHILS # BLD AUTO: 0.06 K/UL (ref 0–0.2)
BASOPHILS NFR BLD: 0.5 % (ref 0–1.9)
BILIRUB SERPL-MCNC: 0.8 MG/DL (ref 0.1–1)
BUN SERPL-MCNC: 24 MG/DL (ref 6–20)
CALCIUM SERPL-MCNC: 8.8 MG/DL (ref 8.7–10.5)
CHLORIDE SERPL-SCNC: 102 MMOL/L (ref 95–110)
CO2 SERPL-SCNC: 28 MMOL/L (ref 23–29)
CREAT SERPL-MCNC: 1.5 MG/DL (ref 0.5–1.4)
DIFFERENTIAL METHOD: ABNORMAL
EOSINOPHIL # BLD AUTO: 0.4 K/UL (ref 0–0.5)
EOSINOPHIL NFR BLD: 3.1 % (ref 0–8)
ERYTHROCYTE [DISTWIDTH] IN BLOOD BY AUTOMATED COUNT: 18.1 % (ref 11.5–14.5)
EST. GFR  (AFRICAN AMERICAN): 58.1 ML/MIN/1.73 M^2
EST. GFR  (NON AFRICAN AMERICAN): 50.2 ML/MIN/1.73 M^2
GLUCOSE SERPL-MCNC: 87 MG/DL (ref 70–110)
HCT VFR BLD AUTO: 30.6 % (ref 40–54)
HGB BLD-MCNC: 8.9 G/DL (ref 14–18)
IMM GRANULOCYTES # BLD AUTO: 0.1 K/UL (ref 0–0.04)
IMM GRANULOCYTES NFR BLD AUTO: 0.8 % (ref 0–0.5)
INR PPP: 1 (ref 0.8–1.2)
INR PPP: 1 (ref 0.8–1.2)
LYMPHOCYTES # BLD AUTO: 1.1 K/UL (ref 1–4.8)
LYMPHOCYTES NFR BLD: 9.4 % (ref 18–48)
MAGNESIUM SERPL-MCNC: 1.9 MG/DL (ref 1.6–2.6)
MCH RBC QN AUTO: 26.1 PG (ref 27–31)
MCHC RBC AUTO-ENTMCNC: 29.1 G/DL (ref 32–36)
MCV RBC AUTO: 90 FL (ref 82–98)
MONOCYTES # BLD AUTO: 0.6 K/UL (ref 0.3–1)
MONOCYTES NFR BLD: 5 % (ref 4–15)
NEUTROPHILS # BLD AUTO: 9.9 K/UL (ref 1.8–7.7)
NEUTROPHILS NFR BLD: 81.2 % (ref 38–73)
NRBC BLD-RTO: 0 /100 WBC
PHOSPHATE SERPL-MCNC: 3 MG/DL (ref 2.7–4.5)
PLATELET # BLD AUTO: 297 K/UL (ref 150–350)
PMV BLD AUTO: 9.2 FL (ref 9.2–12.9)
POCT GLUCOSE: 102 MG/DL (ref 70–110)
POCT GLUCOSE: 133 MG/DL (ref 70–110)
POCT GLUCOSE: 84 MG/DL (ref 70–110)
POCT GLUCOSE: 94 MG/DL (ref 70–110)
POTASSIUM SERPL-SCNC: 3.8 MMOL/L (ref 3.5–5.1)
PROT SERPL-MCNC: 6.4 G/DL (ref 6–8.4)
PROTHROMBIN TIME: 10.3 SEC (ref 9–12.5)
PROTHROMBIN TIME: 10.3 SEC (ref 9–12.5)
RBC # BLD AUTO: 3.41 M/UL (ref 4.6–6.2)
SODIUM SERPL-SCNC: 139 MMOL/L (ref 136–145)
VANCOMYCIN SERPL-MCNC: 12.9 UG/ML
WBC # BLD AUTO: 12.17 K/UL (ref 3.9–12.7)

## 2020-03-05 PROCEDURE — 84100 ASSAY OF PHOSPHORUS: CPT

## 2020-03-05 PROCEDURE — 97116 GAIT TRAINING THERAPY: CPT

## 2020-03-05 PROCEDURE — 80053 COMPREHEN METABOLIC PANEL: CPT

## 2020-03-05 PROCEDURE — 11000001 HC ACUTE MED/SURG PRIVATE ROOM

## 2020-03-05 PROCEDURE — 83735 ASSAY OF MAGNESIUM: CPT

## 2020-03-05 PROCEDURE — 25000003 PHARM REV CODE 250: Performed by: STUDENT IN AN ORGANIZED HEALTH CARE EDUCATION/TRAINING PROGRAM

## 2020-03-05 PROCEDURE — 94761 N-INVAS EAR/PLS OXIMETRY MLT: CPT

## 2020-03-05 PROCEDURE — 63600175 PHARM REV CODE 636 W HCPCS: Performed by: INTERNAL MEDICINE

## 2020-03-05 PROCEDURE — S4991 NICOTINE PATCH NONLEGEND: HCPCS | Performed by: STUDENT IN AN ORGANIZED HEALTH CARE EDUCATION/TRAINING PROGRAM

## 2020-03-05 PROCEDURE — 97167 OT EVAL HIGH COMPLEX 60 MIN: CPT

## 2020-03-05 PROCEDURE — S0030 INJECTION, METRONIDAZOLE: HCPCS | Performed by: STUDENT IN AN ORGANIZED HEALTH CARE EDUCATION/TRAINING PROGRAM

## 2020-03-05 PROCEDURE — 85610 PROTHROMBIN TIME: CPT

## 2020-03-05 PROCEDURE — 80202 ASSAY OF VANCOMYCIN: CPT

## 2020-03-05 PROCEDURE — 97164 PT RE-EVAL EST PLAN CARE: CPT

## 2020-03-05 PROCEDURE — 85025 COMPLETE CBC W/AUTO DIFF WBC: CPT

## 2020-03-05 PROCEDURE — 97802 MEDICAL NUTRITION INDIV IN: CPT

## 2020-03-05 PROCEDURE — 99232 PR SUBSEQUENT HOSPITAL CARE,LEVL II: ICD-10-PCS | Mod: GC,,, | Performed by: INTERNAL MEDICINE

## 2020-03-05 PROCEDURE — 99232 SBSQ HOSP IP/OBS MODERATE 35: CPT | Mod: GC,,, | Performed by: INTERNAL MEDICINE

## 2020-03-05 PROCEDURE — 36415 COLL VENOUS BLD VENIPUNCTURE: CPT

## 2020-03-05 PROCEDURE — 25000003 PHARM REV CODE 250: Performed by: INTERNAL MEDICINE

## 2020-03-05 PROCEDURE — 97535 SELF CARE MNGMENT TRAINING: CPT

## 2020-03-05 RX ORDER — LACTULOSE 10 G/15ML
20 SOLUTION ORAL EVERY 6 HOURS
Status: DISCONTINUED | OUTPATIENT
Start: 2020-03-05 | End: 2020-03-05

## 2020-03-05 RX ORDER — ONDANSETRON 2 MG/ML
4 INJECTION INTRAMUSCULAR; INTRAVENOUS EVERY 8 HOURS PRN
Status: CANCELLED | OUTPATIENT
Start: 2020-03-05

## 2020-03-05 RX ORDER — METOPROLOL TARTRATE 50 MG/1
50 TABLET ORAL 2 TIMES DAILY
Status: CANCELLED | OUTPATIENT
Start: 2020-03-05

## 2020-03-05 RX ORDER — IPRATROPIUM BROMIDE AND ALBUTEROL SULFATE 2.5; .5 MG/3ML; MG/3ML
3 SOLUTION RESPIRATORY (INHALATION) EVERY 4 HOURS PRN
Status: CANCELLED | OUTPATIENT
Start: 2020-03-05

## 2020-03-05 RX ORDER — TALC
6 POWDER (GRAM) TOPICAL NIGHTLY PRN
Status: CANCELLED | OUTPATIENT
Start: 2020-03-05

## 2020-03-05 RX ORDER — METOPROLOL TARTRATE 50 MG/1
50 TABLET ORAL 2 TIMES DAILY
Qty: 60 TABLET | Refills: 11 | Status: ON HOLD
Start: 2020-03-05 | End: 2020-03-24 | Stop reason: HOSPADM

## 2020-03-05 RX ORDER — METRONIDAZOLE 500 MG/1
500 TABLET ORAL EVERY 8 HOURS
Qty: 12 TABLET | Refills: 0 | Status: ON HOLD | OUTPATIENT
Start: 2020-03-05 | End: 2020-03-24 | Stop reason: HOSPADM

## 2020-03-05 RX ORDER — ACETAMINOPHEN 325 MG/1
650 TABLET ORAL EVERY 8 HOURS PRN
Status: CANCELLED | OUTPATIENT
Start: 2020-03-05

## 2020-03-05 RX ORDER — GLUCAGON 1 MG
1 KIT INJECTION
Status: CANCELLED | OUTPATIENT
Start: 2020-03-05

## 2020-03-05 RX ORDER — METRONIDAZOLE 500 MG/100ML
500 INJECTION, SOLUTION INTRAVENOUS EVERY 8 HOURS
Qty: 1500 ML | Refills: 0 | Status: SHIPPED | OUTPATIENT
Start: 2020-03-05 | End: 2020-03-05 | Stop reason: HOSPADM

## 2020-03-05 RX ORDER — CEFEPIME HYDROCHLORIDE 1 G/1
1 INJECTION, POWDER, FOR SOLUTION INTRAMUSCULAR; INTRAVENOUS EVERY 12 HOURS
Qty: 10 G | Refills: 0 | Status: SHIPPED | OUTPATIENT
Start: 2020-03-05 | End: 2020-03-05 | Stop reason: HOSPADM

## 2020-03-05 RX ORDER — AMITRIPTYLINE HYDROCHLORIDE 50 MG/1
100 TABLET, FILM COATED ORAL NIGHTLY
Status: CANCELLED | OUTPATIENT
Start: 2020-03-05

## 2020-03-05 RX ORDER — METRONIDAZOLE 500 MG/1
500 TABLET ORAL EVERY 8 HOURS
Status: CANCELLED | OUTPATIENT
Start: 2020-03-05 | End: 2020-03-09

## 2020-03-05 RX ORDER — SENNOSIDES 8.6 MG/1
8.6 TABLET ORAL DAILY PRN
Status: CANCELLED | OUTPATIENT
Start: 2020-03-05

## 2020-03-05 RX ORDER — LANOLIN ALCOHOL/MO/W.PET/CERES
1000 CREAM (GRAM) TOPICAL DAILY
Status: CANCELLED | OUTPATIENT
Start: 2020-03-06

## 2020-03-05 RX ORDER — ENOXAPARIN SODIUM 100 MG/ML
100 INJECTION SUBCUTANEOUS 2 TIMES DAILY
Status: CANCELLED | OUTPATIENT
Start: 2020-03-05

## 2020-03-05 RX ORDER — POLYETHYLENE GLYCOL 3350 17 G/17G
17 POWDER, FOR SOLUTION ORAL DAILY
Status: CANCELLED | OUTPATIENT
Start: 2020-03-06

## 2020-03-05 RX ORDER — METRONIDAZOLE 500 MG/1
500 TABLET ORAL EVERY 8 HOURS
Status: DISCONTINUED | OUTPATIENT
Start: 2020-03-05 | End: 2020-03-06 | Stop reason: HOSPADM

## 2020-03-05 RX ORDER — IBUPROFEN 200 MG
16 TABLET ORAL
Status: CANCELLED | OUTPATIENT
Start: 2020-03-05

## 2020-03-05 RX ORDER — ACETAMINOPHEN 325 MG/1
650 TABLET ORAL EVERY 6 HOURS PRN
Status: CANCELLED | OUTPATIENT
Start: 2020-03-05

## 2020-03-05 RX ORDER — AMOXICILLIN 250 MG
1 CAPSULE ORAL 2 TIMES DAILY
Status: CANCELLED | OUTPATIENT
Start: 2020-03-05

## 2020-03-05 RX ORDER — POTASSIUM CHLORIDE 20 MEQ/1
40 TABLET, EXTENDED RELEASE ORAL ONCE
Status: DISCONTINUED | OUTPATIENT
Start: 2020-03-05 | End: 2020-03-05

## 2020-03-05 RX ORDER — AMITRIPTYLINE HYDROCHLORIDE 100 MG/1
100 TABLET ORAL NIGHTLY
Qty: 30 TABLET | Refills: 11
Start: 2020-03-05 | End: 2020-05-05 | Stop reason: SDUPTHER

## 2020-03-05 RX ORDER — LEVALBUTEROL INHALATION SOLUTION 0.63 MG/3ML
0.63 SOLUTION RESPIRATORY (INHALATION) EVERY 8 HOURS
Status: CANCELLED | OUTPATIENT
Start: 2020-03-05

## 2020-03-05 RX ORDER — ERGOCALCIFEROL 1.25 MG/1
50000 CAPSULE ORAL
Status: CANCELLED | OUTPATIENT
Start: 2020-03-10

## 2020-03-05 RX ORDER — IPRATROPIUM BROMIDE 0.5 MG/2.5ML
500 SOLUTION RESPIRATORY (INHALATION) EVERY 4 HOURS PRN
Status: CANCELLED | OUTPATIENT
Start: 2020-03-05

## 2020-03-05 RX ORDER — ENOXAPARIN SODIUM 100 MG/ML
100 INJECTION SUBCUTANEOUS 2 TIMES DAILY
Status: ON HOLD
Start: 2020-03-05 | End: 2020-03-24 | Stop reason: HOSPADM

## 2020-03-05 RX ORDER — LEVETIRACETAM 500 MG/1
1000 TABLET ORAL 2 TIMES DAILY
Status: CANCELLED | OUTPATIENT
Start: 2020-03-05

## 2020-03-05 RX ORDER — RAMIPRIL 5 MG/1
5 CAPSULE ORAL DAILY
Status: CANCELLED | OUTPATIENT
Start: 2020-03-05

## 2020-03-05 RX ORDER — HYDROCODONE BITARTRATE AND ACETAMINOPHEN 500; 5 MG/1; MG/1
TABLET ORAL
Status: CANCELLED | OUTPATIENT
Start: 2020-03-05

## 2020-03-05 RX ORDER — IBUPROFEN 200 MG
24 TABLET ORAL
Status: CANCELLED | OUTPATIENT
Start: 2020-03-05

## 2020-03-05 RX ORDER — SENNOSIDES 8.6 MG/1
1 TABLET ORAL DAILY PRN
Status: ON HOLD | COMMUNITY
Start: 2020-03-05 | End: 2020-03-24 | Stop reason: HOSPADM

## 2020-03-05 RX ORDER — INSULIN ASPART 100 [IU]/ML
0-5 INJECTION, SOLUTION INTRAVENOUS; SUBCUTANEOUS
Refills: 0 | Status: ON HOLD
Start: 2020-03-05 | End: 2020-03-24 | Stop reason: HOSPADM

## 2020-03-05 RX ORDER — OXYCODONE HYDROCHLORIDE 10 MG/1
10 TABLET ORAL EVERY 6 HOURS PRN
Status: CANCELLED | OUTPATIENT
Start: 2020-03-05

## 2020-03-05 RX ORDER — PROCHLORPERAZINE MALEATE 5 MG
10 TABLET ORAL EVERY 6 HOURS PRN
Status: CANCELLED | OUTPATIENT
Start: 2020-03-05

## 2020-03-05 RX ORDER — IBUPROFEN 200 MG
1 TABLET ORAL DAILY
Status: CANCELLED | OUTPATIENT
Start: 2020-03-06

## 2020-03-05 RX ORDER — ATORVASTATIN CALCIUM 20 MG/1
80 TABLET, FILM COATED ORAL NIGHTLY
Status: CANCELLED | OUTPATIENT
Start: 2020-03-05

## 2020-03-05 RX ORDER — POLYETHYLENE GLYCOL 3350 17 G/17G
17 POWDER, FOR SOLUTION ORAL 2 TIMES DAILY
Status: CANCELLED | OUTPATIENT
Start: 2020-03-05

## 2020-03-05 RX ORDER — CALCIUM CARBONATE 200(500)MG
500 TABLET,CHEWABLE ORAL 2 TIMES DAILY PRN
Status: CANCELLED | OUTPATIENT
Start: 2020-03-05

## 2020-03-05 RX ORDER — POTASSIUM CHLORIDE 20 MEQ/1
40 TABLET, EXTENDED RELEASE ORAL ONCE
Status: COMPLETED | OUTPATIENT
Start: 2020-03-05 | End: 2020-03-05

## 2020-03-05 RX ORDER — PANTOPRAZOLE SODIUM 40 MG/1
40 TABLET, DELAYED RELEASE ORAL DAILY
Status: CANCELLED | OUTPATIENT
Start: 2020-03-06

## 2020-03-05 RX ORDER — FUROSEMIDE 20 MG/1
20 TABLET ORAL DAILY
Status: DISCONTINUED | OUTPATIENT
Start: 2020-03-06 | End: 2020-03-06 | Stop reason: HOSPADM

## 2020-03-05 RX ORDER — CIPROFLOXACIN 750 MG/1
750 TABLET, FILM COATED ORAL EVERY 12 HOURS
Status: ON HOLD
Start: 2020-03-05 | End: 2020-03-24 | Stop reason: HOSPADM

## 2020-03-05 RX ORDER — OXYCODONE HYDROCHLORIDE 5 MG/1
5 TABLET ORAL EVERY 6 HOURS PRN
Qty: 28 TABLET | Refills: 0 | Status: ON HOLD | OUTPATIENT
Start: 2020-03-05 | End: 2020-03-24 | Stop reason: HOSPADM

## 2020-03-05 RX ORDER — SENNOSIDES 8.6 MG/1
8.6 TABLET ORAL DAILY PRN
Status: DISCONTINUED | OUTPATIENT
Start: 2020-03-05 | End: 2020-03-06 | Stop reason: HOSPADM

## 2020-03-05 RX ORDER — INSULIN ASPART 100 [IU]/ML
0-5 INJECTION, SOLUTION INTRAVENOUS; SUBCUTANEOUS
Status: CANCELLED | OUTPATIENT
Start: 2020-03-05

## 2020-03-05 RX ADMIN — ENOXAPARIN SODIUM 100 MG: 100 INJECTION SUBCUTANEOUS at 09:03

## 2020-03-05 RX ADMIN — VANCOMYCIN HYDROCHLORIDE 1500 MG: 1.5 INJECTION, POWDER, LYOPHILIZED, FOR SOLUTION INTRAVENOUS at 09:03

## 2020-03-05 RX ADMIN — AMITRIPTYLINE HYDROCHLORIDE 100 MG: 50 TABLET, FILM COATED ORAL at 09:03

## 2020-03-05 RX ADMIN — WARFARIN SODIUM 7.5 MG: 2.5 TABLET ORAL at 04:03

## 2020-03-05 RX ADMIN — CIPROFLOXACIN HYDROCHLORIDE 750 MG: 250 TABLET, FILM COATED ORAL at 09:03

## 2020-03-05 RX ADMIN — CEFEPIME 1 G: 1 INJECTION, POWDER, FOR SOLUTION INTRAMUSCULAR; INTRAVENOUS at 03:03

## 2020-03-05 RX ADMIN — CIPROFLOXACIN HYDROCHLORIDE 750 MG: 250 TABLET, FILM COATED ORAL at 12:03

## 2020-03-05 RX ADMIN — METRONIDAZOLE 500 MG: 500 TABLET ORAL at 09:03

## 2020-03-05 RX ADMIN — PANTOPRAZOLE SODIUM 40 MG: 40 TABLET, DELAYED RELEASE ORAL at 09:03

## 2020-03-05 RX ADMIN — METOPROLOL TARTRATE 50 MG: 50 TABLET, FILM COATED ORAL at 09:03

## 2020-03-05 RX ADMIN — METRONIDAZOLE 500 MG: 500 INJECTION, SOLUTION INTRAVENOUS at 08:03

## 2020-03-05 RX ADMIN — CYANOCOBALAMIN TAB 1000 MCG 1000 MCG: 1000 TAB at 09:03

## 2020-03-05 RX ADMIN — ATORVASTATIN CALCIUM 80 MG: 20 TABLET, FILM COATED ORAL at 09:03

## 2020-03-05 RX ADMIN — NICOTINE 1 PATCH: 21 PATCH, EXTENDED RELEASE TRANSDERMAL at 09:03

## 2020-03-05 RX ADMIN — OXYCODONE HYDROCHLORIDE 10 MG: 10 TABLET ORAL at 09:03

## 2020-03-05 RX ADMIN — POTASSIUM CHLORIDE 40 MEQ: 1500 TABLET, EXTENDED RELEASE ORAL at 08:03

## 2020-03-05 RX ADMIN — LACTULOSE 20 G: 20 SOLUTION ORAL at 09:03

## 2020-03-05 RX ADMIN — LEVETIRACETAM 1000 MG: 500 TABLET ORAL at 09:03

## 2020-03-05 RX ADMIN — ACETAMINOPHEN 650 MG: 325 TABLET ORAL at 09:03

## 2020-03-05 RX ADMIN — POLYETHYLENE GLYCOL 3350 17 G: 17 POWDER, FOR SOLUTION ORAL at 09:03

## 2020-03-05 RX ADMIN — METRONIDAZOLE 500 MG: 500 TABLET ORAL at 02:03

## 2020-03-05 NOTE — PROGRESS NOTES
Pharmacokinetic Sign Off: IV Vancomycin    Therapy with vancomycin complete and/or consult discontinued by provider.  Pharmacy will sign off, please re-consult as needed.    Makenna Pastrana, PharmD  PGY-2 Pharmacy Resident- Internal Medicine  [phone]

## 2020-03-05 NOTE — PHYSICIAN QUERY
PT Name: Ben Melvin  MR #: [id number]     CDS/: Any Youssef               Contact information: [email or web address]  This form is a permanent document in the medical record.     Query Date: March 5, 2020    Physician Query - Neurological Condition Clarification    By submitting this query, we are merely seeking further clarification of documentation to reflect the severity of illness of your patient. Please utilize your independent clinical judgment when addressing the question(s) below.    The Medical record reflects the following:     Indicators   Supporting Clinical Findings Location in Medical Record   x AMS, Confusion,  LOC, etc.  Sent to ER from SNF after patient's lab showed leucocytosis and patient has altered mental status.   Reports he has been altered in the last 2-3 days where he just is drowsy all the time and they had to stop some of his medications which they thought were the reason initially (oxycontin, amitryptiline and robaxin) however patient still was confused per wife at bedside (even after >24 hours off from those meds).  Positive for confusion      Transferred from [facility] for evaluation of lethargy, confusion, and leukocytosis. CT head wnl.       Patient remains alert but only oriented to self, clarified with wife this is patient baseline since seziure/fall months back. Wife reports patient appears well and baseline   ED Provider Note 03/02, filed 03/03                  Hospital Medicine H&P Attestation 03/03, filed 03/05    Hospital Medicine PN 03/05   x Acute / Chronic Illness Sepsis  Cholelithiasis with Acute Cholecystitis  Acute encephalopathy  Subdural Hematoma  DDx includes sepsis, worsening hematoma, seizure  CANDY (acute kidney injury)      CT abdomen/pelvis showing no stones and although gallbladder distended. US abdomen showing signs of cholecystitis.   AMS could be multifactorial. Has history of seizures, subdural hemorrhage and polypharmacy      Past medical  history of pAF on coumadin, seizures, recent fall with bilateral shoulder hemiarthroplasty and R humerus fx ORIF, presenting from SNF for leukocytosis and encephalopathy, admitted with sepsis and acute cholecystitis   [facility] Medicine H&P 03/03, filed 03/05              General Surgery Consult 03/02, filed 03/03          [facility] Medicine PN 03/04    Radiology Findings      Electrolyte Imbalance      Medication     x Treatment         Plan  Treat sepsis with antibiotics, source control  Head CT nonacute, stable hematoma  Seizure precautions, continue Keppra at previous 1g BID dose   Hospital Medicine PN 03/05    Other       Encephalopathy- is a general term for any diffuse disease of the brain that alters brain function or structure. Treatment of the cognitive dysfunction varies but is ultimately dependent on the treatment of the underlying condition.    Major Symptoms of Encephalopathy - Decreased level of consciousness, fluctuating alertness/concentration, confusion, agitation, lethargy, somnolence, drowsiness, obtundation, stupor, or coma.         References: National Institutes of Healths (NIH) National [place] of Neurological Disorders and Strokes;  HCPro 2016; Advisory Board     Clinical Guidelines:   These guidelines will set system standards to assist providers in managing, documentation, and coding of encephalopathy. The intent of this document is to serve as a system guideline, not replace the providers clinical judgment:  Provider, please specify the diagnosis or diagnoses associated with above clinical findings.    Please further specify above diagnosis of Acute Encephalopathy:      [ x  ] Metabolic Encephalopathy - Due to electrolye imbalance, metabolic derangements, or infections processes, includes Septic Encephalopathy   [   ] Traumatic Encephalopathy - Due to concussion or other structural brain injury   [   ] Other Encephalopathy - Includes uremic encephalopathy   [   ] Unspecified  Encephalopathy      [   ] Other Neurological Condition-  Includes Post-ictal altered mental status. (please specify condition): _________   [   ]  Clinically Undetermined     Please document in your progress notes daily for the duration of treatment until resolved, and include in your discharge summary.

## 2020-03-05 NOTE — PLAN OF CARE
03/05/20 1503   Post-Acute Status   Post-Acute Authorization Placement   Post-Acute Placement Status Referrals Sent   Discharge Plan   Discharge Plan A Skilled Nursing Facility     Pt transferred from [facility]; referral sent through [facility]. Pt may be medically ready to return today if accepted.    Anny Degroot, ROBER [id number]   My signature below certifies that the above stated patient is homebound and upon completion of the Face-To-Face encounter, has the need for intermittent skilled nursing, physical therapy and/or speech or occupational therapy services in their home for their current diagnosis as outlined in their initial plan of care. These services will continue to be monitored by myself or another physician.

## 2020-03-05 NOTE — PLAN OF CARE
Problem: Oral Intake Inadequate  Goal: Improved Oral Intake  Outcome: Ongoing, Progressing   Recommendations    Recommendation:   1. Continue 2000 kcal DM diet, encourage intake as tolerable   2. Change boost glucose to Optisource TID to increase intake and in complaince with warfarin medication   3. RD to monitor and follow up     Goals: pt to tolerate >85% of EEN/EPN by RD follow up   Nutrition Goal Status: new  Communication of RD Recs: other (comment)(POC)

## 2020-03-05 NOTE — PT/OT/SLP RE-EVAL
Occupational Therapy   Re-evaluation/Treatment    Name: Ben Melvin  MRN: [id number]  Admitting Diagnosis:  Sepsis      Recommendations:     Discharge Recommendations: nursing facility, skilled  Discharge Equipment Recommendations:  wheelchair, bedside commode, hospital bed  Barriers to discharge:  Inaccessible home environment(increased level of assistance at this time due to orthopedic precautions)    Assessment:     Ben Melvin is a 60 y.o. male with a medical diagnosis of Sepsis. Performance deficits affecting function are weakness, impaired endurance, impaired self care skills, impaired functional mobilty, gait instability, impaired balance, orthopedic precautions, decreased ROM, decreased upper extremity function, decreased lower extremity function. Patient is currently NWB to B UEs needing to wear B shoulder abduction braces and has an LSO to wear when OOB at this time. Patient's wife reported that the MDs educated her and patient on donning and doffing the braces/orthosis. Wife has demonstrated the ability to adjust and tesfaye LSO (with minimal assistance from staff). Patient still needs assistance with total assistance with ADLs due to orthopedic precautions. Patient requires CGA<>Min A hand held assist for functional transfers for safety at this time. Patient would benefit from continued skilled acute OT 3x/wk to improve functional mobility, increase independence with ADLs, and address established goals. Recommending SNF once medically appropriate for discharge to increase maximal independence, reduce burden of care, and ensure safety prior to going home.     Rehab Prognosis:  good; patient would benefit from acute skilled OT services to address these deficits and reach maximum level of function.       Plan:     Patient to be seen 3 x/week to address the above listed problems via self-care/home management, therapeutic activities, therapeutic exercises  · Plan of Care Expires: 04/05/20  · Plan of Care  Reviewed with: patient, spouse    Subjective     Chief Complaint: unable to use UEs yet  Patient/Family stated goals: return to SNF and wife reports that patient will be able to use L UE on Tuesday of next week. Wife reports SNF is already aware and has those orders.  Communicated with: JOSE prior to session.  Pain/Comfort:  · Pain Rating 1: 0/10  · Location - Side 1: Bilateral  · Location - Orientation 1: generalized  · Location 1: shoulder  · Pain Addressed 1: Reposition, Distraction  · Pain Rating Post-Intervention 1: 0/10    Objective:     Communicated with: JOSE prior to session.  Patient found on BSC with: peripheral IV, telemetry upon OT entry to room.    General Precautions: Standard, fall   Orthopedic Precautions:LUE non weight bearing, RUE non weight bearing, spinal precautions(Per orders: NWB BUE 3 months postop, may remove sling for therapy; ROM as tolerated hand wrist elbow, active assisted ROM shoulder and pendulums)   Braces: LSO, Shoulder abduction brace(bilateral shoulder abduction braces)     Occupational Performance:    Bed Mobility:    · Not tested    Functional Mobility/Transfers:  · Patient completed Sit <> Stand Transfer with contact guard assistance at BSC and bed level (min A from bedside chair level due to low surface) with hand-held assist and no AD due to orthopedic precautions  · Patient completed Bed > Chair Transfer with functional ambulation technique with minimum assistance with hand-held assist and no AD due to orthopedic precautions  · Patient completed Toilet Transfer BSC>bed Stand Pivot technique with minimum assistance with  no AD and hand-held assist due to orthopedic precautions    Activities of Daily Living:  · Feeding:  total assistance per wife report   · Upper Body Dressing: Wife donned LSO and adjusted B UE shoulder abduction braces with therapists assistance minimally  · Lower Body Dressing: total assistance Donning shorts  · Toileting: total assistance       Cognitive/Visual Perceptual:  Cognitive/Psychosocial Skills:     -       Oriented to: Person, Place, Time and Situation   -       Follows Commands/attention:Follows multistep  commands  -       Communication: clear/fluent  -       Memory: No Deficits noted  -       Safety awareness/insight to disability: intact   -       Mood/Affect/Coping skills/emotional control: Appropriate to situation  Visual/Perceptual:      -Intact      [facility] 6 Click:  AMPA Total Score: 6    Treatment & Education:  Education:  Role of OT and POC  Safety  ADL retraining  Functional mobility training  Discharge planning    Patient left up in chair with all lines intact, call button in reach, nurse notified, spouse present and all needs met.     GOALS:   Multidisciplinary Problems     Occupational Therapy Goals        Problem: Occupational Therapy Goal    Goal Priority Disciplines Outcome Interventions   Occupational Therapy Goal     OT, PT/OT Ongoing, Progressing    Description:  Goals to be met by: 3/20/2020    Patient will increase functional independence with ADLs by performing:    Patient family to be I with assisting patient with UBD and LBD.   Patient family to be I with don/doff all braces (LSO and B UE shoulder abduction braces)  Patient family and patient to be SBA with performing there ex to BUE as per ortho orders  Patient to perform SPT without an AD and CGA.  Patient to perform transfer to BSC with CGA and without an AD.  Supine to sit with mod A.                           History:     Past Medical History:   Diagnosis Date    Adenoma of right adrenal gland 11/09/2017    Alcohol abuse     ASCVD (arteriosclerotic cardiovascular disease) 10/2008    [place]    BPH (benign prostatic hyperplasia)     Cardiomyopathy     Chronic anxiety     Cirrhosis of liver     COPD (chronic obstructive pulmonary disease)     Current every day smoker     2-3 ppd    Elevated LFTs 08/2001    GERD with esophagitis 11/09/2017    History of  colon polyps     History of epididymitis 2016    Juan Antonio    Hyperlipidemia     Hypertension, essential     Long term (current) use of anticoagulants     Lumbar disc disease with radiculopathy 05/2015    Major depression, recurrent, chronic     PAF (paroxysmal atrial fibrillation)     Peripheral autonomic neuropathy due to DM     Proteinuria     PVD (peripheral vascular disease) 05/29/2009    Venous insufficiency of both lower extremities     Vitamin D deficiency        Past Surgical History:   Procedure Laterality Date    biopsy back  05/20/2019    benign Martinez    biopsy right ankle Right 05/20/2019    Martinez, benign    COLONOSCOPY N/A 6/13/2019    Procedure: COLONOSCOPY;  Surgeon: Delmer Kerr MD;  Location: [facility];  Service: Endoscopy;  Laterality: N/A;    COLONOSCOPY W/ POLYPECTOMY      EPIDURAL STEROID INJECTION INTO LUMBAR SPINE  06/2015    LASHAE Harvey    ESOPHAGOGASTRODUODENOSCOPY N/A 6/13/2019    Procedure: ESOPHAGOGASTRODUODENOSCOPY (EGD);  Surgeon: Delmer Kerr MD;  Location: [facility];  Service: Endoscopy;  Laterality: N/A;    INTESTINAL MALROTATION REPAIR Right 1961    2 weeks old, Martinez    OPEN REDUCTION AND INTERNAL FIXATION (ORIF) OF FRACTURE OF PROXIMAL HUMERUS Right 2/14/2020    Procedure: ORIF, FRACTURE, HUMERUS, PROXIMAL - ORIF R greater tuberosity - suture repair. Trios, supine, fiberwire;  Surgeon: Chandler Chris MD;  [facility];  Service: Orthopedics;  Laterality: Right;    PARTIAL ARTHROPLASTY OF SHOULDER Left 1/28/2020    Procedure: HEMIARTHROPLASTY, SHOULDER;  Surgeon: Chandler Chris MD;  Location: [facility] OR 2ND FLR;  Service: Orthopedics;  Laterality: Left;    PARTIAL ARTHROPLASTY OF SHOULDER Right 1/30/2020    Procedure: HEMIARTHROPLASTY, SHOULDER- RIGHT- MEKA- SUPINE-  TRIOS;  Surgeon: Chandler Chris MD;  Location: [facility] OR 2ND FLR;  Service: Orthopedics;  Laterality: Right;    PERCUTANEOUS TRANSLUMINAL  ANGIOPLASTY (PTA) OF PERIPHERAL VESSEL Right 12/2014    NIKOLAS Childs       Time Tracking:     OT Date of Treatment: 03/05/20  OT Start Time: 1211  OT Stop Time: 1240  OT Total Time (min): 29 min    Billable Minutes:Re-eval 10  Self Care/Home Management 19    LILIAM Marcelino  3/5/2020

## 2020-03-05 NOTE — PLAN OF CARE
Problem: Occupational Therapy Goal  Goal: Occupational Therapy Goal  Description  Goals to be met by: 3/20/2020    Patient will increase functional independence with ADLs by performing:    Patient family to be I with assisting patient with UBD and LBD.   Patient family to be I with don/doff all braces (LSO and B UE shoulder abduction braces)  Patient family and patient to be SBA with performing there ex to BUE as per ortho orders  Patient to perform SPT without an AD and CGA.  Patient to perform transfer to [facility] with CGA and without an AD.  Supine to sit with mod A.        Outcome: Ongoing, Progressing   Patient's goals are set.   LILIAM Marcelino  3/5/2020

## 2020-03-05 NOTE — PLAN OF CARE
Problem: Physical Therapy Goal  Goal: Physical Therapy Goal  Description  Goals to be met by: 20     Patient will increase functional independence with mobility by performin. Supine to sit with Contact Guard Assistance, observing spinal precautions/ LSO and NWB BUE  2. Sit to stand transfer with Minimal Assistance,  observing spinal precautions/LSO and NWB BUE  3. Bed to chair transfer with Minimal Assistance using step transfer,  observing spinal precautions/LSO and NWB BUE  4. Gait  x 30 feet with Minimal Assistance w/o AD,  observing spinal precautions and NWB BUE.   5. Lower extremity exercise program x20 reps per handout, with assistance as needed     Outcome: Ongoing, Progressing  Re-assessment post surgery today. Goals remain appropriate. Lissette Cline, PT 3/5/2020

## 2020-03-05 NOTE — PT/OT/SLP RE-EVAL
Physical Therapy Re-evaluation    Patient Name:  Ben Melvin   MRN:  [id number]    Recommendations:     Discharge Recommendations:  nursing facility, skilled(return to [facility])   Discharge Equipment Recommendations: wheelchair, bedside commode, hospital bed   Barriers to discharge: Inaccessible home and Decreased caregiver support    Assessment:     Ben Melvin is a 60 y.o. male admitted with a medical diagnosis of Sepsis.  He presents with the following impairments/functional limitations:  weakness, gait instability, decreased upper extremity function, decreased ROM, impaired endurance, impaired balance, decreased lower extremity function, impaired self care skills, impaired functional mobilty Patient transferred from [facility] due to sepsis and s/p drain for gallbladder on 3/3. He was evaulated on 3/3 prior to surgery and re-eval today, 3/5, s/p sx. He is NWB BUE and has B shoulder abduction slings and has an LSO to wear when OOB. He and his wife have received training in using the braces/orthosis and demonstrated ability to adjust, tesfaye  LSO. Patient amb w/ CG/min assist w/o AD w/ chair follow and IV pole in tow x15 feet. Transfers w/ CG/min assist from EOB and from chair. Patient will benefit from continued physical therapy to address deficits and improve safety and functional mobility; recommend return to [facility]. Continue with physical therapy plan of care.     Rehab Prognosis:  good; patient would benefit from acute skilled PT services to address these deficits and reach maximum level of function.      Recent Surgery: * No surgery found *      Plan:     During this hospitalization, patient to be seen 3 x/week to address the above listed problems via gait training, therapeutic activities, therapeutic exercises  · Plan of Care Expires:  04/04/20   Plan of Care Reviewed with: patient, spouse    Subjective     Communicated with nurse prior to session.  Patient found sitting on BSC w/ wife  present with peripheral IV, telemetry upon PT entry to room, agreeable to evaluation.      Chief Complaint: can't  Use arms yet  Patient comments/goals: return to SNF and progress to discharge home  Pain/Comfort:  · Pain Rating 1: 0/10  · Pain Rating Post-Intervention 1: 0/10    Patients cultural, spiritual, Jain conflicts given the current situation:        Objective:     Patient found with: peripheral IV, telemetry     General Precautions: Standard, fall   Orthopedic Precautions:LUE non weight bearing, RUE non weight bearing, spinal precautions   Braces: LSO, Shoulder abduction brace(B shoulder abduction brace; LSO)     Exams:  · Cognitive Exam:  Patient is oriented to Person, Place, Time and Situation  · Gross Motor Coordination:  WFL BLEs  · RLE ROM: WFL  · RLE Strength: WFL  · LLE ROM: WFL  · LLE Strength: WFL    Functional Mobility:  · Bed Mobility:  Not  Tested. Patient on BSC on arrival and remained up in chair  · Transfers:  Stands from BSC w/ CGA/min assistance and maintaining NWB BUEs. Step transfer to sit EOB. (sitting EOB, donned LSO)  · Sit to Stand:  contact guard assistance with no AD from EOB w/ B shoulder abd braces and NWB BUE and LSO in place.   · Patient performs sit<>stand to/from bedside chair w/ pillow to elevate surface w/ min assist for steadying and observing all precautions  · Gait: w/o AD w/ CG/min assist and chair follow 15 feet, IV pole in tow.  · Balance: sits EOB w/o assist; sits on BSC w/o assistance; stands w/ SBA/CGA    AM-PAC 6 CLICK MOBILITY  Total Score:14       Therapeutic Activities and Exercises:   patient re-eval'd by PT and OT session.   Patient used BSC w/ wife demonstrating ability to assist patient w/ therapist assistance.  Patient and wife demonstrate understanding of BUE NWB precautions, spinal precautions, use of abd slings and LSO.   Wife demonstrates understanding and ability to assist patient w/ donning LSO and adjusting shoulder slings to fit w/ the LSO.        Patient left up in chair with all lines intact, call button in reach, nurse notified and wife present.    GOALS:   Multidisciplinary Problems     Physical Therapy Goals        Problem: Physical Therapy Goal    Goal Priority Disciplines Outcome Goal Variances Interventions   Physical Therapy Goal     PT, PT/OT Ongoing, Progressing     Description:  Goals to be met by: 20     Patient will increase functional independence with mobility by performin. Supine to sit with Contact Guard Assistance, observing spinal precautions/ LSO and NWB BUE  2. Sit to stand transfer with Minimal Assistance,  observing spinal precautions/LSO and NWB BUE  3. Bed to chair transfer with Minimal Assistance using step transfer,  observing spinal precautions/LSO and NWB BUE  4. Gait  x 30 feet with Minimal Assistance w/o AD,  observing spinal precautions and NWB BUE.   5. Lower extremity exercise program x20 reps per handout, with assistance as needed                      History:     Past Medical History:   Diagnosis Date    Adenoma of right adrenal gland 2017    Alcohol abuse     ASCVD (arteriosclerotic cardiovascular disease) 10/2008    [place]    BPH (benign prostatic hyperplasia)     Cardiomyopathy     Chronic anxiety     Cirrhosis of liver     COPD (chronic obstructive pulmonary disease)     Current every day smoker     2-3 ppd    Elevated LFTs 2001    GERD with esophagitis 2017    History of colon polyps     History of epididymitis 2016    Juan Antonio    Hyperlipidemia     Hypertension, essential     Long term (current) use of anticoagulants     Lumbar disc disease with radiculopathy 2015    Major depression, recurrent, chronic     PAF (paroxysmal atrial fibrillation)     Peripheral autonomic neuropathy due to DM     Proteinuria     PVD (peripheral vascular disease) 2009    Venous insufficiency of both lower extremities     Vitamin D deficiency        Past Surgical History:    Procedure Laterality Date    biopsy back  05/20/2019    benign Martinez    biopsy right ankle Right 05/20/2019    Martinez, benign    COLONOSCOPY N/A 6/13/2019    Procedure: COLONOSCOPY;  Surgeon: Delmer Kerr MD;  Location: [facility] ENDO;  Service: Endoscopy;  Laterality: N/A;    COLONOSCOPY W/ POLYPECTOMY      EPIDURAL STEROID INJECTION INTO LUMBAR SPINE  06/2015    LASHAE Harvey    ESOPHAGOGASTRODUODENOSCOPY N/A 6/13/2019    Procedure: ESOPHAGOGASTRODUODENOSCOPY (EGD);  Surgeon: Delmer Kerr MD;  Location: [facility] ENDO;  Service: Endoscopy;  Laterality: N/A;    INTESTINAL MALROTATION REPAIR Right 1961    2 weeks old, Martinez    OPEN REDUCTION AND INTERNAL FIXATION (ORIF) OF FRACTURE OF PROXIMAL HUMERUS Right 2/14/2020    Procedure: ORIF, FRACTURE, HUMERUS, PROXIMAL - ORIF R greater tuberosity - suture repair. Trios, supine, fiberwire;  Surgeon: Chandler Chris MD;  [facility];  Service: Orthopedics;  Laterality: Right;    PARTIAL ARTHROPLASTY OF SHOULDER Left 1/28/2020    Procedure: HEMIARTHROPLASTY, SHOULDER;  Surgeon: Chandler Chris MD;  Location: [facility] OR [facility] FLR;  Service: Orthopedics;  Laterality: Left;    PARTIAL ARTHROPLASTY OF SHOULDER Right 1/30/2020    Procedure: HEMIARTHROPLASTY, SHOULDER- RIGHT- MEKA- SUPINE-  TRIOS;  Surgeon: Chandler Chris MD;  [facility];  Service: Orthopedics;  Laterality: Right;    PERCUTANEOUS TRANSLUMINAL ANGIOPLASTY (PTA) OF PERIPHERAL VESSEL Right 12/2014    NIKOLAS Childs       Time Tracking:     PT Received On: 03/05/20  PT Start Time: 1211     PT Stop Time: 1239  PT Total Time (min): 28 min     Billable Minutes: Re-eval 15 and Gait Training 13      Lissette Cline, PT  03/05/2020

## 2020-03-05 NOTE — PLAN OF CARE
Went over plan of care - no questions . No  falls or injuries, complaints of pain x 1- see mar. Will continue to monitor. Partner at bedside.

## 2020-03-05 NOTE — PROGRESS NOTES
"[facility] Hospital Medicine  Progress Note    Patient Name: Ben Melvin  MRN: [id number]  Patient Class: IP- Inpatient   Admission Date: 3/2/2020  Length of Stay: 2 days  Attending Physician: Giovanni De Jesus DO  Primary Care Provider: Cristal Ohara NP    Hospital Medicine Team: [facility] HOSP MED 5 Bora La DO    Subjective:     Principal Problem:Sepsis        HPI:  60M with hx seizure on 01/2020 with resultant bilateral prox humerus fractures, acromial fractures, subdural hemorrhage, and L1 + L4 burst fractures s/p L + R shoulder hemiarthroplasty and ORIF R prox humerus fx, AF on coumadin, HTN, COPD, DM on insulin (01/2019 A1c 5.9%) transferred from [facility] for evaluation of lethargy, confusion, and leukocytosis noted on recent labs. History provided by pt and his wife. They report that pt felt well until Thursday, when he had nausea and vomiting. Wife reports that several other patients at the [facility] got sick from the hamburgers that day. Wife reports that the next day patient "looked like a druggie," which she further characterizes as having general confusion and a "glassy" look to his eyes. She also says that his legs were weak even though the previous day he had walked far with the Rehab team. On Saturday pt was very sleepy so his pain meds and other sedating meds (robaxin, amitriptyline) were decreased but his mental status did not improve on Sunday. WBC was drawn Monday, returning at 26K. Pt denies any fevers, chills, myalgias, n/v, diarrhea/constipation, changes in urination, chest pain, cough, or dyspnea.    He was sent to [facility] ED where he was noted to have a WBC of 27K and an CANDY with creatinine 2.8 (0.8 baseline) with tachycardia to 110s, mild hypotension to the 90s/50s improving with IV fluids, and a TMax 100.4F. CT A/P revealed a distended gallbladder "with high attenuation material which may represent biliary sludge and surrounding inflammatory changes" and commented " "that it "may be related to acute cholecystitis in the correct clinical setting." Followup US confirmed findings c/w cholecystitis and positive Rojas's sign, noting that the common bile duct was 4 mm. General Surgery was consulted but recommended that pt be treated for his CANDY, his INR be reversed, and that his sepsis be worked up with IR consult for perc tube placement.     Pt's chart mentions alcohol abuse and cirrhosis; these were discussed with the patient. He says he quit daily alcohol use 10 years ago and will have an occasional beer but has not had any alcohol since his seizure 01/2020. IM notes with his primary care NP mention EtOH abuse as recently as 08/2019 but there is little mention afterward. In regards to cirrhosis he has never had jaundice, ascites, confusion / HE episodes, variceal bleed or any GIB, or other decompensations and does not have imaging or biopsy to suggest he has cirrhosis.    Overview/Hospital Course:  The patient is a 60 year old male with past medical history of pAF on coumadin, seizures, recent fall with bilateral shoulder hemiarthroplasty and R humerus fx ORIF, presenting from SNF for leukocytosis and encephalopathy, admitted with sepsis and acute cholecystitis. 3/3/2020 with general surgery recommendation, IR placed percutaneous cholecystostomy tube to be in place 6 weeks. Patient on vanc, cefepime, flagyl. Orthopedics notified of patient in hospital. Patient appears to be baseline mentation (orented to self only) and baseline appearance per wife 3/5. Will have cipro and flagyl for 7 days total upon discharge. Restarted warfarin.     Interval History: NAEO. Patient remains alert but only oriented to self, clarified with wife this is patient baseline since seziure/fall months back. Wife reports patient appears well and baseline. Patient had bowel movement today.    Review of Systems   Unable to perform ROS: Mental status change     Objective:     Vital Signs (Most Recent):  Temp: " 97.6 °F (36.4 °C) (03/05/20 0837)  Pulse: 107 (03/05/20 0837)  Resp: 20 (03/05/20 0837)  BP: 126/62 (03/05/20 0837)  SpO2: (!) 94 % (03/05/20 0837) Vital Signs (24h Range):  Temp:  [96.4 °F (35.8 °C)-98.3 °F (36.8 °C)] 97.6 °F (36.4 °C)  Pulse:  [100-107] 107  Resp:  [14-20] 20  SpO2:  [90 %-94 %] 94 %  BP: ()/(52-62) 126/62     Weight: 104.8 kg (231 lb 0.7 oz)  Body mass index is 32.22 kg/m².    Intake/Output Summary (Last 24 hours) at 3/5/2020 1055  Last data filed at 3/5/2020 0449  Gross per 24 hour   Intake 550 ml   Output 345 ml   Net 205 ml      Physical Exam   Constitutional: No distress.   Ill appearing   HENT:   Head: Normocephalic and atraumatic.   Eyes: Pupils are equal, round, and reactive to light. EOM are normal.   Neck: Normal range of motion. No thyromegaly present.   Cardiovascular: Normal heart sounds.   No murmur heard.  tachycardic   Pulmonary/Chest: Effort normal and breath sounds normal.   Abdominal: Soft. Bowel sounds are normal. There is tenderness (RUQ mild). There is no rebound and no guarding.   Paty tube draining bile appearing fluid   Neurological:   Alert, Oriented to self only   Skin: Skin is warm and dry.   Bilateral anterior shoulder well healing incisional wounds   Psychiatric:   Confused, no hallucinations       Significant Labs:   Recent Lab Results       03/05/20  0833   03/05/20  0405   03/04/20  2056   03/04/20  1705   03/04/20  1346        Albumin   1.8           Alkaline Phosphatase   261           ALT   30           Ammonia         33     Anion Gap   9           AST   51           Baso #   0.06           Basophil%   0.5           BILIRUBIN TOTAL   0.8  Comment:  For infants and newborns, interpretation of results should be based  on gestational age, weight and in agreement with clinical  observations.  Premature Infant recommended reference ranges:  Up to 24 hours.............<8.0 mg/dL  Up to 48 hours............<12.0 mg/dL  3-5 days..................<15.0  mg/dL  6-29 days.................<15.0 mg/dL             BUN, Bld   24           Calcium   8.8           Chloride   102           CO2   28           Creatinine   1.5           Differential Method   Automated           eGFR if    58.1           eGFR if non    50.2  Comment:  Calculation used to obtain the estimated glomerular filtration  rate (eGFR) is the CKD-EPI equation.              Eos #   0.4           Eosinophil%   3.1           Glucose   87           Gran # (ANC)   9.9           Gran%   81.2           Hematocrit   30.6           Hemoglobin   8.9           Immature Grans (Abs)   0.10  Comment:  Mild elevation in immature granulocytes is non specific and   can be seen in a variety of conditions including stress response,   acute inflammation, trauma and pregnancy. Correlation with other   laboratory and clinical findings is essential.             Immature Granulocytes   0.8           INR   1.0  Comment:  Coumadin Therapy:  2.0 - 3.0 for INR for all indicators except mechanical heart valves  and antiphospholipid syndromes which should use 2.5 - 3.5.                1.0  Comment:  Coumadin Therapy:  2.0 - 3.0 for INR for all indicators except mechanical heart valves  and antiphospholipid syndromes which should use 2.5 - 3.5.             Lymph #   1.1           Lymph%   9.4           Magnesium   1.9           MCH   26.1           MCHC   29.1           MCV   90           Mono #   0.6           Mono%   5.0           MPV   9.2           nRBC   0           Phosphorus   3.0           Platelets   297           POCT Glucose 94   90 84       Potassium   3.8           PROTEIN TOTAL   6.4           Protime   10.3              10.3           RBC   3.41           RDW   18.1           Sodium   139           Vancomycin, Random   12.9     22.0     WBC   12.17                            03/04/20  1115        Albumin       Alkaline Phosphatase       ALT       Ammonia       Anion Gap       AST        Baso #       Basophil%       BILIRUBIN TOTAL       BUN, Bld       Calcium       Chloride       CO2       Creatinine       Differential Method       eGFR if        eGFR if non        Eos #       Eosinophil%       Glucose       Gran # (ANC)       Gran%       Hematocrit       Hemoglobin       Immature Grans (Abs)       Immature Granulocytes       INR       Lymph #       Lymph%       Magnesium       MCH       MCHC       MCV       Mono #       Mono%       MPV       nRBC       Phosphorus       Platelets       POCT Glucose 96     Potassium       PROTEIN TOTAL       Protime       RBC       RDW       Sodium       Vancomycin, Random       WBC             Significant Imaging: I have reviewed all pertinent imaging results/findings within the past 24 hours.      Assessment/Plan:      * Sepsis  Cholelithiasis with Acute Cholecystitis    60M with complex medical history transferred from SNF with confusion, lethargy, leukocytosis. Exam with severe RUQ pain, CT and US suggestive of cholecystitis. General Surgery consulted and recommend IR jose tube placement.    - fluid 30mg/kg resusc  - INR reversed with FFP and vit K  - IR jose tube placed 3/3/2020      Plan  - F/U blood cultures  - Cefepime, vanc + metronidazole , likely dc with cipro+flagyl for 7 days total abx  - pain control  - maintenance fluids      Acute encephalopathy  Subdural Hematoma    Reports from wife and in chart of confusion in the days preceding admission. On admission exam pt is alert and oriented.     DDx includes sepsis, worsening hematoma, seizure    Plan  - Treat sepsis with antibiotics, source control  - Head CT nonacute, stable hematoma  - Seizure precautions, continue Keppra at previous 1g BID dose  - Per wife 3/5/20 patient orientation to self only is baseline since fall/seizure/hematoma months back    CANDY (acute kidney injury)  Baseline creatinine 0.8. CANDY in the setting of sepsis with rising BUN in the days preceding  admission. Most likely prerenal from distributive effect of sepsis + true volume depletion.    UA with 2+ protein. Pt denies urinary hesitancy or retention.  - sCr improving with fluid resuscitation    Plan  - CMP daily  - Renally dose meds, avoid nephrotoxic agents  - Strict I/O        Cholelithiasis with acute cholecystitis  See sepsis      GERD (gastroesophageal reflux disease)  Cont home PPI    Bilateral proximal humeral fractures  Continue pain control, weight bearing precautions per Orthopedics.  Held muscle relaxants initially given reports of confusion but may consider restarting if resolves with sepsis tx.    PT/OT consult.    Subdural hematoma        Diabetes 1.5, managed as type 2  A1c 5.9% 01/2019. Rx insulin (glargine 25U daily + MDSSI) at home but wife and pt report he hasn't been given insulin while admitted.    LDSSI  Diabetic Diet  BG checks AC/HS  -patient not eating well, dislikes food here, titrate insulin as needed    Essential hypertension  Hold home ramipril 5 mg in CANDY + until pt's BP stable    PAF (paroxysmal atrial fibrillation)  Long Term (current) Use of Anticoagulants    Pt with AF on warfarin, Restarted home regimen after jose tube. Daily INR        COPD (chronic obstructive pulmonary disease)  PRN duonebs. Pt reports that he has smoked 8-9 PPD x40 years and quit shortly after his seizure and fractures.    Hyperlipidemia  Cont home atorvastatin 80      VTE Risk Mitigation (From admission, onward)         Ordered     enoxaparin injection 100 mg  2 times daily      03/04/20 1528     warfarin tablet 7.5 mg  Daily      03/04/20 1529                      Bora La DO  Department of Hospital Medicine   [facility]

## 2020-03-05 NOTE — ASSESSMENT & PLAN NOTE
Cholelithiasis with Acute Cholecystitis    60M with complex medical history transferred from SNF with confusion, lethargy, leukocytosis. Exam with severe RUQ pain, CT and US suggestive of cholecystitis. General Surgery consulted and recommend IR jose tube placement.    - fluid 30mg/kg resusc  - INR reversed with FFP and vit K  - IR jose tube placed 3/3/2020      Plan  - F/U blood cultures  - Cefepime, vanc + metronidazole , likely dc with cipro+flagyl for 7 days total abx  - pain control  - maintenance fluids

## 2020-03-05 NOTE — ASSESSMENT & PLAN NOTE
A1c 5.9% 01/2019. Rx insulin (glargine 25U daily + MDSSI) at home but wife and pt report he hasn't been given insulin while admitted.    LDSSI  Diabetic Diet  BG checks AC/HS  -patient not eating well, dislikes food here, titrate insulin as needed

## 2020-03-05 NOTE — ASSESSMENT & PLAN NOTE
Subdural Hematoma    Reports from wife and in chart of confusion in the days preceding admission. On admission exam pt is alert and oriented.     DDx includes sepsis, worsening hematoma, seizure    Plan  - Treat sepsis with antibiotics, source control  - Head CT nonacute, stable hematoma  - Seizure precautions, continue Keppra at previous 1g BID dose  - Per wife 3/5/20 patient orientation to self only is baseline since fall/seizure/hematoma months back

## 2020-03-05 NOTE — SUBJECTIVE & OBJECTIVE
Interval History: NAEO. Patient remains alert but only oriented to self, clarified with wife this is patient baseline since seziure/fall months back. Wife reports patient appears well and baseline. Patient had bowel movement today.    Review of Systems   Unable to perform ROS: Mental status change     Objective:     Vital Signs (Most Recent):  Temp: 97.6 °F (36.4 °C) (03/05/20 0837)  Pulse: 107 (03/05/20 0837)  Resp: 20 (03/05/20 0837)  BP: 126/62 (03/05/20 0837)  SpO2: (!) 94 % (03/05/20 0837) Vital Signs (24h Range):  Temp:  [96.4 °F (35.8 °C)-98.3 °F (36.8 °C)] 97.6 °F (36.4 °C)  Pulse:  [100-107] 107  Resp:  [14-20] 20  SpO2:  [90 %-94 %] 94 %  BP: ()/(52-62) 126/62     Weight: 104.8 kg (231 lb 0.7 oz)  Body mass index is 32.22 kg/m².    Intake/Output Summary (Last 24 hours) at 3/5/2020 1055  Last data filed at 3/5/2020 0449  Gross per 24 hour   Intake 550 ml   Output 345 ml   Net 205 ml      Physical Exam   Constitutional: No distress.   Ill appearing   HENT:   Head: Normocephalic and atraumatic.   Eyes: Pupils are equal, round, and reactive to light. EOM are normal.   Neck: Normal range of motion. No thyromegaly present.   Cardiovascular: Normal heart sounds.   No murmur heard.  tachycardic   Pulmonary/Chest: Effort normal and breath sounds normal.   Abdominal: Soft. Bowel sounds are normal. There is tenderness (RUQ mild). There is no rebound and no guarding.   Paty tube draining bile appearing fluid   Neurological:   Alert, Oriented to self only   Skin: Skin is warm and dry.   Bilateral anterior shoulder well healing incisional wounds   Psychiatric:   Confused, no hallucinations       Significant Labs:   Recent Lab Results       03/05/20  0833   03/05/20  0405   03/04/20  2056   03/04/20  1705   03/04/20  1346        Albumin   1.8           Alkaline Phosphatase   261           ALT   30           Ammonia         33     Anion Gap   9           AST   51           Baso #   0.06           Basophil%   0.5            BILIRUBIN TOTAL   0.8  Comment:  For infants and newborns, interpretation of results should be based  on gestational age, weight and in agreement with clinical  observations.  Premature Infant recommended reference ranges:  Up to 24 hours.............<8.0 mg/dL  Up to 48 hours............<12.0 mg/dL  3-5 days..................<15.0 mg/dL  6-29 days.................<15.0 mg/dL             BUN, Bld   24           Calcium   8.8           Chloride   102           CO2   28           Creatinine   1.5           Differential Method   Automated           eGFR if    58.1           eGFR if non    50.2  Comment:  Calculation used to obtain the estimated glomerular filtration  rate (eGFR) is the CKD-EPI equation.              Eos #   0.4           Eosinophil%   3.1           Glucose   87           Gran # (ANC)   9.9           Gran%   81.2           Hematocrit   30.6           Hemoglobin   8.9           Immature Grans (Abs)   0.10  Comment:  Mild elevation in immature granulocytes is non specific and   can be seen in a variety of conditions including stress response,   acute inflammation, trauma and pregnancy. Correlation with other   laboratory and clinical findings is essential.             Immature Granulocytes   0.8           INR   1.0  Comment:  Coumadin Therapy:  2.0 - 3.0 for INR for all indicators except mechanical heart valves  and antiphospholipid syndromes which should use 2.5 - 3.5.                1.0  Comment:  Coumadin Therapy:  2.0 - 3.0 for INR for all indicators except mechanical heart valves  and antiphospholipid syndromes which should use 2.5 - 3.5.             Lymph #   1.1           Lymph%   9.4           Magnesium   1.9           MCH   26.1           MCHC   29.1           MCV   90           Mono #   0.6           Mono%   5.0           MPV   9.2           nRBC   0           Phosphorus   3.0           Platelets   297           POCT Glucose 94   90 84       Potassium   3.8            PROTEIN TOTAL   6.4           Protime   10.3              10.3           RBC   3.41           RDW   18.1           Sodium   139           Vancomycin, Random   12.9     22.0     WBC   12.17                            03/04/20  1115        Albumin       Alkaline Phosphatase       ALT       Ammonia       Anion Gap       AST       Baso #       Basophil%       BILIRUBIN TOTAL       BUN, Bld       Calcium       Chloride       CO2       Creatinine       Differential Method       eGFR if        eGFR if non        Eos #       Eosinophil%       Glucose       Gran # (ANC)       Gran%       Hematocrit       Hemoglobin       Immature Grans (Abs)       Immature Granulocytes       INR       Lymph #       Lymph%       Magnesium       MCH       MCHC       MCV       Mono #       Mono%       MPV       nRBC       Phosphorus       Platelets       POCT Glucose 96     Potassium       PROTEIN TOTAL       Protime       RBC       RDW       Sodium       Vancomycin, Random       WBC             Significant Imaging: I have reviewed all pertinent imaging results/findings within the past 24 hours.

## 2020-03-05 NOTE — CONSULTS
"  [facility]  Adult Nutrition  Consult Note    SUMMARY     Recommendations    Recommendation:   1. Continue 2000 kcal DM diet, encourage intake as tolerable   2. Change boost glucose to Optisource TID to increase intake and in complaince with warfarin medication   3. RD to monitor and follow up     Goals: pt to tolerate >85% of EEN/EPN by RD follow up   Nutrition Goal Status: new  Communication of RD Recs: other (comment)(POC)    Reason for Assessment    Reason For Assessment: consult  Diagnosis: (AMS)  Relevant Medical History: COPD; DM; CKD3; HLD: PVD; ETOH abuse; cirrhosis of liver; HTN; GERD  Interdisciplinary Rounds: did not attend  General Information Comments: Pt and family in room, reported appetite poor over past 2 days. Cholecysotomy tube placed 3/3. PTA pt with good PO intake at home and following DM diet. Encouraged good PO intake as tolerable. Pt receiving boost plus, switch to optisource due to warfarin interactions. Wt loss reported from UBW ~265 lbs over past 2.5 months, due to altered intake throughout hospital visits. NFPE completed pt with mild muscle loss, pt at risk for malnutrition at this time.   Nutrition Discharge Planning: adequate PO intake     Nutrition Risk Screen    Nutrition Risk Screen: no indicators present    Nutrition/Diet History    Spiritual, Cultural Beliefs, Amish Practices, Values that Affect Care: no  Food Allergies: NKFA  Factors Affecting Nutritional Intake: decreased appetite    Anthropometrics    Temp: 96.3 °F (35.7 °C)  Height Method: Stated  Height: 5' 11" (180.3 cm)  Height (inches): 71 in  Weight Method: Bed Scale  Weight: 104.8 kg (231 lb 0.7 oz)  Weight (lb): 231.04 lb  Ideal Body Weight (IBW), Male: 172 lb  % Ideal Body Weight, Male (lb): 130.23 %  BMI (Calculated): 32.2  BMI Grade: 30 - 34.9- obesity - grade I       Lab/Procedures/Meds    Pertinent Labs Reviewed: reviewed  Pertinent Labs Comments: BUN 24; Cr 1.5  Pertinent Medications " Reviewed: reviewed  Pertinent Medications Comments: warfarin; vancomycin; insulin     Estimated/Assessed Needs    Weight Used For Calorie Calculations: 104.8 kg (231 lb 0.7 oz)  Energy Calorie Requirements (kcal): 2262 kcal/d  Energy Need Method: Horry-St Jeor(x1.2)  Protein Requirements:  g/day   Weight Used For Protein Calculations: 104.8 kg (231 lb 0.7 oz)  Fluid Requirements (mL): 1 mL/kcal or per MD  RDA Method (mL): 2262  CHO Requirement: 282    Nutrition Prescription Ordered    Current Diet Order: 2000 kcal DM diet   Oral Nutrition Supplement: boost glucose     Evaluation of Received Nutrient/Fluid Intake    I/O: -1.8 L since admit  Energy Calories Required: not meeting needs  Protein Required: not meeting needs  Fluid Required: meeting needs  Comments: LBM 3/5  Tolerance: tolerating  % Intake of Estimated Energy Needs: 25 - 50 %  % Meal Intake: 25 - 50 %    Nutrition Risk    Level of Risk/Frequency of Follow-up: low     Assessment and Plan     Nutrition Problem  inadequate oral intake    Related to (etiology):   Decreased appetite    Signs and Symptoms (as evidenced by):   PO <75% of EEN/EPN      Interventions (treatment strategy):  Collaboration of care with other providers  Commercial beverage  Modified diet- DM diet     Nutrition Diagnosis Status:   New    Monitor and Evaluation    Food and Nutrient Intake: energy intake, food and beverage intake  Food and Nutrient Adminstration: diet order  Knowledge/Beliefs/Attitudes: food and nutrition knowledge/skill  Anthropometric Measurements: weight change, weight  Biochemical Data, Medical Tests and Procedures: electrolyte and renal panel, lipid profile, gastrointestinal profile, glucose/endocrine profile, inflammatory profile  Nutrition-Focused Physical Findings: overall appearance     Malnutrition Assessment  Weight Loss (Malnutrition): (10% x 2 months)  Energy Intake (Malnutrition): less than 75% for greater than 7 days  Muscle Mass (Malnutrition):  mild depletion   Orbital Region (Subcutaneous Fat Loss): well nourished  Upper Arm Region (Subcutaneous Fat Loss): well nourished  Thoracic and Lumbar Region: well nourished   Taoism Region (Muscle Loss): well nourished  Clavicle Bone Region (Muscle Loss): well nourished  Clavicle and Acromion Bone Region (Muscle Loss): well nourished  Dorsal Hand (Muscle Loss): well nourished  Patellar Region (Muscle Loss): mild depletion  Anterior Thigh Region (Muscle Loss): mild depletion  Posterior Calf Region (Muscle Loss): mild depletion     Nutrition Follow-Up    RD Follow-up?: Yes

## 2020-03-05 NOTE — ASSESSMENT & PLAN NOTE
Long Term (current) Use of Anticoagulants    Pt with AF on warfarin, Restarted home regimen after jose tube. Daily INR

## 2020-03-05 NOTE — PLAN OF CARE
[facility]     Department of Hospital Medicine     [address] [place] [address]     [phone] after hours  [phone] fax       Facility Transfer Orders  [facility]    03/06/2020    Admit to Nursing Home:     Skilled Bed                                                 Diagnoses:  Active Hospital Problems    Diagnosis  POA    *Sepsis [A41.9]  Yes    Cholelithiasis with acute cholecystitis [K80.00]  Yes    CANDY (acute kidney injury) [N17.9]  Yes    Acute encephalopathy [G93.40]  Yes    GERD (gastroesophageal reflux disease) [K21.9]  Yes    Bilateral proximal humeral fractures [S42.201A, S42.202A]  Yes    Subdural hematoma [S06.5X9A]  Yes    Diabetes 1.5, 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       Patient is homebound due to:  Sepsis    Allergies:Review of patient's allergies indicates:  No Known Allergies    Vitals:       Every shift (Skilled Nursing patients)    Diet: diabetic   Supplement:  Glucerna       Acitivities:     - Up in a chair each morning as tolerated   - Ambulate with assistance to bathroom   - Scheduled walks once each shift (every 8 hours)   - May ambulate independently   - May use walker, cane, or self-propelled wheelchair      LABS:  Per facility protocol  PT/INR daily for two weeks   CMP, CBC each month for 3 months   PT-INR each week for 1 month then monthly   Pre-albumin each month for 3 months     Nursing Precautions:    - Aspiration precautions:             - Total assistance with meals            -  Upright 90 degrees befor during and after meals             -  Suction at bedside           - Fall precautions per nursing home protocol   - Seizure precaution per penitentiary protocol   - Decubitus precautions:        -  for positioning   - Pressure reducing foam mattress   - Turn patient every two hours. Use wedge pillows to anchor patient    CONSULTS:     Physical Therapy to evaluate and treat     Occupational Therapy to evaluate and treat     Speech Therapy  to evaluate and treat     Nutrition to evaluate and recommend diet        MISCELLANEOUS CARE:          Percutaneous Cholecystostomy tube Care: Empty bag BID PRN  To remain in place for 6 weeks until general surgery follow up.         Routine Skin for Bedridden Patients:  Apply moisture barrier cream to all    skin folds and wet areas in perineal area daily and after baths and                           all bowel movements.    WOUND CARE:  Wound spray or saline for wound cleaning with all dressing changes.    All wounds to be measured with first dressing changes and every week.                 DIABETES CARE:        Check blood sugar:    Fingerstick blood sugar a.m and p.m.    Fingerstick blood sugar AC and HS   Fingerstick blood sugar every 6 hours if unable to eat      Report CBG < 60 or > 400 to physician.                                          Insulin Sliding Scale          Glucose  Novolog Insulin Subcutaneous        0 - 60   Orange juice or glucose tablet, hold insulin      No insulin   201-250  2 units   251-300  4 units   301-350  6 units   351-400  8 units   >400   10 units then call physician      Medications: Discontinue all previous medication orders, if any. See new list below.   Ben Melvin   Home Medication Instructions [place]:[id number]    Printed on:03/06/20 111   Medication Information                      amitriptyline (ELAVIL) 100 MG tablet  Take 1 tablet (100 mg total) by mouth every evening.             aspirin 81 MG Chew  Take 162 mg by mouth once daily.             atorvastatin (LIPITOR) 80 MG  tablet  Take 80 mg by mouth every evening.             bisacodyL (DULCOLAX) 10 mg Supp  Place 1 suppository (10 mg total) rectally daily as needed.             cetirizine (ZYRTEC) 10 MG tablet  Take 10 mg by mouth once daily.             ciprofloxacin HCl (CIPRO) 750 MG tablet  Take 1 tablet (750 mg total) by mouth every 12 (twelve) hours. for 4 days             cyanocobalamin (VITAMIN B-12) 1000 MCG tablet  Take 1,000 mcg by mouth once daily.              enoxaparin (LOVENOX) 100 mg/mL Syrg  Inject 1 mL (100 mg total) into the skin 2 (two) times daily.             ergocalciferol (ERGOCALCIFEROL) 50,000 unit Cap  Take 1 capsule (50,000 Units total) by mouth every 7 days.                        insulin aspart U-100 (NOVOLOG) 100 unit/mL (3 mL) InPn pen  Inject 0-5 Units into the skin before meals and at bedtime as needed (Hyperglycemia).                          ipratropium (ATROVENT) 0.02 % nebulizer solution  Take 2.5 mLs (500 mcg total) by nebulization every 4 (four) hours as needed for Wheezing. Rescue             levalbuterol (XOPENEX) 0.63 mg/3 mL nebulizer solution  Take 3 mLs (0.63 mg total) by nebulization every 8 (eight) hours. Rescue             levETIRAcetam (KEPPRA) 1000 MG tablet  Take 1 tablet (1,000 mg total) by mouth 2 (two) times daily.             methocarbamol (ROBAXIN) 750 MG Tab  Take 750 mg by mouth.             metoprolol tartrate (LOPRESSOR) 50 MG tablet  Take 1 tablet (50 mg total) by mouth 2 (two) times daily.             metroNIDAZOLE (FLAGYL) 500 MG tablet  Take 1 tablet (500 mg total) by mouth every 8 (eight) hours. for 4 days             multivitamin with minerals tablet  Take 1 tablet by mouth once daily.             nicotine (NICODERM CQ) 21 mg/24 hr  Place 1 patch onto the skin once daily.             oxyCODONE (ROXICODONE) 5 MG immediate release tablet  Take 1 tablet (5 mg total) by mouth every 6 (six) hours as needed for Pain.             pantoprazole (PROTONIX) 40 MG tablet  Take  1 tablet (40 mg total) by mouth once daily.             polyethylene glycol (GLYCOLAX) 17 gram PwPk  Take 17 g by mouth 2 (two) times daily.             senna (SENOKOT) 8.6 mg tablet  Take 1 tablet by mouth daily as needed for Constipation.             senna-docusate 8.6-50 mg (PERICOLACE) 8.6-50 mg per tablet  Take 2 tablets by mouth 2 (two) times daily.             TRADJENTA 5 mg Tab tablet  TAKE 1 TABLET BY MOUTH EVERY DAY             warfarin (COUMADIN) 7.5 MG tablet  Take 1 tablet (7.5 mg total) by mouth Daily.                           _________________________________  Bora La DO  03/05/2020

## 2020-03-06 ENCOUNTER — HOSPITAL ENCOUNTER (INPATIENT)
Facility: HOSPITAL | Age: 60
LOS: 19 days | Discharge: HOME HEALTH | DRG: 444 | End: 2020-03-25
Attending: INTERNAL MEDICINE | Admitting: INTERNAL MEDICINE
Payer: MEDICARE

## 2020-03-06 VITALS
DIASTOLIC BLOOD PRESSURE: 65 MMHG | RESPIRATION RATE: 18 BRPM | SYSTOLIC BLOOD PRESSURE: 136 MMHG | HEART RATE: 99 BPM | WEIGHT: 231.06 LBS | OXYGEN SATURATION: 95 % | HEIGHT: 71 IN | TEMPERATURE: 98 F | BODY MASS INDEX: 32.35 KG/M2

## 2020-03-06 DIAGNOSIS — K80.00 CHOLELITHIASIS WITH ACUTE CHOLECYSTITIS: ICD-10-CM

## 2020-03-06 DIAGNOSIS — S42.91XD CLOSED FRACTURE DISLOCATION OF JOINT OF RIGHT SHOULDER GIRDLE WITH ROUTINE HEALING, SUBSEQUENT ENCOUNTER: Primary | ICD-10-CM

## 2020-03-06 DIAGNOSIS — A41.9 SEPSIS, DUE TO UNSPECIFIED ORGANISM, UNSPECIFIED WHETHER ACUTE ORGAN DYSFUNCTION PRESENT: ICD-10-CM

## 2020-03-06 LAB
ALBUMIN SERPL BCP-MCNC: 1.9 G/DL (ref 3.5–5.2)
ALP SERPL-CCNC: 254 U/L (ref 55–135)
ALT SERPL W/O P-5'-P-CCNC: 26 U/L (ref 10–44)
ANION GAP SERPL CALC-SCNC: 12 MMOL/L (ref 8–16)
AST SERPL-CCNC: 51 U/L (ref 10–40)
BASOPHILS # BLD AUTO: 0.06 K/UL (ref 0–0.2)
BASOPHILS NFR BLD: 0.7 % (ref 0–1.9)
BILIRUB SERPL-MCNC: 0.7 MG/DL (ref 0.1–1)
BUN SERPL-MCNC: 22 MG/DL (ref 6–20)
CALCIUM SERPL-MCNC: 9 MG/DL (ref 8.7–10.5)
CHLORIDE SERPL-SCNC: 102 MMOL/L (ref 95–110)
CO2 SERPL-SCNC: 26 MMOL/L (ref 23–29)
CREAT SERPL-MCNC: 1.4 MG/DL (ref 0.5–1.4)
DIFFERENTIAL METHOD: ABNORMAL
EOSINOPHIL # BLD AUTO: 0.5 K/UL (ref 0–0.5)
EOSINOPHIL NFR BLD: 5.6 % (ref 0–8)
ERYTHROCYTE [DISTWIDTH] IN BLOOD BY AUTOMATED COUNT: 18 % (ref 11.5–14.5)
EST. GFR  (AFRICAN AMERICAN): >60 ML/MIN/1.73 M^2
EST. GFR  (NON AFRICAN AMERICAN): 54.6 ML/MIN/1.73 M^2
GLUCOSE SERPL-MCNC: 75 MG/DL (ref 70–110)
HCT VFR BLD AUTO: 29.7 % (ref 40–54)
HGB BLD-MCNC: 8.6 G/DL (ref 14–18)
IMM GRANULOCYTES # BLD AUTO: 0.09 K/UL (ref 0–0.04)
IMM GRANULOCYTES NFR BLD AUTO: 1 % (ref 0–0.5)
INR PPP: 1.1 (ref 0.8–1.2)
INR PPP: 1.1 (ref 0.8–1.2)
LYMPHOCYTES # BLD AUTO: 1.3 K/UL (ref 1–4.8)
LYMPHOCYTES NFR BLD: 14.5 % (ref 18–48)
MAGNESIUM SERPL-MCNC: 1.9 MG/DL (ref 1.6–2.6)
MCH RBC QN AUTO: 26 PG (ref 27–31)
MCHC RBC AUTO-ENTMCNC: 29 G/DL (ref 32–36)
MCV RBC AUTO: 90 FL (ref 82–98)
MONOCYTES # BLD AUTO: 0.7 K/UL (ref 0.3–1)
MONOCYTES NFR BLD: 7.7 % (ref 4–15)
NEUTROPHILS # BLD AUTO: 6.5 K/UL (ref 1.8–7.7)
NEUTROPHILS NFR BLD: 70.5 % (ref 38–73)
NRBC BLD-RTO: 0 /100 WBC
PHOSPHATE SERPL-MCNC: 2.9 MG/DL (ref 2.7–4.5)
PLATELET # BLD AUTO: 332 K/UL (ref 150–350)
PMV BLD AUTO: 9.6 FL (ref 9.2–12.9)
POCT GLUCOSE: 79 MG/DL (ref 70–110)
POCT GLUCOSE: 85 MG/DL (ref 70–110)
POCT GLUCOSE: 90 MG/DL (ref 70–110)
POCT GLUCOSE: 90 MG/DL (ref 70–110)
POTASSIUM SERPL-SCNC: 3.7 MMOL/L (ref 3.5–5.1)
PROT SERPL-MCNC: 6.6 G/DL (ref 6–8.4)
PROTHROMBIN TIME: 11.4 SEC (ref 9–12.5)
PROTHROMBIN TIME: 11.4 SEC (ref 9–12.5)
RBC # BLD AUTO: 3.31 M/UL (ref 4.6–6.2)
SODIUM SERPL-SCNC: 140 MMOL/L (ref 136–145)
WBC # BLD AUTO: 9.15 K/UL (ref 3.9–12.7)

## 2020-03-06 PROCEDURE — 25000003 PHARM REV CODE 250: Performed by: STUDENT IN AN ORGANIZED HEALTH CARE EDUCATION/TRAINING PROGRAM

## 2020-03-06 PROCEDURE — S4991 NICOTINE PATCH NONLEGEND: HCPCS | Performed by: STUDENT IN AN ORGANIZED HEALTH CARE EDUCATION/TRAINING PROGRAM

## 2020-03-06 PROCEDURE — 36415 COLL VENOUS BLD VENIPUNCTURE: CPT

## 2020-03-06 PROCEDURE — 99239 PR HOSPITAL DISCHARGE DAY,>30 MIN: ICD-10-PCS | Mod: GC,,, | Performed by: INTERNAL MEDICINE

## 2020-03-06 PROCEDURE — 85025 COMPLETE CBC W/AUTO DIFF WBC: CPT

## 2020-03-06 PROCEDURE — 25000003 PHARM REV CODE 250: Performed by: INTERNAL MEDICINE

## 2020-03-06 PROCEDURE — 63600175 PHARM REV CODE 636 W HCPCS: Performed by: INTERNAL MEDICINE

## 2020-03-06 PROCEDURE — 84100 ASSAY OF PHOSPHORUS: CPT

## 2020-03-06 PROCEDURE — 85610 PROTHROMBIN TIME: CPT

## 2020-03-06 PROCEDURE — 99239 HOSP IP/OBS DSCHRG MGMT >30: CPT | Mod: GC,,, | Performed by: INTERNAL MEDICINE

## 2020-03-06 PROCEDURE — 11000004 HC SNF PRIVATE

## 2020-03-06 PROCEDURE — 80053 COMPREHEN METABOLIC PANEL: CPT

## 2020-03-06 PROCEDURE — 83735 ASSAY OF MAGNESIUM: CPT

## 2020-03-06 RX ORDER — ONDANSETRON 2 MG/ML
4 INJECTION INTRAMUSCULAR; INTRAVENOUS EVERY 8 HOURS PRN
Status: DISCONTINUED | OUTPATIENT
Start: 2020-03-06 | End: 2020-03-08

## 2020-03-06 RX ORDER — METRONIDAZOLE 500 MG/1
500 TABLET ORAL EVERY 8 HOURS
Status: COMPLETED | OUTPATIENT
Start: 2020-03-07 | End: 2020-03-11

## 2020-03-06 RX ORDER — ATORVASTATIN CALCIUM 20 MG/1
80 TABLET, FILM COATED ORAL NIGHTLY
Status: DISCONTINUED | OUTPATIENT
Start: 2020-03-07 | End: 2020-03-25 | Stop reason: HOSPADM

## 2020-03-06 RX ORDER — ACETAMINOPHEN 325 MG/1
650 TABLET ORAL EVERY 6 HOURS PRN
Status: DISCONTINUED | OUTPATIENT
Start: 2020-03-06 | End: 2020-03-08

## 2020-03-06 RX ORDER — AMITRIPTYLINE HYDROCHLORIDE 25 MG/1
100 TABLET, FILM COATED ORAL NIGHTLY
Status: DISCONTINUED | OUTPATIENT
Start: 2020-03-07 | End: 2020-03-08

## 2020-03-06 RX ORDER — ACETAMINOPHEN 325 MG/1
650 TABLET ORAL EVERY 8 HOURS PRN
Status: DISCONTINUED | OUTPATIENT
Start: 2020-03-06 | End: 2020-03-25 | Stop reason: HOSPADM

## 2020-03-06 RX ORDER — PROCHLORPERAZINE MALEATE 10 MG
10 TABLET ORAL EVERY 6 HOURS PRN
Status: DISCONTINUED | OUTPATIENT
Start: 2020-03-06 | End: 2020-03-08

## 2020-03-06 RX ORDER — POLYETHYLENE GLYCOL 3350 17 G/17G
17 POWDER, FOR SOLUTION ORAL 2 TIMES DAILY
Status: DISCONTINUED | OUTPATIENT
Start: 2020-03-07 | End: 2020-03-25 | Stop reason: HOSPADM

## 2020-03-06 RX ORDER — IBUPROFEN 200 MG
16 TABLET ORAL
Status: DISCONTINUED | OUTPATIENT
Start: 2020-03-06 | End: 2020-03-08

## 2020-03-06 RX ORDER — POTASSIUM CHLORIDE 20 MEQ/15ML
40 SOLUTION ORAL
Status: COMPLETED | OUTPATIENT
Start: 2020-03-06 | End: 2020-03-06

## 2020-03-06 RX ORDER — GLUCAGON 1 MG
1 KIT INJECTION
Status: DISCONTINUED | OUTPATIENT
Start: 2020-03-06 | End: 2020-03-08

## 2020-03-06 RX ORDER — CALCIUM CARBONATE 200(500)MG
500 TABLET,CHEWABLE ORAL 2 TIMES DAILY PRN
Status: DISCONTINUED | OUTPATIENT
Start: 2020-03-06 | End: 2020-03-25 | Stop reason: HOSPADM

## 2020-03-06 RX ORDER — PANTOPRAZOLE SODIUM 40 MG/1
40 TABLET, DELAYED RELEASE ORAL DAILY
Status: DISCONTINUED | OUTPATIENT
Start: 2020-03-07 | End: 2020-03-25 | Stop reason: HOSPADM

## 2020-03-06 RX ORDER — ENOXAPARIN SODIUM 100 MG/ML
100 INJECTION SUBCUTANEOUS 2 TIMES DAILY
Status: DISCONTINUED | OUTPATIENT
Start: 2020-03-07 | End: 2020-03-08

## 2020-03-06 RX ORDER — ERGOCALCIFEROL 1.25 MG/1
50000 CAPSULE ORAL
Status: DISCONTINUED | OUTPATIENT
Start: 2020-03-10 | End: 2020-03-25 | Stop reason: HOSPADM

## 2020-03-06 RX ORDER — HYDROCODONE BITARTRATE AND ACETAMINOPHEN 500; 5 MG/1; MG/1
TABLET ORAL
Status: DISCONTINUED | OUTPATIENT
Start: 2020-03-06 | End: 2020-03-25 | Stop reason: HOSPADM

## 2020-03-06 RX ORDER — IBUPROFEN 200 MG
1 TABLET ORAL DAILY
Status: DISCONTINUED | OUTPATIENT
Start: 2020-03-07 | End: 2020-03-08

## 2020-03-06 RX ORDER — IPRATROPIUM BROMIDE AND ALBUTEROL SULFATE 2.5; .5 MG/3ML; MG/3ML
3 SOLUTION RESPIRATORY (INHALATION) EVERY 4 HOURS PRN
Status: DISCONTINUED | OUTPATIENT
Start: 2020-03-06 | End: 2020-03-25 | Stop reason: HOSPADM

## 2020-03-06 RX ORDER — POLYETHYLENE GLYCOL 3350 17 G/17G
17 POWDER, FOR SOLUTION ORAL DAILY
Status: DISCONTINUED | OUTPATIENT
Start: 2020-03-07 | End: 2020-03-08

## 2020-03-06 RX ORDER — IPRATROPIUM BROMIDE 0.5 MG/2.5ML
500 SOLUTION RESPIRATORY (INHALATION) EVERY 4 HOURS PRN
Status: DISCONTINUED | OUTPATIENT
Start: 2020-03-06 | End: 2020-03-08

## 2020-03-06 RX ORDER — AMOXICILLIN 250 MG
1 CAPSULE ORAL 2 TIMES DAILY
Status: DISCONTINUED | OUTPATIENT
Start: 2020-03-07 | End: 2020-03-25 | Stop reason: HOSPADM

## 2020-03-06 RX ORDER — RAMIPRIL 5 MG/1
5 CAPSULE ORAL DAILY
Status: DISCONTINUED | OUTPATIENT
Start: 2020-03-07 | End: 2020-03-08

## 2020-03-06 RX ORDER — LEVALBUTEROL INHALATION SOLUTION 0.63 MG/3ML
0.63 SOLUTION RESPIRATORY (INHALATION) EVERY 8 HOURS
Status: DISCONTINUED | OUTPATIENT
Start: 2020-03-07 | End: 2020-03-07

## 2020-03-06 RX ORDER — LANOLIN ALCOHOL/MO/W.PET/CERES
1000 CREAM (GRAM) TOPICAL DAILY
Status: DISCONTINUED | OUTPATIENT
Start: 2020-03-07 | End: 2020-03-25 | Stop reason: HOSPADM

## 2020-03-06 RX ORDER — OXYCODONE HYDROCHLORIDE 10 MG/1
10 TABLET ORAL EVERY 6 HOURS PRN
Status: DISCONTINUED | OUTPATIENT
Start: 2020-03-06 | End: 2020-03-08

## 2020-03-06 RX ORDER — IBUPROFEN 200 MG
24 TABLET ORAL
Status: DISCONTINUED | OUTPATIENT
Start: 2020-03-06 | End: 2020-03-08

## 2020-03-06 RX ORDER — INSULIN ASPART 100 [IU]/ML
0-5 INJECTION, SOLUTION INTRAVENOUS; SUBCUTANEOUS
Status: DISCONTINUED | OUTPATIENT
Start: 2020-03-06 | End: 2020-03-08

## 2020-03-06 RX ORDER — METOPROLOL TARTRATE 50 MG/1
50 TABLET ORAL 2 TIMES DAILY
Status: DISCONTINUED | OUTPATIENT
Start: 2020-03-07 | End: 2020-03-25 | Stop reason: HOSPADM

## 2020-03-06 RX ORDER — TALC
6 POWDER (GRAM) TOPICAL NIGHTLY PRN
Status: DISCONTINUED | OUTPATIENT
Start: 2020-03-06 | End: 2020-03-08

## 2020-03-06 RX ORDER — TALC
6 POWDER (GRAM) TOPICAL NIGHTLY PRN
Status: DISCONTINUED | OUTPATIENT
Start: 2020-03-06 | End: 2020-03-25 | Stop reason: HOSPADM

## 2020-03-06 RX ORDER — WARFARIN 7.5 MG/1
7.5 TABLET ORAL DAILY
Qty: 30 TABLET | Refills: 11 | Status: ON HOLD
Start: 2020-03-06 | End: 2020-03-24 | Stop reason: HOSPADM

## 2020-03-06 RX ORDER — SENNOSIDES 8.6 MG/1
8.6 TABLET ORAL DAILY PRN
Status: DISCONTINUED | OUTPATIENT
Start: 2020-03-06 | End: 2020-03-25 | Stop reason: HOSPADM

## 2020-03-06 RX ORDER — WARFARIN 2.5 MG/1
7.5 TABLET ORAL DAILY
Status: DISCONTINUED | OUTPATIENT
Start: 2020-03-07 | End: 2020-03-08

## 2020-03-06 RX ORDER — LANOLIN ALCOHOL/MO/W.PET/CERES
800 CREAM (GRAM) TOPICAL EVERY 4 HOURS
Status: COMPLETED | OUTPATIENT
Start: 2020-03-06 | End: 2020-03-06

## 2020-03-06 RX ORDER — LEVETIRACETAM 500 MG/1
1000 TABLET ORAL 2 TIMES DAILY
Status: DISCONTINUED | OUTPATIENT
Start: 2020-03-07 | End: 2020-03-25 | Stop reason: HOSPADM

## 2020-03-06 RX ADMIN — FUROSEMIDE 20 MG: 20 TABLET ORAL at 08:03

## 2020-03-06 RX ADMIN — ENOXAPARIN SODIUM 100 MG: 100 INJECTION SUBCUTANEOUS at 09:03

## 2020-03-06 RX ADMIN — CIPROFLOXACIN HYDROCHLORIDE 750 MG: 250 TABLET, FILM COATED ORAL at 09:03

## 2020-03-06 RX ADMIN — Medication 800 MG: at 08:03

## 2020-03-06 RX ADMIN — LEVETIRACETAM 1000 MG: 500 TABLET ORAL at 09:03

## 2020-03-06 RX ADMIN — METRONIDAZOLE 500 MG: 500 TABLET ORAL at 06:03

## 2020-03-06 RX ADMIN — ATORVASTATIN CALCIUM 80 MG: 20 TABLET, FILM COATED ORAL at 09:03

## 2020-03-06 RX ADMIN — POTASSIUM CHLORIDE 40 MEQ: 20 SOLUTION ORAL at 11:03

## 2020-03-06 RX ADMIN — METRONIDAZOLE 500 MG: 500 TABLET ORAL at 03:03

## 2020-03-06 RX ADMIN — POLYETHYLENE GLYCOL 3350 17 G: 17 POWDER, FOR SOLUTION ORAL at 08:03

## 2020-03-06 RX ADMIN — ENOXAPARIN SODIUM 100 MG: 100 INJECTION SUBCUTANEOUS at 08:03

## 2020-03-06 RX ADMIN — OXYCODONE HYDROCHLORIDE 10 MG: 10 TABLET ORAL at 09:03

## 2020-03-06 RX ADMIN — CIPROFLOXACIN HYDROCHLORIDE 750 MG: 250 TABLET, FILM COATED ORAL at 08:03

## 2020-03-06 RX ADMIN — METOPROLOL TARTRATE 50 MG: 50 TABLET, FILM COATED ORAL at 08:03

## 2020-03-06 RX ADMIN — AMITRIPTYLINE HYDROCHLORIDE 100 MG: 50 TABLET, FILM COATED ORAL at 09:03

## 2020-03-06 RX ADMIN — PANTOPRAZOLE SODIUM 40 MG: 40 TABLET, DELAYED RELEASE ORAL at 08:03

## 2020-03-06 RX ADMIN — NICOTINE 1 PATCH: 21 PATCH, EXTENDED RELEASE TRANSDERMAL at 08:03

## 2020-03-06 RX ADMIN — METRONIDAZOLE 500 MG: 500 TABLET ORAL at 09:03

## 2020-03-06 RX ADMIN — METOPROLOL TARTRATE 50 MG: 50 TABLET, FILM COATED ORAL at 09:03

## 2020-03-06 RX ADMIN — POTASSIUM CHLORIDE 40 MEQ: 20 SOLUTION ORAL at 08:03

## 2020-03-06 RX ADMIN — WARFARIN SODIUM 7.5 MG: 2.5 TABLET ORAL at 04:03

## 2020-03-06 RX ADMIN — LEVETIRACETAM 1000 MG: 500 TABLET ORAL at 08:03

## 2020-03-06 RX ADMIN — CYANOCOBALAMIN TAB 1000 MCG 1000 MCG: 1000 TAB at 08:03

## 2020-03-06 RX ADMIN — Medication 800 MG: at 11:03

## 2020-03-06 NOTE — PLAN OF CARE
Lying in bed with significant other at bedside. No c/o pain or discomfort, breathing even and unlabored. Safety maintained during stay. Waiting for transport for discharge.

## 2020-03-06 NOTE — PLAN OF CARE
Pt can be accepted back to [facility] today. Waiting to hear on when transport can be arranged.     Janay Boswell, BOBBY  [facility]

## 2020-03-06 NOTE — PLAN OF CARE
Patient will discharge to [facility] today. RN can call report to [facility] at [phone].     Transportation arranged for wheelchair van via [facility] ([phone]. [phone]). Requested pickup time is  7:45 PM. Requested pickup time is not a guarantee of time of arrival.      Rn aware.    Janay Boswell LMSW  [facility]

## 2020-03-06 NOTE — PLAN OF CARE
03/06/20 1130   Final Note   Assessment Type Final Discharge Note   Anticipated Discharge Disposition SNF   What phone number can be called within the next 1-3 days to see how you are doing after discharge?   (Ben Melvin [phone])   Hospital Follow Up  Appt(s) scheduled? No   Discharge plans and expectations educations in teach back method with documentation complete? Yes   Right Care Referral Info   Post Acute Recommendation SNF / Sub-Acute Rehab       Emily Callahan RN, BSN     Ext [phone]

## 2020-03-07 LAB
ALBUMIN SERPL BCP-MCNC: 2.4 G/DL (ref 3.5–5.2)
ALP SERPL-CCNC: 280 U/L (ref 55–135)
ALT SERPL W/O P-5'-P-CCNC: 32 U/L (ref 10–44)
ANION GAP SERPL CALC-SCNC: 12 MMOL/L (ref 8–16)
AST SERPL-CCNC: 54 U/L (ref 10–40)
BACTERIA BLD CULT: NORMAL
BASOPHILS # BLD AUTO: 0.08 K/UL (ref 0–0.2)
BASOPHILS NFR BLD: 0.7 % (ref 0–1.9)
BILIRUB SERPL-MCNC: 0.7 MG/DL (ref 0.1–1)
BUN SERPL-MCNC: 22 MG/DL (ref 6–20)
CALCIUM SERPL-MCNC: 9.4 MG/DL (ref 8.7–10.5)
CHLORIDE SERPL-SCNC: 100 MMOL/L (ref 95–110)
CO2 SERPL-SCNC: 27 MMOL/L (ref 23–29)
CREAT SERPL-MCNC: 1.3 MG/DL (ref 0.5–1.4)
DIFFERENTIAL METHOD: ABNORMAL
EOSINOPHIL # BLD AUTO: 0.5 K/UL (ref 0–0.5)
EOSINOPHIL NFR BLD: 4.3 % (ref 0–8)
ERYTHROCYTE [DISTWIDTH] IN BLOOD BY AUTOMATED COUNT: 17.9 % (ref 11.5–14.5)
EST. GFR  (AFRICAN AMERICAN): >60 ML/MIN/1.73 M^2
EST. GFR  (NON AFRICAN AMERICAN): 59.7 ML/MIN/1.73 M^2
GLUCOSE SERPL-MCNC: 87 MG/DL (ref 70–110)
HCT VFR BLD AUTO: 35.2 % (ref 40–54)
HGB BLD-MCNC: 10.5 G/DL (ref 14–18)
IMM GRANULOCYTES # BLD AUTO: 0.12 K/UL (ref 0–0.04)
IMM GRANULOCYTES NFR BLD AUTO: 1.1 % (ref 0–0.5)
INR PPP: 1.8 (ref 0.8–1.2)
INR PPP: 1.8 (ref 0.8–1.2)
LYMPHOCYTES # BLD AUTO: 1.8 K/UL (ref 1–4.8)
LYMPHOCYTES NFR BLD: 16.3 % (ref 18–48)
MCH RBC QN AUTO: 26.5 PG (ref 27–31)
MCHC RBC AUTO-ENTMCNC: 29.8 G/DL (ref 32–36)
MCV RBC AUTO: 89 FL (ref 82–98)
MONOCYTES # BLD AUTO: 0.8 K/UL (ref 0.3–1)
MONOCYTES NFR BLD: 7.2 % (ref 4–15)
NEUTROPHILS # BLD AUTO: 7.6 K/UL (ref 1.8–7.7)
NEUTROPHILS NFR BLD: 70.4 % (ref 38–73)
NRBC BLD-RTO: 0 /100 WBC
PLATELET # BLD AUTO: 376 K/UL (ref 150–350)
PMV BLD AUTO: 10 FL (ref 9.2–12.9)
POCT GLUCOSE: 100 MG/DL (ref 70–110)
POCT GLUCOSE: 105 MG/DL (ref 70–110)
POCT GLUCOSE: 82 MG/DL (ref 70–110)
POCT GLUCOSE: 97 MG/DL (ref 70–110)
POTASSIUM SERPL-SCNC: 4.8 MMOL/L (ref 3.5–5.1)
PROT SERPL-MCNC: 7.6 G/DL (ref 6–8.4)
PROTHROMBIN TIME: 17.4 SEC (ref 9–12.5)
PROTHROMBIN TIME: 17.4 SEC (ref 9–12.5)
RBC # BLD AUTO: 3.96 M/UL (ref 4.6–6.2)
SODIUM SERPL-SCNC: 139 MMOL/L (ref 136–145)
WBC # BLD AUTO: 10.76 K/UL (ref 3.9–12.7)

## 2020-03-07 PROCEDURE — 97166 OT EVAL MOD COMPLEX 45 MIN: CPT

## 2020-03-07 PROCEDURE — 97530 THERAPEUTIC ACTIVITIES: CPT

## 2020-03-07 PROCEDURE — 85025 COMPLETE CBC W/AUTO DIFF WBC: CPT

## 2020-03-07 PROCEDURE — 11000004 HC SNF PRIVATE

## 2020-03-07 PROCEDURE — 80053 COMPREHEN METABOLIC PANEL: CPT

## 2020-03-07 PROCEDURE — 85610 PROTHROMBIN TIME: CPT

## 2020-03-07 PROCEDURE — 25000242 PHARM REV CODE 250 ALT 637 W/ HCPCS: Performed by: INTERNAL MEDICINE

## 2020-03-07 PROCEDURE — 63600175 PHARM REV CODE 636 W HCPCS: Performed by: STUDENT IN AN ORGANIZED HEALTH CARE EDUCATION/TRAINING PROGRAM

## 2020-03-07 PROCEDURE — 94640 AIRWAY INHALATION TREATMENT: CPT

## 2020-03-07 PROCEDURE — 97535 SELF CARE MNGMENT TRAINING: CPT

## 2020-03-07 PROCEDURE — 25000003 PHARM REV CODE 250: Performed by: STUDENT IN AN ORGANIZED HEALTH CARE EDUCATION/TRAINING PROGRAM

## 2020-03-07 PROCEDURE — S4991 NICOTINE PATCH NONLEGEND: HCPCS | Performed by: STUDENT IN AN ORGANIZED HEALTH CARE EDUCATION/TRAINING PROGRAM

## 2020-03-07 RX ORDER — LEVALBUTEROL INHALATION SOLUTION 0.63 MG/3ML
0.63 SOLUTION RESPIRATORY (INHALATION) EVERY 8 HOURS
Status: DISCONTINUED | OUTPATIENT
Start: 2020-03-07 | End: 2020-03-08

## 2020-03-07 RX ADMIN — CIPROFLOXACIN HYDROCHLORIDE 750 MG: 500 TABLET, FILM COATED ORAL at 09:03

## 2020-03-07 RX ADMIN — LEVALBUTEROL HYDROCHLORIDE 0.63 MG: 0.63 SOLUTION RESPIRATORY (INHALATION) at 11:03

## 2020-03-07 RX ADMIN — NICOTINE 1 PATCH: 21 PATCH, EXTENDED RELEASE TRANSDERMAL at 09:03

## 2020-03-07 RX ADMIN — CYANOCOBALAMIN TAB 1000 MCG 1000 MCG: 1000 TAB at 09:03

## 2020-03-07 RX ADMIN — LEVALBUTEROL HYDROCHLORIDE 0.63 MG: 0.63 SOLUTION RESPIRATORY (INHALATION) at 09:03

## 2020-03-07 RX ADMIN — WARFARIN SODIUM 7.5 MG: 2.5 TABLET ORAL at 04:03

## 2020-03-07 RX ADMIN — LEVETIRACETAM 1000 MG: 500 TABLET ORAL at 09:03

## 2020-03-07 RX ADMIN — ENOXAPARIN SODIUM 100 MG: 100 INJECTION SUBCUTANEOUS at 09:03

## 2020-03-07 RX ADMIN — OXYCODONE HYDROCHLORIDE 10 MG: 10 TABLET ORAL at 01:03

## 2020-03-07 RX ADMIN — Medication 6 MG: at 12:03

## 2020-03-07 RX ADMIN — OXYCODONE HYDROCHLORIDE 10 MG: 10 TABLET ORAL at 09:03

## 2020-03-07 RX ADMIN — RAMIPRIL 5 MG: 5 CAPSULE ORAL at 09:03

## 2020-03-07 RX ADMIN — LEVALBUTEROL HYDROCHLORIDE 0.63 MG: 0.63 SOLUTION RESPIRATORY (INHALATION) at 04:03

## 2020-03-07 RX ADMIN — METRONIDAZOLE 500 MG: 500 TABLET ORAL at 02:03

## 2020-03-07 RX ADMIN — METRONIDAZOLE 500 MG: 500 TABLET ORAL at 09:03

## 2020-03-07 RX ADMIN — PANTOPRAZOLE SODIUM 40 MG: 40 TABLET, DELAYED RELEASE ORAL at 09:03

## 2020-03-07 RX ADMIN — AMITRIPTYLINE HYDROCHLORIDE 100 MG: 25 TABLET, FILM COATED ORAL at 09:03

## 2020-03-07 RX ADMIN — METOPROLOL TARTRATE 50 MG: 50 TABLET, FILM COATED ORAL at 09:03

## 2020-03-07 RX ADMIN — ATORVASTATIN CALCIUM 80 MG: 20 TABLET, FILM COATED ORAL at 09:03

## 2020-03-07 NOTE — ASSESSMENT & PLAN NOTE
A1c 5.9% 01/2019. Rx insulin (glargine 25U daily + MDSSI) at home but wife and pt report he hasn't been given insulin while admitted.    LDSSI  Diabetic Diet  BG checks AC/HS  -patient not eating well, dislikes food here, titrate insulin as needed  - will dc with only SSI due to low insulin req

## 2020-03-07 NOTE — PLAN OF CARE
Repositions minimal assist, no new skin breakdowns noted. Rt upper shoulder incision closed/resurfaced, no redness. Immobilizer in use. Waffle mattress in use. Afebrile, ciprofloxacin and metronidazole PO scheduled. Monitored for pain and safety.safety maintained. Pain meds effective

## 2020-03-07 NOTE — ASSESSMENT & PLAN NOTE
Cholelithiasis with Acute Cholecystitis    60M with complex medical history transferred from SNF with confusion, lethargy, leukocytosis. Exam with severe RUQ pain, CT and US suggestive of cholecystitis. General Surgery consulted and recommend IR jose tube placement.    - fluid 30mg/kg resusc  - INR reversed with FFP and vit K  - IR jose tube placed 3/3/2020      Plan  - F/U blood cultures  - Cefepime, vanc + metronidazole , dc with cipro+flagyl for 7 days total abx  - pain control  - maintenance fluids as needed  - drain in place for 6 weeks post op  - surgery follow up in 6 weeks post op

## 2020-03-07 NOTE — DISCHARGE SUMMARY
"[facility] Hospital Medicine  Discharge Summary      Patient Name: Ben Melvin  MRN: [id number]  Admission Date: 3/2/2020  Hospital Length of Stay: 3 days  Discharge Date and Time:  03/06/2020 7:36 PM  Attending Physician: Giovanni De Jesus DO   Discharging Provider: Bora La DO  Primary Care Provider: Cristal Ohara NP  Hospital Medicine Team: [facility] HOSP MED 5 Bora La DO    HPI:   60M with hx seizure on 01/2020 with resultant bilateral prox humerus fractures, acromial fractures, subdural hemorrhage, and L1 + L4 burst fractures s/p L + R shoulder hemiarthroplasty and ORIF R prox humerus fx, AF on coumadin, HTN, COPD, DM on insulin (01/2019 A1c 5.9%) transferred from [facility] for evaluation of lethargy, confusion, and leukocytosis noted on recent labs. History provided by pt and his wife. They report that pt felt well until Thursday, when he had nausea and vomiting. Wife reports that several other patients at the [facility] got sick from the hamburgers that day. Wife reports that the next day patient "looked like a druggie," which she further characterizes as having general confusion and a "glassy" look to his eyes. She also says that his legs were weak even though the previous day he had walked far with the Rehab team. On Saturday pt was very sleepy so his pain meds and other sedating meds (robaxin, amitriptyline) were decreased but his mental status did not improve on Sunday. WBC was drawn Monday, returning at 26K. Pt denies any fevers, chills, myalgias, n/v, diarrhea/constipation, changes in urination, chest pain, cough, or dyspnea.    He was sent to [facility] ED where he was noted to have a WBC of 27K and an CANDY with creatinine 2.8 (0.8 baseline) with tachycardia to 110s, mild hypotension to the 90s/50s improving with IV fluids, and a TMax 100.4F. CT A/P revealed a distended gallbladder "with high attenuation material which may represent biliary sludge and surrounding inflammatory " "changes" and commented that it "may be related to acute cholecystitis in the correct clinical setting." Followup US confirmed findings c/w cholecystitis and positive Rojas's sign, noting that the common bile duct was 4 mm. General Surgery was consulted but recommended that pt be treated for his CANDY, his INR be reversed, and that his sepsis be worked up with IR consult for perc tube placement.     Pt's chart mentions alcohol abuse and cirrhosis; these were discussed with the patient. He says he quit daily alcohol use 10 years ago and will have an occasional beer but has not had any alcohol since his seizure 01/2020. IM notes with his primary care NP mention EtOH abuse as recently as 08/2019 but there is little mention afterward. In regards to cirrhosis he has never had jaundice, ascites, confusion / HE episodes, variceal bleed or any GIB, or other decompensations and does not have imaging or biopsy to suggest he has cirrhosis.    * No surgery found *      Hospital Course:   The patient is a 60 year old male with past medical history of pAF on coumadin, seizures, recent fall with bilateral shoulder hemiarthroplasty and R humerus fx ORIF, presenting from SNF for leukocytosis and encephalopathy, admitted with sepsis and acute cholecystitis. 3/3/2020 with general surgery recommendation, IR placed percutaneous cholecystostomy tube to be in place 6 weeks. Patient on vanc, cefepime, flagyl. Orthopedics notified of patient in hospital. Patient appears to be baseline mentation (orented to self only) and baseline appearance per wife 3/5. Will have cipro and flagyl for 7 days total upon discharge. Restarted warfarin. Medically ready for [facility]. Will stop lasix, ramipril due to low BP. Will stop long acting insulin due to lack of appetite.     Vitals:    03/06/20 0431 03/06/20 0757 03/06/20 1120 03/06/20 1634   BP: 120/67 (!) 99/49 130/69 128/61   BP Location:  Right leg Right arm    Patient Position:  Lying Lying  "   Pulse: 75 94 100 104   Resp: 20 16 18 18   Temp: 98.5 °F (36.9 °C) 97.8 °F (36.6 °C)     TempSrc:  Oral     SpO2: 98% 96% (!) 94% (!) 94%   Weight:       Height:           Physical Exam   Constitutional: No distress.   Ill appearing   HENT:   Head: Normocephalic and atraumatic.   Eyes: Pupils are equal, round, and reactive to light. EOM are normal.   Neck: Normal range of motion. No thyromegaly present.   Cardiovascular: Normal heart sounds.   No murmur heard  Pulmonary/Chest: Effort normal and breath sounds normal.   Abdominal: Soft. Bowel sounds are normal. There is tenderness (RUQ mild). There is no rebound and no guarding.   Paty tube draining bile appearing fluid   Neurological:   Alert, Oriented to self only   Skin: Skin is warm and dry.   Bilateral anterior shoulder well healing incisional wounds   Psychiatric:   Confused, no hallucinations     Consults:   Consults (From admission, onward)        Status Ordering Provider     Inpatient consult to General surgery  Once     Provider:  (Not yet assigned)    Completed DSAHA ALLEN     Inpatient consult to [facility] Medicine-General  Once     Provider:  (Not yet assigned)    Completed ALEA COVINGTON     Inpatient consult to Interventional Radiology  Once     Provider:  (Not yet assigned)    Completed JOAN XIE     Nutrition Services Referral  Once     Provider:  (Not yet assigned)    Completed CITLALLI MYERS          * Sepsis  Cholelithiasis with Acute Cholecystitis    60M with complex medical history transferred from SNF with confusion, lethargy, leukocytosis. Exam with severe RUQ pain, CT and US suggestive of cholecystitis. General Surgery consulted and recommend IR paty tube placement.    - fluid 30mg/kg resusc  - INR reversed with FFP and vit K  - IR paty tube placed 3/3/2020      Plan  - F/U blood cultures  - Cefepime, vanc + metronidazole , dc with cipro+flagyl for 7 days total abx  - pain control  - maintenance fluids as  needed  - drain in place for 6 weeks post op  - surgery follow up in 6 weeks post op      Acute encephalopathy  Subdural Hematoma    Reports from wife and in chart of confusion in the days preceding admission. On admission exam pt is alert and oriented.     DDx includes sepsis, worsening hematoma, seizure    Plan  - Treat sepsis with antibiotics, source control  - Head CT nonacute, stable hematoma  - Seizure precautions, continue Keppra at previous 1g BID dose  - Per wife 3/5/20 patient orientation to self only is baseline since fall/seizure/hematoma months back      CANDY (acute kidney injury)  Baseline creatinine 0.8. CANDY in the setting of sepsis with rising BUN in the days preceding admission. Most likely prerenal from distributive effect of sepsis + true volume depletion.    UA with 2+ protein. Pt denies urinary hesitancy or retention.  - sCr improving with fluid resuscitation    Plan  - CMP daily  - Renally dose meds, avoid nephrotoxic agents  - Strict I/O        Cholelithiasis with acute cholecystitis  See sepsis      Diabetes 1.5, managed as type 2  A1c 5.9% 01/2019. Rx insulin (glargine 25U daily + MDSSI) at home but wife and pt report he hasn't been given insulin while admitted.    LDSSI  Diabetic Diet  BG checks AC/HS  -patient not eating well, dislikes food here, titrate insulin as needed  - will dc with only SSI due to low insulin req      Final Active Diagnoses:    Diagnosis Date Noted POA    PRINCIPAL PROBLEM:  Sepsis [A41.9] 03/03/2020 Yes    Cholelithiasis with acute cholecystitis [K80.00] 03/03/2020 Yes    CANDY (acute kidney injury) [N17.9] 03/03/2020 Yes    Acute encephalopathy [G93.40] 03/03/2020 Yes    GERD (gastroesophageal reflux disease) [K21.9] 02/15/2020 Yes    Bilateral proximal humeral fractures [S42.201A, S42.202A] 02/14/2020 Yes    Subdural hematoma [S06.5X9A] 01/27/2020 Yes    Diabetes 1.5, 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] 06/03/2019 Yes    COPD (chronic obstructive pulmonary disease)  [J44.9]  Yes     Chronic    PAF (paroxysmal atrial fibrillation) [I48.0]  Yes    Long term (current) use of anticoagulants [Z79.01]  Not Applicable    Essential hypertension [I10]  Yes    Hyperlipidemia [E78.5]  Yes      Problems Resolved During this Admission:       Discharged Condition: stable    Disposition: Skilled Nursing Facility*    Follow Up:    Patient Instructions:   No discharge procedures on file.    Significant Diagnostic Studies: Labs:   BMP:   Recent Labs   Lab 03/05/20  0405 03/06/20  0313   GLU 87 75    140   K 3.8 3.7    102   CO2 28 26   BUN 24* 22*   CREATININE 1.5* 1.4   CALCIUM 8.8 9.0   MG 1.9 1.9   , CMP   Recent Labs   Lab 03/05/20  0405 03/06/20  0313    140   K 3.8 3.7    102   CO2 28 26   GLU 87 75   BUN 24* 22*   CREATININE 1.5* 1.4   CALCIUM 8.8 9.0   PROT 6.4 6.6   ALBUMIN 1.8* 1.9*   BILITOT 0.8 0.7   ALKPHOS 261* 254*   AST 51* 51*   ALT 30 26   ANIONGAP 9 12   ESTGFRAFRICA 58.1* >60.0   EGFRNONAA 50.2* 54.6*    and All labs within the past 24 hours have been reviewed    Pending Diagnostic Studies:     None         Medications:  Reconciled Home Medications:      Medication List      START taking these medications    ciprofloxacin HCl 750 MG tablet  Commonly known as:  CIPRO  Take 1 tablet (750 mg total) by mouth every 12 (twelve) hours. for 4 days     enoxaparin 100 mg/mL Syrg  Commonly known as:  LOVENOX  Inject 1 mL (100 mg total) into the skin 2 (two) times daily.     metroNIDAZOLE 500 MG tablet  Commonly known as:  FLAGYL  Take 1 tablet (500 mg total) by mouth every 8 (eight) hours. for 4 days     senna 8.6 mg tablet  Commonly known as:  SENOKOT  Take 1 tablet by mouth daily as needed for Constipation.        CHANGE how you take these medications    amitriptyline 100 MG tablet  Commonly known as:  ELAVIL  Take 1 tablet (100 mg total) by mouth every evening.  What changed:    · medication strength  · how much to take     insulin aspart U-100 100 unit/mL  (3 mL) Inpn pen  Commonly known as:  NovoLOG  Inject 0-5 Units into the skin before meals and at bedtime as needed (Hyperglycemia).  What changed:  how much to take     metoprolol tartrate 50 MG tablet  Commonly known as:  LOPRESSOR  Take 1 tablet (50 mg total) by mouth 2 (two) times daily.  What changed:    · medication strength  · how much to take     nicotine 21 mg/24 hr  Commonly known as:  NICODERM CQ  Place 1 patch onto the skin once daily.  What changed:  Another medication with the same name was removed. Continue taking this medication, and follow the directions you see here.     oxyCODONE 5 MG immediate release tablet  Commonly known as:  ROXICODONE  Take 1 tablet (5 mg total) by mouth every 6 (six) hours as needed for Pain.  What changed:    · when to take this  · reasons to take this     warfarin 7.5 MG tablet  Commonly known as:  COUMADIN  Take 1 tablet (7.5 mg total) by mouth Daily.  What changed:    · medication strength  · how much to take  · when to take this  · additional instructions        CONTINUE taking these medications    aspirin 81 MG Chew  Take 162 mg by mouth once daily.     atorvastatin 80 MG tablet  Commonly known as:  LIPITOR  Take 80 mg by mouth every evening.     bisacodyL 10 mg Supp  Commonly known as:  DULCOLAX  Place 1 suppository (10 mg total) rectally daily as needed.     cetirizine 10 MG tablet  Commonly known as:  ZYRTEC  Take 10 mg by mouth once daily.     cyanocobalamin 1000 MCG tablet  Commonly known as:  VITAMIN B-12  Take 1,000 mcg by mouth once daily.     ergocalciferol 50,000 unit Cap  Commonly known as:  ERGOCALCIFEROL  Take 1 capsule (50,000 Units total) by mouth every 7 days.     furosemide 20 MG tablet  Commonly known as:  LASIX  Take 20 mg by mouth once daily.     ipratropium 0.02 % nebulizer solution  Commonly known as:  ATROVENT  Take 2.5 mLs (500 mcg total) by nebulization every 4 (four) hours as needed for Wheezing. Rescue     levalbuterol 0.63 mg/3 mL nebulizer  solution  Commonly known as:  XOPENEX  Take 3 mLs (0.63 mg total) by nebulization every 8 (eight) hours. Rescue     levETIRAcetam 1000 MG tablet  Commonly known as:  KEPPRA  Take 1 tablet (1,000 mg total) by mouth 2 (two) times daily.     methocarbamol 750 MG Tab  Commonly known as:  ROBAXIN  Take 750 mg by mouth.     multivitamin with minerals tablet  Take 1 tablet by mouth once daily.     pantoprazole 40 MG tablet  Commonly known as:  PROTONIX  Take 1 tablet (40 mg total) by mouth once daily.     polyethylene glycol 17 gram Pwpk  Commonly known as:  GLYCOLAX  Take 17 g by mouth 2 (two) times daily.     senna-docusate 8.6-50 mg 8.6-50 mg per tablet  Commonly known as:  PERICOLACE  Take 2 tablets by mouth 2 (two) times daily.     Tradjenta 5 mg Tab tablet  Generic drug:  linaGLIPtin  TAKE 1 TABLET BY MOUTH EVERY DAY        STOP taking these medications    Basaglar KwikPen U-100 Insulin glargine 100 units/mL (3mL) SubQ pen  Generic drug:  insulin     celecoxib 200 MG capsule  Commonly known as:  CeleBREX     gabapentin 300 MG capsule  Commonly known as:  NEURONTIN     metFORMIN 1000 MG tablet  Commonly known as:  GLUCOPHAGE     ramipril 5 MG capsule  Commonly known as:  ALTACE            Indwelling Lines/Drains at time of discharge:   Lines/Drains/Airways     Drain                 Biliary Tube 03/03/20 1530 VTCB biliary 8 Fr. RUQ 3 days                Time spent on the discharge of patient: 40 minutes  Patient was seen and examined on the date of discharge and determined to be suitable for discharge.         Bora La DO  Department of Hospital Medicine  [facility]

## 2020-03-07 NOTE — PROGRESS NOTES
Pt arrived to floor in wheelchair. Pt has BUE in arm slings/immobilizer. Pt has a right upper quadrant biliary drain. Pt's S.O. At bedside. She provided admit info. Pt oriented to call bell and room. Pt and S.O. Stated they were here before. Pt aao x4. Will continue to monitor pt.

## 2020-03-07 NOTE — NURSING
Called [facility] lab questioning PT/INR still in process, not resulted.  stated order was cancelled by Lina. Lab still had blood specimen. This was a daily draw. I reordered for today

## 2020-03-07 NOTE — PT/OT/SLP EVAL
Occupational Therapy  Evaluation & Tx    Ben Melvin   MRN: [id number]   Admitting Diagnosis: <principal problem not specified>     OT Date of Treatment: 03/07/20   OT Start Time: 0800  OT Stop Time: 0840  OT Total Time (min): 40 min   2nd session from 1450 - 1518  (28 min)  for adjustment of B shoulder immobilizers and bed positioning    Billable Minutes:  Evaluation 15  Self Care/Home Management 25   Therapeutic Activity 28    Diagnosis: <principal problem not specified>    Past Medical History:   Diagnosis Date    Adenoma of right adrenal gland 11/09/2017    Alcohol abuse     ASCVD (arteriosclerotic cardiovascular disease) 10/2008    [place]    BPH (benign prostatic hyperplasia)     Cardiomyopathy     Chronic anxiety     Cirrhosis of liver     COPD (chronic obstructive pulmonary disease)     Current every day smoker     2-3 ppd    Elevated LFTs 08/2001    GERD with esophagitis 11/09/2017    History of colon polyps     History of epididymitis 2016    Juan Antonio    Hyperlipidemia     Hypertension, essential     Long term (current) use of anticoagulants     Lumbar disc disease with radiculopathy 05/2015    Major depression, recurrent, chronic     PAF (paroxysmal atrial fibrillation)     Peripheral autonomic neuropathy due to DM     Proteinuria     PVD (peripheral vascular disease) 05/29/2009    Venous insufficiency of both lower extremities     Vitamin D deficiency       Past Surgical History:   Procedure Laterality Date    biopsy back  05/20/2019    benign Martinez    biopsy right ankle Right 05/20/2019    Martinez, benign    COLONOSCOPY N/A 6/13/2019    Procedure: COLONOSCOPY;  Surgeon: Delmer Kerr MD;  Location: [facility];  Service: Endoscopy;  Laterality: N/A;    COLONOSCOPY W/ POLYPECTOMY      EPIDURAL STEROID INJECTION INTO LUMBAR SPINE  06/2015    LASHAE Harvey    ESOPHAGOGASTRODUODENOSCOPY N/A 6/13/2019    Procedure: ESOPHAGOGASTRODUODENOSCOPY (EGD);  Surgeon: Delmer Kerr,  "MD;  Location: [facility];  Service: Endoscopy;  Laterality: N/A;    INTESTINAL MALROTATION REPAIR Right 1961    2 weeks old, Martinez    OPEN REDUCTION AND INTERNAL FIXATION (ORIF) OF FRACTURE OF PROXIMAL HUMERUS Right 2/14/2020    Procedure: ORIF, FRACTURE, HUMERUS, PROXIMAL - ORIF R greater tuberosity - suture repair. Trios, supine, fiberwire;  Surgeon: Chandler Chris MD;  Location: [facility] OR [address] [place] [address];  Service: Orthopedics;  Laterality: Right;    PARTIAL ARTHROPLASTY OF SHOULDER Left 1/28/2020    Procedure: HEMIARTHROPLASTY, SHOULDER;  Surgeon: Chandler Chris MD;  [facility];  Service: Orthopedics;  Laterality: Left;    PARTIAL ARTHROPLASTY OF SHOULDER Right 1/30/2020    Procedure: HEMIARTHROPLASTY, SHOULDER- RIGHT- MEKA- SUPINE-  TRIOS;  Surgeon: Chandler Chris MD;  [facility];  Service: Orthopedics;  Laterality: Right;    PERCUTANEOUS TRANSLUMINAL ANGIOPLASTY (PTA) OF PERIPHERAL VESSEL Right 12/2014    NIKOLAS Childs         General Precautions: Standard, fall  Orthopedic Precautions: RUE non weight bearing, LUE non weight bearing, spinal precautions  Braces: LSO, Shoulder abduction brace(2 shoulder braces)  ** Current orders: NWB on B UEs for 3 mos post op; May remove sling for PT, ROM as tolerated at hand, wrist and elbow.  AAROM shoulder and pendulum        Patient History:  Living Environment Comment: Pt lives with significant other in [facility] with 3 KATHRYN and rail on R - has tub shower combo for bathing  Equipment Currently Used at Home: none(has access to TTB, RW, BSC and w/c)    Prior level of function:    Bed Mobility/Transfers: independent  Grooming: independent  Bathing: independent  Upper Body Dressing: independent  Lower Body Dressing: independent  Toileting: independent  Driving License: Yes  Leisure and Hobbies: casino     Dominant hand: right    Subjective:  Communicated with nurse prior to session.  "I want to get back in bed"  Chief " Complaint: pt with pain at gall bladder with movement  Patient/Family stated goals: get stronger and return home    Pain/Comfort  Pain Rating 1: 4/10  Location 1: (gall bladder)  Pain Addressed 1: Reposition  Pain Rating Post-Intervention 1: 4/10    Objective:   Patient found with: (Biliary drain; Pt supine in bed with wife at his side)    Cognitive Exam:  Oriented to: Person, Place and Time  Follows Commands/attention: Easily distracted and Follows one-step commands  Communication: at times difficult to comprehend  Memory:  Impaired STM  Safety awareness/insight to disability: impaired  Coping skills/emotional control: Appropriate to situation    Visual/perceptual:  Intact  tracking    Physical Exam:  Postural examination/scapula alignment:    -       Forward head  Skin integrity: bruising on arms  Edema: Mild B feet      Sensation:   -       Intact    Upper Extremity Range of Motion:  Right/Left Upper Extremity: WFL at hand, wrist and elbow - shoulder not tested    Upper Extremity Strength:  Right/Left Upper Extremity: Fair at wrist and elbows - shoulder not tested     Strength: Good    Fine motor coordination:   [id number]    Gross motor coordination: impaired due to limitations at shoulders and back    Occupational Performance:    Bed Mobility:    · Patient completed Rolling/Turning to Left with  minimum assistance  · Patient completed Scooting/Bridging with moderate assistance  · Patient completed Supine to Sit with moderate assistance  · Patient completed Sit to Supine with moderate assistance   · UB assist needed for supine <-> sit    Functional Mobility/Transfers:  · Patient completed Sit <> Stand Transfer with minimum assistance  with  no assistive device  during evaluation x1; pt performed sit to stand x3 during clothing mgmt and standing to step to HOB to reposition during PM session  · Functional Mobility: able to take 6 side steps to L with CGA - no AD      Activities of Daily Living:  · Feeding:   maximal assistance unable to bring utensils to mouth  · Grooming: maximal assistance able to reach mouth, but needs assistance with grooming tasks  · Bathing: total assistance sponge bath (per wife report - wife had bathed him prior to OT arrival)  · Upper Body Dressing: total assistance per wife report (already dressed with B braces on)  · Lower Body Dressing: total assistance per wife report  · Toileting: total assistance needs assist with hygiene and clothing mgmt - pt incontinent during PM session    AMPA 6 Click:  [facility] Total Score: 8    OT Exercises: AROM 2 sets of 10 reps of hand pumps and biceps curls to maintain ROM and strength of B UEs for daily living skills      Additional Treatment:  · Pt completed ADLs and func mobility activities for tx session as noted above  · OT reviewed WB precautions and spinal precautions with pt and significant other  · Significant other states that he is able to remove his L UE abduction brace on Monday (6 weeks after sx), however, MD order states 3 mos post op  · Whiteboard updated  · OT returned in PM after report that pt removed B UE immobilizers - needed total A to reposition immobilizers - required pt to sit on EOB for proper placement - pt left with 1 abdominal band off because it was rubbing against biliary drain where pt is having pain/sensitivity to touch - pt instructed to leave braces in place to keep shoulders positioned as ordered  · Pt educated on role of OT and POC      Patient left HOB elevated with significant other present    Assessment:  Ben Melvin is a 60 y.o. male with a medical diagnosis of <principal problem not specified> and deficits noted below.  Pt was agreeable to OT evaluation.  Goals established to assist pt with returning to OF regarding ADLs and func mobility.  Pt is returning to SNF following ER visit/admission due to change in status.  He has returned and continues to maintain NWB on B UEs and spinal precautions with B shoulder  immobilizer braces and LSO when OOB.  Pt requires significant assistance in all areas of ADLs (max to total A) due to the limitations on UE movement and WB.  Pt will benefit from skilled OT services in order to increase his level of safety and independence with ADLs and mobility.      Rehab identified problem list/impairments: weakness, impaired endurance, impaired self care skills, impaired functional mobilty, gait instability, impaired balance, impaired cognition, decreased coordination, decreased upper extremity function, decreased safety awareness, pain, decreased ROM, impaired coordination, impaired fine motor, impaired skin, edema, orthopedic precautions    Rehab potential is good    Activity tolerance: Good    Discharge recommendations: home health OT     Barriers to discharge: Inaccessible home environment     Equipment recommendations: (TBD - pt has BSC, RW, W/C, and TTB available at home to use )     GOALS:   Multidisciplinary Problems     Occupational Therapy Goals        Problem: Occupational Therapy Goal    Goal Priority Disciplines Outcome Interventions   Occupational Therapy Goal     OT, PT/OT Ongoing, Progressing    Description:  Goals to be met by: 03/26/2020     Patient will increase functional independence with ADLs by performing:    Feeding with Set-up Assistance.  UE Dressing with Moderate Assistance.  LE Dressing with Moderate Assistance.  Grooming while seated with Set-up Assistance.  Toileting from bedside commode with Moderate Assistance for hygiene and clothing management.   Bathing from  edge of bed with Moderate Assistance.  Toilet transfer to bedside commode with Stand-by Assistance.  Upper extremity exercise program 2 x12 reps per handout, with supervision according to orthopedic restrictions per MD order in order to maintain UE ROM and then, once cleared, in order to increase strength to improve ADLs and func mobility  Perform functional task in standing for 10 minutes with SBA in  order to prepare for safe standing ADLs/iADLs.                      PLAN: Patient to be seen 5 x/week, 6 x/week to address the above listed problems via self-care/home management, therapeutic activities, therapeutic exercises  Plan of Care expires: 04/06/20  Plan of Care reviewed with: patient, significant other    Carola Wilcox, OT  03/07/2020

## 2020-03-07 NOTE — NURSING
"Rounding, pt had removed ESTEVAN sling and feet were over foot board.  pt confused stating "where is everyone, there was a lot more of you, I am falling out the bed"  Repositioned for comfort, called therapist to replace sling. Pt diabetic did not eat lunch rechecked glucose 82, offered apple juice. Instructed to call for assist, therapist at side  "

## 2020-03-07 NOTE — PLAN OF CARE
Problem: Occupational Therapy Goal  Goal: Occupational Therapy Goal  Description  Goals to be met by: 03/26/2020     Patient will increase functional independence with ADLs by performing:    Feeding with Set-up Assistance.  UE Dressing with Moderate Assistance.  LE Dressing with Moderate Assistance.  Grooming while seated with Set-up Assistance.  Toileting from bedside commode with Moderate Assistance for hygiene and clothing management.   Bathing from  edge of bed with Moderate Assistance.  Toilet transfer to bedside commode with Stand-by Assistance.  Upper extremity exercise program 2 x12 reps per handout, with supervision according to orthopedic restrictions per MD order in order to maintain UE ROM and then, once cleared, in order to increase strength to improve ADLs and func mobility  Perform functional task in standing for 10 minutes with SBA in order to prepare for safe standing ADLs/iADLs.     Outcome: Ongoing, Progressing     Pt was agreeable to OT evaluation.  Goals established to assist pt with returning to PLOF regarding ADLs and func mobility.  Pt will benefit from skilled OT services in order to increase his level of safety and independence with ADLs and mobility.      Carola Wilcox, OT  3/7/2020

## 2020-03-08 LAB
ALBUMIN SERPL BCP-MCNC: 2.3 G/DL (ref 3.5–5.2)
ALP SERPL-CCNC: 237 U/L (ref 55–135)
ALT SERPL W/O P-5'-P-CCNC: 36 U/L (ref 10–44)
ANION GAP SERPL CALC-SCNC: 11 MMOL/L (ref 8–16)
AST SERPL-CCNC: 62 U/L (ref 10–40)
BASOPHILS # BLD AUTO: 0.08 K/UL (ref 0–0.2)
BASOPHILS NFR BLD: 0.9 % (ref 0–1.9)
BILIRUB SERPL-MCNC: 0.6 MG/DL (ref 0.1–1)
BUN SERPL-MCNC: 22 MG/DL (ref 6–20)
CALCIUM SERPL-MCNC: 8.8 MG/DL (ref 8.7–10.5)
CHLORIDE SERPL-SCNC: 100 MMOL/L (ref 95–110)
CO2 SERPL-SCNC: 26 MMOL/L (ref 23–29)
CREAT SERPL-MCNC: 1.3 MG/DL (ref 0.5–1.4)
DIFFERENTIAL METHOD: ABNORMAL
EOSINOPHIL # BLD AUTO: 0.5 K/UL (ref 0–0.5)
EOSINOPHIL NFR BLD: 4.9 % (ref 0–8)
ERYTHROCYTE [DISTWIDTH] IN BLOOD BY AUTOMATED COUNT: 18 % (ref 11.5–14.5)
EST. GFR  (AFRICAN AMERICAN): >60 ML/MIN/1.73 M^2
EST. GFR  (NON AFRICAN AMERICAN): 59.7 ML/MIN/1.73 M^2
GLUCOSE SERPL-MCNC: 101 MG/DL (ref 70–110)
HCT VFR BLD AUTO: 34.7 % (ref 40–54)
HGB BLD-MCNC: 10.1 G/DL (ref 14–18)
IMM GRANULOCYTES # BLD AUTO: 0.09 K/UL (ref 0–0.04)
IMM GRANULOCYTES NFR BLD AUTO: 1 % (ref 0–0.5)
INR PPP: 3 (ref 0.8–1.2)
LYMPHOCYTES # BLD AUTO: 1.8 K/UL (ref 1–4.8)
LYMPHOCYTES NFR BLD: 19.7 % (ref 18–48)
MCH RBC QN AUTO: 25.9 PG (ref 27–31)
MCHC RBC AUTO-ENTMCNC: 29.1 G/DL (ref 32–36)
MCV RBC AUTO: 89 FL (ref 82–98)
MONOCYTES # BLD AUTO: 0.6 K/UL (ref 0.3–1)
MONOCYTES NFR BLD: 6.7 % (ref 4–15)
NEUTROPHILS # BLD AUTO: 6.1 K/UL (ref 1.8–7.7)
NEUTROPHILS NFR BLD: 66.8 % (ref 38–73)
NRBC BLD-RTO: 0 /100 WBC
PLATELET # BLD AUTO: 424 K/UL (ref 150–350)
PMV BLD AUTO: 9.4 FL (ref 9.2–12.9)
POCT GLUCOSE: 100 MG/DL (ref 70–110)
POCT GLUCOSE: 111 MG/DL (ref 70–110)
POCT GLUCOSE: 86 MG/DL (ref 70–110)
POCT GLUCOSE: 93 MG/DL (ref 70–110)
POCT GLUCOSE: 94 MG/DL (ref 70–110)
POTASSIUM SERPL-SCNC: 4.3 MMOL/L (ref 3.5–5.1)
PROT SERPL-MCNC: 7.1 G/DL (ref 6–8.4)
PROTHROMBIN TIME: 28.8 SEC (ref 9–12.5)
RBC # BLD AUTO: 3.9 M/UL (ref 4.6–6.2)
SODIUM SERPL-SCNC: 137 MMOL/L (ref 136–145)
WBC # BLD AUTO: 9.14 K/UL (ref 3.9–12.7)

## 2020-03-08 PROCEDURE — 80053 COMPREHEN METABOLIC PANEL: CPT

## 2020-03-08 PROCEDURE — 97161 PT EVAL LOW COMPLEX 20 MIN: CPT

## 2020-03-08 PROCEDURE — 25000003 PHARM REV CODE 250: Performed by: INTERNAL MEDICINE

## 2020-03-08 PROCEDURE — 85610 PROTHROMBIN TIME: CPT

## 2020-03-08 PROCEDURE — 94640 AIRWAY INHALATION TREATMENT: CPT

## 2020-03-08 PROCEDURE — 97110 THERAPEUTIC EXERCISES: CPT

## 2020-03-08 PROCEDURE — 25000242 PHARM REV CODE 250 ALT 637 W/ HCPCS: Performed by: INTERNAL MEDICINE

## 2020-03-08 PROCEDURE — 97116 GAIT TRAINING THERAPY: CPT

## 2020-03-08 PROCEDURE — 63600175 PHARM REV CODE 636 W HCPCS: Performed by: STUDENT IN AN ORGANIZED HEALTH CARE EDUCATION/TRAINING PROGRAM

## 2020-03-08 PROCEDURE — S4991 NICOTINE PATCH NONLEGEND: HCPCS | Performed by: STUDENT IN AN ORGANIZED HEALTH CARE EDUCATION/TRAINING PROGRAM

## 2020-03-08 PROCEDURE — 11000004 HC SNF PRIVATE

## 2020-03-08 PROCEDURE — 97530 THERAPEUTIC ACTIVITIES: CPT

## 2020-03-08 PROCEDURE — 25000003 PHARM REV CODE 250: Performed by: STUDENT IN AN ORGANIZED HEALTH CARE EDUCATION/TRAINING PROGRAM

## 2020-03-08 PROCEDURE — 85025 COMPLETE CBC W/AUTO DIFF WBC: CPT

## 2020-03-08 PROCEDURE — 94761 N-INVAS EAR/PLS OXIMETRY MLT: CPT

## 2020-03-08 RX ORDER — WARFARIN 1 MG/1
4 TABLET ORAL DAILY
Status: DISCONTINUED | OUTPATIENT
Start: 2020-03-08 | End: 2020-03-08

## 2020-03-08 RX ORDER — WARFARIN 1 MG/1
4 TABLET ORAL DAILY
Status: DISCONTINUED | OUTPATIENT
Start: 2020-03-09 | End: 2020-03-09

## 2020-03-08 RX ORDER — WARFARIN 2.5 MG/1
5 TABLET ORAL DAILY
Status: DISCONTINUED | OUTPATIENT
Start: 2020-03-08 | End: 2020-03-08

## 2020-03-08 RX ORDER — HYDROCODONE BITARTRATE AND ACETAMINOPHEN 5; 325 MG/1; MG/1
1 TABLET ORAL EVERY 4 HOURS PRN
Status: DISCONTINUED | OUTPATIENT
Start: 2020-03-08 | End: 2020-03-25 | Stop reason: HOSPADM

## 2020-03-08 RX ORDER — WARFARIN 2.5 MG/1
5 TABLET ORAL DAILY
Status: DISCONTINUED | OUTPATIENT
Start: 2020-03-09 | End: 2020-03-08

## 2020-03-08 RX ORDER — LEVALBUTEROL INHALATION SOLUTION 0.63 MG/3ML
0.63 SOLUTION RESPIRATORY (INHALATION) EVERY 8 HOURS PRN
Status: DISCONTINUED | OUTPATIENT
Start: 2020-03-08 | End: 2020-03-25 | Stop reason: HOSPADM

## 2020-03-08 RX ORDER — IBUPROFEN 200 MG
1 TABLET ORAL DAILY
Status: DISCONTINUED | OUTPATIENT
Start: 2020-03-09 | End: 2020-03-25 | Stop reason: HOSPADM

## 2020-03-08 RX ORDER — GABAPENTIN 300 MG/1
300 CAPSULE ORAL NIGHTLY
Status: DISCONTINUED | OUTPATIENT
Start: 2020-03-08 | End: 2020-03-25 | Stop reason: HOSPADM

## 2020-03-08 RX ADMIN — SENNOSIDES AND DOCUSATE SODIUM 1 TABLET: 8.6; 5 TABLET ORAL at 08:03

## 2020-03-08 RX ADMIN — ATORVASTATIN CALCIUM 80 MG: 20 TABLET, FILM COATED ORAL at 08:03

## 2020-03-08 RX ADMIN — CIPROFLOXACIN HYDROCHLORIDE 750 MG: 500 TABLET, FILM COATED ORAL at 10:03

## 2020-03-08 RX ADMIN — METOPROLOL TARTRATE 50 MG: 50 TABLET, FILM COATED ORAL at 10:03

## 2020-03-08 RX ADMIN — NICOTINE 1 PATCH: 21 PATCH, EXTENDED RELEASE TRANSDERMAL at 10:03

## 2020-03-08 RX ADMIN — POLYETHYLENE GLYCOL 3350 17 G: 17 POWDER, FOR SOLUTION ORAL at 10:03

## 2020-03-08 RX ADMIN — METRONIDAZOLE 500 MG: 500 TABLET ORAL at 09:03

## 2020-03-08 RX ADMIN — OXYCODONE HYDROCHLORIDE 10 MG: 10 TABLET ORAL at 11:03

## 2020-03-08 RX ADMIN — CYANOCOBALAMIN TAB 1000 MCG 1000 MCG: 1000 TAB at 10:03

## 2020-03-08 RX ADMIN — SENNOSIDES AND DOCUSATE SODIUM 1 TABLET: 8.6; 5 TABLET ORAL at 10:03

## 2020-03-08 RX ADMIN — LEVALBUTEROL HYDROCHLORIDE 0.63 MG: 0.63 SOLUTION RESPIRATORY (INHALATION) at 08:03

## 2020-03-08 RX ADMIN — POLYETHYLENE GLYCOL 3350 17 G: 17 POWDER, FOR SOLUTION ORAL at 09:03

## 2020-03-08 RX ADMIN — GABAPENTIN 300 MG: 300 CAPSULE ORAL at 08:03

## 2020-03-08 RX ADMIN — METOPROLOL TARTRATE 50 MG: 50 TABLET, FILM COATED ORAL at 08:03

## 2020-03-08 RX ADMIN — LEVETIRACETAM 1000 MG: 500 TABLET ORAL at 10:03

## 2020-03-08 RX ADMIN — RAMIPRIL 5 MG: 5 CAPSULE ORAL at 10:03

## 2020-03-08 RX ADMIN — CIPROFLOXACIN HYDROCHLORIDE 750 MG: 500 TABLET, FILM COATED ORAL at 08:03

## 2020-03-08 RX ADMIN — PANTOPRAZOLE SODIUM 40 MG: 40 TABLET, DELAYED RELEASE ORAL at 10:03

## 2020-03-08 RX ADMIN — ENOXAPARIN SODIUM 100 MG: 100 INJECTION SUBCUTANEOUS at 10:03

## 2020-03-08 RX ADMIN — METRONIDAZOLE 500 MG: 500 TABLET ORAL at 02:03

## 2020-03-08 RX ADMIN — METRONIDAZOLE 500 MG: 500 TABLET ORAL at 06:03

## 2020-03-08 RX ADMIN — Medication 6 MG: at 08:03

## 2020-03-08 RX ADMIN — LEVETIRACETAM 1000 MG: 500 TABLET ORAL at 08:03

## 2020-03-08 RX ADMIN — HYDROCODONE BITARTRATE AND ACETAMINOPHEN 1 TABLET: 5; 325 TABLET ORAL at 08:03

## 2020-03-08 NOTE — PT/OT/SLP EVAL
PhysicalTherapy   Evaluation    Ben Melvin   MRN: [id number]     PT Received On: 03/08/20  PT Start Time: 0908     PT Stop Time: 0958    PT Total Time (min): 50 min       Billable Minutes:  Evaluation 10, Gait Training 15, Therapeutic Activity 15 and Therapeutic Exercise 10    Diagnosis: <principal problem not specified>sepsis and [place] [facility] [place]  Additionally s/p shoulder sx and orders: NWB BUE 3 months postop, may remove sling for PT; ROM as tolerated hand wrist elbow, active assisted ROM shoulder and pendulums and s/p compression fx and has spinal precautions and LSO for OOB.  Past Medical History:   Diagnosis Date    Adenoma of right adrenal gland 11/09/2017    Alcohol abuse     ASCVD (arteriosclerotic cardiovascular disease) 10/2008    [place]    BPH (benign prostatic hyperplasia)     Cardiomyopathy     Chronic anxiety     Cirrhosis of liver     COPD (chronic obstructive pulmonary disease)     Current every day smoker     2-3 ppd    Elevated LFTs 08/2001    GERD with esophagitis 11/09/2017    History of colon polyps     History of epididymitis 2016    Juan Antonio    Hyperlipidemia     Hypertension, essential     Long term (current) use of anticoagulants     Lumbar disc disease with radiculopathy 05/2015    Major depression, recurrent, chronic     PAF (paroxysmal atrial fibrillation)     Peripheral autonomic neuropathy due to DM     Proteinuria     PVD (peripheral vascular disease) 05/29/2009    Venous insufficiency of both lower extremities     Vitamin D deficiency       Past Surgical History:   Procedure Laterality Date    biopsy back  05/20/2019    benign Martinez    biopsy right ankle Right 05/20/2019    Martinez, benign    COLONOSCOPY N/A 6/13/2019    Procedure: COLONOSCOPY;  Surgeon: Delmer Kerr MD;  [facility]  Service: Endoscopy;  Laterality: N/A;    COLONOSCOPY W/ POLYPECTOMY      EPIDURAL STEROID INJECTION INTO LUMBAR SPINE  06/2015    LASHAE  Wes    ESOPHAGOGASTRODUODENOSCOPY N/A 6/13/2019    Procedure: ESOPHAGOGASTRODUODENOSCOPY (EGD);  Surgeon: Delmer Kerr MD;  [facility]: [facility];  Service: Endoscopy;  Laterality: N/A;    INTESTINAL MALROTATION REPAIR Right 1961    2 weeks old, Martinez    OPEN REDUCTION AND INTERNAL FIXATION (ORIF) OF FRACTURE OF PROXIMAL HUMERUS Right 2/14/2020    Procedure: ORIF, FRACTURE, HUMERUS, PROXIMAL - ORIF R greater tuberosity - suture repair. Trios, supine, fiberwire;  Surgeon: Chandler Chris MD;  Location: [address];  Service: Orthopedics;  Laterality: Right;    PARTIAL ARTHROPLASTY OF SHOULDER Left 1/28/2020    Procedure: HEMIARTHROPLASTY, SHOULDER;  Surgeon: Chandler Chris MD;  Location: [facility] OR [address] [place] [address];  Service: Orthopedics;  Laterality: Left;    PARTIAL ARTHROPLASTY OF SHOULDER Right 1/30/2020    Procedure: HEMIARTHROPLASTY, SHOULDER- RIGHT- MEKA- SUPINE-  TRIOS;  Surgeon: Chandler Chris MD;  Location: [address];  Service: Orthopedics;  Laterality: Right;    PERCUTANEOUS TRANSLUMINAL ANGIOPLASTY (PTA) OF PERIPHERAL VESSEL Right 12/2014    NIKOLAS Childs         General Precautions: Standard, fall  Orthopedic Precautions: RUE non weight bearing, LUE non weight bearing, spinal precautions   Braces: UE Sling, LSO         Patient History:  Lives With: significant other  Living Arrangements: house  Home Accessibility: stairs to enter home  Home Layout: Able to live on 1st floor  Stair Railings at Home: outside, present on right side  Transportation Anticipated: health plan transportation  Living Environment Comment: Pt lives w/ his S.O. in a 1[place] w/ 3 KATHRYN and RHR. Pt has a tub/shower combo w/ a grab bar. Pt's S.O. is retired and able to assist him 24/7 upon D/C.   Equipment Currently Used at Home: none  DME owned (not currently used): rolling walker and wheelchair    Previous Level of Function:  Ambulation Skills: independent  Transfer Skills: independent  ADL  Skills: independent  Work/Leisure Activity: independent    Subjective:  Communicated with patient prior to session.  Patient agreeable to session.   Chief Complaint: can't use arms  Patient goals: return home to PLOF    Pain/Comfort  Pain Rating 1: 0/10  Pain Rating Post-Intervention 1: 0/10    Objective:  Patient found HOB elevated, BUE abd slings in place  Sitting EOB donned LSO w/ wife assisting with Patient found with: (biliary drain)    Cognitive Exam:  Oriented to: Person, Place, Time and Situation  Follows Commands/attention: Follows one-step commands  Communication: clear/fluent and sometimes guttural  Safety awareness/insight to disability: impaired    Physical Exam:  Postural examination/scapula alignment:    -       Rounded shoulders    Skin integrity: small abrasions lateral Left lower leg and small 'skinned' area anterior knee  Edema: None noted     Sensation:   Light touch grossly intact LEs    Upper Extremity Range of Motion:  Right Upper Extremity: not tested  Left Upper Extremity: not tested  B shoulder slings.       Upper Extremity Strength:  Right Upper Extremity: not tested  Left Upper Extremity: not tested    Lower Extremity Range of Motion:  Right Lower Extremity: WFL  Left Lower Extremity: WFL    Lower Extremity Strength:  Right Lower Extremity: WFL  Left Lower Extremity: WFL         Gross motor coordination: WFL LEs      AM-PAC 6 CLICK MOBILITY  Total Score:13    Bed Mobility:  Sit>Supine:w/ HOB elevated w/ CGA  Supine>Sit:not performed. Patient remained up in chair    Transfers:  Sit<>Stand: from bed w/o AD w/ CGA; to/from w/c x2 trials w/o AD w/ CGA of 2 for safety.  Stand Pivot Transfer: bed>w/c w/o AD w/ CGA;   NWB BLUE, spinal precautions    Gait:  Amb w/o AD w/ CGA of 2 and w/c closely follow 5 feet 16 feet. Seated rest break between trials. Patient amb w/ decreased step length and foot clearance, lateral sway, foot flat contact.       Advanced Gait:  Stairs: unable  Curb Step:  unable    Wheelchair Mobility:  Patient propels w/c w/ BLEs and  feet. Frequent rests,      Therex:  W/ red TB, performs 15x knee flex, ext, ankle PF, DF    Balance:  Sits EOB w/ SBA   Stands w/o AD w/ CGA for safety    Additional Treatment:  LSO donned and sh abd slings adjusted sitting EOB. Patientts s/o knowledgable and manages w/ occ assistance (for re-routing shoulder straps over LSO)    Patient left up in chair with all lines intact, call button in reach, wife present and telesitter camera. Returned to room via rehab tech.    Assessment:  Ben Melvin is a 60 y.o. male with a medical diagnosis of <principal problem not specified>. Patient s/p hospitalization for sepsis and has a drain in gall bladder. He is NWB BUE and has slings and has spinal precautions and LSO . NWB BUE 3 months postop, may remove sling for PT; ROM as tolerated hand wrist elbow, active assisted ROM shoulder and pendulums.  Patient participated well and will benefit from skilled Physical therapy to address deficits and improve safety and functional mobility to allow patient to progress to safe discharge home.      Rehab identified problem list/impairments: weakness, impaired endurance, gait instability, impaired functional mobilty, decreased upper extremity function, impaired balance, orthopedic precautions    Rehab potential is good.    Activity tolerance: Good    Discharge recommendations: home with home health     Barriers to discharge: Inaccessible home environment    Equipment recommendations: wheelchair, bedside commode, bath bench(pt s/o, has DME from prior family members)     GOALS:   Multidisciplinary Problems     Physical Therapy Goals        Problem: Physical Therapy Goal    Goal Priority Disciplines Outcome Goal Variances Interventions   Physical Therapy Goal     PT, PT/OT      Description:  Goals to be met by: 20     Patient will increase functional independence with mobility by performin. Supine to sit  with Stand-by Assistance and observing all precautions  2. Sit to supine with Stand-by Assistance and observing all precautions  3. Sit to stand transfer with Stand-by Assistance and observing all precautions  4. Bed to chair transfer with Stand-by Assistance, with LRAD if appropriate,  and observing all precautions  5. Gait  x 100 feet with Stand-by Assistance  With LRAD, if appropriate, and observing all precautions.   6. Ascend/descend 3 stair  Handrails Minimal Assistance and maintaining weight-bearing precaution(s), all precautions.   7. Ascend/Descend 4 inch curb step with Minimal Assistance using LRAD, if appropriate  and observing all precautions.  8. Stand for 3 minutes with Stand-by Assistance   9. Lower extremity exercise program x20 reps per handout, with assistance as needed and gym therex                      PLAN:    Patient to be seen 5 x/week  to address the above listed problems via gait training, therapeutic activities, therapeutic exercises, wheelchair management/training  Plan of Care Expires: 04/07/20    Lissette Cline, PT 3/8/2020

## 2020-03-08 NOTE — NURSING
"Upon making rounds, found pt sitting on floor on side of bed. Wife was sitting in recliner in front of pt. Wife states that pt "slid out of bed trying to go to the bathroom." Pt alert and oriented at this time. Moves all extremities without difficulty. Wife denies that pt hit head. Pt asking this Nurse not to report fall. Abrasion noted to L knee. Wife states that pt hit knee on bed rail. Side rails noted up x3. Pt assisted back to bed without difficulty. Bed alarm on.  Notified on call provider (Tammy Calderon) of incident. Reported to oncoming Staff Nurse and Charge Nurse.  "

## 2020-03-08 NOTE — PLAN OF CARE
Problem: Adult Inpatient Plan of Care  Goal: Plan of Care Review  Outcome: Ongoing, Progressing  Flowsheets (Taken 3/8/2020 0030)  Plan of Care Reviewed With: patient; spouse     Problem: Fall Injury Risk  Goal: Absence of Fall and Fall-Related Injury  Outcome: Ongoing, Not Progressing

## 2020-03-08 NOTE — PLAN OF CARE
Problem: Physical Therapy Goal  Goal: Physical Therapy Goal  Description  Goals to be met by: 20     Patient will increase functional independence with mobility by performin. Supine to sit with Stand-by Assistance and observing all precautions  2. Sit to supine with Stand-by Assistance and observing all precautions  3. Sit to stand transfer with Stand-by Assistance and observing all precautions  4. Bed to chair transfer with Stand-by Assistance, with LRAD if appropriate,  and observing all precautions  5. Gait  x 100 feet with Stand-by Assistance  With LRAD, if appropriate, and observing all precautions.   6. Ascend/descend 3 stair  Handrails Minimal Assistance and maintaining weight-bearing precaution(s), all precautions.   7. Ascend/Descend 4 inch curb step with Minimal Assistance using LRAD, if appropriate  and observing all precautions.  8. Stand for 3 minutes with Stand-by Assistance   9. Lower extremity exercise program x20 reps per handout, with assistance as needed and gym therex       Outcome: Ongoing, Progressing

## 2020-03-08 NOTE — H&P
Hospital Medicine  History and Physical Exam    Admit Date: 3/6/2020  KIRBY TBD  Principal Problem:  Cholelithiasis with acute cholecystitis   Primary care Physician: Cristal Ohara NP  Code status: Full Code    HPI:   Ben Melvin is a 60 y.o. male with PMH of AFib on coumadin, peripheral vascular disease, hypertension, COPD, current smoker, liver cirrhosis had a witnessed seizure while playing video poker 01/27/2020 had sustained bilateral proximal humerus fracture dislocations, bilateral acromial fractures, subdural hemorrhage, L1 and L4 burst fractures. He underwent left shoulder hemiarthroplasty, biceps tenodesis on 01/28/20 and right shoulder hemiarthroplasty, biceps tenodesis on 01/30/20 and [facility]. BL shoulder XRs from 02/10/20 notable for failure of the repair of the greater tuberosity on the right shoulder and [place] [facility] [place] revision fixation of his right greater tuberosity fracture. He was sent back to [facility] 3/2 due to metabolic encephalopathy and leukocytosis. Work up revealed acute cholecystitis. IR placed percutaneous cholecystostomy tube to be place for at least 6 weeks. He states bilateral shoulder pain are well-controlled. He does have RUQ pain that radiates to epigastrum. No associated nausea or vomiting. A cramping pain has been intermittent since his percutaneous tube placement. It waxes and wanes in intensity. There are not exacerbating or releiving factors.    Patient transferred to [facility] with PT and OT to improve functional status and ability to perform ADLs.     Past Medical History: Patient has a past medical history of Adenoma of right adrenal gland (11/09/2017), Alcohol abuse, ASCVD (arteriosclerotic cardiovascular disease) (10/2008), BPH (benign prostatic hyperplasia), Cardiomyopathy, Chronic anxiety, Cirrhosis of liver, COPD (chronic obstructive pulmonary disease), Current every day smoker, Elevated LFTs (08/2001), GERD with esophagitis  (11/09/2017), History of colon polyps, History of epididymitis (2016), Hyperlipidemia, Hypertension, essential, Long term (current) use of anticoagulants, Lumbar disc disease with radiculopathy (05/2015), Major depression, recurrent, chronic, PAF (paroxysmal atrial fibrillation), Peripheral autonomic neuropathy due to DM, Proteinuria, PVD (peripheral vascular disease) (05/29/2009), Venous insufficiency of both lower extremities, and Vitamin D deficiency.    Past Surgical History: Patient has a past surgical history that includes Percutaneous transluminal angioplasty (PTA) of peripheral vessel (Right, 12/2014); Intestinal malrotation repair (Right, 1961); Epidural steroid injection into lumbar spine (06/2015); biopsy right ankle (Right, 05/20/2019); biopsy back (05/20/2019); Colonoscopy w/ polypectomy; Esophagogastroduodenoscopy (N/A, 6/13/2019); Colonoscopy (N/A, 6/13/2019); Partial arthroplasty of shoulder (Left, 1/28/2020); Partial arthroplasty of shoulder (Right, 1/30/2020); and Open reduction and internal fixation (ORIF) of fracture of proximal humerus (Right, 2/14/2020).    Social History: Patient reports that he has been smoking cigarettes. He has a 360.00 pack-year smoking history. He has never used smokeless tobacco. He reports that he drank alcohol. He reports that he does not use drugs.    Family History: family history includes Acromegaly in his father; Diabetes in his mother; Heart attack in his father; Hypertension in his father and mother; Kidney cancer in his brother.    Medications: reviewed     Allergies: Patient has No Known Allergies.    ROS  Constitutional: no fever or chills  Respiratory: no cough or shortness of breath  Cardiovascular: no chest pain or palpitations  Gastrointestinal: see HPI, change in bowel habits  Genitourinary: no hematuria or dysuria  Integument/Breast: no rash or pruritis  Hematologic/Lymphatic: no easy bruising or lymphadenopathy  Musculoskeletal: see HPI  Neurological:  no seizures or tremors  Behavioral/Psych: no depression or anxiety    PEx  Temp:  [97.5 °F (36.4 °C)]   Pulse:  []   Resp:  [16-20]   BP: ()/(62-72)   SpO2:  [93 %-98 %]   Body mass index is 30.87 kg/m².     General appearance: no distress  Mental status: Alert and oriented x 3  HEENT:  conjunctivae/corneas clear, PERRL  Neck: supple, thyroid not enlarged  Pulm:   normal respiratory effort, CTA B, no c/w/r  Card: RRR, no murmur  Abd: soft, NT, ND, BS present; no masses, no organomegaly  Ext: no edema; BUE in shoulder brace. LUE wound covered with post-op dressing and RUE wound healing well, clean, dry and intact  Pulses: 2+, symmetric  Skin: color, texture, turgor normal. No rashes or lesions  Neuro: CN II-XII grossly intact, no focal numbness or weakness, normal strength and tone     Recent Labs   Lab 03/04/20  0527 03/05/20  0405 03/06/20 0313 03/07/20  0607 03/08/20  0630    139 140 139 137   K 4.0 3.8 3.7 4.8 4.3    102 102 100 100   CO2 25 28 26 27 26   BUN 28* 24* 22* 22* 22*   CREATININE 1.6* 1.5* 1.4 1.3 1.3   GLU 75 87 75 87 101   CALCIUM 9.2 8.8 9.0 9.4 8.8   MG 1.9 1.9 1.9  --   --    PHOS 2.9 3.0 2.9  --   --      Recent Labs   Lab 03/06/20 0313 03/07/20  0607 03/08/20  0630   ALKPHOS 254* 280* 237*   ALT 26 32 36   AST 51* 54* 62*   ALBUMIN 1.9* 2.4* 2.3*   PROT 6.6 7.6 7.1   BILITOT 0.7 0.7 0.6   INR 1.1  1.1 1.8*  1.8* 3.0*       Recent Labs   Lab 03/06/20 0313 03/07/20  0607 03/08/20  0630   WBC 9.15 10.76 9.14   HGB 8.6* 10.5* 10.1*   HCT 29.7* 35.2* 34.7*    376* 424*   GRAN 70.5  6.5 70.4  7.6 66.8  6.1   LYMPH 14.5*  1.3 16.3*  1.8 19.7  1.8   MONO 7.7  0.7 7.2  0.8 6.7  0.6       Recent Labs   Lab 03/07/20  1109 03/07/20  1446 03/07/20  1617 03/07/20  2148 03/08/20  0730 03/08/20  1128   POCTGLUCOSE 97 82 105 93 100 111*       Assessment and Plan:    Acute cholecystitis  Continue zosyn and metronidazole for 1 week total  S/p percutaneous  cholecystostomy   Follow up with general surgery in 4 week from placement    Bilateral fracture dislocation of proximal humerus fracture  Bilateral acromial fractures with bilateral shoulder dislocation  S/p bilateral shoulder hemiarthroplasty: right on 1/28/2020 and left on 1/30/2020  S/p Pt underwent revision of fixation of R greater tuberosity fracture on 02/14/20  Follow up with orthopedics as scheduled  PT/OT - nonweightbearing bilateral upper extremities in sling  DVT prophylaxis - continue warfarin as for atrial fibrillaiton    Neuropathic pain  Gabapentin 300 mg qhs  Capsaicin cream prn  Hydrocodone  5/aceteaminophen 325 mg prn    Pathologic osteoporotic fractures  Treat vitamin D deficiency  Further evaluation and management as per PCP     CANDY on CKD III, resolved  SCr with wandering baseline 0.8-1.4     GERD (gastroesophageal reflux disease)  Continue Pantoprazole 40 mg daily     Stable burst fracture of first lumbar vertebra  L1 and L4 burst fractures  f/u in neurosurgery clinic as scheduled on 02/21/20 for further management  TSLO brace whenever not lying in bed    Constipation  Continue daily miralax and senokot-s      Subdural hematoma  Stable  Cleared to resume anticoagualtion     Type 2 diabetes mellitus with CKD III and HTN  A1c 5.9 from 01/27/20  Not on treatment current  Acuchecks in general <120  Stop accucheck and SSI     Severe Vitamin D deficiency  Level of 7 on 2/4  Ergocalciferol 50k weekly     Essential hypertension  Home medications: metoprolol and ramipril at home.   Metoprolol 50 mg BID with low normal BPs   Ramipril held due to lowish BPs     PAF (paroxysmal atrial fibrillation)  Chronic anticoagulation  Encounter for therapeutic monitoring  Resume warfarin for 2-3   Dosed at 4 mg daily   Will likely need increase back to home dosing once antibiotics have stopped  Continue metoprolol     COPD (chronic obstructive pulmonary disease)  Smoker   -- change levalbuterol nebs to prn  -- Limit  oxygen to avoid hypercapnea  -nicotine patch 14 mg daily, can wean down as tolearted  -smoking cessation advised    Seizure disorder - continue keppra 1 g BID  Follow up with neurology after discharge - appointment needs to be made    Hyperlipidemia - atorvastatin 80 mg daily    NANCY - protonix 40 mg daily    Anemic of acute infection   improving without specific intervention    Moderate protein calorie malnutrition - boost plus TID with meals  Regular diet  Dietary consult    Acute metabolic encephalopathy  Probable mild Cognitive impairment  speech therapy eval  Consider neuropsych eval  Hold amitriptyline for now, will use melatonin for sleep    DJD of multiple sites - PT/OT. Pain management as above    Cirrhosis of liver attributed to alcohol. Patient rarely drinks alcohol at this time.    Continue the following medications for treatment of the indicated conditions:  ·  Indication Medication Dose Route  Frequency       hyperlipidemia atorvastatin  80 mg Oral QHS    vitamain D deficiency [START ON 2/22/2020] ergocalciferol  50,000 Units Oral Q7 Days    neuropathic pain gabapentin  300 mg Oral qhs    Seizure disorder levETIRAcetam  1,000 mg Oral BID    Atrial fibrillation metoprolol tartrate  50 mg Oral BID    Nicotine abuse nicotine  1 patch Transdermal Daily    NANCY pantoprazole  40 mg Oral Daily    BM regimen polyethylene glycol  17 g Oral BID    BM regimen senna-docusate 8.6-50 mg  2 tablet Oral BID    Atrial fibrillaiton warfarin  5 mg Oral Daily       Future Appointments   Date Time Provider Department Center   3/24/2020  8:00 AM Cristal Ohara [place] [facility]   3/25/2020  8:15 AM Chandler Chris MD [facility] ORTHO [facility] [place]   4/23/2020  8:15 AM [facility] OIC-CT2 500 LB LIMIT [facility]   4/23/2020  8:30 AM [facility] OIC-CT2 500 LB LIMIT [facility]   4/23/2020  9:30 AM Carl Barry MD [facility] NEUROS7 [facility] [place]       I certify that SNF services are required to be  given on an inpatient basis because Ben Melvin needs for skilled nursing care and/or skilled rehabilitation are required on a daily basis and such services can only practically be provided in a skilled nursing facility setting and are for an ongoing condition for which she received inpatient care in the hospital.     Time (minutes) spent in care of the patient including face-to-face contact and coordination of care while on unit: 40    Jennifer Anne MD

## 2020-03-09 PROBLEM — E44.0 MODERATE MALNUTRITION: Status: ACTIVE | Noted: 2020-03-09

## 2020-03-09 LAB
ANION GAP SERPL CALC-SCNC: 13 MMOL/L (ref 8–16)
BACTERIA BLD CULT: NORMAL
BACTERIA BLD CULT: NORMAL
BASOPHILS # BLD AUTO: 0.1 K/UL (ref 0–0.2)
BASOPHILS NFR BLD: 1 % (ref 0–1.9)
BUN SERPL-MCNC: 22 MG/DL (ref 6–20)
CALCIUM SERPL-MCNC: 9.1 MG/DL (ref 8.7–10.5)
CHLORIDE SERPL-SCNC: 100 MMOL/L (ref 95–110)
CO2 SERPL-SCNC: 26 MMOL/L (ref 23–29)
CREAT SERPL-MCNC: 1.4 MG/DL (ref 0.5–1.4)
DIFFERENTIAL METHOD: ABNORMAL
EOSINOPHIL # BLD AUTO: 0.3 K/UL (ref 0–0.5)
EOSINOPHIL NFR BLD: 3 % (ref 0–8)
ERYTHROCYTE [DISTWIDTH] IN BLOOD BY AUTOMATED COUNT: 18.3 % (ref 11.5–14.5)
EST. GFR  (AFRICAN AMERICAN): >60 ML/MIN/1.73 M^2
EST. GFR  (NON AFRICAN AMERICAN): 54.6 ML/MIN/1.73 M^2
GLUCOSE SERPL-MCNC: 81 MG/DL (ref 70–110)
HCT VFR BLD AUTO: 37.3 % (ref 40–54)
HGB BLD-MCNC: 10.7 G/DL (ref 14–18)
IMM GRANULOCYTES # BLD AUTO: 0.11 K/UL (ref 0–0.04)
IMM GRANULOCYTES NFR BLD AUTO: 1.1 % (ref 0–0.5)
INR PPP: 3.2 (ref 0.8–1.2)
LYMPHOCYTES # BLD AUTO: 1.5 K/UL (ref 1–4.8)
LYMPHOCYTES NFR BLD: 15.2 % (ref 18–48)
MAGNESIUM SERPL-MCNC: 2 MG/DL (ref 1.6–2.6)
MCH RBC QN AUTO: 26.1 PG (ref 27–31)
MCHC RBC AUTO-ENTMCNC: 28.7 G/DL (ref 32–36)
MCV RBC AUTO: 91 FL (ref 82–98)
MONOCYTES # BLD AUTO: 0.8 K/UL (ref 0.3–1)
MONOCYTES NFR BLD: 7.9 % (ref 4–15)
NEUTROPHILS # BLD AUTO: 7.3 K/UL (ref 1.8–7.7)
NEUTROPHILS NFR BLD: 71.8 % (ref 38–73)
NRBC BLD-RTO: 0 /100 WBC
PHOSPHATE SERPL-MCNC: 4.3 MG/DL (ref 2.7–4.5)
PLATELET # BLD AUTO: 394 K/UL (ref 150–350)
PMV BLD AUTO: 9.3 FL (ref 9.2–12.9)
POTASSIUM SERPL-SCNC: 4.3 MMOL/L (ref 3.5–5.1)
PROTHROMBIN TIME: 30.4 SEC (ref 9–12.5)
RBC # BLD AUTO: 4.1 M/UL (ref 4.6–6.2)
SODIUM SERPL-SCNC: 139 MMOL/L (ref 136–145)
WBC # BLD AUTO: 10.11 K/UL (ref 3.9–12.7)

## 2020-03-09 PROCEDURE — 97802 MEDICAL NUTRITION INDIV IN: CPT

## 2020-03-09 PROCEDURE — 97110 THERAPEUTIC EXERCISES: CPT | Mod: CQ

## 2020-03-09 PROCEDURE — 25000003 PHARM REV CODE 250: Performed by: STUDENT IN AN ORGANIZED HEALTH CARE EDUCATION/TRAINING PROGRAM

## 2020-03-09 PROCEDURE — 11000004 HC SNF PRIVATE

## 2020-03-09 PROCEDURE — 85025 COMPLETE CBC W/AUTO DIFF WBC: CPT

## 2020-03-09 PROCEDURE — 99306 PR NURSING FACILITY CARE, INIT, HIGH SEVERITY: ICD-10-PCS | Mod: ,,, | Performed by: INTERNAL MEDICINE

## 2020-03-09 PROCEDURE — 97530 THERAPEUTIC ACTIVITIES: CPT | Mod: CQ

## 2020-03-09 PROCEDURE — 25000003 PHARM REV CODE 250: Performed by: INTERNAL MEDICINE

## 2020-03-09 PROCEDURE — 84100 ASSAY OF PHOSPHORUS: CPT

## 2020-03-09 PROCEDURE — 85610 PROTHROMBIN TIME: CPT

## 2020-03-09 PROCEDURE — 36415 COLL VENOUS BLD VENIPUNCTURE: CPT

## 2020-03-09 PROCEDURE — 99306 1ST NF CARE HIGH MDM 50: CPT | Mod: ,,, | Performed by: INTERNAL MEDICINE

## 2020-03-09 PROCEDURE — 80048 BASIC METABOLIC PNL TOTAL CA: CPT

## 2020-03-09 PROCEDURE — 83735 ASSAY OF MAGNESIUM: CPT

## 2020-03-09 PROCEDURE — 97530 THERAPEUTIC ACTIVITIES: CPT | Mod: CO

## 2020-03-09 PROCEDURE — 97116 GAIT TRAINING THERAPY: CPT | Mod: CQ

## 2020-03-09 RX ORDER — WARFARIN 1 MG/1
4 TABLET ORAL DAILY
Status: DISCONTINUED | OUTPATIENT
Start: 2020-03-10 | End: 2020-03-10

## 2020-03-09 RX ADMIN — METRONIDAZOLE 500 MG: 500 TABLET ORAL at 06:03

## 2020-03-09 RX ADMIN — METOPROLOL TARTRATE 50 MG: 50 TABLET, FILM COATED ORAL at 09:03

## 2020-03-09 RX ADMIN — GABAPENTIN 300 MG: 300 CAPSULE ORAL at 09:03

## 2020-03-09 RX ADMIN — Medication 6 MG: at 09:03

## 2020-03-09 RX ADMIN — CYANOCOBALAMIN TAB 1000 MCG 1000 MCG: 1000 TAB at 09:03

## 2020-03-09 RX ADMIN — PANTOPRAZOLE SODIUM 40 MG: 40 TABLET, DELAYED RELEASE ORAL at 09:03

## 2020-03-09 RX ADMIN — LEVETIRACETAM 1000 MG: 500 TABLET ORAL at 09:03

## 2020-03-09 RX ADMIN — POLYETHYLENE GLYCOL 3350 17 G: 17 POWDER, FOR SOLUTION ORAL at 09:03

## 2020-03-09 RX ADMIN — CIPROFLOXACIN HYDROCHLORIDE 750 MG: 500 TABLET, FILM COATED ORAL at 09:03

## 2020-03-09 RX ADMIN — SENNOSIDES AND DOCUSATE SODIUM 1 TABLET: 8.6; 5 TABLET ORAL at 09:03

## 2020-03-09 RX ADMIN — METRONIDAZOLE 500 MG: 500 TABLET ORAL at 09:03

## 2020-03-09 RX ADMIN — HYDROCODONE BITARTRATE AND ACETAMINOPHEN 1 TABLET: 5; 325 TABLET ORAL at 09:03

## 2020-03-09 RX ADMIN — ATORVASTATIN CALCIUM 80 MG: 20 TABLET, FILM COATED ORAL at 09:03

## 2020-03-09 RX ADMIN — METRONIDAZOLE 500 MG: 500 TABLET ORAL at 01:03

## 2020-03-09 NOTE — PT/OT/SLP PROGRESS
Physical Therapy  Treatment    Ben Melvin   MRN: [id number]   Admitting Diagnosis: <principal problem not specified>    PT Received On: 03/09/20  Total Time (min): (--)       Billable Minutes:  Gait Training 10, Therapeutic Activity 10 and Therapeutic Exercise 18    Treatment Type: Treatment  PT/PTA: PTA     PTA Visit Number: 1       General Precautions: Standard, fall  Orthopedic Precautions: RUE non weight bearing, LUE non weight bearing, spinal precautions   Braces: UE Sling, LSO         Subjective:  Communicated with nursing prior to session.  Pt agreed to work with therapy.     Pain/Comfort  Pain Rating 1: 0/10  Pain Rating Post-Intervention 1: 0/10    Objective:  Patient found seated w/c.     AM-PAC 6 CLICK MOBILITY  Total Score:     Transfers:  Sit<>Stand: to/from w/c x3 trials w/o AD CGA  Stand Pivot Transfer: mat to w/c w/o AD CGA    Gait:  Amb x2 trials 10ft and 32ft w/o AD CGA w/ close w/c follow for safety.      Therex:  BLE therex x20 reps:    -AP   -LAQ   -hip flexion    -GS   -hip abd/add    Additional Treatment:  -Mini-Elliptical x15 min     Patient left up in chair with call button in reach, nursing notified and family member present.    Assessment:  Ben Melvin is a 60 y.o. male with a medical diagnosis of <principal problem not specified>.  Pt tolerated treatment well, and will continue to benefit from PT services at this time. Continue with PT POC as indicated.    Rehab identified problem list/impairments: weakness, impaired endurance, gait instability, impaired functional mobilty, decreased upper extremity function, impaired balance, orthopedic precautions    Rehab potential is good.    Activity tolerance: Good    Discharge recommendations: home with home health     Barriers to discharge: Inaccessible home environment    Equipment recommendations: wheelchair, bedside commode, bath bench(pt s/o, has DME from prior family members)     GOALS:   Multidisciplinary Problems     Physical  Therapy Goals        Problem: Physical Therapy Goal    Goal Priority Disciplines Outcome Goal Variances Interventions   Physical Therapy Goal     PT, PT/OT Ongoing, Progressing     Description:  Goals to be met by: 20     Patient will increase functional independence with mobility by performin. Supine to sit with Stand-by Assistance and observing all precautions  2. Sit to supine with Stand-by Assistance and observing all precautions  3. Sit to stand transfer with Stand-by Assistance and observing all precautions  4. Bed to chair transfer with Stand-by Assistance, with LRAD if appropriate,  and observing all precautions  5. Gait  x 100 feet with Stand-by Assistance  With LRAD, if appropriate, and observing all precautions.   6. Ascend/descend 3 stair  Handrails Minimal Assistance and maintaining weight-bearing precaution(s), all precautions.   7. Ascend/Descend 4 inch curb step with Minimal Assistance using LRAD, if appropriate  and observing all precautions.  8. Stand for 3 minutes with Stand-by Assistance   9. Lower extremity exercise program x20 reps per handout, with assistance as needed and gym therex                      PLAN:    Patient to be seen 5 x/week  to address the above listed problems via gait training, therapeutic activities, therapeutic exercises, wheelchair management/training  Plan of Care expires: 20  Plan of Care reviewed with: patient    Rosa Isela Pipe, PTA  2020

## 2020-03-09 NOTE — CONSULTS
"   [facility] - Skilled Nursing Care  Adult Nutrition  Consult Note     SUMMARY      Recommendations     Recommendation:   1. Continue regular diet as tolerate; if pt experiences hyperglycemia rec'd DM 2000kcal diet.   2. Continue boost glucose control BID.      Goals: PO intake > 85% EEN, EPN daily   Nutrition Goal Status: new  Communication of RD Recs: other (comment)(POC)     Reason for Assessment     Reason For Assessment: consult  Diagnosis: infection/sepsis  Relevant Medical History: COPD, elevated LFTs, EtOH abuse, Cirrhosis, JLD, ASCVD, PVD, PAF, GERD, Vit D def, DM, CKDIII  Interdisciplinary Rounds: did not attend  General Information Comments: Pt reports having poor appetite since d/c from SNF 3/2 on  acute cholecystitis . s/p cholecystostomy tube placement on 3/4, s/p ORIF - right humerus on . Pt still reports fair appetite; PO intake 25-50% of meals. Pt drinking Boost Glucose control BID. Wt assists pt to eat meals. Pt denies ever receiving education and would be interested in receiving education on DM/cardiac diet prior to discharge. Denies N/V/D/C. NFPE completed 3/9, pt with moderate muscle and fat loss in extremities  inactivity. Pt meets criteria for moderate malnutrition.   Nutrition Discharge Planning: adequate PO intake on regular diet      Nutrition Risk Screen     Nutrition Risk Screen: no indicators present     Nutrition/Diet History     Food Preferences: likes sweetned oatmeal   Food Allergies: NKFA  Factors Affecting Nutritional Intake: decreased appetite     Anthropometrics     Temp: 98.2 °F (36.8 °C)  Height Method: Stated  Height: 5' 11" (180.3 cm)  Height (inches): 71 in  Weight Method: Standard Scale  Weight: 100.4 kg (221 lb 5.5 oz)  Weight (lb): 221.34 lb  Ideal Body Weight (IBW), Male: 172 lb  % Ideal Body Weight, Male (lb): 128.69 %  BMI (Calculated): 30.9  BMI Grade: 30 - 34.9- obesity - grade I  Usual Body Weight (UBW), k.45 kg(20)  % Usual Body Weight: 83.53  % " Weight Change From Usual Weight: -16.65 %     Lab/Procedures/Meds     Pertinent Labs Reviewed: reviewed  Pertinent Labs Comments: BUN 22, H/H 10.7/37.3, eGFR 54.6   Pertinent Medications Reviewed: reviewed  Pertinent Medications Comments: statin, cyanocobalamin, gabapentin, metoprolol, metroidazole, pantoprazole, polyethylene glycol, senna-docusate      Physical Findings/Assessment     Cristhian Score 19   Incision - right abdomen   Incision - right shoulder      Estimated/Assessed Needs     Weight Used For Calorie Calculations: 100.4 kg (221 lb 5.5 oz)  Energy Calorie Requirements (kcal): 2393 kcal/day  Energy Need Method: [place]-St Jeor(PAL 1.3)  Protein Requirements: 100 - 120 g/day(1.0 - 1.2 g/kg)  Weight Used For Protein Calculations: 100.4 kg (221 lb 5.5 oz)  Fluid Requirements (mL): 1 mL/kcal or per MD   Estimated Fluid Requirement Method: RDA Method  RDA Method (mL): 2393  CHO Requirement: 317 g/day     Nutrition Prescription Ordered     Current Diet Order: regular   Oral Nutrition Supplement: Boost Glucose Control BID      Evaluation of Received Nutrient/Fluid Intake     I/O: 450/52  Energy Calories Required: not meeting needs  Protein Required: not meeting needs  Fluid Required: meeting needs  Comments: LBM 3/8  Tolerance: tolerating  % Intake of Estimated Energy Needs: 25 - 50 %  % Meal Intake: 25 - 50 %     Nutrition Risk     Level of Risk/Frequency of Follow-up: low(1x/week)      Assessment and Plan     Nutrition Problem:  Moderate Protein-Calorie Malnutrition  Malnutrition in the context of Acute Illness/Injury     Related to (etiology):  S/p ORIF; acute cholecystitis      Signs and Symptoms (as evidenced by):  Energy Intake: <50% of estimated energy requirement for x 7 days  Body Fat Depletion: moderate depletion of triceps   Muscle Mass Depletion: mild and moderate depletion of temples, clavicle region, interosseous muscle and lower extremities   Weight Loss: 17% x 1 month      Interventions(treatment  strategy):  Collaboration with other providers  Com[employer] - Boost Glucose Control BID      Nutrition Diagnosis Status:  New     Monitor and Evaluation     Food and Nutrient Intake: energy intake, food and beverage intake  Anthropometric Measurements: weight, weight change, body mass index  Biochemical Data, Medical Tests and Procedures: electrolyte and renal panel, inflammatory profile, lipid profile, glucose/endocrine profile  Nutrition-Focused Physical Findings: overall appearance      Malnutrition Assessment  Malnutrition Type: acute illness or injury  Energy Intake: moderate energy intake  Weight Loss (Malnutrition): greater than 5% in 1 month  Energy Intake (Malnutrition): less than 75% for greater than 7 days   Orbital Region (Subcutaneous Fat Loss): well nourished  Upper Arm Region (Subcutaneous Fat Loss): moderate depletion   [place] Region (Muscle Loss): well nourished  Clavicle Bone Region (Muscle Loss): well nourished  Clavicle and Acromion Bone Region (Muscle Loss): well nourished  Dorsal Hand (Muscle Loss): well nourished  Patellar Region (Muscle Loss): moderate depletion  Anterior Thigh Region (Muscle Loss): moderate depletion  Posterior Calf Region (Muscle Loss): moderate depletion   Edema (Fluid Accumulation): 0-->no edema present   Subcutaneous Fat Loss (Final Summary): moderate protein-calorie malnutrition  Muscle Loss Evaluation (Final Summary): moderate protein-calorie malnutrition    Severe Weight Loss (Malnutrition): greater than 5% in 1 month     Nutrition Follow-Up     RD Follow-up?: Yes

## 2020-03-09 NOTE — PLAN OF CARE
Problem: Malnutrition  Goal: Improved Nutritional Intake  Outcome: Ongoing, Progressing     Recommendations     Recommendation:   1. Continue regular diet as tolerate; if pt experiences hyperglycemia rec'd DM 2000kcal diet.   2. Continue boost glucose control BID.      Goals: PO intake > 85% EEN, EPN daily   Nutrition Goal Status: new  Communication of RD Recs: other (comment)(POC)

## 2020-03-09 NOTE — PT/OT/SLP PROGRESS
"Occupational Therapy  Treatment    Ben Melvin   MRN: [id number]   Admitting Diagnosis: <principal problem not specified>    OT Date of Treatment: 03/09/20       Billable Minutes:  Therapeutic Activity 39    General Precautions: Standard, fall  Orthopedic Precautions: RUE non weight bearing, LUE non weight bearing, spinal precautions  NWB BUE 3 months postop, may remove sling for PT; ROM as tolerated hand wrist elbow, active assisted ROM shoulder and pendulums.  Braces: LSO, Shoulder abduction brace(2 shoulder braces)         Subjective:  Communicated with patient and significant other prior to session.  "The doctor said I can stop wearing the L brace tomorrow."      Pain/Comfort  Pain Rating 1: 7/10  Location - Side 1: Bilateral  Location - Orientation 1: generalized  Location 1: rib(s)  Pain Addressed 1: Pre-medicate for activity, Reposition, Distraction  Pain Rating Post-Intervention 1: 7/10    Objective:   Pt found supine with B shoulder abd braces and LSO with significant other present.    Occupational Performance:    Bed Mobility:    · Patient completed Rolling/Turning to Left with  minimum assistance  · Patient completed Rolling/Turning to Right with minimum assistance  · Patient completed Supine to Sit with moderate assistance  · Patient completed Sit to Supine with moderate assistance     Functional Mobility/Transfers:  · Patient completed Sit <> Stand Transfer with contact guard assistance  with  no assistive device   · Patient completed Bed <> Chair Transfer using Stand Pivot technique with contact guard assistance with no assistive device  · Functional Mobility: Pt completed 3 steps forward to counter with no AD and CGA      AMPAC 6 Click:  AMPAC Total Score: 8    OT Exercises: AROM 3x10 bicep curls, wrist flex/ext, hand pumps  PROM sh flex, elbow flex/ext, wrist flex/ext  Pt performed therex to increase strength and endurance required for independence and safety with ADLs, transfers, and " mobility.    Additional Treatment:  Pt performed functional activities in standing to bring item table<>chin to simulate ADLs to increase balance and standing tolerance for ADLs and IADLs.        Patient left up in chair with significant other    ASSESSMENT:  Ben Melvin is a 60 y.o. male with a medical diagnosis of <principal problem not specified>. Pt with decrease strength in B UE for AROM. Pt with decreased balance for mobility and t/fs. Patient will benefit from continued OT services to progress toward goals and promote independence.      Rehab identified problem list/impairments: weakness, impaired endurance, impaired self care skills, impaired functional mobilty, gait instability, impaired balance, impaired cognition, decreased coordination, decreased upper extremity function, decreased safety awareness, pain, decreased ROM, impaired coordination, impaired fine motor, impaired skin, edema, orthopedic precautions    Rehab potential is good    Activity tolerance: Good    Discharge recommendations: home health OT     Barriers to discharge: Inaccessible home environment     Equipment recommendations: (TBD - pt has BSC, RW, W/C, and TTB available at home to use )     GOALS:   Multidisciplinary Problems     Occupational Therapy Goals        Problem: Occupational Therapy Goal    Goal Priority Disciplines Outcome Interventions   Occupational Therapy Goal     OT, PT/OT Ongoing, Progressing    Description:  Goals to be met by: 03/26/2020     Patient will increase functional independence with ADLs by performing:    Feeding with Set-up Assistance.  UE Dressing with Moderate Assistance.  LE Dressing with Moderate Assistance.  Grooming while seated with Set-up Assistance.  Toileting from bedside commode with Moderate Assistance for hygiene and clothing management.   Bathing from  edge of bed with Moderate Assistance.  Toilet transfer to bedside commode with Stand-by Assistance.  Upper extremity exercise program 2 x12  reps per handout, with supervision according to orthopedic restrictions per MD order in order to maintain UE ROM and then, once cleared, in order to increase strength to improve ADLs and func mobility  Perform functional task in standing for 10 minutes with SBA in order to prepare for safe standing ADLs/iADLs.                      Plan:  Patient to be seen 5 x/week, 6 x/week to address the above listed problems via self-care/home management, therapeutic activities, therapeutic exercises  Plan of Care expires: 04/06/20  Plan of Care reviewed with: patient    ESTEFANY Freed  03/09/2020           The LILIAM and JOHNNY have collaborated and discussed the patient's status, treatment plan and progress toward established goals.   ESTEFANY Freed 3/9/2020

## 2020-03-09 NOTE — PROGRESS NOTES
PHARMACY NOTE: WARFARIN  Ben Melvin is a 60 y.o. male on warfarin therapy for Atrial Fibrillation. PharmD has been consulted for warfarin dosing.    Current order: warfarin 4 mg daily  Home dose: warfarin 5 mg daily  Coumadin clinic enrollment: Active (lab core)  INR goal: 2-3    Lab Results   Component Value Date    INR 3.2 (H) 03/09/2020    INR 3.0 (H) 03/08/2020    INR 1.8 (H) 03/07/2020    INR 1.8 (H) 03/07/2020     Significant drug interactions: cipro  Diet: Adult Regular with Boost supplements     Recommendation(s):    Hold warfarin until INR<=3   Increase INR monitoring to daily   Pharmacy will continue to follow and monitor warfarin        Rosa Isela Trujillo, Pharm. D.  Clinical Pharmacist  [facility]      ** THE ABOVE RECOMMENDATIONS ARE ONLY SUGGESTED.THE RECOMMENDATIONS SHOULD BE CONSIDERED IN CONJUNCTION WITH ALL PATIENT FACTORS. **

## 2020-03-09 NOTE — CLINICAL REVIEW
Clinical Pharmacy Chart Review Note      Admit Date: 3/6/2020   LOS: 3 days       Ben Melvin is a 60 y.o. male re-admitted to SNF for PT/OT after hospitalization for sepsis, cholelithiasis with acute cholecystitis.    Active Hospital Problems    Diagnosis  POA    Sepsis [A41.9]  Yes      Resolved Hospital Problems   No resolved problems to display.     Review of patient's allergies indicates:  No Known Allergies  Patient Active Problem List    Diagnosis Date Noted    Sepsis 03/03/2020    Cholelithiasis with acute cholecystitis 03/03/2020    CANDY (acute kidney injury) 03/03/2020    Acute encephalopathy 03/03/2020    Hypertension associated with stage 3 chronic kidney disease due to type 2 diabetes mellitus 02/19/2020    Neuropathic pain 02/18/2020    Stable burst fracture of first lumbar vertebra 02/15/2020    GERD (gastroesophageal reflux disease) 02/15/2020    Bilateral proximal humeral fractures 02/14/2020    Acute blood loss anemia     Constipation     Closed displaced fracture of acromial process of right scapula 01/29/2020    Closed displaced fracture of acromial process of left scapula 01/29/2020    Dislocation of left shoulder joint     Dislocation of right shoulder joint      fracture dislocation of bilateral shoulders 01/28/2020    Fracture dislocation of joint of left shoulder girdle 01/27/2020    Subdural hematoma 01/27/2020    Salpingitis of Eustachian tube, right 10/16/2019    Person consulting for explanation of examination or test findings 07/16/2019    Insomnia 07/16/2019    Dietary counseling 07/15/2019    Abnormal computed tomography of esophagus 06/13/2019    Encounter for therapeutic drug monitoring 06/03/2019    Metabolic syndrome 06/03/2019    Encounter for long-term (current) use of medications 06/03/2019    Screening for malignant neoplasm of prostate 06/03/2019    Generalized osteoarthrosis, involving multiple sites 06/03/2019    Abnormal CAT scan  06/03/2019    Multiple vitamin deficiency disease 06/03/2019    Chronic fatigue and malaise 06/03/2019    Vitamin D deficiency 06/03/2019    Diabetes 1.5, managed as type 2 06/03/2019    Hereditary and idiopathic neuropathy  06/03/2019    Other long term (current) drug therapy  06/03/2019    Current every day smoker     Hyperlipidemia     Proteinuria     COPD (chronic obstructive pulmonary disease)     Cirrhosis of liver     Venous insufficiency of both lower extremities     Long term (current) use of anticoagulants     PAF (paroxysmal atrial fibrillation)     Essential hypertension     Peripheral autonomic neuropathy due to DM     BPH (benign prostatic hyperplasia)     Chronic anxiety     Major depression, recurrent, chronic     Adenoma of right adrenal gland 11/09/2017    GERD with esophagitis 11/09/2017    History of epididymitis 01/01/2016    Lumbar disc disease with radiculopathy 05/01/2015    PVD (peripheral vascular disease) 05/29/2009    ASCVD (arteriosclerotic cardiovascular disease) 10/01/2008    Elevated LFTs 08/01/2001       Scheduled Meds:    atorvastatin  80 mg Oral QHS    ciprofloxacin HCl  750 mg Oral Q12H    cyanocobalamin  1,000 mcg Oral Daily    [START ON 3/10/2020] ergocalciferol  50,000 Units Oral Q7 Days    gabapentin  300 mg Oral QHS    levETIRAcetam  1,000 mg Oral BID    metoprolol tartrate  50 mg Oral BID    metroNIDAZOLE  500 mg Oral Q8H    nicotine  1 patch Transdermal Daily    pantoprazole  40 mg Oral Daily    polyethylene glycol  17 g Oral BID    senna-docusate 8.6-50 mg  1 tablet Oral BID    [START ON 3/10/2020] warfarin  4 mg Oral Daily     Continuous Infusions:   PRN Meds: sodium chloride, acetaminophen, albuterol-ipratropium, calcium carbonate, HYDROcodone-acetaminophen, levalbuterol, melatonin, senna    OBJECTIVE:     Vital Signs (Last 24H)  Temp:  [97.7 °F (36.5 °C)-98.2 °F (36.8 °C)]   Pulse:  []   Resp:  [17-18]   BP: (129-138)/(66-72)    SpO2:  [93 %-96 %]     Laboratory:  CBC:   Recent Labs   Lab 03/07/20 0607 03/08/20 0630 03/09/20 0519   WBC 10.76 9.14 10.11   RBC 3.96* 3.90* 4.10*   HGB 10.5* 10.1* 10.7*   HCT 35.2* 34.7* 37.3*   * 424* 394*   MCV 89 89 91   MCH 26.5* 25.9* 26.1*   MCHC 29.8* 29.1* 28.7*     BMP:   Recent Labs   Lab 03/05/20  0405 03/06/20 0313 03/07/20  0607 03/08/20 0630 03/09/20 0519   GLU 87 75 87 101 81    140 139 137 139   K 3.8 3.7 4.8 4.3 4.3    102 100 100 100   CO2 28 26 27 26 26   BUN 24* 22* 22* 22* 22*   CREATININE 1.5* 1.4 1.3 1.3 1.4   CALCIUM 8.8 9.0 9.4 8.8 9.1   MG 1.9 1.9  --   --  2.0     CMP:   Recent Labs   Lab 03/06/20 0313 03/07/20 0607 03/08/20 0630 03/09/20 0519   GLU 75 87 101 81   CALCIUM 9.0 9.4 8.8 9.1   ALBUMIN 1.9* 2.4* 2.3*  --    PROT 6.6 7.6 7.1  --     139 137 139   K 3.7 4.8 4.3 4.3   CO2 26 27 26 26    100 100 100   BUN 22* 22* 22* 22*   CREATININE 1.4 1.3 1.3 1.4   ALKPHOS 254* 280* 237*  --    ALT 26 32 36  --    AST 51* 54* 62*  --    BILITOT 0.7 0.7 0.6  --      LFTs:   Recent Labs   Lab 03/06/20 0313 03/07/20 0607 03/08/20 0630   ALT 26 32 36   AST 51* 54* 62*   ALKPHOS 254* 280* 237*   BILITOT 0.7 0.7 0.6   PROT 6.6 7.6 7.1   ALBUMIN 1.9* 2.4* 2.3*     Coagulation:   Recent Labs   Lab 03/03/20  0409  03/07/20  0607 03/08/20  0630 03/09/20  0519   INR 2.2*   < > 1.8*  1.8* 3.0* 3.2*   APTT 40.4*  --   --   --   --     < > = values in this interval not displayed.     Cardiac markers: No results for input(s): CKMB, TROPONINT, MYOGLOBIN in the last 168 hours.  ABGs:   Recent Labs   Lab 03/02/20  1955   PH 7.362   PCO2 45.6*   PO2 29*   HCO3 25.9   POCSATURATED 53*   BE 0       Recent Labs   Lab 03/02/20  1351   COLORU Sridevi   SPECGRAV 1.015   PHUR 5.0   PROTEINUA 2+*   BACTERIA Few*   NITRITE Negative   LEUKOCYTESUR Negative   HYALINECASTS 5*     Lab Results   Component Value Date    HGBA1C 5.9 (H) 01/27/2020         ASSESSMENT/PLAN:     I  have reviewed the medications in compliance with CMS Regulation F756 of the [place]. Based on information gathered, the following items may need to be addressed:    **Patient takes ramipril at home. This medication is not currently ordered at SNF (held at [facility] for CANDY, now resolved).    Medications reviewed by PharmD, please re-consult if needed.      Rosa Isela Trujillo, Pharm. D.  Clinical Pharmacist  [facility]

## 2020-03-09 NOTE — ASSESSMENT & PLAN NOTE
Nutrition Problem:  Moderate Protein-Calorie Malnutrition  Malnutrition in the context of Acute Illness/Injury     Related to (etiology):  S/p ORIF; acute cholecystitis      Signs and Symptoms (as evidenced by):  Energy Intake: <50% of estimated energy requirement for x 7 days  Body Fat Depletion: moderate depletion of triceps   Muscle Mass Depletion: mild and moderate depletion of temples, clavicle region, interosseous muscle and lower extremities   Weight Loss: 17% x 1 month      Interventions(treatment strategy):  Collaboration with other providers  Commercial Bever[employer] - Boost Glucose Control BID      Nutrition Diagnosis Status:  Continues

## 2020-03-09 NOTE — PROGRESS NOTES
"  [facility]  Adult Nutrition  Consult Note    SUMMARY     Recommendations    Recommendation:   1. Continue regular diet as tolerate; if pt experiences hyperglycemia rec'd DM 2000kcal diet.   2. Continue boost glucose control BID.     Goals: PO intake > 85% EEN, EPN daily   Nutrition Goal Status: new  Communication of RD Recs: other (comment)(POC)    Reason for Assessment    Reason For Assessment: consult  Diagnosis: infection/sepsis  Relevant Medical History: COPD, elevated LFTs, EtOH abuse, Cirrhosis, JLD, ASCVD, PVD, PAF, GERD, Vit D def, DM, CKDIII  Interdisciplinary Rounds: did not attend  General Information Comments: Pt reports having poor appetite since d/c from SNF 3/2 on  acute cholecystitis . s/p cholecystostomy tube placement on 3/4, s/p ORIF - right humerus on . Pt still reports fair appetite; PO intake 25-50% of meals. Pt drinking Boost Glucose control BID. Wt assists pt to eat meals. Pt denies ever receiving education and would be interested in receiving education on DM/cardiac diet prior to discharge. Denies N/V/D/C. NFPE completed 3/9, pt with moderate muscle and fat loss in extremities  inactivity. Pt meets criteria for moderate malnutrition.   Nutrition Discharge Planning: adequate PO intake on regular diet     Nutrition Risk Screen    Nutrition Risk Screen: no indicators present    Nutrition/Diet History    Food Preferences: likes sweetned oatmeal   Food Allergies: NKFA  Factors Affecting Nutritional Intake: decreased appetite    Anthropometrics    Temp: 98.2 °F (36.8 °C)  Height Method: Stated  Height: 5' 11" (180.3 cm)  Height (inches): 71 in  Weight Method: Standard Scale  Weight: 100.4 kg (221 lb 5.5 oz)  Weight (lb): 221.34 lb  Ideal Body Weight (IBW), Male: 172 lb  % Ideal Body Weight, Male (lb): 128.69 %  BMI (Calculated): 30.9  BMI Grade: 30 - 34.9- obesity - grade I  Usual Body Weight (UBW), k.45 kg(20)  % Usual Body Weight: 83.53  % Weight " Change From Usual Weight: -16.65 %     Lab/Procedures/Meds    Pertinent Labs Reviewed: reviewed  Pertinent Labs Comments: BUN 22, H/H 10.7/37.3, eGFR 54.6   Pertinent Medications Reviewed: reviewed  Pertinent Medications Comments: statin, cyanocobalamin, gabapentin, metoprolol, metroidazole, pantoprazole, polyethylene glycol, senna-docusate     Physical Findings/Assessment    Cristhian Score 19   Incision - right abdomen   Incision - right shoulder     Estimated/Assessed Needs    Weight Used For Calorie Calculations: 100.4 kg (221 lb 5.5 oz)  Energy Calorie Requirements (kcal): 2393 kcal/day  Energy Need Method: [place]-St Jeor(PAL 1.3)  Protein Requirements: 100 - 120 g/day(1.0 - 1.2 g/kg)  Weight Used For Protein Calculations: 100.4 kg (221 lb 5.5 oz)  Fluid Requirements (mL): 1 mL/kcal or per MD   Estimated Fluid Requirement Method: RDA Method  RDA Method (mL): 2393  CHO Requirement: 317 g/day    Nutrition Prescription Ordered    Current Diet Order: regular   Oral Nutrition Supplement: Boost Glucose Control BID     Evaluation of Received Nutrient/Fluid Intake    I/O: 450/52  Energy Calories Required: not meeting needs  Protein Required: not meeting needs  Fluid Required: meeting needs  Comments: LBM 3/8  Tolerance: tolerating  % Intake of Estimated Energy Needs: 25 - 50 %  % Meal Intake: 25 - 50 %    Nutrition Risk    Level of Risk/Frequency of Follow-up: low(1x/week)     Assessment and Plan    Nutrition Problem:  Moderate Protein-Calorie Malnutrition  Malnutrition in the context of Acute Illness/Injury    Related to (etiology):  S/p ORIF; acute cholecystitis     Signs and Symptoms (as evidenced by):  Energy Intake: <50% of estimated energy requirement for x 7 days  Body Fat Depletion: moderate depletion of triceps   Muscle Mass Depletion: mild and moderate depletion of temples, clavicle region, interosseous muscle and lower extremities   Weight Loss: 17% x 1 month     Interventions(treatment  strategy):  Collaboration with other providers  Com[employer] Bever[employer] - Boost Glucose Control BID     Nutrition Diagnosis Status:  New    Monitor and Evaluation    Food and Nutrient Intake: energy intake, food and beverage intake  Anthropometric Measurements: weight, weight change, body mass index  Biochemical Data, Medical Tests and Procedures: electrolyte and renal panel, inflammatory profile, lipid profile, glucose/endocrine profile  Nutrition-Focused Physical Findings: overall appearance     Malnutrition Assessment  Malnutrition Type: acute illness or injury  Energy Intake: moderate energy intake  Weight Loss (Malnutrition): greater than 5% in 1 month  Energy Intake (Malnutrition): less than 75% for greater than 7 days   Orbital Region (Subcutaneous Fat Loss): well nourished  Upper Arm Region (Subcutaneous Fat Loss): moderate depletion   [place] Region (Muscle Loss): well nourished  Clavicle Bone Region (Muscle Loss): well nourished  Clavicle and Acromion Bone Region (Muscle Loss): well nourished  Dorsal Hand (Muscle Loss): well nourished  Patellar Region (Muscle Loss): moderate depletion  Anterior Thigh Region (Muscle Loss): moderate depletion  Posterior Calf Region (Muscle Loss): moderate depletion   Edema (Fluid Accumulation): 0-->no edema present   Subcutaneous Fat Loss (Final Summary): moderate protein-calorie malnutrition  Muscle Loss Evaluation (Final Summary): moderate protein-calorie malnutrition    Severe Weight Loss (Malnutrition): greater than 5% in 1 month    Nutrition Follow-Up    RD Follow-up?: Yes

## 2020-03-10 LAB
INR PPP: 2.8 (ref 0.8–1.2)
PROTHROMBIN TIME: 26.1 SEC (ref 9–12.5)

## 2020-03-10 PROCEDURE — 97535 SELF CARE MNGMENT TRAINING: CPT

## 2020-03-10 PROCEDURE — 97530 THERAPEUTIC ACTIVITIES: CPT | Mod: CO

## 2020-03-10 PROCEDURE — 97116 GAIT TRAINING THERAPY: CPT | Mod: CQ

## 2020-03-10 PROCEDURE — 85610 PROTHROMBIN TIME: CPT

## 2020-03-10 PROCEDURE — 97110 THERAPEUTIC EXERCISES: CPT | Mod: CQ

## 2020-03-10 PROCEDURE — 25000003 PHARM REV CODE 250: Performed by: STUDENT IN AN ORGANIZED HEALTH CARE EDUCATION/TRAINING PROGRAM

## 2020-03-10 PROCEDURE — 25000003 PHARM REV CODE 250: Performed by: NURSE PRACTITIONER

## 2020-03-10 PROCEDURE — 11000004 HC SNF PRIVATE

## 2020-03-10 PROCEDURE — 36415 COLL VENOUS BLD VENIPUNCTURE: CPT

## 2020-03-10 PROCEDURE — 92523 SPEECH SOUND LANG COMPREHEN: CPT

## 2020-03-10 PROCEDURE — 25000003 PHARM REV CODE 250: Performed by: INTERNAL MEDICINE

## 2020-03-10 PROCEDURE — S4991 NICOTINE PATCH NONLEGEND: HCPCS | Performed by: INTERNAL MEDICINE

## 2020-03-10 PROCEDURE — 97530 THERAPEUTIC ACTIVITIES: CPT | Mod: CQ

## 2020-03-10 RX ORDER — WARFARIN 1 MG/1
2 TABLET ORAL DAILY
Status: DISCONTINUED | OUTPATIENT
Start: 2020-03-11 | End: 2020-03-10

## 2020-03-10 RX ORDER — WARFARIN 1 MG/1
2 TABLET ORAL DAILY
Status: DISCONTINUED | OUTPATIENT
Start: 2020-03-10 | End: 2020-03-11

## 2020-03-10 RX ADMIN — SENNOSIDES AND DOCUSATE SODIUM 1 TABLET: 8.6; 5 TABLET ORAL at 08:03

## 2020-03-10 RX ADMIN — CIPROFLOXACIN HYDROCHLORIDE 750 MG: 500 TABLET, FILM COATED ORAL at 09:03

## 2020-03-10 RX ADMIN — ERGOCALCIFEROL 50000 UNITS: 1.25 CAPSULE ORAL at 08:03

## 2020-03-10 RX ADMIN — LEVETIRACETAM 1000 MG: 500 TABLET ORAL at 08:03

## 2020-03-10 RX ADMIN — ATORVASTATIN CALCIUM 80 MG: 20 TABLET, FILM COATED ORAL at 09:03

## 2020-03-10 RX ADMIN — CYANOCOBALAMIN TAB 1000 MCG 1000 MCG: 1000 TAB at 08:03

## 2020-03-10 RX ADMIN — CIPROFLOXACIN HYDROCHLORIDE 750 MG: 500 TABLET, FILM COATED ORAL at 08:03

## 2020-03-10 RX ADMIN — NICOTINE 1 PATCH: 14 PATCH TRANSDERMAL at 08:03

## 2020-03-10 RX ADMIN — METRONIDAZOLE 500 MG: 500 TABLET ORAL at 09:03

## 2020-03-10 RX ADMIN — HYDROCODONE BITARTRATE AND ACETAMINOPHEN 1 TABLET: 5; 325 TABLET ORAL at 05:03

## 2020-03-10 RX ADMIN — Medication 6 MG: at 09:03

## 2020-03-10 RX ADMIN — WARFARIN SODIUM 2 MG: 1 TABLET ORAL at 05:03

## 2020-03-10 RX ADMIN — METRONIDAZOLE 500 MG: 500 TABLET ORAL at 05:03

## 2020-03-10 RX ADMIN — METRONIDAZOLE 500 MG: 500 TABLET ORAL at 03:03

## 2020-03-10 RX ADMIN — LEVETIRACETAM 1000 MG: 500 TABLET ORAL at 09:03

## 2020-03-10 RX ADMIN — GABAPENTIN 300 MG: 300 CAPSULE ORAL at 09:03

## 2020-03-10 RX ADMIN — PANTOPRAZOLE SODIUM 40 MG: 40 TABLET, DELAYED RELEASE ORAL at 08:03

## 2020-03-10 RX ADMIN — METOPROLOL TARTRATE 50 MG: 50 TABLET, FILM COATED ORAL at 08:03

## 2020-03-10 RX ADMIN — HYDROCODONE BITARTRATE AND ACETAMINOPHEN 1 TABLET: 5; 325 TABLET ORAL at 09:03

## 2020-03-10 RX ADMIN — METOPROLOL TARTRATE 50 MG: 50 TABLET, FILM COATED ORAL at 09:03

## 2020-03-10 NOTE — PROGRESS NOTES
Mr. Melvin was seen at [facility] today for a post-operative visit after undergoing:  · 1/28/2020 Left shoulder hemiarthroplasty, biceps tenodesis and  · 1/30/2020 right shoulder hemiarthroplasty, biceps tenodesis and   · 2/14/2020 ORIF right greater tuberosity of humerus   by Dr. Chris.    Patient was previously admitted to [facility] but required readmission secondary to sepsis involving his gallbladder.  He is back at SNF for therapy.      Interval History:  He reports that he is doing ok.  Pain is controlled with medication given to him at [facility].  He is taking pain medication.  He is taking Norco 5/325 mg PRN for pain.  He denies fever, chills, and sweats since the time of the surgery.  He is also on Flagyl and Cipro given his recent episode of sepsis.  He is participating in his therapy sessions.     Physical exam:  Incisions to both shoulders is open to air and healed.  There is no redness or drainage seen.  He has tactile stimulation to their bilateral upper arms, he denies calf pain, there is no leg edema and their pedal pulse is palpable x 2.  His radial pulses are palpable bilaterally.  He has good hand strength.      RADS: none done today    Assessment:  Post-op visit (3 weeks)    Plan:  Current care, treatment plan, precautions, activity level/ modifications, limitations, rehabilitation exercises and proposed future treatment were discussed with the patient. We discussed the need to monitor for changes in symptoms and condition and report them to the physician.  Discussed importance of compliance with all appointments and follow up examinations.     - Pain medication: refill was not needed,   - Pain medication refill policy provided to patient for review, yes  - Patient will continue to be seen on [facility] weekly until their discharge then he is to return to clinic for follow up.  He will follow up with Dr. Chris on 3/25/2020 as scheduled.  - Continue therapy as ordered.  - DVT ppx with coumadin.  - Per op note, he is  NWB to bilateral upper extremities for 3 months from last op date.  He should wear shoulder sling for 6 weeks and ok to remove for hygiene and PT.  He can do ROM at hand, wrist, elbow as tolerates.  Ok for AAROM of shoulder and pendulums now.  No strengthening exercises for 3 months.  -Will order shoulder x-rays on both shoulders today.       If there are any questions prior to scheduled follow up, the patient was instructed to contact the office

## 2020-03-10 NOTE — PROGRESS NOTES
[facility]                                  Skilled Nursing Facility                   Progress Note     Admit Date: 3/6/2020  KIRBY 3/26/2020  Principal Problem:  Cholelithiasis with acute cholecystitis   HPI obtained from patient interview and chart review     Chief Complaint:  Hospital follow-up    HPI:   Mr. Melvin is a 60 year old male with PMHx of AFib on coumadin, peripheral vascular disease, hypertension, COPD, current smoker, liver cirrhosis, who presents to SNF following hospitalization for failure of the repair of the greater tuberosity on the right shoulder s/p revision fixation of right greater tuberosity fracture on 2/14 by Dr. Chris- patient underwent readmission from 3/2-3/6  for sepsis/acute cholecystitis s/p percutaneous cholecystostomy tube. Admission to SNF for secondary weakness and debility.     Patient originally presented to [facility] ED on 01/27 after suffering a witnessed seizure while playing video poker and sustained bilateral proximal humerus fracture dislocations, bilateral acromial fractures, subdural hemorrhage, L1 and L4 burst fractures. He underwent left shoulder hemiarthroplasty, biceps tenodesis on 01/28/20 and right shoulder hemiarthroplasty, biceps tenodesis on 01/30/20 and [facility]. BL shoulder XRs from 02/10/20 notable for failure of the repair of the greater tuberosity on the right shoulder and he returned to [facility] on 02/14/20 for revision fixation of his right greater tuberosity fracture.      He was sent back to [facility] 3/2 due to metabolic encephalopathy and leukocytosis. Work up revealed acute cholecystitis. IR placed percutaneous cholecystostomy tube to be place for at least 6 weeks.      Patient will be treated at [facility] with PT and OT to improve functional status and ability to perform ADLs.     Past Medical History: Patient has a past medical history of Adenoma of right adrenal gland  (11/09/2017), Alcohol abuse, ASCVD (arteriosclerotic cardiovascular disease) (10/2008), BPH (benign prostatic hyperplasia), Cardiomyopathy, Chronic anxiety, Cirrhosis of liver, COPD (chronic obstructive pulmonary disease), Current every day smoker, Elevated LFTs (08/2001), GERD with esophagitis (11/09/2017), History of colon polyps, History of epididymitis (2016), Hyperlipidemia, Hypertension, essential, Long term (current) use of anticoagulants, Lumbar disc disease with radiculopathy (05/2015), Major depression, recurrent, chronic, PAF (paroxysmal atrial fibrillation), Peripheral autonomic neuropathy due to DM, Proteinuria, PVD (peripheral vascular disease) (05/29/2009), Venous insufficiency of both lower extremities, and Vitamin D deficiency.    Past Surgical History: Patient has a past surgical history that includes Percutaneous transluminal angioplasty (PTA) of peripheral vessel (Right, 12/2014); Intestinal malrotation repair (Right, 1961); Epidural steroid injection into lumbar spine (06/2015); biopsy right ankle (Right, 05/20/2019); biopsy back (05/20/2019); Colonoscopy w/ polypectomy; Esophagogastroduodenoscopy (N/A, 6/13/2019); Colonoscopy (N/A, 6/13/2019); Partial arthroplasty of shoulder (Left, 1/28/2020); Partial arthroplasty of shoulder (Right, 1/30/2020); and Open reduction and internal fixation (ORIF) of fracture of proximal humerus (Right, 2/14/2020).    Social History: Patient reports that he has been smoking cigarettes. He has a 360.00 pack-year smoking history. He has never used smokeless tobacco. He reports that he drank alcohol. He reports that he does not use drugs.    Family History: family history includes Acromegaly in his father; Diabetes in his mother; Heart attack in his father; Hypertension in his father and mother; Kidney cancer in his brother.    Allergies: Patient has No Known Allergies.    ROS  Constitutional: Negative for fever. + fatigue, insomnia.   Eyes: Negative for blurred  vision, double vision and discharge.   Respiratory: Negative for cough, shortness of breath and wheezing.    Cardiovascular: Negative for chest pain, palpitations, and leg swelling.   Gastrointestinal: Negative for abdominal pain, constipation, diarrhea, nausea and vomiting.   Genitourinary: Negative for dysuria, frequency and urgency.   Musculoskeletal:  + generalized weakness, intermittent. Negative for back pain and myalgias.   Skin: Negative for itching and rash.   Neurological: Negative for dizziness, speech change, and headaches.   Psychiatric/Behavioral: Negative for depression. The patient is not nervous/anxious.      PEx  Temp:  [97.4 °F (36.3 °C)-98.1 °F (36.7 °C)]   Pulse:  []   Resp:  [16-18]   BP: (133-135)/(68-75)   SpO2:  [94 %-95 %]      Constitutional: Patient appears well-developed.  No distress noted  HENT:   Head: Normocephalic and atraumatic.   Eyes: Pupils are equal, round  Neck: Normal range of motion. Neck supple.   Cardiovascular: Normal rate, regular rhythm and normal heart sounds.    Pulmonary/Chest: Effort normal and breath sounds are clear  Abdominal: Soft. Bowel sounds are normal.   Musculoskeletal: Normal range of motion.  Decreased range of motion to bilateral upper extremities  Neurological: Alert and oriented to person.  Disoriented to place, and time.   Psychiatric: Normal mood and affect. Behavior is normal.   Skin: Skin is warm and dry.  Surgical incision to RUE 14cm in length, LUE 15 cm in length- no drainage noted, fully approximated, no signs of infection- following weekly last visualized on 3/10; cholecystostomy tube    No results for input(s): GLUCOSE, NA, K, CL, CO2, BUN, CREATININE, MG in the last 24 hours.    Invalid input(s):  CALCIUM    No results for input(s): WBC, RBC, HGB, HCT, PLT, MCV, MCH, MCHC in the last 24 hours.      Assessment and Plan:    Acute cholecystitis  S/p percutaneous cholecystostomy   - Continue zosyn and metronidazole for 1 week total  -  Follow up with general surgery  6 week from placement- around 4/14-  Will message Gen Surg to set apt up.      Bilateral fracture dislocation of proximal humerus fracture  Bilateral acromial fractures with bilateral shoulder dislocation  S/p bilateral shoulder hemiarthroplasty: right on 1/28/2020 and left on 1/30/2020  S/p Pt underwent revision of fixation of R greater tuberosity fracture on 02/14/20  - Follow up with orthopedics as scheduled  - PT/OT - nonweightbearing bilateral upper extremities in sling  - DVT prophylaxis - continue warfarin as for atrial fibrillaiton     Neuropathic pain  - continue Gabapentin 300 mg qhs, Capsaicin cream prn, Hydrocodone  5/aceteaminophen 325 mg prn     Pathologic osteoporotic fractures  - Treat vitamin D deficiency  - Further evaluation and management as per PCP     CANDY on CKD III, resolved  - SCr with wandering baseline 0.8-1.4     GERD (gastroesophageal reflux disease)  - Continue Pantoprazole 40 mg daily     Stable burst fracture of first lumbar vertebra  L1 and L4 burst fractures  - f/u in neurosurgery clinic as scheduled on 04/23/20 for further management  - TSLO brace whenever not lying in bed     Constipation  - Continue daily miralax and senokot-s      Subdural hematoma  - Stable  - Cleared to resume anticoagualtion     Type 2 diabetes mellitus with CKD III and HTN  - A1c 5.9 from 01/27/20  - Not on treatment current  - Acuchecks in general <120  - Stop accucheck and SSI     Severe Vitamin D deficiency  - Level of 7 on 2/4  - continue Ergocalciferol 50k weekly     Essential hypertension  - Home medications: metoprolol and ramipril at home.   - continue Metoprolol 50 mg BID with low normal BPs   - Ramipril held due to lowish BPs     PAF (paroxysmal atrial fibrillation)  Chronic anticoagulation  Encounter for therapeutic monitoring  - Resume warfarin for 2-3              Dosed at 4 mg daily at home              Will likely need increase back to home dosing once  antibiotics have stopped  - Continue metoprolol  - 3/10 INR supratherapeutic yesterday on 03/09 with INR 3.2, today's, INR is 2.8, initiated Coumadin 2 mg daily- will increase as appropriate     COPD (chronic obstructive pulmonary disease)  Smoker   - change levalbuterol nebs to prn  - Limit oxygen to avoid hypercapnea  - nicotine patch 14 mg daily, can wean down as tolearted  - smoking cessation advised     Seizure disorder   - continue keppra 1 g BID  - Follow up with neurology after discharge - appointment needs to be made     Hyperlipidemia   - continue atorvastatin 80 mg daily     GEDR   - continue protonix 40 mg daily     Anemic of acute infection   - improving without specific intervention     Moderate protein calorie malnutrition   - boost plus TID with meals  - Regular diet  - Dietary consult     Acute metabolic encephalopathy  Probable mild Cognitive impairment  - speech therapy eval  - Consider neuropsych eval  - Hold amitriptyline for now, will use melatonin for sleep     DJD of multiple sites   - PT/OT. Pain management as above     Cirrhosis of liver   - attributed to alcohol. Patient rarely drinks alcohol at this time.    Future Appointments   Date Time Provider Department Center   3/25/2020  8:15 AM Chandler Chris MD [facility] ORTHO [facility]   4/17/2020  9:40 AM Cristal Ohara NP [facility]   4/23/2020  8:15 AM [facility] OI-CT2 500 LB LIMIT [facility]   4/23/2020  8:30 AM [facility] OI-CT2 500 LB LIMIT [facility]   4/23/2020  9:30 AM Carl Barry MD [facility] NEUROS7 [facility]     I certify that SNF services are required to be given on an inpatient basis because Ben Melvin needs for skilled nursing care and/or skilled rehabilitation are required on a daily basis and such services can only practically be provided in a skilled nursing facility setting and are for an ongoing condition for which she received inpatient care in the hospital.     Total time of the  visit 65 minutes [id number]   Non physical exam/ non charting time: 45 minutes   Description of non physical exam/non charting time: counseling patient on clinical conditions and therapies provided regarding acute cholecystitis, antibiotic therapy, follow-up appointments, antihypertensive regimen, encephalopathy, ortho recommendations, and the beginning of discharge planning.  Extensive chart review completed including all consultation notes.  All pertinent laboratory and radiographical images reviewed.        Ave Craig NP      DOS: 3/10/2020       Patient note was created using MModal Dictation.  Any errors in syntax or even information may not have been identified and edited on initial review prior to signing this note.

## 2020-03-10 NOTE — PLAN OF CARE
Pt remained free from falls, injury and trauma throughout shift. Pt AAO x 2. Bed in lowest position and wheels locked. Pt instructed to call for assistance. Camera bedside. Hourly rounds to monitor pt for safety and comfort. Personal items and call bell within reach. Pt medicated for pain. No signs or symptoms of distress.  Will continue to monitor pt.

## 2020-03-10 NOTE — PT/OT/SLP PROGRESS
"Occupational Therapy  Treatment    Ben Melvin   MRN: [id number]   Admitting Diagnosis: Cholelithiasis with acute cholecystitis    OT Date of Treatment: 03/10/20       Billable Minutes:  Therapeutic Activity 43    General Precautions: Standard, fall  Orthopedic Precautions: RUE non weight bearing, LUE non weight bearing, spinal precautions  Braces: LSO, Shoulder abduction brace(2 shoulder braces)         Subjective:  Communicated with patient and significant other prior to session.  "The brace and the gall bladder drain are uncomfortable."    Pain/Comfort  Pain Rating 1: 8/10  Location - Side 1: Left  Location - Orientation 1: generalized  Location 1: rib(s)  Pain Addressed 1: Pre-medicate for activity, Reposition, Cessation of Activity, Distraction  Pain Rating Post-Intervention 1: 8/10    Objective:   Pt found in gym with R sh abd brace and LSO with significant other    Occupational Performance:  Functional Mobility/Transfers:  · Patient completed Sit <> Stand Transfer with contact guard assistance  with  hand-held assist   · Functional Mobility: Pt performed steps to counter with CGA and no AD      AMPAC 6 Click:  AMPAC Total Score: 8    OT Exercises: AROM Pt performed B Bicep curls, hand pumps and wrist flex, ext to increase ROM and strength for functional activities.   Pt performed AAROM Per MD orders B pendulum swings, and L table walks in sh flexion  Pt performed therex in sitting to increase strength and endurance required for independence and safety with ADLs, transfers, and mobility.        Patient left up in chair with significant other    ASSESSMENT:  Ben Melvin is a 60 y.o. male with a medical diagnosis of Cholelithiasis with acute cholecystitis. Pt attempted pendulum swings in standing while bending <90* when becoming lightheaded and assisted to seated postion, vitals taken and reported 117/ 57 HR 80. Pt displays decreased strength for elbow flex AROM and difficulty with R pendulum AAROM. " Patient will benefit from continued OT services to progress toward goals and promote independence.      Rehab identified problem list/impairments: weakness, impaired endurance, impaired self care skills, impaired functional mobilty, gait instability, impaired balance, impaired cognition, decreased coordination, decreased upper extremity function, decreased safety awareness, pain, decreased ROM, impaired coordination, impaired fine motor, impaired skin, edema, orthopedic precautions    Rehab potential is good    Activity tolerance: Good    Discharge recommendations: home health OT     Barriers to discharge: Inaccessible home environment     Equipment recommendations: (TBD - pt has BSC, RW, W/C, and TTB available at home to use )     GOALS:   Multidisciplinary Problems     Occupational Therapy Goals        Problem: Occupational Therapy Goal    Goal Priority Disciplines Outcome Interventions   Occupational Therapy Goal     OT, PT/OT Ongoing, Progressing    Description:  Goals to be met by: 03/26/2020     Patient will increase functional independence with ADLs by performing:    Feeding with Set-up Assistance.  UE Dressing with Moderate Assistance.  LE Dressing with Moderate Assistance.  Grooming while seated with Set-up Assistance.  Toileting from bedside commode with Moderate Assistance for hygiene and clothing management.   Bathing from  edge of bed with Moderate Assistance.  Toilet transfer to bedside commode with Stand-by Assistance.  Upper extremity exercise program 2 x12 reps per handout, with supervision according to orthopedic restrictions per MD order in order to maintain UE ROM and then, once cleared, in order to increase strength to improve ADLs and func mobility  Perform functional task in standing for 10 minutes with SBA in order to prepare for safe standing ADLs/iADLs.                      Plan:  Patient to be seen 5 x/week, 6 x/week to address the above listed problems via self-care/home management,  therapeutic activities, therapeutic exercises  Plan of Care expires: 04/06/20  Plan of Care reviewed with: patient    ESTEFANY Freed  03/10/2020

## 2020-03-10 NOTE — PLAN OF CARE
Problem: SLP Goal  Goal: SLP Goal  Description  Speech Language Pathology Goals  Goals expected to be met by 3/17:  1. Pt will complete immediate recall tasks with 80% accuracy given min cues.   2. Following a 3 minute delay, pt will recall 3/3 units of functional information given min-mod cues.   3. Pt will complete moderate level problem solving tasks with 80% accuracy given min-mod cues.   4. Pt will list an average of 9 items in a category in one minute given min cues.   5. Pt will participate in evaluation of reading, writing, visual spatial, and functional math.          Outcome: Ongoing, Progressing  Cognitive-linguistic evaluation completed.  SLP will follow 3x/wk.  MELANI Dominguez, CCC-SLP  Speech Language Pathologist  [phone]  3/10/2020

## 2020-03-10 NOTE — PT/OT/SLP EVAL
Speech Language Pathology  Evaluation    Ben Melvin   MRN: [id number]   Admitting Diagnosis: <principal problem not specified>    Diet recommendations: Solid Diet Level: Regular  Liquid Diet Level: Thin Assistance with meals, HOB to 90 degrees and Standard aspiration precautions    SLP Treatment Date: 03/10/20  Speech Start Time: 0922     Speech Stop Time: 0938     Speech Total (min): 16 min       TREATMENT BILLABLE MINUTES:  Eval 8  and [facility]/Home Management Training 8    Diagnosis: <principal problem not specified>      Past Medical History:   Diagnosis Date    Adenoma of right adrenal gland 11/09/2017    Alcohol abuse     ASCVD (arteriosclerotic cardiovascular disease) 10/2008    [place]    BPH (benign prostatic hyperplasia)     Cardiomyopathy     Chronic anxiety     Cirrhosis of liver     COPD (chronic obstructive pulmonary disease)     Current every day smoker     2-3 ppd    Elevated LFTs 08/2001    GERD with esophagitis 11/09/2017    History of colon polyps     History of epididymitis 2016    Juan Antonio    Hyperlipidemia     Hypertension, essential     Long term (current) use of anticoagulants     Lumbar disc disease with radiculopathy 05/2015    Major depression, recurrent, chronic     PAF (paroxysmal atrial fibrillation)     Peripheral autonomic neuropathy due to DM     Proteinuria     PVD (peripheral vascular disease) 05/29/2009    Venous insufficiency of both lower extremities     Vitamin D deficiency      Past Surgical History:   Procedure Laterality Date    biopsy back  05/20/2019    benign Martinez    biopsy right ankle Right 05/20/2019    Martinez, benign    COLONOSCOPY N/A 6/13/2019    Procedure: COLONOSCOPY;  Surgeon: Delmer Kerr MD;  Location: [facility];  Service: Endoscopy;  Laterality: N/A;    COLONOSCOPY W/ POLYPECTOMY      EPIDURAL STEROID INJECTION INTO LUMBAR SPINE  06/2015    LASHAE Harvey    ESOPHAGOGASTRODUODENOSCOPY N/A 6/13/2019    Procedure:  "ESOPHAGOGASTRODUODENOSCOPY (EGD);  Surgeon: Delmer Kerr MD;  Location: [facility];  Service: Endoscopy;  Laterality: N/A;    INTESTINAL MALROTATION REPAIR Right 1961    2 weeks old, Martinez    OPEN REDUCTION AND INTERNAL FIXATION (ORIF) OF FRACTURE OF PROXIMAL HUMERUS Right 2/14/2020    Procedure: ORIF, FRACTURE, HUMERUS, PROXIMAL - ORIF R greater tuberosity - suture repair. Trios, supine, fiberwire;  Surgeon: Chandler Chris MD;  Location: [facility] OR [address] [place] [address];  Service: Orthopedics;  Laterality: Right;    PARTIAL ARTHROPLASTY OF SHOULDER Left 1/28/2020    Procedure: HEMIARTHROPLASTY, SHOULDER;  Surgeon: Chandler Chris MD;  [facility];  Service: Orthopedics;  Laterality: Left;    PARTIAL ARTHROPLASTY OF SHOULDER Right 1/30/2020    Procedure: HEMIARTHROPLASTY, SHOULDER- RIGHT- MEKA- SUPINE-  TRIOS;  Surgeon: Chandler Chris MD;  Location: [facility] OR [address] [place] [address];  Service: Orthopedics;  Laterality: Right;    PERCUTANEOUS TRANSLUMINAL ANGIOPLASTY (PTA) OF PERIPHERAL VESSEL Right 12/2014    NIKOLAS Childs            General Precautions: Standard, fall    Current Respiratory Status: room air     HPI: 60M with hx seizure on 01/2020 with resultant bilateral prox humerus fractures, acromial fractures, subdural hemorrhage, and L1 + L4 burst fractures s/p L + R shoulder hemiarthroplasty and ORIF R prox humerus fx, AF on coumadin, HTN, COPD, DM on insulin (01/2019 A1c 5.9%) transferred from [facility] for evaluation of lethargy, confusion, and leukocytosis noted on recent labs. History provided by pt and his wife. They report that pt felt well until Thursday, when he had nausea and vomiting. Wife reports that several other patients at the SNF got sick from the hamburgers that day. Wife reports that the next day patient "looked like a druggie," which she further characterizes as having general confusion and a "glassy" look to his eyes. She also says that his legs were weak even " "though the previous day he had walked far with the Rehab team. On Saturday pt was very sleepy so his pain meds and other sedating meds (robaxin, amitriptyline) were decreased but his mental status did not improve on Sunday. WBC was drawn Monday, returning at 26K. Pt denies any fevers, chills, myalgias, n/v, diarrhea/constipation, changes in urination, chest pain, cough, or dyspnea.     [place] [facility] where he was noted to have a WBC of 27K and an CANDY with creatinine 2.8 (0.8 baseline) with tachycardia to 110s, mild hypotension to the 90s/50s improving with IV fluids, and a TMax 100.4F. CT A/P revealed a distended gallbladder "with high attenuation material which may represent biliary sludge and surrounding inflammatory changes" and commented that it "may be related to acute cholecystitis in the correct clinical setting." Followup US confirmed findings c/w cholecystitis and positive Rojas's sign, noting that the common bile duct was 4 mm. General Surgery was consulted but recommended that pt be treated for his CANDY, his INR be reversed, and that his sepsis be worked up with IR consult for perc tube placement.      Pt's chart mentions alcohol abuse and cirrhosis; these were discussed with the patient. He says he quit daily alcohol use 10 years ago and will have an occasional beer but has not had any alcohol since his seizure 01/2020. IM notes with his primary care NP mention EtOH abuse as recently as 08/2019 but there is little mention afterward. In regards to cirrhosis he has never had jaundice, ascites, confusion / HE episodes, variceal bleed or any GIB, or other decompensations and does not have imaging or biopsy to suggest he has cirrhosis.    Social Hx: Patient lives with significant other.    Subjective:  "I hate that game." pt commented when SLP introduced the delayed memory task during evaluation.          Objective:              Cognitive Status:  Behavioral Observations: alert and cooperative-  Memory " and Orientation: O x 4. Pt immediately recalled a series of 5 digits, but transposed 2 digits when recalling series of 4 digits.  Following a 3 minute delay, pt recalled 2/3 unrelated words ind'ly.  Attention: mildly reduced sustained attention  Problem Solving: Min cues needed to answer problem solving q's.  Pt generated multiple causes for a hypothetical situation adequately.   Executive Function: insight/awareness decreased    Auditory Comprehension: answers yes/no questions and able to follow commands complex    Verbal Expression: conversational speech Language abilities adequate for expressing wants/needs and some complex thoughts/ideas.  During a word fluency task, pt listed 7 items in a category in one minute given cue x 1 (15-20 is wnl).     Motor Speech: no significant dysarthria evident    Voice: adequate    Reading: tba    Writing: tba    Visual-Spatial: tba    Additional Treatment:  Education provided to pt regarding role of SLP, purpose of cognitive evaluation, cognition, SLP goals addressed during previous stay, improvements in performance during today's evaluation compared to previous admission, and SLP treatment plan and POC.  Pt expressed understanding and agreement, but will benefit from continued reinforcement.     Assessment:  Ben Melvin is a 60 y.o. male with a medical diagnosis of <principal problem not specified> and presents with cognitive-linguistic deficits.      Discharge recommendations: Discharge Facility/Level of Care Needs: (tbd)     Goals:   Multidisciplinary Problems     SLP Goals        Problem: SLP Goal    Goal Priority Disciplines Outcome   SLP Goal     SLP Ongoing, Progressing   Description:  Speech Language Pathology Goals  Goals expected to be met by 3/17:  1. Pt will complete immediate recall tasks with 80% accuracy given min cues.   2. Following a 3 minute delay, pt will recall 3/3 units of functional information given min-mod cues.   3. Pt will complete moderate level  problem solving tasks with 80% accuracy given min-mod cues.   4. Pt will list an average of 9 items in a category in one minute given min cues.   5. Pt will participate in evaluation of reading, writing, visual spatial, and functional math.                            Plan:   Patient to be seen Therapy Frequency: 3 x/week   Planned Interventions: Cognitive-Linguistic Therapy  Plan of Care expires: 04/10/20  Plan of Care reviewed with: patient  SLP Follow-up?: Yes              MELANI Dominguez, CCC-SLP  03/10/2020    MELANI Dominguez, CCC-SLP  Speech Language Pathologist  [phone]  3/10/2020

## 2020-03-10 NOTE — PT/OT/SLP PROGRESS
Physical Therapy  Treatment    Ben Melvin   MRN: [id number]   Admitting Diagnosis: <principal problem not specified>    PT Received On: 03/10/20  Total Time (min): (--)       Billable Minutes:  Gait Training 10, Therapeutic Activity 10 and Therapeutic Exercise 20    Treatment Type: Treatment  PT/PTA: PTA     PTA Visit Number: 2       General Precautions: Standard, fall  Orthopedic Precautions: RUE non weight bearing, LUE non weight bearing, spinal precautions   Braces: UE Sling, LSO         Subjective:  Communicated with nursing prior to session.  Pt agreed to work with therapy.     Pain/Comfort  Location - Side 1: Bilateral  Location - Orientation 1: generalized  Location 1: rib(s)  Pain Addressed 1: Pre-medicate for activity, Distraction, Reposition  Pain Rating Post-Intervention 1: 7/10    Objective:  Patient found supine in bed.        AM-PAC 6 CLICK MOBILITY  Total Score:13    Bed Mobility:  Sit>Supine:not performed  Supine>Sit: on bed with Min A at trunk     Transfers:  Sit<>Stand: to/from EOB, to/from w/c, to/from recumbent w/o AD and CGA  Stand Pivot Transfer: EOB to w/c w/o AD and CGA; recumbent stepper to w/c w/o AD and CGA for safety    Gait:  Amb 130ft w/o AD and CGA for safety. Pt unsteady at times but no major LOB noted.      Therex:  BLE therex x30 reps with 3#:   -AP   -LAQ   -hip flexion    -GS   -hip abd/add    Additional Treatment:  -recumbent stepper x15 min L6 using BLEs only.     Patient left up in chair with call button in reach and nursing notified.    Assessment:  Ben Melvin is a 60 y.o. male with a medical diagnosis of <principal problem not specified>.  Pt tolerated treatment well, and will continue to benefit from PT services at this time. Continue with PT POC as indicated.    Rehab identified problem list/impairments: weakness, impaired endurance, gait instability, impaired functional mobilty, decreased upper extremity function, impaired balance, orthopedic precautions    Rehab  potential is good.    Activity tolerance: Good    Discharge recommendations: home with home health     Barriers to discharge: Inaccessible home environment    Equipment recommendations: wheelchair, bedside commode, bath bench(pt s/o, has DME from prior family members)     GOALS:   Multidisciplinary Problems     Physical Therapy Goals        Problem: Physical Therapy Goal    Goal Priority Disciplines Outcome Goal Variances Interventions   Physical Therapy Goal     PT, PT/OT Ongoing, Progressing     Description:  Goals to be met by: 20     Patient will increase functional independence with mobility by performin. Supine to sit with Stand-by Assistance and observing all precautions  2. Sit to supine with Stand-by Assistance and observing all precautions  3. Sit to stand transfer with Stand-by Assistance and observing all precautions  4. Bed to chair transfer with Stand-by Assistance, with LRAD if appropriate,  and observing all precautions  5. Gait  x 100 feet with Stand-by Assistance  With LRAD, if appropriate, and observing all precautions.   6. Ascend/descend 3 stair  Handrails Minimal Assistance and maintaining weight-bearing precaution(s), all precautions.   7. Ascend/Descend 4 inch curb step with Minimal Assistance using LRAD, if appropriate  and observing all precautions.  8. Stand for 3 minutes with Stand-by Assistance   9. Lower extremity exercise program x20 reps per handout, with assistance as needed and gym therex                      PLAN:    Patient to be seen 5 x/week  to address the above listed problems via gait training, therapeutic activities, therapeutic exercises, wheelchair management/training  Plan of Care expires: 20  Plan of Care reviewed with: patient    Rosa Isela Pipe, PTA  03/10/2020

## 2020-03-11 LAB
INR PPP: 2.5 (ref 0.8–1.2)
POCT GLUCOSE: 100 MG/DL (ref 70–110)
POCT GLUCOSE: 122 MG/DL (ref 70–110)
POCT GLUCOSE: 88 MG/DL (ref 70–110)
POCT GLUCOSE: 97 MG/DL (ref 70–110)
PROTHROMBIN TIME: 23.8 SEC (ref 9–12.5)

## 2020-03-11 PROCEDURE — 97110 THERAPEUTIC EXERCISES: CPT | Mod: CQ

## 2020-03-11 PROCEDURE — 25000003 PHARM REV CODE 250: Performed by: INTERNAL MEDICINE

## 2020-03-11 PROCEDURE — 97535 SELF CARE MNGMENT TRAINING: CPT

## 2020-03-11 PROCEDURE — 97530 THERAPEUTIC ACTIVITIES: CPT | Mod: CQ

## 2020-03-11 PROCEDURE — 25000003 PHARM REV CODE 250: Performed by: NURSE PRACTITIONER

## 2020-03-11 PROCEDURE — 11000004 HC SNF PRIVATE

## 2020-03-11 PROCEDURE — 97116 GAIT TRAINING THERAPY: CPT | Mod: CQ

## 2020-03-11 PROCEDURE — S4991 NICOTINE PATCH NONLEGEND: HCPCS | Performed by: INTERNAL MEDICINE

## 2020-03-11 PROCEDURE — 36415 COLL VENOUS BLD VENIPUNCTURE: CPT

## 2020-03-11 PROCEDURE — 85610 PROTHROMBIN TIME: CPT

## 2020-03-11 PROCEDURE — 97110 THERAPEUTIC EXERCISES: CPT

## 2020-03-11 PROCEDURE — 25000003 PHARM REV CODE 250: Performed by: STUDENT IN AN ORGANIZED HEALTH CARE EDUCATION/TRAINING PROGRAM

## 2020-03-11 RX ORDER — WARFARIN 1 MG/1
4 TABLET ORAL DAILY
Status: DISCONTINUED | OUTPATIENT
Start: 2020-03-11 | End: 2020-03-13

## 2020-03-11 RX ADMIN — SENNOSIDES AND DOCUSATE SODIUM 1 TABLET: 8.6; 5 TABLET ORAL at 09:03

## 2020-03-11 RX ADMIN — POLYETHYLENE GLYCOL 3350 17 G: 17 POWDER, FOR SOLUTION ORAL at 09:03

## 2020-03-11 RX ADMIN — METRONIDAZOLE 500 MG: 500 TABLET ORAL at 02:03

## 2020-03-11 RX ADMIN — METRONIDAZOLE 500 MG: 500 TABLET ORAL at 05:03

## 2020-03-11 RX ADMIN — METOPROLOL TARTRATE 50 MG: 50 TABLET, FILM COATED ORAL at 10:03

## 2020-03-11 RX ADMIN — NICOTINE 1 PATCH: 14 PATCH TRANSDERMAL at 09:03

## 2020-03-11 RX ADMIN — WARFARIN SODIUM 4 MG: 1 TABLET ORAL at 05:03

## 2020-03-11 RX ADMIN — CIPROFLOXACIN HYDROCHLORIDE 750 MG: 500 TABLET, FILM COATED ORAL at 09:03

## 2020-03-11 RX ADMIN — CYANOCOBALAMIN TAB 1000 MCG 1000 MCG: 1000 TAB at 09:03

## 2020-03-11 RX ADMIN — METOPROLOL TARTRATE 50 MG: 50 TABLET, FILM COATED ORAL at 09:03

## 2020-03-11 RX ADMIN — LEVETIRACETAM 1000 MG: 500 TABLET ORAL at 10:03

## 2020-03-11 RX ADMIN — ATORVASTATIN CALCIUM 80 MG: 20 TABLET, FILM COATED ORAL at 10:03

## 2020-03-11 RX ADMIN — HYDROCODONE BITARTRATE AND ACETAMINOPHEN 1 TABLET: 5; 325 TABLET ORAL at 10:03

## 2020-03-11 RX ADMIN — LEVETIRACETAM 1000 MG: 500 TABLET ORAL at 09:03

## 2020-03-11 RX ADMIN — Medication 6 MG: at 10:03

## 2020-03-11 RX ADMIN — GABAPENTIN 300 MG: 300 CAPSULE ORAL at 10:03

## 2020-03-11 RX ADMIN — PANTOPRAZOLE SODIUM 40 MG: 40 TABLET, DELAYED RELEASE ORAL at 09:03

## 2020-03-11 NOTE — PT/OT/SLP PROGRESS
Speech Language Pathology  Treatment    Ben Melvin   MRN: [id number]   Admitting Diagnosis: Cholelithiasis with acute cholecystitis    Diet recommendations: Solid Diet Level: Regular  Liquid Diet Level: Thin Monitor for s/s of aspiration and Standard aspiration precautions    SLP Treatment Date: 03/11/20  Speech Start Time: 1001     Speech Stop Time: 1029     Speech Total (min): 28 min       TREATMENT BILLABLE MINUTES:  Speech Therapy Individual 28 (cognitive therapy, 2 units)           General Precautions: Standard, fall  Current Respiratory Status: room air       Subjective:  Pt seen for therapy while sitting up in w/c in room. Significant other stepped out of room for therapy session.       Objective:      Pt recalled functional information from a jerry with 90% accuracy and from an appointment card with 44% accuracy ind'ly/67% given cues.  Reading abilities were evaluated at the paragraph level while wearing reading glasses.  Reading abilities was functional, but pt did exhibit a few minor errors.  Pt immediately recalled direction of increasing length with 60% accuracy ind'ly/95% given cues and repetitions.      Assessment:  Ben Melvin is a 60 y.o. male with a medical diagnosis of Cholelithiasis with acute cholecystitis and presents with cognitive-linguistic deficits.      Discharge recommendations: Discharge Facility/Level of Care Needs: (tbd)     Goals:   Multidisciplinary Problems     SLP Goals        Problem: SLP Goal    Goal Priority Disciplines Outcome   SLP Goal     SLP Ongoing, Progressing   Description:  Speech Language Pathology Goals  Goals expected to be met by 3/17:  1. Pt will complete immediate recall tasks with 80% accuracy given min cues.   2. Following a 3 minute delay, pt will recall 3/3 units of functional information given min-mod cues.   3. Pt will complete moderate level problem solving tasks with 80% accuracy given min-mod cues.   4. Pt will list an average of 9 items in a  category in one minute given min cues.   5. Pt will participate in evaluation of reading, writing, visual spatial, and functional math.                            Plan:   Patient to be seen Therapy Frequency: 3 x/week  Planned Interventions: Cognitive-Linguistic Therapy  Plan of Care expires: 04/10/20  Plan of Care reviewed with: patient  SLP Follow-up?: Yes              MELANI Dominguez, MARY-SLP  03/11/2020     MELANI Dominguez, CCC-SLP  Speech Language Pathologist  [phone]  3/11/2020

## 2020-03-11 NOTE — PLAN OF CARE
Problem: Adult Inpatient Plan of Care  Goal: Plan of Care Review  Outcome: Ongoing, Progressing     Problem: Adult Inpatient Plan of Care  Goal: Patient-Specific Goal (Individualization)  Outcome: Ongoing, Progressing     Problem: Diabetes Comorbidity  Goal: Blood Glucose Level Within Desired Range  Intervention: Maintain Glycemic Control  Flowsheets (Taken 3/11/2020 9389)  Glycemic Management: blood glucose monitoring; oral hydration promoted     Problem: Fall Injury Risk  Goal: Absence of Fall and Fall-Related Injury  Intervention: Promote Injury-Free Environment  Flowsheets (Taken 3/11/2020 5054)  Safety Promotion/Fall Prevention: assistive device/personal item within reach; commode/urinal/bedpan at bedside; Fall Risk signage in place; Fall Risk reviewed with patient/family; room near unit station; nonskid shoes/socks when out of bed; supervised activity; side rails raised x 2  Environmental Safety Modification: assistive device/personal items within reach; clutter free environment maintained; room near unit station; room organization consistent

## 2020-03-11 NOTE — PLAN OF CARE
Problem: SLP Goal  Goal: SLP Goal  Description  Speech Language Pathology Goals  Goals expected to be met by 3/17:  1. Pt will complete immediate recall tasks with 80% accuracy given min cues.   2. Following a 3 minute delay, pt will recall 3/3 units of functional information given min-mod cues.   3. Pt will complete moderate level problem solving tasks with 80% accuracy given min-mod cues.   4. Pt will list an average of 9 items in a category in one minute given min cues.   5. Pt will participate in evaluation of reading, writing, visual spatial, and functional math.          Outcome: Ongoing, Progressing  Cont POC.   MELANI Dominguez, CCC-SLP  Speech Language Pathologist  [phone]  3/11/2020

## 2020-03-11 NOTE — PT/OT/SLP PROGRESS
Physical Therapy  Treatment    Ben Melvin   MRN: [id number]   Admitting Diagnosis: Cholelithiasis with acute cholecystitis    PT Received On: 03/11/20  Total Time (min): (--)       Billable Minutes:  Gait Training 10, Therapeutic Activity 10 and Therapeutic Exercise 18    Treatment Type: Treatment  PT/PTA: PTA     PTA Visit Number: 2       General Precautions: Standard, fall  Orthopedic Precautions: RUE non weight bearing, LUE non weight bearing, spinal precautions   Braces: UE Sling, LSO         Subjective:  Communicated with nursing prior to session.  Pt agreed to work with therapy.     Pain/Comfort  Pain Rating 1: 3/10  Location - Side 1: Bilateral  Location - Orientation 1: lower  Location 1: back  Pain Addressed 1: Reposition  Pain Rating Post-Intervention 1: 3/10    Objective:  Patient found supine in bed.        AM-PAC 6 CLICK MOBILITY  Total Score:13    Bed Mobility:  Sit>Supine:not performed  Supine>Sit: on bed with HOB flat and min-mod A trunk    Transfers:  Sit<>Stand: to/from EOB, to/from w/c, to/from recumbent stepper w/o AD and CG-SBA for safety.   Stand Pivot Transfer: EOB to w/c w/o AD and SBA; w/c to recumbent stepper w/o AD and CG-SBA for safety    Gait:  Amb 50ft w/o AD and CGA for safety. Distance limited 2* to pt fatigue on this date.      Therex:  BLE therex x30 reps:    -AP   -LAQ   -Hip flexion    -GS   -hip abd/add      Additional Treatment:  -Donned LSO and shoes at start of treatment session.   -Recumbent stepper x15 min L6 using BLE only.     Patient left up in chair with call button in reach and nursing notified.    Assessment:  Ben Melvin is a 60 y.o. male with a medical diagnosis of Cholelithiasis with acute cholecystitis.  Pt with decreased gait distance on this date 2* to pt fatigue. Pt will continue to benefit from PT services at this time. Continue with PT POC as indicated.    Rehab identified problem list/impairments: weakness, impaired endurance, gait instability,  impaired functional mobilty, decreased upper extremity function, impaired balance, orthopedic precautions    Rehab potential is good.    Activity tolerance: Good    Discharge recommendations: home with home health     Barriers to discharge: Inaccessible home environment    Equipment recommendations: wheelchair, bedside commode, bath bench(pt s/o, has DME from prior family members)     GOALS:   Multidisciplinary Problems     Physical Therapy Goals        Problem: Physical Therapy Goal    Goal Priority Disciplines Outcome Goal Variances Interventions   Physical Therapy Goal     PT, PT/OT Ongoing, Progressing     Description:  Goals to be met by: 20     Patient will increase functional independence with mobility by performin. Supine to sit with Stand-by Assistance and observing all precautions  2. Sit to supine with Stand-by Assistance and observing all precautions  3. Sit to stand transfer with Stand-by Assistance and observing all precautions  4. Bed to chair transfer with Stand-by Assistance, with LRAD if appropriate,  and observing all precautions  5. Gait  x 100 feet with Stand-by Assistance  With LRAD, if appropriate, and observing all precautions.   6. Ascend/descend 3 stair  Handrails Minimal Assistance and maintaining weight-bearing precaution(s), all precautions.   7. Ascend/Descend 4 inch curb step with Minimal Assistance using LRAD, if appropriate  and observing all precautions.  8. Stand for 3 minutes with Stand-by Assistance   9. Lower extremity exercise program x20 reps per handout, with assistance as needed and gym therex                      PLAN:    Patient to be seen 5 x/week  to address the above listed problems via gait training, therapeutic activities, therapeutic exercises, wheelchair management/training  Plan of Care expires: 20  Plan of Care reviewed with: patient    Rosa Isela Pipe, PTA  2020

## 2020-03-12 DIAGNOSIS — K81.9 CHOLECYSTITIS: Primary | ICD-10-CM

## 2020-03-12 LAB
ANION GAP SERPL CALC-SCNC: 12 MMOL/L (ref 8–16)
BASOPHILS # BLD AUTO: 0.08 K/UL (ref 0–0.2)
BASOPHILS NFR BLD: 1 % (ref 0–1.9)
BUN SERPL-MCNC: 28 MG/DL (ref 6–20)
CALCIUM SERPL-MCNC: 9 MG/DL (ref 8.7–10.5)
CHLORIDE SERPL-SCNC: 103 MMOL/L (ref 95–110)
CO2 SERPL-SCNC: 25 MMOL/L (ref 23–29)
CREAT SERPL-MCNC: 1.3 MG/DL (ref 0.5–1.4)
DIFFERENTIAL METHOD: ABNORMAL
EOSINOPHIL # BLD AUTO: 0.2 K/UL (ref 0–0.5)
EOSINOPHIL NFR BLD: 2.7 % (ref 0–8)
ERYTHROCYTE [DISTWIDTH] IN BLOOD BY AUTOMATED COUNT: 18 % (ref 11.5–14.5)
EST. GFR  (AFRICAN AMERICAN): >60 ML/MIN/1.73 M^2
EST. GFR  (NON AFRICAN AMERICAN): 59.7 ML/MIN/1.73 M^2
GLUCOSE SERPL-MCNC: 81 MG/DL (ref 70–110)
HCT VFR BLD AUTO: 38.4 % (ref 40–54)
HGB BLD-MCNC: 11.3 G/DL (ref 14–18)
IMM GRANULOCYTES # BLD AUTO: 0.09 K/UL (ref 0–0.04)
IMM GRANULOCYTES NFR BLD AUTO: 1.1 % (ref 0–0.5)
INR PPP: 2.6 (ref 0.8–1.2)
LYMPHOCYTES # BLD AUTO: 2.2 K/UL (ref 1–4.8)
LYMPHOCYTES NFR BLD: 27.8 % (ref 18–48)
MAGNESIUM SERPL-MCNC: 2 MG/DL (ref 1.6–2.6)
MCH RBC QN AUTO: 26.5 PG (ref 27–31)
MCHC RBC AUTO-ENTMCNC: 29.4 G/DL (ref 32–36)
MCV RBC AUTO: 90 FL (ref 82–98)
MONOCYTES # BLD AUTO: 0.8 K/UL (ref 0.3–1)
MONOCYTES NFR BLD: 9.8 % (ref 4–15)
NEUTROPHILS # BLD AUTO: 4.5 K/UL (ref 1.8–7.7)
NEUTROPHILS NFR BLD: 57.6 % (ref 38–73)
NRBC BLD-RTO: 0 /100 WBC
PHOSPHATE SERPL-MCNC: 4.4 MG/DL (ref 2.7–4.5)
PLATELET # BLD AUTO: 332 K/UL (ref 150–350)
PMV BLD AUTO: 9.5 FL (ref 9.2–12.9)
POCT GLUCOSE: 109 MG/DL (ref 70–110)
POCT GLUCOSE: 87 MG/DL (ref 70–110)
POTASSIUM SERPL-SCNC: 4.4 MMOL/L (ref 3.5–5.1)
PROTHROMBIN TIME: 24.5 SEC (ref 9–12.5)
RBC # BLD AUTO: 4.27 M/UL (ref 4.6–6.2)
SODIUM SERPL-SCNC: 140 MMOL/L (ref 136–145)
WBC # BLD AUTO: 7.85 K/UL (ref 3.9–12.7)

## 2020-03-12 PROCEDURE — 80048 BASIC METABOLIC PNL TOTAL CA: CPT

## 2020-03-12 PROCEDURE — 97530 THERAPEUTIC ACTIVITIES: CPT | Mod: CQ

## 2020-03-12 PROCEDURE — 25000003 PHARM REV CODE 250: Performed by: NURSE PRACTITIONER

## 2020-03-12 PROCEDURE — 97110 THERAPEUTIC EXERCISES: CPT | Mod: CQ

## 2020-03-12 PROCEDURE — 83735 ASSAY OF MAGNESIUM: CPT

## 2020-03-12 PROCEDURE — 85025 COMPLETE CBC W/AUTO DIFF WBC: CPT

## 2020-03-12 PROCEDURE — 97110 THERAPEUTIC EXERCISES: CPT

## 2020-03-12 PROCEDURE — 25000003 PHARM REV CODE 250: Performed by: INTERNAL MEDICINE

## 2020-03-12 PROCEDURE — 84100 ASSAY OF PHOSPHORUS: CPT

## 2020-03-12 PROCEDURE — 97116 GAIT TRAINING THERAPY: CPT | Mod: CQ

## 2020-03-12 PROCEDURE — 25000003 PHARM REV CODE 250: Performed by: STUDENT IN AN ORGANIZED HEALTH CARE EDUCATION/TRAINING PROGRAM

## 2020-03-12 PROCEDURE — 11000004 HC SNF PRIVATE

## 2020-03-12 PROCEDURE — 36415 COLL VENOUS BLD VENIPUNCTURE: CPT

## 2020-03-12 PROCEDURE — 85610 PROTHROMBIN TIME: CPT

## 2020-03-12 PROCEDURE — S4991 NICOTINE PATCH NONLEGEND: HCPCS | Performed by: INTERNAL MEDICINE

## 2020-03-12 RX ADMIN — Medication 6 MG: at 10:03

## 2020-03-12 RX ADMIN — ATORVASTATIN CALCIUM 80 MG: 20 TABLET, FILM COATED ORAL at 08:03

## 2020-03-12 RX ADMIN — LEVETIRACETAM 1000 MG: 500 TABLET ORAL at 08:03

## 2020-03-12 RX ADMIN — GABAPENTIN 300 MG: 300 CAPSULE ORAL at 08:03

## 2020-03-12 RX ADMIN — SENNOSIDES AND DOCUSATE SODIUM 1 TABLET: 8.6; 5 TABLET ORAL at 08:03

## 2020-03-12 RX ADMIN — WARFARIN SODIUM 4 MG: 1 TABLET ORAL at 05:03

## 2020-03-12 RX ADMIN — METOPROLOL TARTRATE 50 MG: 50 TABLET, FILM COATED ORAL at 08:03

## 2020-03-12 RX ADMIN — NICOTINE 1 PATCH: 14 PATCH TRANSDERMAL at 08:03

## 2020-03-12 RX ADMIN — POLYETHYLENE GLYCOL 3350 17 G: 17 POWDER, FOR SOLUTION ORAL at 08:03

## 2020-03-12 RX ADMIN — CYANOCOBALAMIN TAB 1000 MCG 1000 MCG: 1000 TAB at 08:03

## 2020-03-12 RX ADMIN — PANTOPRAZOLE SODIUM 40 MG: 40 TABLET, DELAYED RELEASE ORAL at 08:03

## 2020-03-12 NOTE — PT/OT/SLP PROGRESS
Physical Therapy  Treatment    Ben Melvin   MRN: [id number]   Admitting Diagnosis: Cholelithiasis with acute cholecystitis    PT Received On: 03/12/20  Total Time (min): (--)       Billable Minutes:  Gait Training 10, Therapeutic Activity 10 and Therapeutic Exercise 20    Treatment Type: Treatment  PT/PTA: PTA     PTA Visit Number: 3       General Precautions: Standard, fall  Orthopedic Precautions: RUE non weight bearing, LUE non weight bearing, spinal precautions   Braces: UE Sling, LSO         Subjective:  Communicated with nursing prior to session.  Pt agreed to work with therapy.     Pain/Comfort  Pain Rating 1: 0/10  Pain Rating Post-Intervention 1: 0/10    Objective:  Patient found seated EOB with family member and PT tech present.       AM-PAC 6 CLICK MOBILITY  Total Score:13      Transfers:  Sit<>Stand: to/from EOB, to/from w/c, to/from recumbent stepper w/o AD and CG-SBA  Stand Pivot Transfer: EOB to w/c no  AD and CG-SBA; recumbent stepper to w/c no AD CG-SBA    Gait:  Amb 130ft w/o AD and CGA for safety. No LOB noted.      Advanced Gait:  Stairs: Pt declined 2* to BLE fatigue. Pt agreed to practice tomorrow 3/13/2020.    Therex:  BLE therex x30 reps w/ 3#:    -AP   -LAQ   -Hip flexion    -GS   -QS   -Hamstring Curls w/ RTB    Additional Treatment:  -Recumbent stepper x17 min BLE only L6.    Patient left up in chair with call button in reach, nursing notified and family member present.    Assessment:  Ben Melvin is a 60 y.o. male with a medical diagnosis of Cholelithiasis with acute cholecystitis.  Pt tolerated treatment well, and will continue to benefit from PT services at this time. Continue with PT POC as indicated.    Rehab identified problem list/impairments: weakness, impaired endurance, gait instability, impaired functional mobilty, decreased upper extremity function, impaired balance, orthopedic precautions    Rehab potential is good.    Activity tolerance: Good    Discharge  recommendations: home with home health     Barriers to discharge: Inaccessible home environment    Equipment recommendations: wheelchair, bedside commode, bath bench(pt s/o, has DME from prior family members)     GOALS:   Multidisciplinary Problems     Physical Therapy Goals        Problem: Physical Therapy Goal    Goal Priority Disciplines Outcome Goal Variances Interventions   Physical Therapy Goal     PT, PT/OT Ongoing, Progressing     Description:  Goals to be met by: 20     Patient will increase functional independence with mobility by performin. Supine to sit with Stand-by Assistance and observing all precautions  2. Sit to supine with Stand-by Assistance and observing all precautions  3. Sit to stand transfer with Stand-by Assistance and observing all precautions  4. Bed to chair transfer with Stand-by Assistance, with LRAD if appropriate,  and observing all precautions  5. Gait  x 100 feet with Stand-by Assistance  With LRAD, if appropriate, and observing all precautions.   6. Ascend/descend 3 stair  Handrails Minimal Assistance and maintaining weight-bearing precaution(s), all precautions.   7. Ascend/Descend 4 inch curb step with Minimal Assistance using LRAD, if appropriate  and observing all precautions.  8. Stand for 3 minutes with Stand-by Assistance   9. Lower extremity exercise program x20 reps per handout, with assistance as needed and gym therex                      PLAN:    Patient to be seen 5 x/week  to address the above listed problems via gait training, therapeutic activities, therapeutic exercises, wheelchair management/training  Plan of Care expires: 20  Plan of Care reviewed with: patient, significant other    Rosa Isela Betancur, JEFFERY  2020

## 2020-03-13 LAB
INR PPP: 2.8 (ref 0.8–1.2)
POCT GLUCOSE: 104 MG/DL (ref 70–110)
POCT GLUCOSE: 109 MG/DL (ref 70–110)
POCT GLUCOSE: 125 MG/DL (ref 70–110)
POCT GLUCOSE: 84 MG/DL (ref 70–110)
PROTHROMBIN TIME: 26.3 SEC (ref 9–12.5)

## 2020-03-13 PROCEDURE — 97110 THERAPEUTIC EXERCISES: CPT

## 2020-03-13 PROCEDURE — 97535 SELF CARE MNGMENT TRAINING: CPT

## 2020-03-13 PROCEDURE — 97129 THER IVNTJ 1ST 15 MIN: CPT

## 2020-03-13 PROCEDURE — 97530 THERAPEUTIC ACTIVITIES: CPT

## 2020-03-13 PROCEDURE — S4991 NICOTINE PATCH NONLEGEND: HCPCS | Performed by: INTERNAL MEDICINE

## 2020-03-13 PROCEDURE — 25000003 PHARM REV CODE 250: Performed by: NURSE PRACTITIONER

## 2020-03-13 PROCEDURE — 11000004 HC SNF PRIVATE

## 2020-03-13 PROCEDURE — 97116 GAIT TRAINING THERAPY: CPT

## 2020-03-13 PROCEDURE — 25000003 PHARM REV CODE 250: Performed by: STUDENT IN AN ORGANIZED HEALTH CARE EDUCATION/TRAINING PROGRAM

## 2020-03-13 PROCEDURE — 85610 PROTHROMBIN TIME: CPT

## 2020-03-13 PROCEDURE — 25000003 PHARM REV CODE 250: Performed by: INTERNAL MEDICINE

## 2020-03-13 PROCEDURE — 97130 THER IVNTJ EA ADDL 15 MIN: CPT

## 2020-03-13 PROCEDURE — 36415 COLL VENOUS BLD VENIPUNCTURE: CPT

## 2020-03-13 RX ORDER — WARFARIN 1 MG/1
3 TABLET ORAL DAILY
Status: DISCONTINUED | OUTPATIENT
Start: 2020-03-13 | End: 2020-03-16

## 2020-03-13 RX ADMIN — ATORVASTATIN CALCIUM 80 MG: 20 TABLET, FILM COATED ORAL at 09:03

## 2020-03-13 RX ADMIN — POLYETHYLENE GLYCOL 3350 17 G: 17 POWDER, FOR SOLUTION ORAL at 09:03

## 2020-03-13 RX ADMIN — PANTOPRAZOLE SODIUM 40 MG: 40 TABLET, DELAYED RELEASE ORAL at 09:03

## 2020-03-13 RX ADMIN — METOPROLOL TARTRATE 50 MG: 50 TABLET, FILM COATED ORAL at 09:03

## 2020-03-13 RX ADMIN — Medication 6 MG: at 09:03

## 2020-03-13 RX ADMIN — GABAPENTIN 300 MG: 300 CAPSULE ORAL at 09:03

## 2020-03-13 RX ADMIN — WARFARIN SODIUM 3 MG: 1 TABLET ORAL at 04:03

## 2020-03-13 RX ADMIN — LEVETIRACETAM 1000 MG: 500 TABLET ORAL at 09:03

## 2020-03-13 RX ADMIN — CYANOCOBALAMIN TAB 1000 MCG 1000 MCG: 1000 TAB at 09:03

## 2020-03-13 RX ADMIN — NICOTINE 1 PATCH: 14 PATCH TRANSDERMAL at 09:03

## 2020-03-13 RX ADMIN — SENNOSIDES AND DOCUSATE SODIUM 1 TABLET: 8.6; 5 TABLET ORAL at 09:03

## 2020-03-13 NOTE — PLAN OF CARE
Problem: SLP Goal  Goal: SLP Goal  Description  Speech Language Pathology Goals  Goals expected to be met by 3/17:  1. Pt will complete immediate recall tasks with 80% accuracy given min cues.   2. Following a 3 minute delay, pt will recall 3/3 units of functional information given min-mod cues.   3. Pt will complete moderate level problem solving tasks with 80% accuracy given min-mod cues.   4. Pt will list an average of 9 items in a category in one minute given min cues.   5. Pt will participate in evaluation of reading, writing, visual spatial, and functional math.          Outcome: Ongoing, Progressing  Cont POC.   MELANI Dominguez, CCC-SLP  Speech Language Pathologist  [phone]  3/13/2020

## 2020-03-13 NOTE — PLAN OF CARE
Repositions with assist, no new skin breakdowns noted. No s/s of seizures. Afebrile. Rt abd drain dry and intact. No redness at insertion site. Monitored for pain and safety. Telesitter in use. Pain meds effective

## 2020-03-13 NOTE — PT/OT/SLP PROGRESS
Occupational Therapy  Treatment    Ben Melvin   MRN: [id number]   Admitting Diagnosis: Cholelithiasis with acute cholecystitis    OT Date of Treatment: 03/12/20       Billable Minutes:  Therapeutic Exercise 60    General Precautions: Standard, fall  Orthopedic Precautions: RUE non weight bearing, LUE non weight bearing, spinal precautions  Braces: LSO, Shoulder abduction brace(2 shoulder braces)    Subjective:  Communicated with pts spouse prior to session.  Pt agreed to focus on BUE therex this session.    Pain/Comfort  Pain Rating 1: 3/10  Location - Side 1: Bilateral  Location 1: shoulder  Pain Addressed 1: Reposition  Pain Rating Post-Intervention 1: 3/10    Objective:   Pt seen for LUE therex as follows:  Arom: elbow, forearm, wrist and digits flexion/ extension full rom, 2x15 reps each with aarom required for end range elbow flexion.  aa/prom: L shoulder flexion 0-85*; ER 0-10*  2x15 reps each; shoulder shrugs; shoulder rolls  Pt was educated to perform the following daily: shoulder shrugs, forward shoulder flexion on tabletop;  strengthening L hand.  OT provided focus on functional reach tasks with active assist provided for shoulder flexion.     RUE therex as follows:  Arom: elbow, flrearm, wrist and digits 2x15 reps;   Prom: R shoulder 0- 50*; modified pendulum ex's  OT educated pt to limit RUE HEP to arom elbow - distally and to maintain use of R sling and swathe at all times.    Patient left up in chair with call button in reach    ASSESSMENT:  Ben Melvin is a 60 y.o. male with a medical diagnosis of Cholelithiasis with acute cholecystitis who has significantly impaired functional use of BUE's. He has no active shoulder flexion L shoulder with significant muscular atrophy throughout.    Rehab identified problem list/impairments: weakness, impaired endurance, impaired self care skills, impaired functional mobilty, gait instability, impaired balance, impaired cognition, decreased coordination,  decreased upper extremity function, decreased safety awareness, pain, decreased ROM, impaired coordination, impaired fine motor, impaired skin, edema, orthopedic precautions    Rehab potential is good    Activity tolerance: Good    Discharge recommendations: home health OT     Barriers to discharge: Inaccessible home environment     Equipment recommendations: (TBD - pt has BSC, RW, W/C, and TTB available at home to use )     GOALS:   Multidisciplinary Problems     Occupational Therapy Goals        Problem: Occupational Therapy Goal    Goal Priority Disciplines Outcome Interventions   Occupational Therapy Goal     OT, PT/OT Ongoing, Progressing    Description:  Goals to be met by: 03/26/2020     Patient will increase functional independence with ADLs by performing:    Feeding with Set-up Assistance.  UE Dressing with Moderate Assistance.  LE Dressing with Moderate Assistance.  Grooming while seated with Set-up Assistance.  Toileting from bedside commode with Moderate Assistance for hygiene and clothing management.   Bathing from  edge of bed with Moderate Assistance.  Toilet transfer to bedside commode with Stand-by Assistance.  Upper extremity exercise program 2 x12 reps per handout, with supervision according to orthopedic restrictions per MD order in order to maintain UE ROM and then, once cleared, in order to increase strength to improve ADLs and func mobility  Perform functional task in standing for 10 minutes with SBA in order to prepare for safe standing ADLs/iADLs.                      Plan:  Patient to be seen 5 x/week, 6 x/week to address the above listed problems via self-care/home management, therapeutic activities, therapeutic exercises  Plan of Care expires: 04/06/20  Plan of Care reviewed with: patient, spouse    Tamara Carbajal, OT  03/12/2020

## 2020-03-13 NOTE — PT/OT/SLP PROGRESS
Occupational Therapy  Treatment    Ben Melvin   MRN: [id number]   Admitting Diagnosis: Cholelithiasis with acute cholecystitis    OT Date of Treatment: 03/12/20       Billable Minutes:  Self Care/Home Management 40 and Therapeutic Exercise 15    General Precautions: Standard, fall  Orthopedic Precautions: RUE non weight bearing, LUE non weight bearing, spinal precautions  Braces: LSO, Shoulder abduction brace(2 shoulder braces)    Subjective:  Communicated with nursing prior to session.  Pt reported goal of being able to self dress.    Pain/Comfort  Pain Rating 1: 3/10  Location - Side 1: Bilateral  Location 1: shoulder  Pain Addressed 1: Reposition  Pain Rating Post-Intervention 1: 3/10    Objective:     Bed Mobility:    · Patient completed Rolling/Turning to Right with moderate assistance  · Patient completed Supine to Sit with moderate assistance     Functional Mobility/Transfers:  · Patient completed Sit <> Stand Transfer with minimum assistance  with  hand-held assist   · Patient completed Toilet Transfer Step Transfer technique with contact guard assistance with  hand-held assist  · Patient completed  Shower Transfer Step Transfer technique with minimum assistance with hand-held assist and ttb  · Functional Mobility: Pt able to stand intermittently during ADL tasks of showering and grooming at sink.    Activities of Daily Living:  · Grooming: moderate assistance utilizing L non-dominant UE.  · Bathing: maximal assistance sitting on ttb  · Upper Body Dressing: maximal assistance to thread BUE's.  · Lower Body Dressing: maximal assistance  seated w/c level  · Toileting: moderate assistance to utilize urinal    AMPAC 6 Click:  AMPAC Total Score: 12    OT Exercises: AROM BUE's from elbow - distally. pendulum ex's L shoulder modified, while seated on EOB.    Additional Treatment:  Pt was seen for education / training in L shoulder pendulum ex's, prom 0-80* forward flexion; 0-10* ER.    Patient left up in chair  with call button in reach    ASSESSMENT:  Ben Melvin is a 60 y.o. male with a medical diagnosis of Cholelithiasis with acute cholecystitis who requires considerable assistance with all self care, functional mobility and functional use of BUE's. He is a high fall risk, poor insight into his deficits, supportive wife.     Rehab identified problem list/impairments: weakness, impaired endurance, impaired self care skills, impaired functional mobilty, gait instability, impaired balance, impaired cognition, decreased coordination, decreased upper extremity function, decreased safety awareness, pain, decreased ROM, impaired coordination, impaired fine motor, impaired skin, edema, orthopedic precautions    Rehab potential is good    Activity tolerance: Good    Discharge recommendations: home health OT     Barriers to discharge: Inaccessible home environment     Equipment recommendations: (TBD - pt has BSC, RW, W/C, and TTB available at home to use )     GOALS:   Multidisciplinary Problems     Occupational Therapy Goals        Problem: Occupational Therapy Goal    Goal Priority Disciplines Outcome Interventions   Occupational Therapy Goal     OT, PT/OT Ongoing, Progressing    Description:  Goals to be met by: 03/26/2020     Patient will increase functional independence with ADLs by performing:    Feeding with Set-up Assistance.  UE Dressing with Moderate Assistance.  LE Dressing with Moderate Assistance.  Grooming while seated with Set-up Assistance.  Toileting from bedside commode with Moderate Assistance for hygiene and clothing management.   Bathing from  edge of bed with Moderate Assistance.  Toilet transfer to bedside commode with Stand-by Assistance.  Upper extremity exercise program 2 x12 reps per handout, with supervision according to orthopedic restrictions per MD order in order to maintain UE ROM and then, once cleared, in order to increase strength to improve ADLs and func mobility  Perform functional task  in standing for 10 minutes with SBA in order to prepare for safe standing ADLs/iADLs.                      Plan:  Patient to be seen 5 x/week, 6 x/week to address the above listed problems via self-care/home management, therapeutic activities, therapeutic exercises  Plan of Care expires: 04/06/20  Plan of Care reviewed with: patient, spouse    Tamara Carbajal, OT  03/11/2020

## 2020-03-13 NOTE — PLAN OF CARE
Problem: Adult Inpatient Plan of Care  Goal: Plan of Care Review  Outcome: Ongoing, Progressing     Problem: Diabetes Comorbidity  Goal: Blood Glucose Level Within Desired Range  Outcome: Ongoing, Progressing     Problem: Fall Injury Risk  Goal: Absence of Fall and Fall-Related Injury  Outcome: Ongoing, Progressing     Problem: Malnutrition  Goal: Improved Nutritional Intake  Outcome: Ongoing, Progressing     Problem: Skin Injury Risk Increased  Goal: Skin Health and Integrity  Outcome: Ongoing, Progressing

## 2020-03-13 NOTE — PT/OT/SLP PROGRESS
"Physical Therapy  Treatment    Ben Melvin   MRN: [id number]   Admitting Diagnosis: Cholelithiasis with acute cholecystitis    PT Received On: 03/13/20          Billable Minutes:  Gait Training 15, Therapeutic Activity 10, Therapeutic Exercise 29 and Total Time 54    Treatment Type: Treatment  PT/PTA: PT     PTA Visit Number: 0       General Precautions: Standard, fall  Orthopedic Precautions: RUE non weight bearing, LUE non weight bearing, spinal precautions   Braces: LSO(RUE shoulder abduction brace)       Subjective:  Communicated with patient and his wife prior to session.  Pt agreeable to session. Pt's wife present t/o session for observation.    Pain/Comfort  Pain Rating 1: 6/10  Location - Side 1: Left  Location - Orientation 1: generalized  Location 1: hip  Pain Addressed 1: Reposition  Pain Rating Post-Intervention 1: 6/10    Objective:  Patient found sitting in w/c.       AM-PAC 6 CLICK MOBILITY  Total Score:14    Bed Mobility:  Not performed    Transfers:  Sit<>Stand: to/from w/c, multiple trials, w/o AD w/ Gabby  Stand Pivot Transfer: w/c<>EOM w/o AD w/ Min/CGA to rise and pivot  Cues for BUE NWB    Gait:  Amb 162ft w/o AD w/ CGA for safety  Lateral instability due to leg length discrepancy  Cues to slow down for safety w/ turns     Therex:  Foot Taps on 1.5" step x4 reps each LE (RLE up>down, LLE up>down, x4 reps each) w/ Min/CGA for stability    Sit<>stand to/from EOM: x3 reps EOM at 23", x3 reps EOM at 21", x3 reps EOM at 20", x3 reps EOM at 20", x3 reps EOM at 21", x3 reps EOM at 23"    Seated BLE therex 2x15 reps (HF and LAQ)    Balance:  Standing card game x3min w/ CGA for safety, pt using LUE but w/o shoulder ROM, cards were placed very close to pt on counter so that he was only moving elbow/hand    Additional Treatment:  Recumbent cross , Level 3, x15min w/ BLE only to inc strength/endurance    Patient left up in chair with OT present.    Assessment:  Ben Melvin is a 60 y.o. male " with a medical diagnosis of Cholelithiasis with acute cholecystitis.  Pt mary session well w/ good participation. He is progressing well but remains limited by the below listed deficits including orthopedic precautions and BLE weakness/instability. He will continue PT POC.    Rehab identified problem list/impairments: weakness, impaired endurance, gait instability, impaired functional mobilty, decreased upper extremity function, impaired balance, orthopedic precautions    Rehab potential is good.    Activity tolerance: Good    Discharge recommendations: home with home health     Barriers to discharge: Inaccessible home environment    Equipment recommendations: wheelchair, bedside commode, bath bench(pt s/o, has DME from prior family members)     GOALS:   Multidisciplinary Problems     Physical Therapy Goals        Problem: Physical Therapy Goal    Goal Priority Disciplines Outcome Goal Variances Interventions   Physical Therapy Goal     PT, PT/OT Ongoing, Progressing     Description:  Goals to be met by: 20     Patient will increase functional independence with mobility by performin. Supine to sit with Stand-by Assistance and observing all precautions  2. Sit to supine with Stand-by Assistance and observing all precautions  3. Sit to stand transfer with Stand-by Assistance and observing all precautions  4. Bed to chair transfer with Stand-by Assistance, with LRAD if appropriate,  and observing all precautions  5. Gait  x 100 feet with Stand-by Assistance  With LRAD, if appropriate, and observing all precautions.   6. Ascend/descend 3 stair  Handrails Minimal Assistance and maintaining weight-bearing precaution(s), all precautions.   7. Ascend/Descend 4 inch curb step with Minimal Assistance using LRAD, if appropriate  and observing all precautions.  8. Stand for 3 minutes with Stand-by Assistance   9. Lower extremity exercise program x20 reps per handout, with assistance as needed and gym therex                       PLAN:    Patient to be seen 5 x/week  to address the above listed problems via gait training, therapeutic activities, therapeutic exercises, wheelchair management/training  Plan of Care expires: 04/07/20  Plan of Care reviewed with: patient, significant other    Taryn Ocasio, PT  03/13/2020

## 2020-03-13 NOTE — PROGRESS NOTES
[facility]                                  Skilled Nursing Facility                   Progress Note     Admit Date: 3/6/2020  KIRBY 3/26/2020  Principal Problem:  Cholelithiasis with acute cholecystitis   HPI obtained from patient interview and chart review     Chief Complaint: Revaluation of medical treatment and therapy status: Lab review    HPI:   Mr. Melvin is a 60 year old male with PMHx of AFib on coumadin, peripheral vascular disease, hypertension, COPD, current smoker, liver cirrhosis, who presents to SNF following hospitalization for failure of the repair of the greater tuberosity on the right shoulder s/p revision fixation of right greater tuberosity fracture on 2/14 by Dr. Chris- patient underwent readmission from 3/2-3/6  for sepsis/acute cholecystitis s/p percutaneous cholecystostomy tube. Admission to SNF for secondary weakness and debility.     Interval history: All of today's labs reviewed.  Sodium 140, K 4.4, BUN/creatinine 28/1.3, Mag 2.0.  WBC 7.85, H&H 11.3/38, platelets 332.  INR 2.6.  24 hr vital sign ranges listed below.  Patient's mentation continues to improve. cholecystostomy tube draining brown drainage.  Patient denies shortness of breath or cough, abdominal discomfort, nausea, or vomiting.  Patient reports an adequate appetite.  Patient denies dysuria.  Patient reports having regular bowel movements.  Patient progessing with PT/OT. Continuing to follow and treat all acute and chronic conditions.      Past Medical History: Patient has a past medical history of Adenoma of right adrenal gland (11/09/2017), Alcohol abuse, ASCVD (arteriosclerotic cardiovascular disease) (10/2008), BPH (benign prostatic hyperplasia), Cardiomyopathy, Chronic anxiety, Cirrhosis of liver, COPD (chronic obstructive pulmonary disease), Current every day smoker, Elevated LFTs (08/2001), GERD with esophagitis (11/09/2017), History of colon  polyps, History of epididymitis (2016), Hyperlipidemia, Hypertension, essential, Long term (current) use of anticoagulants, Lumbar disc disease with radiculopathy (05/2015), Major depression, recurrent, chronic, PAF (paroxysmal atrial fibrillation), Peripheral autonomic neuropathy due to DM, Proteinuria, PVD (peripheral vascular disease) (05/29/2009), Venous insufficiency of both lower extremities, and Vitamin D deficiency.    Past Surgical History: Patient has a past surgical history that includes Percutaneous transluminal angioplasty (PTA) of peripheral vessel (Right, 12/2014); Intestinal malrotation repair (Right, 1961); Epidural steroid injection into lumbar spine (06/2015); biopsy right ankle (Right, 05/20/2019); biopsy back (05/20/2019); Colonoscopy w/ polypectomy; Esophagogastroduodenoscopy (N/A, 6/13/2019); Colonoscopy (N/A, 6/13/2019); Partial arthroplasty of shoulder (Left, 1/28/2020); Partial arthroplasty of shoulder (Right, 1/30/2020); and Open reduction and internal fixation (ORIF) of fracture of proximal humerus (Right, 2/14/2020).    Social History: Patient reports that he has been smoking cigarettes. He has a 360.00 pack-year smoking history. He has never used smokeless tobacco. He reports that he drank alcohol. He reports that he does not use drugs.    Family History: family history includes Acromegaly in his father; Diabetes in his mother; Heart attack in his father; Hypertension in his father and mother; Kidney cancer in his brother.    Allergies: Patient has No Known Allergies.    ROS  Constitutional: Negative for fever. + fatigue, insomnia.   Eyes: Negative for blurred vision, double vision and discharge.   Respiratory: Negative for cough, shortness of breath and wheezing.    Cardiovascular: Negative for chest pain, palpitations, and leg swelling.   Gastrointestinal: Negative for abdominal pain, constipation, diarrhea, nausea and vomiting.   Genitourinary: Negative for dysuria, frequency and  urgency.   Musculoskeletal:  + generalized weakness, intermittent. Negative for back pain and myalgias.   Skin: Negative for itching and rash.   Neurological: Negative for dizziness, speech change, and headaches.   Psychiatric/Behavioral: Negative for depression. The patient is not nervous/anxious.      PEx  Temp:  [97 °F (36.1 °C)-98.1 °F (36.7 °C)]   Pulse:  []   Resp:  [18-20]   BP: (117-134)/(66-72)   SpO2:  [95 %]      Constitutional: Patient appears well-developed.  No distress noted  HENT:   Head: Normocephalic and atraumatic.   Eyes: Pupils are equal, round  Neck: Normal range of motion. Neck supple.   Cardiovascular: Normal rate, regular rhythm and normal heart sounds.    Pulmonary/Chest: Effort normal and breath sounds are clear  Abdominal: Soft. Bowel sounds are normal.   Musculoskeletal: Normal range of motion.  Decreased range of motion to bilateral upper extremities  Neurological: Alert and oriented to person.  Disoriented to place, and time.   Psychiatric: Normal mood and affect. Behavior is normal.   Skin: Skin is warm and dry.  Surgical incision to RUE 14cm in length, LUE 15 cm in length- no drainage noted, fully approximated, no signs of infection- following weekly last visualized on 3/10; cholecystostomy tube    No results for input(s): GLUCOSE, NA, K, CL, CO2, BUN, CREATININE, MG in the last 24 hours.    Invalid input(s):  CALCIUM    No results for input(s): WBC, RBC, HGB, HCT, PLT, MCV, MCH, MCHC in the last 24 hours.      Assessment and Plan:     Problem addressed today    PAF (paroxysmal atrial fibrillation)  Chronic anticoagulation  Encounter for therapeutic monitoring  - Resume warfarin for 2-3              Dosed at 4 mg daily at home              Will likely need increase back to home dosing once antibiotics have stopped  - Continue metoprolol  - 3/10 INR supratherapeutic yesterday on 03/09 with INR 3.2, today's, INR is 2.8, initiated Coumadin 2 mg daily- will increase as  appropriate  - 3/12 initiated Coumadin to 3 mg daily    Acute cholecystitis  S/p percutaneous cholecystostomy   - Continue zosyn and metronidazole for 1 week total  - Follow up with general surgery  6 week from placement- around 4/14-  Will message Gen Surg to set apt up.   - 3/12 message sent to general surgery to set up appointment- around 4/14 appointment not set up yet, will follow-up     Neuropathic pain  - stable, continue Gabapentin 300 mg qhs, Capsaicin cream prn, Hydrocodone  5/aceteaminophen 325 mg prn    Constipation  - stable, Continue daily miralax and senokot-s     Essential hypertension  - Home medications: metoprolol and ramipril at home.   - continue Metoprolol 50 mg BID with low normal BPs   - Ramipril held due to lowish BPs  - stable, continue current treatment       Ongoing but stable problems       Bilateral fracture dislocation of proximal humerus fracture  Bilateral acromial fractures with bilateral shoulder dislocation  S/p bilateral shoulder hemiarthroplasty: right on 1/28/2020 and left on 1/30/2020  S/p Pt underwent revision of fixation of R greater tuberosity fracture on 02/14/20  - Follow up with orthopedics as scheduled  - PT/OT - nonweightbearing bilateral upper extremities in sling  - DVT prophylaxis - continue warfarin as for atrial fibrillaiton    Pathologic osteoporotic fractures  - Treat vitamin D deficiency  - Further evaluation and management as per PCP     CANDY on CKD III, resolved  - SCr with wandering baseline 0.8-1.4     GERD (gastroesophageal reflux disease)  - Continue Pantoprazole 40 mg daily     Stable burst fracture of first lumbar vertebra  L1 and L4 burst fractures  - f/u in neurosurgery clinic as scheduled on 04/23/20 for further management  - TSLO brace whenever not lying in bed     Subdural hematoma  - Stable  - Cleared to resume anticoagualtion     Type 2 diabetes mellitus with CKD III and HTN  - A1c 5.9 from 01/27/20  - Not on treatment current  - Acuchecks in  general <120  - Stop accucheck and SSI     Severe Vitamin D deficiency  - Level of 7 on 2/4  - continue Ergocalciferol 50k weekly      COPD (chronic obstructive pulmonary disease)  Smoker   - change levalbuterol nebs to prn  - Limit oxygen to avoid hypercapnea  - nicotine patch 14 mg daily, can wean down as tolearted  - smoking cessation advised     Seizure disorder   - continue keppra 1 g BID  - Follow up with neurology after discharge - appointment needs to be made     Hyperlipidemia   - continue atorvastatin 80 mg daily     GEDR   - continue protonix 40 mg daily     Anemic of acute infection   - improving without specific intervention     Moderate protein calorie malnutrition   - boost plus TID with meals  - Regular diet  - Dietary consult     Acute metabolic encephalopathy  Probable mild Cognitive impairment  - speech therapy eval  - Consider neuropsych eval  - Hold amitriptyline for now, will use melatonin for sleep     DJD of multiple sites   - PT/OT. Pain management as above     Cirrhosis of liver   - attributed to alcohol. Patient rarely drinks alcohol at this time.    Future Appointments   Date Time Provider Department Center   3/25/2020  8:15 AM Chandler Chris MD [facility] ORTHO Tai Maxwell   4/17/2020  9:40 AM Crisatl Ohara NP [facility]  [facility]   4/23/2020  8:15 AM [facility] OI-CT2 500 LB LIMIT [facility]   4/23/2020  8:30 AM [facility] OI-CT2 500 LB LIMIT [facility]   6/4/2020 11:00 AM Carl Barry MD [facility] NEUROS7 Tai Craig NP      DOS: 3/12/2020       Patient note was created using MModal Dictation.  Any errors in syntax or even information may not have been identified and edited on initial review prior to signing this note.

## 2020-03-13 NOTE — PT/OT/SLP PROGRESS
"Occupational Therapy  Treatment    Ben Melvin   MRN: [id number]   Admitting Diagnosis: Cholelithiasis with acute cholecystitis    OT Date of Treatment: 03/13/20       Billable Minutes:  Therapeutic Exercise 40    General Precautions: Standard, fall  Orthopedic Precautions: RUE non weight bearing, LUE non weight bearing, spinal precautions  Braces: LSO, RUE sling and swathe     Subjective:  Communicated with PT prior to session.  Pt reports he "can't do anything" with my arms, they've gotten so weak.    Pain/Comfort  Pain Rating 1: 3/10  Location - Side 1: Right  Location 1: shoulder  Pain Addressed 1: Reposition  Pain Rating Post-Intervention 1: 3/10    Objective:  Pt seen for LUE therex as follows:  Arom: elbow, forearm, wrist and digits flexion/ extension full rom, 2x15 reps each with aarom required for end range elbow flexion.  aa/prom: L shoulder flexion 0-85*; ER 0-10*  2x15 reps each; shoulder shrugs; shoulder rolls  Pt was educated to perform the following daily: shoulder shrugs, forward shoulder flexion on tabletop;  strengthening L hand.  OT provided focus on functional reach tasks with active assist provided for shoulder flexion.      RUE therex as follows:  Arom: elbow, flrearm, wrist and digits 2x15 reps;   Prom: R shoulder 0- 50*; modified pendulum ex's  OT educated pt to limit RUE HEP to arom elbow - distally and to maintain use of R sling and swathe at all times.     Patient left up in chair with call button in reach  [facility] 6 Click:  [facility] Total Score: 12    Patient left up in chair with call button in reach    ASSESSMENT:  Ben Melvin is a 60 y.o. male with a medical diagnosis of Cholelithiasis with acute cholecystitis who continues to be significantly limited in functional use of BUE's. Pt tolerating gentle prom R shoulder forward flexion 0-70*, L shoulder forward flexion 0-90*; R shoulder ER 0-10*. Pt continues to demonstrate need for skilled OT intervention.    Rehab identified " problem list/impairments: weakness, impaired endurance, impaired self care skills, impaired functional mobilty, gait instability, impaired balance, impaired cognition, decreased coordination, decreased upper extremity function, decreased safety awareness, pain, decreased ROM, impaired coordination, impaired fine motor, impaired skin, edema, orthopedic precautions    Rehab potential is good    Activity tolerance: Good    Discharge recommendations: home health OT     Barriers to discharge: Inaccessible home environment     Equipment recommendations: (TBD - pt has BSC, RW, W/C, and TTB available at home to use )     GOALS:   Multidisciplinary Problems     Occupational Therapy Goals        Problem: Occupational Therapy Goal    Goal Priority Disciplines Outcome Interventions   Occupational Therapy Goal     OT, PT/OT Ongoing, Progressing    Description:  Goals to be met by: 03/26/2020     Patient will increase functional independence with ADLs by performing:    Feeding with Set-up Assistance.  UE Dressing with Moderate Assistance.  LE Dressing with Moderate Assistance.  Grooming while seated with Set-up Assistance.  Toileting from bedside commode with Moderate Assistance for hygiene and clothing management.   Bathing from  edge of bed with Moderate Assistance.  Toilet transfer to bedside commode with Stand-by Assistance.  Upper extremity exercise program 2 x12 reps per handout, with supervision according to orthopedic restrictions per MD order in order to maintain UE ROM and then, once cleared, in order to increase strength to improve ADLs and func mobility  Perform functional task in standing for 10 minutes with SBA in order to prepare for safe standing ADLs/iADLs.                      Plan:  Patient to be seen 5 x/week, 6 x/week to address the above listed problems via self-care/home management, therapeutic activities, therapeutic exercises  Plan of Care expires: 04/06/20  Plan of Care reviewed with: patient,  spouse    Tamara Carbajal, OT  03/13/2020

## 2020-03-14 LAB
INR PPP: 2.8 (ref 0.8–1.2)
POCT GLUCOSE: 115 MG/DL (ref 70–110)
POCT GLUCOSE: 121 MG/DL (ref 70–110)
POCT GLUCOSE: 99 MG/DL (ref 70–110)
PROTHROMBIN TIME: 26.7 SEC (ref 9–12.5)

## 2020-03-14 PROCEDURE — 97530 THERAPEUTIC ACTIVITIES: CPT | Mod: CO

## 2020-03-14 PROCEDURE — S4991 NICOTINE PATCH NONLEGEND: HCPCS | Performed by: INTERNAL MEDICINE

## 2020-03-14 PROCEDURE — 85610 PROTHROMBIN TIME: CPT

## 2020-03-14 PROCEDURE — 25000003 PHARM REV CODE 250: Performed by: STUDENT IN AN ORGANIZED HEALTH CARE EDUCATION/TRAINING PROGRAM

## 2020-03-14 PROCEDURE — 11000004 HC SNF PRIVATE

## 2020-03-14 PROCEDURE — 25000003 PHARM REV CODE 250: Performed by: NURSE PRACTITIONER

## 2020-03-14 PROCEDURE — 25000003 PHARM REV CODE 250: Performed by: INTERNAL MEDICINE

## 2020-03-14 RX ADMIN — HYDROCODONE BITARTRATE AND ACETAMINOPHEN 1 TABLET: 5; 325 TABLET ORAL at 09:03

## 2020-03-14 RX ADMIN — WARFARIN SODIUM 3 MG: 1 TABLET ORAL at 04:03

## 2020-03-14 RX ADMIN — METOPROLOL TARTRATE 50 MG: 50 TABLET, FILM COATED ORAL at 09:03

## 2020-03-14 RX ADMIN — NICOTINE 1 PATCH: 14 PATCH TRANSDERMAL at 08:03

## 2020-03-14 RX ADMIN — CYANOCOBALAMIN TAB 1000 MCG 1000 MCG: 1000 TAB at 08:03

## 2020-03-14 RX ADMIN — Medication 6 MG: at 09:03

## 2020-03-14 RX ADMIN — PANTOPRAZOLE SODIUM 40 MG: 40 TABLET, DELAYED RELEASE ORAL at 08:03

## 2020-03-14 RX ADMIN — ATORVASTATIN CALCIUM 80 MG: 20 TABLET, FILM COATED ORAL at 09:03

## 2020-03-14 RX ADMIN — METOPROLOL TARTRATE 50 MG: 50 TABLET, FILM COATED ORAL at 08:03

## 2020-03-14 RX ADMIN — GABAPENTIN 300 MG: 300 CAPSULE ORAL at 09:03

## 2020-03-14 RX ADMIN — LEVETIRACETAM 1000 MG: 500 TABLET ORAL at 08:03

## 2020-03-14 RX ADMIN — SENNOSIDES AND DOCUSATE SODIUM 1 TABLET: 8.6; 5 TABLET ORAL at 09:03

## 2020-03-14 RX ADMIN — SENNOSIDES AND DOCUSATE SODIUM 1 TABLET: 8.6; 5 TABLET ORAL at 08:03

## 2020-03-14 RX ADMIN — LEVETIRACETAM 1000 MG: 500 TABLET ORAL at 09:03

## 2020-03-14 NOTE — PT/OT/SLP PROGRESS
"Occupational Therapy  Treatment    Ben Melvin   MRN: [id number]   Admitting Diagnosis: Cholelithiasis with acute cholecystitis    OT Date of Treatment: 03/14/20       Billable Minutes:  Therapeutic Activity 53    General Precautions: Standard, fall  Orthopedic Precautions: RUE non weight bearing, LUE non weight bearing, spinal precautions  Braces: LSO, Shoulder abduction brace(2 shoulder braces)         Subjective:  Communicated with patient prior to session.  "I can't sleep well at night."    Pain/Comfort  Pain Rating 1: 0/10    Objective:   Pt found in supine with RUE brace     Occupational Performance:    Bed Mobility:    · Patient completed Rolling/Turning to Left with  minimum assistance  · Patient completed Rolling/Turning to Right with minimum assistance  · Patient completed Supine to Sit with moderate assistance  · Patient completed Sit to Supine with minimum assistance     Functional Mobility/Transfers:  · Patient completed Sit <> Stand Transfer with stand by assistance  with  no assistive device   · Patient completed Bed > Chair Transfer using Stand Pivot technique with stand by assistance with no assistive device  · Patient completed Chair <> Mat Stand Pivot technique with stand by assistance with no assistive device  · Functional Mobility: Pt self propelled w/c with BLE room>gym    Activities of Daily Living:  · Lower Body Dressing: supervision to tesfaye shoes, cues for no bending    [facility] 6 Click:  [facility] Total Score: 12    OT Exercises: AROM bicep curls, wrist, hand pumps, shoulder elevation, depression, rolls  PROM in supine sh flexion.    Additional Treatment:  Pt educated in modified log roll technique to adhere to spinal and NWB precautions for independence with bed mobs.    Patient left up in chair with nurse    ASSESSMENT:  Ben Melvin is a 60 y.o. male with a medical diagnosis of Cholelithiasis with acute cholecystitis. Pt motivated to participate in therapy. Pt encouraged to increase " independence for self care tasks. Patient will benefit from continued OT services to progress toward goals and promote independence.      Rehab identified problem list/impairments: weakness, impaired endurance, impaired self care skills, impaired functional mobilty, gait instability, impaired balance, impaired cognition, decreased coordination, decreased upper extremity function, decreased safety awareness, pain, decreased ROM, impaired coordination, impaired fine motor, impaired skin, edema, orthopedic precautions    Rehab potential is good    Activity tolerance: Good    Discharge recommendations: home health OT     Barriers to discharge: Inaccessible home environment     Equipment recommendations: (TBD - pt has BSC, RW, W/C, and TTB available at home to use )     GOALS:   Multidisciplinary Problems     Occupational Therapy Goals        Problem: Occupational Therapy Goal    Goal Priority Disciplines Outcome Interventions   Occupational Therapy Goal     OT, PT/OT Ongoing, Progressing    Description:  Goals to be met by: 03/26/2020     Patient will increase functional independence with ADLs by performing:    Feeding with Set-up Assistance.  UE Dressing with Moderate Assistance.  LE Dressing with Moderate Assistance.  Grooming while seated with Set-up Assistance.  Toileting from bedside commode with Moderate Assistance for hygiene and clothing management.   Bathing from  edge of bed with Moderate Assistance.  Toilet transfer to bedside commode with Stand-by Assistance.  Upper extremity exercise program 2 x12 reps per handout, with supervision according to orthopedic restrictions per MD order in order to maintain UE ROM and then, once cleared, in order to increase strength to improve ADLs and func mobility  Perform functional task in standing for 10 minutes with SBA in order to prepare for safe standing ADLs/iADLs.                      Plan:  Patient to be seen 5 x/week, 6 x/week to address the above listed problems  via self-care/home management, therapeutic activities, therapeutic exercises  Plan of Care expires: 04/06/20  Plan of Care reviewed with: patient    ESTEFANY Freed  03/14/2020

## 2020-03-15 LAB
INR PPP: 2.2 (ref 0.8–1.2)
PROTHROMBIN TIME: 21.1 SEC (ref 9–12.5)

## 2020-03-15 PROCEDURE — 25000003 PHARM REV CODE 250: Performed by: INTERNAL MEDICINE

## 2020-03-15 PROCEDURE — 25000003 PHARM REV CODE 250: Performed by: STUDENT IN AN ORGANIZED HEALTH CARE EDUCATION/TRAINING PROGRAM

## 2020-03-15 PROCEDURE — 97110 THERAPEUTIC EXERCISES: CPT | Mod: CQ

## 2020-03-15 PROCEDURE — 25000003 PHARM REV CODE 250: Performed by: NURSE PRACTITIONER

## 2020-03-15 PROCEDURE — S4991 NICOTINE PATCH NONLEGEND: HCPCS | Performed by: INTERNAL MEDICINE

## 2020-03-15 PROCEDURE — 11000004 HC SNF PRIVATE

## 2020-03-15 PROCEDURE — 85610 PROTHROMBIN TIME: CPT

## 2020-03-15 PROCEDURE — 97116 GAIT TRAINING THERAPY: CPT | Mod: CQ

## 2020-03-15 PROCEDURE — 97530 THERAPEUTIC ACTIVITIES: CPT | Mod: CQ

## 2020-03-15 RX ADMIN — SENNOSIDES AND DOCUSATE SODIUM 1 TABLET: 8.6; 5 TABLET ORAL at 08:03

## 2020-03-15 RX ADMIN — NICOTINE 1 PATCH: 14 PATCH TRANSDERMAL at 08:03

## 2020-03-15 RX ADMIN — HYDROCODONE BITARTRATE AND ACETAMINOPHEN 1 TABLET: 5; 325 TABLET ORAL at 09:03

## 2020-03-15 RX ADMIN — CYANOCOBALAMIN TAB 1000 MCG 1000 MCG: 1000 TAB at 08:03

## 2020-03-15 RX ADMIN — LEVETIRACETAM 1000 MG: 500 TABLET ORAL at 09:03

## 2020-03-15 RX ADMIN — METOPROLOL TARTRATE 50 MG: 50 TABLET, FILM COATED ORAL at 08:03

## 2020-03-15 RX ADMIN — METOPROLOL TARTRATE 50 MG: 50 TABLET, FILM COATED ORAL at 09:03

## 2020-03-15 RX ADMIN — LEVETIRACETAM 1000 MG: 500 TABLET ORAL at 08:03

## 2020-03-15 RX ADMIN — ATORVASTATIN CALCIUM 80 MG: 20 TABLET, FILM COATED ORAL at 09:03

## 2020-03-15 RX ADMIN — Medication 6 MG: at 09:03

## 2020-03-15 RX ADMIN — HYDROCODONE BITARTRATE AND ACETAMINOPHEN 1 TABLET: 5; 325 TABLET ORAL at 08:03

## 2020-03-15 RX ADMIN — PANTOPRAZOLE SODIUM 40 MG: 40 TABLET, DELAYED RELEASE ORAL at 08:03

## 2020-03-15 RX ADMIN — GABAPENTIN 300 MG: 300 CAPSULE ORAL at 09:03

## 2020-03-15 RX ADMIN — SENNOSIDES AND DOCUSATE SODIUM 1 TABLET: 8.6; 5 TABLET ORAL at 09:03

## 2020-03-15 RX ADMIN — WARFARIN SODIUM 3 MG: 1 TABLET ORAL at 04:03

## 2020-03-15 NOTE — PLAN OF CARE
Problem: Adult Inpatient Plan of Care  Goal: Absence of Hospital-Acquired Illness or Injury  Outcome: Ongoing, Progressing     Problem: Adult Inpatient Plan of Care  Goal: Optimal Comfort and Wellbeing  Outcome: Ongoing, Progressing     Problem: Adult Inpatient Plan of Care  Goal: Plan of Care Review  Outcome: Ongoing, Progressing     Problem: Adult Inpatient Plan of Care  Goal: Patient-Specific Goal (Individualization)  Outcome: Ongoing, Progressing

## 2020-03-15 NOTE — PLAN OF CARE
Problem: Adult Inpatient Plan of Care  Goal: Plan of Care Review  3/15/2020 0115 by Austin Wang LPN  Outcome: Ongoing, Progressing  3/15/2020 0114 by Austin Wang LPN  Outcome: Ongoing, Progressing     Problem: Adult Inpatient Plan of Care  Goal: Patient-Specific Goal (Individualization)  3/15/2020 0115 by Austin Wang LPN  Outcome: Ongoing, Progressing  3/15/2020 0114 by Austin Wang LPN  Outcome: Ongoing, Progressing     Problem: Adult Inpatient Plan of Care  Goal: Absence of Hospital-Acquired Illness or Injury  3/15/2020 0115 by Austin Wang LPN  Outcome: Ongoing, Progressing  3/15/2020 0114 by Austin Wang LPN  Outcome: Ongoing, Progressing     Problem: Adult Inpatient Plan of Care  Goal: Optimal Comfort and Wellbeing  3/15/2020 0115 by Austin Wang LPN  Outcome: Ongoing, Progressing  3/15/2020 0114 by Austin Wnag LPN  Outcome: Ongoing, Progressing

## 2020-03-15 NOTE — PT/OT/SLP PROGRESS
Physical Therapy  Treatment    Ben Melvin   MRN: [id number]   Admitting Diagnosis: Cholelithiasis with acute cholecystitis    PT Received On: 03/15/20  Total Time (min): (--)       Billable Minutes:  Gait Training 10, Therapeutic Activity 10 and Therapeutic Exercise 20    Treatment Type: Treatment  PT/PTA: PTA     PTA Visit Number: 1       General Precautions: Standard, fall  Orthopedic Precautions: RUE non weight bearing, LUE non weight bearing, spinal precautions   Braces: LSO(RUE shoulder abduction brace)         Subjective:  Communicated with nursing prior to session.  Pt agreed to work with therapy.     Pain/Comfort  Pain Rating 1: 0/10  Pain Rating Post-Intervention 1: 0/10    Objective:  Patient found seated w/c.        AM-PAC 6 CLICK MOBILITY  Total Score:14    Bed Mobility:  Sit>Supine:not performed  Supine>Sit: not performed    Transfers:   Sit<>Stand: no AD CGA  Stand Pivot Transfer: no AD CGA     Gait:  Amb 150ft w/o AD and CGA for safety. Pt with lateral instability, but no LOB noted noted.       Advanced Gait:  Stairs: Pt declined stair training on this date.     Therex:  BLE tehrex x30 reps with 3#:   -AP   -LAQ   -hip flexion    -GS   -Hip abd/add    Additional Treatment:  -Recumbent stepper x15 min L6.    Patient left up in chair with call button in reach and nursing notified.    Assessment:  Ben Melvin is a 60 y.o. male with a medical diagnosis of Cholelithiasis with acute cholecystitis.  Pt tolerated treatment well and will continue to benefit from PT services at this time. Continue with PT POC as indicated.    Rehab identified problem list/impairments: weakness, impaired endurance, gait instability, impaired functional mobilty, decreased upper extremity function, impaired balance, orthopedic precautions    Rehab potential is good.    Activity tolerance: Good    Discharge recommendations: home with home health     Barriers to discharge: Inaccessible home environment    Equipment  recommendations: wheelchair, bedside commode, bath bench(pt s/o, has DME from prior family members)     GOALS:   Multidisciplinary Problems     Physical Therapy Goals        Problem: Physical Therapy Goal    Goal Priority Disciplines Outcome Goal Variances Interventions   Physical Therapy Goal     PT, PT/OT Ongoing, Progressing     Description:  Goals to be met by: 20     Patient will increase functional independence with mobility by performin. Supine to sit with Stand-by Assistance and observing all precautions  2. Sit to supine with Stand-by Assistance and observing all precautions  3. Sit to stand transfer with Stand-by Assistance and observing all precautions  4. Bed to chair transfer with Stand-by Assistance, with LRAD if appropriate,  and observing all precautions  5. Gait  x 100 feet with Stand-by Assistance  With LRAD, if appropriate, and observing all precautions.   6. Ascend/descend 3 stair  Handrails Minimal Assistance and maintaining weight-bearing precaution(s), all precautions.   7. Ascend/Descend 4 inch curb step with Minimal Assistance using LRAD, if appropriate  and observing all precautions.  8. Stand for 3 minutes with Stand-by Assistance   9. Lower extremity exercise program x20 reps per handout, with assistance as needed and gym therex                      PLAN:    Patient to be seen 5 x/week  to address the above listed problems via gait training, therapeutic activities, therapeutic exercises, wheelchair management/training  Plan of Care expires: 20  Plan of Care reviewed with: patient    Rosa Isela Pipe, PTA  03/15/2020

## 2020-03-16 LAB
ANION GAP SERPL CALC-SCNC: 10 MMOL/L (ref 8–16)
BASOPHILS # BLD AUTO: 0.09 K/UL (ref 0–0.2)
BASOPHILS NFR BLD: 1.3 % (ref 0–1.9)
BUN SERPL-MCNC: 19 MG/DL (ref 6–20)
CALCIUM SERPL-MCNC: 8.8 MG/DL (ref 8.7–10.5)
CHLORIDE SERPL-SCNC: 104 MMOL/L (ref 95–110)
CO2 SERPL-SCNC: 27 MMOL/L (ref 23–29)
CREAT SERPL-MCNC: 1.1 MG/DL (ref 0.5–1.4)
DIFFERENTIAL METHOD: ABNORMAL
EOSINOPHIL # BLD AUTO: 0.3 K/UL (ref 0–0.5)
EOSINOPHIL NFR BLD: 4.1 % (ref 0–8)
ERYTHROCYTE [DISTWIDTH] IN BLOOD BY AUTOMATED COUNT: 17.9 % (ref 11.5–14.5)
EST. GFR  (AFRICAN AMERICAN): >60 ML/MIN/1.73 M^2
EST. GFR  (NON AFRICAN AMERICAN): >60 ML/MIN/1.73 M^2
GLUCOSE SERPL-MCNC: 90 MG/DL (ref 70–110)
HCT VFR BLD AUTO: 38.5 % (ref 40–54)
HGB BLD-MCNC: 11.2 G/DL (ref 14–18)
IMM GRANULOCYTES # BLD AUTO: 0.05 K/UL (ref 0–0.04)
IMM GRANULOCYTES NFR BLD AUTO: 0.7 % (ref 0–0.5)
INR PPP: 1.8 (ref 0.8–1.2)
LYMPHOCYTES # BLD AUTO: 2.1 K/UL (ref 1–4.8)
LYMPHOCYTES NFR BLD: 30.6 % (ref 18–48)
MAGNESIUM SERPL-MCNC: 1.5 MG/DL (ref 1.6–2.6)
MCH RBC QN AUTO: 26.2 PG (ref 27–31)
MCHC RBC AUTO-ENTMCNC: 29.1 G/DL (ref 32–36)
MCV RBC AUTO: 90 FL (ref 82–98)
MONOCYTES # BLD AUTO: 0.5 K/UL (ref 0.3–1)
MONOCYTES NFR BLD: 6.9 % (ref 4–15)
NEUTROPHILS # BLD AUTO: 4 K/UL (ref 1.8–7.7)
NEUTROPHILS NFR BLD: 56.4 % (ref 38–73)
NRBC BLD-RTO: 0 /100 WBC
PHOSPHATE SERPL-MCNC: 4.1 MG/DL (ref 2.7–4.5)
PLATELET # BLD AUTO: 324 K/UL (ref 150–350)
PMV BLD AUTO: 9.8 FL (ref 9.2–12.9)
POCT GLUCOSE: 117 MG/DL (ref 70–110)
POCT GLUCOSE: 90 MG/DL (ref 70–110)
POTASSIUM SERPL-SCNC: 4.2 MMOL/L (ref 3.5–5.1)
PROTHROMBIN TIME: 17.5 SEC (ref 9–12.5)
RBC # BLD AUTO: 4.28 M/UL (ref 4.6–6.2)
SODIUM SERPL-SCNC: 141 MMOL/L (ref 136–145)
WBC # BLD AUTO: 7 K/UL (ref 3.9–12.7)

## 2020-03-16 PROCEDURE — 11000004 HC SNF PRIVATE

## 2020-03-16 PROCEDURE — 97116 GAIT TRAINING THERAPY: CPT | Mod: CQ

## 2020-03-16 PROCEDURE — 83735 ASSAY OF MAGNESIUM: CPT

## 2020-03-16 PROCEDURE — 25000003 PHARM REV CODE 250: Performed by: NURSE PRACTITIONER

## 2020-03-16 PROCEDURE — 85025 COMPLETE CBC W/AUTO DIFF WBC: CPT

## 2020-03-16 PROCEDURE — 25000003 PHARM REV CODE 250: Performed by: INTERNAL MEDICINE

## 2020-03-16 PROCEDURE — 97803 MED NUTRITION INDIV SUBSEQ: CPT

## 2020-03-16 PROCEDURE — S4991 NICOTINE PATCH NONLEGEND: HCPCS | Performed by: INTERNAL MEDICINE

## 2020-03-16 PROCEDURE — 85610 PROTHROMBIN TIME: CPT

## 2020-03-16 PROCEDURE — 25000003 PHARM REV CODE 250: Performed by: STUDENT IN AN ORGANIZED HEALTH CARE EDUCATION/TRAINING PROGRAM

## 2020-03-16 PROCEDURE — 97130 THER IVNTJ EA ADDL 15 MIN: CPT

## 2020-03-16 PROCEDURE — 84100 ASSAY OF PHOSPHORUS: CPT

## 2020-03-16 PROCEDURE — 97129 THER IVNTJ 1ST 15 MIN: CPT

## 2020-03-16 PROCEDURE — 97535 SELF CARE MNGMENT TRAINING: CPT

## 2020-03-16 PROCEDURE — 97530 THERAPEUTIC ACTIVITIES: CPT | Mod: CQ

## 2020-03-16 PROCEDURE — 80048 BASIC METABOLIC PNL TOTAL CA: CPT

## 2020-03-16 PROCEDURE — 97110 THERAPEUTIC EXERCISES: CPT | Mod: CQ

## 2020-03-16 RX ORDER — WARFARIN 1 MG/1
4 TABLET ORAL DAILY
Status: DISCONTINUED | OUTPATIENT
Start: 2020-03-16 | End: 2020-03-17

## 2020-03-16 RX ORDER — LANOLIN ALCOHOL/MO/W.PET/CERES
400 CREAM (GRAM) TOPICAL 2 TIMES DAILY
Status: COMPLETED | OUTPATIENT
Start: 2020-03-16 | End: 2020-03-17

## 2020-03-16 RX ADMIN — ATORVASTATIN CALCIUM 80 MG: 20 TABLET, FILM COATED ORAL at 08:03

## 2020-03-16 RX ADMIN — LEVETIRACETAM 1000 MG: 500 TABLET ORAL at 08:03

## 2020-03-16 RX ADMIN — Medication 400 MG: at 12:03

## 2020-03-16 RX ADMIN — SENNOSIDES AND DOCUSATE SODIUM 1 TABLET: 8.6; 5 TABLET ORAL at 08:03

## 2020-03-16 RX ADMIN — WARFARIN SODIUM 4 MG: 1 TABLET ORAL at 04:03

## 2020-03-16 RX ADMIN — HYDROCODONE BITARTRATE AND ACETAMINOPHEN 1 TABLET: 5; 325 TABLET ORAL at 08:03

## 2020-03-16 RX ADMIN — NICOTINE 1 PATCH: 14 PATCH TRANSDERMAL at 08:03

## 2020-03-16 RX ADMIN — METOPROLOL TARTRATE 50 MG: 50 TABLET, FILM COATED ORAL at 08:03

## 2020-03-16 RX ADMIN — CYANOCOBALAMIN TAB 1000 MCG 1000 MCG: 1000 TAB at 08:03

## 2020-03-16 RX ADMIN — PANTOPRAZOLE SODIUM 40 MG: 40 TABLET, DELAYED RELEASE ORAL at 08:03

## 2020-03-16 RX ADMIN — Medication 6 MG: at 08:03

## 2020-03-16 RX ADMIN — Medication 400 MG: at 08:03

## 2020-03-16 RX ADMIN — GABAPENTIN 300 MG: 300 CAPSULE ORAL at 08:03

## 2020-03-16 NOTE — PROGRESS NOTES
[facility] - Skilled Nursing Care  Adult Nutrition  Progress Note    SUMMARY       Recommendations    Recommendation:   1. Continue regular diet as tolerated; if pt experiences hyperglycemia rec'd DM 2000kcal diet.  2. Continue boost glucose control BID.   3. Provide nutrition education on DM and cardiac diets before discharge.     Goals: PO intake > 85% EEN, EPN daily   Nutrition Goal Status: progressing towards goal  Communication of RD Recs: other (comment)(POC)    Reason for Assessment    Reason For Assessment: RD follow-up  Diagnosis: infection/sepsis  Relevant Medical History: COPD, elevated LFTs, EtOH abuse, Cirrhosis, JLD, ASCVD, PVD, PAF, GERD, Vit D def, DM, CKDIII  Interdisciplinary Rounds: did not attend  General Information Comments:   3/16/20: Pt reports having a good appetite; PO intake 50 - 75% of meals because he dislikes the food. Pt drinking Boost Glucose control BID. Denies N/V/D/C.  Pt denies weight loss. 12# wt loss x 1 week per chart; likely inaccurate.   3/9/20: Pt reports having poor appetite since d/c from SNF 3/2 on 2/2 acute cholecystitis . s/p cholecystostomy tube placement on 3/4, s/p ORIF - right humerus on 2/14. Pt still reports fair appetite; PO intake 25-50% of meals. Pt drinking Boost Glucose control BID. Wt assists pt to eat meals. Pt denies ever receiving education and would be interested in receiving education on DM/cardiac diet prior to discharge. Denies N/V/D/C. NFPE completed 3/9, pt with moderate muscle and fat loss in extremities 2/2 inactivity. Pt meets criteria for moderate malnutrition.   Nutrition Discharge Planning: adequate PO intake on regular diet     Nutrition Risk Screen    Nutrition Risk Screen: no indicators present    Nutrition/Diet History    Food Preferences: likes sweetned oatmeal   Spiritual, Cultural Beliefs, Muslim Practices, Values that Affect Care: yes  Food Allergies: NKFA  Factors Affecting Nutritional Intake: None identified at this  "time    Anthropometrics    Temp: 97 °F (36.1 °C)  Height Method: Stated  Height: 5' 11" (180.3 cm)  Height (inches): 71 in  Weight Method: Standard Scale  Weight: 99.4 kg (219 lb 2.2 oz)  Weight (lb): 219.14 lb  Ideal Body Weight (IBW), Male: 172 lb  % Ideal Body Weight, Male (lb): 128.69 %  BMI (Calculated): 30.6  BMI Grade: 30 - 34.9- obesity - grade I  Usual Body Weight (UBW), k.45 kg(20)  % Usual Body Weight: 83.53  % Weight Change From Usual Weight: -16.65 %       Lab/Procedures/Meds    Pertinent Labs Reviewed: reviewed  Pertinent Labs Comments: Mg 1.5, Triglycerides 195  Pertinent Medications Reviewed: reviewed  Pertinent Medications Comments: statin, cyanocobalamin, gabapentin, metoprolol, metroidazole, pantoprazole, polyethylene glycol, senna-docusate     Physical Findings/Assessment    Cristhian Score 18  Incision - right abdomen   Incision - right shoulder     Estimated/Assessed Needs    Weight Used For Calorie Calculations: 100.4 kg (221 lb 5.5 oz)  Energy Calorie Requirements (kcal): 2393 kcal/day  Energy Need Method: Greene-St Jeor(PAL 1.3)  Protein Requirements: 100 - 120 g/day(1.0 - 1.2 g/kg)  Weight Used For Protein Calculations: 100.4 kg (221 lb 5.5 oz)  Fluid Requirements (mL): 1 mL/kcal or per MD   Estimated Fluid Requirement Method: RDA Method  RDA Method (mL): 2393  CHO Requirement: 317 g/day    Nutrition Prescription Ordered    Current Diet Order: regular   Oral Nutrition Supplement: Boost Glucose Control BID     Evaluation of Received Nutrient/Fluid Intake    I/O: 1000/1700  Energy Calories Required: meeting needs  Protein Required: meeting needs  Fluid Required: meeting needs  Comments: LBM 3/15  Tolerance: tolerating  % Intake of Estimated Energy Needs: 50 - 75 %   % Meal Intake: 50 - 75 %     Nutrition Risk    Level of Risk/Frequency of Follow-up: low(1x/week)     Assessment and Plan    Moderate malnutrition  Nutrition Problem:  Moderate Protein-Calorie Malnutrition  Malnutrition " in the context of Acute Illness/Injury     Related to (etiology):  S/p ORIF; acute cholecystitis      Signs and Symptoms (as evidenced by):  Energy Intake: <50% of estimated energy requirement for x 7 days  Body Fat Depletion: moderate depletion of triceps   Muscle Mass Depletion: mild and moderate depletion of temples, clavicle region, interosseous muscle and lower extremities   Weight Loss: 17% x 1 month      Interventions(treatment strategy):  Collaboration with other providers  Com[employer] Bever[employer] - Boost Glucose Control BID      Nutrition Diagnosis Status:  Continues    Monitor and Evaluation    Food and Nutrient Intake: energy intake, food and beverage intake  Anthropometric Measurements: weight, weight change, body mass index  Biochemical Data, Medical Tests and Procedures: electrolyte and renal panel, inflammatory profile, lipid profile, glucose/endocrine profile  Nutrition-Focused Physical Findings: overall appearance     Malnutrition Assessment  Malnutrition Type: acute illness or injury  Energy Intake: moderate energy intake  Weight Loss (Malnutrition): greater than 5% in 1 month  Energy Intake (Malnutrition): less than 75% for greater than 7 days   Orbital Region (Subcutaneous Fat Loss): well nourished  Upper Arm Region (Subcutaneous Fat Loss): moderate depletion   Druze Region (Muscle Loss): well nourished  Clavicle Bone Region (Muscle Loss): well nourished  Clavicle and Acromion Bone Region (Muscle Loss): well nourished  Dorsal Hand (Muscle Loss): well nourished  Patellar Region (Muscle Loss): moderate depletion  Anterior Thigh Region (Muscle Loss): moderate depletion  Posterior Calf Region (Muscle Loss): moderate depletion   Edema (Fluid Accumulation): 0-->no edema present   Subcutaneous Fat Loss (Final Summary): moderate protein-calorie malnutrition  Muscle Loss Evaluation (Final Summary): moderate protein-calorie malnutrition    Severe Weight Loss (Malnutrition): greater than 5% in 1  month    Nutrition Follow-Up    RD Follow-up?: Yes

## 2020-03-16 NOTE — PROGRESS NOTES
[facility]                                  Skilled Nursing Facility                   Progress Note     Admit Date: 3/6/2020  IKRBY 3/26/2020  Principal Problem:  Cholelithiasis with acute cholecystitis   HPI obtained from patient interview and chart review     Chief Complaint:  Revaluation of medical treatment and therapy status: Lab review    HPI:   Mr. Melvin is a 60 year old male with PMHx of AFib on coumadin, peripheral vascular disease, hypertension, COPD, current smoker, liver cirrhosis, who presents to SNF following hospitalization for failure of the repair of the greater tuberosity on the right shoulder s/p revision fixation of right greater tuberosity fracture on 2/14 by Dr. Chris- patient underwent readmission from 3/2-3/6  for sepsis/acute cholecystitis s/p percutaneous cholecystostomy tube. Admission to SNF for secondary weakness and debility.     Interval history: All of today's labs reviewed and are listed below.  Today's INR is 1.8 from yesterday's 2.2.  Mag 1.5.  24 hr vital sign ranges listed below.   Patient's mentation continues to improve. cholecystostomy tube draining brown drainage.  Patient denies shortness of breath or cough, abdominal discomfort, nausea, or vomiting.  Patient reports an adequate appetite.  Patient denies dysuria.  Patient reports having regular bowel movements.  Patient progessing with PT/OT. Continuing to follow and treat all acute and chronic conditions.      Past Medical History: Patient has a past medical history of Adenoma of right adrenal gland (11/09/2017), Alcohol abuse, ASCVD (arteriosclerotic cardiovascular disease) (10/2008), BPH (benign prostatic hyperplasia), Cardiomyopathy, Chronic anxiety, Cirrhosis of liver, COPD (chronic obstructive pulmonary disease), Current every day smoker, Elevated LFTs (08/2001), GERD with esophagitis (11/09/2017), History of colon polyps, History of  epididymitis (2016), Hyperlipidemia, Hypertension, essential, Long term (current) use of anticoagulants, Lumbar disc disease with radiculopathy (05/2015), Major depression, recurrent, chronic, PAF (paroxysmal atrial fibrillation), Peripheral autonomic neuropathy due to DM, Proteinuria, PVD (peripheral vascular disease) (05/29/2009), Venous insufficiency of both lower extremities, and Vitamin D deficiency.    Past Surgical History: Patient has a past surgical history that includes Percutaneous transluminal angioplasty (PTA) of peripheral vessel (Right, 12/2014); Intestinal malrotation repair (Right, 1961); Epidural steroid injection into lumbar spine (06/2015); biopsy right ankle (Right, 05/20/2019); biopsy back (05/20/2019); Colonoscopy w/ polypectomy; Esophagogastroduodenoscopy (N/A, 6/13/2019); Colonoscopy (N/A, 6/13/2019); Partial arthroplasty of shoulder (Left, 1/28/2020); Partial arthroplasty of shoulder (Right, 1/30/2020); and Open reduction and internal fixation (ORIF) of fracture of proximal humerus (Right, 2/14/2020).    Social History: Patient reports that he has been smoking cigarettes. He has a 360.00 pack-year smoking history. He has never used smokeless tobacco. He reports that he drank alcohol. He reports that he does not use drugs.    Family History: family history includes Acromegaly in his father; Diabetes in his mother; Heart attack in his father; Hypertension in his father and mother; Kidney cancer in his brother.    Allergies: Patient has No Known Allergies.    ROS  Constitutional: Negative for fever. + fatigue, insomnia.   Eyes: Negative for blurred vision, double vision and discharge.   Respiratory: Negative for cough, shortness of breath and wheezing.    Cardiovascular: Negative for chest pain, palpitations, and leg swelling.   Gastrointestinal: Negative for abdominal pain, constipation, diarrhea, nausea and vomiting.   Genitourinary: Negative for dysuria, frequency and urgency.    Musculoskeletal:  + generalized weakness, intermittent. Negative for back pain and myalgias.   Skin: Negative for itching and rash.   Neurological: Negative for dizziness, speech change, and headaches.   Psychiatric/Behavioral: Negative for depression. The patient is not nervous/anxious.      PEx  Temp:  [97 °F (36.1 °C)-98 °F (36.7 °C)]   Pulse:  []   Resp:  [18]   BP: (130-134)/(69-73)   SpO2:  [97 %]      Constitutional: Patient appears well-developed.  No distress noted  HENT:   Head: Normocephalic and atraumatic.   Eyes: Pupils are equal, round  Neck: Normal range of motion. Neck supple.   Cardiovascular: Normal rate, regular rhythm and normal heart sounds.    Pulmonary/Chest: Effort normal and breath sounds are clear  Abdominal: Soft. Bowel sounds are normal.   Musculoskeletal: Normal range of motion.  Decreased range of motion to bilateral upper extremities  Neurological: Alert and oriented to person.  Disoriented to place, and time.   Psychiatric: Normal mood and affect. Behavior is normal.   Skin: Skin is warm and dry.  Surgical incision to RUE 14cm in length, LUE 15 cm in length- no drainage noted, fully approximated, no signs of infection- following weekly last visualized on 3/16; cholecystostomy tube    Recent Labs   Lab 03/16/20  0528      K 4.2      CO2 27   BUN 19   CREATININE 1.1   MG 1.5*       Recent Labs   Lab 03/16/20  0528   WBC 7.00   RBC 4.28*   HGB 11.2*   HCT 38.5*      MCV 90   MCH 26.2*   MCHC 29.1*         Assessment and Plan:     Problem addressed today    PAF (paroxysmal atrial fibrillation)  Chronic anticoagulation  Encounter for therapeutic monitoring  - Resume warfarin for 2-3              Dosed at 4 mg daily at home              Will likely need increase back to home dosing once antibiotics have stopped  - Continue metoprolol  - 3/10 INR supratherapeutic yesterday on 03/09 with INR 3.2, today's, INR is 2.8, initiated Coumadin 2 mg daily- will increase as  appropriate  - 3/16 initiated Coumadin to 4 mg daily    Hypomagnesemia  - initiated magnesium oxide 400 mg BID x2 days    Acute cholecystitis  S/p percutaneous cholecystostomy   - Continue zosyn and metronidazole for 1 week total  - Follow up with general surgery  6 week from placement- around 4/14-  Will message Gen Surg to set apt up.   - 3/12 message sent to general surgery to set up appointment- around 4/14 appointment not set up yet, will follow-up  - 3/16 repeat CT scans ordered for 4/23     Neuropathic pain  - stable, continue Gabapentin 300 mg qhs, Capsaicin cream prn, Hydrocodone  5/aceteaminophen 325 mg prn    Constipation  - stable, Continue daily miralax and senokot-s     Essential hypertension  - Home medications: metoprolol and ramipril at home.   - continue Metoprolol 50 mg BID with low normal BPs   - Ramipril held due to lowish BPs  - stable, continue current treatment       Ongoing but stable problems       Bilateral fracture dislocation of proximal humerus fracture  Bilateral acromial fractures with bilateral shoulder dislocation  S/p bilateral shoulder hemiarthroplasty: right on 1/28/2020 and left on 1/30/2020  S/p Pt underwent revision of fixation of R greater tuberosity fracture on 02/14/20  - Follow up with orthopedics as scheduled  - PT/OT - nonweightbearing bilateral upper extremities in sling  - DVT prophylaxis - continue warfarin as for atrial fibrillaiton    Pathologic osteoporotic fractures  - Treat vitamin D deficiency  - Further evaluation and management as per PCP     CANDY on CKD III, resolved  - SCr with wandering baseline 0.8-1.4     GERD (gastroesophageal reflux disease)  - Continue Pantoprazole 40 mg daily     Stable burst fracture of first lumbar vertebra  L1 and L4 burst fractures  - f/u in neurosurgery clinic as scheduled on 04/23/20 for further management  - TSLO brace whenever not lying in bed     Subdural hematoma  - Stable  - Cleared to resume anticoagualtion     Type 2  diabetes mellitus with CKD III and HTN  - A1c 5.9 from 01/27/20  - Not on treatment current  - Acuchecks in general <120  - Stop accucheck and SSI     Severe Vitamin D deficiency  - Level of 7 on 2/4  - continue Ergocalciferol 50k weekly      COPD (chronic obstructive pulmonary disease)  Smoker   - change levalbuterol nebs to prn  - Limit oxygen to avoid hypercapnea  - nicotine patch 14 mg daily, can wean down as tolearted  - smoking cessation advised     Seizure disorder   - continue keppra 1 g BID  - Follow up with neurology after discharge - appointment needs to be made     Hyperlipidemia   - continue atorvastatin 80 mg daily     GEDR   - continue protonix 40 mg daily     Anemic of acute infection   - improving without specific intervention     Moderate protein calorie malnutrition   - boost plus TID with meals  - Regular diet  - Dietary consult     Acute metabolic encephalopathy  Probable mild Cognitive impairment  - speech therapy eval  - Consider neuropsych eval  - Hold amitriptyline for now, will use melatonin for sleep     DJD of multiple sites   - PT/OT. Pain management as above     Cirrhosis of liver   - attributed to alcohol. Patient rarely drinks alcohol at this time.    Future Appointments   Date Time Provider Department Center   3/25/2020  8:15 AM Chandler Chris MD [facility] ORTHO Tai Maxwell   4/17/2020  9:40 AM Cristal Ohara NP [facility]  [facility]   4/23/2020  8:15 AM [facility] OI-CT2 500 LB LIMIT [facility]   4/23/2020  8:30 AM [facility] OIC-CT2 500 LB LIMIT [facility]   6/4/2020 11:00 AM Carl Barry MD [facility] NEUROS7 Tai Craig NP      DOS: 3/16/2020       Patient note was created using MModal Dictation.  Any errors in syntax or even information may not have been identified and edited on initial review prior to signing this note.

## 2020-03-16 NOTE — PT/OT/SLP PROGRESS
"Physical Therapy  Treatment    Ben Melvin   MRN: [id number]   Admitting Diagnosis: Cholelithiasis with acute cholecystitis    PT Received On: 03/16/20          Billable Minutes:  Gait Training 15, Therapeutic Activity 15 and Therapeutic Exercise 15    Treatment Type: Treatment  PT/PTA: PTA     PTA Visit Number: 2       General Precautions: Standard, fall  Orthopedic Precautions: RUE non weight bearing, LUE non weight bearing, spinal precautions   Braces: LSO(RUE shoulder abduction brace)         Subjective:  "I'm good" Pt agreebale to therapy    Pain/Comfort  Pain Rating 1: other (see comments)(not rated "I got some for sure")  Location - Side 1: Bilateral  Location - Orientation 1: generalized  Location 1: shoulder  Pain Addressed 1: Pre-medicate for activity, Reposition, Distraction  Pain Rating Post-Intervention 1: 3/10(with activity)    Objective:  Patient found supine in bed with speech therapist, donned shoes and braces at beginning of session Min A for shoes, Max A for braces       AM-PAC 6 CLICK MOBILITY  Total Score:15    Bed Mobility:  Supine to Sit: Mod A at trunk HOB slightly elevated    Transfers:  Sit<>Stand: t/f wc chair recumbent stepper SBA no AD, vcs to maintain NWB UE, able to maintain on RUE, unable/refused to maintain on LUE  Stand Pivot Transfer: chair<>wc<>recumbent stepper SBA no AD    Gait:  Amb 120' SBA no AD lateral instability noted, no LOB     Advanced Gait:  Refused any advanced gait on this date    Wheelchair Mobility:  Patient propels w/c      Therex:  Recumbent stepper x 15 minutes L7    Balance:  No LOB noted throughout session    Additional Treatment:  Patient educated on importance of increased time out of bed and SONDRA throughout the day    Patient left up in chair with call button in reach.    Assessment:  Ben Melvin is a 60 y.o. male with a medical diagnosis of Cholelithiasis with acute cholecystitis.  Patient tolerated treatment well focusing on bed mobility, " transfers, gait and therex. Patient will continue to improve with skilled physical therapy services in order to return to functional baseline.    Rehab identified problem list/impairments: weakness, impaired endurance, gait instability, impaired functional mobilty, decreased upper extremity function, impaired balance, orthopedic precautions    Rehab potential is good.    Activity tolerance: Good    Discharge recommendations: home with home health     Barriers to discharge: Inaccessible home environment    Equipment recommendations: wheelchair, bedside commode, bath bench(pt s/o, has DME from prior family members)     GOALS:   Multidisciplinary Problems     Physical Therapy Goals        Problem: Physical Therapy Goal    Goal Priority Disciplines Outcome Goal Variances Interventions   Physical Therapy Goal     PT, PT/OT Ongoing, Progressing     Description:  Goals to be met by: 20     Patient will increase functional independence with mobility by performin. Supine to sit with Stand-by Assistance and observing all precautions  2. Sit to supine with Stand-by Assistance and observing all precautions  3. Sit to stand transfer with Stand-by Assistance and observing all precautions  4. Bed to chair transfer with Stand-by Assistance, with LRAD if appropriate,  and observing all precautions  5. Gait  x 100 feet with Stand-by Assistance  With LRAD, if appropriate, and observing all precautions. Met 3/16/2020  6. Ascend/descend 3 stair  Handrails Minimal Assistance and maintaining weight-bearing precaution(s), all precautions.   7. Ascend/Descend 4 inch curb step with Minimal Assistance using LRAD, if appropriate  and observing all precautions.  8. Stand for 3 minutes with Stand-by Assistance met 3/16/2020  9. Lower extremity exercise program x20 reps per handout, with assistance as needed and gym therex                       PLAN:    Patient to be seen 5 x/week  to address the above listed problems via gait  training, therapeutic activities, therapeutic exercises, wheelchair management/training  Plan of Care expires: 04/07/20  Plan of Care reviewed with: patient    Shereen Khari, PTA  03/16/2020

## 2020-03-16 NOTE — PT/OT/SLP PROGRESS
"Speech Language Pathology  Treatment    Ben eMlvin   MRN: [id number]   Admitting Diagnosis: Cholelithiasis with acute cholecystitis    Diet recommendations: Solid Diet Level: Regular  Liquid Diet Level: Thin Assistance with meals, HOB to 90 degrees and Standard aspiration precautions    SLP Treatment Date: 03/16/20  Speech Start Time: 0924     Speech Stop Time: 0955     Speech Total (min): 31 min       TREATMENT BILLABLE MINUTES:  Speech Therapy Individual (cognitive therapy, 2 units) 23 and Seld Care/Home Management Training 8           General Precautions: Standard, fall  Current Respiratory Status: room air       Subjective:  "i'm just grumpy. I want to go home."          Objective:      Pt provide 2 possible problems given hypothetical situations with good ability.  He completed mental manipulation tasks performing simple addition and subtractions problems with 3 single digits with 70% accuracy ind'ly/100% given cues.  Mental manipulation tasks recalling series of 3 words in reverse order were completed with 20% accuracy ind'ly/40% given cues.  Pt stated 2 uses for common objects with 100% accuracy.  Education provided to pt regarding mental manipulation tasks, cognitive flexibility, strategies to assist with performing mental manipulation and cognitive flexibility tasks, and SLP POC.  Pt will benefit from continued reinforcement.     Assessment:  Ben Melvin is a 60 y.o. male with a medical diagnosis of Cholelithiasis with acute cholecystitis and presents with cognitive limitations.     Discharge recommendations: Discharge Facility/Level of Care Needs: (tbd)     Goals:   Multidisciplinary Problems     SLP Goals        Problem: SLP Goal    Goal Priority Disciplines Outcome   SLP Goal     SLP Ongoing, Progressing   Description:  Speech Language Pathology Goals  Goals expected to be met by 3/17:  1. Pt will complete immediate recall tasks with 80% accuracy given min cues.   2. Following a 3 minute delay, " pt will recall 3/3 units of functional information given min-mod cues.   3. Pt will complete moderate level problem solving tasks with 80% accuracy given min-mod cues.   4. Pt will list an average of 9 items in a category in one minute given min cues.   5. Pt will participate in evaluation of reading, writing, visual spatial, and functional math.                            Plan:   Patient to be seen Therapy Frequency: 3 x/week  Planned Interventions: Cognitive-Linguistic Therapy  Plan of Care expires: 04/10/20  Plan of Care reviewed with: patient  SLP Follow-up?: Yes              MELANI Dominguez, CCC-SLP  03/16/2020     MELANI Dominguez, CCC-SLP  Speech Language Pathologist  [phone]  3/16/2020

## 2020-03-16 NOTE — PLAN OF CARE
Problem: Physical Therapy Goal  Goal: Physical Therapy Goal  Description  Goals to be met by: 20     Patient will increase functional independence with mobility by performin. Supine to sit with Stand-by Assistance and observing all precautions  2. Sit to supine with Stand-by Assistance and observing all precautions  3. Sit to stand transfer with Stand-by Assistance and observing all precautions  4. Bed to chair transfer with Stand-by Assistance, with LRAD if appropriate,  and observing all precautions  5. Gait  x 100 feet with Stand-by Assistance  With LRAD, if appropriate, and observing all precautions. Met 3/16/2020  6. Ascend/descend 3 stair  Handrails Minimal Assistance and maintaining weight-bearing precaution(s), all precautions.   7. Ascend/Descend 4 inch curb step with Minimal Assistance using LRAD, if appropriate  and observing all precautions.  8. Stand for 3 minutes with Stand-by Assistance met 3/16/2020  9. Lower extremity exercise program x20 reps per handout, with assistance as needed and gym therex      Outcome: Ongoing, Progressing

## 2020-03-16 NOTE — PT/OT/SLP PROGRESS
Occupational Therapy  Treatment    Ben Melvin   MRN: [id number]   Admitting Diagnosis: Cholelithiasis with acute cholecystitis    OT Date of Treatment: 03/16/20       Billable Minutes:  Therapeutic Activity 58    General Precautions: Standard, fall  Orthopedic Precautions: RUE non weight bearing, LUE non weight bearing, spinal precautions  Braces: LSO, Shoulder abduction brace(2 shoulder braces)         Subjective:  Communicated with nsgprior to session.  I am doing well today    Pain/Comfort  Pain Rating 1: 4/10  Location - Side 1: Bilateral  Location - Orientation 1: generalized  Location 1: shoulder  Pain Addressed 1: Reposition, Distraction    Objective:   Pt. Seated in chair on arrival with teller sittter    Occupational Performance:    Bed Mobility:     Not tested     Functional Mobility/Transfers:  · Patient completed Sit <> Stand Transfer with contact guard assistance  with  hand-held assist   · Functional Mobility: from chair <> w/c and from w/c <> to EOM with cues for safety    Activities of Daily Living:  · Not tested     [facility] 6 Click:  [facility] Total Score: 12      Additional Treatment:  Pt. Provided with PROM at RUE with Shd flex 2 x 10 reps  0 to 50` and  AROM of  elbow wrist and digit flex/ ext pattern  Pt. Also with Brace adjustment with Velcro added to lengthen and adjust neck strap to ensure correct fit  for RUE   Pt. Also RUE with ROM with table top ROM  0 to 70`with Shd flex followed by elbow/ wrist and digit flex/ext and supination/pronaiton   Pt. Also with adjustment of LSO brace on this day. Pt. Braced donned backwards Pt. edu on adjustment and brace management     Patient left up in chair with all lines intact and call button in reach    ASSESSMENT:  Ben Melvin is a 60 y.o. male with a medical diagnosis of Cholelithiasis with acute cholecystitis Pt. participated well with session on this day.Pt demos physical deficits with balance  functional mobility, UB strength, endurance  level of  functional indep with daily tasks and activities and selfcare skills .Pt. Will continue to benefit from continued OT to progress towards goals  .    Rehab identified problem list/impairments: weakness, impaired endurance, impaired self care skills, impaired functional mobilty, gait instability, impaired balance, impaired cognition, decreased coordination, decreased upper extremity function, decreased safety awareness, pain, decreased ROM, impaired coordination, impaired fine motor, impaired skin, edema, orthopedic precautions    Rehab potential is fair    Activity tolerance: Fair    Discharge recommendations: home health OT     Barriers to discharge: Inaccessible home environment     Equipment recommendations: (TBD - pt has BSC, RW, W/C, and TTB available at home to use )     GOALS:   Multidisciplinary Problems     Occupational Therapy Goals        Problem: Occupational Therapy Goal    Goal Priority Disciplines Outcome Interventions   Occupational Therapy Goal     OT, PT/OT Ongoing, Progressing    Description:  Goals to be met by: 03/26/2020     Patient will increase functional independence with ADLs by performing:    Feeding with Set-up Assistance.  UE Dressing with Moderate Assistance.  LE Dressing with Moderate Assistance.  Grooming while seated with Set-up Assistance.  Toileting from bedside commode with Moderate Assistance for hygiene and clothing management.   Bathing from  edge of bed with Moderate Assistance.  Toilet transfer to bedside commode with Stand-by Assistance.  Upper extremity exercise program 2 x12 reps per handout, with supervision according to orthopedic restrictions per MD order in order to maintain UE ROM and then, once cleared, in order to increase strength to improve ADLs and func mobility  Perform functional task in standing for 10 minutes with SBA in order to prepare for safe standing ADLs/iADLs.                  Plan:  Patient to be seen 5 x/week, 6 x/week to address the above listed  problems via self-care/home management, therapeutic activities, therapeutic exercises  Plan of Care expires: 04/06/20  Plan of Care reviewed with: patient    JOHNNY Rothman/CONNIE  03/16/2020

## 2020-03-16 NOTE — PLAN OF CARE
Problem: Malnutrition  Goal: Improved Nutritional Intake  Outcome: Ongoing, Progressing     Recommendation:   1. Continue regular diet as tolerated; if pt experiences hyperglycemia rec'd DM 2000kcal diet.  2. Continue boost glucose control BID.   3. Provide nutrition education on DM and cardiac diets before discharge.     Goals: PO intake > 85% EEN, EPN daily   Nutrition Goal Status: progressing towards goal  Communication of RD Recs: other (comment)(POC)

## 2020-03-16 NOTE — PLAN OF CARE
Problem: SLP Goal  Goal: SLP Goal  Description  Speech Language Pathology Goals  Goals expected to be met by 3/17:  1. Pt will complete immediate recall tasks with 80% accuracy given min cues.   2. Following a 3 minute delay, pt will recall 3/3 units of functional information given min-mod cues.   3. Pt will complete moderate level problem solving tasks with 80% accuracy given min-mod cues.   4. Pt will list an average of 9 items in a category in one minute given min cues.   5. Pt will participate in evaluation of reading, writing, visual spatial, and functional math.          Outcome: Ongoing, Progressing  Cont POC.   MELANI Dominguez, CCC-SLP  Speech Language Pathologist  [phone]  3/16/2020

## 2020-03-17 LAB
INR PPP: 1.6 (ref 0.8–1.2)
PROTHROMBIN TIME: 15.2 SEC (ref 9–12.5)

## 2020-03-17 PROCEDURE — 25000003 PHARM REV CODE 250: Performed by: INTERNAL MEDICINE

## 2020-03-17 PROCEDURE — 97116 GAIT TRAINING THERAPY: CPT | Mod: CQ

## 2020-03-17 PROCEDURE — 36415 COLL VENOUS BLD VENIPUNCTURE: CPT

## 2020-03-17 PROCEDURE — 97530 THERAPEUTIC ACTIVITIES: CPT

## 2020-03-17 PROCEDURE — S4991 NICOTINE PATCH NONLEGEND: HCPCS | Performed by: INTERNAL MEDICINE

## 2020-03-17 PROCEDURE — 97110 THERAPEUTIC EXERCISES: CPT | Mod: CQ

## 2020-03-17 PROCEDURE — 97530 THERAPEUTIC ACTIVITIES: CPT | Mod: CQ

## 2020-03-17 PROCEDURE — 25000003 PHARM REV CODE 250: Performed by: STUDENT IN AN ORGANIZED HEALTH CARE EDUCATION/TRAINING PROGRAM

## 2020-03-17 PROCEDURE — 97535 SELF CARE MNGMENT TRAINING: CPT

## 2020-03-17 PROCEDURE — 11000004 HC SNF PRIVATE

## 2020-03-17 PROCEDURE — 25000003 PHARM REV CODE 250: Performed by: NURSE PRACTITIONER

## 2020-03-17 PROCEDURE — 85610 PROTHROMBIN TIME: CPT

## 2020-03-17 PROCEDURE — 97110 THERAPEUTIC EXERCISES: CPT

## 2020-03-17 RX ORDER — WARFARIN 1 MG/1
4 TABLET ORAL DAILY
Status: DISCONTINUED | OUTPATIENT
Start: 2020-03-18 | End: 2020-03-23

## 2020-03-17 RX ORDER — WARFARIN 2.5 MG/1
7.5 TABLET ORAL ONCE
Status: COMPLETED | OUTPATIENT
Start: 2020-03-17 | End: 2020-03-17

## 2020-03-17 RX ADMIN — SENNOSIDES AND DOCUSATE SODIUM 1 TABLET: 8.6; 5 TABLET ORAL at 09:03

## 2020-03-17 RX ADMIN — ERGOCALCIFEROL 50000 UNITS: 1.25 CAPSULE ORAL at 08:03

## 2020-03-17 RX ADMIN — METOPROLOL TARTRATE 50 MG: 50 TABLET, FILM COATED ORAL at 08:03

## 2020-03-17 RX ADMIN — NICOTINE 1 PATCH: 14 PATCH TRANSDERMAL at 08:03

## 2020-03-17 RX ADMIN — METOPROLOL TARTRATE 50 MG: 50 TABLET, FILM COATED ORAL at 09:03

## 2020-03-17 RX ADMIN — HYDROCODONE BITARTRATE AND ACETAMINOPHEN 1 TABLET: 5; 325 TABLET ORAL at 09:03

## 2020-03-17 RX ADMIN — Medication 400 MG: at 08:03

## 2020-03-17 RX ADMIN — WARFARIN SODIUM 7.5 MG: 2.5 TABLET ORAL at 05:03

## 2020-03-17 RX ADMIN — SENNOSIDES AND DOCUSATE SODIUM 1 TABLET: 8.6; 5 TABLET ORAL at 08:03

## 2020-03-17 RX ADMIN — PANTOPRAZOLE SODIUM 40 MG: 40 TABLET, DELAYED RELEASE ORAL at 08:03

## 2020-03-17 RX ADMIN — Medication 400 MG: at 09:03

## 2020-03-17 RX ADMIN — Medication 6 MG: at 09:03

## 2020-03-17 RX ADMIN — GABAPENTIN 300 MG: 300 CAPSULE ORAL at 09:03

## 2020-03-17 RX ADMIN — LEVETIRACETAM 1000 MG: 500 TABLET ORAL at 08:03

## 2020-03-17 RX ADMIN — CYANOCOBALAMIN TAB 1000 MCG 1000 MCG: 1000 TAB at 08:03

## 2020-03-17 RX ADMIN — ATORVASTATIN CALCIUM 80 MG: 20 TABLET, FILM COATED ORAL at 09:03

## 2020-03-17 RX ADMIN — LEVETIRACETAM 1000 MG: 500 TABLET ORAL at 09:03

## 2020-03-17 RX ADMIN — HYDROCODONE BITARTRATE AND ACETAMINOPHEN 1 TABLET: 5; 325 TABLET ORAL at 08:03

## 2020-03-17 NOTE — PT/OT/SLP PROGRESS
Occupational Therapy  Treatment    Ben Melvin   MRN: [id number]   Admitting Diagnosis: Cholelithiasis with acute cholecystitis    OT Date of Treatment: 03/17/20       Billable Minutes:  Self Care/Home Management 15, Therapeutic Activity 15 and Therapeutic Exercise 15    General Precautions: Standard, fall  Orthopedic Precautions: RUE non weight bearing, LUE non weight bearing, spinal precautions  Braces: LSO, Shoulder abduction brace(2 shoulder braces)         Subjective:  Communicated with nurse prior to session.  Pt. Reported that he was going home today    Pain/Comfort  Pain Rating 1: 0/10  Pain Rating Post-Intervention 1: 0/10    Objective:   supine in bed    Occupational Performance:    Bed Mobility:    · Patient completed Supine to Sit with minimum assistance     Functional Mobility/Transfers:  · Patient completed Sit <> Stand Transfer with stand by assistance  with  no assistive device   · Patient completed Bed <> Chair Transfer using Stand Pivot technique with stand by assistance with no assistive device  · Functional Mobility: Pt. Ambulated from gym to room with CGA and w/c follow    Activities of Daily Living:  · Pt. Required Max A to don TLSO as well as shoulder abd brace     [facility] 6 Click:  [facility] Total Score: 12    OT Exercises:   RUE:  AAROM/AROM to wrist elbow and fingers x 2 sets 15 reps  LUE:  AROM to wrist/elbow and fingers  X 2 sets 15 reps; AAROM shoulder forward flexion x 2 sets 10 reps     Additional Treatment:  Pt utilized LUE to perform towel slides on table as well as as well as finger walks on table top to promote forward shoulder flexion  Pt. Performed sit to  supine with SBA    OT adjusted brace on RUE for proper fit     Patient left supine with call button in reach, nurse notified and [employer] camera system also notified    ASSESSMENT:  Ben Melvin is a 60 y.o. male with a medical diagnosis of Cholelithiasis with acute cholecystitis and presents with deficits in functional use of  BUE.  Pt. Tolerated session well on this date and functional mobility is improved. Pt. Continues to have decreased functional use of BUE as well as orthopedic limitations.    Rehab identified problem list/impairments: weakness, impaired endurance, impaired self care skills, impaired functional mobilty, gait instability, impaired balance, impaired cognition, decreased coordination, decreased upper extremity function, decreased safety awareness, pain, decreased ROM, impaired coordination, impaired fine motor, impaired skin, edema, orthopedic precautions    Rehab potential is good    Activity tolerance: Good    Discharge recommendations: home health OT with assist    Barriers to discharge: Inaccessible home environment     Equipment recommendations: (TBD - pt has BSC, RW, W/C, and TTB available at home to use )     GOALS:   Multidisciplinary Problems     Occupational Therapy Goals        Problem: Occupational Therapy Goal    Goal Priority Disciplines Outcome Interventions   Occupational Therapy Goal     OT, PT/OT Ongoing, Progressing    Description:  Goals to be met by: 03/26/2020     Patient will increase functional independence with ADLs by performing:    Feeding with Set-up Assistance.  UE Dressing with Moderate Assistance.  LE Dressing with Moderate Assistance.  Grooming while seated with Set-up Assistance.  Toileting from bedside commode with Moderate Assistance for hygiene and clothing management.   Bathing from  edge of bed with Moderate Assistance.  Toilet transfer to bedside commode with Stand-by Assistance.  Upper extremity exercise program 2 x12 reps per handout, with supervision according to orthopedic restrictions per MD order in order to maintain UE ROM and then, once cleared, in order to increase strength to improve ADLs and func mobility  Perform functional task in standing for 10 minutes with SBA in order to prepare for safe standing ADLs/iADLs.                      Plan:  Patient to be seen 5 x/week,  6 x/week to address the above listed problems via self-care/home management, therapeutic activities, therapeutic exercises  Plan of Care expires: 04/06/20  Plan of Care reviewed with: patient    LILIAM Irvin  03/17/2020

## 2020-03-18 LAB
INR PPP: 1.7 (ref 0.8–1.2)
PROTHROMBIN TIME: 16.7 SEC (ref 9–12.5)

## 2020-03-18 PROCEDURE — 97110 THERAPEUTIC EXERCISES: CPT

## 2020-03-18 PROCEDURE — 11000004 HC SNF PRIVATE

## 2020-03-18 PROCEDURE — 25000003 PHARM REV CODE 250: Performed by: INTERNAL MEDICINE

## 2020-03-18 PROCEDURE — 97530 THERAPEUTIC ACTIVITIES: CPT | Mod: CQ

## 2020-03-18 PROCEDURE — 36415 COLL VENOUS BLD VENIPUNCTURE: CPT

## 2020-03-18 PROCEDURE — 97129 THER IVNTJ 1ST 15 MIN: CPT

## 2020-03-18 PROCEDURE — 97110 THERAPEUTIC EXERCISES: CPT | Mod: CQ

## 2020-03-18 PROCEDURE — S4991 NICOTINE PATCH NONLEGEND: HCPCS | Performed by: INTERNAL MEDICINE

## 2020-03-18 PROCEDURE — 97535 SELF CARE MNGMENT TRAINING: CPT

## 2020-03-18 PROCEDURE — 25000003 PHARM REV CODE 250: Performed by: NURSE PRACTITIONER

## 2020-03-18 PROCEDURE — 25000003 PHARM REV CODE 250: Performed by: STUDENT IN AN ORGANIZED HEALTH CARE EDUCATION/TRAINING PROGRAM

## 2020-03-18 PROCEDURE — 85610 PROTHROMBIN TIME: CPT

## 2020-03-18 PROCEDURE — 97116 GAIT TRAINING THERAPY: CPT | Mod: CQ

## 2020-03-18 PROCEDURE — 97130 THER IVNTJ EA ADDL 15 MIN: CPT

## 2020-03-18 RX ADMIN — CYANOCOBALAMIN TAB 1000 MCG 1000 MCG: 1000 TAB at 09:03

## 2020-03-18 RX ADMIN — ATORVASTATIN CALCIUM 80 MG: 20 TABLET, FILM COATED ORAL at 08:03

## 2020-03-18 RX ADMIN — METOPROLOL TARTRATE 50 MG: 50 TABLET, FILM COATED ORAL at 09:03

## 2020-03-18 RX ADMIN — METOPROLOL TARTRATE 50 MG: 50 TABLET, FILM COATED ORAL at 08:03

## 2020-03-18 RX ADMIN — LEVETIRACETAM 1000 MG: 500 TABLET ORAL at 08:03

## 2020-03-18 RX ADMIN — PANTOPRAZOLE SODIUM 40 MG: 40 TABLET, DELAYED RELEASE ORAL at 09:03

## 2020-03-18 RX ADMIN — HYDROCODONE BITARTRATE AND ACETAMINOPHEN 1 TABLET: 5; 325 TABLET ORAL at 08:03

## 2020-03-18 RX ADMIN — LEVETIRACETAM 1000 MG: 500 TABLET ORAL at 09:03

## 2020-03-18 RX ADMIN — GABAPENTIN 300 MG: 300 CAPSULE ORAL at 08:03

## 2020-03-18 RX ADMIN — WARFARIN SODIUM 4 MG: 1 TABLET ORAL at 04:03

## 2020-03-18 RX ADMIN — NICOTINE 1 PATCH: 14 PATCH TRANSDERMAL at 09:03

## 2020-03-18 NOTE — PLAN OF CARE
Problem: SLP Goal  Goal: SLP Goal  Description  Speech Language Pathology Goals  Revised goals expected to be met by 3/25:  1. Pt will complete immediate recall tasks with 80% accuracy given min cues.   2. Following a 3 minute delay, pt will recall 3/3 units of functional information given min cues.   3. Pt will complete moderate level problem solving tasks with 80% accuracy given min cues.   4. Pt will list an average of 9 items in a category in one minute given min cues.   5. Pt will participate in evaluation of writing and visual spatial abilities.       Goals expected to be met by 3/17:  1. Pt will complete immediate recall tasks with 80% accuracy given min cues. ongoing  2. Following a 3 minute delay, pt will recall 3/3 units of functional information given min-mod cues. Goal met  3. Pt will complete moderate level problem solving tasks with 80% accuracy given min-mod cues. Goal met  4. Pt will list an average of 9 items in a category in one minute given min cues. ongoing  5. Pt will participate in evaluation of reading, writing, visual spatial, and functional math. Ongoing. Need to assess writing and visual spatial abilities.            Outcome: Ongoing, Progressing  Goals reviewed and revised.   MELANI Dominguez, CCC-SLP  Speech Language Pathologist  [phone]  3/18/2020

## 2020-03-18 NOTE — HPI
Mr. Melvin is a 60 year old male with PMHx of AFib on coumadin, peripheral vascular disease, hypertension, COPD, current smoker, liver cirrhosis, who presents to SNF following hospitalization for failure of the repair of the greater tuberosity on the right shoulder s/p revision fixation of right greater tuberosity fracture on 2/14 by Dr. Chris.  Admission to SNF for secondary weakness and debility.     [place] [facility] [place] after suffering a witnessed seizure while playing video poker and sustained bilateral proximal humerus fracture dislocations, bilateral acromial fractures, subdural hemorrhage, L1 and L4 burst fractures. He underwent left shoulder hemiarthroplasty, biceps tenodesis on 01/28/20 and right shoulder hemiarthroplasty, biceps tenodesis on 01/30/20 and discharged to [facility]. BL shoulder XRs from 02/10/20 notable for failure of the repair of the greater tuberosity on the right shoulder and he [place] [facility] [place] for revision fixation of his right greater tuberosity fracture. Pt had a stable CTH on 02/10/20 and warfarin was reinitiated 02/11, last dose was 02/12. Currently on Lovenox 40 mg SQ daily. He was seen by neurosurgery and will f/u with pt outpt as scheduled on 02/21/20.      Patient reports his pain is well controlled with current regimen.  He endorses a productive cough and trouble sleeping.      Patient will be treated at [facility] with PT and OT to improve functional status and ability to perform ADLs.

## 2020-03-18 NOTE — PT/OT/SLP PROGRESS
Physical Therapy  Treatment    Ben Melvin   MRN: [id number]   Admitting Diagnosis: Cholelithiasis with acute cholecystitis    PT Received On: 03/18/20  Total Time (min): (--)       Billable Minutes:  Gait Training 10, Therapeutic Activity 10 and Therapeutic Exercise 18    Treatment Type: Treatment  PT/PTA: PTA     PTA Visit Number: 4       General Precautions: Standard, fall  Orthopedic Precautions: RUE non weight bearing, LUE non weight bearing, spinal precautions   Braces: LSO(RUE shoulder abduction brace)      Subjective:  Communicated with nursing prior to session.  Pt agreed to work with therapy.     Pain/Comfort  Pain Rating 1: 0/10  Pain Rating Post-Intervention 1: 0/10    Objective:  Patient found supine on mat with head of mat elevated.         AM-PAC 6 CLICK MOBILITY  Total Score:  16    Bed Mobility:  Sit>Supine: not performed  Supine>Sit: on mat CGA at trunk     Transfers:  Sit<>Stand: to/from mat, to/from w/c, to/from recumbent stepper no AD SBA  Stand Pivot Transfer: w/c<>recumbent stepper w/o AD and SBA    Gait:  Amb 100ft SBA lateral instability slow jason. Distance limited 2* to pt fatigue.     Advanced Gait:  Stairs: ascend/descend 4 steps LHR CGA for safety.       Therex:  -BLE Therex x30 reps w/ 3#:   -AP   -LAQ   -Hip flexion    -Hip abd/add   -GS    Additional Treatment:  -recumbent stepper BLE only x15 min L7    Patient left up in chair with call button in reach and nursing notified.    Assessment:  Ben Melvin is a 60 y.o. male with a medical diagnosis of Cholelithiasis with acute cholecystitis.  Pt tolerated treatment well, and will continue to benefit from PT services at this time. Continue with PT POC as indicated.    Rehab identified problem list/impairments: weakness, impaired endurance, gait instability, impaired functional mobilty, decreased upper extremity function, impaired balance, orthopedic precautions    Rehab potential is good.    Activity tolerance: Good    Discharge  recommendations: home with home health     Barriers to discharge: Inaccessible home environment    Equipment recommendations: wheelchair, bedside commode, bath bench(pt s/o, has DME from prior family members)     GOALS:   Multidisciplinary Problems     Physical Therapy Goals        Problem: Physical Therapy Goal    Goal Priority Disciplines Outcome Goal Variances Interventions   Physical Therapy Goal     PT, PT/OT Ongoing, Progressing     Description:  Goals to be met by: 20     Patient will increase functional independence with mobility by performin. Supine to sit with Stand-by Assistance and observing all precautions  2. Sit to supine with Stand-by Assistance and observing all precautions  3. Sit to stand transfer with Stand-by Assistance and observing all precautions  4. Bed to chair transfer with Stand-by Assistance, with LRAD if appropriate,  and observing all precautions met 3/17/2020  5. Gait  x 100 feet with Stand-by Assistance  With LRAD, if appropriate, and observing all precautions. Met 3/16/2020  6. Ascend/descend 3 stair  Handrails Minimal Assistance and maintaining weight-bearing precaution(s), all precautions.   7. Ascend/Descend 4 inch curb step with Minimal Assistance using LRAD, if appropriate  and observing all precautions.  8. Stand for 3 minutes with Stand-by Assistance met 3/16/2020  9. Lower extremity exercise program x20 reps per handout, with assistance as needed and gym therex                        PLAN:    Patient to be seen 5 x/week  to address the above listed problems via gait training, therapeutic activities, therapeutic exercises, wheelchair management/training  Plan of Care expires: 20  Plan of Care reviewed with: patient    Rosa Isela Pipe, PTA  2020

## 2020-03-18 NOTE — HOSPITAL COURSE
Patient progressed well with PT and OT. Patient had no significant events during their stay at SNF. Home health was set up. DME was ordered if needed. Follow up appointment to be made by patient within one week. All prescriptions and discharge instructions were ordered to be given to the patient prior to discharge.     PEx  Constitutional: Patient appears well-developed.  No distress noted  HENT:   Head: Normocephalic and atraumatic.   Eyes: Pupils are equal, round  Neck: Normal range of motion. Neck supple.   Cardiovascular: Normal rate, regular rhythm and normal heart sounds.    Pulmonary/Chest: Effort normal and breath sounds are clear  Abdominal: Soft. Bowel sounds are normal.   Musculoskeletal: Normal range of motion.  Decreased range of motion to bilateral upper extremities  Neurological: Alert and oriented to person,  place, and time.   Psychiatric: Normal mood and affect. Behavior is normal.   Skin: Skin is warm and dry.  Surgical incision to RUE 14cm in length, LUE 15 cm in length- no drainage noted, fully approximated, no signs of infection; cholecystostomy tube present and draining

## 2020-03-18 NOTE — PT/OT/SLP PROGRESS
"Speech Language Pathology  Treatment    Ben Melvin   MRN: [id number]   Admitting Diagnosis: Cholelithiasis with acute cholecystitis    Diet recommendations: Solid Diet Level: Regular  Liquid Diet Level: Thin Monitor for s/s of aspiration and Standard aspiration precautions    SLP Treatment Date: 03/18/20  Speech Start Time: 0857     Speech Stop Time: 0928     Speech Total (min): 31 min       TREATMENT BILLABLE MINUTES:  Speech Therapy Individual (cognitive therapy, 2 units) 23 and Seld Care/Home Management Training 8    Has the patient been evaluated by SLP for swallowing? : Yes  Keep patient NPO?: No   General Precautions: Standard, fall  Current Respiratory Status: room air       Subjective:  "I'm still here."       Objective:      Pt was O x 4.  Simple to moderate money calculations were completed with 80% accuracy ind'ly/100% given cues.  Pt immediately recall functional information from short paragraphs presented by SLP with 60% accuracy ind'ly/90% given cues.  Abstract convergent categorization tasks were completed with 90% accuracy ind'ly/100% given cues.  Education provided to pt regarding purpose of each cognitive therapy task, memory strategies, functional math, and categorization tasks.  Pt demonstrated understanding. Pt reports only getting 1 hour of sleep per night.  Will notify providers.   Assessment:  Ben Melvin is a 60 y.o. male with a medical diagnosis of Cholelithiasis with acute cholecystitis and presents with cognitive-linguistic deficits.     Discharge recommendations: Discharge Facility/Level of Care Needs: (tbd)     Goals:   Multidisciplinary Problems     SLP Goals        Problem: SLP Goal    Goal Priority Disciplines Outcome   SLP Goal     SLP Ongoing, Progressing   Description:  Speech Language Pathology Goals  Revised goals expected to be met by 3/25:  1. Pt will complete immediate recall tasks with 80% accuracy given min cues.   2. Following a 3 minute delay, pt will recall 3/3 " units of functional information given min cues.   3. Pt will complete moderate level problem solving tasks with 80% accuracy given min cues.   4. Pt will list an average of 9 items in a category in one minute given min cues.   5. Pt will participate in evaluation of writing and visual spatial abilities.       Goals expected to be met by 3/17:  1. Pt will complete immediate recall tasks with 80% accuracy given min cues. ongoing  2. Following a 3 minute delay, pt will recall 3/3 units of functional information given min-mod cues. Goal met  3. Pt will complete moderate level problem solving tasks with 80% accuracy given min-mod cues. Goal met  4. Pt will list an average of 9 items in a category in one minute given min cues. ongoing  5. Pt will participate in evaluation of reading, writing, visual spatial, and functional math. Ongoing. Need to assess writing and visual spatial abilities.                              Plan:   Patient to be seen Therapy Frequency: 3 x/week  Planned Interventions: Cognitive-Linguistic Therapy  Plan of Care expires: 04/10/20  Plan of Care reviewed with: patient  SLP Follow-up?: Yes              MELANI Dominguez, CCC-SLP  03/18/2020     MELANI Dominguez, CCC-SLP  Speech Language Pathologist  [phone]  3/18/2020

## 2020-03-19 LAB
ANION GAP SERPL CALC-SCNC: 11 MMOL/L (ref 8–16)
BASOPHILS # BLD AUTO: 0.12 K/UL (ref 0–0.2)
BASOPHILS NFR BLD: 1.6 % (ref 0–1.9)
BUN SERPL-MCNC: 21 MG/DL (ref 6–20)
CALCIUM SERPL-MCNC: 9 MG/DL (ref 8.7–10.5)
CHLORIDE SERPL-SCNC: 106 MMOL/L (ref 95–110)
CO2 SERPL-SCNC: 26 MMOL/L (ref 23–29)
CREAT SERPL-MCNC: 1.1 MG/DL (ref 0.5–1.4)
DIFFERENTIAL METHOD: ABNORMAL
EOSINOPHIL # BLD AUTO: 0.2 K/UL (ref 0–0.5)
EOSINOPHIL NFR BLD: 2.6 % (ref 0–8)
ERYTHROCYTE [DISTWIDTH] IN BLOOD BY AUTOMATED COUNT: 17.9 % (ref 11.5–14.5)
EST. GFR  (AFRICAN AMERICAN): >60 ML/MIN/1.73 M^2
EST. GFR  (NON AFRICAN AMERICAN): >60 ML/MIN/1.73 M^2
GLUCOSE SERPL-MCNC: 79 MG/DL (ref 70–110)
HCT VFR BLD AUTO: 36.9 % (ref 40–54)
HGB BLD-MCNC: 10.6 G/DL (ref 14–18)
IMM GRANULOCYTES # BLD AUTO: 0.02 K/UL (ref 0–0.04)
IMM GRANULOCYTES NFR BLD AUTO: 0.3 % (ref 0–0.5)
INR PPP: 2.1 (ref 0.8–1.2)
LYMPHOCYTES # BLD AUTO: 2.2 K/UL (ref 1–4.8)
LYMPHOCYTES NFR BLD: 29.1 % (ref 18–48)
MAGNESIUM SERPL-MCNC: 1.6 MG/DL (ref 1.6–2.6)
MCH RBC QN AUTO: 25.4 PG (ref 27–31)
MCHC RBC AUTO-ENTMCNC: 28.7 G/DL (ref 32–36)
MCV RBC AUTO: 89 FL (ref 82–98)
MONOCYTES # BLD AUTO: 0.5 K/UL (ref 0.3–1)
MONOCYTES NFR BLD: 6.4 % (ref 4–15)
NEUTROPHILS # BLD AUTO: 4.6 K/UL (ref 1.8–7.7)
NEUTROPHILS NFR BLD: 60 % (ref 38–73)
NRBC BLD-RTO: 0 /100 WBC
PHOSPHATE SERPL-MCNC: 3.7 MG/DL (ref 2.7–4.5)
PLATELET # BLD AUTO: 297 K/UL (ref 150–350)
PMV BLD AUTO: 10.3 FL (ref 9.2–12.9)
POTASSIUM SERPL-SCNC: 4.2 MMOL/L (ref 3.5–5.1)
PROTHROMBIN TIME: 20.2 SEC (ref 9–12.5)
RBC # BLD AUTO: 4.17 M/UL (ref 4.6–6.2)
SODIUM SERPL-SCNC: 143 MMOL/L (ref 136–145)
WBC # BLD AUTO: 7.69 K/UL (ref 3.9–12.7)

## 2020-03-19 PROCEDURE — 11000004 HC SNF PRIVATE

## 2020-03-19 PROCEDURE — 83735 ASSAY OF MAGNESIUM: CPT

## 2020-03-19 PROCEDURE — 84100 ASSAY OF PHOSPHORUS: CPT

## 2020-03-19 PROCEDURE — 25000003 PHARM REV CODE 250: Performed by: STUDENT IN AN ORGANIZED HEALTH CARE EDUCATION/TRAINING PROGRAM

## 2020-03-19 PROCEDURE — 85025 COMPLETE CBC W/AUTO DIFF WBC: CPT

## 2020-03-19 PROCEDURE — 80048 BASIC METABOLIC PNL TOTAL CA: CPT

## 2020-03-19 PROCEDURE — 97110 THERAPEUTIC EXERCISES: CPT

## 2020-03-19 PROCEDURE — 97530 THERAPEUTIC ACTIVITIES: CPT | Mod: CO

## 2020-03-19 PROCEDURE — 36415 COLL VENOUS BLD VENIPUNCTURE: CPT

## 2020-03-19 PROCEDURE — 25000003 PHARM REV CODE 250: Performed by: NURSE PRACTITIONER

## 2020-03-19 PROCEDURE — 85610 PROTHROMBIN TIME: CPT

## 2020-03-19 PROCEDURE — 97116 GAIT TRAINING THERAPY: CPT

## 2020-03-19 PROCEDURE — 25000003 PHARM REV CODE 250: Performed by: INTERNAL MEDICINE

## 2020-03-19 PROCEDURE — 97530 THERAPEUTIC ACTIVITIES: CPT

## 2020-03-19 PROCEDURE — S4991 NICOTINE PATCH NONLEGEND: HCPCS | Performed by: INTERNAL MEDICINE

## 2020-03-19 RX ORDER — LANOLIN ALCOHOL/MO/W.PET/CERES
400 CREAM (GRAM) TOPICAL 2 TIMES DAILY
Status: DISPENSED | OUTPATIENT
Start: 2020-03-19 | End: 2020-03-21

## 2020-03-19 RX ORDER — AMITRIPTYLINE HYDROCHLORIDE 25 MG/1
100 TABLET, FILM COATED ORAL DAILY
Status: DISCONTINUED | OUTPATIENT
Start: 2020-03-19 | End: 2020-03-25 | Stop reason: HOSPADM

## 2020-03-19 RX ADMIN — Medication 400 MG: at 09:03

## 2020-03-19 RX ADMIN — METOPROLOL TARTRATE 50 MG: 50 TABLET, FILM COATED ORAL at 08:03

## 2020-03-19 RX ADMIN — POLYETHYLENE GLYCOL 3350 17 G: 17 POWDER, FOR SOLUTION ORAL at 08:03

## 2020-03-19 RX ADMIN — WARFARIN SODIUM 4 MG: 1 TABLET ORAL at 05:03

## 2020-03-19 RX ADMIN — ATORVASTATIN CALCIUM 80 MG: 20 TABLET, FILM COATED ORAL at 09:03

## 2020-03-19 RX ADMIN — LEVETIRACETAM 1000 MG: 500 TABLET ORAL at 09:03

## 2020-03-19 RX ADMIN — LEVETIRACETAM 1000 MG: 500 TABLET ORAL at 08:03

## 2020-03-19 RX ADMIN — SENNOSIDES AND DOCUSATE SODIUM 1 TABLET: 8.6; 5 TABLET ORAL at 08:03

## 2020-03-19 RX ADMIN — HYDROCODONE BITARTRATE AND ACETAMINOPHEN 1 TABLET: 5; 325 TABLET ORAL at 09:03

## 2020-03-19 RX ADMIN — PANTOPRAZOLE SODIUM 40 MG: 40 TABLET, DELAYED RELEASE ORAL at 08:03

## 2020-03-19 RX ADMIN — METOPROLOL TARTRATE 50 MG: 50 TABLET, FILM COATED ORAL at 09:03

## 2020-03-19 RX ADMIN — CYANOCOBALAMIN TAB 1000 MCG 1000 MCG: 1000 TAB at 08:03

## 2020-03-19 RX ADMIN — NICOTINE 1 PATCH: 14 PATCH TRANSDERMAL at 08:03

## 2020-03-19 RX ADMIN — AMITRIPTYLINE HYDROCHLORIDE 100 MG: 25 TABLET, FILM COATED ORAL at 05:03

## 2020-03-19 RX ADMIN — Medication 6 MG: at 09:03

## 2020-03-19 RX ADMIN — GABAPENTIN 300 MG: 300 CAPSULE ORAL at 09:03

## 2020-03-19 NOTE — PT/OT/SLP PROGRESS
"Occupational Therapy  Treatment    Ben Melvin   MRN: [id number]   Admitting Diagnosis: Cholelithiasis with acute cholecystitis    OT Date of Treatment: 03/18/20       Billable Minutes:  Self Care/Home Management 30 and Therapeutic Exercise 25    General Precautions: Standard, fall  Orthopedic Precautions: RUE non weight bearing, LUE non weight bearing, spinal precautions  Braces: LSO, Shoulder abduction brace(2 shoulder braces)    Subjective:  Communicated with nursing prior to session.  Pt reported he would like to take a shower but "I can't do anything for myself."     Objective:     Bed Mobility:    · Patient completed Rolling/Turning to Left with  minimum assistance  · Patient completed Rolling/Turning to Right with minimum assistance  · Patient completed Supine to Sit with moderate assistance     Functional Mobility/Transfers:  · Patient completed Sit <> Stand Transfer with contact guard assistance  with  hand-held assist   · Patient completed Toilet Transfer Step Transfer technique with contact guard assistance with  hand-held assist  · Patient completed  Shower Transfer Step Transfer technique with contact guard assistance with hand-held assist  · Functional Mobility: Pt performed ADL's from sit and stand levels appropriately. He tolerated standing throughout oral care ADL task at sink with no noted LOB.    Activities of Daily Living:  · Grooming: moderate assistance for hair care due to limited functional reach B'ly  · Bathing: maximal assistance for UB and LB. Pt initiating increased portion of task and self washing anterior from his face to his knees without assistance.  · Upper Body Dressing: maximal assistance for pullover shirt  · Lower Body Dressing: moderate assistance to initiate threading legs into pants  · Toileting: moderate assistance      [facility] 6 Click:  [facility] Total Score:      OT Exercises: AROM L shoulder forward flexion, ER; elbow, wrist and hand flexion/ estension. AArom was required for " L shoulder movements with passive stretch to 100* of forward flexion.  PROM R shoulder flexion 0-80* in supported forward plane of movements semi supine position and humerus supported in glenohumeral fossa.    Additional Treatment:  -Therapist provided facilitation and instruction of proper body mechanics, energy conservation, and fall prevention strategies during tasks listed above.  -Pt educated on role of OT, POC and goals for therapy  -Time provided for therapeutic listening  -Pt provided daily orientation  -Pt educated on spinal precautions with 1/3 recall  -Pt  educated on importance of OOB activities with staff member assistance and sitting OOB majority of the day.   -Pt verbalized understanding and expressed no further concerns/questions  -Whiteboard updated    Patient left up in chair with call button in reach    ASSESSMENT:  Ben Melvin is a 60 y.o. male with a medical diagnosis of Cholelithiasis with acute cholecystitis who continues to deem steady functional progress in functional reach as well as ADL tasks. He is tolerating increased hours/ day OOB and initiating engagement in self care tasks. He continues to demonstrate increasing agitation / frustration with limited abilities.     Rehab identified problem list/impairments: weakness, impaired endurance, impaired self care skills, impaired functional mobilty, gait instability, impaired balance, impaired cognition, decreased coordination, decreased upper extremity function, decreased safety awareness, pain, decreased ROM, impaired coordination, impaired fine motor, impaired skin, edema, orthopedic precautions    Rehab potential is good    Activity tolerance: Good    Discharge recommendations: home health OT     Barriers to discharge: Inaccessible home environment     Equipment recommendations: (TBD - pt has BSC, RW, W/C, and TTB available at home to use )     GOALS:   Multidisciplinary Problems     Occupational Therapy Goals        Problem:  Occupational Therapy Goal    Goal Priority Disciplines Outcome Interventions   Occupational Therapy Goal     OT, PT/OT Ongoing, Progressing    Description:  Goals to be met by: 03/26/2020     Patient will increase functional independence with ADLs by performing:    Feeding with Set-up Assistance.  UE Dressing with Moderate Assistance.  LE Dressing with Moderate Assistance.  Grooming while seated with Set-up Assistance.  Toileting from bedside commode with Moderate Assistance for hygiene and clothing management.   Bathing from  edge of bed with Moderate Assistance.  Toilet transfer to bedside commode with Stand-by Assistance.  Upper extremity exercise program 2 x12 reps per handout, with supervision according to orthopedic restrictions per MD order in order to maintain UE ROM and then, once cleared, in order to increase strength to improve ADLs and func mobility  Perform functional task in standing for 10 minutes with SBA in order to prepare for safe standing ADLs/iADLs.                      Plan:  Patient to be seen 5 x/week, 6 x/week to address the above listed problems via self-care/home management, therapeutic activities, therapeutic exercises  Plan of Care expires: 04/06/20  Plan of Care reviewed with: patient    Tamara Carbajal OT  03/18/2020

## 2020-03-19 NOTE — PT/OT/SLP PROGRESS
Occupational Therapy  Treatment    Ben Melvin   MRN: [id number]   Admitting Diagnosis: Cholelithiasis with acute cholecystitis    OT Date of Treatment: 03/19/20       Billable Minutes:  Therapeutic Activity 40    General Precautions: Standard, fall  Orthopedic Precautions: RUE non weight bearing, LUE non weight bearing, spinal precautions  Braces: LSO, Shoulder abduction brace(2 shoulder braces)         Subjective:  Communicated with nsg prior to session.  I am doing ok today I guess    Pain/Comfort  Pain Rating 1: 0/10  Pain Rating Post-Intervention 1: 0/10    Objective:   Pt. Seated in w/c with teller sitter camera    Occupational Performance:    Bed Mobility:    · Not tested     Functional Mobility/Transfers:  · Patient completed Sit <> Stand Transfer with contact guard assistance  with  no assistive device  from w/c to bed side chair approx 7 ft with CGA aspects    Activities of Daily Living:  · Not tested     [facility] 6 Click:  [facility] Total Score: 12     Additional Treatment:  Pt. With RUE management into and out of sling with AAROM/AROM to  Elbow wrist  and digits x 2 x 15 reps  Pt. Then with  AROM to elbow wrist and digits with flex and ext  2 x 15 reps and then with  AAROM shoulder forward flexion 2x10 reps approximal 0 to 70 ~  Pt. edu on spinal precautions with task  Pt. Also seem minorly down on this day and stated that he was ready to go home    Patient left up in chair with all lines intact, call button in reach and Staff present    ASSESSMENT:  Ben Melvin is a 60 y.o. male with a medical diagnosis of Cholelithiasis with acute cholecystitis Pt. participated well with session on this day.Pt demos physical deficits with balance  functional mobility, UB strength, endurance  level of functional indep with daily tasks and activities and selfcare skills .Pt. Will continue to benefit from continued OT to progress towards goals  .    Rehab identified problem list/impairments: weakness, impaired endurance,  impaired self care skills, impaired functional mobilty, gait instability, impaired balance, impaired cognition, decreased coordination, decreased upper extremity function, decreased safety awareness, pain, decreased ROM, impaired coordination, impaired fine motor, impaired skin, edema, orthopedic precautions    Rehab potential is fair    Activity tolerance: Fair    Discharge recommendations: home health OT     Barriers to discharge: Inaccessible home environment     Equipment recommendations: (TBD - pt has BSC, RW, W/C, and TTB available at home to use )     GOALS:   Multidisciplinary Problems     Occupational Therapy Goals        Problem: Occupational Therapy Goal    Goal Priority Disciplines Outcome Interventions   Occupational Therapy Goal     OT, PT/OT Ongoing, Progressing    Description:  Goals to be met by: 03/26/2020     Patient will increase functional independence with ADLs by performing:    Feeding with Set-up Assistance.  UE Dressing with Moderate Assistance.  LE Dressing with Moderate Assistance.  Grooming while seated with Set-up Assistance.  Toileting from bedside commode with Moderate Assistance for hygiene and clothing management.   Bathing from  edge of bed with Moderate Assistance.  Toilet transfer to bedside commode with Stand-by Assistance.  Upper extremity exercise program 2 x12 reps per handout, with supervision according to orthopedic restrictions per MD order in order to maintain UE ROM and then, once cleared, in order to increase strength to improve ADLs and func mobility  Perform functional task in standing for 10 minutes with SBA in order to prepare for safe standing ADLs/iADLs.                      Plan:  Patient to be seen 5 x/week, 6 x/week to address the above listed problems via self-care/home management, therapeutic activities, therapeutic exercises  Plan of Care expires: 04/06/20  Plan of Care reviewed with: patient    ESTEFANY Rothman  03/19/2020

## 2020-03-19 NOTE — PLAN OF CARE
Problem: Adult Inpatient Plan of Care  Goal: Plan of Care Review  Outcome: Ongoing, Progressing     Problem: Adult Inpatient Plan of Care  Goal: Patient-Specific Goal (Individualization)  Outcome: Ongoing, Progressing     Problem: Fall Injury Risk  Goal: Absence of Fall and Fall-Related Injury  Intervention: Promote Injury-Free Environment  Flowsheets (Taken 3/19/2020 1610)  Safety Promotion/Fall Prevention: assistive device/personal item within reach; nonskid shoes/socks when out of bed; Fall Risk reviewed with patient/family; Fall Risk signage in place; medications reviewed; room near unit station; /camera at bedside; side rails raised x 2  Environmental Safety Modification: assistive device/personal items within reach; clutter free environment maintained; room near unit station; room organization consistent     Problem: Skin Injury Risk Increased  Goal: Skin Health and Integrity  Intervention: Promote and Optimize Oral Intake  Flowsheets (Taken 3/19/2020 1610)  Oral Nutrition Promotion: rest periods promoted; safe use of adaptive equipment encouraged; social interaction promoted; medicated

## 2020-03-19 NOTE — PROGRESS NOTES
[facility]                                  Skilled Nursing Facility                   Progress Note     Admit Date: 3/6/2020  KIRBY 3/26/2020  Principal Problem:  Cholelithiasis with acute cholecystitis   HPI obtained from patient interview and chart review     Chief Complaint:  Revaluation of medical treatment and therapy status: Lab review; hypomagnesemia, insomnia    HPI:   Mr. Melvin is a 60 year old male with PMHx of AFib on coumadin, peripheral vascular disease, hypertension, COPD, current smoker, liver cirrhosis, who presents to SNF following hospitalization for failure of the repair of the greater tuberosity on the right shoulder s/p revision fixation of right greater tuberosity fracture on 2/14 by Dr. Chris- patient underwent readmission from 3/2-3/6  for sepsis/acute cholecystitis s/p percutaneous cholecystostomy tube. Admission to SNF for secondary weakness and debility.     Interval history: All of today's labs reviewed and are listed below.  INR 12.1 Mag 1.6.  24 hr vital sign ranges listed below.   Patient's mentation continues to improve. cholecystostomy tube draining brown drainage.  Patient denies shortness of breath or cough, abdominal discomfort, nausea, or vomiting.  Patient reports an adequate appetite.  Patient denies dysuria.  Patient reports having regular bowel movements.  Patient progessing with PT/OT. Continuing to follow and treat all acute and chronic conditions.    Past Medical History: Patient has a past medical history of Adenoma of right adrenal gland (11/09/2017), Alcohol abuse, ASCVD (arteriosclerotic cardiovascular disease) (10/2008), BPH (benign prostatic hyperplasia), Cardiomyopathy, Chronic anxiety, Cirrhosis of liver, COPD (chronic obstructive pulmonary disease), Current every day smoker, Elevated LFTs (08/2001), GERD with esophagitis (11/09/2017), History of colon polyps, History of epididymitis  (2016), Hyperlipidemia, Hypertension, essential, Long term (current) use of anticoagulants, Lumbar disc disease with radiculopathy (05/2015), Major depression, recurrent, chronic, PAF (paroxysmal atrial fibrillation), Peripheral autonomic neuropathy due to DM, Proteinuria, PVD (peripheral vascular disease) (05/29/2009), Venous insufficiency of both lower extremities, and Vitamin D deficiency.    Past Surgical History: Patient has a past surgical history that includes Percutaneous transluminal angioplasty (PTA) of peripheral vessel (Right, 12/2014); Intestinal malrotation repair (Right, 1961); Epidural steroid injection into lumbar spine (06/2015); biopsy right ankle (Right, 05/20/2019); biopsy back (05/20/2019); Colonoscopy w/ polypectomy; Esophagogastroduodenoscopy (N/A, 6/13/2019); Colonoscopy (N/A, 6/13/2019); Partial arthroplasty of shoulder (Left, 1/28/2020); Partial arthroplasty of shoulder (Right, 1/30/2020); and Open reduction and internal fixation (ORIF) of fracture of proximal humerus (Right, 2/14/2020).    Social History: Patient reports that he has been smoking cigarettes. He has a 360.00 pack-year smoking history. He has never used smokeless tobacco. He reports that he drank alcohol. He reports that he does not use drugs.    Family History: family history includes Acromegaly in his father; Diabetes in his mother; Heart attack in his father; Hypertension in his father and mother; Kidney cancer in his brother.    Allergies: Patient has No Known Allergies.    ROS  Constitutional: Negative for fever. + fatigue, insomnia.   Eyes: Negative for blurred vision, double vision and discharge.   Respiratory: Negative for cough, shortness of breath and wheezing.    Cardiovascular: Negative for chest pain, palpitations, and leg swelling.   Gastrointestinal: Negative for abdominal pain, constipation, diarrhea, nausea and vomiting.   Genitourinary: Negative for dysuria, frequency and urgency.   Musculoskeletal:  +  generalized weakness, intermittent. Negative for back pain and myalgias.   Skin: Negative for itching and rash.   Neurological: Negative for dizziness, speech change, and headaches.   Psychiatric/Behavioral: Negative for depression. The patient is not nervous/anxious.      PEx  Temp:  [97.8 °F (36.6 °C)-98.1 °F (36.7 °C)]   Pulse:  [100-101]   Resp:  [18]   BP: (129-133)/(66-68)   SpO2:  [94 %-97 %]      Constitutional: Patient appears well-developed.  No distress noted  HENT:   Head: Normocephalic and atraumatic.   Eyes: Pupils are equal, round  Neck: Normal range of motion. Neck supple.   Cardiovascular: Normal rate, regular rhythm and normal heart sounds.    Pulmonary/Chest: Effort normal and breath sounds are clear  Abdominal: Soft. Bowel sounds are normal.   Musculoskeletal: Normal range of motion.  Decreased range of motion to bilateral upper extremities  Neurological: Alert and oriented to person.  Disoriented to place, and time.   Psychiatric: Normal mood and affect. Behavior is normal.   Skin: Skin is warm and dry.  Surgical incision to RUE 14cm in length, LUE 15 cm in length- no drainage noted, fully approximated, no signs of infection- following weekly last visualized on 3/16; cholecystostomy tube    Recent Labs   Lab 03/19/20  0516      K 4.2      CO2 26   BUN 21*   CREATININE 1.1   MG 1.6       Recent Labs   Lab 03/19/20  0516   WBC 7.69   RBC 4.17*   HGB 10.6*   HCT 36.9*      MCV 89   MCH 25.4*   MCHC 28.7*         Assessment and Plan:     Problem addressed today      Insomnia  - initiated amitriptyline 100 mg qHS, patient's home medication    PAF (paroxysmal atrial fibrillation)  Chronic anticoagulation  Encounter for therapeutic monitoring  - Resume warfarin for 2-3              Dosed at 4 mg daily at home              Will likely need increase back to home dosing once antibiotics have stopped  - Continue metoprolol  - 3/10 INR supratherapeutic yesterday on 03/09 with INR 3.2,  today's, INR is 2.8, initiated Coumadin 2 mg daily- will increase as appropriate  - 3/19 continue Coumadin to 4 mg daily    Hypomagnesemia  - initiated magnesium oxide 400 mg BID x2 days    Acute cholecystitis  S/p percutaneous cholecystostomy   - Continue zosyn and metronidazole for 1 week total  - Follow up with general surgery  6 week from placement- around 4/14-  Will message Gen Surg to set apt up.   - 3/12 message sent to general surgery to set up appointment- around 4/14 appointment not set up yet, will follow-up  - 3/19 repeat CT scans ordered for 4/23     Neuropathic pain  - stable, continue Gabapentin 300 mg qhs, Capsaicin cream prn, Hydrocodone  5/aceteaminophen 325 mg prn    Constipation  - stable, Continue daily miralax and senokot-s     Essential hypertension  - Home medications: metoprolol and ramipril at home.   - continue Metoprolol 50 mg BID with low normal BPs   - Ramipril held due to lowish BPs  - stable, continue current treatment       Ongoing but stable problems       Bilateral fracture dislocation of proximal humerus fracture  Bilateral acromial fractures with bilateral shoulder dislocation  S/p bilateral shoulder hemiarthroplasty: right on 1/28/2020 and left on 1/30/2020  S/p Pt underwent revision of fixation of R greater tuberosity fracture on 02/14/20  - Follow up with orthopedics as scheduled  - PT/OT - nonweightbearing bilateral upper extremities in sling  - DVT prophylaxis - continue warfarin as for atrial fibrillaiton    Pathologic osteoporotic fractures  - Treat vitamin D deficiency  - Further evaluation and management as per PCP     CANDY on CKD III, resolved  - SCr with wandering baseline 0.8-1.4     GERD (gastroesophageal reflux disease)  - Continue Pantoprazole 40 mg daily     Stable burst fracture of first lumbar vertebra  L1 and L4 burst fractures  - f/u in neurosurgery clinic as scheduled on 04/23/20 for further management  - TSLO brace whenever not lying in bed     Subdural  hematoma  - Stable  - Cleared to resume anticoagualtion     Type 2 diabetes mellitus with CKD III and HTN  - A1c 5.9 from 01/27/20  - Not on treatment current  - Acuchecks in general <120  - Stop accucheck and SSI     Severe Vitamin D deficiency  - Level of 7 on 2/4  - continue Ergocalciferol 50k weekly      COPD (chronic obstructive pulmonary disease)  Smoker   - change levalbuterol nebs to prn  - Limit oxygen to avoid hypercapnea  - nicotine patch 14 mg daily, can wean down as tolearted  - smoking cessation advised     Seizure disorder   - continue keppra 1 g BID  - Follow up with neurology after discharge - appointment needs to be made     Hyperlipidemia   - continue atorvastatin 80 mg daily     GEDR   - continue protonix 40 mg daily     Anemic of acute infection   - improving without specific intervention     Moderate protein calorie malnutrition   - boost plus TID with meals  - Regular diet  - Dietary consult     Acute metabolic encephalopathy  Probable mild Cognitive impairment  - speech therapy eval  - Consider neuropsych eval  - Hold amitriptyline for now, will use melatonin for sleep     DJD of multiple sites   - PT/OT. Pain management as above     Cirrhosis of liver   - attributed to alcohol. Patient rarely drinks alcohol at this time.    Future Appointments   Date Time Provider Department Center   3/25/2020  8:15 AM Chandler Chris MD [facility] ORTHO [facility]   4/17/2020  9:40 AM Cristal Ohara NP [facility]  [facility]   4/23/2020  8:15 AM [facility] OIC-CT2 500 LB LIMIT [facility]   4/23/2020  8:30 AM [facility] OIC-CT2 500 LB LIMIT [facility]   6/4/2020 11:00 AM Carl Barry MD [facility] NEUROS7 Tai Craig NP      DOS: 3/19/2020       Patient note was created using MModal Dictation.  Any errors in syntax or even information may not have been identified and edited on initial review prior to signing this note.

## 2020-03-19 NOTE — TREATMENT PLAN
Rehab Services' DME recommendations    Ben Melvin  MRN: [id number]      [x] 3 in 1 commode Standard      [x] Tub bench Standard (unpadded)     [x] Home health PT and OT, SLP    Liliane Bledsoe, PT 3/19/2020     MELANI Dominguez, CCC-SLP  Speech Language Pathologist  [phone]  3/23/2020

## 2020-03-19 NOTE — PT/OT/SLP PROGRESS
"Physical Therapy  Treatment    Ben Melvin   MRN: [id number]   Admitting Diagnosis: Cholelithiasis with acute cholecystitis    PT Received On: 03/19/20          Billable Minutes:  Gait Training 15, Therapeutic Activity 23    Treatment Type: Treatment  PT/PTA: PT     PTA Visit Number: 0       General Precautions: Standard, fall  Orthopedic Precautions: RUE non weight bearing, LUE non weight bearing (12 weeks= April 23 and May 7), spinal precautions   Braces: LSO(RUE shoulder abduction brace)          Subjective:  Communicated with pt prior to session.  Agreeable to PT services. "I can go home and paint!"    Pain/Comfort  Pain Rating 1: 0/10    Objective:  Patient found seated at edge of bed     AM-PAC 6 CLICK MOBILITY  Total Score:18    Transfers:  Sit<>Stand: SBA  Stand Pivot Transfer: SBA    Gait:  Amb 200 ft with no AD, SBA-CGA due to fast pace, cues to slow pace, slightly forward flexed posture, decreased foot clearance bilaterally    Advanced Gait:  Stairs: 4 steps with L HR (just for balance, not weight-bearing), CGA  Curb Step: *4 inch with CGA, no AD    Balance:  Needs SBA-CGA, depending on the activity    Additional Treatment:  Completed 15 minutes on recumbent stepper at level 8 to improve LE strength and endurance.       Patient left up in chair with call button in reach.    Assessment:  Ben Melvin is a 60 y.o. male with a medical diagnosis of Cholelithiasis with acute cholecystitis.  Pt met three goals today and is benefiting from therapy services. Will benefit from continued work on his balance, gait mechanics, and core stability/strenght to offload his low back.3    Rehab identified problem list/impairments: weakness, impaired endurance, gait instability, impaired functional mobilty, decreased upper extremity function, impaired balance, orthopedic precautions    Rehab potential is fair.    Activity tolerance: Fair    Discharge recommendations: home with home health     Barriers to discharge: " Inaccessible home environment    Equipment recommendations: wheelchair, bedside commode, bath bench(pt s/o, has DME from prior family members)     GOALS:   Multidisciplinary Problems     Physical Therapy Goals        Problem: Physical Therapy Goal    Goal Priority Disciplines Outcome Goal Variances Interventions   Physical Therapy Goal     PT, PT/OT Ongoing, Progressing     Description:  Goals to be met by: 20     Patient will increase functional independence with mobility by performin. Supine to sit with Stand-by Assistance and observing all precautions  2. Sit to supine with Stand-by Assistance and observing all precautions  3. Sit to stand transfer with Stand-by Assistance and observing all precautions Met (3/19/2020)  4. Bed to chair transfer with Stand-by Assistance, with LRAD if appropriate,  and observing all precautions met 3/17/2020  5. Gait  x 100 feet with Stand-by Assistance  With LRAD, if appropriate, and observing all precautions. Met 3/16/2020  6. Ascend/descend 3 stair  Handrails Minimal Assistance and maintaining weight-bearing precaution(s), all precautions.  Met (3/19/2020)  7. Ascend/Descend 4 inch curb step with Minimal Assistance using LRAD, if appropriate  and observing all precautions. Met (3/19/2020)  8. Stand for 3 minutes with Stand-by Assistance met 3/16/2020  9. Lower extremity exercise program x20 reps per handout, with assistance as needed and gym therex                         PLAN:    Patient to be seen 5 x/week  to address the above listed problems via gait training, therapeutic activities, therapeutic exercises, wheelchair management/training  Plan of Care expires: 20  Plan of Care reviewed with: patient    Liliane Bledsoe, PT  2020

## 2020-03-20 LAB
INR PPP: 2.3 (ref 0.8–1.2)
PROTHROMBIN TIME: 22.2 SEC (ref 9–12.5)

## 2020-03-20 PROCEDURE — 97130 THER IVNTJ EA ADDL 15 MIN: CPT

## 2020-03-20 PROCEDURE — 97530 THERAPEUTIC ACTIVITIES: CPT | Mod: CQ

## 2020-03-20 PROCEDURE — 11000004 HC SNF PRIVATE

## 2020-03-20 PROCEDURE — 25000003 PHARM REV CODE 250: Performed by: INTERNAL MEDICINE

## 2020-03-20 PROCEDURE — 97110 THERAPEUTIC EXERCISES: CPT | Mod: CQ

## 2020-03-20 PROCEDURE — 97116 GAIT TRAINING THERAPY: CPT | Mod: CQ

## 2020-03-20 PROCEDURE — 25000003 PHARM REV CODE 250: Performed by: STUDENT IN AN ORGANIZED HEALTH CARE EDUCATION/TRAINING PROGRAM

## 2020-03-20 PROCEDURE — 36415 COLL VENOUS BLD VENIPUNCTURE: CPT

## 2020-03-20 PROCEDURE — 85610 PROTHROMBIN TIME: CPT

## 2020-03-20 PROCEDURE — S4991 NICOTINE PATCH NONLEGEND: HCPCS | Performed by: INTERNAL MEDICINE

## 2020-03-20 PROCEDURE — 97110 THERAPEUTIC EXERCISES: CPT

## 2020-03-20 PROCEDURE — 97129 THER IVNTJ 1ST 15 MIN: CPT

## 2020-03-20 PROCEDURE — 25000003 PHARM REV CODE 250: Performed by: NURSE PRACTITIONER

## 2020-03-20 RX ADMIN — AMITRIPTYLINE HYDROCHLORIDE 100 MG: 25 TABLET, FILM COATED ORAL at 05:03

## 2020-03-20 RX ADMIN — Medication 400 MG: at 08:03

## 2020-03-20 RX ADMIN — Medication 6 MG: at 08:03

## 2020-03-20 RX ADMIN — ATORVASTATIN CALCIUM 80 MG: 20 TABLET, FILM COATED ORAL at 08:03

## 2020-03-20 RX ADMIN — PANTOPRAZOLE SODIUM 40 MG: 40 TABLET, DELAYED RELEASE ORAL at 09:03

## 2020-03-20 RX ADMIN — LEVETIRACETAM 1000 MG: 500 TABLET ORAL at 09:03

## 2020-03-20 RX ADMIN — LEVETIRACETAM 1000 MG: 500 TABLET ORAL at 08:03

## 2020-03-20 RX ADMIN — METOPROLOL TARTRATE 50 MG: 50 TABLET, FILM COATED ORAL at 08:03

## 2020-03-20 RX ADMIN — HYDROCODONE BITARTRATE AND ACETAMINOPHEN 1 TABLET: 5; 325 TABLET ORAL at 08:03

## 2020-03-20 RX ADMIN — CYANOCOBALAMIN TAB 1000 MCG 1000 MCG: 1000 TAB at 09:03

## 2020-03-20 RX ADMIN — METOPROLOL TARTRATE 50 MG: 50 TABLET, FILM COATED ORAL at 09:03

## 2020-03-20 RX ADMIN — NICOTINE 1 PATCH: 14 PATCH TRANSDERMAL at 09:03

## 2020-03-20 RX ADMIN — SENNOSIDES AND DOCUSATE SODIUM 1 TABLET: 8.6; 5 TABLET ORAL at 09:03

## 2020-03-20 RX ADMIN — GABAPENTIN 300 MG: 300 CAPSULE ORAL at 08:03

## 2020-03-20 RX ADMIN — Medication 400 MG: at 09:03

## 2020-03-20 RX ADMIN — WARFARIN SODIUM 4 MG: 1 TABLET ORAL at 05:03

## 2020-03-20 RX ADMIN — SENNOSIDES AND DOCUSATE SODIUM 1 TABLET: 8.6; 5 TABLET ORAL at 08:03

## 2020-03-20 NOTE — PLAN OF CARE
Problem: SLP Goal  Goal: SLP Goal  Description  Speech Language Pathology Goals  Revised goals expected to be met by 3/25:  1. Pt will complete immediate recall tasks with 80% accuracy given min cues.   2. Following a 3 minute delay, pt will recall 3/3 units of functional information given min cues.   3. Pt will complete moderate level problem solving tasks with 80% accuracy given min cues.   4. Pt will list an average of 9 items in a category in one minute given min cues.   5. Pt will participate in evaluation of writing and visual spatial abilities.       Goals expected to be met by 3/17:  1. Pt will complete immediate recall tasks with 80% accuracy given min cues. ongoing  2. Following a 3 minute delay, pt will recall 3/3 units of functional information given min-mod cues. Goal met  3. Pt will complete moderate level problem solving tasks with 80% accuracy given min-mod cues. Goal met  4. Pt will list an average of 9 items in a category in one minute given min cues. ongoing  5. Pt will participate in evaluation of reading, writing, visual spatial, and functional math. Ongoing. Need to assess writing and visual spatial abilities.            Outcome: Ongoing, Progressing  Pt making progress towards meeting goals.  Cont POC.   MELANI Dominguez, CCC-SLP  Speech Language Pathologist  [phone]  3/20/2020

## 2020-03-20 NOTE — PT/OT/SLP PROGRESS
Speech Language Pathology  Treatment    Ben Melvin   MRN: [id number]   Admitting Diagnosis: Cholelithiasis with acute cholecystitis    Diet recommendations: Solid Diet Level: Regular  Liquid Diet Level: Thin Standard aspiration precautions    SLP Treatment Date: 03/20/20  Speech Start Time: 0959     Speech Stop Time: 1031     Speech Total (min): 32 min       TREATMENT BILLABLE MINUTES:  Speech Therapy Individual (cognitive therapy, 2 units) 32    Has the patient been evaluated by SLP for swallowing? : Yes  Keep patient NPO?: No   General Precautions: Standard, fall  Current Respiratory Status: room air       Subjective:  Pt seen in room sitting up in w/c. Pt pleasant and cooperative.          Objective:      Pt completed a delayed memory task recalling 3/3 facts after a 3 minute delay given min cues.  During 3 word fluency trials, pt listed 16 clothing items, 10 sports, and 12 desserts within 1 minute per category (goal is for 9 items).  Pt answered problem solving q's appropriately.  He completed convergent categorization tasks with 80% accuracy ind'ly/100% given cues, then named additional item to each category with 100% accuracy.     Assessment:  Ben Melvin is a 60 y.o. male with a medical diagnosis of Cholelithiasis with acute cholecystitis and presents with cognitive-linguistic impairments (improving).     Discharge recommendations: Discharge Facility/Level of Care Needs: (tbd)     Goals:   Multidisciplinary Problems     SLP Goals        Problem: SLP Goal    Goal Priority Disciplines Outcome   SLP Goal     SLP Ongoing, Progressing   Description:  Speech Language Pathology Goals  Revised goals expected to be met by 3/25:  1. Pt will complete immediate recall tasks with 80% accuracy given min cues.   2. Following a 3 minute delay, pt will recall 3/3 units of functional information given min cues.   3. Pt will complete moderate level problem solving tasks with 80% accuracy given min cues.   4. Pt will  list an average of 9 items in a category in one minute given min cues.   5. Pt will participate in evaluation of writing and visual spatial abilities.       Goals expected to be met by 3/17:  1. Pt will complete immediate recall tasks with 80% accuracy given min cues. ongoing  2. Following a 3 minute delay, pt will recall 3/3 units of functional information given min-mod cues. Goal met  3. Pt will complete moderate level problem solving tasks with 80% accuracy given min-mod cues. Goal met  4. Pt will list an average of 9 items in a category in one minute given min cues. ongoing  5. Pt will participate in evaluation of reading, writing, visual spatial, and functional math. Ongoing. Need to assess writing and visual spatial abilities.                              Plan:   Patient to be seen Therapy Frequency: 3 x/week  Planned Interventions: Cognitive-Linguistic Therapy  Plan of Care expires: 04/10/20  Plan of Care reviewed with: patient  SLP Follow-up?: Yes              MELANI Dominguez, MARY-SLP  03/20/2020     MELANI Dominguez, CCC-SLP  Speech Language Pathologist  [phone]  3/20/2020

## 2020-03-20 NOTE — PT/OT/SLP PROGRESS
"Physical Therapy  Treatment    Ben Melvni   MRN: [id number]   Admitting Diagnosis: Cholelithiasis with acute cholecystitis    PT Received On: 03/20/20          Billable Minutes:  Gait Training 10, Therapeutic Activity 15 and Therapeutic Exercise 13    Treatment Type: Treatment  PT/PTA: PTA     PTA Visit Number: 1       General Precautions: Standard, fall  Orthopedic Precautions: RUE non weight bearing, LUE non weight bearing, spinal precautions   Braces: LSO(RUE shoulder abduction brace)         Subjective:  "I'm good" Pt agreebale to therapy    Pain/Comfort  Pain Rating 1: 6/10  Location - Side 1: Bilateral  Location - Orientation 1: generalized  Location 1: shoulder  Pain Addressed 1: Distraction  Pain Rating Post-Intervention 1: other (see comments)(not rated)    Objective:  Patient found in wc in gym with braces donned       AM-PAC 6 CLICK MOBILITY  Total Score:18    Transfers:  Sit<>Stand: t/f wc bed chair no AD SBA  Stand Pivot Transfer: bed>chair no AD SBA    Gait:  Amb 75' no AD SBA, pt limited on this day due to increased fatigue     Advanced Gait:  Pt refused on this date due to increased fatigue    Wheelchair Mobility:  Patient propels w/c 100' BLE Mod I     Therex:  2 x 10 AP, LAQ, HF, abd/add, GS all with 4# ankle weights    Balance:  No LOB noted throughout session    Additional Treatment:  Patient educated on importance of increased time out of bed and SONDRA throughout the day    Patient left up in chair with call button in reach.    Assessment:  Ben Melvin is a 60 y.o. male with a medical diagnosis of Cholelithiasis with acute cholecystitis.  Patient tolerated treatment well focusing on transfers, gait, therex and wc mgmt. Patient will continue to improve with skilled physical therapy services in order to return to functional baseline.    Rehab identified problem list/impairments: weakness, impaired endurance, gait instability, impaired functional mobilty, decreased upper extremity function, " impaired balance, orthopedic precautions    Rehab potential is good.    Activity tolerance: Good    Discharge recommendations: home with home health     Barriers to discharge: Inaccessible home environment    Equipment recommendations: wheelchair, bedside commode, bath bench(pt s/o, has DME from prior family members)     GOALS:   Multidisciplinary Problems     Physical Therapy Goals        Problem: Physical Therapy Goal    Goal Priority Disciplines Outcome Goal Variances Interventions   Physical Therapy Goal     PT, PT/OT Ongoing, Progressing     Description:  Goals to be met by: 20     Patient will increase functional independence with mobility by performin. Supine to sit with Stand-by Assistance and observing all precautions  2. Sit to supine with Stand-by Assistance and observing all precautions  3. Sit to stand transfer with Stand-by Assistance and observing all precautions Met (3/19/2020)  4. Bed to chair transfer with Stand-by Assistance, with LRAD if appropriate,  and observing all precautions met 3/17/2020  5. Gait  x 100 feet with Stand-by Assistance  With LRAD, if appropriate, and observing all precautions. Met 3/16/2020  6. Ascend/descend 3 stair  Handrails Minimal Assistance and maintaining weight-bearing precaution(s), all precautions.  Met (3/19/2020)  7. Ascend/Descend 4 inch curb step with Minimal Assistance using LRAD, if appropriate  and observing all precautions. Met (3/19/2020)  8. Stand for 3 minutes with Stand-by Assistance met 3/16/2020  9. Lower extremity exercise program x20 reps per handout, with assistance as needed and gym therex met 3/20/2020                          PLAN:    Patient to be seen 5 x/week  to address the above listed problems via gait training, therapeutic activities, therapeutic exercises, wheelchair management/training  Plan of Care expires: 20  Plan of Care reviewed with: patient    Shereen Lucia, PTA  2020

## 2020-03-20 NOTE — PT/OT/SLP PROGRESS
Occupational Therapy  Treatment    Ben Melvin   MRN: [id number]   Admitting Diagnosis: Cholelithiasis with acute cholecystitis    OT Date of Treatment: 03/20/20       Billable Minutes:  Therapeutic Exercise 55    General Precautions: Standard, fall  Orthopedic Precautions: RUE non weight bearing, LUE non weight bearing, spinal precautions  Braces: LSO, Shoulder abduction brace(2 shoulder braces)     Subjective:  Communicated with pt prior to session.  Pt reported he would like to focus on his BUE rehab    Pain/Comfort  Pain Rating 1: 0/10  Pain Rating Post-Intervention 1: 0/10    Objective:     Bed Mobility:    · Patient completed Supine to Sit with minimum assistance  · Patient completed Sit to Supine with minimum assistance     Functional Mobility/Transfers:  · Patient completed Sit <> Stand Transfer with contact guard assistance  with  rolling walker     [facility] 6 Click:  [facility] Total Score: 12    OT Exercises:   Pt seen for LUE therex as follows:  Arom: elbow, forearm, wrist and digits flexion/ extension full rom, 2x15 reps each with aarom required for end range elbow flexion.  aarom: L shoulder flexion 0-85*; ER 0-15*  2x15 reps each; shoulder shrugs; shoulder rolls  Prom L shoulder flexion 0-100* and ER 0-20*  Pt was educated to perform the following daily: shoulder shrugs, forward shoulder flexion on tabletop;  strengthening L hand.  OT provided focus on functional reach tasks with active assist provided for shoulder flexion.      RUE therex as follows:  Arom: elbow, flrearm, wrist and digits 2x15 reps;   Prom: R shoulder 0- 80*; modified pendulum ex's  OT educated pt to limit RUE HEP to arom elbow - distally and to maintain use of R sling and swathe at all times    Patient left up in chair with call button in reach    ASSESSMENT:  Ben Melvin is a 60 y.o. male with a medical diagnosis of Cholelithiasis with acute cholecystitis who continues to demonstrate functional gains in arom, prom and ADL's as  noted above.    Rehab identified problem list/impairments: weakness, impaired endurance, impaired self care skills, impaired functional mobilty, gait instability, impaired balance, impaired cognition, decreased coordination, decreased upper extremity function, decreased safety awareness, pain, decreased ROM, impaired coordination, impaired fine motor, impaired skin, edema, orthopedic precautions    Rehab potential is good    Activity tolerance: Good    Discharge recommendations: home health OT     Barriers to discharge: Inaccessible home environment     Equipment recommendations: (TBD - pt has BSC, RW, W/C, and TTB available at home to use )     GOALS:   Multidisciplinary Problems     Occupational Therapy Goals        Problem: Occupational Therapy Goal    Goal Priority Disciplines Outcome Interventions   Occupational Therapy Goal     OT, PT/OT Ongoing, Progressing    Description:  Goals to be met by: 03/26/2020     Patient will increase functional independence with ADLs by performing:    Feeding with Set-up Assistance.  UE Dressing with Moderate Assistance.  LE Dressing with Moderate Assistance.  Grooming while seated with Set-up Assistance.  Toileting from bedside commode with Moderate Assistance for hygiene and clothing management.   Bathing from  edge of bed with Moderate Assistance.  Toilet transfer to bedside commode with Stand-by Assistance.  Upper extremity exercise program 2 x12 reps per handout, with supervision according to orthopedic restrictions per MD order in order to maintain UE ROM and then, once cleared, in order to increase strength to improve ADLs and func mobility  Perform functional task in standing for 10 minutes with SBA in order to prepare for safe standing ADLs/iADLs.                      Plan:  Patient to be seen 5 x/week, 6 x/week to address the above listed problems via self-care/home management, therapeutic activities, therapeutic exercises  Plan of Care expires: 04/06/20  Plan of Care  reviewed with: patient    Tamarakannan Diezmoncho, OT  03/20/2020

## 2020-03-21 LAB
INR PPP: 2.4 (ref 0.8–1.2)
PROTHROMBIN TIME: 23.2 SEC (ref 9–12.5)

## 2020-03-21 PROCEDURE — 85610 PROTHROMBIN TIME: CPT

## 2020-03-21 PROCEDURE — 25000003 PHARM REV CODE 250: Performed by: NURSE PRACTITIONER

## 2020-03-21 PROCEDURE — S4991 NICOTINE PATCH NONLEGEND: HCPCS | Performed by: INTERNAL MEDICINE

## 2020-03-21 PROCEDURE — 25000003 PHARM REV CODE 250: Performed by: STUDENT IN AN ORGANIZED HEALTH CARE EDUCATION/TRAINING PROGRAM

## 2020-03-21 PROCEDURE — 11000004 HC SNF PRIVATE

## 2020-03-21 PROCEDURE — 25000003 PHARM REV CODE 250: Performed by: INTERNAL MEDICINE

## 2020-03-21 RX ADMIN — AMITRIPTYLINE HYDROCHLORIDE 100 MG: 25 TABLET, FILM COATED ORAL at 05:03

## 2020-03-21 RX ADMIN — METOPROLOL TARTRATE 50 MG: 50 TABLET, FILM COATED ORAL at 10:03

## 2020-03-21 RX ADMIN — NICOTINE 1 PATCH: 14 PATCH TRANSDERMAL at 09:03

## 2020-03-21 RX ADMIN — LEVETIRACETAM 1000 MG: 500 TABLET ORAL at 10:03

## 2020-03-21 RX ADMIN — Medication 6 MG: at 10:03

## 2020-03-21 RX ADMIN — ATORVASTATIN CALCIUM 80 MG: 20 TABLET, FILM COATED ORAL at 10:03

## 2020-03-21 RX ADMIN — LEVETIRACETAM 1000 MG: 500 TABLET ORAL at 09:03

## 2020-03-21 RX ADMIN — METOPROLOL TARTRATE 50 MG: 50 TABLET, FILM COATED ORAL at 09:03

## 2020-03-21 RX ADMIN — GABAPENTIN 300 MG: 300 CAPSULE ORAL at 10:03

## 2020-03-21 RX ADMIN — POLYETHYLENE GLYCOL 3350 17 G: 17 POWDER, FOR SOLUTION ORAL at 09:03

## 2020-03-21 RX ADMIN — WARFARIN SODIUM 4 MG: 1 TABLET ORAL at 04:03

## 2020-03-21 RX ADMIN — SENNOSIDES AND DOCUSATE SODIUM 1 TABLET: 8.6; 5 TABLET ORAL at 10:03

## 2020-03-21 RX ADMIN — HYDROCODONE BITARTRATE AND ACETAMINOPHEN 1 TABLET: 5; 325 TABLET ORAL at 06:03

## 2020-03-21 RX ADMIN — CYANOCOBALAMIN TAB 1000 MCG 1000 MCG: 1000 TAB at 09:03

## 2020-03-21 RX ADMIN — SENNOSIDES AND DOCUSATE SODIUM 1 TABLET: 8.6; 5 TABLET ORAL at 09:03

## 2020-03-21 RX ADMIN — HYDROCODONE BITARTRATE AND ACETAMINOPHEN 1 TABLET: 5; 325 TABLET ORAL at 10:03

## 2020-03-21 RX ADMIN — PANTOPRAZOLE SODIUM 40 MG: 40 TABLET, DELAYED RELEASE ORAL at 09:03

## 2020-03-22 LAB
INR PPP: 2.7 (ref 0.8–1.2)
PROTHROMBIN TIME: 25.8 SEC (ref 9–12.5)

## 2020-03-22 PROCEDURE — 25000003 PHARM REV CODE 250: Performed by: INTERNAL MEDICINE

## 2020-03-22 PROCEDURE — S4991 NICOTINE PATCH NONLEGEND: HCPCS | Performed by: INTERNAL MEDICINE

## 2020-03-22 PROCEDURE — 97116 GAIT TRAINING THERAPY: CPT | Mod: CQ

## 2020-03-22 PROCEDURE — 97530 THERAPEUTIC ACTIVITIES: CPT | Mod: CQ

## 2020-03-22 PROCEDURE — 85610 PROTHROMBIN TIME: CPT

## 2020-03-22 PROCEDURE — 11000004 HC SNF PRIVATE

## 2020-03-22 PROCEDURE — 25000003 PHARM REV CODE 250: Performed by: NURSE PRACTITIONER

## 2020-03-22 PROCEDURE — 97530 THERAPEUTIC ACTIVITIES: CPT | Mod: CO

## 2020-03-22 PROCEDURE — 25000003 PHARM REV CODE 250: Performed by: STUDENT IN AN ORGANIZED HEALTH CARE EDUCATION/TRAINING PROGRAM

## 2020-03-22 PROCEDURE — 97110 THERAPEUTIC EXERCISES: CPT | Mod: CQ

## 2020-03-22 RX ADMIN — METOPROLOL TARTRATE 50 MG: 50 TABLET, FILM COATED ORAL at 08:03

## 2020-03-22 RX ADMIN — PANTOPRAZOLE SODIUM 40 MG: 40 TABLET, DELAYED RELEASE ORAL at 08:03

## 2020-03-22 RX ADMIN — AMITRIPTYLINE HYDROCHLORIDE 100 MG: 25 TABLET, FILM COATED ORAL at 05:03

## 2020-03-22 RX ADMIN — LEVETIRACETAM 1000 MG: 500 TABLET ORAL at 08:03

## 2020-03-22 RX ADMIN — GABAPENTIN 300 MG: 300 CAPSULE ORAL at 08:03

## 2020-03-22 RX ADMIN — ATORVASTATIN CALCIUM 80 MG: 20 TABLET, FILM COATED ORAL at 08:03

## 2020-03-22 RX ADMIN — HYDROCODONE BITARTRATE AND ACETAMINOPHEN 1 TABLET: 5; 325 TABLET ORAL at 09:03

## 2020-03-22 RX ADMIN — CYANOCOBALAMIN TAB 1000 MCG 1000 MCG: 1000 TAB at 08:03

## 2020-03-22 RX ADMIN — NICOTINE 1 PATCH: 14 PATCH TRANSDERMAL at 08:03

## 2020-03-22 RX ADMIN — Medication 6 MG: at 09:03

## 2020-03-22 RX ADMIN — WARFARIN SODIUM 4 MG: 1 TABLET ORAL at 05:03

## 2020-03-22 NOTE — PT/OT/SLP PROGRESS
"Physical Therapy  Treatment    Ben Melvin   MRN: [id number]   Admitting Diagnosis: Cholelithiasis with acute cholecystitis    PT Received On: 03/22/20        Billable Minutes:  Gait Training 15, Therapeutic Activity 10 and Therapeutic Exercise 15    Treatment Type: Treatment  PT/PTA: PTA     PTA Visit Number: 2       General Precautions: Standard, fall  Orthopedic Precautions: RUE non weight bearing, LUE non weight bearing, spinal precautions   Braces: LSO(RUE shoulder abduction brace)     Subjective:  "feel bad" agreeable to therapy    Pain/Comfort  Pain Rating 1: 9/10  Location - Side 1: Bilateral  Location - Orientation 1: generalized  Location 1: shoulder  Pain Addressed 1: Nurse notified(meds requested/given at start of session)  Pain Rating Post-Intervention 1: 7/10    Objective:   Patient found with: (in wc OT ajusting RUE brace for proper fit) LSO and drain     AM-PAC 6 CLICK MOBILITY  Total Score:18    Transfers:  Sit<>Stand: with no AD ed not to use LUE (NWB)  Stand pivot WC<>nustep no AD SBA    Gait:  Amb with no AD close SBA B sway LSO and RUE abd brace ~40 ft, 50 ft and 80 ft seated rest break    Wheelchair Mobility:  Patient propels w/c ~150 ft with BLE     Additional Treatment:  Recumbent cross  x 15 min L-6 BLE only    Patient left up in chair with with OT.    Assessment:  Ben Melvin is a 60 y.o. male with a medical diagnosis of Cholelithiasis with acute cholecystitis.  Pt tolerated well, pt would continue to benefit from skilled PT services to improve overall functional mobility, strength and endurance.  .    Rehab identified problem list/impairments: weakness, impaired endurance, gait instability, impaired functional mobilty, decreased upper extremity function, impaired balance, orthopedic precautions    Rehab potential is good.    Activity tolerance: Fair    Discharge recommendations: home with home health     Barriers to discharge: Inaccessible home environment    Equipment " recommendations: wheelchair, bedside commode, bath bench(pt s/o, has DME from prior family members)     GOALS:   Multidisciplinary Problems     Physical Therapy Goals        Problem: Physical Therapy Goal    Goal Priority Disciplines Outcome Goal Variances Interventions   Physical Therapy Goal     PT, PT/OT Ongoing, Progressing     Description:  Goals to be met by: 20     Patient will increase functional independence with mobility by performin. Supine to sit with Stand-by Assistance and observing all precautions  2. Sit to supine with Stand-by Assistance and observing all precautions  3. Sit to stand transfer with Stand-by Assistance and observing all precautions Met (3/19/2020)  4. Bed to chair transfer with Stand-by Assistance, with LRAD if appropriate,  and observing all precautions met 3/17/2020  5. Gait  x 100 feet with Stand-by Assistance  With LRAD, if appropriate, and observing all precautions. Met 3/16/2020  6. Ascend/descend 3 stair  Handrails Minimal Assistance and maintaining weight-bearing precaution(s), all precautions.  Met (3/19/2020)  7. Ascend/Descend 4 inch curb step with Minimal Assistance using LRAD, if appropriate  and observing all precautions. Met (3/19/2020)  8. Stand for 3 minutes with Stand-by Assistance met 3/16/2020  9. Lower extremity exercise program x20 reps per handout, with assistance as needed and gym therex met 3/20/2020                          PLAN:    Patient to be seen 5 x/week  to address the above listed problems via gait training, therapeutic activities, therapeutic exercises, wheelchair management/training  Plan of Care expires: 20  Plan of Care reviewed with: patient    Selinarosa Garciagraciela, JEFFERY  2020

## 2020-03-22 NOTE — PT/OT/SLP PROGRESS
Occupational Therapy  Treatment    Ben Melvin   MRN: [id number]   Admitting Diagnosis: Cholelithiasis with acute cholecystitis    OT Date of Treatment: 03/22/20       Billable Minutes:  Therapeutic Activity 45    General Precautions: Standard, fall  Orthopedic Precautions: RUE non weight bearing, LUE non weight bearing, spinal precautions  Braces: LSO, Shoulder abduction brace(2 shoulder braces)         Subjective:  Communicated with PT prior to session.  I am doing well today    Pain/Comfort  Pain Rating 1: 6/10  Location - Side 1: Bilateral  Location - Orientation 1: generalized  Location 1: shoulder  Pain Addressed 1: Pre-medicate for activity, Reposition, Distraction    Objective:   Pt. Seated in w/c in gym    Occupational Performance:    Bed Mobility:    · Not tested      Functional Mobility/Transfers:  · Patient completed Chair <> Mat Stand Pivot technique with contact guard assistance with no assistive device  · Functional Mobility: Pt. With sit to stand from w/c to bedside chair with cues for safety     Activities of Daily Living:  · Not tested     [facility] 6 Click:  [facility] Total Score: 12    Additional Treatment:  Pt seated EOM for BUE ROM     Pt. With RUE management into and out of sling with AAROM to elbow wrist  and digits x 2 x 15 reps with (A) with stabilization     Pt. Then with LUE AROM to elbow wrist and digits with flex and ext  2 x 15 reps and then with  AAROM shoulder forward flexion 2x10 reps       Patient left up in chair with all lines intact and call button in reach    ASSESSMENT:  Ben Melvin is a 60 y.o. male with a medical diagnosis of Cholelithiasis with acute cholecystitis Pt. participated well with session on this day. Pt is progressing well with session on this day still continues to requires cues with aspects of safety . Pt. Will continue to benefit from continued OT to progress towards goals    Rehab identified problem list/impairments: weakness, impaired endurance, impaired  self care skills, impaired functional mobilty, gait instability, impaired balance, impaired cognition, decreased coordination, decreased upper extremity function, decreased safety awareness, pain, decreased ROM, impaired coordination, impaired fine motor, impaired skin, edema, orthopedic precautions    Rehab potential is fair    Activity tolerance: Fair    Discharge recommendations: home health OT     Barriers to discharge: Inaccessible home environment     Equipment recommendations: (TBD - pt has BSC, RW, W/C, and TTB available at home to use )     GOALS:   Multidisciplinary Problems     Occupational Therapy Goals        Problem: Occupational Therapy Goal    Goal Priority Disciplines Outcome Interventions   Occupational Therapy Goal     OT, PT/OT Ongoing, Progressing    Description:  Goals to be met by: 03/26/2020     Patient will increase functional independence with ADLs by performing:    Feeding with Set-up Assistance.  UE Dressing with Moderate Assistance.  LE Dressing with Moderate Assistance.  Grooming while seated with Set-up Assistance.  Toileting from bedside commode with Moderate Assistance for hygiene and clothing management.   Bathing from  edge of bed with Moderate Assistance.  Toilet transfer to bedside commode with Stand-by Assistance.  Upper extremity exercise program 2 x12 reps per handout, with supervision according to orthopedic restrictions per MD order in order to maintain UE ROM and then, once cleared, in order to increase strength to improve ADLs and func mobility  Perform functional task in standing for 10 minutes with SBA in order to prepare for safe standing ADLs/iADLs.                      Plan:  Patient to be seen 5 x/week, 6 x/week to address the above listed problems via self-care/home management, therapeutic activities, therapeutic exercises  Plan of Care expires: 04/06/20  Plan of Care reviewed with: patient    ESTEFANY Rothman  03/22/2020

## 2020-03-23 LAB
ANION GAP SERPL CALC-SCNC: 11 MMOL/L (ref 8–16)
BASOPHILS # BLD AUTO: 0.07 K/UL (ref 0–0.2)
BASOPHILS NFR BLD: 1.2 % (ref 0–1.9)
BUN SERPL-MCNC: 23 MG/DL (ref 6–20)
CALCIUM SERPL-MCNC: 8.8 MG/DL (ref 8.7–10.5)
CHLORIDE SERPL-SCNC: 104 MMOL/L (ref 95–110)
CO2 SERPL-SCNC: 27 MMOL/L (ref 23–29)
CREAT SERPL-MCNC: 1.2 MG/DL (ref 0.5–1.4)
DIFFERENTIAL METHOD: ABNORMAL
EOSINOPHIL # BLD AUTO: 0.2 K/UL (ref 0–0.5)
EOSINOPHIL NFR BLD: 3.4 % (ref 0–8)
ERYTHROCYTE [DISTWIDTH] IN BLOOD BY AUTOMATED COUNT: 17.5 % (ref 11.5–14.5)
EST. GFR  (AFRICAN AMERICAN): >60 ML/MIN/1.73 M^2
EST. GFR  (NON AFRICAN AMERICAN): >60 ML/MIN/1.73 M^2
GLUCOSE SERPL-MCNC: 79 MG/DL (ref 70–110)
HCT VFR BLD AUTO: 36.2 % (ref 40–54)
HGB BLD-MCNC: 10.7 G/DL (ref 14–18)
IMM GRANULOCYTES # BLD AUTO: 0.02 K/UL (ref 0–0.04)
IMM GRANULOCYTES NFR BLD AUTO: 0.3 % (ref 0–0.5)
INR PPP: 3.5 (ref 0.8–1.2)
LYMPHOCYTES # BLD AUTO: 1.9 K/UL (ref 1–4.8)
LYMPHOCYTES NFR BLD: 33.2 % (ref 18–48)
MAGNESIUM SERPL-MCNC: 1.6 MG/DL (ref 1.6–2.6)
MCH RBC QN AUTO: 26.2 PG (ref 27–31)
MCHC RBC AUTO-ENTMCNC: 29.6 G/DL (ref 32–36)
MCV RBC AUTO: 89 FL (ref 82–98)
MONOCYTES # BLD AUTO: 0.4 K/UL (ref 0.3–1)
MONOCYTES NFR BLD: 7 % (ref 4–15)
NEUTROPHILS # BLD AUTO: 3.2 K/UL (ref 1.8–7.7)
NEUTROPHILS NFR BLD: 54.9 % (ref 38–73)
NRBC BLD-RTO: 0 /100 WBC
PHOSPHATE SERPL-MCNC: 4.2 MG/DL (ref 2.7–4.5)
PLATELET # BLD AUTO: 226 K/UL (ref 150–350)
PMV BLD AUTO: 10.3 FL (ref 9.2–12.9)
POTASSIUM SERPL-SCNC: 4 MMOL/L (ref 3.5–5.1)
PROTHROMBIN TIME: 33 SEC (ref 9–12.5)
RBC # BLD AUTO: 4.09 M/UL (ref 4.6–6.2)
SODIUM SERPL-SCNC: 142 MMOL/L (ref 136–145)
WBC # BLD AUTO: 5.84 K/UL (ref 3.9–12.7)

## 2020-03-23 PROCEDURE — 97110 THERAPEUTIC EXERCISES: CPT | Mod: CQ

## 2020-03-23 PROCEDURE — 25000003 PHARM REV CODE 250: Performed by: NURSE PRACTITIONER

## 2020-03-23 PROCEDURE — 80048 BASIC METABOLIC PNL TOTAL CA: CPT

## 2020-03-23 PROCEDURE — 97129 THER IVNTJ 1ST 15 MIN: CPT

## 2020-03-23 PROCEDURE — 85610 PROTHROMBIN TIME: CPT

## 2020-03-23 PROCEDURE — 97803 MED NUTRITION INDIV SUBSEQ: CPT

## 2020-03-23 PROCEDURE — 85025 COMPLETE CBC W/AUTO DIFF WBC: CPT

## 2020-03-23 PROCEDURE — 97530 THERAPEUTIC ACTIVITIES: CPT | Mod: CQ

## 2020-03-23 PROCEDURE — S4991 NICOTINE PATCH NONLEGEND: HCPCS | Performed by: INTERNAL MEDICINE

## 2020-03-23 PROCEDURE — 25000003 PHARM REV CODE 250: Performed by: STUDENT IN AN ORGANIZED HEALTH CARE EDUCATION/TRAINING PROGRAM

## 2020-03-23 PROCEDURE — 84100 ASSAY OF PHOSPHORUS: CPT

## 2020-03-23 PROCEDURE — 97116 GAIT TRAINING THERAPY: CPT | Mod: CQ

## 2020-03-23 PROCEDURE — 83735 ASSAY OF MAGNESIUM: CPT

## 2020-03-23 PROCEDURE — 25000003 PHARM REV CODE 250: Performed by: INTERNAL MEDICINE

## 2020-03-23 PROCEDURE — 97130 THER IVNTJ EA ADDL 15 MIN: CPT

## 2020-03-23 PROCEDURE — 11000004 HC SNF PRIVATE

## 2020-03-23 RX ORDER — LANOLIN ALCOHOL/MO/W.PET/CERES
400 CREAM (GRAM) TOPICAL 2 TIMES DAILY
Status: COMPLETED | OUTPATIENT
Start: 2020-03-23 | End: 2020-03-24

## 2020-03-23 RX ADMIN — GABAPENTIN 300 MG: 300 CAPSULE ORAL at 08:03

## 2020-03-23 RX ADMIN — ATORVASTATIN CALCIUM 80 MG: 20 TABLET, FILM COATED ORAL at 08:03

## 2020-03-23 RX ADMIN — METOPROLOL TARTRATE 50 MG: 50 TABLET, FILM COATED ORAL at 09:03

## 2020-03-23 RX ADMIN — LEVETIRACETAM 1000 MG: 500 TABLET ORAL at 09:03

## 2020-03-23 RX ADMIN — PANTOPRAZOLE SODIUM 40 MG: 40 TABLET, DELAYED RELEASE ORAL at 11:03

## 2020-03-23 RX ADMIN — CYANOCOBALAMIN TAB 1000 MCG 1000 MCG: 1000 TAB at 09:03

## 2020-03-23 RX ADMIN — Medication 400 MG: at 08:03

## 2020-03-23 RX ADMIN — SENNOSIDES AND DOCUSATE SODIUM 1 TABLET: 8.6; 5 TABLET ORAL at 08:03

## 2020-03-23 RX ADMIN — Medication 6 MG: at 08:03

## 2020-03-23 RX ADMIN — METOPROLOL TARTRATE 50 MG: 50 TABLET, FILM COATED ORAL at 08:03

## 2020-03-23 RX ADMIN — HYDROCODONE BITARTRATE AND ACETAMINOPHEN 1 TABLET: 5; 325 TABLET ORAL at 08:03

## 2020-03-23 RX ADMIN — HYDROCODONE BITARTRATE AND ACETAMINOPHEN 1 TABLET: 5; 325 TABLET ORAL at 09:03

## 2020-03-23 RX ADMIN — AMITRIPTYLINE HYDROCHLORIDE 100 MG: 25 TABLET, FILM COATED ORAL at 05:03

## 2020-03-23 RX ADMIN — Medication 400 MG: at 01:03

## 2020-03-23 RX ADMIN — LEVETIRACETAM 1000 MG: 500 TABLET ORAL at 08:03

## 2020-03-23 RX ADMIN — NICOTINE 1 PATCH: 14 PATCH TRANSDERMAL at 09:03

## 2020-03-23 NOTE — PT/OT/SLP PROGRESS
Physical Therapy  Treatment    Ben Melvin   MRN: [id number]   Admitting Diagnosis: Cholelithiasis with acute cholecystitis    PT Received On: 03/23/20  Total Time (min): (--)       Billable Minutes:  Gait Training 10, Therapeutic Activity 10 and Therapeutic Exercise 18    Treatment Type: Treatment  PT/PTA: PTA     PTA Visit Number: 3       General Precautions: Standard, fall  Orthopedic Precautions: RUE non weight bearing, LUE non weight bearing, spinal precautions   Braces: LSO(RUE shoulder abduction brace)    Subjective:  Communicated with nursing prior to session.    Pain/Comfort  Pain Rating 1: 0/10  Pain Rating Post-Intervention 1: Pt did not rate. (L) hip pain while ambulating.     Objective:  Patient found seated w/c.       AM-PAC 6 CLICK MOBILITY  Total Score:18    Bed Mobility:  Sit>Supine:not performed  Supine>Sit: not performed    Transfers:  Sit<>Stand: to/from w/c x2 trials no AD SBA    Gait:  Amb 40ft no AD SBA. Distance limited by pt with reports of (L) hip pain.     Therex:  BLE therex x20 reps with 5#:   -LAQ   -hip flexion    -GS   -hip abd/add  **Pt w/o reports of hip pain t/o exercises.     Additional Treatment:  -mini-elliptical x12 min     Patient left up in chair with call button in reach and nursing notified.    Assessment:  Ben Melvin is a 60 y.o. male with a medical diagnosis of Cholelithiasis with acute cholecystitis.  Pt tolerated treatment well, and will continue to benefit from PT services at this time. Continue with PT POC as indicated.    Rehab identified problem list/impairments: weakness, impaired endurance, gait instability, impaired functional mobilty, decreased upper extremity function, impaired balance, orthopedic precautions    Rehab potential is good.    Activity tolerance: Fair    Discharge recommendations: home with home health     Barriers to discharge: Inaccessible home environment    Equipment recommendations: wheelchair, bedside commode, bath bench(pt s/o, has  DME from prior family members)     GOALS:   Multidisciplinary Problems     Physical Therapy Goals        Problem: Physical Therapy Goal    Goal Priority Disciplines Outcome Goal Variances Interventions   Physical Therapy Goal     PT, PT/OT Ongoing, Progressing     Description:  Goals to be met by: 20     Patient will increase functional independence with mobility by performin. Supine to sit with Stand-by Assistance and observing all precautions  2. Sit to supine with Stand-by Assistance and observing all precautions  3. Sit to stand transfer with Stand-by Assistance and observing all precautions Met (3/19/2020)  4. Bed to chair transfer with Stand-by Assistance, with LRAD if appropriate,  and observing all precautions met 3/17/2020  5. Gait  x 100 feet with Stand-by Assistance  With LRAD, if appropriate, and observing all precautions. Met 3/16/2020  6. Ascend/descend 3 stair  Handrails Minimal Assistance and maintaining weight-bearing precaution(s), all precautions.  Met (3/19/2020)  7. Ascend/Descend 4 inch curb step with Minimal Assistance using LRAD, if appropriate  and observing all precautions. Met (3/19/2020)  8. Stand for 3 minutes with Stand-by Assistance met 3/16/2020  9. Lower extremity exercise program x20 reps per handout, with assistance as needed and gym therex met 3/20/2020                          PLAN:    Patient to be seen 5 x/week  to address the above listed problems via gait training, therapeutic activities, therapeutic exercises, wheelchair management/training  Plan of Care expires: 20  Plan of Care reviewed with: patient    Rosa Isela Pipe, PTA  2020

## 2020-03-23 NOTE — PLAN OF CARE
Problem: SLP Goal  Goal: SLP Goal  Description  Speech Language Pathology Goals  Revised goals expected to be met by 3/25:  1. Pt will complete immediate recall tasks with 80% accuracy given min cues.   2. Following a 3 minute delay, pt will recall 3/3 units of functional information given min cues.   3. Pt will complete moderate level problem solving tasks with 80% accuracy given min cues.   4. Pt will list an average of 9 items in a category in one minute given min cues.   5. Pt will participate in evaluation of writing and visual spatial abilities.       Goals expected to be met by 3/17:  1. Pt will complete immediate recall tasks with 80% accuracy given min cues. ongoing  2. Following a 3 minute delay, pt will recall 3/3 units of functional information given min-mod cues. Goal met  3. Pt will complete moderate level problem solving tasks with 80% accuracy given min-mod cues. Goal met  4. Pt will list an average of 9 items in a category in one minute given min cues. ongoing  5. Pt will participate in evaluation of reading, writing, visual spatial, and functional math. Ongoing. Need to assess writing and visual spatial abilities.            Outcome: Ongoing, Progressing  Cont POC.   MELANI Dominguez, CCC-SLP  Speech Language Pathologist  [phone]  3/23/2020

## 2020-03-23 NOTE — PT/OT/SLP PROGRESS
Speech Language Pathology  Treatment    Ben Melvin   MRN: [id number]   Admitting Diagnosis: Cholelithiasis with acute cholecystitis    Diet recommendations: Solid Diet Level: Regular  Liquid Diet Level: Thin Monitor for s/s of aspiration and Standard aspiration precautions    SLP Treatment Date: 03/23/20  Speech Start Time: 1015     Speech Stop Time: 1048     Speech Total (min): 33 min       TREATMENT BILLABLE MINUTES:  Speech Therapy Individual 33 (cognitive tx, 2 units)    Has the patient been evaluated by SLP for swallowing? : Yes  Keep patient NPO?: No   General Precautions: Standard, fall  Current Respiratory Status: room air       Subjective:  Pt seen sitting up in w/c in room this AM.          Objective:      Pt completed a delayed memory task recalling 3/3 facts after a 3 minute delay with 100% accuracy.  Pt stated logical conclusions to problem solving q's with good ability.  Pt recalled functional information from paragraphs read aloud by SLP with 79% accuracy ind'y/93% given cues.  Pt complex category exclusion tasks with 90% accuracy ind'ly/100% given cues.     Assessment:  Ben Melvin is a 60 y.o. male with a medical diagnosis of Cholelithiasis with acute cholecystitis and presents with cognitive impairments.     Discharge recommendations: Discharge Facility/Level of Care Needs: (tbd)     Goals:   Multidisciplinary Problems     SLP Goals        Problem: SLP Goal    Goal Priority Disciplines Outcome   SLP Goal     SLP Ongoing, Progressing   Description:  Speech Language Pathology Goals  Revised goals expected to be met by 3/25:  1. Pt will complete immediate recall tasks with 80% accuracy given min cues.   2. Following a 3 minute delay, pt will recall 3/3 units of functional information given min cues.   3. Pt will complete moderate level problem solving tasks with 80% accuracy given min cues.   4. Pt will list an average of 9 items in a category in one minute given min cues.   5. Pt will  participate in evaluation of writing and visual spatial abilities.       Goals expected to be met by 3/17:  1. Pt will complete immediate recall tasks with 80% accuracy given min cues. ongoing  2. Following a 3 minute delay, pt will recall 3/3 units of functional information given min-mod cues. Goal met  3. Pt will complete moderate level problem solving tasks with 80% accuracy given min-mod cues. Goal met  4. Pt will list an average of 9 items in a category in one minute given min cues. ongoing  5. Pt will participate in evaluation of reading, writing, visual spatial, and functional math. Ongoing. Need to assess writing and visual spatial abilities.                              Plan:   Patient to be seen Therapy Frequency: 3 x/week  Planned Interventions: Cognitive-Linguistic Therapy  Plan of Care expires: 04/10/20  Plan of Care reviewed with: patient  SLP Follow-up?: Yes              MELANI Dominguez, CCC-SLP  03/23/2020     MELANI Dominguez, CCC-SLP  Speech Language Pathologist  [phone]  3/23/2020

## 2020-03-23 NOTE — PROGRESS NOTES
[facility]                                  Skilled Nursing Facility                   Progress Note     Admit Date: 3/6/2020  KIRBY 3/26/2020  Principal Problem:  Cholelithiasis with acute cholecystitis   HPI obtained from patient interview and chart review     Chief Complaint:  Revaluation of medical treatment and therapy status: Lab review; hypomagnesemia, supratherapeutic INR    HPI:   Mr. Melvin is a 60 year old male with PMHx of AFib on coumadin, peripheral vascular disease, hypertension, COPD, current smoker, liver cirrhosis, who presents to SNF following hospitalization for failure of the repair of the greater tuberosity on the right shoulder s/p revision fixation of right greater tuberosity fracture on 2/14 by Dr. Chris- patient underwent readmission from 3/2-3/6  for sepsis/acute cholecystitis s/p percutaneous cholecystostomy tube. Admission to SNF for secondary weakness and debility.     Interval history: All of today's labs reviewed and are listed below.  INR 3.5, Mag 1.6.  24 hr vital sign ranges listed below.  Patient is longer having trouble sleeping with the initiation of amitriptyline.  cholecystostomy tube draining brown drainage.  Patient denies shortness of breath or cough, abdominal discomfort, nausea, or vomiting.  Patient reports an adequate appetite.  Patient denies dysuria.  Patient reports having regular bowel movements.  Patient progessing with PT/OT. Continuing to follow and treat all acute and chronic conditions.    Past Medical History: Patient has a past medical history of Adenoma of right adrenal gland (11/09/2017), Alcohol abuse, ASCVD (arteriosclerotic cardiovascular disease) (10/2008), BPH (benign prostatic hyperplasia), Cardiomyopathy, Chronic anxiety, Cirrhosis of liver, COPD (chronic obstructive pulmonary disease), Current every day smoker, Elevated LFTs (08/2001), GERD with esophagitis (11/09/2017),  History of colon polyps, History of epididymitis (2016), Hyperlipidemia, Hypertension, essential, Long term (current) use of anticoagulants, Lumbar disc disease with radiculopathy (05/2015), Major depression, recurrent, chronic, PAF (paroxysmal atrial fibrillation), Peripheral autonomic neuropathy due to DM, Proteinuria, PVD (peripheral vascular disease) (05/29/2009), Venous insufficiency of both lower extremities, and Vitamin D deficiency.    Past Surgical History: Patient has a past surgical history that includes Percutaneous transluminal angioplasty (PTA) of peripheral vessel (Right, 12/2014); Intestinal malrotation repair (Right, 1961); Epidural steroid injection into lumbar spine (06/2015); biopsy right ankle (Right, 05/20/2019); biopsy back (05/20/2019); Colonoscopy w/ polypectomy; Esophagogastroduodenoscopy (N/A, 6/13/2019); Colonoscopy (N/A, 6/13/2019); Partial arthroplasty of shoulder (Left, 1/28/2020); Partial arthroplasty of shoulder (Right, 1/30/2020); and Open reduction and internal fixation (ORIF) of fracture of proximal humerus (Right, 2/14/2020).    Social History: Patient reports that he has been smoking cigarettes. He has a 360.00 pack-year smoking history. He has never used smokeless tobacco. He reports that he drank alcohol. He reports that he does not use drugs.    Family History: family history includes Acromegaly in his father; Diabetes in his mother; Heart attack in his father; Hypertension in his father and mother; Kidney cancer in his brother.    Allergies: Patient has No Known Allergies.    ROS  Constitutional: Negative for fever,  fatigue  Eyes: Negative for blurred vision, double vision and discharge.   Respiratory: Negative for cough, shortness of breath and wheezing.    Cardiovascular: Negative for chest pain, palpitations, and leg swelling.   Gastrointestinal: Negative for abdominal pain, constipation, diarrhea, nausea and vomiting.   Genitourinary: Negative for dysuria, frequency and  urgency.   Musculoskeletal:  + generalized weakness, intermittent. Negative for back pain and myalgias.   Skin: Negative for itching and rash.   Neurological: Negative for dizziness, speech change, and headaches.   Psychiatric/Behavioral: Negative for depression. The patient is not nervous/anxious.      PEx  Temp:  [97.3 °F (36.3 °C)-97.5 °F (36.4 °C)]   Pulse:  []   Resp:  [20]   BP: (121-129)/(64)   SpO2:  [92 %-94 %]      Constitutional: Patient appears well-developed.  No distress noted  HENT:   Head: Normocephalic and atraumatic.   Eyes: Pupils are equal, round  Neck: Normal range of motion. Neck supple.   Cardiovascular: Normal rate, regular rhythm and normal heart sounds.    Pulmonary/Chest: Effort normal and breath sounds are clear  Abdominal: Soft. Bowel sounds are normal.   Musculoskeletal: Normal range of motion.  Decreased range of motion to bilateral upper extremities  Neurological: Alert and oriented to person,  place, and time.   Psychiatric: Normal mood and affect. Behavior is normal.   Skin: Skin is warm and dry.  Surgical incision to RUE 14cm in length, LUE 15 cm in length- no drainage noted, fully approximated, no signs of infection- following weekly last visualized on 3/23; cholecystostomy tube present and draining    Recent Labs   Lab 03/23/20  0512      K 4.0      CO2 27   BUN 23*   CREATININE 1.2   MG 1.6       Recent Labs   Lab 03/23/20  0512   WBC 5.84   RBC 4.09*   HGB 10.7*   HCT 36.2*      MCV 89   MCH 26.2*   MCHC 29.6*         Assessment and Plan:     Problem addressed today      Supratherapeutic INR  PAF (paroxysmal atrial fibrillation)  Chronic anticoagulation  Encounter for therapeutic monitoring  - Resume warfarin for 2-3              Dosed at 4 mg daily at home              Will likely need increase back to home dosing once antibiotics have stopped  - Continue metoprolol  - 3/10 INR supratherapeutic yesterday on 03/09 with INR 3.2, today's, INR is 2.8,  initiated Coumadin 2 mg daily- will increase as appropriate  - 3/23 holding Coumadin tonight, will resume Coumadin at lower dose at 3 mg daily tomorrow pending INR result    Hypomagnesemia  - initiated magnesium oxide 400 mg BID x2 days    Insomnia  - stable, continue amitriptyline 100 mg qHS, patient's home medication    Acute cholecystitis  S/p percutaneous cholecystostomy   - Continue zosyn and metronidazole for 1 week total  - Follow up with general surgery  6 week from placement- around 4/14-  Will message Gen Surg to set apt up.   - 3/12 message sent to general surgery to set up appointment- around 4/14 appointment not set up yet, will follow-up  - 3/23 repeat CT scans ordered for 4/23     Neuropathic pain  - stable, continue Gabapentin 300 mg qhs, Capsaicin cream prn, Hydrocodone  5/aceteaminophen 325 mg prn    Constipation  - stable, Continue daily miralax and senokot-s     Essential hypertension  - Home medications: metoprolol and ramipril at home.   - continue Metoprolol 50 mg BID with low normal BPs   - Ramipril held due to lowish BPs  - stable, continue current treatment       Ongoing but stable problems       Bilateral fracture dislocation of proximal humerus fracture  Bilateral acromial fractures with bilateral shoulder dislocation  S/p bilateral shoulder hemiarthroplasty: right on 1/28/2020 and left on 1/30/2020  S/p Pt underwent revision of fixation of R greater tuberosity fracture on 02/14/20  - Follow up with orthopedics as scheduled  - PT/OT - nonweightbearing bilateral upper extremities in sling  - DVT prophylaxis - continue warfarin as for atrial fibrillaiton    Pathologic osteoporotic fractures  - Treat vitamin D deficiency  - Further evaluation and management as per PCP     CANDY on CKD III, resolved  - SCr with wandering baseline 0.8-1.4     GERD (gastroesophageal reflux disease)  - Continue Pantoprazole 40 mg daily     Stable burst fracture of first lumbar vertebra  L1 and L4 burst  fractures  - f/u in neurosurgery clinic as scheduled on 04/23/20 for further management  - TSLO brace whenever not lying in bed     Subdural hematoma  - Stable  - Cleared to resume anticoagualtion     Type 2 diabetes mellitus with CKD III and HTN  - A1c 5.9 from 01/27/20  - Not on treatment current  - Acuchecks in general <120  - Stop accucheck and SSI     Severe Vitamin D deficiency  - Level of 7 on 2/4  - continue Ergocalciferol 50k weekly      COPD (chronic obstructive pulmonary disease)  Smoker   - change levalbuterol nebs to prn  - Limit oxygen to avoid hypercapnea  - nicotine patch 14 mg daily, can wean down as tolearted  - smoking cessation advised     Seizure disorder   - continue keppra 1 g BID  - Follow up with neurology after discharge - appointment needs to be made     Hyperlipidemia   - continue atorvastatin 80 mg daily     GEDR   - continue protonix 40 mg daily     Anemic of acute infection   - improving without specific intervention     Moderate protein calorie malnutrition   - boost plus TID with meals  - Regular diet  - Dietary consult     Acute metabolic encephalopathy  Probable mild Cognitive impairment  - speech therapy eval  - Consider neuropsych eval  - Hold amitriptyline for now, will use melatonin for sleep     DJD of multiple sites   - PT/OT. Pain management as above     Cirrhosis of liver   - attributed to alcohol. Patient rarely drinks alcohol at this time.    Future Appointments   Date Time Provider Department Center   4/17/2020  9:40 AM Cristal Ohara NP [facility]  [facility]   4/23/2020  8:15 AM [facility] OIC-CT2 [address] LIMIT [facility]   4/23/2020  8:30 AM [facility] OIC-CT2 [address] LIMIT [facility]   5/14/2020  9:15 AM Chandler Chris MD [facility] ORTHO Tai Maxwell   6/4/2020 11:00 AM Carl Barry MD [facility] NEUROS7 Tai Craig NP      DOS: 3/23/2020      Patient note was created using MModal Dictation.  Any errors in syntax or even  information may not have been identified and edited on initial review prior to signing this note.

## 2020-03-24 LAB
INR PPP: 2.6 (ref 0.8–1.2)
PROTHROMBIN TIME: 24.3 SEC (ref 9–12.5)

## 2020-03-24 PROCEDURE — S4991 NICOTINE PATCH NONLEGEND: HCPCS | Performed by: INTERNAL MEDICINE

## 2020-03-24 PROCEDURE — 25000003 PHARM REV CODE 250: Performed by: STUDENT IN AN ORGANIZED HEALTH CARE EDUCATION/TRAINING PROGRAM

## 2020-03-24 PROCEDURE — 97535 SELF CARE MNGMENT TRAINING: CPT

## 2020-03-24 PROCEDURE — 97116 GAIT TRAINING THERAPY: CPT

## 2020-03-24 PROCEDURE — 25000003 PHARM REV CODE 250: Performed by: INTERNAL MEDICINE

## 2020-03-24 PROCEDURE — 25000003 PHARM REV CODE 250: Performed by: NURSE PRACTITIONER

## 2020-03-24 PROCEDURE — 36415 COLL VENOUS BLD VENIPUNCTURE: CPT

## 2020-03-24 PROCEDURE — 11000004 HC SNF PRIVATE

## 2020-03-24 PROCEDURE — 97110 THERAPEUTIC EXERCISES: CPT

## 2020-03-24 PROCEDURE — 85610 PROTHROMBIN TIME: CPT

## 2020-03-24 PROCEDURE — 97530 THERAPEUTIC ACTIVITIES: CPT

## 2020-03-24 RX ORDER — WARFARIN 3 MG/1
3 TABLET ORAL DAILY
Qty: 30 TABLET | Refills: 11 | Status: SHIPPED | OUTPATIENT
Start: 2020-03-24 | End: 2020-06-30 | Stop reason: DRUGHIGH

## 2020-03-24 RX ORDER — NAPROXEN SODIUM 220 MG/1
162 TABLET, FILM COATED ORAL DAILY
Refills: 0
Start: 2020-03-24 | End: 2021-01-28 | Stop reason: DRUGHIGH

## 2020-03-24 RX ORDER — ERGOCALCIFEROL 1.25 MG/1
50000 CAPSULE ORAL
Qty: 7 CAPSULE | Refills: 2 | Status: SHIPPED | OUTPATIENT
Start: 2020-03-24 | End: 2020-08-03

## 2020-03-24 RX ORDER — GABAPENTIN 300 MG/1
300 CAPSULE ORAL NIGHTLY
Qty: 30 CAPSULE | Refills: 3 | Status: SHIPPED | OUTPATIENT
Start: 2020-03-24 | End: 2020-08-03

## 2020-03-24 RX ORDER — LEVETIRACETAM 1000 MG/1
1000 TABLET ORAL 2 TIMES DAILY
Qty: 60 TABLET | Refills: 11 | Status: SHIPPED | OUTPATIENT
Start: 2020-03-24 | End: 2021-08-23

## 2020-03-24 RX ORDER — HYDROCODONE BITARTRATE AND ACETAMINOPHEN 5; 325 MG/1; MG/1
1 TABLET ORAL EVERY 4 HOURS PRN
Qty: 30 TABLET | Refills: 0 | Status: SHIPPED | OUTPATIENT
Start: 2020-03-24 | End: 2020-05-13

## 2020-03-24 RX ORDER — METOPROLOL TARTRATE 50 MG/1
50 TABLET ORAL 2 TIMES DAILY
Qty: 60 TABLET | Refills: 11 | Status: SHIPPED | OUTPATIENT
Start: 2020-03-24 | End: 2021-04-11

## 2020-03-24 RX ORDER — IBUPROFEN 200 MG
1 TABLET ORAL DAILY
Qty: 30 PATCH | Refills: 3 | Status: SHIPPED | OUTPATIENT
Start: 2020-03-25 | End: 2020-04-01 | Stop reason: ALTCHOICE

## 2020-03-24 RX ORDER — POLYETHYLENE GLYCOL 3350 17 G/17G
17 POWDER, FOR SOLUTION ORAL 2 TIMES DAILY
Qty: 30 EACH | Refills: 3 | Status: ON HOLD | OUTPATIENT
Start: 2020-03-24 | End: 2021-02-01 | Stop reason: HOSPADM

## 2020-03-24 RX ADMIN — LEVETIRACETAM 1000 MG: 500 TABLET ORAL at 10:03

## 2020-03-24 RX ADMIN — CYANOCOBALAMIN TAB 1000 MCG 1000 MCG: 1000 TAB at 10:03

## 2020-03-24 RX ADMIN — Medication 400 MG: at 08:03

## 2020-03-24 RX ADMIN — ATORVASTATIN CALCIUM 80 MG: 20 TABLET, FILM COATED ORAL at 08:03

## 2020-03-24 RX ADMIN — NICOTINE 1 PATCH: 14 PATCH TRANSDERMAL at 10:03

## 2020-03-24 RX ADMIN — METOPROLOL TARTRATE 50 MG: 50 TABLET, FILM COATED ORAL at 08:03

## 2020-03-24 RX ADMIN — LEVETIRACETAM 1000 MG: 500 TABLET ORAL at 08:03

## 2020-03-24 RX ADMIN — METOPROLOL TARTRATE 50 MG: 50 TABLET, FILM COATED ORAL at 10:03

## 2020-03-24 RX ADMIN — PANTOPRAZOLE SODIUM 40 MG: 40 TABLET, DELAYED RELEASE ORAL at 10:03

## 2020-03-24 RX ADMIN — ERGOCALCIFEROL 50000 UNITS: 1.25 CAPSULE ORAL at 10:03

## 2020-03-24 RX ADMIN — Medication 400 MG: at 10:03

## 2020-03-24 RX ADMIN — WARFARIN SODIUM 3 MG: 2 TABLET ORAL at 05:03

## 2020-03-24 RX ADMIN — GABAPENTIN 300 MG: 300 CAPSULE ORAL at 08:03

## 2020-03-24 RX ADMIN — AMITRIPTYLINE HYDROCHLORIDE 100 MG: 25 TABLET, FILM COATED ORAL at 05:03

## 2020-03-24 NOTE — PLAN OF CARE
Problem: Occupational Therapy Goal  Goal: Occupational Therapy Goal  Description  Goals to be met by: 03/26/2020     Patient will increase functional independence with ADLs by performing:    Feeding with Set-up Assistance.  UE Dressing with Moderate Assistance.  LE Dressing with Moderate Assistance.  Grooming while seated with Set-up Assistance.  Toileting from bedside commode with Moderate Assistance for hygiene and clothing management.   Bathing from  edge of bed with Moderate Assistance.  Toilet transfer to bedside commode with Stand-by Assistance. Met  Upper extremity exercise program 2 x12 reps per handout, with supervision according to orthopedic restrictions per MD order in order to maintain UE ROM and then, once cleared, in order to increase strength to improve ADLs and func mobility  Perform functional task in standing for 10 minutes with SBA in order to prepare for safe standing ADLs/iADLs.      Outcome: Ongoing, Progressing   Patient's goals are appropriate.   LILIAM Marcelino  3/24/2020

## 2020-03-24 NOTE — PLAN OF CARE
Problem: Adult Inpatient Plan of Care  Goal: Plan of Care Review  Outcome: Ongoing, Progressing     Recommendation:   1. Continue regular diet as tolerated; if pt experiences hyperglycemia rec'd DM 2000kcal diet.  2. Continue boost glucose control BID.   3. Provide nutrition education on DM and cardiac diets before discharge.     Goals: PO intake > 85% EEN, EPN daily   Nutrition Goal Status: progressing towards goal  Communication of RD Recs: other (comment)(POC)

## 2020-03-24 NOTE — PT/OT/SLP PROGRESS
"Physical Therapy  Treatment    Ben Melvin   MRN: [id number]   Admitting Diagnosis: Cholelithiasis with acute cholecystitis   Back biopsy  R partial arthroplasty of R shoulder  L ORIF of L shoulder    PT Received On: 03/24/20          Billable Minutes:  Gait Training 15 , Therapeutic Activity 15  and Therapeutic Exercise 15    Treatment Type: Treatment  PT/PTA: PT     PTA Visit Number: 0       General Precautions: Standard, fall  Orthopedic Precautions: RUE non weight bearing, LUE non weight bearing, spinal precautions   Braces: LSO(RUE shoulder abduction brace)         Subjective:  Communicated with pt prior to session.  Agreeable to PT services. "My hip tingles at times."    Pain/Comfort  Pain Rating 1: 0/10    Objective:  Patient found seated in WC.       AM-PAC 6 CLICK MOBILITY  Total Score:18    Bed Mobility:  Sit>Supine: SBA  Supine>Sit: ModA    Transfers:  Sit<>Stand: SBA  Stand Pivot Transfer: SBA-CGA with set-up    Gait:  Amb 95 ft with SBA-CGA, ambulating closely to objects located on his R side. VC to bring awareness to pt. Increased base of support, decreased dorsiflexion/initial contact foot flat, no toe off noted.    Therex (to improve back pain):  Heel slides x 20 reps  Hip abd  x 20 reps  TA isometric contractions  x 20 reps  Lower trunk rotation x 20 reps   Standing IT band stretch x3 trials (20 seconds)    Balance:  SBA-CGA for standing  Reported dizziness twice during session, each after a sit>stand transfer. Each BP: 135/68mmHg,  137/88mmHg, 120 bpm.    Additional Treatment:  Completed 15 minutes on recumbent stepper at level 4 to improve U/LE strength and endurance.       Patient left up in chair with call button in reach.    Assessment:  Ben Melvin is a 60 y.o. male with a medical diagnosis of Cholelithiasis with acute cholecystitis.  Mr. Melvin met one goal and is progressing but requires stability while ambulating and will benefit from further strengthening of his core and " back.    Rehab identified problem list/impairments: weakness, impaired endurance, gait instability, impaired functional mobilty, decreased upper extremity function, impaired balance, orthopedic precautions    Rehab potential is fair.    Activity tolerance: Good    Discharge recommendations: home with home health     Barriers to discharge: Inaccessible home environment    Equipment recommendations: wheelchair, bedside commode, bath bench(pt s/o, has DME from prior family members)     GOALS:   Multidisciplinary Problems     Physical Therapy Goals        Problem: Physical Therapy Goal    Goal Priority Disciplines Outcome Goal Variances Interventions   Physical Therapy Goal     PT, PT/OT Ongoing, Progressing     Description:  Goals to be met by: 20     Patient will increase functional independence with mobility by performin. Supine to sit with Stand-by Assistance and observing all precautions  2. Sit to supine with Stand-by Assistance and observing all precautions. Met (3/24/2020)  3. Sit to stand transfer with Stand-by Assistance and observing all precautions Met (3/19/2020)  4. Bed to chair transfer with Stand-by Assistance, with LRAD if appropriate,  and observing all precautions met 3/17/2020  5. Gait  x 100 feet with Stand-by Assistance  With LRAD, if appropriate, and observing all precautions. Met 3/16/2020  6. Ascend/descend 3 stair  Handrails Minimal Assistance and maintaining weight-bearing precaution(s), all precautions.  Met (3/19/2020)  7. Ascend/Descend 4 inch curb step with Minimal Assistance using LRAD, if appropriate  and observing all precautions. Met (3/19/2020)  8. Stand for 3 minutes with Stand-by Assistance met 3/16/2020  9. Lower extremity exercise program x20 reps per handout, with assistance as needed and gym therex met 3/20/2020                           PLAN:    Patient to be seen 5 x/week  to address the above listed problems via gait training, therapeutic activities, therapeutic  exercises, wheelchair management/training  Plan of Care expires: 04/07/20  Plan of Care reviewed with: patient    Liliane Bledsoe, PT  03/24/2020

## 2020-03-24 NOTE — PT/OT/SLP PROGRESS
Occupational Therapy  Treatment    Ben Melvin   MRN: [id number]   Admitting Diagnosis: Cholelithiasis with acute cholecystitis    OT Date of Treatment: 03/24/20  Total Time (min): 83 min    Billable Minutes:  Self Care/Home Management 38, Therapeutic Exercise 45 and Total Time 83    General Precautions: Standard, fall  Orthopedic Precautions: RUE weight bearing as tolerated, spinal precautions, LUE non weight bearing  Braces: LSO(R UE shoulder abduction brace)     Subjective:  Communicated with patient prior to session.    Pain/Comfort  Pain Rating 1: 0/10  Pain Rating Post-Intervention 1: 0/10    Objective:       Occupational Performance:    Bed Mobility:    · Patient completed Supine to Sit with minimum assistance 2x  · Patient completed Sit to Supine with minimum assistance     Functional Mobility/Transfers:  · Patient completed Sit <> Stand Transfer with stand by assistance and contact guard assistance  with  no assistive device   · Patient completed Bed > w/c; bed<>w/c Transfer using Stand Pivot technique with stand by assistance and contact guard assistance with no assistive device  · Patient completed Toilet Transfer bed<>toilet Stand Pivot technique with no AD    Activities of Daily Living:  · Grooming: moderate assistance washing face and shaving seated in w/c at sink  · Upper Body Dressing: total assistance Donning TLSO 2x, Donning and doffing shoulder abduction brace  · Toileting: moderate assistance      [facility] 6 Click:  [facility] Total Score: 12    OT Exercises:   R UE exercises as follows: pendulum exercises outside of brace (circular, forward/backwards, and left<>right).     L UE ROM exercises as follows:  Wrist and digits with flexion and extension 2 x 15; towel slides (forwards/backwards for shoulder flexion and extension), shoulder abduction/adduction, and circular    Patient was seen in am and pm and patient was left up in chair with therapists present waiting for PT after first OT session; second  session in pm: Patient was left in bed with HOB elevated with all needs met.     ASSESSMENT:  Ben Melvin is a 60 y.o. male with a medical diagnosis of Cholelithiasis with acute cholecystitis and presents with the deficits listed below. Patient tolerated treatment sessions well and was motivated to complete tasks. Patient would benefit from continued skilled OT services to improve functional mobility, increase independence with ADLs, and address established goals.    Rehab identified problem list/impairments: weakness, impaired endurance, impaired self care skills, impaired functional mobilty, gait instability, decreased upper extremity function, orthopedic precautions, impaired balance    Rehab potential is good    Activity tolerance: Good    Discharge recommendations: (TBD)     Barriers to discharge: Inaccessible home environment     Equipment recommendations: wheelchair, bedside commode, bath bench     GOALS:   Multidisciplinary Problems     Occupational Therapy Goals        Problem: Occupational Therapy Goal    Goal Priority Disciplines Outcome Interventions   Occupational Therapy Goal     OT, PT/OT Ongoing, Progressing    Description:  Goals to be met by: 03/26/2020     Patient will increase functional independence with ADLs by performing:    Feeding with Set-up Assistance.  UE Dressing with Moderate Assistance.  LE Dressing with Moderate Assistance.  Grooming while seated with Set-up Assistance.  Toileting from bedside commode with Moderate Assistance for hygiene and clothing management.   Bathing from  edge of bed with Moderate Assistance.  Toilet transfer to bedside commode with Stand-by Assistance. Met  Upper extremity exercise program 2 x12 reps per handout, with supervision according to orthopedic restrictions per MD order in order to maintain UE ROM and then, once cleared, in order to increase strength to improve ADLs and func mobility  Perform functional task in standing for 10 minutes with SBA in  order to prepare for safe standing ADLs/iADLs.                       Plan:  Patient to be seen 5 x/week to address the above listed problems via self-care/home management, therapeutic activities, therapeutic exercises  Plan of Care expires: 04/06/20  Plan of Care reviewed with: patient    LILIAM Marcelino  03/24/2020

## 2020-03-24 NOTE — PROGRESS NOTES
[facility] - Skilled Nursing Care  Adult Nutrition  Progress Note    SUMMARY       Recommendations    Recommendation:   1. Continue regular diet as tolerated; if pt experiences hyperglycemia rec'd DM 2000kcal diet.  2. Continue boost glucose control BID.   3. Provide nutrition education on DM and cardiac diets before discharge.     Goals: PO intake > 85% EEN, EPN daily   Nutrition Goal Status: progressing towards goal  Communication of RD Recs: other (comment)(POC)    Reason for Assessment    Reason For Assessment: RD follow-up  Diagnosis: infection/sepsis  Relevant Medical History: COPD, elevated LFTs, EtOH abuse, Cirrhosis, JLD, ASCVD, PVD, PAF, GERD, Vit D def, DM, CKDIII  Interdisciplinary Rounds: did not attend  General Information Comments:   3/23/20: RD spoke with RN; limiting pt exposure due to COVID-19 precautions. Pt continues to have a good appetite; per chart pt eating 75% of meals. Pt drinking Boost Glucose Control BID. No N/V/D/C noted in chart. Wt stable.    3/16/20: Pt reports having a good appetite; PO intake 50 - 75% of meals because he dislikes the food. Pt drinking Boost Glucose control BID. Denies N/V/D/C.  Pt denies weight loss. 12# wt loss x 1 week per chart; likely inaccurate.   3/9/20: Pt reports having poor appetite since d/c from SNF 3/2 on 2/2 acute cholecystitis . s/p cholecystostomy tube placement on 3/4, s/p ORIF - right humerus on 2/14. Pt still reports fair appetite; PO intake 25-50% of meals. Pt drinking Boost Glucose control BID. Wt assists pt to eat meals. Pt denies ever receiving education and would be interested in receiving education on DM/cardiac diet prior to discharge. Denies N/V/D/C. NFPE completed 3/9, pt with moderate muscle and fat loss in extremities 2/2 inactivity. Pt meets criteria for moderate malnutrition.   Nutrition Discharge Planning: adequate PO intake on regular diet     Nutrition Risk Screen    Nutrition Risk Screen: no indicators present    Nutrition/Diet  "History    Food Preferences: likes sweetned oatmeal   Spiritual, Cultural Beliefs, Zoroastrian Practices, Values that Affect Care: yes  Food Allergies: NKFA  Factors Affecting Nutritional Intake: None identified at this time    Anthropometrics    Temp: 97.3 °F (36.3 °C)  Height Method: Stated  Height: 5' 11" (180.3 cm)  Height (inches): 71 in  Weight Method: Standard Scale  Weight: 100 kg (220 lb 7.4 oz)  Weight (lb): 220.46 lb  Ideal Body Weight (IBW), Male: 172 lb  % Ideal Body Weight, Male (lb): 128.69 %  BMI (Calculated): 30.8  BMI Grade: 30 - 34.9- obesity - grade I  Usual Body Weight (UBW), k.45 kg(20)  % Usual Body Weight: 83.53  % Weight Change From Usual Weight: -16.65 %       Lab/Procedures/Meds    Pertinent Labs Reviewed: reviewed  Pertinent Labs Comments: BUN 23, A1C 5.9 (), Triglycerdies 195 ()  Pertinent Medications Reviewed: reviewed  Pertinent Medications Comments: statin, cyanocobalamin, gabapentin, metoprolol, metroidazole, pantoprazole, polyethylene glycol, senna-docusate     Physical Findings/Assessment    Cristhian Score 18  Incision - right abdomen   Incision - right shoulder      Estimated/Assessed Needs    Weight Used For Calorie Calculations: 100.4 kg (221 lb 5.5 oz)  Energy Calorie Requirements (kcal): 2393 kcal/day  Energy Need Method: Herreid-St Jeor(PAL 1.3)  Protein Requirements: 100 - 120 g/day(1.0 - 1.2 g/kg)  Weight Used For Protein Calculations: 100.4 kg (221 lb 5.5 oz)  Fluid Requirements (mL): 1 mL/kcal or per MD   Estimated Fluid Requirement Method: RDA Method  RDA Method (mL): 2393  CHO Requirement: 317 g/day    Nutrition Prescription Ordered    Current Diet Order: regular   Oral Nutrition Supplement: Boost Glucose Control BID     Evaluation of Received Nutrient/Fluid Intake    I/O: +474mL  Energy Calories Required: meeting needs  Protein Required: meeting needs  Fluid Required: meeting needs  Comments: LBM 3/23  Tolerance: tolerating  % Intake of Estimated Energy " Needs: 75 - 100 %   % Meal Intake: 75 - 100 %     Nutrition Risk    Level of Risk/Frequency of Follow-up: low(1x/week)     Assessment and Plan    Moderate malnutrition  Nutrition Problem:  Moderate Protein-Calorie Malnutrition  Malnutrition in the context of Acute Illness/Injury     Related to (etiology):  S/p ORIF; acute cholecystitis      Signs and Symptoms (as evidenced by):  Energy Intake: <50% of estimated energy requirement for x 7 days  Body Fat Depletion: moderate depletion of triceps   Muscle Mass Depletion: mild and moderate depletion of temples, clavicle region, interosseous muscle and lower extremities   Weight Loss: 17% x 1 month      Interventions(treatment strategy):  Collaboration with other providers  [employer] - Boost Glucose Control BID      Nutrition Diagnosis Status:  Continues     Monitor and Evaluation    Food and Nutrient Intake: energy intake, food and beverage intake  Anthropometric Measurements: weight, weight change, body mass index  Biochemical Data, Medical Tests and Procedures: electrolyte and renal panel, inflammatory profile, lipid profile, glucose/endocrine profile  Nutrition-Focused Physical Findings: overall appearance     Malnutrition Assessment  Malnutrition Type: acute illness or injury  Energy Intake: moderate energy intake  Weight Loss (Malnutrition): greater than 5% in 1 month  Energy Intake (Malnutrition): less than 75% for greater than 7 days   Orbital Region (Subcutaneous Fat Loss): well nourished  Upper Arm Region (Subcutaneous Fat Loss): moderate depletion   Gnosticism Region (Muscle Loss): well nourished  Clavicle Bone Region (Muscle Loss): well nourished  Clavicle and Acromion Bone Region (Muscle Loss): well nourished  Dorsal Hand (Muscle Loss): well nourished  Patellar Region (Muscle Loss): moderate depletion  Anterior Thigh Region (Muscle Loss): moderate depletion  Posterior Calf Region (Muscle Loss): moderate depletion   Edema (Fluid Accumulation): 0-->no  edema present   Subcutaneous Fat Loss (Final Summary): moderate protein-calorie malnutrition  Muscle Loss Evaluation (Final Summary): moderate protein-calorie malnutrition    Severe Weight Loss (Malnutrition): greater than 5% in 1 month    Nutrition Follow-Up    RD Follow-up?: Yes

## 2020-03-24 NOTE — PROGRESS NOTES
[facility] - Skilled Nursing Care    HOME HEALTH ORDERS  FACE TO FACE ENCOUNTER    Patient Name: Ben Melvin  YOB: 1960    PCP: Cristal Ohara NP   PCP Address: [address] / [place] [address]  PCP Phone Number: [phone]  PCP Fax: [phone]    Encounter Date: 03/24/2020    Admit to Home Health    Diagnoses:  Active Hospital Problems    Diagnosis  POA    *Cholelithiasis with acute cholecystitis [K80.00]  Yes    Moderate malnutrition [E44.0]  Yes    Sepsis [A41.9]  Yes    CANDY (acute kidney injury) [N17.9]  Yes    Acute encephalopathy [G93.40]  Yes    Hypertension associated with stage 3 chronic kidney disease due to type 2 diabetes mellitus [E11.22, I12.9, N18.3]  Yes    Neuropathic pain [M79.2]  Yes    GERD (gastroesophageal reflux disease) [K21.9]  Yes    Bilateral proximal humeral fractures [S42.201A, S42.202A]  Yes    Acute blood loss anemia [D62]  Yes    Diabetes 1.5, 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       Future Appointments   Date Time Provider Department Center   4/17/2020  9:40 AM Cristal Ohara NP [facility]   4/23/2020  8:15 AM [facility] OIC-CT2 500 LB LIMIT [facility]   4/23/2020  8:30 AM [facility] OIC-CT2 500 LB LIMIT [facility]   5/14/2020  9:15 AM Chandler Chris MD [facility] ORTHO [place]   6/4/2020 11:00 AM Carl Barry MD [facility] NEUROS7 [place]     Follow-up Information     Schedule an appointment as soon as possible for a visit with Cristal Ohara  NP.    Specialty:  Internal Medicine  Why:  WITHIN 1-2 WEEKS  Contact information:  [address]  [address]  [place] [address]  [phone]                 I have seen and examined this patient face to face today. My clinical findings that support the need for the home health skilled services and home bound status are the following:  Weakness/numbness causing balance and gait disturbance due to Fracture and Surgery making it taxing to leave home.    Allergies:Review of patient's allergies indicates:  No Known Allergies    Diet: diabetic diet: 2000 calorie    Activities:   NWB to bilateral upper extremities for 3 months from last op date ( surg date- L shoulder- 1/28; R shoulder 1/30 & 2/24) .  He should wear shoulder sling for 6 weeks and ok to remove for hygiene and PT.  He can do ROM at hand, wrist, elbow as tolerates.  Ok for AAROM of shoulder and pendulums now.  No strengthening exercises for 3 months.    Nursing:   SN to complete comprehensive assessment including routine vital signs. Instruct on disease process and s/s of complications to report to MD. Review/verify medication list sent home with the patient at time of discharge  and instruct patient/caregiver as needed. Frequency may be adjusted depending on start of care date.    Notify MD if SBP > 160 or < 90; DBP > 90 or < 50; HR > 120 or < 50; Temp > 101    INR checks 3x per week- fax results to his Coumadin monitor center- wife has info ready and has contacted them to begin monitoring him again.      CONSULTS:    Physical Therapy to evaluate and treat. Evaluate for home safety and equipment needs; Establish/upgrade home exercise program. Perform / instruct on therapeutic exercises, gait training, transfer training, and Range of Motion.  Occupational Therapy to evaluate and treat. Evaluate home environment for safety and equipment needs. Perform/Instruct on transfers, ADL training, ROM, and therapeutic exercises.  Speech Therapy  to evaluate and treat for   Language, Swallowing and Cognition.  Aide to provide assistance with personal care, ADLs, and vital signs.    MISCELLANEOUS CARE:  Diabetic Care:   SN to perform and educate Diabetic management with blood glucose monitoring:, Fingerstick blood sugar AC and HS and Report CBG < 60 or > 350 to physician.    WOUND CARE ORDERS    Change cholecystostomy tube dressing per protocol    Medications: Review discharge medications with patient and family and provide education.      I certify that this patient is confined to his home and needs intermittent skilled nursing care, physical therapy, speech therapy and occupational therapy.

## 2020-03-25 ENCOUNTER — TELEPHONE (OUTPATIENT)
Dept: ORTHOPEDICS | Facility: HOSPITAL | Age: 60
End: 2020-03-25

## 2020-03-25 VITALS
HEART RATE: 108 BPM | BODY MASS INDEX: 30.86 KG/M2 | WEIGHT: 220.44 LBS | OXYGEN SATURATION: 92 % | DIASTOLIC BLOOD PRESSURE: 70 MMHG | TEMPERATURE: 96 F | SYSTOLIC BLOOD PRESSURE: 118 MMHG | HEIGHT: 71 IN | RESPIRATION RATE: 18 BRPM

## 2020-03-25 DIAGNOSIS — S42.251K CLOSED DISPLACED FRACTURE OF GREATER TUBEROSITY OF RIGHT HUMERUS WITH NONUNION, SUBSEQUENT ENCOUNTER: ICD-10-CM

## 2020-03-25 DIAGNOSIS — Z96.612 STATUS POST REPLACEMENT OF BOTH SHOULDER JOINTS: Primary | ICD-10-CM

## 2020-03-25 DIAGNOSIS — S43.005D DISLOCATION OF LEFT SHOULDER JOINT, SUBSEQUENT ENCOUNTER: ICD-10-CM

## 2020-03-25 DIAGNOSIS — S42.121D CLOSED DISPLACED FRACTURE OF RIGHT ACROMIAL PROCESS WITH ROUTINE HEALING, SUBSEQUENT ENCOUNTER: ICD-10-CM

## 2020-03-25 DIAGNOSIS — Z96.611 STATUS POST REPLACEMENT OF BOTH SHOULDER JOINTS: Primary | ICD-10-CM

## 2020-03-25 DIAGNOSIS — S42.91XD CLOSED FRACTURE DISLOCATION OF JOINT OF RIGHT SHOULDER GIRDLE WITH ROUTINE HEALING, SUBSEQUENT ENCOUNTER: ICD-10-CM

## 2020-03-25 DIAGNOSIS — S42.122D: ICD-10-CM

## 2020-03-25 DIAGNOSIS — S42.92XD CLOSED FRACTURE DISLOCATION OF JOINT OF LEFT SHOULDER GIRDLE WITH ROUTINE HEALING, SUBSEQUENT ENCOUNTER: ICD-10-CM

## 2020-03-25 LAB
INR PPP: 2.1 (ref 0.8–1.2)
PROTHROMBIN TIME: 20.2 SEC (ref 9–12.5)

## 2020-03-25 PROCEDURE — 25000003 PHARM REV CODE 250: Performed by: STUDENT IN AN ORGANIZED HEALTH CARE EDUCATION/TRAINING PROGRAM

## 2020-03-25 PROCEDURE — 25000003 PHARM REV CODE 250: Performed by: INTERNAL MEDICINE

## 2020-03-25 PROCEDURE — 97110 THERAPEUTIC EXERCISES: CPT

## 2020-03-25 PROCEDURE — 36415 COLL VENOUS BLD VENIPUNCTURE: CPT

## 2020-03-25 PROCEDURE — 85610 PROTHROMBIN TIME: CPT

## 2020-03-25 PROCEDURE — S4991 NICOTINE PATCH NONLEGEND: HCPCS | Performed by: INTERNAL MEDICINE

## 2020-03-25 RX ADMIN — CYANOCOBALAMIN TAB 1000 MCG 1000 MCG: 1000 TAB at 08:03

## 2020-03-25 RX ADMIN — LEVETIRACETAM 1000 MG: 500 TABLET ORAL at 08:03

## 2020-03-25 RX ADMIN — PANTOPRAZOLE SODIUM 40 MG: 40 TABLET, DELAYED RELEASE ORAL at 08:03

## 2020-03-25 RX ADMIN — METOPROLOL TARTRATE 50 MG: 50 TABLET, FILM COATED ORAL at 08:03

## 2020-03-25 RX ADMIN — NICOTINE 1 PATCH: 14 PATCH TRANSDERMAL at 08:03

## 2020-03-25 NOTE — DISCHARGE SUMMARY
[facility] - Skilled [facility] Medicine  Discharge Summary      Patient Name: Ben Melvin  MRN: [id number]  Admission Date: 3/6/2020  Hospital Length of Stay: 19 days  Discharge Date and Time:  03/25/2020 11:01 AM  Attending Physician: Jennifer Anne MD   Discharging Provider: Ave Craig NP  Primary Care Provider: Cristal Ohara NP      HPI:   Mr. Melvin is a 60 year old male with PMHx of AFib on coumadin, peripheral vascular disease, hypertension, COPD, current smoker, liver cirrhosis, who presents to SNF following hospitalization for failure of the repair of the greater tuberosity on the right shoulder s/p revision fixation of right greater tuberosity fracture on 2/14 by Dr. Chris.  Admission to SNF for secondary weakness and debility.     Patient originally presented to [facility] ED on 01/27 after suffering a witnessed seizure while playing video poker and sustained bilateral proximal humerus fracture dislocations, bilateral acromial fractures, subdural hemorrhage, L1 and L4 burst fractures. He underwent left shoulder hemiarthroplasty, biceps tenodesis on 01/28/20 and right shoulder hemiarthroplasty, biceps tenodesis on 01/30/20 and [facility]. BL shoulder XRs from 02/10/20 notable for failure of the repair of the greater tuberosity on the right shoulder and he returned to [facility] on 02/14/20 for revision fixation of his right greater tuberosity fracture. Pt had a stable CTH on 02/10/20 and warfarin was reinitiated 02/11, last dose was 02/12. Currently on Lovenox 40 mg SQ daily. He was seen by neurosurgery and will f/u with pt outpt as scheduled on 02/21/20.      Patient reports his pain is well controlled with current regimen.  He endorses a productive cough and trouble sleeping.      Patient will be treated at [facility] with PT and OT to improve functional status and ability to perform ADLs.     * No surgery found *      Hospital Course:   Patient progressed well with PT and OT.  Patient had no significant events during their stay at SNF. Home health was set up. DME was ordered if needed. Follow up appointment to be made by patient within one week. All prescriptions and discharge instructions were ordered to be given to the patient prior to discharge.     PEx  Constitutional: Patient appears well-developed.  No distress noted  HENT:   Head: Normocephalic and atraumatic.   Eyes: Pupils are equal, round  Neck: Normal range of motion. Neck supple.   Cardiovascular: Normal rate, regular rhythm and normal heart sounds.    Pulmonary/Chest: Effort normal and breath sounds are clear  Abdominal: Soft. Bowel sounds are normal.   Musculoskeletal: Normal range of motion.  Decreased range of motion to bilateral upper extremities  Neurological: Alert and oriented to person,  place, and time.   Psychiatric: Normal mood and affect. Behavior is normal.   Skin: Skin is warm and dry.  Surgical incision to RUE 14cm in length, LUE 15 cm in length- no drainage noted, fully approximated, no signs of infection; cholecystostomy tube present and draining       Consults:   Consults (From admission, onward)        Status Ordering Provider     Inpatient consult to Registered Dietitian/Nutritionist  Once     Provider:  (Not yet assigned)    RUDY Perdomo          No new Assessment & Plan notes have been filed under this hospital service since the last note was generated.  Service: Hospital Medicine    Final Active Diagnoses:    Diagnosis Date Noted POA    PRINCIPAL PROBLEM:  Cholelithiasis with acute cholecystitis [K80.00] 03/03/2020 Yes    Moderate malnutrition [E44.0] 03/09/2020 Yes    Sepsis [A41.9] 03/03/2020 Yes    CANDY (acute kidney injury) [N17.9] 03/03/2020 Yes    Acute encephalopathy [G93.40] 03/03/2020 Yes    Hypertension associated with stage 3 chronic kidney disease due to type 2 diabetes mellitus [E11.22, I12.9, N18.3] 02/19/2020 Yes    Neuropathic pain [M79.2] 02/18/2020 Yes    GERD  "(gastroesophageal reflux disease) [K21.9] 02/15/2020 Yes    Bilateral proximal humeral fractures [S42.201A, S42.202A] 02/14/2020 Yes    Acute blood loss anemia [D62]  Yes    Diabetes 1.5, 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] 06/03/2019 Yes    Vitamin D deficiency [E55.9] 06/03/2019 Yes    COPD (chronic obstructive pulmonary disease) [J44.9]  Yes     Chronic    Essential hypertension [I10]  Yes    PAF (paroxysmal atrial fibrillation) [I48.0]  Yes    Hyperlipidemia [E78.5]  Yes    Long term (current) use of anticoagulants [Z79.01]  Not Applicable    Current every day smoker [F17.200]  Yes    GERD with esophagitis [K21.0] 11/09/2017 Yes      Problems Resolved During this Admission:       Discharged Condition: good    Disposition: Home-Health Care [facility]    Follow Up:  Follow-up Information     Schedule an appointment as soon as possible for a visit with Cristal Ohara NP.    Specialty:  Internal Medicine  Why:  WITHIN 1-2 WEEKS  Contact information:  [address] [id number] [phone]                 Patient Instructions:      3 IN 1 COMMODE FOR HOME USE     Order Specific Question Answer Comments   Type: Standard    Height: 5' 11" (1.803 m)    Weight: 100 kg (220 lb 7.4 oz)    Does patient have medical equipment at home? none    Length of need (1-99 months): 99      TRANSFER TUB BENCH FOR HOME USE     Order Specific Question Answer Comments   Type of Transfer Tub Bench: Unpadded    Height: 5' 11" (1.803 m)    Weight: 100 kg (220 lb 7.4 oz)    Does patient have medical equipment at home? none    Length of need (1-99 months): 99      No driving until:   Order Comments: Cleared by PCP     Notify your health care provider if you experience any of the following:  temperature >100.4     Notify your health care provider if you experience any of the following:  persistent nausea and vomiting or diarrhea     Notify your health care provider if you experience any of the following:  severe uncontrolled pain "     Notify your health care provider if you experience any of the following:  redness, tenderness, or signs of infection (pain, swelling, redness, odor or green/yellow discharge around incision site)     Notify your health care provider if you experience any of the following:  difficulty breathing or increased cough     Notify your health care provider if you experience any of the following:  persistent dizziness, light-headedness, or visual disturbances     Notify your health care provider if you experience any of the following:  increased confusion or weakness     Activity as tolerated       Significant Diagnostic Studies: Labs: BMP: No results for input(s): GLU, NA, K, CL, CO2, BUN, CREATININE, CALCIUM, MG in the last 48 hours. and CBC No results for input(s): WBC, HGB, HCT, PLT in the last 48 hours.    Pending Diagnostic Studies:     Procedure Component Value Units Date/Time    Protime-INR [[id number]] Collected:  03/07/20 1240    Order Status:  Sent Lab Status:  In process Updated:  03/07/20 1249    Specimen:  Blood          Medications:  Reconciled Home Medications:      Medication List      START taking these medications    HYDROcodone-acetaminophen 5-325 mg per tablet  Commonly known as:  NORCO  Take 1 tablet by mouth every 4 (four) hours as needed.     nicotine 14 mg/24 hr  Commonly known as:  NICODERM CQ  Place 1 patch onto the skin once daily.  Replaces:  nicotine 21 mg/24 hr        CHANGE how you take these medications    aspirin 81 MG Chew  Take 2 tablets (162 mg total) by mouth once daily. Hold until otherwise directed by primary care provider  What changed:  additional instructions     gabapentin 300 MG capsule  Commonly known as:  NEURONTIN  Take 1 capsule (300 mg total) by mouth every evening.  What changed:  when to take this     warfarin 3 MG tablet  Commonly known as:  COUMADIN  Take 1 tablet (3 mg total) by mouth Daily.  What changed:    · medication strength  · how much to take  · when to take  this  · additional instructions  · Another medication with the same name was removed. Continue taking this medication, and follow the directions you see here.        CONTINUE taking these medications    amitriptyline 100 MG tablet  Commonly known as:  ELAVIL  Take 1 tablet (100 mg total) by mouth every evening.     atorvastatin 80 MG tablet  Commonly known as:  LIPITOR  Take 80 mg by mouth every evening.     cetirizine 10 MG tablet  Commonly known as:  ZYRTEC  Take 10 mg by mouth once daily.     cyanocobalamin 1000 MCG tablet  Commonly known as:  VITAMIN B-12  Take 1,000 mcg by mouth once daily.     ergocalciferol 50,000 unit Cap  Commonly known as:  ERGOCALCIFEROL  Take 1 capsule (50,000 Units total) by mouth every 7 days.     levETIRAcetam 1000 MG tablet  Commonly known as:  KEPPRA  Take 1 tablet (1,000 mg total) by mouth 2 (two) times daily.     metoprolol tartrate 50 MG tablet  Commonly known as:  LOPRESSOR  Take 1 tablet (50 mg total) by mouth 2 (two) times daily.     pantoprazole 40 MG tablet  Commonly known as:  PROTONIX  Take 1 tablet (40 mg total) by mouth once daily.     polyethylene glycol 17 gram Pwpk  Commonly known as:  GLYCOLAX  Take 17 g by mouth 2 (two) times daily.     TRADJENTA 5 mg Tab tablet  Generic drug:  linaGLIPtin  TAKE 1 TABLET BY MOUTH EVERY DAY        STOP taking these medications    BASAGLAR KWIKPEN U-100 INSULIN glargine 100 units/mL (3mL) SubQ pen  Generic drug:  insulin     bisacodyL 10 mg Supp  Commonly known as:  DULCOLAX     ciprofloxacin HCl 750 MG tablet  Commonly known as:  CIPRO     enoxaparin 100 mg/mL Syrg  Commonly known as:  LOVENOX     furosemide 20 MG tablet  Commonly known as:  LASIX     insulin aspart U-100 100 unit/mL (3 mL) Inpn pen  Commonly known as:  NovoLOG     insulin detemir U-100 100 unit/mL (3 mL) Inpn pen  Commonly known as:  LEVEMIR FLEXTOUCH     ipratropium 0.02 % nebulizer solution  Commonly known as:  ATROVENT     levalbuterol 0.63 mg/3 mL nebulizer  solution  Commonly known as:  XOPENEX     metFORMIN 1000 MG tablet  Commonly known as:  GLUCOPHAGE     methocarbamoL 750 MG Tab  Commonly known as:  ROBAXIN     metroNIDAZOLE 500 MG tablet  Commonly known as:  FLAGYL     multivitamin with minerals tablet     nicotine 21 mg/24 hr  Commonly known as:  NICODERM CQ  Replaced by:  nicotine 14 mg/24 hr     oxyCODONE 5 MG immediate release tablet  Commonly known as:  ROXICODONE     ramipriL 5 MG capsule  Commonly known as:  ALTACE     senna 8.6 mg tablet  Commonly known as:  SENOKOT     senna-docusate 8.6-50 mg 8.6-50 mg per tablet  Commonly known as:  PERICOLACE            Indwelling Lines/Drains at time of discharge:   Lines/Drains/Airways     Drain                 Biliary Tube 03/03/20 1530 VTCB biliary 8 Fr. RUQ 21 days                Time spent on the discharge of patient: 38 minutes  Patient was seen and examined on the date of discharge and determined to be suitable for discharge.         Ave Craig NP  Department of Hospital Medicine  [facility] [place] [facility] - Skilled Nursing Care

## 2020-03-25 NOTE — PT/OT/SLP PROGRESS
Physical Therapy      Patient Name:  Ben Melvin   MRN:  [id number]    Patient not seen today secondary to being D/C, SO and pt reported feeling comfortable with pt care upon D/C, not necessary for FT, SO has been present for a lot of therapy sessions, will continue with HHPT, DME ordered. Will follow-up with HHPT    Selina Mckoy PTA

## 2020-03-25 NOTE — TELEPHONE ENCOUNTER
60-year-old male, obese, AFib on anticoagulation, peripheral vascular disease, hypertension, COPD, current smoker, liver cirrhosis who sustained a witnessed seizure while playing video poker 01/27/2020.  He fell from his chair.  He was brought to emergency department at outside facility.  Injuries identified were bilateral proximal humerus fracture dislocations, bilateral acromial fractures, subdural hemorrhage, L1 and L4 burst fractures.      01/28/2020 - left shoulder hemiarthroplasty, biceps tenodesis     01/30/2020 - right shoulder hemiarthroplasty, biceps tenodesis     Failure of right greater tuberosity fixation     02/14/2020 - open reduction internal fixation right greater tuberosity, humerus    Patient contacted by phone due to COVID19  Talked to both patient and his family.  He is doing well from a shoulder perspective.  Minimal pain bilateral shoulders  Controlled with OTC meds  Working on range of motion and it is steadily returning    X-rays look good with stable fixation of bilateral tuberosities and concentric reduction of bilateral shoulder hemiarthroplasty      Plan  DC both slings  Range of motion as tolerated shoulder, elbow, wrist, hand bilateral upper extremities, both active and passive  No resistance exercises bilateral shoulders until 3 months postop  Nonweightbearing bilateral upper extremity times 3 months postop          X-ray bilateral shoulders at subsequent postop visits       Subsequent f/u @ postop 3 months, 6 months, 1 year

## 2020-03-25 NOTE — PROGRESS NOTES
Pt. D/c to home accompanied by girlfriend.d/c instructions with f/u care and wound care explained to pt. girlfriend and she verbalized understanding.copy of d/c instruction sheet given to pt.pt. escorted to front entrance per PCT and assisted into family vehicle.pt. personal belongings accompanied pt.

## 2020-03-25 NOTE — PLAN OF CARE
[facility] [place] [facility] - Skilled Nursing Care     HOME HEALTH ORDERS  FACE TO FACE ENCOUNTER     Patient Name: Ben Melvin  YOB: 1960     PCP: Cristal Ohara NP   PCP Address: [address] / [place] [address]  PCP Phone Number: [phone]  PCP Fax: [phone]     Encounter Date: 03/24/2020     Admit to Home Health     Diagnoses:         Active Hospital Problems     Diagnosis   POA    *Cholelithiasis with acute cholecystitis [K80.00]   Yes    Moderate malnutrition [E44.0]   Yes    Sepsis [A41.9]   Yes    CANDY (acute kidney injury) [N17.9]   Yes    Acute encephalopathy [G93.40]   Yes    Hypertension associated with stage 3 chronic kidney disease due to type 2 diabetes mellitus [E11.22, I12.9, N18.3]   Yes    Neuropathic pain [M79.2]   Yes    GERD (gastroesophageal reflux disease) [K21.9]   Yes    Bilateral proximal humeral fractures [S42.201A, S42.202A]   Yes    Acute blood loss anemia [D62]   Yes    Diabetes 1.5, 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]   Yes    Vitamin D deficiency [E55.9]   Yes    COPD (chronic obstructive pulmonary disease) [J44.9]   Yes       Chronic    Essential hypertension [I10]   Yes    PAF (paroxysmal atrial fibrillation) [I48.0]   Yes    Hyperlipidemia [E78.5]   Yes    Long term (current) use of anticoagulants [Z79.01]   Not Applicable    Current every day smoker [F17.200]   Yes       8 ppd       GERD with esophagitis [K21.0]   Yes       Resolved Hospital Problems   No resolved problems to display.                Future Appointments   Date Time Provider Department Center   4/17/2020  9:40 AM Cristal Ohara NP [facility]  [facility]   4/23/2020  8:15 AM [facility] OIC-CT2 500 LB LIMIT [facility]   4/23/2020  8:30 AM [facility] OIC-CT2 500 LB LIMIT [facility]   5/14/2020  9:15 AM Chandler Chris MD [facility] ORTHO [place]   6/4/2020 11:00 AM Carl Barry MD [facility] NEUROS7 [facility] [place]          Follow-up Information      Schedule an appointment  as soon as possible for a visit with Cristal Ohara NP.    Specialty:  Internal Medicine  Why:  WITHIN 1-2 WEEKS  Contact information:  [address]  [address]  [place] [address] [phone]                      I have seen and examined this patient face to face today. My clinical findings that support the need for the home health skilled services and home bound status are the following:  Weakness/numbness causing balance and gait disturbance due to Fracture and Surgery making it taxing to leave home.     Allergies:Review of patient's allergies indicates:  No Known Allergies     Diet: diabetic diet: 2000 calorie     Activities:   NWB to bilateral upper extremities for 3 months from last op date ( surg date- L shoulder- 1/28; R shoulder 1/30 & 2/24) .  He should wear shoulder sling for 6 weeks and ok to remove for hygiene and PT.  He can do ROM at hand, wrist, elbow as tolerates.  Ok for AAROM of shoulder and pendulums now.  No strengthening exercises for 3 months.     Nursing:   SN to complete comprehensive assessment including routine vital signs. Instruct on disease process and s/s of complications to report to MD. Review/verify medication list sent home with the patient at time of discharge  and instruct patient/caregiver as needed. Frequency may be adjusted depending on start of care date.     Notify MD if SBP > 160 or < 90; DBP > 90 or < 50; HR > 120 or < 50; Temp > 101     INR checks 3x per week- fax results to his Coumadin monitor center- wife has info ready and has contacted them to begin monitoring him again.       CONSULTS:    Physical Therapy to evaluate and treat. Evaluate for home safety and equipment needs; Establish/upgrade home exercise program. Perform / instruct on therapeutic exercises, gait training, transfer training, and Range of Motion.  Occupational Therapy to evaluate and treat. Evaluate home environment for safety and equipment needs. Perform/Instruct on transfers, ADL training, ROM, and  therapeutic exercises.  Speech Therapy  to evaluate and treat for  Language, Swallowing and Cognition.  Aide to provide assistance with personal care, ADLs, and vital signs.     MISCELLANEOUS CARE:  Diabetic Care:   SN to perform and educate Diabetic management with blood glucose monitoring:, Fingerstick blood sugar AC and HS and Report CBG < 60 or > 350 to physician.     WOUND CARE ORDERS     Change cholecystostomy tube dressing per protocol    Keep incision clean and dry.  Cleanse with soap and water daily, pat dry.  Do not submerge under water until cleared by Ortho.        Medications: Review discharge medications with patient and family and provide education.       I certify that this patient is confined to his home and needs intermittent skilled nursing care, physical therapy, speech therapy and occupational therapy.

## 2020-03-25 NOTE — PROGRESS NOTES
Pt. Returned from main campus ortho appt per 2 Help via w/c.appt cancelled due to covid 19.will continue to monitor pt.

## 2020-03-26 NOTE — PROGRESS NOTES
Home health orders sent to [facility] home [facility]. Patient needed no DME. Discharged home with the assistance of fiance' and family. No barriers to discharge.

## 2020-03-26 NOTE — PT/OT/SLP PROGRESS
Occupational Therapy  Treatment    Ben Melvin   MRN: [id number]   Admitting Diagnosis: Cholelithiasis with acute cholecystitis    OT Date of Treatment: 03/20/20       Billable Minutes:  Therapeutic Exercise 45    General Precautions: Standard, fall  Orthopedic Precautions: RUE weight bearing as tolerated, spinal precautions, LUE non weight bearing  Braces: LSO(R UE shoulder abduction brace)     Subjective:  Communicated with pts girlfriend and nurse prior to session.  Pt is ready to be d/c this day!     Objective:       Occupational Performance:    Bed Mobility:    · Patient completed Rolling/Turning to Left with  supervision  · Patient completed Rolling/Turning to Right with supervision  · Patient completed Supine to Sit with supervision  · Patient completed Sit to Supine with supervision   From mat, hospital bed without use of bedrails    Functional Mobility/Transfers:  · Patient completed Sit <> Stand Transfer with supervision  with  hand-held assist   · Patient completed Bed <> Chair Transfer using Step Transfer technique with supervision with hand-held assist  · Patient completed Toilet Transfer Step Transfer technique with supervision with  hand-held assist  · Patient completed  Shower Transfer Step Transfer technique with supervision with hand-held assist  · Functional Mobility: Pt performs all functional mobility ambulatory level with HHA at household level during ADL's.    Activities of Daily Living:  · Feeding:  set up only    · Grooming: supervision    · Bathing: minimum assistance    · Upper Body Dressing: minimum assistance    · Lower Body Dressing: minimum assistance    · Toileting: stand by assistance      OT Exercises:   Pt seen for UZMAE therex as follows:  Arom: elbow, forearm, wrist and digits flexion/ extension full rom, 2x15 reps each with aarom required for end range elbow flexion.  aarom: L shoulder flexion 0-85*; ER 0-15*  2x15 reps each; shoulder shrugs; shoulder rolls  Prom L shoulder  flexion 0-100* and ER 0-20*  Pt was educated to perform the following daily: shoulder shrugs, forward shoulder flexion on tabletop;  strengthening L hand.  OT provided focus on functional reach tasks with active assist provided for shoulder flexion.    Additional Treatment:  Completed all family training with pts girlfriend re: safety and assistance required for ADL's, functional mobility, BUE exercise program as outlined above.    Patient left up in chair with call button in reach    ASSESSMENT:  Ben Melvin is a 60 y.o. male with a medical diagnosis of Cholelithiasis with acute cholecystitis who has made steady functional progress throughout the SNF stay. He continues to deem need for ongoing OT services to manage ongoing rehab of BUE as he continues to be significantly limited in functional use and arom.    Rehab identified problem list/impairments: weakness, impaired endurance, impaired self care skills, impaired functional mobilty, gait instability, decreased upper extremity function, orthopedic precautions, impaired balance    Rehab potential is good    Activity tolerance: Good    Discharge recommendations:Home with girlfriend providing assistance as needed.    Barriers to discharge: Inaccessible home environment     Equipment recommendations: wheelchair, bedside commode, bath bench     GOALS:   Multidisciplinary Problems     Occupational Therapy Goals        Problem: Occupational Therapy Goal    Goal Priority Disciplines Outcome Interventions   Occupational Therapy Goal     OT, PT/OT Ongoing, Progressing    Description:  Goals to be met by: 03/26/2020     Patient will increase functional independence with ADLs by performing:    Feeding with Set-up Assistance.  UE Dressing with Moderate Assistance.  LE Dressing with Moderate Assistance.  Grooming while seated with Set-up Assistance.  Toileting from bedside commode with Moderate Assistance for hygiene and clothing management.   Bathing from  edge of  bed with Moderate Assistance.  Toilet transfer to bedside commode with Stand-by Assistance. Met  Upper extremity exercise program 2 x12 reps per handout, with supervision according to orthopedic restrictions per MD order in order to maintain UE ROM and then, once cleared, in order to increase strength to improve ADLs and func mobility  Perform functional task in standing for 10 minutes with SBA in order to prepare for safe standing ADLs/iADLs.                       Plan:  Patient to be seen 5 x/week to address the above listed problems via self-care/home management, therapeutic activities, therapeutic exercises  Plan of Care expires: 04/06/20  Plan of Care reviewed with: patient    Tamara Carbajal, OT  03/25/2020

## 2020-04-01 PROBLEM — K81.9 CHOLECYSTITIS WITHOUT CALCULUS: Status: ACTIVE | Noted: 2020-03-03

## 2020-04-01 PROBLEM — S06.5XAA INTRACRANIAL SUBDURAL HEMATOMA: Status: ACTIVE | Noted: 2020-01-27

## 2020-04-01 PROCEDURE — G0180 MD CERTIFICATION HHA PATIENT: HCPCS | Mod: ,,, | Performed by: INTERNAL MEDICINE

## 2020-04-01 PROCEDURE — G0180 PR HOME HEALTH MD CERTIFICATION: ICD-10-PCS | Mod: ,,, | Performed by: INTERNAL MEDICINE

## 2020-04-16 ENCOUNTER — DOCUMENT SCAN (OUTPATIENT)
Dept: HOME HEALTH SERVICES | Facility: HOSPITAL | Age: 60
End: 2020-04-16
Payer: MEDICARE

## 2020-04-17 ENCOUNTER — TELEPHONE (OUTPATIENT)
Dept: ORTHOPEDICS | Facility: CLINIC | Age: 60
End: 2020-04-17

## 2020-04-17 ENCOUNTER — DOCUMENT SCAN (OUTPATIENT)
Dept: HOME HEALTH SERVICES | Facility: HOSPITAL | Age: 60
End: 2020-04-17

## 2020-04-17 RX ORDER — HYDROCODONE BITARTRATE AND ACETAMINOPHEN 5; 325 MG/1; MG/1
1 TABLET ORAL EVERY 6 HOURS PRN
Qty: 28 TABLET | Refills: 0 | Status: SHIPPED | OUTPATIENT
Start: 2020-04-17 | End: 2020-05-13

## 2020-04-17 NOTE — TELEPHONE ENCOUNTER
----- Message from Chandler Chris MD sent at 4/17/2020  2:53 PM CDT -----  Contact: Caregiver - Eboni Rhodes  Refilled meds      ----- Message -----  From: Jammie Raza MA  Sent: 4/17/2020  12:15 PM CDT  To: Chandler Chris MD    Spoke with pt's wife.   States pt is taking one Norco tablet when PT comes on Tuesdays and Thursdays.   And will take one at night for pain relief.  Please review and advise  Thanks  Nicole    ----- Message -----  From: Jose David Felipe  Sent: 4/17/2020  11:12 AM CDT  To: Aires Jo Staff    Rx Refill/Request     Is this a Refill or New Rx:  Refill    Rx Name and Strength:  HYDROcodone-acetaminophen (NORCO) 5-325 mg per tablet    Preferred Pharmacy with phone number: [facility]/pharmacy #[id number] [phone] (Phone)  [phone] (Fax)    Communication Preference:  [phone]    Additional Information: Pt's pain level is: 6 now

## 2020-04-20 ENCOUNTER — EXTERNAL HOME HEALTH (OUTPATIENT)
Dept: HOME HEALTH SERVICES | Facility: HOSPITAL | Age: 60
End: 2020-04-20
Payer: MEDICARE

## 2020-04-23 ENCOUNTER — HOSPITAL ENCOUNTER (OUTPATIENT)
Dept: RADIOLOGY | Facility: HOSPITAL | Age: 60
Discharge: HOME | End: 2020-04-23
Attending: NEUROLOGICAL SURGERY
Payer: MEDICARE

## 2020-04-23 DIAGNOSIS — S06.5XAA SUBDURAL HEMATOMA: ICD-10-CM

## 2020-04-23 DIAGNOSIS — S32.041A CLOSED STABLE BURST FRACTURE OF FOURTH LUMBAR VERTEBRA, INITIAL ENCOUNTER: ICD-10-CM

## 2020-04-23 PROCEDURE — 70450 CT HEAD WITHOUT CONTRAST: ICD-10-PCS | Mod: 26,,, | Performed by: RADIOLOGY

## 2020-04-23 PROCEDURE — 72131 CT LUMBAR SPINE W/O DYE: CPT | Mod: TC

## 2020-04-23 PROCEDURE — 70450 CT HEAD/BRAIN W/O DYE: CPT | Mod: TC

## 2020-04-23 PROCEDURE — 72131 CT LUMBAR SPINE W/O DYE: CPT | Mod: 26,,, | Performed by: RADIOLOGY

## 2020-04-23 PROCEDURE — 72131 CT LUMBAR SPINE WITHOUT CONTRAST: ICD-10-PCS | Mod: 26,,, | Performed by: RADIOLOGY

## 2020-04-23 PROCEDURE — 70450 CT HEAD/BRAIN W/O DYE: CPT | Mod: 26,,, | Performed by: RADIOLOGY

## 2020-05-05 ENCOUNTER — DOCUMENT SCAN (OUTPATIENT)
Dept: HOME HEALTH SERVICES | Facility: HOSPITAL | Age: 60
End: 2020-05-05

## 2020-05-13 ENCOUNTER — TELEPHONE (OUTPATIENT)
Dept: ORTHOPEDICS | Facility: CLINIC | Age: 60
End: 2020-05-13

## 2020-05-13 DIAGNOSIS — R52 PAIN: Primary | ICD-10-CM

## 2020-05-13 PROBLEM — G93.40 ACUTE ENCEPHALOPATHY: Status: RESOLVED | Noted: 2020-03-03 | Resolved: 2020-05-13

## 2020-05-13 NOTE — TELEPHONE ENCOUNTER
Spoke with family member of pt. Confirmed appt on tomorrow 5/14/2020 for 9:15am. Family member verbalize understands.

## 2020-05-14 ENCOUNTER — OFFICE VISIT (OUTPATIENT)
Dept: ORTHOPEDICS | Facility: CLINIC | Age: 60
End: 2020-05-14
Payer: MEDICARE

## 2020-05-14 ENCOUNTER — HOSPITAL ENCOUNTER (OUTPATIENT)
Dept: RADIOLOGY | Facility: HOSPITAL | Age: 60
Discharge: HOME | End: 2020-05-14
Attending: ORTHOPAEDIC SURGERY
Payer: MEDICARE

## 2020-05-14 VITALS
HEART RATE: 89 BPM | RESPIRATION RATE: 18 BRPM | DIASTOLIC BLOOD PRESSURE: 82 MMHG | SYSTOLIC BLOOD PRESSURE: 112 MMHG | TEMPERATURE: 99 F

## 2020-05-14 DIAGNOSIS — S42.91XD CLOSED FRACTURE DISLOCATION OF JOINT OF RIGHT SHOULDER GIRDLE WITH ROUTINE HEALING, SUBSEQUENT ENCOUNTER: ICD-10-CM

## 2020-05-14 DIAGNOSIS — R52 PAIN: ICD-10-CM

## 2020-05-14 DIAGNOSIS — Z96.612 STATUS POST REPLACEMENT OF BOTH SHOULDER JOINTS: Primary | ICD-10-CM

## 2020-05-14 DIAGNOSIS — S42.92XD CLOSED FRACTURE DISLOCATION OF JOINT OF LEFT SHOULDER GIRDLE WITH ROUTINE HEALING, SUBSEQUENT ENCOUNTER: ICD-10-CM

## 2020-05-14 DIAGNOSIS — S42.251K CLOSED DISPLACED FRACTURE OF GREATER TUBEROSITY OF RIGHT HUMERUS WITH NONUNION, SUBSEQUENT ENCOUNTER: ICD-10-CM

## 2020-05-14 DIAGNOSIS — Z96.611 STATUS POST REPLACEMENT OF BOTH SHOULDER JOINTS: Primary | ICD-10-CM

## 2020-05-14 PROCEDURE — 99999 PR PBB SHADOW E&M-EST. PATIENT-LVL III: ICD-10-PCS | Mod: PBBFAC,,, | Performed by: ORTHOPAEDIC SURGERY

## 2020-05-14 PROCEDURE — 73030 X-RAY EXAM OF SHOULDER: CPT | Mod: TC,50

## 2020-05-14 PROCEDURE — 73030 X-RAY EXAM OF SHOULDER: CPT | Mod: 26,50,, | Performed by: RADIOLOGY

## 2020-05-14 PROCEDURE — 73030 XR SHOULDER COMPLETE 2 OR MORE VIEWS BILATERAL: ICD-10-PCS | Mod: 26,50,, | Performed by: RADIOLOGY

## 2020-05-14 PROCEDURE — 99213 OFFICE O/P EST LOW 20 MIN: CPT | Mod: PBBFAC,25 | Performed by: ORTHOPAEDIC SURGERY

## 2020-05-14 PROCEDURE — 99999 PR PBB SHADOW E&M-EST. PATIENT-LVL III: CPT | Mod: PBBFAC,,, | Performed by: ORTHOPAEDIC SURGERY

## 2020-05-14 PROCEDURE — 99024 PR POST-OP FOLLOW-UP VISIT: ICD-10-PCS | Mod: POP,,, | Performed by: ORTHOPAEDIC SURGERY

## 2020-05-14 PROCEDURE — 99024 POSTOP FOLLOW-UP VISIT: CPT | Mod: POP,,, | Performed by: ORTHOPAEDIC SURGERY

## 2020-05-14 RX ORDER — ACETAMINOPHEN 500 MG
500 TABLET ORAL EVERY 6 HOURS PRN
Qty: 40 TABLET | Refills: 0 | Status: SHIPPED | OUTPATIENT
Start: 2020-05-14 | End: 2023-03-23

## 2020-05-14 RX ORDER — TRAMADOL HYDROCHLORIDE 50 MG/1
50 TABLET ORAL EVERY 6 HOURS
Qty: 30 TABLET | Refills: 0 | Status: SHIPPED | OUTPATIENT
Start: 2020-05-14 | End: 2021-01-28 | Stop reason: DRUGHIGH

## 2020-05-14 NOTE — PROGRESS NOTES
CC:  Postop follow-up bilateral shoulder hemiarthroplasty     HPI:  60-year-old male, obese, AFib on anticoagulation, peripheral vascular disease, hypertension, COPD, current smoker, liver cirrhosis who sustained a witnessed seizure while playing video poker 01/27/2020.  He fell from his chair.  He was brought to emergency department at outside facility.  Injuries identified were bilateral proximal humerus fracture dislocations, bilateral acromial fractures, subdural hemorrhage, L1 and L4 burst fractures.      01/28/2020 - left shoulder hemiarthroplasty, biceps tenodesis     01/30/2020 - right shoulder hemiarthroplasty, biceps tenodesis     Failure of right greater tuberosity fixation     02/14/2020 - open reduction internal fixation right greater tuberosity, humerus     SUBJECTIVE:  At home  Doing well, pleased w/ surgical result and progress  Has been ambulatory with his lumbar brace, no surgery planned per Neurosurgery/patient discussion  Denies fevers, chills, wound drainage  Pain controlled with Percocet occasionally  Has eliminated sling use and progressing w/ his ADLs     OBJECTIVE:  PE  RUE:  Incision well healed, no signs of infection  Nontender  Passive range of motion to 100° forward flexion, active 60°  Motor  deltoid,  3/5  Internal/external rotation 4/5  5/5 bicep, tricep,   5/5 wrist flexion/extension, finger flexion/extension, interossei  Sensation intact axillary, radial, median, ulnar nerves  Palp rad pulse, BCR     LUE:  Incision well healed, no signs of infection  Nontender  Passive range of motion to 100° forward flexion, active 60°  Motor  deltoid,  3/5  Internal/external rotation 4/5  5/5 bicep, tricep,   5/5 wrist flexion/extension, finger flexion/extension, interossei  Sensation intact axillary, radial, median, ulnar nerves  Palp rad pulse, BCR     XRAYS:  X-ray bilateral shoulders demonstrate hemiarthroplasty in place with stable fixation of his tuberosities  bilaterally     ASSESSMENT:  60-year-old male, obese, AFib on anticoagulation, peripheral vascular disease, hypertension, COPD, current smoker, liver cirrhosis who sustained a witnessed seizure while playing video poker 01/27/2020.  He fell from his chair.  He was brought to emergency department at outside facility.  Injuries identified were bilateral proximal humerus fracture dislocations, bilateral acromial fractures, subdural hemorrhage, L1 and L4 burst fractures.      01/28/2020 - left shoulder hemiarthroplasty, biceps tenodesis     01/30/2020 - right shoulder hemiarthroplasty, biceps tenodesis     Failure of right greater tuberosity fixation     02/14/2020 - open reduction internal fixation right greater tuberosity, humerus     Doing well  Incisions healed  X-ray stable        PLAN:  WBAT BUE  ROMAT  Cont PT  OK for resistance exercises   Aggressively work on stretching     X-ray bilateral shoulders at subsequent postop visits     Subsequent f/u @ postop 6 months, 1 year

## 2020-05-18 ENCOUNTER — DOCUMENT SCAN (OUTPATIENT)
Dept: HOME HEALTH SERVICES | Facility: HOSPITAL | Age: 60
End: 2020-05-18

## 2020-05-25 ENCOUNTER — DOCUMENT SCAN (OUTPATIENT)
Dept: HOME HEALTH SERVICES | Facility: HOSPITAL | Age: 60
End: 2020-05-25

## 2020-05-26 ENCOUNTER — TELEPHONE (OUTPATIENT)
Dept: NEUROSURGERY | Facility: CLINIC | Age: 60
End: 2020-05-26

## 2020-06-04 ENCOUNTER — OFFICE VISIT (OUTPATIENT)
Dept: NEUROSURGERY | Facility: CLINIC | Age: 60
End: 2020-06-04
Payer: MEDICARE

## 2020-06-04 VITALS — SYSTOLIC BLOOD PRESSURE: 135 MMHG | DIASTOLIC BLOOD PRESSURE: 77 MMHG | HEART RATE: 92 BPM

## 2020-06-04 DIAGNOSIS — S06.5X0S: Primary | ICD-10-CM

## 2020-06-04 DIAGNOSIS — S32.011D: ICD-10-CM

## 2020-06-04 PROCEDURE — 99999 PR PBB SHADOW E&M-EST. PATIENT-LVL III: CPT | Mod: PBBFAC,,, | Performed by: NEUROLOGICAL SURGERY

## 2020-06-04 PROCEDURE — 99999 PR PBB SHADOW E&M-EST. PATIENT-LVL III: ICD-10-PCS | Mod: PBBFAC,,, | Performed by: NEUROLOGICAL SURGERY

## 2020-06-04 PROCEDURE — 99214 OFFICE O/P EST MOD 30 MIN: CPT | Mod: S$PBB,,, | Performed by: NEUROLOGICAL SURGERY

## 2020-06-04 PROCEDURE — 99214 PR OFFICE/OUTPT VISIT, EST, LEVL IV, 30-39 MIN: ICD-10-PCS | Mod: S$PBB,,, | Performed by: NEUROLOGICAL SURGERY

## 2020-06-04 PROCEDURE — 99213 OFFICE O/P EST LOW 20 MIN: CPT | Mod: PBBFAC | Performed by: NEUROLOGICAL SURGERY

## 2020-06-04 NOTE — PROGRESS NOTES
"CHIEF COMPLAINT:  Follow up with new imaging    I, Car May, attest that this documentation has been prepared under the direction and in the presence of Carl Barry MD.    HPI:  Ben Melvin is a 60 y.o.  male with history of COPD, cardiomyopathy, EtOH abuse, tobacco abuse, paroxysmal A-fib, liver cirrhosis, PVD s/p angioplasty of peripheral vessel, on coumadin, and recent ORIF of right humerus fracture, who presents today for follow up evaluation with new imaging. Pt reports continued low back pain though it is improving. Pt is not experiencing any relief with pain medication. Pt can ambulate on his own but presents today in a wheelchair. He has been participating in physical therapy. He states that he can walk short distances with breaks and has been walking "mailbox [place] mailbox" in his neighborhood.  Pt's family reports memory difficulty and forgetfulness. He does not recall events immediately preceding his hematoma and seizure activity. Pt is capable of remembering events in the short term such as yesterday. Pt is resuming blood thinners and slowly titrating the dosage up to therapeutic levels. Pt would like to begin driving but he is not yet fully cleared with regard to his bilateral shoulders. He presents today wearing an LSO brace. He takes of his brace at home when he is more sedentary, but wears the brace during therapy or when he is active. Per pt's family, he had an infection of his gall bladder and presented to [facility]. Pt is taking gabapentin. He denies any shooting leg pain.       Review of patient's allergies indicates:   Allergen Reactions    Chantix [varenicline]      suicidal       Past Medical History:   Diagnosis Date    Adenoma of right adrenal gland 11/09/2017    Alcohol abuse     ASCVD (arteriosclerotic cardiovascular disease) 10/2008    [place]    BPH (benign prostatic hyperplasia)     Cardiomyopathy     Cholecystitis without calculus 3/3/2020    Chronic anxiety  "    Cirrhosis of liver     COPD (chronic obstructive pulmonary disease)     Current every day smoker     2-3 ppd    Elevated LFTs 08/2001    GERD with esophagitis 11/09/2017    History of colon polyps     History of epididymitis 2016    Juan Antonio    Hyperlipidemia     Hypertension, essential     Long term (current) use of anticoagulants     Lumbar disc disease with radiculopathy 05/2015    Major depression, recurrent, chronic     PAF (paroxysmal atrial fibrillation)     Peripheral autonomic neuropathy due to DM     Proteinuria     PVD (peripheral vascular disease) 05/29/2009    Venous insufficiency of both lower extremities     Vitamin D deficiency      Past Surgical History:   Procedure Laterality Date    biopsy back  05/20/2019    benign Martinez    biopsy right ankle Right 05/20/2019    Martinez, benign    COLONOSCOPY N/A 6/13/2019    Procedure: COLONOSCOPY;  Surgeon: Delmer Kerr MD;  Location: [facility];  Service: Endoscopy;  Laterality: N/A;    COLONOSCOPY W/ POLYPECTOMY      EPIDURAL STEROID INJECTION INTO LUMBAR SPINE  06/2015    LASHAE Harvey    ESOPHAGOGASTRODUODENOSCOPY N/A 6/13/2019    Procedure: ESOPHAGOGASTRODUODENOSCOPY (EGD);  Surgeon: Delmer Kerr MD;  Location: [facility];  Service: Endoscopy;  Laterality: N/A;    INTESTINAL MALROTATION REPAIR Right 1961    2 weeks old, Martinez    OPEN REDUCTION AND INTERNAL FIXATION (ORIF) OF FRACTURE OF PROXIMAL HUMERUS Right 2/14/2020    Procedure: ORIF, FRACTURE, HUMERUS, PROXIMAL - ORIF R greater tuberosity - suture repair. Trios, supine, fiberwire;  Surgeon: Chandler Chris MD;  Location: [address];  Service: Orthopedics;  Laterality: Right;    PARTIAL ARTHROPLASTY OF SHOULDER Left 1/28/2020    Procedure: HEMIARTHROPLASTY, SHOULDER;  Surgeon: Chandler Chris MD;  Location: [facility] OR [address] [place] [address];  Service: Orthopedics;  Laterality: Left;    PARTIAL ARTHROPLASTY OF SHOULDER Right 1/30/2020    Procedure:  HEMIARTHROPLASTY, SHOULDER- RIGHT- MEKA- SUPINE-  TRIOS;  Surgeon: Chandler Chris MD;  Location: [facility] OR [address] [place] [address];  Service: Orthopedics;  Laterality: Right;    PERCUTANEOUS TRANSLUMINAL ANGIOPLASTY (PTA) OF PERIPHERAL VESSEL Right 12/2014    NIKOLAS Childs     Family History   Problem Relation Age of Onset    Diabetes Mother     Hypertension Mother     Hypertension Father     Acromegaly Father     Heart attack Father     Kidney cancer Brother      Social History     Tobacco Use    Smoking status: Current Every Day Smoker     Packs/day: 9.00     Years: 40.00     Pack years: 360.00     Types: Cigarettes    Smokeless tobacco: Never Used    Tobacco comment: smokes parts all day long continuously   Substance Use Topics    Alcohol use: Not Currently     Frequency: Never     Comment: quit 2 years ago was heavily    Drug use: Never        Review of Systems   Constitutional: Negative.    HENT: Negative.    Eyes: Negative.    Respiratory: Negative.    Cardiovascular: Negative.    Gastrointestinal: Negative.    Endocrine: Negative.    Genitourinary: Negative.    Musculoskeletal: Positive for back pain (low back). Negative for gait problem and neck pain.   Skin: Negative.    Allergic/Immunologic: Negative.    Neurological: Negative for weakness, light-headedness, numbness and headaches.        Memory difficulty   Hematological: Negative.    Psychiatric/Behavioral: Negative.        OBJECTIVE:   Vital Signs:  Pulse: 92 (06/04/20 1123)  BP: 135/77 (06/04/20 1123)    Physical Exam:    Vital signs: All nursing notes and vital signs reviewed -- afebrile, vital signs stable.  Constitutional: Patient sitting comfortably in chair. Appears well developed and well nourished.  Skin: Exposed areas are intact without abnormal markings, rashes or other lesions.  HEENT: Normocephalic. Normal conjunctivae.  Cardiovascular: Normal rate and regular rhythm.  Respiratory: Chest wall rises and falls symmetrically, without  signs of respiratory distress.  Abdomen: Soft and non-tender.  Extremities: Warm and without edema. Calves supple, non-tender.  Psych/Behavior: Normal affect.    Neurological:    Mental status: Alert and oriented. Conversational and appropriate.       Cranial Nerves: Grossly intact.     Motor:    Upper:  Deltoids Triceps Biceps WE WF     R 5/5 5/5 5/5 5/5 5/5 5/5    L 5/5 5/5 5/5 5/5 5/5 5/5      Lower:  HF KE KF DF PF EHL    R 5/5 5/5 5/5 5/5 5/5 5/5    L 5/5 5/5 5/5 5/5 5/5 5/5     Sensory: Intact sensation to light touch in all extremities. Romberg negative.    Reflexes:          DTR: 2+ symmetrically throughout.     Duran's: Negative.     Babinski's: Negative.     Clonus: Negative.    Cerebellar: Finger-to-nose and rapid alternating movements normal. Gait stable, fluid.    Spine:    Posture: Head well aligned over pelvis in front and side views.  No focal or global spinal deformity visible on inspection. Shoulders and hips even. No obvious leg length discrepancy. No scapula winging.    Bending: Full ROM with forward, back and lateral bending. No rib prominence with forward bend.    Cervical:      ROM: Full with flexion, extension, lateral rotation and ear-to-shoulder bend.      Midline TTP: Negative.     Spurling's test: Negative.     Lhermitte's: Negative.    Thoracic:     Midline TTP: Negative    Lumbar:     Midline TTP: Positive over lower lumbar. Negative over L1.     Straight Leg Test: Negative     Crossed Straight Leg Test: Negative     Sciatic notch tenderness: Negative.    Other:     SI joint TTP: Negative.     Greater trochanter TTP: Negative.     Tenderness with external/internal hip rotation: Negative.    Diagnostic Results:  All imaging was independently reviewed by me.    CT Head, dated 4/23/2020:  1. No new hemorrhage  2. Improving left subdural hematoma    CT L-spine, dated 4/23/2020:  1. Stable L1 and L4 fractures    ASSESSMENT/PLAN:     Ben Melvin has stable L1 and L4 fractures, with  improving axial low back pain. He's ambulating long distances now and making progress with PT. His CT shows resolving left SDH. He still has some short term memory difficulty, but no seizures. We will see him again in 6 months with another CTH and an Xray of the L spine.    The patient understands and agrees with the plan of care. All questions were answered.     1. CT Head  2. X-ray L-spine  3. RTC 6 months in [facility] clinic      I, Dr. Carl Barry personally performed the services described in this documentation. All medical record entries made by the scribe, Car May, were at my direction and in my presence.  I have reviewed the chart and agree that the record reflects my personal performance and is accurate and complete.      Carl Barry M.D.  Department of Neurosurgery  [facility]      .

## 2020-06-04 NOTE — LETTER
June 4, 2020      Cristal Ohara, NP  [address]  [address]  [place] [address]           [facility] - Neurosurgery 7th Fl  [address]  [place] [address]  Phone: [phone]  Fax: [phone]          Patient: Ben Melvin   MR Number: [id number]   YOB: 1960   Date of Visit: 6/4/2020       Dear Cristal Ohara:    Thank you for referring Ben Melvin to me for evaluation. Attached you will find relevant portions of my assessment and plan of care.    If you have questions, please do not hesitate to call me. I look forward to following Ben Melvin along with you.    Sincerely,    Carl Barry MD    Enclosure  CC:  No Recipients    If you would like to receive this communication electronically, please contact [email or web address] or [phone] to request more information on [facility] [employer] access.    For providers and/or their staff who would like to refer a patient to [facility], please contact us through our one-stop-shop provider referral line, [facility], at [phone].    If you feel you have received this communication in error or would no longer like to receive these types of communications, please e-mail [email or web address]

## 2020-06-30 PROBLEM — S42.121A: Status: RESOLVED | Noted: 2020-01-29 | Resolved: 2020-06-30

## 2020-06-30 PROBLEM — S42.202A BILATERAL PROXIMAL HUMERAL FRACTURES: Status: RESOLVED | Noted: 2020-02-14 | Resolved: 2020-06-30

## 2020-06-30 PROBLEM — S42.92XA: Status: RESOLVED | Noted: 2020-01-27 | Resolved: 2020-06-30

## 2020-06-30 PROBLEM — G47.00 INSOMNIA: Status: RESOLVED | Noted: 2019-07-16 | Resolved: 2020-06-30

## 2020-06-30 PROBLEM — S42.122A: Status: RESOLVED | Noted: 2020-01-29 | Resolved: 2020-06-30

## 2020-06-30 PROBLEM — S42.201A BILATERAL PROXIMAL HUMERAL FRACTURES: Status: RESOLVED | Noted: 2020-02-14 | Resolved: 2020-06-30

## 2020-07-09 ENCOUNTER — TELEPHONE (OUTPATIENT)
Dept: NEUROSURGERY | Facility: CLINIC | Age: 60
End: 2020-07-09

## 2020-07-09 NOTE — TELEPHONE ENCOUNTER
Spouse states the pt has recently experienced memory issues and struggles w/speech on a periodic basis.  Recommended spouse contact pt's PCP to discuss obtaining a referral to Neurology for eval.  V/U. ----- Message from Catrina Raza MA sent at 7/9/2020 11:00 AM CDT -----  Regarding: FW: Symptoms  Contact: Eboni (SPouse) [phone]    ----- Message -----  From: Darek Pisano  Sent: 7/9/2020  10:51 AM CDT  To: Jhonny Henry Staff  Subject: Symptoms                                         Eboni would like to speak to someone regarding the patient's symptoms. Please contact the patient to discuss further.

## 2020-08-03 RX ORDER — GABAPENTIN 300 MG/1
CAPSULE ORAL
Qty: 30 CAPSULE | Refills: 11 | Status: SHIPPED | OUTPATIENT
Start: 2020-08-03 | End: 2021-01-28 | Stop reason: DRUGHIGH

## 2020-08-03 RX ORDER — ERGOCALCIFEROL 1.25 MG/1
CAPSULE ORAL
Qty: 4 CAPSULE | Refills: 11 | Status: SHIPPED | OUTPATIENT
Start: 2020-08-03 | End: 2021-01-28 | Stop reason: DRUGHIGH

## 2020-08-14 PROBLEM — A41.9 SEPSIS: Status: RESOLVED | Noted: 2020-03-03 | Resolved: 2020-08-14

## 2020-08-14 PROBLEM — R93.89 ABNORMAL CAT SCAN: Status: RESOLVED | Noted: 2019-06-03 | Resolved: 2020-08-14

## 2020-08-14 PROBLEM — I12.9 HYPERTENSION ASSOCIATED WITH STAGE 3 CHRONIC KIDNEY DISEASE DUE TO TYPE 2 DIABETES MELLITUS: Chronic | Status: ACTIVE | Noted: 2020-02-19

## 2020-08-14 PROBLEM — M79.2 NEUROPATHIC PAIN: Chronic | Status: ACTIVE | Noted: 2020-02-18

## 2020-08-14 PROBLEM — E11.22 HYPERTENSION ASSOCIATED WITH STAGE 3 CHRONIC KIDNEY DISEASE DUE TO TYPE 2 DIABETES MELLITUS: Chronic | Status: ACTIVE | Noted: 2020-02-19

## 2020-08-14 PROBLEM — S32.011A: Chronic | Status: ACTIVE | Noted: 2020-02-15

## 2020-08-14 PROBLEM — N18.30 HYPERTENSION ASSOCIATED WITH STAGE 3 CHRONIC KIDNEY DISEASE DUE TO TYPE 2 DIABETES MELLITUS: Chronic | Status: ACTIVE | Noted: 2020-02-19

## 2020-08-14 PROBLEM — N17.9 AKI (ACUTE KIDNEY INJURY): Status: RESOLVED | Noted: 2020-03-03 | Resolved: 2020-08-14

## 2020-08-17 DIAGNOSIS — S42.91XD CLOSED FRACTURE DISLOCATION OF JOINT OF RIGHT SHOULDER GIRDLE WITH ROUTINE HEALING, SUBSEQUENT ENCOUNTER: ICD-10-CM

## 2020-08-17 DIAGNOSIS — Z96.612 STATUS POST REPLACEMENT OF BOTH SHOULDER JOINTS: Primary | ICD-10-CM

## 2020-08-17 DIAGNOSIS — S42.92XD CLOSED FRACTURE DISLOCATION OF JOINT OF LEFT SHOULDER GIRDLE WITH ROUTINE HEALING, SUBSEQUENT ENCOUNTER: ICD-10-CM

## 2020-08-17 DIAGNOSIS — Z96.611 STATUS POST REPLACEMENT OF BOTH SHOULDER JOINTS: Primary | ICD-10-CM

## 2020-08-18 ENCOUNTER — OFFICE VISIT (OUTPATIENT)
Dept: ORTHOPEDICS | Facility: CLINIC | Age: 60
End: 2020-08-18
Payer: MEDICARE

## 2020-08-18 ENCOUNTER — HOSPITAL ENCOUNTER (OUTPATIENT)
Dept: RADIOLOGY | Facility: HOSPITAL | Age: 60
Discharge: HOME | End: 2020-08-18
Attending: ORTHOPAEDIC SURGERY
Payer: MEDICARE

## 2020-08-18 DIAGNOSIS — Z96.611 STATUS POST REPLACEMENT OF BOTH SHOULDER JOINTS: Primary | ICD-10-CM

## 2020-08-18 DIAGNOSIS — Z96.611 STATUS POST REPLACEMENT OF BOTH SHOULDER JOINTS: ICD-10-CM

## 2020-08-18 DIAGNOSIS — S42.91XD CLOSED FRACTURE DISLOCATION OF JOINT OF RIGHT SHOULDER GIRDLE WITH ROUTINE HEALING, SUBSEQUENT ENCOUNTER: ICD-10-CM

## 2020-08-18 DIAGNOSIS — Z96.612 STATUS POST REPLACEMENT OF BOTH SHOULDER JOINTS: ICD-10-CM

## 2020-08-18 DIAGNOSIS — Z96.612 STATUS POST REPLACEMENT OF BOTH SHOULDER JOINTS: Primary | ICD-10-CM

## 2020-08-18 DIAGNOSIS — S42.92XD CLOSED FRACTURE DISLOCATION OF JOINT OF LEFT SHOULDER GIRDLE WITH ROUTINE HEALING, SUBSEQUENT ENCOUNTER: ICD-10-CM

## 2020-08-18 PROCEDURE — 99211 OFF/OP EST MAY X REQ PHY/QHP: CPT | Mod: PBBFAC,25 | Performed by: ORTHOPAEDIC SURGERY

## 2020-08-18 PROCEDURE — 73030 X-RAY EXAM OF SHOULDER: CPT | Mod: 26,50,, | Performed by: RADIOLOGY

## 2020-08-18 PROCEDURE — 99999 PR PBB SHADOW E&M-EST. PATIENT-LVL I: CPT | Mod: PBBFAC,,, | Performed by: ORTHOPAEDIC SURGERY

## 2020-08-18 PROCEDURE — 99212 PR OFFICE/OUTPT VISIT, EST, LEVL II, 10-19 MIN: ICD-10-PCS | Mod: S$PBB,,, | Performed by: ORTHOPAEDIC SURGERY

## 2020-08-18 PROCEDURE — 99212 OFFICE O/P EST SF 10 MIN: CPT | Mod: S$PBB,,, | Performed by: ORTHOPAEDIC SURGERY

## 2020-08-18 PROCEDURE — 99999 PR PBB SHADOW E&M-EST. PATIENT-LVL I: ICD-10-PCS | Mod: PBBFAC,,, | Performed by: ORTHOPAEDIC SURGERY

## 2020-08-18 PROCEDURE — 73030 XR SHOULDER COMPLETE 2 OR MORE VIEWS BILATERAL: ICD-10-PCS | Mod: 26,50,, | Performed by: RADIOLOGY

## 2020-08-18 PROCEDURE — 73030 X-RAY EXAM OF SHOULDER: CPT | Mod: TC,50

## 2020-08-18 NOTE — PROGRESS NOTES
CC:  Postop follow-up bilateral shoulder hemiarthroplasty     HPI:  60-year-old male, obese, AFib on anticoagulation, peripheral vascular disease, hypertension, COPD, current smoker, liver cirrhosis who sustained a witnessed seizure while playing video poker 01/27/2020.  He fell from his chair.  He was brought to emergency department at outside facility.  Injuries identified were bilateral proximal humerus fracture dislocations, bilateral acromial fractures, subdural hemorrhage, L1 and L4 burst fractures.      01/28/2020 - left shoulder hemiarthroplasty, biceps tenodesis     01/30/2020 - right shoulder hemiarthroplasty, biceps tenodesis     Failure of right greater tuberosity fixation     02/14/2020 - open reduction internal fixation right greater tuberosity, humerus       Past Medical History:   Diagnosis Date    Adenoma of right adrenal gland 11/09/2017    Alcohol abuse     ASCVD (arteriosclerotic cardiovascular disease) 10/2008    [place]    BPH (benign prostatic hyperplasia)     Cardiomyopathy     Cholecystitis without calculus 3/3/2020    Chronic anxiety     Cirrhosis of liver     COPD (chronic obstructive pulmonary disease)     Current every day smoker     2-3 ppd    Elevated LFTs 08/2001    GERD with esophagitis 11/09/2017    History of colon polyps     History of epididymitis 2016    Juan Antonio    Hyperlipidemia     Hypertension, essential     Long term (current) use of anticoagulants     Lumbar disc disease with radiculopathy 05/2015    Major depression, recurrent, chronic     PAF (paroxysmal atrial fibrillation)     Peripheral autonomic neuropathy due to DM     Proteinuria     PVD (peripheral vascular disease) 05/29/2009    Venous insufficiency of both lower extremities     Vitamin D deficiency      Past Surgical History:   Procedure Laterality Date    biopsy back  05/20/2019    benign Martinez    biopsy right ankle Right 05/20/2019    Martinez, benign    COLONOSCOPY N/A 6/13/2019     Procedure: COLONOSCOPY;  Surgeon: Delmer Kerr MD;  Location: [facility];  Service: Endoscopy;  Laterality: N/A;    COLONOSCOPY W/ POLYPECTOMY      EPIDURAL STEROID INJECTION INTO LUMBAR SPINE  06/2015    LASHAE Harvey    ESOPHAGOGASTRODUODENOSCOPY N/A 6/13/2019    Procedure: ESOPHAGOGASTRODUODENOSCOPY (EGD);  Surgeon: Delmer Kerr MD;  [facility] ENDO;  Service: Endoscopy;  Laterality: N/A;    INTESTINAL MALROTATION REPAIR Right 1961    2 weeks old, Martinez    OPEN REDUCTION AND INTERNAL FIXATION (ORIF) OF FRACTURE OF PROXIMAL HUMERUS Right 2/14/2020    Procedure: ORIF, FRACTURE, HUMERUS, PROXIMAL - ORIF R greater tuberosity - suture repair. Trios, supine, fiberwire;  Surgeon: Chandler Chris MD;  [facility];  Service: Orthopedics;  Laterality: Right;    PARTIAL ARTHROPLASTY OF SHOULDER Left 1/28/2020    Procedure: HEMIARTHROPLASTY, SHOULDER;  Surgeon: Chandler Chris MD;  Location: [facility] OR [facility];  Service: Orthopedics;  Laterality: Left;    PARTIAL ARTHROPLASTY OF SHOULDER Right 1/30/2020    Procedure: HEMIARTHROPLASTY, SHOULDER- RIGHT- MEKA- SUPINE-  TRIOS;  Surgeon: Chandler Chris MD;  Location: [facility] OR [facility];  Service: Orthopedics;  Laterality: Right;    PERCUTANEOUS TRANSLUMINAL ANGIOPLASTY (PTA) OF PERIPHERAL VESSEL Right 12/2014    NIKOLAS Childs       Social History     Socioeconomic History    Marital status:      Spouse name: Eboni Rhdoes x37 years    Number of children: 1    Years of education: Not on file    Highest education level: High school graduate   Occupational History    Occupation: Retired  x 37 years     Comment: [employer]   Social Needs    Financial resource strain: Not on file    Food insecurity     Worry: Not on file     Inability: Not on file    Transportation needs     Medical: Not on file     Non-medical: Not on file   Tobacco Use    Smoking status: Current Every Day Smoker      Packs/day: 9.00     Years: 40.00     Pack years: 360.00     Types: Cigarettes    Smokeless tobacco: Never Used    Tobacco comment: smokes parts all day long continuously   Substance and Sexual Activity    Alcohol use: Not Currently     Frequency: Never     Comment: quit 2 years ago was heavily    Drug use: Never    Sexual activity: Yes     Partners: Female   Lifestyle    Physical activity     Days per week: Not on file     Minutes per session: Not on file    Stress: Not at all   Relationships    Social connections     Talks on phone: Not on file     Gets together: Not on file     Attends Yazidism service: Not on file     Active member of club or organization: Not on file     Attends meetings of clubs or organizations: Not on file     Relationship status: Not on file   Other Topics Concern    Not on file   Social History Narrative    Not on file         SUBJECTIVE:  At home  Doing well, pleased w/ surgical result and progress  Has been more active, helping w/ tasks around the house/yard  Denies fevers, chills, wound drainage  Pain controlled with tramadol occasionally       OBJECTIVE:  PE  RUE:  Incision well healed, no signs of infection  Nontender  Passive range of motion to 120° forward flexion, active 90°  External rotation 35, internal rotation L5  Motor  deltoid,   4/5  Internal/external rotation 4/5  5/5 bicep, tricep,   5/5 wrist flexion/extension, finger flexion/extension, interossei  Sensation intact axillary, radial, median, ulnar nerves  Palp rad pulse, BCR     LUE:  Incision well healed, no signs of infection  Nontender  Passive range of motion to 140° forward flexion, active 90°  External rotation 35, internal rotation L5  Motor  deltoid,  4/5  Internal/external rotation 4/5  5/5 bicep, tricep,   5/5 wrist flexion/extension, finger flexion/extension, interossei  Sensation intact axillary, radial, median, ulnar nerves  Palp rad pulse, BCR     XRAYS:  X-ray bilateral shoulders demonstrate  hemiarthroplasty in place with stable fixation of his tuberosities bilaterally     ASSESSMENT:  60-year-old male, obese, AFib on anticoagulation, peripheral vascular disease, hypertension, COPD, current smoker, liver cirrhosis who sustained a witnessed seizure while playing video poker 01/27/2020.  He fell from his chair.  He was brought to emergency department at outside facility.  Injuries identified were bilateral proximal humerus fracture dislocations, bilateral acromial fractures, subdural hemorrhage, L1 and L4 burst fractures.      01/28/2020 - left shoulder hemiarthroplasty, biceps tenodesis     01/30/2020 - right shoulder hemiarthroplasty, biceps tenodesis     Failure of right greater tuberosity fixation     02/14/2020 - open reduction internal fixation right greater tuberosity, humerus     Doing well, range of motion and strength have improved since prior visit, patient is getting to be more active  Incisions healed  X-ray stable        PLAN:  WBAT BUE  ROMAT  Cont PT  Aggressively work on stretching, strengthening     X-ray bilateral shoulders at subsequent postop visits     Subsequent f/u @ postop 1 year

## 2020-09-27 PROBLEM — J44.9 OSA AND COPD OVERLAP SYNDROME: Status: ACTIVE | Noted: 2020-09-01

## 2020-09-27 PROBLEM — G47.33 OSA AND COPD OVERLAP SYNDROME: Status: ACTIVE | Noted: 2020-09-01

## 2020-10-15 ENCOUNTER — OFFICE VISIT (OUTPATIENT)
Dept: ORTHOPEDICS | Facility: CLINIC | Age: 60
End: 2020-10-15
Payer: MEDICARE

## 2020-10-15 ENCOUNTER — HOSPITAL ENCOUNTER (OUTPATIENT)
Dept: RADIOLOGY | Facility: HOSPITAL | Age: 60
Discharge: HOME | End: 2020-10-15
Attending: ORTHOPAEDIC SURGERY
Payer: MEDICARE

## 2020-10-15 DIAGNOSIS — Z96.611 STATUS POST REPLACEMENT OF BOTH SHOULDER JOINTS: ICD-10-CM

## 2020-10-15 DIAGNOSIS — Z96.612 STATUS POST REPLACEMENT OF BOTH SHOULDER JOINTS: ICD-10-CM

## 2020-10-15 DIAGNOSIS — Z96.612 STATUS POST REPLACEMENT OF BOTH SHOULDER JOINTS: Primary | ICD-10-CM

## 2020-10-15 DIAGNOSIS — Z96.611 STATUS POST REPLACEMENT OF BOTH SHOULDER JOINTS: Primary | ICD-10-CM

## 2020-10-15 PROCEDURE — 99213 OFFICE O/P EST LOW 20 MIN: CPT | Mod: PBBFAC,25 | Performed by: ORTHOPAEDIC SURGERY

## 2020-10-15 PROCEDURE — 99213 OFFICE O/P EST LOW 20 MIN: CPT | Mod: S$PBB,,, | Performed by: ORTHOPAEDIC SURGERY

## 2020-10-15 PROCEDURE — 73030 X-RAY EXAM OF SHOULDER: CPT | Mod: TC,50

## 2020-10-15 PROCEDURE — 73030 X-RAY EXAM OF SHOULDER: CPT | Mod: 26,50,, | Performed by: RADIOLOGY

## 2020-10-15 PROCEDURE — 73030 XR SHOULDER TRAUMA 3 VIEW BILATERAL: ICD-10-PCS | Mod: 26,50,, | Performed by: RADIOLOGY

## 2020-10-15 PROCEDURE — 99213 PR OFFICE/OUTPT VISIT, EST, LEVL III, 20-29 MIN: ICD-10-PCS | Mod: S$PBB,,, | Performed by: ORTHOPAEDIC SURGERY

## 2020-10-15 PROCEDURE — 99999 PR PBB SHADOW E&M-EST. PATIENT-LVL III: CPT | Mod: PBBFAC,,, | Performed by: ORTHOPAEDIC SURGERY

## 2020-10-15 PROCEDURE — 99999 PR PBB SHADOW E&M-EST. PATIENT-LVL III: ICD-10-PCS | Mod: PBBFAC,,, | Performed by: ORTHOPAEDIC SURGERY

## 2020-10-15 NOTE — PROGRESS NOTES
CC:  Postop follow-up bilateral shoulder hemiarthroplasty     HPI:  60-year-old male, obese, AFib on anticoagulation, peripheral vascular disease, hypertension, COPD, current smoker, liver cirrhosis who sustained a witnessed seizure while playing video poker 01/27/2020.  He fell from his chair.  He was brought to emergency department at outside facility.  Injuries identified were bilateral proximal humerus fracture dislocations, bilateral acromial fractures, subdural hemorrhage, L1 and L4 burst fractures.      01/28/2020 - left shoulder hemiarthroplasty, biceps tenodesis     01/30/2020 - right shoulder hemiarthroplasty, biceps tenodesis     Failure of right greater tuberosity fixation     02/14/2020 - open reduction internal fixation right greater tuberosity, humerus       Past Medical History:   Diagnosis Date    Adenoma of right adrenal gland 11/09/2017    Alcohol abuse     ASCVD (arteriosclerotic cardiovascular disease) 10/2008    [place]    BPH (benign prostatic hyperplasia)     Cardiomyopathy     Cholecystitis without calculus 03/03/2020    no surgery    Chronic anxiety     Cirrhosis of liver     COPD (chronic obstructive pulmonary disease)     Current every day smoker     2-3 ppd    Elevated LFTs 08/2001    GERD with esophagitis 11/09/2017    History of colon polyps     History of epididymitis 2016    Juan Antonio    Hyperlipidemia     Hypertension, essential     Long term (current) use of anticoagulants     Lumbar disc disease with radiculopathy 05/2015    Major depression, recurrent, chronic     LESLIE and COPD overlap syndrome 09/2020    MIld LESLIE, auto pap 5/20 cm    PAF (paroxysmal atrial fibrillation)     Peripheral autonomic neuropathy due to DM     Proteinuria     PVD (peripheral vascular disease) 05/29/2009    Venous insufficiency of both lower extremities     Vitamin D deficiency      Past Surgical History:   Procedure Laterality Date    biopsy back  05/20/2019    benign [facility]     biopsy right ankle Right 05/20/2019    Martinez, benign    COLONOSCOPY N/A 6/13/2019    Procedure: COLONOSCOPY;  Surgeon: Delmer Kerr MD;  Location: [facility] ENDO;  Service: Endoscopy;  Laterality: N/A;    COLONOSCOPY W/ POLYPECTOMY      EPIDURAL STEROID INJECTION INTO LUMBAR SPINE  06/2015    LASHAE Harvey    ESOPHAGOGASTRODUODENOSCOPY N/A 6/13/2019    Procedure: ESOPHAGOGASTRODUODENOSCOPY (EGD);  Surgeon: Delmer Kerr MD;  Location: [facility] ENDO;  Service: Endoscopy;  Laterality: N/A;    INTESTINAL MALROTATION REPAIR Right 1961    2 weeks old, Martinez    OPEN REDUCTION AND INTERNAL FIXATION (ORIF) OF FRACTURE OF PROXIMAL HUMERUS Right 2/14/2020    Procedure: ORIF, FRACTURE, HUMERUS, PROXIMAL - ORIF R greater tuberosity - suture repair. Trios, supine, fiberwire;  Surgeon: Chandler Chris MD;  [facility];  Service: Orthopedics;  Laterality: Right;    PARTIAL ARTHROPLASTY OF SHOULDER Left 1/28/2020    Procedure: HEMIARTHROPLASTY, SHOULDER;  Surgeon: Chandler Chris MD;  Location: [facility] OR 2ND FLR;  Service: Orthopedics;  Laterality: Left;    PARTIAL ARTHROPLASTY OF SHOULDER Right 1/30/2020    Procedure: HEMIARTHROPLASTY, SHOULDER- RIGHT- MEKA- SUPINE-  TRIOS;  Surgeon: Chandler Chris MD;  Location: [facility] OR [facility] FLR;  Service: Orthopedics;  Laterality: Right;    PERCUTANEOUS TRANSLUMINAL ANGIOPLASTY (PTA) OF PERIPHERAL VESSEL Right 12/2014    NIKOLAS Childs       Social History     Socioeconomic History    Marital status:      Spouse name: Eboni Rhodes x37 years    Number of children: 1    Years of education: Not on file    Highest education level: High school graduate   Occupational History    Occupation: Retired  x 37 years     Comment: [employer]   Social Needs    Financial resource strain: Not on file    Food insecurity     Worry: Not on file     Inability: Not on file    Transportation needs     Medical: Not on file      Non-medical: Not on file   Tobacco Use    Smoking status: Current Every Day Smoker     Packs/day: 9.00     Years: 40.00     Pack years: 360.00     Types: Cigarettes    Smokeless tobacco: Never Used    Tobacco comment: smokes parts all day long continuously   Substance and Sexual Activity    Alcohol use: Not Currently     Frequency: Never     Comment: quit 2 years ago was heavily    Drug use: Never    Sexual activity: Yes     Partners: Female   Lifestyle    Physical activity     Days per week: Not on file     Minutes per session: Not on file    Stress: To some extent   Relationships    Social connections     Talks on phone: Not on file     Gets together: Not on file     Attends [employer] service: Not on file     Active member of club or organization: Not on file     Attends meetings of clubs or organizations: Not on file     Relationship status: Not on file   Other Topics Concern    Not on file   Social History Narrative    Not on file         SUBJECTIVE:  At home  Doing well, pleased w/ surgical result and progress  Has had some increase pain, some continued stiffness R shoulder, prompting PCP to obtain a CT scan of his R shoulder which indicated arthroplasty in good position, no fx, healed tuberosity, healed acromion fx    Pain in R shoulder along lat aspect deltoid, worse w/ reaching, extending arm and while sleeping    Cont to be more active, helping w/ tasks around the house/yard  Denies fevers, chills, wound drainage  Pain controlled with tramadol occasionally       OBJECTIVE:  PE   RUE:  Incision well healed, no signs of infection  Nontender  Passive range of motion to 120° forward flexion, active 90°  External rotation 35, internal rotation L5  Motor  deltoid,   5/5  IR 5/5  ER 3+/5  5/5 bicep, tricep,   5/5 wrist flexion/extension, finger flexion/extension, interossei  Sensation intact axillary, radial, median, ulnar nerves  Palp rad pulse, BCR     LUE:  Incision well healed, no signs of  infection  Nontender  Passive range of motion to 140° forward flexion, active 90°  External rotation 35, internal rotation L5  Motor  deltoid,  5/5  IR 5/5  ER 4/5  5/5 bicep, tricep,   5/5 wrist flexion/extension, finger flexion/extension, interossei  Sensation intact axillary, radial, median, ulnar nerves  Palp rad pulse, BCR     XRAYS:  X-ray bilateral shoulders, CT R shoulder demonstrate hemiarthroplasty in place with stable fixation of his tuberosities bilaterally     ASSESSMENT:  60-year-old male, obese, AFib on anticoagulation, peripheral vascular disease, hypertension, COPD, current smoker, liver cirrhosis who sustained a witnessed seizure while playing video poker 01/27/2020.  He fell from his chair.  He was brought to emergency department at outside facility.  Injuries identified were bilateral proximal humerus fracture dislocations, bilateral acromial fractures, subdural hemorrhage, L1 and L4 burst fractures.      01/28/2020 - left shoulder hemiarthroplasty, biceps tenodesis     01/30/2020 - right shoulder hemiarthroplasty, biceps tenodesis     Failure of right greater tuberosity fixation     02/14/2020 - open reduction internal fixation right greater tuberosity, humerus     Doing OK - though still w/ pain in R shoulder, likely due to cuff tear (supra/infra)  X-ray stable        PLAN:  Counseled on his condition, anticipated progressive recovery  Cont PT  If still unsatisfied w/ results after 1-2 yrs, could consider conversion to RTSA now that his Acromion fx is healed, however, recommended that he maximizes PT first    WBAT BUE  ROMAT  Cont PT  Aggressively work on stretching, strengthening     X-ray bilateral shoulders at subsequent postop visits     Subsequent f/u @ postop 1 year

## 2021-02-01 PROBLEM — I25.10 CAD IN NATIVE ARTERY: Status: ACTIVE | Noted: 2021-02-01

## 2021-02-01 PROBLEM — R94.39 ABNORMAL MYOCARDIAL PERFUSION STUDY: Status: ACTIVE | Noted: 2021-02-01

## 2021-02-17 DIAGNOSIS — R52 PAIN: Primary | ICD-10-CM

## 2021-02-18 ENCOUNTER — OFFICE VISIT (OUTPATIENT)
Dept: ORTHOPEDICS | Facility: CLINIC | Age: 61
End: 2021-02-18
Payer: MEDICARE

## 2021-02-18 ENCOUNTER — HOSPITAL ENCOUNTER (OUTPATIENT)
Dept: RADIOLOGY | Facility: HOSPITAL | Age: 61
Discharge: HOME | End: 2021-02-18
Attending: ORTHOPAEDIC SURGERY
Payer: MEDICARE

## 2021-02-18 DIAGNOSIS — Z96.612 STATUS POST REPLACEMENT OF BOTH SHOULDER JOINTS: Primary | ICD-10-CM

## 2021-02-18 DIAGNOSIS — Z96.611 STATUS POST REPLACEMENT OF BOTH SHOULDER JOINTS: Primary | ICD-10-CM

## 2021-02-18 DIAGNOSIS — R52 PAIN: ICD-10-CM

## 2021-02-18 PROCEDURE — 99999 PR PBB SHADOW E&M-EST. PATIENT-LVL III: CPT | Mod: PBBFAC,,, | Performed by: ORTHOPAEDIC SURGERY

## 2021-02-18 PROCEDURE — 99213 OFFICE O/P EST LOW 20 MIN: CPT | Mod: PBBFAC,25 | Performed by: ORTHOPAEDIC SURGERY

## 2021-02-18 PROCEDURE — 73030 PR  X-RAY SHOULDER 2+ VW: ICD-10-PCS | Mod: 26,LT,, | Performed by: RADIOLOGY

## 2021-02-18 PROCEDURE — 99212 PR OFFICE/OUTPT VISIT, EST, LEVL II, 10-19 MIN: ICD-10-PCS | Mod: S$PBB,,, | Performed by: ORTHOPAEDIC SURGERY

## 2021-02-18 PROCEDURE — 73030 X-RAY EXAM OF SHOULDER: CPT | Mod: 26,RT,, | Performed by: RADIOLOGY

## 2021-02-18 PROCEDURE — 99212 OFFICE O/P EST SF 10 MIN: CPT | Mod: S$PBB,,, | Performed by: ORTHOPAEDIC SURGERY

## 2021-02-18 PROCEDURE — 99999 PR PBB SHADOW E&M-EST. PATIENT-LVL III: ICD-10-PCS | Mod: PBBFAC,,, | Performed by: ORTHOPAEDIC SURGERY

## 2021-02-18 PROCEDURE — 73030 X-RAY EXAM OF SHOULDER: CPT | Mod: 26,LT,, | Performed by: RADIOLOGY

## 2021-02-18 PROCEDURE — 73030 X-RAY EXAM OF SHOULDER: CPT | Mod: TC,50

## 2021-03-08 PROBLEM — R56.9 CONVULSIONS: Status: ACTIVE | Noted: 2021-03-08

## 2021-08-16 PROBLEM — Z79.899 ENCOUNTER FOR LONG-TERM (CURRENT) USE OF MEDICATIONS: Chronic | Status: RESOLVED | Noted: 2019-06-03 | Resolved: 2021-08-16

## 2021-08-16 PROBLEM — Z51.81 ENCOUNTER FOR THERAPEUTIC DRUG MONITORING: Status: RESOLVED | Noted: 2019-06-03 | Resolved: 2021-08-16

## 2021-08-16 PROBLEM — Z12.5 SCREENING FOR MALIGNANT NEOPLASM OF PROSTATE: Status: RESOLVED | Noted: 2019-06-03 | Resolved: 2021-08-16

## 2021-08-18 NOTE — H&P
Hospital Medicine  History and Physical Exam    Admit Date: 2/18/2020  KIRBY TBD  Principal Problem:  Bilateral proximal humeral fractures   Primary care Physician: Cristal Ohara NP  Code status: Full Code    HPI:   Ben Melvin is a 60 y.o. male with PMH of AFib on coumadin, peripheral vascular disease, hypertension, COPD, current smoker, liver cirrhosis, who had a witnessed seizure while playing video poker 01/27/2020 and sustained bilateral proximal humerus fracture dislocations, bilateral acromial fractures, subdural hemorrhage, L1 and L4 burst fractures. He underwent left shoulder hemiarthroplasty, biceps tenodesis on 01/28/20 and right shoulder hemiarthroplasty, biceps tenodesis on 01/30/20 and [facility]. BL shoulder XRs from 02/10/20 notable for failure of the repair of the greater tuberosity on the right shoulder and [place] [facility] [place] revision fixation of his right greater tuberosity fracture. Pt had a stable CTH on 02/10/20 and warfarin was reinitiated 02/11, last dose was 02/12. Currently on Lovenox 40 mg SQ daily. He was seen by neurosurgery and will f/u with pt outpt as scheduled on 02/21/20. Patient tonight was not clear about the quality, severity, frequency, exacerbating and releiving factors of his bilateral shoulder pain. He been requesting prn medications twice a day previously.    Patient transferred to [facility] with PT and OT to improve functional status and ability to perform ADLs.     Past Medical History: Patient has a past medical history of Adenoma of right adrenal gland (11/09/2017), Alcohol abuse, ASCVD (arteriosclerotic cardiovascular disease) (10/2008), BPH (benign prostatic hyperplasia), Cardiomyopathy, Chronic anxiety, Cirrhosis of liver, COPD (chronic obstructive pulmonary disease), Current every day smoker, Elevated LFTs (08/2001), GERD with esophagitis (11/09/2017), History of colon polyps, History of epididymitis (2016), Hyperlipidemia,  [Nutrition/ Exercise/ Weight Management] : nutrition, exercise, weight management Hypertension, essential, Long term (current) use of anticoagulants, Lumbar disc disease with radiculopathy (05/2015), Major depression, recurrent, chronic, PAF (paroxysmal atrial fibrillation), Peripheral autonomic neuropathy due to DM, Proteinuria, PVD (peripheral vascular disease) (05/29/2009), Venous insufficiency of both lower extremities, and Vitamin D deficiency.    Past Surgical History: Patient has a past surgical history that includes Percutaneous transluminal angioplasty (PTA) of peripheral vessel (Right, 12/2014); Intestinal malrotation repair (Right, 1961); Epidural steroid injection into lumbar spine (06/2015); biopsy right ankle (Right, 05/20/2019); biopsy back (05/20/2019); Colonoscopy w/ polypectomy; Esophagogastroduodenoscopy (N/A, 6/13/2019); Colonoscopy (N/A, 6/13/2019); Partial arthroplasty of shoulder (Left, 1/28/2020); Partial arthroplasty of shoulder (Right, 1/30/2020); and Open reduction and internal fixation (ORIF) of fracture of proximal humerus (Right, 2/14/2020).    Social History: Patient reports that he has been smoking cigarettes. He has a 360.00 pack-year smoking history. He has never used smokeless tobacco. He reports that he drank alcohol. He reports that he does not use drugs.    Family History: family history includes Acromegaly in his father; Diabetes in his mother; Heart attack in his father; Hypertension in his father and mother; Kidney cancer in his brother.    Medications: reviewed     Allergies: Patient has No Known Allergies.    ROS  Constitutional: no fever or chills  Respiratory: no cough or shortness of breath  Cardiovascular: no chest pain or palpitations  Gastrointestinal: no nausea or vomiting, no abdominal pain or change in bowel habits  Genitourinary: no hematuria or dysuria  Integument/Breast: no rash or pruritis  Hematologic/Lymphatic: no easy bruising or lymphadenopathy  Musculoskeletal: see HPI  Neurological: no seizures or tremors  Behavioral/Psych: no  [Vaccines] : vaccines depression or anxiety    PEx  Temp:  [98.2 °F (36.8 °C)-98.4 °F (36.9 °C)]   Pulse:  []   Resp:  [17-20]   BP: ()/(39-76)   SpO2:  [87 %-96 %]   Body mass index is 36.48 kg/m².     General appearance: no distress  Mental status: Alert and oriented x 3  HEENT:  conjunctivae/corneas clear, PERRL  Neck: supple, thyroid not enlarged  Pulm:   normal respiratory effort, CTA B, no c/w/r  Card: RRR, no murmur  Abd: soft, NT, ND, BS present; no masses, no organomegaly  Ext: no edema; BUE in shoulder brace. LUE wound covered with post-op dressing and RUE wound healing well, clean, dry and intact  Pulses: 2+, symmetric  Skin: color, texture, turgor normal. No rashes or lesions  Neuro: CN II-XII grossly intact, no focal numbness or weakness, normal strength and tone     Recent Labs   Lab 02/15/20  0935 02/17/20  0434    139   K 4.4 4.6    104   CO2 24 22*   BUN 34* 29*   CREATININE 1.3 1.4   * 120*   CALCIUM 9.1 8.9   MG 1.9 1.7   PHOS 4.1 4.2     Recent Labs   Lab 02/15/20  0935 02/17/20  0434   ALKPHOS 275* 257*   ALT 39 30   AST 23 20   ALBUMIN 3.0* 2.8*   PROT 6.7 6.7   BILITOT 0.7 0.6   INR 1.0 1.0       Recent Labs   Lab 02/15/20  0935 02/17/20  0434   WBC 7.88 9.84   HGB 10.6* 11.0*   HCT 35.5* 35.9*    324   GRAN 64.5  5.1 72.0  7.1   LYMPH 24.2  1.9 18.0  1.8   MONO 7.5  0.6 7.5  0.7       Recent Labs   Lab 02/16/20  1302 02/16/20  1629 02/18/20  0826 02/18/20  1127 02/18/20  1703 02/18/20  2047   POCTGLUCOSE 153* 98 113* 100 120* 122*       Assessment and Plan:    Bilateral fracture dislocation of proximal humerus fracture  Bilateral acromial fractures with bilateral shoulder dislocation  S/p bilateral shoulder hemiarthroplasty: right on 1/28/2020 and left on 1/30/2020  S/p Pt underwent revision of fixation of R greater tuberosity fracture on 02/14/20  Follow up with orthopedics as scheduled  PT/OT - nonweightbearing bilateral upper extremities in sling  DVT prophylaxis -  continue     Neuropathic pain  Gabapentin 400 mg TID   Capsaicin cream prn  Condense pain regimen to percocet 5/325 mg ii po q6h prnpain    Pathologic osteoporotic fractures  Treat vitamin D deficiency  Further evaluation and management as per PCP     CKD III  SCr with wandering baseline 0.8-1.4     GERD (gastroesophageal reflux disease)  Continue Pantoprazole 40 mg daily     Stable burst fracture of first lumbar vertebra  L1 and L4 burst fractures  f/u in neurosurgery clinic as scheduled on 02/21/20 for further management  TSLO brace whenever not lying in bed    Constipation  Continue daily miralax and senokot-s      Subdural hematoma  Stable  Cleared to resume anticoagualtion     Type 2 diabetes mellitus with CKD III and HTN  A1c 5.9 from 01/27/20  Not on treatment current  Acuchecks in general <120  Stop accucheck and SSI     Severe Vitamin D deficiency  Level of 7 on 2/4  Ergocalciferol 50k weekly     Essential hypertension  Home medications: metoprolol and ramipril at home.   Metoprolol 25 mg BID with low normal BPs      PAF (paroxysmal atrial fibrillation)  Chronic anticoagulation  Resume warfarin at 5 mg daily   For INR 2-3  Continue metoprolol     COPD (chronic obstructive pulmonary disease)  Smoker   -- change levalbuterol nebs to prn  -- Limit oxygen to avoid hypercapnea  -nicotine patch 21 mg daily  -smoking cessation advised    Seizure disorder - continue keppra 1 g BID  Follow up with neurology after discharge - appointment needs to be made    Hyperlipidemia - atorvastatin 80 mg daily    NANCY - protonix 40 mg daily    Expected blood loss anemia - improving without specific intervention    Moderate protein calorie malnutrition - boost plus TID with meals  Regular diet  Dietary consult    Acute metabolic encephalopathy  Probable mild Cognitive impairment  speech therapy eval  Consider neuropsych eval  Change scheduled methocarbamol to prn  Hold amitriptyline for now, will use melatonin for sleep    Yayo  catheter  Placed for surgery and never removed  Remove go catheter    DJD of multiple sites - PT/OT. Pain management as above    Cirrhosis of liver attributed to alcohol. Patient rarely drinks alcohol at this time.    Continue the following medications for treatment of the indicated conditions:  ·  Indication Medication Dose Route  Frequency       amitriptyline  50 mg Oral Daily with dinner    atorvastatin  80 mg Oral QHS    capsaicin   Topical (Top) TID    cetirizine  10 mg Oral Daily    enoxaparin  40 mg Subcutaneous Daily    [START ON 2/22/2020] ergocalciferol  50,000 Units Oral Q7 Days    gabapentin  400 mg Oral TID    levETIRAcetam  1,000 mg Oral BID    metoprolol tartrate  25 mg Oral BID    nicotine  1 patch Transdermal Daily    pantoprazole  40 mg Oral Daily    polyethylene glycol  17 g Oral BID    senna-docusate 8.6-50 mg  2 tablet Oral BID    warfarin  5 mg Oral Daily       Future Appointments   Date Time Provider Department Center   2/21/2020  7:30 AM [facility]-CT2 [address] LB LIMIT [facility]   2/21/2020  8:15 AM [facility] EOS [facility]   2/21/2020  9:30 AM Carl Barry MD [facility] NEUROS[address]   2/26/2020  2:15 PM [facility] XRORTHO1 [address] LB LIMIT [facility] XRAYORT [address]   2/26/2020  2:45 PM Chandler Chris MD [facility] ORTHO [address]   3/24/2020  8:00 AM Cristal Ohara NP [facility]   4/2/2020  3:00 PM Carl Barry MD [facility] NEUROS[address]       I certify that SNF services are required to be given on an inpatient basis because Ben Melvin needs for skilled nursing care and/or skilled rehabilitation are required on a daily basis and such services can only practically be provided in a skilled nursing facility setting and are for an ongoing condition for which she received inpatient care in the hospital.     Time (minutes) spent in care of the patient including face-to-face contact and coordination of care while on unit: 110    Jennifer Anne,  MD

## 2021-11-15 PROBLEM — E11.59 TYPE 2 DIABETES MELLITUS WITH CIRCULATORY DISORDER, WITH LONG-TERM CURRENT USE OF INSULIN: Status: ACTIVE | Noted: 2021-11-15

## 2021-11-15 PROBLEM — Z71.2 PERSON CONSULTING FOR EXPLANATION OF EXAMINATION OR TEST FINDINGS: Status: RESOLVED | Noted: 2019-07-16 | Resolved: 2021-11-15

## 2021-11-15 PROBLEM — I25.10 CAD IN NATIVE ARTERY: Status: RESOLVED | Noted: 2021-02-01 | Resolved: 2021-11-15

## 2021-11-15 PROBLEM — Z71.3 DIETARY COUNSELING: Status: RESOLVED | Noted: 2019-07-15 | Resolved: 2021-11-15

## 2021-11-15 PROBLEM — F07.81 POST CONCUSSION SYNDROME: Status: ACTIVE | Noted: 2021-11-15

## 2021-11-15 PROBLEM — Z79.4 TYPE 2 DIABETES MELLITUS WITH CIRCULATORY DISORDER, WITH LONG-TERM CURRENT USE OF INSULIN: Status: ACTIVE | Noted: 2021-11-15

## 2021-11-15 PROBLEM — M75.01 ADHESIVE CAPSULITIS OF RIGHT SHOULDER: Status: ACTIVE | Noted: 2021-11-15

## 2021-11-15 PROBLEM — R94.39 ABNORMAL MYOCARDIAL PERFUSION STUDY: Status: RESOLVED | Noted: 2021-02-01 | Resolved: 2021-11-15

## 2021-11-15 PROBLEM — R63.8 DIETARY INDISCRETION: Status: ACTIVE | Noted: 2021-11-15

## 2021-11-16 PROBLEM — S42.91XA: Status: RESOLVED | Noted: 2020-01-28 | Resolved: 2021-11-16

## 2022-02-16 PROBLEM — K74.4 SECONDARY BILIARY CIRRHOSIS: Status: ACTIVE | Noted: 2022-02-16

## 2022-08-14 PROBLEM — Z79.899 OTHER LONG TERM (CURRENT) DRUG THERAPY: Status: RESOLVED | Noted: 2019-06-03 | Resolved: 2022-08-14

## 2022-08-14 PROBLEM — F07.81 POST CONCUSSION SYNDROME: Status: RESOLVED | Noted: 2021-11-15 | Resolved: 2022-08-14

## 2022-08-14 PROBLEM — Z87.898 HISTORY OF CONVULSIONS: Status: ACTIVE | Noted: 2021-03-08

## 2022-09-15 PROBLEM — E44.0 MODERATE MALNUTRITION: Status: RESOLVED | Noted: 2020-03-09 | Resolved: 2022-09-15

## 2022-12-21 PROBLEM — Z87.828 HISTORY OF SUBDURAL HEMATOMA (POST TRAUMATIC): Status: ACTIVE | Noted: 2020-01-27

## 2023-01-01 NOTE — PT/OT/SLP PROGRESS
"Speech Language Pathology  Treatment    Ben Melvin   MRN: [id number]   Admitting Diagnosis: Cholelithiasis with acute cholecystitis    Diet recommendations: Solid Diet Level: Regular  Liquid Diet Level: Thin Assistance with meals, Monitor for s/s of aspiration and Standard aspiration precautions    SLP Treatment Date: 03/13/20  Speech Start Time: 1028     Speech Stop Time: 1059     Speech Total (min): 31 min       TREATMENT BILLABLE MINUTES:  Speech Therapy Individual (cognitive tx, 2 units) 23 and [facility]/Home Management Training 8           General Precautions: Standard, fall  Current Respiratory Status: room air       Subjective:  "You gotta win when you wanna win."         Objective:      Pt seen at bedside for cognitive cognitive therapy.  SO stepped out of room during session.  Pt completed a delayed memory task recalling 3/3 units of functional information after a 3 minute delay with 100% accuracy.  Word deductions were solved with 45% accuracy ind'ly/100% given cues.  Word list retention/category inclusion tasks were completed with 60% accuracy ind'ly/80% given cues.  Analogies were completed with 60% accuracy ind'ly/100% given cues.  Education provided to pt regarding cognition, cognitive aspects targeted with each tx task, task instructions, and SLP treatment plan and POC. Pt demonstrated understanding.     Assessment:  Ben Melvin is a 60 y.o. male with a medical diagnosis of Cholelithiasis with acute cholecystitis and presents with cognitive impairments.    Discharge recommendations: Discharge Facility/Level of Care Needs: (tbd)     Goals:   Multidisciplinary Problems     SLP Goals        Problem: SLP Goal    Goal Priority Disciplines Outcome   SLP Goal     SLP Ongoing, Progressing   Description:  Speech Language Pathology Goals  Goals expected to be met by 3/17:  1. Pt will complete immediate recall tasks with 80% accuracy given min cues.   2. Following a 3 minute delay, pt will recall 3/3 " units of functional information given min-mod cues.   3. Pt will complete moderate level problem solving tasks with 80% accuracy given min-mod cues.   4. Pt will list an average of 9 items in a category in one minute given min cues.   5. Pt will participate in evaluation of reading, writing, visual spatial, and functional math.                            Plan:   Patient to be seen Therapy Frequency: 3 x/week  Planned Interventions: Cognitive-Linguistic Therapy  Plan of Care expires: 04/10/20  Plan of Care reviewed with: patient  SLP Follow-up?: Yes              MELANI Dominguez, CCC-SLP  03/13/2020     MELANI Dominguez, CCC-SLP  Speech Language Pathologist  [phone]  3/13/2020     2

## 2023-01-01 NOTE — PT/OT/SLP PROGRESS
Occupational Therapy  Treatment    Ben Melvin   MRN: [id number]   Admitting Diagnosis: Bilateral proximal humeral fractures    OT Date of Treatment: 02/24/20       Billable Minutes:  Therapeutic Exercise 55    General Precautions: Standard, diabetic, fall, seizure  Orthopedic Precautions: RUE non weight bearing, LUE non weight bearing, spinal precautions(BROMelbowristhand; LAAROM shld pendNOshldRUE ROM/pend to2-28)  Braces: TLSO, Shoulder abduction brace(bilateral)    Spiritual, Cultural Beliefs, Adventist Practices, Values that Affect Care: no    Subjective:  Communicated with pts wife and noted MD note this day with revised outline of exercise prior to session.    Objective:   Pt seen this day for functional transfer training to commode, mat and ttb requiring min A for anterior lean. Due to lumbar spine burst fractures this pt is unable to participate in standard pendulum exercises therefore he was instructed on the importance of avoiding shoulder elevation due to high risk of shoulder impingement and further difficulty with long head of his biceps tendon.    Pt transitioned to supine for prom B shoulder in straight forward flexion maintaining elbow in extension and shoulder in neutral rotation position.   RUE - 0-60* forward flexion prom, with goal of obtaining 0-90* when pt able to tolerate.  LUE - 0-75* forward flexion prom, with goal of obtaining 0-90* when pt able to tolerate.  At this time pt requires constant VC's and tactile cueing to avoid shoulder elevation R shoulder > L shoulder.     In sitting:  RUE: arom for: elbow flexion, (avoiding last 15* end range);, forearm pronation/ supination; wrist and digit flexion/ extension.  LUE: arom for: elbow flexion/ extension; forearm pronation/ supination; wrist and digit flexion/ extension.    Pt presented with simple bimanual tasks of opening containers focusing on in hand manipulation, grasp/ release and pincer grasp.     Educated pt on the technique and  importance of adhering to proper sling and swathe wear schedule, proper bed positioning limiting shoulder extension and for edema control. Pts wife present throughout session yet was not requested to return demonstration of exercise regime at this point. To be addressed when appropriate.     Patient left up in chair with call button in reach    ASSESSMENT:  Ben Melvin is a 60 y.o. male with a medical diagnosis of Bilateral proximal humeral fractures who continues to steadily improve in functional use of distal BUE's. Due to pts neuro status, he continues to demonstrate poor safety awareness, requiring 24/7 supervision/ assistance. This pts wife is aware of his supervision needs and reports having supportive family to assist as this pt can be persistent in his non-compliance. I.e. Today he was adamant that he would be appropriate to return to driving immediately upon d/c from SNF.     Rehab identified problem list/impairments: impaired endurance, impaired self care skills, impaired functional mobilty, decreased upper extremity function    Rehab potential is good    Activity tolerance: Good    Discharge recommendations: rehabilitation facility     Barriers to discharge: Inaccessible home environment     Equipment recommendations: bedside commode, wheelchair, hospital bed     GOALS:   Multidisciplinary Problems     Occupational Therapy Goals        Problem: Occupational Therapy Goal    Goal Priority Disciplines Outcome Interventions   Occupational Therapy Goal     OT, PT/OT Ongoing, Progressing    Description:  Goals to be met by: 03-11-20   Pt. Family to be I with assisting pt. With UBD, LBD   Pt. Family to be I with don/doff all braces  Pt. Family and pt. To be I with performing there ex to BUE as per ortho orders  Pt. To perform SPT without an AD and SBA  Pt. To engage in standing activities with SBA x 10 minutes    Patient will increase functional independence with ADLs by performing:                           Plan:  Patient to be seen 6 x/week to address the above listed problems via self-care/home management, therapeutic activities, therapeutic exercises  Plan of Care expires: 03/20/20  Plan of Care reviewed with: patient, spouse    Tamara Carbajal OT  02/26/2020   0.22

## 2023-03-23 PROBLEM — D69.6 LOW PLATELET COUNT: Status: ACTIVE | Noted: 2023-03-23

## 2023-04-09 NOTE — ASSESSMENT & PLAN NOTE
PAF on coumadin. Coumadin held due to SDH and restarted on 02/11/20 after repeat CTH showed stable SDH. Last dose on 02/13/20, held for revision fixation for R greater tuberosity fracture that pt underwent 02/14/20. He is currently on Lovenox 40 mg sq daily. INR today 1.0, goal 2-3. Surgery team must determine when it is most appropriate for pt to restart AC.     -- AC initiation per primary surgery team  -- Metoprolol 25 mg BID     Airway patent,   Mouth with normal mucosa. 09-Apr-2023 10:52

## 2023-08-31 PROBLEM — R00.0 TACHYCARDIA: Status: ACTIVE | Noted: 2023-08-31

## 2023-08-31 PROBLEM — R79.89 ABNORMAL SERUM THYROID STIMULATING HORMONE (TSH) LEVEL: Status: ACTIVE | Noted: 2023-08-31

## 2023-08-31 PROBLEM — F17.210 NICOTINE DEPENDENCE, CIGARETTES, UNCOMPLICATED: Status: ACTIVE | Noted: 2023-08-31

## 2023-08-31 PROBLEM — R79.89 HIGH SERUM THYROXINE (T4): Status: ACTIVE | Noted: 2023-08-31

## 2023-11-03 PROBLEM — G60.9 HEREDITARY AND IDIOPATHIC NEUROPATHY: Chronic | Status: RESOLVED | Noted: 2019-06-03 | Resolved: 2023-11-03

## 2023-11-03 PROBLEM — E13.9 DIABETES 1.5, MANAGED AS TYPE 2: Chronic | Status: ACTIVE | Noted: 2019-06-03

## 2023-11-03 PROBLEM — E88.810 METABOLIC SYNDROME: Chronic | Status: ACTIVE | Noted: 2019-06-03

## 2023-11-03 PROBLEM — E11.59 TYPE 2 DIABETES MELLITUS WITH CIRCULATORY DISORDER, WITH LONG-TERM CURRENT USE OF INSULIN: Chronic | Status: ACTIVE | Noted: 2021-11-15

## 2023-11-03 PROBLEM — Z79.4 TYPE 2 DIABETES MELLITUS WITH CIRCULATORY DISORDER, WITH LONG-TERM CURRENT USE OF INSULIN: Chronic | Status: ACTIVE | Noted: 2021-11-15

## 2023-11-03 PROBLEM — Z87.898 HISTORY OF CONVULSIONS: Chronic | Status: ACTIVE | Noted: 2021-03-08

## 2024-03-28 PROBLEM — Z87.898 HISTORY OF CONVULSIONS: Chronic | Status: RESOLVED | Noted: 2021-03-08 | Resolved: 2024-03-28

## 2024-03-28 PROBLEM — R56.1 POST-TRAUMATIC SEIZURES: Status: ACTIVE | Noted: 2024-03-28

## 2024-03-28 PROBLEM — N18.31 CHRONIC KIDNEY DISEASE, STAGE 3A: Status: ACTIVE | Noted: 2024-03-28

## 2024-10-02 NOTE — NURSING
Patient transported to ED [facility], accompanied by family, vss, will cont to monitor,   Plan:  Labs reviewed in epic and discussed with patient.   Current medications reviewed.  Refills given as warranted.   Goal bp is less than 130/85.  If you are consistently above 140/90 call my office so I can adjust your medications.   Decrease metoprolol to 50 mg daily.  If you are still dizzy and fatigued call and let me know.  It is ok to cut your current pills in half until your current bottle is finished  Patient is intermediate risk clinically for low risk type of surgery. May proceed as planned.  Ok to hold your blood thinner for the least amount of time as possible.   No cardiac testing at this time.     Follow up with me in 6 months.

## (undated) DEVICE — SEE MEDLINE ITEM 152529

## (undated) DEVICE — ELECTRODE REM PLYHSV RETURN 9

## (undated) DEVICE — SEE MEDLINE ITEM 146313

## (undated) DEVICE — SPONGE LAP 18X18 PREWASHED

## (undated) DEVICE — PACK SET UP CONVERTORS

## (undated) DEVICE — BLADE SAG 18.0X1.27X100

## (undated) DEVICE — SEE MEDLINE ITEM 157150

## (undated) DEVICE — APPLICATOR CHLORAPREP ORN 26ML

## (undated) DEVICE — SUT MONOCRYL 3-0 PS-2 UND

## (undated) DEVICE — SEE MEDLINE ITEM 146417

## (undated) DEVICE — DRESSING AQUACEL AG 3.5X10IN

## (undated) DEVICE — SCALPEL #15 BLADE STRL DISP.

## (undated) DEVICE — TRAY MINOR ORTHO

## (undated) DEVICE — DRAPE C ARM 42 X 120 10/BX

## (undated) DEVICE — SUT VICRYL PLUS 2-0 CT1 18

## (undated) DEVICE — GOWN SMART IMP BREATHABLE XXLG

## (undated) DEVICE — SOL IRR NACL .9% 3000ML

## (undated) DEVICE — SPONGE LAP 4X18 PREWASHED

## (undated) DEVICE — DRAPE STERI LONG

## (undated) DEVICE — SUPPORT SLING SHOT II MEDIUM

## (undated) DEVICE — SUT FIBERWIRE #5

## (undated) DEVICE — COVER LIGHT HANDLE 80/CA

## (undated) DEVICE — CATH SUCTION 10FR

## (undated) DEVICE — DRAPE STERI-DRAPE 1000 17X11IN

## (undated) DEVICE — HOOD T-5 TEAR AWAY STERILE

## (undated) DEVICE — SUT VICRYL 2-0 36 CT-1

## (undated) DEVICE — SET DECANTER MEDICHOICE

## (undated) DEVICE — TAPE SURG DURAPORE 2 X10YD

## (undated) DEVICE — SEE MEDLINE ITEM 157166

## (undated) DEVICE — SYS PRINEO SKIN CLOSURE

## (undated) DEVICE — SEE MEDLINE ITEM 146347

## (undated) DEVICE — SUT FIBERWIRE 2 38 IN TAPER

## (undated) DEVICE — SHEET DRAPE FAN-FOLDED 3/4

## (undated) DEVICE — MARKER SKIN STND TIP BLUE BARR

## (undated) DEVICE — NDL BOX COUNTER

## (undated) DEVICE — KIT IRR SUCTION HND PIECE

## (undated) DEVICE — DRAPE PLASTIC U 60X72

## (undated) DEVICE — SEE MEDLINE ITEM 146292

## (undated) DEVICE — SEE MEDLINE ITEM 152622

## (undated) DEVICE — TUBE SUCTION MEDI-VAC STERILE

## (undated) DEVICE — SEE MEDLINE ITEM 152487

## (undated) DEVICE — GAUZE SPONGE 4X4 12PLY

## (undated) DEVICE — SCALPEL #10 BLADE STRL DISP

## (undated) DEVICE — DRAPE INCISE IOBAN 2 23X33IN

## (undated) DEVICE — SYR 30CC LUER LOCK

## (undated) DEVICE — NDL 18GA X1 1/2 REG BEVEL

## (undated) DEVICE — SYS LABEL CORRECT MED

## (undated) DEVICE — SUT VICRYL PLUS 0 CT1 18IN

## (undated) DEVICE — ADHESIVE DERMABOND ADVANCED

## (undated) DEVICE — DRAPE STERI U-SHAPED 47X51IN

## (undated) DEVICE — DRAPE STERI INSTRUMENT 1018

## (undated) DEVICE — SYS CLSR DERMABOND PRINEO 22CM

## (undated) DEVICE — STOCKINET 4INX48

## (undated) DEVICE — SEE MEDLINE ITEM 154981

## (undated) DEVICE — BIT BRILL 2.5

## (undated) DEVICE — ELECTRODE PENCIL W/ROCKER NDL

## (undated) DEVICE — MASK FLYTE HOOD PEEL AWAY

## (undated) DEVICE — SEE MEDLINE ITEM 157131

## (undated) DEVICE — FIBERTAPE COLLAGEN COATED

## (undated) DEVICE — PRESSURIZER HI VAC HIP KIT

## (undated) DEVICE — PAD SHOULDER CARE POLAR

## (undated) DEVICE — BLADE SURG CARBON STEEL #10

## (undated) DEVICE — SUT MONOCRYL 3-0 PS-1

## (undated) DEVICE — SET SINGLE BASIN

## (undated) DEVICE — PUMP COLD THERAPY

## (undated) DEVICE — SUT 0 VICRYL / CT-1

## (undated) DEVICE — PACK SHOULDER ARTHRO TOTAL